# Patient Record
Sex: MALE | Race: WHITE | NOT HISPANIC OR LATINO | Employment: OTHER | URBAN - METROPOLITAN AREA
[De-identification: names, ages, dates, MRNs, and addresses within clinical notes are randomized per-mention and may not be internally consistent; named-entity substitution may affect disease eponyms.]

---

## 2017-01-03 ENCOUNTER — OFFICE VISIT (OUTPATIENT)
Dept: INTERNAL MEDICINE | Facility: CLINIC | Age: 77
End: 2017-01-03

## 2017-01-03 VITALS
HEART RATE: 70 BPM | DIASTOLIC BLOOD PRESSURE: 66 MMHG | WEIGHT: 186 LBS | OXYGEN SATURATION: 98 % | SYSTOLIC BLOOD PRESSURE: 124 MMHG | BODY MASS INDEX: 25.19 KG/M2 | HEIGHT: 72 IN

## 2017-01-03 DIAGNOSIS — M17.0 PRIMARY OSTEOARTHRITIS OF BOTH KNEES: ICD-10-CM

## 2017-01-03 DIAGNOSIS — G60.9 IDIOPATHIC PERIPHERAL NEUROPATHY: ICD-10-CM

## 2017-01-03 DIAGNOSIS — E78.49 OTHER HYPERLIPIDEMIA: ICD-10-CM

## 2017-01-03 DIAGNOSIS — M10.472 ACUTE GOUT DUE TO OTHER SECONDARY CAUSE INVOLVING TOE OF LEFT FOOT: Primary | ICD-10-CM

## 2017-01-03 DIAGNOSIS — F52.21 ERECTILE DYSFUNCTION OF NONORGANIC ORIGIN: ICD-10-CM

## 2017-01-03 DIAGNOSIS — I10 ESSENTIAL HYPERTENSION: ICD-10-CM

## 2017-01-03 LAB
ALBUMIN SERPL-MCNC: 3.8 G/DL (ref 3.2–4.8)
ALBUMIN/GLOB SERPL: 1.6 G/DL (ref 1.5–2.5)
ALP SERPL-CCNC: 67 U/L (ref 25–100)
ALT SERPL-CCNC: 28 U/L (ref 7–40)
AST SERPL-CCNC: 29 U/L (ref 0–33)
BILIRUB SERPL-MCNC: 0.4 MG/DL (ref 0.3–1.2)
BUN SERPL-MCNC: 23 MG/DL (ref 9–23)
BUN/CREAT SERPL: 25.6 (ref 7–25)
CALCIUM SERPL-MCNC: 9.6 MG/DL (ref 8.7–10.4)
CHLORIDE SERPL-SCNC: 107 MMOL/L (ref 99–109)
CHOLEST SERPL-MCNC: 132 MG/DL (ref 0–200)
CO2 SERPL-SCNC: 38 MMOL/L (ref 20–31)
CREAT SERPL-MCNC: 0.9 MG/DL (ref 0.6–1.3)
GLOBULIN SER CALC-MCNC: 2.4 GM/DL
GLUCOSE SERPL-MCNC: 93 MG/DL (ref 70–100)
HDLC SERPL-MCNC: 42 MG/DL (ref 40–60)
LDLC SERPL CALC-MCNC: 73 MG/DL (ref 0–100)
POTASSIUM SERPL-SCNC: 4.6 MMOL/L (ref 3.5–5.5)
PROT SERPL-MCNC: 6.2 G/DL (ref 5.7–8.2)
SODIUM SERPL-SCNC: 144 MMOL/L (ref 132–146)
TRIGL SERPL-MCNC: 83 MG/DL (ref 0–150)
TSH SERPL DL<=0.005 MIU/L-ACNC: 2.19 MIU/ML (ref 0.35–5.35)
VLDLC SERPL CALC-MCNC: 16.6 MG/DL

## 2017-01-03 PROCEDURE — G0444 DEPRESSION SCREEN ANNUAL: HCPCS | Performed by: HOSPITALIST

## 2017-01-03 PROCEDURE — 96372 THER/PROPH/DIAG INJ SC/IM: CPT | Performed by: HOSPITALIST

## 2017-01-03 PROCEDURE — 96160 PT-FOCUSED HLTH RISK ASSMT: CPT | Performed by: HOSPITALIST

## 2017-01-03 PROCEDURE — 99397 PER PM REEVAL EST PAT 65+ YR: CPT | Performed by: HOSPITALIST

## 2017-01-03 PROCEDURE — G0439 PPPS, SUBSEQ VISIT: HCPCS | Performed by: HOSPITALIST

## 2017-01-03 RX ORDER — METHYLPREDNISOLONE ACETATE 40 MG/ML
40 INJECTION, SUSPENSION INTRA-ARTICULAR; INTRALESIONAL; INTRAMUSCULAR; SOFT TISSUE ONCE
Status: COMPLETED | OUTPATIENT
Start: 2017-01-03 | End: 2017-01-03

## 2017-01-03 RX ORDER — VARDENAFIL HYDROCHLORIDE 20 MG/1
20 TABLET ORAL DAILY PRN
Qty: 30 TABLET | Refills: 5 | OUTPATIENT
Start: 2017-01-03 | End: 2017-12-08

## 2017-01-03 RX ORDER — TADALAFIL 20 MG/1
20 TABLET ORAL DAILY PRN
Qty: 30 TABLET | Refills: 5 | OUTPATIENT
Start: 2017-01-03 | End: 2017-12-08

## 2017-01-03 RX ADMIN — METHYLPREDNISOLONE ACETATE 40 MG: 40 INJECTION, SUSPENSION INTRA-ARTICULAR; INTRALESIONAL; INTRAMUSCULAR; SOFT TISSUE at 10:10

## 2017-01-03 NOTE — PROGRESS NOTES
Subjective   Isrrael Marino is a 76 y.o. male. Annual Exam (subsequent medicare wellness visit)   Also complains og OA in his knees, gout in his toes, and GERD      HPI Comments: He has been having gout pain in the left great toe and aleve is not taking care of it. He has taken steroid shot for the gout in the past and it helped. He also has chronic knee pain due to OA. He has not had any interventions but its has been worse and he would like to try an injection. His acid reflux is controlled on Nexium one a day but he had a flare up in the last few months.      The following portions of the patient's history were reviewed and updated as appropriate: allergies, current medications, past family history, past medical history, past social history, past surgical history and problem list.    Review of Systems   Constitutional: Negative for activity change and appetite change.   HENT: Negative for congestion and dental problem.    Respiratory: Negative for cough and choking.    Genitourinary: Negative for difficulty urinating and dysuria.   Musculoskeletal: Positive for arthralgias. Negative for gait problem.   Neurological: Negative for dizziness and facial asymmetry.   Psychiatric/Behavioral: Negative for agitation and behavioral problems.       Objective   Vitals:    01/03/17 0840   BP: 124/66   Pulse: 70   SpO2: 98%       Physical Exam   Constitutional: He is oriented to person, place, and time. He appears well-developed and well-nourished.   HENT:   Head: Normocephalic and atraumatic.   Right Ear: External ear normal.   Left Ear: External ear normal.   Mouth/Throat: Oropharynx is clear and moist.   Eyes: Conjunctivae and EOM are normal. Pupils are equal, round, and reactive to light.   Neck: Normal range of motion. Neck supple.   Cardiovascular: Normal rate and regular rhythm.    Pulmonary/Chest: Effort normal and breath sounds normal.   Abdominal: Bowel sounds are normal.   Musculoskeletal: Normal range of  motion. He exhibits deformity. He exhibits no tenderness.   Neurological: He is alert and oriented to person, place, and time.   Skin: Skin is warm and dry.   Psychiatric: He has a normal mood and affect. His behavior is normal. Judgment normal.       Assessment/Plan   Isrrael was seen today for annual exam.    Diagnoses and all orders for this visit:    Acute gout due to other secondary cause involving toe of left foot    Primary osteoarthritis of both knees    Idiopathic peripheral neuropathy        Results for orders placed or performed in visit on 09/01/16    COLONOSCOPY   Result Value Ref Range     Colonoscopy Fiberoptic     COLONOSCOPY   Result Value Ref Range     Colonoscopy Complete Colonoscopy

## 2017-01-03 NOTE — MR AVS SNAPSHOT
Isrrael CASEY Ned   1/3/2017 8:30 AM   Office Visit    Dept Phone:  [phone]   Encounter #:  [id number]    Provider:  Everardo Peña MD   Department:  [facility] INTERNAL MEDICINE AND ENDOCRINOLOGY [place]                Your Full Care Plan              Today's Medication Changes          These changes are accurate as of: 1/3/17 10:09 AM.  If you have any questions, ask your nurse or doctor.               New Medication(s)Ordered:     tadalafil 20 MG tablet   Commonly known as:  CIALIS   Take 1 tablet by mouth Daily As Needed for erectile dysfunction.   Started by:  Everardo Peña MD       vardenafil 20 MG tablet   Commonly known as:  LEVITRA   Take 1 tablet by mouth Daily As Needed for erectile dysfunction.   Started by:  Everardo Peña MD            Where to Get Your Medications      You can get these medications from any pharmacy     Bring a paper prescription for each of these medications     tadalafil 20 MG tablet    vardenafil 20 MG tablet                  Your Updated Medication List          This list is accurate as of: 1/3/17 10:09 AM.  Always use your most recent med list.                B COMPLEX PO       B-12 PO       clopidogrel 75 MG tablet   Commonly known as:  PLAVIX   TAKE 1 TABLET EVERY DAY       esomeprazole 20 MG capsule   Commonly known as:  nexIUM       fluticasone 50 MCG/ACT nasal spray   Commonly known as:  FLONASE       gabapentin 300 MG capsule   Commonly known as:  NEURONTIN   Take 1-2 capsules by mouth every night.       lisinopril 10 MG tablet   Commonly known as:  PRINIVIL,ZESTRIL       metoprolol succinate XL 25 MG 24 hr tablet   Commonly known as:  TOPROL-XL   TAKE 1 TABLET ONE TIME DAILY       simvastatin 40 MG tablet   Commonly known as:  ZOCOR       tadalafil 20 MG tablet   Commonly known as:  CIALIS   Take 1 tablet by mouth Daily As Needed for erectile dysfunction.       vardenafil 20 MG tablet   Commonly known as:  LEVITRA   Take 1 tablet by  mouth Daily As Needed for erectile dysfunction.               We Performed the Following     Ambulatory Referral to Orthopedic Surgery     Comprehensive Metabolic Panel     Lipid Panel     TSH       You Were Diagnosed With        Codes Comments    Acute gout due to other secondary cause involving toe of left foot    -  Primary ICD-10-CM: M10.472     Primary osteoarthritis of both knees     ICD-10-CM: M17.0  ICD-9-CM: 715.16     Idiopathic peripheral neuropathy     ICD-10-CM: G60.9  ICD-9-CM: 356.9     Erectile dysfunction of nonorganic origin     ICD-10-CM: F52.21  ICD-9-CM: 302.72     Essential hypertension     ICD-10-CM: I10  ICD-9-CM: 401.9     Other hyperlipidemia     ICD-10-CM: E78.4  ICD-9-CM: 272.4       Medications to be Given to You by a Medical Professional     Due       Frequency    1/3/2017 methylPREDNISolone acetate (DEPO-medrol) injection 40 mg  Once      Instructions     None    Patient Instructions History      Upcoming Appointments     Visit Type Date Time Department    SUBSEQUENT MEDICARE WELLNESS 1/3/2017  8:30 AM [facility]    FOLLOW UP 7/3/2017  8:15 AM [facility]    FOLLOW UP 9/12/2017 10:00 AM [facility]    FOLLOW UP 11/6/2017  9:45 AM [facility]      [employer] Signup     Our records indicate that your [facility] [employer] account has been deactivated. If you would like to reactivate your account, please email [email or web address] or call [phone] to talk to our [employer] staff.             Other Info from Your Visit           Your Appointments     Jul 03, 2017  8:15 AM EDT   Follow Up with Everardo Peña MD   [facility] INTERNAL MEDICINE AND ENDOCRINOLOGY [place] (--)    [address]  [place] [id number] [phone]           Arrive 15 minutes prior to appointment.            Sep 12, 2017 10:00 AM EDT   Follow Up with Zeus Rizvi MD   [facility] (--)    [address]  [place]  "[id number]   [phone]           Arrive 15 minutes prior to appointment.            Nov 06, 2017  9:45 AM EST   Follow Up with Casimiro Bernal MD   [facility] CARDIOLOG[facility] (--)    [address]  [place] [address] [phone]           Arrive 15 minutes prior to appointment.              Allergies     Ibuprofen        Reason for Visit     Annual Exam subsequent medicare wellness visit      Vital Signs     Blood Pressure Pulse Height Weight Oxygen Saturation Body Mass Index    124/66 70 72\" (182.9 cm) 186 lb (84.4 kg) 98% 25.23 kg/m2    Smoking Status                   Never Smoker           Problems and Diagnoses Noted     Sexual dysfunction    Gout    High cholesterol or triglycerides    High blood pressure    Problem with function of peripheral nerve    Primary osteoarthritis of both knees        "

## 2017-01-03 NOTE — PROGRESS NOTES
QUICK REFERENCE INFORMATION:  The ABCs of the Annual Wellness Visit    Subsequent Medicare Wellness Visit    HEALTH RISK ASSESSMENT    Recent Hospitalizations:  No recent hospitalization(s)..    Health Habits:  Current Diet: Well Balanced Diet  Dental Exam. up to date  Eye Exam. not up to date - scheduling an appt  Exercise: 5 times/week.  Current exercise activities include: light weights/kettlebells and walking    Current Medical Providers:  Patient Care Team:  Everardo Peña MD as PCP - General  Everardo Peña MD as PCP - Family Medicine    The [facility] providers who are involved in the care of this patient are listed above. Additional providers and suppliers are listed below:          Smoking Status:  History   Smoking Status   • Never Smoker   Smokeless Tobacco   • Never Used       Alcohol Consumption:  History   Alcohol Use No       Depression Screen:   PHQ-9 Depression Screening 1/3/2017   Little interest or pleasure in doing things 0   Feeling down, depressed, or hopeless 0   Trouble falling or staying asleep, or sleeping too much 0   Feeling tired or having little energy 0   Poor appetite or overeating 0   Feeling bad about yourself - or that you are a failure or have let yourself or your family down 0   Trouble concentrating on things, such as reading the newspaper or watching television 0   Moving or speaking so slowly that other people could have noticed. Or the opposite - being so fidgety or restless that you have been moving around a lot more than usual 0   Thoughts that you would be better off dead, or of hurting yourself in some way 0   PHQ-9 Total Score 0       Functional and Cognitive Screening:  Functional & Cognitive Status 1/3/2017   Do you have difficulty preparing food and eating? No   Do you have difficulty bathing yourself? No   Do you have difficulty getting dressed? Yes   Do you have difficulty using the toilet? No   Do you have difficulty moving around from place to place?  Yes   In the past year have you fallen or experienced a near fall? No   Do you need help using the phone?  No   Are you deaf or do you have serious difficulty hearing?  Yes   Do you need help with transportation? No   Do you need help shopping? No   Do you need help preparing meals?  No   Do you need help with housework?  No   Do you need help with laundry? No   Do you need help taking your medications? No   Do you need help managing money? No   Do you have difficulty concentrating, remembering or making decisions? No       Falls Risk Assessment:       Does the patient have evidence of cognitive impairment? No    Recent Lab Results:  CMP:  Lab Results   Component Value Date    BUN 40 (H) 06/29/2016    CREATININE 1.10 06/29/2016    EGFRIFNONA 65 06/29/2016    BCR 36.4 (H) 06/29/2016     06/29/2016    K 4.5 06/29/2016    CO2 31.0 06/29/2016    CALCIUM 9.2 06/29/2016    ALBUMIN 4.00 06/29/2016    LABIL2 1.4 06/29/2016    BILITOT 0.6 06/29/2016    ALKPHOS 60 06/29/2016    AST 30 06/29/2016    ALT 22 06/29/2016     Lipid Panel:  Lab Results   Component Value Date    CHLPL 160 11/24/2015    TRIG 74 11/24/2015    HDL 64 (H) 11/24/2015     HbA1c:  No results found for: HGBA1C  Urine Microalbumin:  No results found for: MICROALBUR, POCMALB  Visual Acuity:  No exam data present    Age-appropriate Screening Schedule:  Refer to the list below for future screening recommendations based on patient's age, sex and/or medical conditions. Orders for these recommended tests are listed in the plan section. The patient has been provided with a written plan.    Health Maintenance   Topic Date Due   • TDAP/TD VACCINES (2 - Td) 12/11/2012   • LIPID PANEL  01/03/2017   • INFLUENZA VACCINE  08/01/2016   • ZOSTER VACCINE  02/14/2017 (Originally 6/29/2016)   • PNEUMOCOCCAL VACCINES (65+ LOW/MEDIUM RISK)  Excluded        Subjective   History of Present Illness    Isrrael Marino is a 76 y.o. male who presents for an Subsequent Wellness  Visit. In addition, we addressed the following health issues:Osteoarthritis and gout in his left great toe, GERD  He also had a prostate exam with Dr. Gutierrez this year  The following portions of the patient's history were reviewed and updated as appropriate: allergies, current medications, past family history, past medical history, past social history, past surgical history and problem list.    Outpatient Medications Prior to Visit   Medication Sig Dispense Refill   • B Complex Vitamins (B COMPLEX PO) Take  by mouth As Needed.     • clopidogrel (PLAVIX) 75 MG tablet TAKE 1 TABLET EVERY DAY 90 tablet 2   • Cyanocobalamin (B-12 PO) Take  by mouth As Needed.     • esomeprazole (NexIUM) 20 MG capsule Take 20 mg by mouth 2 (two) times a day.     • fluticasone (FLONASE) 50 MCG/ACT nasal spray 2 sprays into each nostril daily.     • gabapentin (NEURONTIN) 300 MG capsule Take 1-2 capsules by mouth every night. (Patient taking differently: Take 300-600 mg by mouth As Needed.) 60 capsule 3   • lisinopril (PRINIVIL,ZESTRIL) 10 MG tablet Take 10 mg by mouth daily.     • metoprolol succinate XL (TOPROL-XL) 25 MG 24 hr tablet TAKE 1 TABLET ONE TIME DAILY 90 tablet 2   • simvastatin (ZOCOR) 40 MG tablet Take 40 mg by mouth daily.       No facility-administered medications prior to visit.        Patient Active Problem List   Diagnosis   • Gastroesophageal reflux disease   • Atopic rhinitis   • Arthritis   • Peripheral neuropathy   • Arthralgia of hip   • Neck pain   • Low back pain   • Irritable bowel syndrome   • Hypertension   • Hyperlipidemia   • Hyperglycemia   • Hemorrhoids   • Gout   • Enlarged prostate   • Erectile dysfunction of nonorganic origin   • Cough   • Constipation   • Chronic diarrhea   • Benign prostatic hyperplasia   • Chronic coronary artery disease   • Lower extremity neuropathy   • Primary osteoarthritis of both knees       Identification of Risk Factors:  Risk factors include: cardiovascular risk.    Review of  "Systems   Constitutional: Negative for activity change and appetite change.   HENT: Negative for congestion and dental problem.    Respiratory: Negative for apnea and chest tightness.    Cardiovascular: Negative for chest pain and leg swelling.   Endocrine: Negative for cold intolerance and heat intolerance.   Genitourinary: Negative for difficulty urinating and dysuria.   Musculoskeletal: Negative for arthralgias and back pain.   Neurological: Negative for dizziness and facial asymmetry.   Hematological: Negative for adenopathy. Does not bruise/bleed easily.   Psychiatric/Behavioral: Negative for agitation and behavioral problems.       Compared to one year ago, the patient feels his physical health is the same.  Compared to one year ago, the patient feels his mental health is the same.    Objective     Physical Exam    Vitals:    01/03/17 0840   BP: 124/66   Pulse: 70   SpO2: 98%   Weight: 186 lb (84.4 kg)   Height: 72\" (182.9 cm)       Body mass index is 25.23 kg/(m^2).  Discussed the patient's BMI with him. The BMI is in the acceptable range.    Assessment/Plan   Patient Self-Management and Personalized Health Advice  The patient has been provided with information about: designing advance directives and preventive services including:   · Advanced directives: has an advanced directive - a copy HAS NOT been provided.    Visit Diagnoses:    ICD-10-CM ICD-9-CM   1. Acute gout due to other secondary cause involving toe of left foot M10.472    2. Primary osteoarthritis of both knees M17.0 715.16   3. Idiopathic peripheral neuropathy G60.9 356.9   4. Erectile dysfunction of nonorganic origin F52.21 302.72   5. Essential hypertension I10 401.9   6. Other hyperlipidemia E78.4 272.4       Orders Placed This Encounter   Procedures   • Comprehensive Metabolic Panel   • Lipid Panel   • TSH   • Ambulatory Referral to Orthopedic Surgery     Referral Priority:   Routine     Referral Type:   Consultation     Referral Reason:   " Specialty Services Required     Requested Specialty:   Orthopedic Surgery     Number of Visits Requested:   1       Outpatient Encounter Prescriptions as of 1/3/2017   Medication Sig Dispense Refill   • B Complex Vitamins (B COMPLEX PO) Take  by mouth As Needed.     • clopidogrel (PLAVIX) 75 MG tablet TAKE 1 TABLET EVERY DAY 90 tablet 2   • Cyanocobalamin (B-12 PO) Take  by mouth As Needed.     • esomeprazole (NexIUM) 20 MG capsule Take 20 mg by mouth 2 (two) times a day.     • fluticasone (FLONASE) 50 MCG/ACT nasal spray 2 sprays into each nostril daily.     • gabapentin (NEURONTIN) 300 MG capsule Take 1-2 capsules by mouth every night. (Patient taking differently: Take 300-600 mg by mouth As Needed.) 60 capsule 3   • lisinopril (PRINIVIL,ZESTRIL) 10 MG tablet Take 10 mg by mouth daily.     • metoprolol succinate XL (TOPROL-XL) 25 MG 24 hr tablet TAKE 1 TABLET ONE TIME DAILY 90 tablet 2   • simvastatin (ZOCOR) 40 MG tablet Take 40 mg by mouth daily.     • tadalafil (CIALIS) 20 MG tablet Take 1 tablet by mouth Daily As Needed for erectile dysfunction. 30 tablet 5   • vardenafil (LEVITRA) 20 MG tablet Take 1 tablet by mouth Daily As Needed for erectile dysfunction. 30 tablet 5     Facility-Administered Encounter Medications as of 1/3/2017   Medication Dose Route Frequency Provider Last Rate Last Dose   • methylPREDNISolone acetate (DEPO-medrol) injection 40 mg  40 mg Intramuscular Once Everardo Peña MD           Reviewed use of high risk medication in the elderly: yes  Reviewed for potential of harmful drug interactions in the elderly: yes    Follow Up:  Return in about 6 months (around 7/3/2017) for Next scheduled follow up.     An After Visit Summary and PPPS with all of these plans were given to the patient.

## 2017-02-08 RX ORDER — SIMVASTATIN 40 MG
TABLET ORAL
Qty: 90 TABLET | Refills: 3 | Status: SHIPPED | OUTPATIENT
Start: 2017-02-08 | End: 2018-03-02 | Stop reason: SDUPTHER

## 2017-02-08 RX ORDER — LISINOPRIL 10 MG/1
TABLET ORAL
Qty: 90 TABLET | Refills: 3 | Status: SHIPPED | OUTPATIENT
Start: 2017-02-08 | End: 2018-07-09

## 2017-05-10 RX ORDER — FLUTICASONE PROPIONATE 50 MCG
SPRAY, SUSPENSION (ML) NASAL
Qty: 48 G | Refills: 3 | OUTPATIENT
Start: 2017-05-10 | End: 2017-12-08

## 2017-05-23 ENCOUNTER — OFFICE VISIT (OUTPATIENT)
Dept: ORTHOPEDIC SURGERY | Facility: CLINIC | Age: 77
End: 2017-05-23

## 2017-05-23 VITALS
WEIGHT: 182 LBS | HEIGHT: 71 IN | BODY MASS INDEX: 25.48 KG/M2 | SYSTOLIC BLOOD PRESSURE: 142 MMHG | HEART RATE: 63 BPM | DIASTOLIC BLOOD PRESSURE: 70 MMHG

## 2017-05-23 DIAGNOSIS — M17.0 PRIMARY OSTEOARTHRITIS OF BOTH KNEES: Primary | ICD-10-CM

## 2017-05-23 PROCEDURE — 20610 DRAIN/INJ JOINT/BURSA W/O US: CPT | Performed by: PHYSICIAN ASSISTANT

## 2017-05-23 RX ORDER — LIDOCAINE HYDROCHLORIDE 10 MG/ML
4 INJECTION, SOLUTION INFILTRATION; PERINEURAL
Status: COMPLETED | OUTPATIENT
Start: 2017-05-23 | End: 2017-05-23

## 2017-05-23 RX ORDER — METHYLPREDNISOLONE ACETATE 40 MG/ML
40 INJECTION, SUSPENSION INTRA-ARTICULAR; INTRALESIONAL; INTRAMUSCULAR; SOFT TISSUE
Status: COMPLETED | OUTPATIENT
Start: 2017-05-23 | End: 2017-05-23

## 2017-05-23 RX ORDER — BUPIVACAINE HYDROCHLORIDE 2.5 MG/ML
4 INJECTION, SOLUTION INFILTRATION; PERINEURAL
Status: COMPLETED | OUTPATIENT
Start: 2017-05-23 | End: 2017-05-23

## 2017-05-23 RX ADMIN — BUPIVACAINE HYDROCHLORIDE 4 ML: 2.5 INJECTION, SOLUTION INFILTRATION; PERINEURAL at 08:48

## 2017-05-23 RX ADMIN — METHYLPREDNISOLONE ACETATE 40 MG: 40 INJECTION, SUSPENSION INTRA-ARTICULAR; INTRALESIONAL; INTRAMUSCULAR; SOFT TISSUE at 08:48

## 2017-05-23 RX ADMIN — METHYLPREDNISOLONE ACETATE 40 MG: 40 INJECTION, SUSPENSION INTRA-ARTICULAR; INTRALESIONAL; INTRAMUSCULAR; SOFT TISSUE at 08:50

## 2017-05-23 RX ADMIN — LIDOCAINE HYDROCHLORIDE 4 ML: 10 INJECTION, SOLUTION INFILTRATION; PERINEURAL at 08:48

## 2017-05-23 RX ADMIN — LIDOCAINE HYDROCHLORIDE 4 ML: 10 INJECTION, SOLUTION INFILTRATION; PERINEURAL at 08:50

## 2017-05-23 RX ADMIN — BUPIVACAINE HYDROCHLORIDE 4 ML: 2.5 INJECTION, SOLUTION INFILTRATION; PERINEURAL at 08:50

## 2017-07-03 ENCOUNTER — OFFICE VISIT (OUTPATIENT)
Dept: INTERNAL MEDICINE | Facility: CLINIC | Age: 77
End: 2017-07-03

## 2017-07-03 VITALS
DIASTOLIC BLOOD PRESSURE: 84 MMHG | SYSTOLIC BLOOD PRESSURE: 138 MMHG | BODY MASS INDEX: 25.38 KG/M2 | OXYGEN SATURATION: 98 % | HEART RATE: 56 BPM | WEIGHT: 182 LBS

## 2017-07-03 DIAGNOSIS — M10.471 ACUTE GOUT DUE TO OTHER SECONDARY CAUSE INVOLVING TOE OF RIGHT FOOT: ICD-10-CM

## 2017-07-03 DIAGNOSIS — I10 ESSENTIAL HYPERTENSION: Primary | ICD-10-CM

## 2017-07-03 PROCEDURE — 99213 OFFICE O/P EST LOW 20 MIN: CPT | Performed by: NURSE PRACTITIONER

## 2017-07-03 RX ORDER — HYDROCODONE BITARTRATE AND ACETAMINOPHEN 10; 325 MG/1; MG/1
TABLET ORAL AS NEEDED
COMMUNITY
Start: 2017-06-29 | End: 2017-12-07

## 2017-07-03 RX ORDER — CLINDAMYCIN PHOSPHATE 11.9 MG/ML
SOLUTION TOPICAL
COMMUNITY
Start: 2017-06-13 | End: 2017-09-21

## 2017-07-03 RX ORDER — AMOXICILLIN AND CLAVULANATE POTASSIUM 500; 125 MG/1; MG/1
TABLET, FILM COATED ORAL
COMMUNITY
Start: 2017-06-29 | End: 2017-09-21

## 2017-07-03 RX ORDER — COLCHICINE 0.6 MG/1
TABLET ORAL
Qty: 9 TABLET | Refills: 0 | OUTPATIENT
Start: 2017-07-03 | End: 2017-12-08

## 2017-07-03 NOTE — PROGRESS NOTES
Subjective  Follow-up (hypertension, gout flared up )      Isrrael Marino is a 77 y.o. male.   Allergies   Allergen Reactions   • Ibuprofen      History of Present Illness      States his blood pressure was doing well usually 120/68, no cp, no soa     Did have a gout flare  Up , took his meds and got a little better but still red and swollen 3 days , is a constant ache  The following portions of the patient's history were reviewed and updated as appropriate: allergies, current medications, past family history, past medical history, past social history, past surgical history and problem list.    Review of Systems   Constitutional: Negative for fatigue.   Respiratory: Negative for apnea, chest tightness and shortness of breath.    Cardiovascular: Negative for chest pain, palpitations and leg swelling.   Musculoskeletal: Positive for arthralgias.   Skin: Negative for color change.   Psychiatric/Behavioral: The patient is not nervous/anxious.    All other systems reviewed and are negative.      Objective   Physical Exam   Constitutional: He is oriented to person, place, and time. He appears well-developed and well-nourished.   HENT:   Head: Normocephalic and atraumatic.   Eyes: Conjunctivae are normal.   Neck: Neck supple. No thyromegaly present.   Cardiovascular: Normal rate and regular rhythm.    Pulmonary/Chest: Effort normal and breath sounds normal.   Musculoskeletal: He exhibits tenderness.   Right great toe is swollen, erythematous and swollen   Lymphadenopathy:     He has no cervical adenopathy.   Neurological: He is alert and oriented to person, place, and time.   Skin: Skin is warm and dry.   Psychiatric: He has a normal mood and affect. His behavior is normal. Judgment and thought content normal.   Nursing note and vitals reviewed.    /84  Pulse 56  Wt 182 lb (82.6 kg)  SpO2 98%  BMI 25.38 kg/m2    Assessment/Plan     Problem List Items Addressed This Visit        Cardiovascular and Mediastinum     Hypertension - Primary     Blood pressure is controlled with medication.              Other    Gout     meds as ordered               rtc 6 mos

## 2017-08-22 ENCOUNTER — OFFICE VISIT (OUTPATIENT)
Dept: ORTHOPEDIC SURGERY | Facility: CLINIC | Age: 77
End: 2017-08-22

## 2017-08-22 DIAGNOSIS — M25.511 BILATERAL SHOULDER PAIN, UNSPECIFIED CHRONICITY: ICD-10-CM

## 2017-08-22 DIAGNOSIS — M25.512 BILATERAL SHOULDER PAIN, UNSPECIFIED CHRONICITY: ICD-10-CM

## 2017-08-22 DIAGNOSIS — M17.0 PRIMARY OSTEOARTHRITIS OF BOTH KNEES: Primary | ICD-10-CM

## 2017-08-22 PROCEDURE — 99213 OFFICE O/P EST LOW 20 MIN: CPT | Performed by: PHYSICIAN ASSISTANT

## 2017-08-22 PROCEDURE — 20610 DRAIN/INJ JOINT/BURSA W/O US: CPT | Performed by: PHYSICIAN ASSISTANT

## 2017-08-22 RX ORDER — METHYLPREDNISOLONE ACETATE 40 MG/ML
40 INJECTION, SUSPENSION INTRA-ARTICULAR; INTRALESIONAL; INTRAMUSCULAR; SOFT TISSUE
Status: COMPLETED | OUTPATIENT
Start: 2017-08-22 | End: 2017-08-22

## 2017-08-22 RX ORDER — BUPIVACAINE HYDROCHLORIDE 2.5 MG/ML
4 INJECTION, SOLUTION INFILTRATION; PERINEURAL
Status: COMPLETED | OUTPATIENT
Start: 2017-08-22 | End: 2017-08-22

## 2017-08-22 RX ORDER — LIDOCAINE HYDROCHLORIDE 10 MG/ML
4 INJECTION, SOLUTION INFILTRATION; PERINEURAL
Status: COMPLETED | OUTPATIENT
Start: 2017-08-22 | End: 2017-08-22

## 2017-08-22 RX ADMIN — LIDOCAINE HYDROCHLORIDE 4 ML: 10 INJECTION, SOLUTION INFILTRATION; PERINEURAL at 09:02

## 2017-08-22 RX ADMIN — METHYLPREDNISOLONE ACETATE 40 MG: 40 INJECTION, SUSPENSION INTRA-ARTICULAR; INTRALESIONAL; INTRAMUSCULAR; SOFT TISSUE at 09:02

## 2017-08-22 RX ADMIN — BUPIVACAINE HYDROCHLORIDE 4 ML: 2.5 INJECTION, SOLUTION INFILTRATION; PERINEURAL at 09:02

## 2017-08-22 RX ADMIN — LIDOCAINE HYDROCHLORIDE 4 ML: 10 INJECTION, SOLUTION INFILTRATION; PERINEURAL at 09:04

## 2017-08-22 RX ADMIN — METHYLPREDNISOLONE ACETATE 40 MG: 40 INJECTION, SUSPENSION INTRA-ARTICULAR; INTRALESIONAL; INTRAMUSCULAR; SOFT TISSUE at 09:04

## 2017-08-22 RX ADMIN — BUPIVACAINE HYDROCHLORIDE 4 ML: 2.5 INJECTION, SOLUTION INFILTRATION; PERINEURAL at 09:04

## 2017-08-22 NOTE — PROGRESS NOTES
Procedure   Large Joint Arthrocentesis  Date/Time: 8/22/2017 9:04 AM  Consent given by: patient  Site marked: site marked  Timeout: Immediately prior to procedure a time out was called to verify the correct patient, procedure, equipment, support staff and site/side marked as required   Supporting Documentation  Indications: pain   Procedure Details  Location: knee - L knee  Preparation: Patient was prepped and draped in the usual sterile fashion  Needle size: 22 G  Approach: anterolateral  Medications administered: 4 mL bupivacaine; 4 mL lidocaine 1 %; 40 mg methylPREDNISolone acetate 40 MG/ML  Patient tolerance: patient tolerated the procedure well with no immediate complications

## 2017-08-22 NOTE — PROGRESS NOTES
Procedure   Large Joint Arthrocentesis  Date/Time: 8/22/2017 9:02 AM  Consent given by: patient  Site marked: site marked  Timeout: Immediately prior to procedure a time out was called to verify the correct patient, procedure, equipment, support staff and site/side marked as required   Supporting Documentation  Indications: pain   Procedure Details  Location: knee - R knee  Preparation: Patient was prepped and draped in the usual sterile fashion  Needle size: 22 G  Approach: anterolateral  Medications administered: 4 mL bupivacaine; 4 mL lidocaine 1 %; 40 mg methylPREDNISolone acetate 40 MG/ML  Patient tolerance: patient tolerated the procedure well with no immediate complications

## 2017-08-22 NOTE — PROGRESS NOTES
I have reviewed the notes, assessments, and/or procedures performed by Tish Shelley PA-C, I concur with her documentation of Isrrael Marino.

## 2017-08-22 NOTE — PROGRESS NOTES
Patient: Isrrael Marino  : 1940    Primary Care Provider: Everardo Peña MD (Inactive)    Requesting Provider: As above    Follow-up (Bilat knees)      History    Chief Complaint: Bilateral knee pain.    History of Present Illness: Patient returns today to discuss his bilateral knee pain with known bilateral knee arthritis and chondrocalcinosis.  He reports no new symptoms from the knees.  He complains of medial functional pain with no night pain, left more painful than the right.  He is still able to be very active with the pain.  He takes Aleve as needed.  He describes the pain as sharp.  He has been treated in the past with steroid injection every 3-4 months with 90% relief for at least 2 months and sometimes 3.  He reports that about 20% of pain has returned since his previous injections in May 2017.  He is not interested knee replacement and like to continue with intermittent injection.      Patient also complains of bilateral shoulder pain longstanding.  He has had several falls on the left shoulder.  He complains of pain with lifting and lying on his shoulders and feelings of weakness on the right.      Allergies   Allergen Reactions   • Ibuprofen      Current Outpatient Prescriptions on File Prior to Visit   Medication Sig Dispense Refill   • amoxicillin-clavulanate (AUGMENTIN) 500-125 MG per tablet      • B Complex Vitamins (B COMPLEX PO) Take  by mouth As Needed.     • clindamycin (CLEOCIN T) 1 % external solution      • clopidogrel (PLAVIX) 75 MG tablet TAKE 1 TABLET EVERY DAY 90 tablet 2   • colchicine (COLCRYS) 0.6 MG tablet Take one tablet, if no relief in 2 h take 1 more, if no relief 2 hours until then may take a 3rd pill 9 tablet 0   • Cyanocobalamin (B-12 PO) Take  by mouth As Needed.     • esomeprazole (NexIUM) 20 MG capsule Take 20 mg by mouth 2 (two) times a day.     • fluticasone (FLONASE) 50 MCG/ACT nasal spray USE 1 TO 2 SPRAYS IN EACH NOSTRIL ONE TIME DAILY 48 g 3   •  gabapentin (NEURONTIN) 300 MG capsule Take 1-2 capsules by mouth every night. (Patient taking differently: Take 300-600 mg by mouth As Needed.) 60 capsule 3   • HYDROcodone-acetaminophen (NORCO)  MG per tablet      • lisinopril (PRINIVIL,ZESTRIL) 10 MG tablet TAKE 1 TABLET EVERY DAY 90 tablet 3   • metoprolol succinate XL (TOPROL-XL) 25 MG 24 hr tablet TAKE 1 TABLET ONE TIME DAILY 90 tablet 2   • simvastatin (ZOCOR) 40 MG tablet TAKE 1 TABLET EVERY DAY 90 tablet 3   • tadalafil (CIALIS) 20 MG tablet Take 1 tablet by mouth Daily As Needed for erectile dysfunction. 30 tablet 5   • vardenafil (LEVITRA) 20 MG tablet Take 1 tablet by mouth Daily As Needed for erectile dysfunction. 30 tablet 5     No current facility-administered medications on file prior to visit.      Social History     Social History   • Marital status:      Spouse name: N/A   • Number of children: N/A   • Years of education: N/A     Occupational History   • Not on file.     Social History Main Topics   • Smoking status: Never Smoker   • Smokeless tobacco: Never Used   • Alcohol use No   • Drug use: No   • Sexual activity: Defer     Other Topics Concern   • Not on file     Social History Narrative     Past Surgical History:   Procedure Laterality Date   • APPENDECTOMY  1956   • ARTHRODESIS      Lumbar L, Lumbar L3   • BACK SURGERY  1997    spinal decompression and fusion   • BREAST LUMPECTOMY     • CARPAL TUNNEL RELEASE  03/28/2014    Neuroplasty Decompression Median Nerve At Carpal Tunnel   • HERNIA REPAIR  2002    & 1998   • JOINT REPLACEMENT Right     2003  [facility]   • TOTAL HIP ARTHROPLASTY  2002   • VASECTOMY       Family History   Problem Relation Age of Onset   • Hyperlipidemia Other    • Hypertension Other    • Cancer Mother    • Heart disease Father    • Hypertension Father      Past Medical History:   Diagnosis Date   • Arthritis    • CAD (coronary artery disease)    • Cancer    • GERD (gastroesophageal reflux disease)    • Gout    •  Heart attack      Last Assessed: 28 Mar 2014   • Hyperlipidemia    • Hypertension    • IBS (irritable bowel syndrome)    • Low back pain    • Lower extremity neuropathy    • Lumbar canal stenosis    • Neck pain    • Numbness    • Right hip pain          Review of Systems   Constitutional: Negative.    HENT: Positive for hearing loss.    Eyes: Negative.    Respiratory: Negative.    Cardiovascular: Positive for leg swelling.   Gastrointestinal: Negative.    Endocrine: Negative.    Genitourinary: Negative.    Musculoskeletal: Positive for arthralgias and joint swelling.   Skin: Negative.    Allergic/Immunologic: Negative.    Neurological: Negative.    Hematological: Negative.  Does not bruise/bleed easily.   Psychiatric/Behavioral: Negative.        The following portions of the patient's history were reviewed and updated as appropriate: allergies, current medications, past family history, past medical history, past social history, past surgical history and problem list.    Objective   There were no vitals taken for this visit.    Physical Exam:   GENERAL: Body habitus: normal weight for height    Lower extremity edema: Left: none; Right: none    Varicose veins:  Left: none; Right: none    Gait: normal     Mental Status:  awake and alert; oriented to person, place, and time    Voice:  clear  SKIN:  Normal    Hair Growth:  Right:normal; Left:  normal  HEENT: Head: Normocephalic, no lesions, without obvious abnormality.     Eyes: sclera anicteric  PULM:  Repiratory effort normal    Ortho Exam    Right Knee Exam  ----------  ALIGNMENT: Right: neutral----------  RANGE OF MOTION:  Right: 5-120  LIGAMENTOUS STABILITY:   Right:stable to varus and valgus stress at terminal extension and 30 degrees without any evidence of laxity----------  STRENGTH:  KNEE FLEXION Right 5/5  KNEE EXTENSION Right 5/5 ----------  PAIN WITH PALPATION: Right denies tenderness to palpation about the knee  PAIN WITH KNEE ROM: Right no  PATELLAR  CREPITUS: Right yes   ----------    Left Knee Exam  ----------  Knee Exam:  ----------  ALIGNMENT:  Left: neutral  ----------  RANGE OF MOTION:  Left: 5-120  LIGAMENTOUS STABILITY:   Left:stable to varus and valgus stress at terminal extension and 30 degrees without any evidence of laxity   ----------  STRENGTH:  KNEE FLEXION  Left 5/5  KNEE EXTENSION Left 5/5  ----------  PAIN WITH PALPATION: Left medial joint line  PAIN WITH KNEE ROM:  Left no  PATELLAR CREPITUS:  Left yes  ----------        Medical Decision Making    Data Review:   none    Assessment and Plan/ Diagnosis/Treatment options:   1.  Doing well with nonoperative treatment of bilateral knee arthritis. I reviewed clinical findings with the patient today.  On exam, he has left medial joint line tenderness with no evidence of new ligament or meniscal pathology.  The right knee is nontender with no evidence of ligament or meniscal pathology.  Patient reports no new symptoms from the knees he continues to be active with 20% return pain since steroid injection of May 2017.  Recommendation today is repeat steroid injection into bilateral knees.  He'll return in 3-4 months or sooner if needed.    Using sterile technique, the right knee was sterilely prepped with Hibiclens.  Following a time out,  using a 22 gauge needle, the right knee was injected with 40mg Depo Medrol, 4 cc lidocaine and 4 cc marcaine.  Patient tolerated the procedure well.  No complications.    Using sterile technique, the left knee was sterilely prepped with Hibiclens. Following a time out,  using a 22 gauge needle, the left knee was injected with 40mg Depo Medrol, 4 cc lidocaine and 4 cc marcaine.  Patient tolerated the procedure well.  No complications.    2.  Bilateral shoulder pain.  I explained to the patient that I do not have a doctor in the practice now that treats shoulders.  I will refer him to [facility] for evaluation.

## 2017-09-21 ENCOUNTER — OFFICE VISIT (OUTPATIENT)
Dept: NEUROLOGY | Facility: CLINIC | Age: 77
End: 2017-09-21

## 2017-09-21 VITALS
DIASTOLIC BLOOD PRESSURE: 58 MMHG | HEART RATE: 59 BPM | WEIGHT: 184 LBS | SYSTOLIC BLOOD PRESSURE: 120 MMHG | OXYGEN SATURATION: 98 % | BODY MASS INDEX: 25.76 KG/M2 | HEIGHT: 71 IN

## 2017-09-21 DIAGNOSIS — G60.9 IDIOPATHIC PERIPHERAL NEUROPATHY: Primary | ICD-10-CM

## 2017-09-21 PROCEDURE — 99212 OFFICE O/P EST SF 10 MIN: CPT | Performed by: PSYCHIATRY & NEUROLOGY

## 2017-09-21 NOTE — PROGRESS NOTES
Subjective:     Patient ID: Isrrael Marino is a 77 y.o. male.    History of Present Illness     77 y.o.  male with peripheral neuropathy returns in follow up.  Last visit on 9/13/16 started  -600 mg qhs.     mg qhs educes cramps in feet at night.  Side effects of unsteadiness.   Trouble sensing where feet are in space.  Using brake and gas pedal can be   Notes continued numbness in feet, worse on the right than the left.  Once to twice a week has knife like jolts in feet while sitting.  Trouble using brake pedals due to not feeling feet.      Continue to use walk behind mower multiple times a week without difficulty.   Feet will cramp later in the day after exertion.  Wasting in right FDI.  N/T in 4/5 digits in bilateral hands.      The following portions of the patient's history were reviewed and updated as appropriate: allergies, current medications, past medical history, past surgical history and problem list.    Review of Systems   Constitutional: Negative for activity change, fatigue and unexpected weight change.   HENT: Negative for facial swelling, hearing loss, tinnitus, trouble swallowing and voice change.    Eyes: Negative for photophobia, pain and visual disturbance.   Respiratory: Negative for apnea, cough and choking.    Cardiovascular: Negative for chest pain.   Gastrointestinal: Negative for constipation, nausea and vomiting.   Endocrine: Negative for cold intolerance.   Genitourinary: Negative for difficulty urinating, frequency and urgency.   Musculoskeletal: Positive for myalgias. Negative for arthralgias, back pain, gait problem, neck pain and neck stiffness.   Skin: Negative for rash.   Allergic/Immunologic: Negative for immunocompromised state.   Neurological: Positive for numbness. Negative for dizziness, tremors, seizures, syncope, facial asymmetry, speech difficulty, weakness, light-headedness and headaches.   Hematological: Negative for adenopathy.   Psychiatric/Behavioral:  "Negative for confusion, decreased concentration, hallucinations and sleep disturbance. The patient is not nervous/anxious.         Objective:  Vitals:    09/21/17 0822   BP: 120/58   Pulse: 59   SpO2: 98%   Weight: 184 lb (83.5 kg)   Height: 71\" (180.3 cm)       Neurologic Exam     Mental Status   Attention: normal. Concentration: normal.   Level of consciousness: alert  Knowledge: good and consistent with education.   Normal comprehension.     Cranial Nerves     CN II   Visual fields full to confrontation.   Visual acuity: normal  Right visual field deficit: none  Left visual field deficit: none     CN III, IV, VI   Nystagmus: none   Diplopia: none  Ophthalmoparesis: none  Upgaze: normal  Downgaze: normal  Conjugate gaze: present    CN V   Facial sensation intact.   Right corneal reflex: normal  Left corneal reflex: normal    CN VII   Right facial weakness: none  Left facial weakness: none    CN VIII   Hearing: intact    CN IX, X   Palate: symmetric  Right gag reflex: normal  Left gag reflex: normal    CN XI   Right sternocleidomastoid strength: normal  Left sternocleidomastoid strength: normal    CN XII   Tongue: not atrophic  Fasciculations: absent  Tongue deviation: none    Motor Exam   Muscle bulk: normal  Overall muscle tone: normal  Right arm tone: normal  Left arm tone: normal  Right leg tone: normal  Left leg tone: normal    Sensory Exam   Right leg light touch: decreased from knee  Left leg light touch: decreased from knee  Right leg vibration: decreased from knee  Left leg vibration: decreased from knee  Right leg proprioception: decreased from knee  Left leg proprioception: decreased from knee  Right leg pinprick: decreased from knee  Left leg pinprick: decreased from knee  Sensory deficit distribution on right: ulnar  Sensory deficit distribution on left: ulnar    Gait, Coordination, and Reflexes     Tremor   Resting tremor: absent  Intention tremor: absent  Action tremor: absent    Reflexes   Reflexes " 2+ except as noted.   Right patellar: 0  Left patellar: 0  Right achilles: 0  Left achilles: 0      Physical Exam   Constitutional: He appears well-developed and well-nourished.   Neurological:   Reflex Scores:       Patellar reflexes are 0 on the right side and 0 on the left side.       Achilles reflexes are 0 on the right side and 0 on the left side.  Nursing note and vitals reviewed.      Assessment/Plan:       Problems Addressed this Visit        Nervous and Auditory    Peripheral neuropathy - Primary     Sx continue to progress     Continue  - 600 mg qhs

## 2017-11-06 ENCOUNTER — OFFICE VISIT (OUTPATIENT)
Dept: CARDIOLOGY | Facility: CLINIC | Age: 77
End: 2017-11-06

## 2017-11-06 VITALS
DIASTOLIC BLOOD PRESSURE: 58 MMHG | SYSTOLIC BLOOD PRESSURE: 124 MMHG | HEART RATE: 68 BPM | BODY MASS INDEX: 25.55 KG/M2 | WEIGHT: 188.6 LBS | HEIGHT: 72 IN | OXYGEN SATURATION: 96 %

## 2017-11-06 DIAGNOSIS — I25.10 CHRONIC CORONARY ARTERY DISEASE: Primary | ICD-10-CM

## 2017-11-06 DIAGNOSIS — I10 ESSENTIAL HYPERTENSION: ICD-10-CM

## 2017-11-06 DIAGNOSIS — E78.2 MIXED HYPERLIPIDEMIA: ICD-10-CM

## 2017-11-06 PROCEDURE — 99214 OFFICE O/P EST MOD 30 MIN: CPT | Performed by: NURSE PRACTITIONER

## 2017-11-06 RX ORDER — ESOMEPRAZOLE MAGNESIUM 40 MG/1
40 CAPSULE, DELAYED RELEASE ORAL
COMMUNITY
End: 2020-08-25

## 2017-11-06 RX ORDER — CHOLECALCIFEROL (VITAMIN D3) 125 MCG
5 CAPSULE ORAL DAILY
COMMUNITY
End: 2017-12-08

## 2017-11-06 RX ORDER — MAG HYDROX/ALUMINUM HYD/SIMETH 400-400-40
SUSPENSION, ORAL (FINAL DOSE FORM) ORAL AS NEEDED
COMMUNITY
End: 2017-12-08

## 2017-11-06 NOTE — PROGRESS NOTES
Subjective:     Encounter Date:11/06/2017      Patient ID: Isrrael Marino is a 77 y.o. male.    Chief Complaint: Coronary Artery Disease    PROBLEM LIST:  1.  Coronary artery disease:  a. Non STEMI, 09/22/2013.  Cardiac catheterization:   99% first diagonal (2.5 x 15 Xience), 95% second diagonal (PTCA), 50% LAD, FFR 0.83.  EF 50%.  b. On 11/19/2013:  Crescendo angina.  Catheterization:  95% LAD/70% second diagonal 3.0 x 23-mm XIENCE (LAD) and 2.75 x 12-mm XIENCE (second diagonal). Widely  patent first diagonal stent.   2. Hypertension.  3. Dyslipidemia.   4. Lower  extremity neuropathy.  5. Arthritis.  6. Skin cancer with removal  7. Surgeries:  a. Appendectomy  b. Lumbar surgery  c. Carpal tunnel surgery  d. Hernia repair  e. Right hip replacement  f. Vasectomy         History of Present Illness  Patient returns today for follow up with a history of coronary artery disease, hypertension, and dyslipidemia. Since last visit Patient is overall done well.  His GI complaints have resolved with medication and dietary adjustments.  Since last being seen he had skin cancer removed.  He still walks for 30 minutes on his treadmill almost every day.  Denies any chest pain, pressure, tightness.  Denies any increasing shortness of breath.  Denies any syncope, near-syncope, or edema.  He is also suffered a left rotator cuff injury and at this time has decided not to undergo surgical intervention.    Allergies   Allergen Reactions   • Ibuprofen          Current Outpatient Prescriptions:   •  B Complex Vitamins (B COMPLEX PO), Take  by mouth As Needed., Disp: , Rfl:   •  Cholecalciferol (VITAMIN D3) 2000 units tablet, Take 5 tablets by mouth Daily., Disp: , Rfl:   •  clopidogrel (PLAVIX) 75 MG tablet, TAKE 1 TABLET EVERY DAY, Disp: 90 tablet, Rfl: 2  •  Coenzyme Q10 (CO Q 10) 100 MG capsule, Take  by mouth Daily., Disp: , Rfl:   •  colchicine (COLCRYS) 0.6 MG tablet, Take one tablet, if no relief in 2 h take 1 more, if no  "relief 2 hours until then may take a 3rd pill, Disp: 9 tablet, Rfl: 0  •  Cyanocobalamin (B-12 PO), Take  by mouth As Needed., Disp: , Rfl:   •  esomeprazole (nexIUM) 40 MG capsule, Take 40 mg by mouth Every Morning Before Breakfast., Disp: , Rfl:   •  fluticasone (FLONASE) 50 MCG/ACT nasal spray, USE 1 TO 2 SPRAYS IN EACH NOSTRIL ONE TIME DAILY, Disp: 48 g, Rfl: 3  •  HYDROcodone-acetaminophen (NORCO)  MG per tablet, As Needed., Disp: , Rfl:   •  lisinopril (PRINIVIL,ZESTRIL) 10 MG tablet, TAKE 1 TABLET EVERY DAY, Disp: 90 tablet, Rfl: 3  •  metoprolol succinate XL (TOPROL-XL) 25 MG 24 hr tablet, TAKE 1 TABLET ONE TIME DAILY, Disp: 90 tablet, Rfl: 2  •  Saw Palmetto 450 MG capsule, Take  by mouth As Needed., Disp: , Rfl:   •  simvastatin (ZOCOR) 40 MG tablet, TAKE 1 TABLET EVERY DAY, Disp: 90 tablet, Rfl: 3  •  tadalafil (CIALIS) 20 MG tablet, Take 1 tablet by mouth Daily As Needed for erectile dysfunction., Disp: 30 tablet, Rfl: 5  •  vardenafil (LEVITRA) 20 MG tablet, Take 1 tablet by mouth Daily As Needed for erectile dysfunction., Disp: 30 tablet, Rfl: 5  •  Zinc 50 MG capsule, Take  by mouth As Needed., Disp: , Rfl:     The following portions of the patient's history were reviewed and updated as appropriate: allergies, current medications, past family history, past medical history, past social history, past surgical history and problem list.    Review of Systems   Constitution: Negative.   Cardiovascular: Negative.    Respiratory: Negative.    Hematologic/Lymphatic: Negative for bleeding problem. Does not bruise/bleed easily.   Skin: Negative for rash.   Musculoskeletal: Negative for muscle weakness and myalgias.   Gastrointestinal: Negative for heartburn, nausea and vomiting.   Neurological: Negative.           Objective:     Vitals:    11/06/17 0940 11/06/17 1033   BP:  124/58   Pulse: 68    SpO2: 96%    Weight: 188 lb 9.6 oz (85.5 kg)    Height: 72\" (182.9 cm)          Physical Exam   Constitutional: " He is oriented to person, place, and time. He appears well-developed and well-nourished.   HENT:   Mouth/Throat: Oropharynx is clear and moist.   Neck: No JVD present. Carotid bruit is not present. No thyromegaly present.   Cardiovascular: Regular rhythm, S1 normal, S2 normal, normal heart sounds and intact distal pulses.  Exam reveals no gallop, no S3 and no S4.    No murmur heard.  Pulses:       Carotid pulses are 2+ on the right side, and 2+ on the left side.       Radial pulses are 2+ on the right side, and 2+ on the left side.   Pulmonary/Chest: Breath sounds normal.   Abdominal: Soft. Bowel sounds are normal. He exhibits no mass. There is no tenderness.   Musculoskeletal: He exhibits no edema.   Neurological: He is alert and oriented to person, place, and time.   Skin: Skin is warm and dry. No rash noted.       Procedures        Assessment:   Isrrael was seen today for coronary artery disease.    Diagnoses and all orders for this visit:    Chronic coronary artery disease, Stable no current angina.    Essential hypertension, controlled.    Mixed hyperlipidemia, on statin therapy.  Labs with primary care.        Plan:    1. Continue current medications.  2. Revisit in 12 MO, or sooner as needed.    JULY Rodriguez     Please note that portions of this note may have been completed with a voice recognition program. Efforts were made to edit the dictations, but occasionally words are mistranscribed.

## 2017-11-12 ENCOUNTER — HOSPITAL ENCOUNTER (EMERGENCY)
Facility: HOSPITAL | Age: 77
Discharge: HOME | End: 2017-11-12
Attending: EMERGENCY MEDICINE | Admitting: FAMILY MEDICINE

## 2017-11-12 ENCOUNTER — APPOINTMENT (OUTPATIENT)
Dept: CT IMAGING | Facility: HOSPITAL | Age: 77
End: 2017-11-12

## 2017-11-12 ENCOUNTER — APPOINTMENT (OUTPATIENT)
Dept: GENERAL RADIOLOGY | Facility: HOSPITAL | Age: 77
End: 2017-11-12

## 2017-11-12 VITALS
RESPIRATION RATE: 18 BRPM | WEIGHT: 185 LBS | SYSTOLIC BLOOD PRESSURE: 133 MMHG | BODY MASS INDEX: 23.74 KG/M2 | DIASTOLIC BLOOD PRESSURE: 72 MMHG | OXYGEN SATURATION: 96 % | HEIGHT: 74 IN | TEMPERATURE: 97.9 F | HEART RATE: 62 BPM

## 2017-11-12 DIAGNOSIS — M54.2 NECK PAIN: ICD-10-CM

## 2017-11-12 DIAGNOSIS — S12.100A CLOSED ODONTOID FRACTURE, INITIAL ENCOUNTER (HCC): Primary | ICD-10-CM

## 2017-11-12 DIAGNOSIS — M54.6 ACUTE BILATERAL THORACIC BACK PAIN: ICD-10-CM

## 2017-11-12 PROCEDURE — 72125 CT NECK SPINE W/O DYE: CPT

## 2017-11-12 PROCEDURE — 90715 TDAP VACCINE 7 YRS/> IM: CPT | Performed by: EMERGENCY MEDICINE

## 2017-11-12 PROCEDURE — 73560 X-RAY EXAM OF KNEE 1 OR 2: CPT

## 2017-11-12 PROCEDURE — 99283 EMERGENCY DEPT VISIT LOW MDM: CPT

## 2017-11-12 PROCEDURE — 72128 CT CHEST SPINE W/O DYE: CPT

## 2017-11-12 PROCEDURE — 70450 CT HEAD/BRAIN W/O DYE: CPT

## 2017-11-12 PROCEDURE — 90471 IMMUNIZATION ADMIN: CPT | Performed by: EMERGENCY MEDICINE

## 2017-11-12 PROCEDURE — 25010000002 TDAP 5-2.5-18.5 LF-MCG/0.5 SUSPENSION: Performed by: EMERGENCY MEDICINE

## 2017-11-12 PROCEDURE — 99284 EMERGENCY DEPT VISIT MOD MDM: CPT | Performed by: NEUROLOGICAL SURGERY

## 2017-11-12 RX ORDER — TRAMADOL HYDROCHLORIDE 50 MG/1
50 TABLET ORAL EVERY 6 HOURS PRN
COMMUNITY
End: 2018-07-09

## 2017-11-12 RX ORDER — HYDROCODONE BITARTRATE AND ACETAMINOPHEN 7.5; 325 MG/1; MG/1
1 TABLET ORAL ONCE
Status: COMPLETED | OUTPATIENT
Start: 2017-11-12 | End: 2017-11-12

## 2017-11-12 RX ORDER — GABAPENTIN 300 MG/1
300 CAPSULE ORAL 3 TIMES DAILY
COMMUNITY
End: 2018-02-15

## 2017-11-12 RX ORDER — HYDROCODONE BITARTRATE AND ACETAMINOPHEN 5; 325 MG/1; MG/1
1-2 TABLET ORAL EVERY 4 HOURS PRN
Qty: 18 TABLET | Refills: 0 | Status: SHIPPED | OUTPATIENT
Start: 2017-11-12 | End: 2018-07-09

## 2017-11-12 RX ADMIN — TETANUS TOXOID, REDUCED DIPHTHERIA TOXOID AND ACELLULAR PERTUSSIS VACCINE, ADSORBED 0.5 ML: 5; 2.5; 8; 8; 2.5 SUSPENSION INTRAMUSCULAR at 12:39

## 2017-11-12 RX ADMIN — HYDROCODONE BITARTRATE AND ACETAMINOPHEN 1 TABLET: 7.5; 325 TABLET ORAL at 12:43

## 2017-11-12 NOTE — CONSULTS
NEUROSURGERY CONSULT    Referring Provider: No ref. provider found     Reason for Consultation: Odontoid fracture    Patient Care Team:  JULY Rowe as PCP - General (Internal Medicine)    Chief complaint: Neck pain    Subjective .     History of present illness:  The patient is a 77-year-old man who was painting while standing on on about the third step of a ladder leaning against the wall 2 days ago, when the foot of the ladder on the drop cloth slipped out and he fell forward and hit his head and face, causing bruising of his face, and afterwards he noticed neck pain that persisted for the next 2 days until today.  He has had no neurologic symptoms.  He came to the emergency room where CT scanning of the cervical spine was done showing a type II odontoid fracture.  He was placed in a vertical collar and a neurosurgical consult was requested.    Results Review:   CT scan of the cervical spine confirms a type II odontoid fracture with good alignment and no subluxation.  He has extensive osteoarthritis of the cervical spine, particularly the C4 to C7 level, but even at the C1-C2 level there is a partially calcified pannus surrounding the odontoid on the sagittal view.    Review of Systems  Pertinent items are noted in HPI, all other systems reviewed.    History  Past Medical History:   Diagnosis Date   • Arthritis     both knees   • CAD (coronary artery disease)    • Cancer    • Chondrocalcinosis of knee    • GERD (gastroesophageal reflux disease)    • Gout    • Heart attack      Last Assessed: 28 Mar 2014   • Heart disease    • Hyperlipidemia    • Hypertension    • IBS (irritable bowel syndrome)    • Left rotator cuff tear arthropathy    • Low back pain    • Lower extremity neuropathy    • Lumbar canal stenosis    • Neck pain    • Numbness    • Right hip pain    • Rotator cuff tear arthropathy of right shoulder    • Thin blood    , Past Surgical History:   Procedure Laterality Date   • APPENDECTOMY  1956  "  • BACK SURGERY  1997    spinal decompression and fusion   • BREAST LUMPECTOMY  2003   • CARPAL TUNNEL RELEASE  03/28/2014    Neuroplasty Decompression Median Nerve At Carpal Tunnel   • CORONARY STENT PLACEMENT  2013   • HERNIA REPAIR  2002    & 1998   • LUMBAR DISCECTOMY FUSION INSTRUMENTATION     • TONSILLECTOMY     • TOTAL HIP ARTHROPLASTY  2002   • VASECTOMY     , Family History   Problem Relation Age of Onset   • Cancer Mother    • Heart disease Father    • Hypertension Father    • Heart attack Father    , Social History   Substance Use Topics   • Smoking status: Never Smoker   • Smokeless tobacco: Never Used   • Alcohol use No   ,   (Not in a hospital admission) and Allergies:  Ibuprofen    Objective     Vital Signs   Blood pressure 133/72, pulse 62, temperature 97.9 °F (36.6 °C), temperature source Oral, resp. rate 18, height 74\" (188 cm), weight 185 lb (83.9 kg), SpO2 96 %.    Physical Exam:    FACE: Multiple abrasions of the face, no laceration.    NEUROLOGICAL EXAMINATION:      MENTAL STATUS:  Alert and oriented.  Speech intact.  Recent and remote memory intact.      CRANIAL NERVES:  Nerves II through XII are intact.     MUSCULOSKELETAL:  No focal cervical spine tenderness.    MOTOR:  Normal strength throughout the upper and lower extremities, normal gait and stance.    SENSATION:  Chronic numbness of the lower extremities, which has been attributed to a peripheral neuropathy.    REFLEXES:  DTR hypoactive to absent upper and lower extremities.      Assessment/Plan     DIAGNOSIS: Odontoid fracture type II, stable, normal alignment, no separation of the fracture site.    RECOMMENDATION: Cervical collar for 2-3 months.  Follow-up with AP and lateral C-spine x-ray in 3 weeks.  He is currently wearing a [place] J-type collar which is fine for the daytime.  A soft cervical collar can be worn at night when he is reclining.  The collar should be worn at all times except when shaving or showering.    Zeus Cruz, " MD  11/12/17  3:35 PM

## 2017-11-12 NOTE — DISCHARGE INSTRUCTIONS
Follow up with Dr. Cruz in three weeks for a follow up x-ray and then a repeat evaluation.    Immediately return to the emergency department for any new or worsening symptoms.     Where your hard collar throughout the day.  You may sleep in your soft collar.  Avoid neck rotation.    Do not climb any tree stands or ladders.    If you are questionable about any activity that you wish to perform, ask your wife's permission first.    Please review the medications you are supposed to be taking according to prior physician recommendations. I have not changed your home medications during this visit. If your discharge instructions indicate that I have changed your home medications, this is not the case, and you should disregard. If you have any questions about the medication you should be taking at home, please call your physician.

## 2017-11-12 NOTE — ED PROVIDER NOTES
Subjective   HPI Comments: Mr. Isrrael Marino is a 77 y.o. male who presents to the ED with c/o after a fall. Two days ago he was on a ladder at ceiling height (8ft) and fell after his drop cloth slipped. He fell and hit his back, head, and knees. He complains of pain in his upper back between his shoulder blades, left knee, and neck. He rates his pain as a 9/10 in severity. He took two tramadol with some, but not all, relief of his pain. He cannot lie down secondary to the pain and the pain does ease when he stands. He denies any chest pain, abdominal pain, or any other injuries to his body. He is anticoagulated on Plavix. His last tetanus vaccine was in 2012.     Patient is a 77 y.o. male presenting with fall.   History provided by:  Patient  Fall   Mechanism of injury: fall    Injury location:  Torso, head/neck and leg  Head/neck injury location:  L neck and R neck  Torso injury location:  Back  Leg injury location:  L knee  Incident location:  Home  Time since incident:  2 days  Arrived directly from scene: no    Fall:     Fall occurred:  From a ladder    Height of fall:  8 ft    Impact surface:  Unable to specify    Entrapped after fall: no    Suspicion of alcohol use: no    Suspicion of drug use: no    Current pain details:     Pain Severity:  Severe    Pain timing:  Constant  Associated symptoms: back pain    Associated symptoms: no abdominal pain, no chest pain, no headaches and no neck pain    Risk factors: anticoagulation therapy (Plavix)        Review of Systems   Constitutional: Negative for chills and fever.   Cardiovascular: Negative for chest pain.   Gastrointestinal: Negative for abdominal pain.   Genitourinary: Negative for flank pain.   Musculoskeletal: Positive for arthralgias (Left knee) and back pain. Negative for joint swelling, myalgias and neck pain.   Skin: Positive for wound (Left knee, face).   Neurological: Negative for syncope and headaches.   All other systems reviewed and are  negative.      Past Medical History:   Diagnosis Date   • Arthritis     both knees   • CAD (coronary artery disease)    • Cancer    • Chondrocalcinosis of knee    • GERD (gastroesophageal reflux disease)    • Gout    • Heart attack      Last Assessed: 28 Mar 2014   • Heart disease    • Hyperlipidemia    • Hypertension    • IBS (irritable bowel syndrome)    • Left rotator cuff tear arthropathy    • Low back pain    • Lower extremity neuropathy    • Lumbar canal stenosis    • Neck pain    • Numbness    • Right hip pain    • Rotator cuff tear arthropathy of right shoulder    • Thin blood        Allergies   Allergen Reactions   • Ibuprofen        Past Surgical History:   Procedure Laterality Date   • APPENDECTOMY  1956   • ARTHRODESIS      Lumbar L, Lumbar L3   • BACK SURGERY  1997    spinal decompression and fusion   • BREAST LUMPECTOMY  2003   • CARPAL TUNNEL RELEASE  03/28/2014    Neuroplasty Decompression Median Nerve At Carpal Tunnel   • CORONARY STENT PLACEMENT  2013   • HERNIA REPAIR  2002    & 1998   • JOINT REPLACEMENT Right     2003  [facility]   • TONSILLECTOMY     • TOTAL HIP ARTHROPLASTY  2002   • VASECTOMY         Family History   Problem Relation Age of Onset   • Hyperlipidemia Other    • Hypertension Other    • Cancer Mother    • Heart disease Father    • Hypertension Father    • Heart attack Father    • Heart disease Other    • Hypertension Other        Social History     Social History   • Marital status:      Spouse name: N/A   • Number of children: N/A   • Years of education: N/A     Social History Main Topics   • Smoking status: Never Smoker   • Smokeless tobacco: Never Used   • Alcohol use No   • Drug use: No   • Sexual activity: Defer     Other Topics Concern   • None     Social History Narrative         Objective   Physical Exam   Constitutional: He is oriented to person, place, and time. He appears well-developed and well-nourished. No distress.   HENT:   Head: Normocephalic.   Right Ear: No  hemotympanum.   Left Ear: No hemotympanum.   Nose: Nose normal.   Mouth/Throat: Oropharynx is clear and moist.   Periorbital contusion. No signs of dental trauma   Eyes: Conjunctivae are normal. Pupils are equal, round, and reactive to light. No scleral icterus.   PERRL at 4mm   Neck: Normal range of motion. Neck supple.   Mid and low cervical spine TTP w/o crepitus or deformity.    Cardiovascular: Intact distal pulses.    Pulmonary/Chest: Effort normal. No respiratory distress.   Musculoskeletal: Normal range of motion.   Upper thoracic spine mild TTP more in paraspinal musculature than midline. No other spinal TTP or abnormality  Left knee: Mild TTP diffusely but able to support full weight w/o significant difficulty.    Neurological: He is alert and oriented to person, place, and time.   Skin: Skin is warm and dry. He is not diaphoretic.   Overlying patella there is a abrasion/avulsion measuring 2 cm vertically by 7 cm horizontally. No signs of infection   Psychiatric: He has a normal mood and affect. His behavior is normal.   Nursing note and vitals reviewed.      Procedures         ED Course  ED Course   Comment By Time   Dr. Mckeon paged for Dr. Cruz. Aum Lundy 11/12 1453   Dr. Mckeon discussed case with Dr. Cruz, on call neurosurgery, who recommends admission and will come see the patient. [facility] 11/12 1502   Dr. Mckeon paged for Dr. León [facility] 11/12 1504   Dr. Mckeon discussed case with , hospitalist, who will admit the patient. [facility] 11/12 1511   Dr. Mckeon discussed case with Dr. Cruz, on call neurosurgery, who after seeing the patient feels as though he can go home. He recommends follow up xray in 3 weeks. Aum Lundy 11/12 1534     No results found for this or any previous visit (from the past 24 hour(s)).  Note: In addition to lab results from this visit, the labs listed above may include labs taken at another facility or during a different encounter within the last 24 hours.  Please correlate lab times with ED admission and discharge times for further clarification of the services performed during this visit.    CT Cervical Spine Without Contrast   Final Result   1. Incomplete fracture involving the base of the tip of the odontoid of   uncertain chronicity and clinical correlation is needed. (Incomplete   type II A fracture) There is extensive degenerative change and   subchondral cyst formation seen throughout the C1 and C2 levels. There   is no prevertebral soft tissue swelling.   2. Extensive degenerative changes seen at the C5/C6 and C6/C7 levels   with anterolisthesis of C7 on T1, chronic in appearance. Multilevel   degenerative change is seen throughout the cervical spine.   3. Mild convexity to the left of the thoracic spine with multilevel   degenerative changes present and no evidence of fracture or dislocation.       DICTATED:     11/12/2017   EDITED:         11/12/2017           This report was finalized on 11/12/2017 2:06 PM by Dr. Jordana Romo MD.          CT Thoracic Spine Without Contrast   Final Result   1. Incomplete fracture involving the base of the tip of the odontoid of   uncertain chronicity and clinical correlation is needed. (Incomplete   type II A fracture) There is extensive degenerative change and   subchondral cyst formation seen throughout the C1 and C2 levels. There   is no prevertebral soft tissue swelling.   2. Extensive degenerative changes seen at the C5/C6 and C6/C7 levels   with anterolisthesis of C7 on T1, chronic in appearance. Multilevel   degenerative change is seen throughout the cervical spine.   3. Mild convexity to the left of the thoracic spine with multilevel   degenerative changes present and no evidence of fracture or dislocation.       DICTATED:     11/12/2017   EDITED:         11/12/2017           This report was finalized on 11/12/2017 2:06 PM by Dr. Jordana Romo MD.          CT Head Without Contrast   Preliminary Result  "  Mild chronic change with no acute intracranial abnormality   identified.       DICTATED:     11/12/2017   EDITED:         11/12/2017                      XR Knee 1 or 2 View Right    (Results Pending)     Vitals:    11/12/17 1131   BP: 167/74   Pulse: 69   Resp: 16   Temp: 98.3 °F (36.8 °C)   TempSrc: Oral   SpO2: 96%   Weight: 185 lb (83.9 kg)   Height: 74\" (188 cm)     Medications   HYDROcodone-acetaminophen (NORCO) 7.5-325 MG per tablet 1 tablet (1 tablet Oral Given 11/12/17 1243)   Tdap (BOOSTRIX) injection 0.5 mL (0.5 mL Intramuscular Given 11/12/17 1239)     ECG/EMG Results (last 24 hours)     ** No results found for the last 24 hours. **                          [facility]    Final diagnoses:   Closed odontoid fracture, initial encounter   Neck pain   Acute bilateral thoracic back pain       Documentation assistance provided by yamini VILLEGAS.  Information recorded by the scribabida was done at my direction and has been verified and validated by me.     Say Villegas  11/12/17 1219       Say Villegas  11/12/17 1513       Omar Mckeon MD  11/12/17 2211    "

## 2017-11-13 DIAGNOSIS — S12.100A CLOSED ODONTOID FRACTURE, INITIAL ENCOUNTER (HCC): Primary | ICD-10-CM

## 2017-11-21 ENCOUNTER — OFFICE VISIT (OUTPATIENT)
Dept: ORTHOPEDIC SURGERY | Facility: CLINIC | Age: 77
End: 2017-11-21

## 2017-11-21 DIAGNOSIS — M17.0 PRIMARY OSTEOARTHRITIS OF BOTH KNEES: Primary | ICD-10-CM

## 2017-11-21 PROCEDURE — 99213 OFFICE O/P EST LOW 20 MIN: CPT | Performed by: PHYSICIAN ASSISTANT

## 2017-11-21 PROCEDURE — 20610 DRAIN/INJ JOINT/BURSA W/O US: CPT | Performed by: PHYSICIAN ASSISTANT

## 2017-11-21 RX ORDER — LIDOCAINE HYDROCHLORIDE 10 MG/ML
4 INJECTION, SOLUTION INFILTRATION; PERINEURAL
Status: COMPLETED | OUTPATIENT
Start: 2017-11-21 | End: 2017-11-21

## 2017-11-21 RX ORDER — METHYLPREDNISOLONE ACETATE 40 MG/ML
40 INJECTION, SUSPENSION INTRA-ARTICULAR; INTRALESIONAL; INTRAMUSCULAR; SOFT TISSUE
Status: COMPLETED | OUTPATIENT
Start: 2017-11-21 | End: 2017-11-21

## 2017-11-21 RX ADMIN — LIDOCAINE HYDROCHLORIDE 4 ML: 10 INJECTION, SOLUTION INFILTRATION; PERINEURAL at 08:33

## 2017-11-21 RX ADMIN — METHYLPREDNISOLONE ACETATE 40 MG: 40 INJECTION, SUSPENSION INTRA-ARTICULAR; INTRALESIONAL; INTRAMUSCULAR; SOFT TISSUE at 08:33

## 2017-11-21 NOTE — PROGRESS NOTES
Patient: Isrrael Marino  : 1940    Primary Care Provider: JULY Sullivan    Requesting Provider: As above    Follow-up of the Left Knee (3 months) and Follow-up of the Right Knee (3 months)      History    Chief Complaint: Bilateral knee pain    History of Present Illness:  Patient returns for today for follow-up bilateral knee pain with known bilateral knee arthritis.  He reports no new symptoms.  He had a fall from a ladder a week ago breaking his neck and has an abrasion and skin tear on the left anterior knee.  He reports the steroid injections are still giving him at least 2 months relief or more.  They've continued to bother him again for about the past week.  He is has only functional pain with no night pain.  He had x-rays of the left knee at the emergency room which showed degenerative changes but no fracture.  He denies any fever chills or constitutional symptoms.        Allergies   Allergen Reactions   • Ibuprofen      Current Outpatient Prescriptions on File Prior to Visit   Medication Sig Dispense Refill   • B Complex Vitamins (B COMPLEX PO) Take  by mouth As Needed.     • Cholecalciferol (VITAMIN D3) 2000 units tablet Take 5 tablets by mouth Daily.     • clopidogrel (PLAVIX) 75 MG tablet TAKE 1 TABLET EVERY DAY 90 tablet 2   • Coenzyme Q10 (CO Q 10) 100 MG capsule Take  by mouth Daily.     • colchicine (COLCRYS) 0.6 MG tablet Take one tablet, if no relief in 2 h take 1 more, if no relief 2 hours until then may take a 3rd pill 9 tablet 0   • Cyanocobalamin (B-12 PO) Take  by mouth As Needed.     • docusate sodium (DOCQLACE) 100 MG capsule Take  by mouth Take As Directed.     • esomeprazole (nexIUM) 40 MG capsule Take 40 mg by mouth Every Morning Before Breakfast.     • fluticasone (FLONASE) 50 MCG/ACT nasal spray USE 1 TO 2 SPRAYS IN EACH NOSTRIL ONE TIME DAILY 48 g 3   • gabapentin (NEURONTIN) 300 MG capsule Take 300 mg by mouth 3 (Three) Times a Day.     • HYDROcodone-acetaminophen  (NORCO)  MG per tablet As Needed.     • HYDROcodone-acetaminophen (NORCO) 5-325 MG per tablet Take 1-2 tablets by mouth Every 4 (Four) Hours As Needed for Severe Pain . 18 tablet 0   • lisinopril (PRINIVIL,ZESTRIL) 10 MG tablet TAKE 1 TABLET EVERY DAY 90 tablet 3   • metoprolol succinate XL (TOPROL-XL) 25 MG 24 hr tablet TAKE 1 TABLET ONE TIME DAILY 90 tablet 2   • Saw Palmetto 450 MG capsule Take  by mouth As Needed.     • simvastatin (ZOCOR) 40 MG tablet TAKE 1 TABLET EVERY DAY 90 tablet 3   • tadalafil (CIALIS) 20 MG tablet Take 1 tablet by mouth Daily As Needed for erectile dysfunction. 30 tablet 5   • tamsulosin (FLOMAX) 0.4 MG capsule 24 hr capsule Take  by mouth Take As Directed.     • traMADol (ULTRAM) 50 MG tablet Take 50 mg by mouth Every 6 (Six) Hours As Needed for Moderate Pain .     • vardenafil (LEVITRA) 20 MG tablet Take 1 tablet by mouth Daily As Needed for erectile dysfunction. 30 tablet 5   • vitamin B-12 (CYANOCOBALAMIN) 1000 MCG tablet Take 1,000 mcg by mouth Take As Directed.     • Zinc 50 MG capsule Take  by mouth As Needed.       No current facility-administered medications on file prior to visit.      Social History     Social History   • Marital status:      Spouse name: N/A   • Number of children: N/A   • Years of education: N/A     Occupational History   • Not on file.     Social History Main Topics   • Smoking status: Never Smoker   • Smokeless tobacco: Never Used   • Alcohol use No   • Drug use: No   • Sexual activity: Not Currently     Other Topics Concern   • Not on file     Social History Narrative     Past Surgical History:   Procedure Laterality Date   • APPENDECTOMY  1956   • BACK SURGERY  1997    spinal decompression and fusion   • BREAST LUMPECTOMY  2003   • CARPAL TUNNEL RELEASE  03/28/2014    Neuroplasty Decompression Median Nerve At Carpal Tunnel   • CORONARY STENT PLACEMENT  2013   • HERNIA REPAIR  2002    & 1998   • LUMBAR DISCECTOMY FUSION INSTRUMENTATION     •  TONSILLECTOMY     • TOTAL HIP ARTHROPLASTY  2002   • VASECTOMY       Family History   Problem Relation Age of Onset   • Cancer Mother    • Heart disease Father    • Hypertension Father    • Heart attack Father      Past Medical History:   Diagnosis Date   • Arthritis     both knees   • CAD (coronary artery disease)    • Cancer    • Chondrocalcinosis of knee    • GERD (gastroesophageal reflux disease)    • Gout    • Heart attack      Last Assessed: 28 Mar 2014   • Heart disease    • Hyperlipidemia    • Hypertension    • IBS (irritable bowel syndrome)    • Left rotator cuff tear arthropathy    • Low back pain    • Lower extremity neuropathy    • Lumbar canal stenosis    • Neck pain    • Numbness    • Right hip pain    • Rotator cuff tear arthropathy of right shoulder    • Thin blood          Review of Systems   Constitutional: Negative.    HENT: Negative.    Eyes: Negative.    Respiratory: Negative.    Cardiovascular: Negative.    Gastrointestinal: Negative.    Endocrine: Negative.    Genitourinary: Negative.    Musculoskeletal: Negative.         Joint Pain    Skin: Negative.    Allergic/Immunologic: Negative.    Neurological: Negative.    Hematological: Negative.    Psychiatric/Behavioral: Negative.        The following portions of the patient's history were reviewed and updated as appropriate: allergies, current medications, past family history, past medical history, past social history, past surgical history and problem list.    Objective   There were no vitals taken for this visit.    Physical Exam:   GENERAL: Body habitus: normal weight for height    Lower extremity edema: Left: 1+ pitting; Right: trace    Varicose veins:  Left: moderate; Right: moderate    Gait: antalgic     Mental Status:  awake and alert; oriented to person, place, and time    Voice:  clear  SKIN:  Normal    Hair Growth:  Right:normal; Left:  normal  HEENT: Head: Normocephalic, no lesions, without obvious abnormality.     Eyes: sclera  anicteric  PULM:  Repiratory effort normal    Ortho Exam    Left Knee Exam  ----------  Knee Exam:  ----------  ALIGNMENT:  Left: neutral  ----------  RANGE OF MOTION:  Left: 5-120  LIGAMENTOUS STABILITY:   Left:stable to varus and valgus stress at terminal extension and 30 degrees without any evidence of laxity   ----------  STRENGTH:  KNEE FLEXION  Left 5/5  KNEE EXTENSION Left 5/5  ----------  PAIN WITH PALPATION: Left medial joint line  PAIN WITH KNEE ROM:  Left no  PATELLAR CREPITUS:  Left yes  ----------  SENSATION TO LIGHT TOUCH:  DEEP PERONEAL/SUPERFICIAL PERONEAL/SURAL/SAPHENOUS/TIBIAL:   Left intact  ----------  EDEMA:   Left:  no  ERYTHEMA:  ; Left:  no  WOUNDS/INCISIONS: abrasion skin tear anterior knee. Healing.  No erythema or sign of infection.       Right Knee Exam  ----------  ALIGNMENT: Right: neutral----------  RANGE OF MOTION:  Right: 5-120  LIGAMENTOUS STABILITY:   Right:stable to varus and valgus stress at terminal extension and 30 degrees without any evidence of laxity----------  STRENGTH:  KNEE FLEXION Right 5/5  KNEE EXTENSION Right 5/5 ----------  PAIN WITH PALPATION: Right medial joint line  PAIN WITH KNEE ROM: Right no  PATELLAR CREPITUS: Right yes   ----------  SENSATION TO LIGHT TOUCH:  DEEP PERONEAL/SUPERFICIAL PERONEAL/SURAL/SAPHENOUS/TIBIAL:   Right intact----------  EDEMA:  Right:  no;  ERYTHEMA:  Right: no;  WOUNDS/INCISIONS: none, no overlying skin problems.      Medical Decision Making    Data Review:   none    Assessment and Plan/ Diagnosis/Treatment options:   Doing well with nonoperative treatment of bilateral knee arthritis.  I reviewed clinical findings with the patient today.  On exam, he has bilateral medial joint line tenderness with mild flexion contractures.  He does have an abrasion and skin tear on the anterior left knee.  I explained him that I don't think that we should do an injection into the left knee today given his wound.  I explained the risk of introducing  bacteria into the knee joint and leading to infection in the joint itself.  Recommendation today is repeat steroid injection into the right knee.  Should he decide he needs injection in the left after the wound has healed and there is no scab any longer, he can return for repeat injection into the left knee.  Otherwise, return in 3 months.    Using sterile technique, the right knee was sterilely prepped with Hibiclens.  Following a time out,  using a 22 gauge needle, the right knee was injected with 40mg Depo Medrol, 4 cc lidocaine and 4 cc marcaine.  Patient tolerated the procedure well.  No complications.

## 2017-11-21 NOTE — PROGRESS NOTES
Procedure   Large Joint Arthrocentesis  Date/Time: 11/21/2017 8:33 AM  Consent given by: patient  Site marked: site marked  Timeout: Immediately prior to procedure a time out was called to verify the correct patient, procedure, equipment, support staff and site/side marked as required   Supporting Documentation  Indications: pain   Procedure Details  Location: knee - R knee  Needle size: 22 G  Approach: anterolateral  Medications administered: 4 mL lidocaine 1 %; 40 mg methylPREDNISolone acetate 40 MG/ML (4cc- Bupivacaine .25%  NDC [id number]  Batch [id number]   [id number])  Patient tolerance: patient tolerated the procedure well with no immediate complications

## 2017-12-07 ENCOUNTER — OFFICE VISIT (OUTPATIENT)
Dept: NEUROSURGERY | Facility: CLINIC | Age: 77
End: 2017-12-07

## 2017-12-07 ENCOUNTER — HOSPITAL ENCOUNTER (OUTPATIENT)
Dept: GENERAL RADIOLOGY | Facility: HOSPITAL | Age: 77
Discharge: HOME | End: 2017-12-07
Attending: NEUROLOGICAL SURGERY | Admitting: NEUROLOGICAL SURGERY

## 2017-12-07 VITALS — BODY MASS INDEX: 23.61 KG/M2 | HEIGHT: 74 IN | TEMPERATURE: 97.4 F | WEIGHT: 184 LBS

## 2017-12-07 DIAGNOSIS — S12.100A CLOSED ODONTOID FRACTURE, INITIAL ENCOUNTER (HCC): ICD-10-CM

## 2017-12-07 DIAGNOSIS — M50.30 DDD (DEGENERATIVE DISC DISEASE), CERVICAL: Primary | ICD-10-CM

## 2017-12-07 PROBLEM — S12.100D: Status: ACTIVE | Noted: 2017-12-07

## 2017-12-07 PROCEDURE — 72040 X-RAY EXAM NECK SPINE 2-3 VW: CPT

## 2017-12-07 PROCEDURE — 99213 OFFICE O/P EST LOW 20 MIN: CPT | Performed by: NEUROLOGICAL SURGERY

## 2017-12-07 NOTE — PROGRESS NOTES
Subjective   Isrrael Marino is a 77 y.o. male who presents for follow up of  odontoid fracture.     The patient sustained a type II odontoid fracture 1 month ago on 11/10/17 when he fell off the third step of a ladder reviewed CT scan showed a type II odontoid fracture with no subluxation or separation of the odontoid from the C2 body.  He has worn a soft cervical collar since then.  He has no complaint of pain in his upper cervical region, but does in his lower cervical region where he has extensive degenerative disc and facet disease from C5 to C7.    X-rays today show that the odontoid is well aligned just as it was when his CT was done a month ago.    He has used cervical traction in the past because of his chronic neck pain.  Did drywall installation for many years.  He is currently sleeping in a recliner because he cannot sleep flat in the bed with a collar.    The following portions of the patient's history were reviewed, updated as appropriate and approved: allergies, current medications, past family history, past medical history, past social history, past surgical history, review of systems and problem list.     Review of Systems   Constitutional: Negative for activity change, appetite change, chills, diaphoresis, fatigue, fever and unexpected weight change.   HENT: Positive for hearing loss and tinnitus. Negative for congestion, dental problem, drooling, ear discharge, ear pain, facial swelling, mouth sores, nosebleeds, postnasal drip, rhinorrhea, sinus pressure, sneezing, sore throat, trouble swallowing and voice change.    Eyes: Negative for photophobia, pain, discharge, redness, itching and visual disturbance.   Respiratory: Positive for cough. Negative for apnea, choking, chest tightness, shortness of breath, wheezing and stridor.    Cardiovascular: Positive for leg swelling. Negative for chest pain and palpitations.   Gastrointestinal: Negative for abdominal distention, abdominal pain, anal  bleeding, blood in stool, constipation, diarrhea, nausea, rectal pain and vomiting.   Endocrine: Negative for cold intolerance, heat intolerance, polydipsia, polyphagia and polyuria.   Genitourinary: Positive for difficulty urinating and frequency. Negative for decreased urine volume, dysuria, enuresis, flank pain, genital sores, hematuria and urgency.   Musculoskeletal: Positive for arthralgias, back pain, myalgias, neck pain and neck stiffness. Negative for gait problem and joint swelling.   Skin: Negative for color change, pallor, rash and wound.   Allergic/Immunologic: Negative for environmental allergies, food allergies and immunocompromised state.   Neurological: Positive for weakness and numbness. Negative for dizziness, tremors, seizures, syncope, facial asymmetry, speech difficulty, light-headedness and headaches.   Hematological: Negative for adenopathy. Bruises/bleeds easily.   Psychiatric/Behavioral: Negative for agitation, behavioral problems, confusion, decreased concentration, dysphoric mood, hallucinations, self-injury, sleep disturbance and suicidal ideas. The patient is not nervous/anxious and is not hyperactive.    All other systems reviewed and are negative.      Objective    NEUROLOGICAL EXAMINATION:    Neurological exam is normal.    Assessment   Stable odontoid fracture, now one month since onset.       Plan   6 week follow-up with CT of the cervical spine.  May remove the collar when sleeping.       Zeus Cruz MD

## 2017-12-28 RX ORDER — CLOPIDOGREL BISULFATE 75 MG/1
TABLET ORAL
Qty: 90 TABLET | Refills: 4 | Status: SHIPPED | OUTPATIENT
Start: 2017-12-28 | End: 2019-05-01 | Stop reason: SDUPTHER

## 2017-12-28 RX ORDER — METOPROLOL SUCCINATE 25 MG/1
TABLET, EXTENDED RELEASE ORAL
Qty: 90 TABLET | Refills: 4 | Status: SHIPPED | OUTPATIENT
Start: 2017-12-28 | End: 2019-05-01 | Stop reason: SDUPTHER

## 2018-02-01 ENCOUNTER — OFFICE VISIT (OUTPATIENT)
Dept: NEUROSURGERY | Facility: CLINIC | Age: 78
End: 2018-02-01

## 2018-02-01 ENCOUNTER — HOSPITAL ENCOUNTER (OUTPATIENT)
Dept: CT IMAGING | Facility: HOSPITAL | Age: 78
Discharge: HOME | End: 2018-02-01
Attending: NEUROLOGICAL SURGERY | Admitting: NEUROLOGICAL SURGERY

## 2018-02-01 VITALS — WEIGHT: 186 LBS | BODY MASS INDEX: 23.87 KG/M2 | HEIGHT: 74 IN

## 2018-02-01 DIAGNOSIS — M50.30 DDD (DEGENERATIVE DISC DISEASE), CERVICAL: ICD-10-CM

## 2018-02-01 DIAGNOSIS — S12.100D CLOSED ODONTOID FRACTURE WITH ROUTINE HEALING, SUBSEQUENT ENCOUNTER: Primary | ICD-10-CM

## 2018-02-01 DIAGNOSIS — S12.100A CLOSED ODONTOID FRACTURE, INITIAL ENCOUNTER (HCC): ICD-10-CM

## 2018-02-01 PROCEDURE — 99213 OFFICE O/P EST LOW 20 MIN: CPT | Performed by: NEUROLOGICAL SURGERY

## 2018-02-01 PROCEDURE — 72125 CT NECK SPINE W/O DYE: CPT

## 2018-02-01 NOTE — PROGRESS NOTES
Subjective   Isrrael Marino is a 77 y.o. male who presents for follow up of  type II odontoid fracture.     The patient fell off the third step of a ladder and fractured his odontoid nearly 3 months ago on 11/10/17.  CT scan of the cervical spine showed a type II odontoid fracture of the anterior cortex with a slight angulation of the odontoid on the lateral/sagittal view, but with intact cortex of the posterior odontoid and good alignment.  He has worn a collar for 3 months.  He has pain in his lower neck and shoulder blade more so than he does in his upper neck or occipital region.    A new CT scan of the head was done and shows no change in the appearance of the odontoid fracture.  It is stable but has not shown any evidence of calcification or reossification at the fracture site.  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    The following portions of the patient's history were reviewed, updated as appropriate and approved: allergies, current medications, past family history, past medical history, past social history, past surgical history, review of systems and problem list.     Review of Systems   Constitutional: Negative for activity change, appetite change, chills, diaphoresis, fatigue, fever and unexpected weight change.   HENT: Positive for hearing loss and tinnitus. Negative for congestion, dental problem, drooling, ear discharge, ear pain, facial swelling, mouth sores, nosebleeds, postnasal drip, rhinorrhea, sinus pressure, sneezing, sore throat, trouble swallowing and voice change.    Eyes: Negative for photophobia, pain, discharge, redness, itching and visual disturbance.   Respiratory: Negative for apnea, cough, choking, chest tightness, shortness of breath, wheezing and stridor.    Cardiovascular: Negative for chest pain, palpitations and leg swelling.   Gastrointestinal: Negative for abdominal distention, abdominal pain, anal bleeding, blood in stool,  constipation, diarrhea, nausea, rectal pain and vomiting.   Endocrine: Negative for cold intolerance, heat intolerance, polydipsia, polyphagia and polyuria.   Genitourinary: Positive for frequency. Negative for decreased urine volume, difficulty urinating, dysuria, enuresis, flank pain, genital sores, hematuria and urgency.   Musculoskeletal: Positive for arthralgias, back pain, neck pain and neck stiffness. Negative for gait problem, joint swelling and myalgias.   Skin: Negative for color change, pallor, rash and wound.   Allergic/Immunologic: Negative for environmental allergies, food allergies and immunocompromised state.   Neurological: Negative for dizziness, tremors, seizures, syncope, facial asymmetry, speech difficulty, weakness, light-headedness, numbness and headaches.   Hematological: Negative for adenopathy. Does not bruise/bleed easily.   Psychiatric/Behavioral: Negative for agitation, behavioral problems, confusion, decreased concentration, dysphoric mood, hallucinations, self-injury, sleep disturbance and suicidal ideas. The patient is not nervous/anxious and is not hyperactive.        Objective    NEUROLOGICAL EXAMINATION:    Neurological exam is intact.    Assessment   Stable type II odontoid fracture, 3 months since injury       Plan   Removed cervical collar.  Follow-up CT of the cervical spine in 3 months.       Zeus Cruz MD

## 2018-02-09 ENCOUNTER — TELEPHONE (OUTPATIENT)
Dept: NEUROSURGERY | Facility: CLINIC | Age: 78
End: 2018-02-09

## 2018-02-13 ENCOUNTER — OFFICE VISIT (OUTPATIENT)
Dept: ORTHOPEDIC SURGERY | Facility: CLINIC | Age: 78
End: 2018-02-13

## 2018-02-13 VITALS
BODY MASS INDEX: 23.4 KG/M2 | DIASTOLIC BLOOD PRESSURE: 89 MMHG | HEART RATE: 75 BPM | SYSTOLIC BLOOD PRESSURE: 139 MMHG | HEIGHT: 74 IN | WEIGHT: 182.32 LBS

## 2018-02-13 DIAGNOSIS — M19.011 GLENOHUMERAL ARTHRITIS, RIGHT: ICD-10-CM

## 2018-02-13 DIAGNOSIS — M11.20 CHONDROCALCINOSIS: ICD-10-CM

## 2018-02-13 DIAGNOSIS — M19.012 GLENOHUMERAL ARTHRITIS, LEFT: ICD-10-CM

## 2018-02-13 DIAGNOSIS — M17.0 PRIMARY OSTEOARTHRITIS OF BOTH KNEES: Primary | ICD-10-CM

## 2018-02-13 PROCEDURE — 99214 OFFICE O/P EST MOD 30 MIN: CPT | Performed by: PHYSICIAN ASSISTANT

## 2018-02-13 PROCEDURE — 20610 DRAIN/INJ JOINT/BURSA W/O US: CPT | Performed by: PHYSICIAN ASSISTANT

## 2018-02-13 RX ADMIN — METHYLPREDNISOLONE ACETATE 40 MG: 40 INJECTION, SUSPENSION INTRA-ARTICULAR; INTRALESIONAL; INTRAMUSCULAR; SOFT TISSUE at 12:12

## 2018-02-13 NOTE — PROGRESS NOTES
Procedure   Large Joint Arthrocentesis  Date/Time: 2/13/2018 12:12 PM  Consent given by: patient  Site marked: site marked  Timeout: Immediately prior to procedure a time out was called to verify the correct patient, procedure, equipment, support staff and site/side marked as required   Supporting Documentation  Indications: pain   Procedure Details  Location: knee - R knee  Needle size: 22 G  Approach: anterolateral  Medications administered: 40 mg methylPREDNISolone acetate 40 MG/ML (4cc, Xylocaine 1% 10mg/ml [id number] exp [id number]       4cc , Bupivacaine 2.5mg/ml  CBU [id number] exp [id number])

## 2018-02-13 NOTE — PROGRESS NOTES
[facility] – [place], [place] [facility] Note    Subjective     Patient: Isrrael Marino  : 1940    Primary Care Provider: JULY Sullivan    Requesting Provider: As above    Follow-up of the Left Knee and Follow-up of the Right Knee      History    Chief Complaint: Bilateral knee pain and bilateral shoulder pain    History of Present Illness: Patient presents today to discuss his returning bilateral knee pain as well as a new problem, bilateral shoulder pain.  He reports no new symptoms in the knees.  He complains of functional medial joint line pain with no rest pain or night pain.  He denies any radiating pain or mechanical symptoms.  He reports an aching and throbbing type pain.  He has been treated in the past with intermittent steroid injection that gave him good relief for at least 2 months.  He reports that steroid injections in his knee joints actually help all of his joint pain to some degree.  He is not interested knee replacements and will like to continue with intermittent injection.    He also has complaints of bilateral shoulder pain, right more painful than left.  He fell last summer fracturing some cervical vertebrae and falling on the left shoulder.  Since that time he has not had good use of his left shoulder with fixing to extreme decrease in active range of motion.  He reports pain in the right shoulder for about the same.  Time.  He denies any radiating pain down the arm.  He has pain constantly with increased pain with any motion of the shoulder.  He has night pain from the shoulders as well.  He reports that pain to be 7/10.  He was seen by Dr. Cardona for the left shoulder and was told he needed a reverse shoulder replacement.  He was also given injection at that time which improved his pain for several months.  He reports he is not interested in any surgical treatment for his shoulders and would like to continue with any injections that are available to him.    Current  Outpatient Prescriptions on File Prior to Visit   Medication Sig Dispense Refill   • clopidogrel (PLAVIX) 75 MG tablet TAKE 1 TABLET EVERY DAY 90 tablet 4   • esomeprazole (nexIUM) 40 MG capsule Take 40 mg by mouth Every Morning Before Breakfast.     • gabapentin (NEURONTIN) 300 MG capsule Take 300 mg by mouth 3 (Three) Times a Day.     • HYDROcodone-acetaminophen (NORCO) 5-325 MG per tablet Take 1-2 tablets by mouth Every 4 (Four) Hours As Needed for Severe Pain . 18 tablet 0   • lisinopril (PRINIVIL,ZESTRIL) 10 MG tablet TAKE 1 TABLET EVERY DAY 90 tablet 3   • metoprolol succinate XL (TOPROL-XL) 25 MG 24 hr tablet TAKE 1 TABLET ONE TIME DAILY 90 tablet 4   • simvastatin (ZOCOR) 40 MG tablet TAKE 1 TABLET EVERY DAY 90 tablet 3   • traMADol (ULTRAM) 50 MG tablet Take 50 mg by mouth Every 6 (Six) Hours As Needed for Moderate Pain .       No current facility-administered medications on file prior to visit.       Allergies   Allergen Reactions   • Ibuprofen       Past Medical History:   Diagnosis Date   • Arthritis     both knees   • CAD (coronary artery disease)    • Cancer    • Chondrocalcinosis of knee    • GERD (gastroesophageal reflux disease)    • Gout    • Heart attack      Last Assessed: 28 Mar 2014   • Heart disease    • Hyperlipidemia    • Hypertension    • IBS (irritable bowel syndrome)    • Left rotator cuff tear arthropathy    • Low back pain    • Lower extremity neuropathy    • Lumbar canal stenosis    • Neck pain    • Numbness    • Right hip pain    • Rotator cuff tear arthropathy of right shoulder    • Thin blood      Past Surgical History:   Procedure Laterality Date   • APPENDECTOMY  1956   • BACK SURGERY  1997    spinal decompression and fusion   • BREAST LUMPECTOMY  2003   • CARPAL TUNNEL RELEASE  03/28/2014    Neuroplasty Decompression Median Nerve At Carpal Tunnel   • CORONARY STENT PLACEMENT  2013   • HERNIA REPAIR  2002    & 1998   • LUMBAR DISCECTOMY FUSION INSTRUMENTATION     • TONSILLECTOMY    "  • TOTAL HIP ARTHROPLASTY  2002   • VASECTOMY       Family History   Problem Relation Age of Onset   • Cancer Mother    • Heart disease Father    • Hypertension Father    • Heart attack Father       Social History     Social History   • Marital status:      Spouse name: N/A   • Number of children: N/A   • Years of education: N/A     Occupational History   • Not on file.     Social History Main Topics   • Smoking status: Never Smoker   • Smokeless tobacco: Never Used   • Alcohol use No   • Drug use: No   • Sexual activity: Not Currently     Other Topics Concern   • Not on file     Social History Narrative        Review of Systems   Constitutional: Negative.    HENT: Negative.    Eyes: Negative.    Respiratory: Negative.    Cardiovascular: Negative.    Gastrointestinal: Negative.    Endocrine: Negative.    Genitourinary: Negative.    Musculoskeletal: Positive for joint swelling.        Joint Pain    Skin: Negative.    Allergic/Immunologic: Negative.    Neurological: Negative.    Hematological: Negative.    Psychiatric/Behavioral: Negative.        The following portions of the patient's history were reviewed and updated as appropriate: allergies, current medications, past family history, past medical history, past social history, past surgical history and problem list.      Objective      Physical Exam  /89  Pulse 75  Ht 188 cm (74.02\")  Wt 82.7 kg (182 lb 5.1 oz)  BMI 23.4 kg/m2    Body mass index is 23.4 kg/(m^2).    GENERAL: Body habitus: normal weight for height    Lower extremity edema: Left: trace; Right: trace    Varicose veins:  Left: moderate; Right: moderate    Gait: normal     Mental Status:  awake and alert; oriented to person, place, and time    Voice:  clear  SKIN:  Normal    Hair Growth:  Right:normal; Left:  normal  HEENT: Head: Normocephalic, atraumatic,  without obvious abnormality.  eye: normal external eye, no icterus   PULM:  Repiratory effort normal    Ortho " Exam  Musculoskeletal   Upper Extremity   Left Shoulder     Inspection and Palpation:     Tenderness - over the AC joint and medial scapula    Crepitus - positive    Sensation is normal    Examination reveals no ecchymosis.      Strength and Tone:    Supraspinatus - 0/5     External Rotators-3/5    Infraspinatus - 3/5        Deltoid - 5/5   Right Shoulder     Inspection and Palpation:     Tenderness - posterior joint line and AC joint    Crepitus - positive    Sensation is normal    Examination reveals no ecchymosis.        Strength and Tone:    Supraspinatus -4/5    External Rotators-4/5    Infraspinatus - 5/5    Subscapularis - 4/5    Deltoid - 5/5     Range of Motion   Right shoulder:    Internal Rotation: ROM - T7    External Rotation: AROM - 45 degrees    Elevation through flexion: AROM - 140 degrees    Left Shoulder:    Internal Rotation: ROM - T2    External Rotation: AROM - 30 degrees    Elevation through flexion: AROM - 30 degrees      Instability      Impingement   Left shoulder    Shi-Gilbert impingement test negative    Neer impingement test negative     Functional Testing   Left shoulder    AC crossover adduction test mildly positive         Impingement   Right shoulder    Shi-Gilbert impingement test negative    Neer impingement test negative       Right shoulder    AC crossover adduction test mildly positive          Right Knee Exam  ----------  ALIGNMENT: Right: neutral----------  RANGE OF MOTION:  Right: 5-120  LIGAMENTOUS STABILITY:   Right:stable to varus and valgus stress at terminal extension and 30 degrees without any evidence of laxity----------  STRENGTH:  KNEE FLEXION Right 5/5  KNEE EXTENSION Right 5/5 ----------  PAIN WITH PALPATION: Right medial joint line  PAIN WITH KNEE ROM: Right no  PATELLAR CREPITUS: Right yes   ----------    Left Knee Exam  ----------  Knee Exam:  ----------  ALIGNMENT:  Left: neutral  ----------  RANGE OF MOTION:  Left: 5-120  LIGAMENTOUS STABILITY:    Left:stable to varus and valgus stress at terminal extension and 30 degrees without any evidence of laxity   ----------  STRENGTH:  KNEE FLEXION  Left 5/5  KNEE EXTENSION Left 5/5  ----------  PAIN WITH PALPATION: Left medial joint line  PAIN WITH KNEE ROM:  Left no  PATELLAR CREPITUS:  Left yes  ----------        Medical Decision Making    Data Review:   ordered and reviewed x-rays today    Assessment:  1. Primary osteoarthritis of both knees    2. Glenohumeral arthritis, left    3. Glenohumeral arthritis, right        Plan:  1.  Doing well with nonoperative treatment of bilateral knee arthritis.  I reviewed x-rays and clinical findings with the patient today.  On exam, he has medial joint line tenderness with no evidence of new ligament or meniscal pathology.  X-rays today show outer to severe medial joint space narrowing with chondrocalcinosis.  Patient reports he has done well in the past with intermittent steroid injection with good relief for 2 months.  He is not interested knee replacement and like to continue with injection.  Plan today is repeat steroid injection into bilateral knees.    Using sterile technique, the right knee was sterilely prepped with Hibiclens.  Following a time out,  using a 22 gauge needle, the right knee was injected with 40mg Depo Medrol, 4 cc lidocaine and 4 cc marcaine.  Patient tolerated the procedure well.  No complications.    Using sterile technique, the left knee was sterilely prepped with Hibiclens. Following a time out,  using a 22 gauge needle, the left knee was injected with 40mg Depo Medrol, 4 cc lidocaine and 4 cc marcaine.  Patient tolerated the procedure well.  No complications.    2.  Bilateral glenohumeral arthritis.  I reviewed today's x-rays, clinical findings with the patient.  On exam, he has decreased passive range of motion of both shoulders with before meals joint tenderness bilaterally and posterior joint line tenderness on the right.  He essentially has  loss of active range of motion of the left shoulder.  X-rays today show glenohumeral arthritis bilaterally, right more advanced than left.  As well as severe before meals arthritis bilaterally.  Patient has seen Dr. Cardona who recommended reverse shoulder replacement on the left.  He is not interested in that.  He would like to get injections in both shoulders.  He feels steroid injections and joints improve all of his joint pain.  He'll like to return in 6 weeks for injections into bilateral shoulders as the knee injections will help this pain for several weeks.  Plan today is that he return in 6 weeks to consider bilateral glenohumeral injection.  In the meantime, I will get Dr. Cardona's notes for review.      Tish Shelley PA-C  02/14/18  10:07 AM

## 2018-02-14 RX ORDER — METHYLPREDNISOLONE ACETATE 40 MG/ML
40 INJECTION, SUSPENSION INTRA-ARTICULAR; INTRALESIONAL; INTRAMUSCULAR; SOFT TISSUE
Status: COMPLETED | OUTPATIENT
Start: 2018-02-13 | End: 2018-02-13

## 2018-02-15 ENCOUNTER — OFFICE VISIT (OUTPATIENT)
Dept: INTERNAL MEDICINE | Facility: CLINIC | Age: 78
End: 2018-02-15

## 2018-02-15 VITALS
WEIGHT: 182 LBS | BODY MASS INDEX: 23.36 KG/M2 | HEIGHT: 74 IN | DIASTOLIC BLOOD PRESSURE: 72 MMHG | HEART RATE: 61 BPM | OXYGEN SATURATION: 98 % | SYSTOLIC BLOOD PRESSURE: 140 MMHG

## 2018-02-15 DIAGNOSIS — Z00.00 MEDICARE ANNUAL WELLNESS VISIT, SUBSEQUENT: Primary | ICD-10-CM

## 2018-02-15 PROCEDURE — G0439 PPPS, SUBSEQ VISIT: HCPCS | Performed by: NURSE PRACTITIONER

## 2018-02-15 NOTE — PROGRESS NOTES
QUICK REFERENCE INFORMATION:  The ABCs of the Annual Wellness Visit    Subsequent Medicare Wellness Visit    HEALTH RISK ASSESSMENT    1940    Recent Hospitalizations:  No hospitalization(s) within the last year..        Current Medical Providers:  Patient Care Team:  JULY Rowe as PCP - General (Internal Medicine)  Omar Mckeon MD as Referring Physician (Emergency Medicine)        Smoking Status:  History   Smoking Status   • Never Smoker   Smokeless Tobacco   • Never Used       Alcohol Consumption:  History   Alcohol Use No       Depression Screen:   PHQ-2/PHQ-9 Depression Screening 2/15/2018   Little interest or pleasure in doing things 0   Feeling down, depressed, or hopeless 0   Trouble falling or staying asleep, or sleeping too much -   Feeling tired or having little energy -   Poor appetite or overeating -   Feeling bad about yourself - or that you are a failure or have let yourself or your family down -   Trouble concentrating on things, such as reading the newspaper or watching television -   Moving or speaking so slowly that other people could have noticed. Or the opposite - being so fidgety or restless that you have been moving around a lot more than usual -   Thoughts that you would be better off dead, or of hurting yourself in some way -   Total Score 0       Health Habits and Functional and Cognitive Screening:  Functional & Cognitive Status 2/15/2018   Do you have difficulty preparing food and eating? No   Do you have difficulty bathing yourself, getting dressed or grooming yourself? Yes   Do you have difficulty using the toilet? No   Do you have difficulty moving around from place to place? Yes   Do you have trouble with steps or getting out of a bed or a chair? Yes   In the past year have you fallen or experienced a near fall? Yes   Current Diet Well Balanced Diet   Dental Exam Up to date   Eye Exam Up to date   Exercise (times per week) 3 times per week   Current Exercise  Activities Include Light Weight/Kette[employer]ells   Do you need help using the phone?  No   Are you deaf or do you have serious difficulty hearing?  Yes   Do you need help with transportation? No   Do you need help shopping? No   Do you need help preparing meals?  No   Do you need help with housework?  No   Do you need help with laundry? No   Do you need help taking your medications? No   Do you need help managing money? No   Have you felt unusual stress, anger or loneliness in the last month? No   Who do you live with? Spouse   If you need help, do you have trouble finding someone available to you? No   Have you been bothered in the last four weeks by sexual problems? No   Do you have difficulty concentrating, remembering or making decisions? No           Does the patient have evidence of cognitive impairment? No    Aspirin use counseling: Does not need ASA (and currently is not on it)      Recent Lab Results:  CMP:  Lab Results   Component Value Date    GLU 93 01/03/2017    BUN 23 01/03/2017    CREATININE 0.90 01/03/2017    EGFRIFNONA 82 01/03/2017    EGFRIFAFRI 99 01/03/2017    BCR 25.6 (H) 01/03/2017     01/03/2017    K 4.6 01/03/2017    CO2 38.0 (H) 01/03/2017    CALCIUM 9.6 01/03/2017    PROTENTOTREF 6.2 01/03/2017    ALBUMIN 3.80 01/03/2017    LABGLOBREF 2.4 01/03/2017    LABIL2 1.6 01/03/2017    BILITOT 0.4 01/03/2017    ALKPHOS 67 01/03/2017    AST 29 01/03/2017    ALT 28 01/03/2017     Lipid Panel:  Lab Results   Component Value Date    TRIG 83 01/03/2017    HDL 42 01/03/2017    VLDL 16.6 01/03/2017     HbA1c:       Visual Acuity:  No exam data present    Age-appropriate Screening Schedule:  Refer to the list below for future screening recommendations based on patient's age, sex and/or medical conditions. Orders for these recommended tests are listed in the plan section. The patient has been provided with a written plan.    Health Maintenance   Topic Date Due   • ZOSTER VACCINE  06/29/2016   • LIPID PANEL   01/03/2018   • TDAP/TD VACCINES (3 - Td) 11/12/2027   • INFLUENZA VACCINE  Completed   • PNEUMOCOCCAL VACCINES (65+ LOW/MEDIUM RISK)  Excluded        Subjective   History of Present Illness    Isrrael Marino is a 77 y.o. male who presents for an Subsequent Wellness Visit.    The following portions of the patient's history were reviewed and updated as appropriate: current medications, past family history, past social history, past surgical history and problem list.    Outpatient Medications Prior to Visit   Medication Sig Dispense Refill   • clopidogrel (PLAVIX) 75 MG tablet TAKE 1 TABLET EVERY DAY 90 tablet 4   • esomeprazole (nexIUM) 40 MG capsule Take 40 mg by mouth Every Morning Before Breakfast.     • HYDROcodone-acetaminophen (NORCO) 5-325 MG per tablet Take 1-2 tablets by mouth Every 4 (Four) Hours As Needed for Severe Pain . 18 tablet 0   • lisinopril (PRINIVIL,ZESTRIL) 10 MG tablet TAKE 1 TABLET EVERY DAY 90 tablet 3   • metoprolol succinate XL (TOPROL-XL) 25 MG 24 hr tablet TAKE 1 TABLET ONE TIME DAILY 90 tablet 4   • simvastatin (ZOCOR) 40 MG tablet TAKE 1 TABLET EVERY DAY 90 tablet 3   • traMADol (ULTRAM) 50 MG tablet Take 50 mg by mouth Every 6 (Six) Hours As Needed for Moderate Pain .     • gabapentin (NEURONTIN) 300 MG capsule Take 300 mg by mouth 3 (Three) Times a Day.       No facility-administered medications prior to visit.        Patient Active Problem List   Diagnosis   • Gastroesophageal reflux disease   • Atopic rhinitis   • Arthritis   • Peripheral neuropathy   • Arthralgia of hip   • Neck pain   • Low back pain   • Irritable bowel syndrome   • Hypertension   • Hyperlipidemia   • Hyperglycemia   • Hemorrhoids   • Gout   • Enlarged prostate   • Erectile dysfunction of nonorganic origin   • Cough   • Constipation   • Chronic diarrhea   • Benign prostatic hyperplasia   • Chronic coronary artery disease   • Lower extremity neuropathy   • Primary osteoarthritis of both knees   • Odontoid  "fracture with routine healing       Advance Care Planning:  has an advance directive - a copy has been provided and is in file    Identification of Risk Factors:  Risk factors include: weight , alcohol use and polypharmacy.    Review of Systems   Respiratory: Negative for cough, choking and shortness of breath.    Cardiovascular: Negative for palpitations and leg swelling.   Gastrointestinal: Negative for abdominal pain.   All other systems reviewed and are negative.      Compared to one year ago, the patient feels his physical health is the same.  Compared to one year ago, the patient feels his mental health is the same.    Objective     Physical Exam   Constitutional: He is oriented to person, place, and time. He appears well-developed and well-nourished.   HENT:   Head: Normocephalic and atraumatic.   Cardiovascular: Normal rate.    Pulmonary/Chest: Effort normal and breath sounds normal.   Lymphadenopathy:     He has no cervical adenopathy.   Neurological: He is alert and oriented to person, place, and time.   Skin: Skin is warm and dry.   Psychiatric: He has a normal mood and affect. His behavior is normal. Judgment and thought content normal.   Nursing note and vitals reviewed.      Vitals:    02/15/18 0937   BP: 140/72   Pulse: 61   SpO2: 98%   Weight: 82.6 kg (182 lb)   Height: 188 cm (74\")       Body mass index is 23.37 kg/(m^2).  Discussed the patient's BMI with him. BMI is normal   Assessment/Plan encouraged cv exercise   Patient Self-Management and Personalized Health Advice  The patient has been provided with information about: diet, exercise, weight management and alcohol use and preventive services including:   · Exercise counseling provided.    Visit Diagnoses:    ICD-10-CM ICD-9-CM   1. Medicare annual wellness visit, subsequent Z00.00 V70.0       No orders of the defined types were placed in this encounter.      Outpatient Encounter Prescriptions as of 2/15/2018   Medication Sig Dispense Refill   • " clopidogrel (PLAVIX) 75 MG tablet TAKE 1 TABLET EVERY DAY 90 tablet 4   • esomeprazole (nexIUM) 40 MG capsule Take 40 mg by mouth Every Morning Before Breakfast.     • HYDROcodone-acetaminophen (NORCO) 5-325 MG per tablet Take 1-2 tablets by mouth Every 4 (Four) Hours As Needed for Severe Pain . 18 tablet 0   • lisinopril (PRINIVIL,ZESTRIL) 10 MG tablet TAKE 1 TABLET EVERY DAY 90 tablet 3   • metoprolol succinate XL (TOPROL-XL) 25 MG 24 hr tablet TAKE 1 TABLET ONE TIME DAILY 90 tablet 4   • simvastatin (ZOCOR) 40 MG tablet TAKE 1 TABLET EVERY DAY 90 tablet 3   • traMADol (ULTRAM) 50 MG tablet Take 50 mg by mouth Every 6 (Six) Hours As Needed for Moderate Pain .     • [DISCONTINUED] gabapentin (NEURONTIN) 300 MG capsule Take 300 mg by mouth 3 (Three) Times a Day.       No facility-administered encounter medications on file as of 2/15/2018.        Reviewed use of high risk medication in the elderly: yes  Reviewed for potential of harmful drug interactions in the elderly: yes    Follow Up:  Return in about 6 months (around 8/15/2018).     An After Visit Summary and PPPS with all of these plans were given to the patient.

## 2018-03-02 RX ORDER — SIMVASTATIN 40 MG
TABLET ORAL
Qty: 90 TABLET | Refills: 3 | Status: SHIPPED | OUTPATIENT
Start: 2018-03-02 | End: 2019-05-01 | Stop reason: SDUPTHER

## 2018-03-27 ENCOUNTER — OFFICE VISIT (OUTPATIENT)
Dept: ORTHOPEDIC SURGERY | Facility: CLINIC | Age: 78
End: 2018-03-27

## 2018-03-27 VITALS
WEIGHT: 182 LBS | HEIGHT: 74 IN | DIASTOLIC BLOOD PRESSURE: 62 MMHG | HEART RATE: 71 BPM | BODY MASS INDEX: 23.36 KG/M2 | SYSTOLIC BLOOD PRESSURE: 128 MMHG

## 2018-03-27 DIAGNOSIS — M19.019 AC (ACROMIOCLAVICULAR) ARTHRITIS: ICD-10-CM

## 2018-03-27 DIAGNOSIS — M19.012 OSTEOARTHRITIS OF LEFT GLENOHUMERAL JOINT: Primary | ICD-10-CM

## 2018-03-27 DIAGNOSIS — M19.011 GLENOHUMERAL ARTHRITIS, RIGHT: ICD-10-CM

## 2018-03-27 PROCEDURE — 20610 DRAIN/INJ JOINT/BURSA W/O US: CPT | Performed by: PHYSICIAN ASSISTANT

## 2018-03-27 RX ORDER — LIDOCAINE HYDROCHLORIDE 10 MG/ML
4 INJECTION, SOLUTION INFILTRATION; PERINEURAL
Status: COMPLETED | OUTPATIENT
Start: 2018-03-27 | End: 2018-03-27

## 2018-03-27 RX ORDER — FLUTICASONE PROPIONATE 50 MCG
1 SPRAY, SUSPENSION (ML) NASAL DAILY
COMMUNITY
Start: 2018-03-05 | End: 2019-03-13 | Stop reason: SDUPTHER

## 2018-03-27 RX ORDER — TRIAMCINOLONE ACETONIDE 40 MG/ML
40 INJECTION, SUSPENSION INTRA-ARTICULAR; INTRAMUSCULAR
Status: COMPLETED | OUTPATIENT
Start: 2018-03-27 | End: 2018-03-27

## 2018-03-27 RX ADMIN — LIDOCAINE HYDROCHLORIDE 4 ML: 10 INJECTION, SOLUTION INFILTRATION; PERINEURAL at 11:19

## 2018-03-27 RX ADMIN — TRIAMCINOLONE ACETONIDE 40 MG: 40 INJECTION, SUSPENSION INTRA-ARTICULAR; INTRAMUSCULAR at 11:19

## 2018-03-27 RX ADMIN — LIDOCAINE HYDROCHLORIDE 4 ML: 10 INJECTION, SOLUTION INFILTRATION; PERINEURAL at 11:21

## 2018-03-27 RX ADMIN — TRIAMCINOLONE ACETONIDE 40 MG: 40 INJECTION, SUSPENSION INTRA-ARTICULAR; INTRAMUSCULAR at 11:21

## 2018-03-27 NOTE — PROGRESS NOTES
Procedure   Large Joint Arthrocentesis  Date/Time: 3/27/2018 11:19 AM  Consent given by: patient  Site marked: site marked  Timeout: Immediately prior to procedure a time out was called to verify the correct patient, procedure, equipment, support staff and site/side marked as required   Supporting Documentation  Indications: pain   Procedure Details  Location: shoulder - R glenohumeral  Preparation: Patient was prepped and draped in the usual sterile fashion  Needle size: 22 G  Approach: posterior  Medications administered: 4 mL lidocaine 1 %; 40 mg triamcinolone acetonide 40 MG/ML (4 cc Bupivicaine .25% NDC: 6614-4666-66; LOT: -DK; EXP: 04/01/2019)  Patient tolerance: patient tolerated the procedure well with no immediate complications

## 2018-03-27 NOTE — PROGRESS NOTES
[facility] [place] [facility] Note    Subjective     Patient: Isrrael Marino  : 1940    Primary Care Provider: JULY Sullivan    Requesting Provider: As above    Follow-up of the Left Knee (Glenohumeral Arthritis of both knees ) and Follow-up of the Right Knee (Glenohumeral Arthritis of both knees )      History    Chief Complaint: Bilateral shoulder pain    History of Present Illness: Patient returns today to discuss his bilateral shoulder pain.  He reports no change in his shoulder pain.  He has loss of motion and pain along the posterior joint line.  He has had injection in the left shoulder in the past with improved pain.  He has seen Dr. Cardona who recommended a reverse shoulder replacement, but the patient is not interested.  He denies any radiating pain down the arm.  He would like glenohumeral injection in both shoulders today.    Current Outpatient Prescriptions on File Prior to Visit   Medication Sig Dispense Refill   • clopidogrel (PLAVIX) 75 MG tablet TAKE 1 TABLET EVERY DAY 90 tablet 4   • esomeprazole (nexIUM) 40 MG capsule Take 40 mg by mouth Every Morning Before Breakfast.     • HYDROcodone-acetaminophen (NORCO) 5-325 MG per tablet Take 1-2 tablets by mouth Every 4 (Four) Hours As Needed for Severe Pain . 18 tablet 0   • lisinopril (PRINIVIL,ZESTRIL) 10 MG tablet TAKE 1 TABLET EVERY DAY 90 tablet 3   • metoprolol succinate XL (TOPROL-XL) 25 MG 24 hr tablet TAKE 1 TABLET ONE TIME DAILY 90 tablet 4   • simvastatin (ZOCOR) 40 MG tablet TAKE 1 TABLET EVERY DAY 90 tablet 3   • traMADol (ULTRAM) 50 MG tablet Take 50 mg by mouth Every 6 (Six) Hours As Needed for Moderate Pain .       No current facility-administered medications on file prior to visit.       Allergies   Allergen Reactions   • Ibuprofen       Past Medical History:   Diagnosis Date   • Arthritis     both knees   • CAD (coronary artery disease)    • Cancer    • Chondrocalcinosis of knee    • GERD (gastroesophageal  reflux disease)    • Gout    • Heart attack      Last Assessed: 28 Mar 2014   • Heart disease    • Hyperlipidemia    • Hypertension    • IBS (irritable bowel syndrome)    • Left rotator cuff tear arthropathy    • Low back pain    • Lower extremity neuropathy    • Lumbar canal stenosis    • Neck pain    • Numbness    • Right hip pain    • Rotator cuff tear arthropathy of right shoulder    • Thin blood      Past Surgical History:   Procedure Laterality Date   • APPENDECTOMY  1956   • BACK SURGERY  1997    spinal decompression and fusion   • BREAST LUMPECTOMY  2003   • CARPAL TUNNEL RELEASE  03/28/2014    Neuroplasty Decompression Median Nerve At Carpal Tunnel   • CORONARY STENT PLACEMENT  2013   • HERNIA REPAIR  2002    & 1998   • LUMBAR DISCECTOMY FUSION INSTRUMENTATION     • TONSILLECTOMY     • TOTAL HIP ARTHROPLASTY  2002   • VASECTOMY       Family History   Problem Relation Age of Onset   • Cancer Mother    • Heart disease Father    • Hypertension Father    • Heart attack Father       Social History     Social History   • Marital status:      Spouse name: N/A   • Number of children: N/A   • Years of education: N/A     Occupational History   • Not on file.     Social History Main Topics   • Smoking status: Never Smoker   • Smokeless tobacco: Never Used   • Alcohol use No   • Drug use: No   • Sexual activity: Not Currently     Other Topics Concern   • Not on file     Social History Narrative   • No narrative on file        Review of Systems   Constitutional: Negative.    HENT: Negative.    Eyes: Negative.    Respiratory: Negative.    Cardiovascular: Negative.    Gastrointestinal: Negative.    Endocrine: Negative.    Genitourinary: Negative.    Musculoskeletal: Positive for joint swelling.        Joint Pain    Skin: Negative.    Allergic/Immunologic: Negative.    Neurological: Negative.    Hematological: Negative.    Psychiatric/Behavioral: Negative.        The following portions of the patient's history were  "reviewed and updated as appropriate: allergies, current medications, past family history, past medical history, past social history, past surgical history and problem list.      Objective      Physical Exam  /62   Pulse 71   Ht 188 cm (74.02\")   Wt 82.6 kg (182 lb)   BMI 23.36 kg/m²     Body mass index is 23.36 kg/m².    GENERAL: Body habitus: normal weight for height    Gait: normal     Mental Status:  awake and alert; oriented to person, place, and time    Voice:  clear  SKIN:  Normal    Hair Growth:  Right:normal; Left:  normal  HEENT: Head: Normocephalic, atraumatic,  without obvious abnormality.  eye: normal external eye, no icterus   PULM:  Repiratory effort normal    Ortho Exam  Musculoskeletal   Upper Extremity   Left Shoulder     Inspection and Palpation:     Tenderness - posterior joint line    Crepitus - positive    Sensation is normal    Examination reveals no ecchymosis.       Right Shoulder     Inspection and Palpation:     Tenderness - posterior joint line    Crepitus - positive    Sensation is normal    Examination reveals no ecchymosis.           Medical Decision Making    Data Review:  Bilateral shoulder x-rays 2/13/18  Assessment:  1. Osteoarthritis of left glenohumeral joint    2. Glenohumeral arthritis, right    3. AC (acromioclavicular) arthritis        Plan:  1.  Patient has bilateral glenohumeral arthritis.  X-rays from 2/13/18 show glenohumeral arthritis, left more advanced than right.  He also has moderate to severe acromioclavicular arthritis bilaterally.  Patient is tender over the posterior joint line with decreased motion and strength.  He reports glenohumeral injection on the left in the past by Dr. Cardona with improved pain.  He is not interested in any surgery is recommended by Dr. Cardona.  He reports he got good relief from the injection for several months.  I explained to him that I was unable to get Dr. Cardona's notes for review.  Plan today is steroid injection into " bilateral glenohumeral joints.  He'll return as needed.    Using sterile technique, the left shoulder with sterilely prepped with Hibiclens.  After time out, using a 22-gauge needle, the left glenohumeral joint was injected with 40 mg Kenalog, 2 cc of Marcaine and 2 cc of lidocaine.  Patient tolerated the procedure well.  Using sterile technique, the right shoulder with sterilely prepped with Hibiclens.  After time out, using a 22-gauge needle, the right glenohumeral joint was injected with 40 mg Kenalog, 2 cc of Marcaine and 2 cc of lidocaine.  Patient tolerated the procedure well.    Of note, the patient reports improved bilateral shoulder pain before leaving the office.        Tish Shelley PA-C  03/27/18  1:36 PM

## 2018-03-27 NOTE — PROGRESS NOTES
Procedure   Large Joint Arthrocentesis  Date/Time: 3/27/2018 11:21 AM  Consent given by: patient  Site marked: site marked  Timeout: Immediately prior to procedure a time out was called to verify the correct patient, procedure, equipment, support staff and site/side marked as required   Supporting Documentation  Indications: pain   Procedure Details  Location: shoulder - L glenohumeral  Preparation: Patient was prepped and draped in the usual sterile fashion  Needle size: 22 G  Approach: posterior  Medications administered: 4 mL lidocaine 1 %; 40 mg triamcinolone acetonide 40 MG/ML (4 cc Bupivicaine .25% NDC: 0473-7337-68; LOT: -DK; EXP: 04/01/2019)  Patient tolerance: patient tolerated the procedure well with no immediate complications

## 2018-05-03 ENCOUNTER — HOSPITAL ENCOUNTER (OUTPATIENT)
Dept: CT IMAGING | Facility: HOSPITAL | Age: 78
Discharge: HOME | End: 2018-05-03
Attending: NEUROLOGICAL SURGERY | Admitting: NEUROLOGICAL SURGERY

## 2018-05-03 DIAGNOSIS — S12.100D CLOSED ODONTOID FRACTURE WITH ROUTINE HEALING, SUBSEQUENT ENCOUNTER: ICD-10-CM

## 2018-05-03 PROCEDURE — 72125 CT NECK SPINE W/O DYE: CPT

## 2018-05-15 ENCOUNTER — CLINICAL SUPPORT (OUTPATIENT)
Dept: ORTHOPEDIC SURGERY | Facility: CLINIC | Age: 78
End: 2018-05-15

## 2018-05-15 VITALS — BODY MASS INDEX: 22.8 KG/M2 | WEIGHT: 177.69 LBS | HEIGHT: 74 IN

## 2018-05-15 DIAGNOSIS — M17.0 PRIMARY OSTEOARTHRITIS OF BOTH KNEES: Primary | ICD-10-CM

## 2018-05-15 PROCEDURE — 20610 DRAIN/INJ JOINT/BURSA W/O US: CPT | Performed by: PHYSICIAN ASSISTANT

## 2018-05-15 RX ORDER — BUPIVACAINE HYDROCHLORIDE 2.5 MG/ML
4 INJECTION, SOLUTION INFILTRATION; PERINEURAL
Status: COMPLETED | OUTPATIENT
Start: 2018-05-15 | End: 2018-05-15

## 2018-05-15 RX ORDER — METHYLPREDNISOLONE ACETATE 40 MG/ML
40 INJECTION, SUSPENSION INTRA-ARTICULAR; INTRALESIONAL; INTRAMUSCULAR; SOFT TISSUE
Status: COMPLETED | OUTPATIENT
Start: 2018-05-15 | End: 2018-05-15

## 2018-05-15 RX ORDER — LIDOCAINE HYDROCHLORIDE 10 MG/ML
4 INJECTION, SOLUTION INFILTRATION; PERINEURAL
Status: COMPLETED | OUTPATIENT
Start: 2018-05-15 | End: 2018-05-15

## 2018-05-15 RX ADMIN — BUPIVACAINE HYDROCHLORIDE 4 ML: 2.5 INJECTION, SOLUTION INFILTRATION; PERINEURAL at 08:52

## 2018-05-15 RX ADMIN — METHYLPREDNISOLONE ACETATE 40 MG: 40 INJECTION, SUSPENSION INTRA-ARTICULAR; INTRALESIONAL; INTRAMUSCULAR; SOFT TISSUE at 08:52

## 2018-05-15 RX ADMIN — LIDOCAINE HYDROCHLORIDE 4 ML: 10 INJECTION, SOLUTION INFILTRATION; PERINEURAL at 08:52

## 2018-05-15 RX ADMIN — BUPIVACAINE HYDROCHLORIDE 4 ML: 2.5 INJECTION, SOLUTION INFILTRATION; PERINEURAL at 08:50

## 2018-05-15 RX ADMIN — METHYLPREDNISOLONE ACETATE 40 MG: 40 INJECTION, SUSPENSION INTRA-ARTICULAR; INTRALESIONAL; INTRAMUSCULAR; SOFT TISSUE at 08:50

## 2018-05-15 RX ADMIN — LIDOCAINE HYDROCHLORIDE 4 ML: 10 INJECTION, SOLUTION INFILTRATION; PERINEURAL at 08:50

## 2018-05-15 NOTE — PROGRESS NOTES
[facility] [place] [facility] Clinic Note    Subjective     Patient: Irsrael Marino  : 1940    Primary Care Provider: JULY Sullivan    Requesting Provider: As above    Follow-up (3 months bilateral knees)      History    Chief Complaint: Bilateral knee pain    History of Present Illness: Patient returns today to discuss his increasing bilateral knee pain.  He reports no new symptoms.  He complains of medial functional pain with no night pain.  He denies any radiating pain or mechanical symptoms.  He reports pain moderate in nature.  He reports good relief for 6-8 weeks.  He still able to do all activities he would like to.  He has been treated with steroid injection multiple times in the past.  He is not interested knee replacement and would like to continue with intermittent injection.    He reports glenohumeral injections approximate 6 weeks ago significantly improved shoulder pain and they are still working.    Current Outpatient Prescriptions on File Prior to Visit   Medication Sig Dispense Refill   • clopidogrel (PLAVIX) 75 MG tablet TAKE 1 TABLET EVERY DAY 90 tablet 4   • esomeprazole (nexIUM) 40 MG capsule Take 40 mg by mouth Every Morning Before Breakfast.     • fluticasone (FLONASE) 50 MCG/ACT nasal spray      • HYDROcodone-acetaminophen (NORCO) 5-325 MG per tablet Take 1-2 tablets by mouth Every 4 (Four) Hours As Needed for Severe Pain . 18 tablet 0   • lisinopril (PRINIVIL,ZESTRIL) 10 MG tablet TAKE 1 TABLET EVERY DAY 90 tablet 3   • metoprolol succinate XL (TOPROL-XL) 25 MG 24 hr tablet TAKE 1 TABLET ONE TIME DAILY 90 tablet 4   • simvastatin (ZOCOR) 40 MG tablet TAKE 1 TABLET EVERY DAY 90 tablet 3   • traMADol (ULTRAM) 50 MG tablet Take 50 mg by mouth Every 6 (Six) Hours As Needed for Moderate Pain .       No current facility-administered medications on file prior to visit.       Allergies   Allergen Reactions   • Ibuprofen       Past Medical History:   Diagnosis Date   • Arthritis  "    both knees   • CAD (coronary artery disease)    • Cancer    • Chondrocalcinosis of knee    • GERD (gastroesophageal reflux disease)    • Gout    • Heart attack      Last Assessed: 28 Mar 2014   • Heart disease    • Hyperlipidemia    • Hypertension    • IBS (irritable bowel syndrome)    • Left rotator cuff tear arthropathy    • Low back pain    • Lower extremity neuropathy    • Lumbar canal stenosis    • Neck pain    • Numbness    • Right hip pain    • Rotator cuff tear arthropathy of right shoulder    • Thin blood      Past Surgical History:   Procedure Laterality Date   • APPENDECTOMY  1956   • BACK SURGERY  1997    spinal decompression and fusion   • BREAST LUMPECTOMY  2003   • CARPAL TUNNEL RELEASE  03/28/2014    Neuroplasty Decompression Median Nerve At Carpal Tunnel   • CORONARY STENT PLACEMENT  2013   • HERNIA REPAIR  2002    & 1998   • LUMBAR DISCECTOMY FUSION INSTRUMENTATION     • TONSILLECTOMY     • TOTAL HIP ARTHROPLASTY  2002   • VASECTOMY       Family History   Problem Relation Age of Onset   • Cancer Mother    • Heart disease Father    • Hypertension Father    • Heart attack Father       Social History     Social History   • Marital status:      Spouse name: N/A   • Number of children: N/A   • Years of education: N/A     Occupational History   • Not on file.     Social History Main Topics   • Smoking status: Never Smoker   • Smokeless tobacco: Never Used   • Alcohol use No   • Drug use: No   • Sexual activity: Not Currently     Other Topics Concern   • Not on file     Social History Narrative   • No narrative on file        Review of Systems    The following portions of the patient's history were reviewed and updated as appropriate: allergies, current medications, past family history, past medical history, past social history, past surgical history and problem list.      Objective      Physical Exam  Ht 188 cm (74.02\")   Wt 80.6 kg (177 lb 11.1 oz)   BMI 22.80 kg/m²     Body mass index is " 22.8 kg/m².    GENERAL: Body habitus: normal weight for height    Lower extremity edema: Left: trace; Right: trace    Varicose veins:  Left: mild; Right: mild    Gait: normal     Mental Status:  awake and alert; oriented to person, place, and time    Voice:  clear  SKIN:  Normal    Hair Growth:  Right:normal; Left:  normal  HEENT: Head: Normocephalic, atraumatic,  without obvious abnormality.  eye: normal external eye, no icterus   PULM:  Repiratory effort normal    Ortho Exam  Right Hip Exam  ----------  FLEXION CONTRACTURE: None  FLEXION: 110 degrees  INTERNAL ROTATION: 20 degrees at 90 degrees of flexion   EXTERNAL ROTATION: 40 degrees at 90 degrees of flexion    PAIN WITH HIP MOTION: no      Right Knee Exam  ----------  ALIGNMENT: Right: neutral----------  RANGE OF MOTION:  Right: 0-120  LIGAMENTOUS STABILITY:   Right:stable to varus and valgus stress at terminal extension and 30 degrees without any evidence of laxity----------  STRENGTH:  KNEE FLEXION Right 5/5  KNEE EXTENSION Right 5/5 ----------  PAIN WITH PALPATION: Right medial joint line  PAIN WITH KNEE ROM: Right no  PATELLAR CREPITUS: Right yes   ----------  SENSATION TO LIGHT TOUCH:  DEEP PERONEAL/SUPERFICIAL PERONEAL/SURAL/SAPHENOUS/TIBIAL:   Right intact----------  EFFUSION  Right:  no;  ERYTHEMA:  Right: no;  WOUNDS/INCISIONS: none, no overlying skin problems.    Left Hip Exam  ---------  FLEXION CONTRACTURE: None  FLEXION: 110 degrees  INTERNAL ROTATION: 20 degrees at 90 degrees of flexion   EXTERNAL ROTATION: 40 degrees at 90 degrees of flexion    PAIN WITH HIP MOTION: no      Left Knee Exam  ----------  Knee Exam:  ----------  ALIGNMENT:  Left: neutral  ----------  RANGE OF MOTION:  Left: 0-120  LIGAMENTOUS STABILITY:   Left:stable to varus and valgus stress at terminal extension and 30 degrees without any evidence of laxity   ----------  STRENGTH:  KNEE FLEXION  Left 5/5  KNEE EXTENSION Left 5/5  ----------  PAIN WITH PALPATION: Left medial joint  line  PAIN WITH KNEE ROM:  Left no  PATELLAR CREPITUS:  Left yes  ----------  SENSATION TO LIGHT TOUCH:  DEEP PERONEAL/SUPERFICIAL PERONEAL/SURAL/SAPHENOUS/TIBIAL:   Left intact  ----------  EFFUSION:   Left:  no  ERYTHEMA:  ; Left:  no  WOUNDS/INCISIONS: none, no overlying skin problems.      Medical Decision Making    Data Review:   none    Assessment:  1. Primary osteoarthritis of both knees        Plan:  Bilateral knee arthritis.  I reviewed clinical findings past and current treatment with the patient.  On exam, he has medial joint line tenderness is good motion and stable ligament is exam.  Patient has been treated in the past with intermittent steroid injection given him excellent relief for 6-8 weeks.  He reports these last injections last him longer and he believes because he had glenohumeral injections in between.  He is not interested in replacement.  Plan today is repeat steroid injection into bilateral knees.  He will return in 3 months for the knees or sooner if needed for his shoulders.    Using sterile technique, the left knee was sterilely prepped with Hibiclens. Following a time out,  using a 22 gauge needle, the left knee was injected with 40mg Depo Medrol, 4 cc lidocaine and 4 cc marcaine.  Patient tolerated the procedure well.  No complications.    Using sterile technique, the right knee was sterilely prepped with Hibiclens.  Following a time out,  using a 22 gauge needle, the right knee was injected with 40mg Depo Medrol, 4 cc lidocaine and 4 cc marcaine.  Patient tolerated the procedure well.  No complications.        Tish Shelley PA-C  05/15/18  10:20 AM

## 2018-05-15 NOTE — PROGRESS NOTES
Procedure   Large Joint Arthrocentesis  Date/Time: 5/15/2018 8:52 AM  Consent given by: patient  Site marked: site marked  Timeout: Immediately prior to procedure a time out was called to verify the correct patient, procedure, equipment, support staff and site/side marked as required   Supporting Documentation  Indications: pain   Procedure Details  Location: knee - L knee  Preparation: Patient was prepped and draped in the usual sterile fashion  Needle size: 22 G  Approach: anterolateral  Medications administered: 4 mL bupivacaine 0.25 %; 4 mL lidocaine 1 %; 40 mg methylPREDNISolone acetate 40 MG/ML  Patient tolerance: patient tolerated the procedure well with no immediate complications

## 2018-05-15 NOTE — PROGRESS NOTES
Procedure   Large Joint Arthrocentesis  Date/Time: 5/15/2018 8:50 AM  Consent given by: patient  Site marked: site marked  Timeout: Immediately prior to procedure a time out was called to verify the correct patient, procedure, equipment, support staff and site/side marked as required   Supporting Documentation  Indications: pain   Procedure Details  Location: knee - R knee  Needle size: 22 G  Approach: anterolateral  Medications administered: 4 mL bupivacaine 0.25 %; 4 mL lidocaine 1 %; 40 mg methylPREDNISolone acetate 40 MG/ML  Patient tolerance: patient tolerated the procedure well with no immediate complications

## 2018-05-24 ENCOUNTER — TELEPHONE (OUTPATIENT)
Dept: NEUROSURGERY | Facility: CLINIC | Age: 78
End: 2018-05-24

## 2018-05-24 ENCOUNTER — HOSPITAL ENCOUNTER (OUTPATIENT)
Dept: GENERAL RADIOLOGY | Facility: HOSPITAL | Age: 78
Discharge: HOME | End: 2018-05-24
Attending: NEUROLOGICAL SURGERY | Admitting: NEUROLOGICAL SURGERY

## 2018-05-24 ENCOUNTER — OFFICE VISIT (OUTPATIENT)
Dept: NEUROSURGERY | Facility: CLINIC | Age: 78
End: 2018-05-24

## 2018-05-24 VITALS — BODY MASS INDEX: 23.74 KG/M2 | WEIGHT: 185 LBS | HEIGHT: 74 IN

## 2018-05-24 DIAGNOSIS — S12.100D CLOSED ODONTOID FRACTURE WITH ROUTINE HEALING, SUBSEQUENT ENCOUNTER: Primary | ICD-10-CM

## 2018-05-24 DIAGNOSIS — S12.100D CLOSED ODONTOID FRACTURE WITH ROUTINE HEALING, SUBSEQUENT ENCOUNTER: ICD-10-CM

## 2018-05-24 PROCEDURE — 72040 X-RAY EXAM NECK SPINE 2-3 VW: CPT

## 2018-05-24 PROCEDURE — 99213 OFFICE O/P EST LOW 20 MIN: CPT | Performed by: NEUROLOGICAL SURGERY

## 2018-05-24 NOTE — TELEPHONE ENCOUNTER
DR ELI REQUESTED TO SEE PT NEXT WEEK BUT HIS SCHEDULE IS FULL.  WOULD HE LIKE TO OPEN A SPOT ON THE 5/31/18?

## 2018-05-24 NOTE — PROGRESS NOTES
Subjective   Isrrael Marino is a 77 y.o. male who presents for follow up of  type II odontoid fracture.     The patient fell off the third step of a ladder and fractured his odontoid 6 months ago on 11/10/17.  CT scan showed a type II odontoid fracture with intact posterior cortex, but slight splaying of the anterior cortex on the sagittal view.  He wore a collar for 3 months.  Pain in his neck has completely subsided.  He has been able to be physically active, including playing a footer to a patio in a 30 inch trench in the past couple of months.    CT scan of the cervical spine done on 5/3/18 was reviewed and shows no change in the appearance of the odontoid.  There is no ossification within the fracture gap, but the posterior cortex remains intact.  The alignment remains good.    The following portions of the patient's history were reviewed, updated as appropriate and approved: allergies, current medications, past family history, past medical history, past social history, past surgical history, review of systems and problem list.     Review of Systems   Constitutional: Negative for activity change, appetite change, chills, diaphoresis, fatigue, fever and unexpected weight change.   HENT: Positive for hearing loss and tinnitus. Negative for congestion, dental problem, drooling, ear discharge, ear pain, facial swelling, mouth sores, nosebleeds, postnasal drip, rhinorrhea, sinus pressure, sneezing, sore throat, trouble swallowing and voice change.    Eyes: Negative for photophobia, pain, discharge, redness, itching and visual disturbance.   Respiratory: Negative for apnea, cough, choking, chest tightness, shortness of breath, wheezing and stridor.    Cardiovascular: Negative for chest pain, palpitations and leg swelling.   Gastrointestinal: Negative for abdominal distention, abdominal pain, anal bleeding, blood in stool, constipation, diarrhea, nausea, rectal pain and vomiting.   Endocrine: Negative for cold  intolerance, heat intolerance, polydipsia, polyphagia and polyuria.   Genitourinary: Negative for decreased urine volume, difficulty urinating, dysuria, enuresis, flank pain, frequency, genital sores, hematuria and urgency.   Musculoskeletal: Positive for arthralgias, back pain, myalgias, neck pain and neck stiffness. Negative for gait problem and joint swelling.   Skin: Negative for color change, pallor, rash and wound.   Allergic/Immunologic: Negative for environmental allergies, food allergies and immunocompromised state.   Neurological: Negative for dizziness, tremors, seizures, syncope, facial asymmetry, speech difficulty, weakness, light-headedness, numbness and headaches.   Hematological: Negative for adenopathy. Does not bruise/bleed easily.   Psychiatric/Behavioral: Negative for agitation, behavioral problems, confusion, decreased concentration, dysphoric mood, hallucinations, self-injury, sleep disturbance and suicidal ideas. The patient is not nervous/anxious and is not hyperactive.        Objective    NEUROLOGICAL EXAMINATION:    Intact neurological exam.    Assessment   6 months since type II odontoid fracture.  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       Plan   Since he is asymptomatic and the alignment appears to be satisfactory without instability, surgery probably poses more risks than benefits.  We will have lateral flexion-extension cervical spine films done to confirm the stability of his odontoid.  If stable without evidence of angulation or subluxation on lateral flexion-extension, we will see him in follow-up with CT of the cervical spine in 6 months.       Zeus Cruz MD

## 2018-05-31 ENCOUNTER — OFFICE VISIT (OUTPATIENT)
Dept: NEUROSURGERY | Facility: CLINIC | Age: 78
End: 2018-05-31

## 2018-05-31 VITALS — BODY MASS INDEX: 23.74 KG/M2 | WEIGHT: 185 LBS | HEIGHT: 74 IN

## 2018-05-31 DIAGNOSIS — S12.100G CLOSED ODONTOID FRACTURE WITH DELAYED HEALING, SUBSEQUENT ENCOUNTER: Primary | ICD-10-CM

## 2018-05-31 PROCEDURE — 99213 OFFICE O/P EST LOW 20 MIN: CPT | Performed by: NEUROLOGICAL SURGERY

## 2018-05-31 NOTE — PROGRESS NOTES
Subjective   Isrrael Marino is a 77 y.o. male who presents for follow up of  odontoid fracture.     Patient sustained an odontoid type II fracture over 6 months ago.  There appeared to be no subluxation on CT scan.  After I saw him last week with a CT scan that showed angulation of the odontoid but no subluxation, lateral flexion and extension images were done.  This shows a 15 mm anterior subluxation on flexion at C1-2.    The following portions of the patient's history were reviewed, updated as appropriate and approved: allergies, current medications, past family history, past medical history, past social history, past surgical history, review of systems and problem list.     Review of Systems   Constitutional: Negative for activity change, appetite change, chills, diaphoresis, fatigue, fever and unexpected weight change.   HENT: Positive for hearing loss and tinnitus. Negative for congestion, dental problem, drooling, ear discharge, ear pain, facial swelling, mouth sores, nosebleeds, postnasal drip, rhinorrhea, sinus pressure, sneezing, sore throat, trouble swallowing and voice change.    Eyes: Negative for photophobia, pain, discharge, redness, itching and visual disturbance.   Respiratory: Negative for apnea, cough, choking, chest tightness, shortness of breath, wheezing and stridor.    Cardiovascular: Negative for chest pain, palpitations and leg swelling.   Gastrointestinal: Negative for abdominal distention, abdominal pain, anal bleeding, blood in stool, constipation, diarrhea, nausea, rectal pain and vomiting.   Endocrine: Negative for cold intolerance, heat intolerance, polydipsia, polyphagia and polyuria.   Genitourinary: Negative for decreased urine volume, difficulty urinating, dysuria, enuresis, flank pain, frequency, genital sores, hematuria and urgency.   Musculoskeletal: Positive for arthralgias, back pain, myalgias, neck pain and neck stiffness. Negative for gait problem and joint swelling.    Skin: Negative for color change, pallor, rash and wound.   Allergic/Immunologic: Negative for environmental allergies, food allergies and immunocompromised state.   Neurological: Negative for dizziness, tremors, seizures, syncope, facial asymmetry, speech difficulty, weakness, light-headedness, numbness and headaches.   Hematological: Negative for adenopathy. Does not bruise/bleed easily.   Psychiatric/Behavioral: Negative for agitation, behavioral problems, confusion, decreased concentration, dysphoric mood, hallucinations, self-injury, sleep disturbance and suicidal ideas. The patient is not nervous/anxious and is not hyperactive.        Objective    NEUROLOGICAL EXAMINATION:    Neurological examination is intact with the exception of a chronic neuropathy which he says he's had for the past 20 years, initially affecting his feet, but now affecting his hands with atrophy and some weakness.    Assessment   Unstable odontoid fracture       Plan   I will ask Dr. Randall Barnett to see him about odontoid stabilization.       Zeus Cruz MD

## 2018-06-04 ENCOUNTER — OFFICE VISIT (OUTPATIENT)
Dept: NEUROSURGERY | Facility: CLINIC | Age: 78
End: 2018-06-04

## 2018-06-04 ENCOUNTER — TELEPHONE (OUTPATIENT)
Dept: CARDIOLOGY | Facility: CLINIC | Age: 78
End: 2018-06-04

## 2018-06-04 ENCOUNTER — PREP FOR SURGERY (OUTPATIENT)
Dept: OTHER | Facility: HOSPITAL | Age: 78
End: 2018-06-04

## 2018-06-04 VITALS
BODY MASS INDEX: 23.74 KG/M2 | WEIGHT: 185 LBS | HEIGHT: 74 IN | SYSTOLIC BLOOD PRESSURE: 126 MMHG | OXYGEN SATURATION: 99 % | DIASTOLIC BLOOD PRESSURE: 80 MMHG | RESPIRATION RATE: 18 BRPM

## 2018-06-04 DIAGNOSIS — S12.110K ODONTOID FRACTURE WITH NONUNION: Primary | ICD-10-CM

## 2018-06-04 DIAGNOSIS — S12.100G CLOSED ODONTOID FRACTURE WITH DELAYED HEALING, SUBSEQUENT ENCOUNTER: Primary | ICD-10-CM

## 2018-06-04 PROBLEM — S12.100A CLOSED FRACTURE OF ODONTOID PROCESS OF AXIS (HCC): Status: ACTIVE | Noted: 2018-06-04

## 2018-06-04 PROCEDURE — 99214 OFFICE O/P EST MOD 30 MIN: CPT | Performed by: NEUROLOGICAL SURGERY

## 2018-06-04 RX ORDER — CEFAZOLIN SODIUM 2 G/100ML
2 INJECTION, SOLUTION INTRAVENOUS ONCE
Status: CANCELLED | OUTPATIENT
Start: 2018-06-04 | End: 2018-06-04

## 2018-06-04 RX ORDER — FAMOTIDINE 20 MG/1
20 TABLET, FILM COATED ORAL
Status: CANCELLED | OUTPATIENT
Start: 2018-06-04

## 2018-06-04 RX ORDER — CHLORHEXIDINE GLUCONATE 4 G/100ML
SOLUTION TOPICAL
Qty: 120 ML | Refills: 0 | Status: SHIPPED | OUTPATIENT
Start: 2018-06-04 | End: 2018-07-09

## 2018-06-04 RX ORDER — SODIUM CHLORIDE AND POTASSIUM CHLORIDE 150; 900 MG/100ML; MG/100ML
75 INJECTION, SOLUTION INTRAVENOUS CONTINUOUS
Status: CANCELLED | OUTPATIENT
Start: 2018-06-04

## 2018-06-04 RX ORDER — ACETAMINOPHEN 500 MG
1000 TABLET ORAL ONCE
Status: CANCELLED | OUTPATIENT
Start: 2018-06-04 | End: 2018-06-04

## 2018-06-04 RX ORDER — IBUPROFEN 800 MG/1
800 TABLET ORAL ONCE
Status: CANCELLED | OUTPATIENT
Start: 2018-06-04 | End: 2018-06-04

## 2018-06-04 RX ORDER — OXYCODONE HCL 10 MG/1
10 TABLET, FILM COATED, EXTENDED RELEASE ORAL ONCE
Status: CANCELLED | OUTPATIENT
Start: 2018-06-04 | End: 2018-06-04

## 2018-06-04 NOTE — TELEPHONE ENCOUNTER
ANDIE from Neurosurgery office requesting ok to stop Plavix for upcoming cervical fusion on 7/13/18.

## 2018-06-04 NOTE — PROGRESS NOTES
"  NAME: VERENA REGALADO   DOS: 2018  : 1940  PCP: JULY Sullivan    Chief Complaint:  Neck pain  Chief Complaint   Patient presents with   • Neck Pain       History of Present Illness:  77 y.o. male   77-year-old gentleman with a history of a fall back in November.  He was treated nonsurgically for a type II odontoid fracture and followed as an outpatient.  He represented for follow-up CT scan demonstrated widening of this fracture line with instability on his CT scan And cervical flexion extension films.  Additionally he has \"neuropathy \"with numbness in his hands and lowers extremities with widening gait and difficulty with his left shoulder.    He's had prior back fusions ×2 years ago with Dr. Santos he denies any additional symptomatology    PMHX  Allergies:  Allergies   Allergen Reactions   • Ibuprofen      Medications    Current Outpatient Prescriptions:   •  clopidogrel (PLAVIX) 75 MG tablet, TAKE 1 TABLET EVERY DAY, Disp: 90 tablet, Rfl: 4  •  esomeprazole (nexIUM) 40 MG capsule, Take 40 mg by mouth Every Morning Before Breakfast., Disp: , Rfl:   •  fluticasone (FLONASE) 50 MCG/ACT nasal spray, , Disp: , Rfl:   •  HYDROcodone-acetaminophen (NORCO) 5-325 MG per tablet, Take 1-2 tablets by mouth Every 4 (Four) Hours As Needed for Severe Pain ., Disp: 18 tablet, Rfl: 0  •  lisinopril (PRINIVIL,ZESTRIL) 10 MG tablet, TAKE 1 TABLET EVERY DAY, Disp: 90 tablet, Rfl: 3  •  metoprolol succinate XL (TOPROL-XL) 25 MG 24 hr tablet, TAKE 1 TABLET ONE TIME DAILY, Disp: 90 tablet, Rfl: 4  •  simvastatin (ZOCOR) 40 MG tablet, TAKE 1 TABLET EVERY DAY, Disp: 90 tablet, Rfl: 3  •  traMADol (ULTRAM) 50 MG tablet, Take 50 mg by mouth Every 6 (Six) Hours As Needed for Moderate Pain ., Disp: , Rfl:   Past Medical History:  Past Medical History:   Diagnosis Date   • Arthritis     both knees   • CAD (coronary artery disease)    • Cancer    • Chondrocalcinosis of knee    • GERD (gastroesophageal reflux disease)  "   • Gout    • Heart attack      Last Assessed: 28 Mar 2014   • Heart disease    • Hyperlipidemia    • Hypertension    • IBS (irritable bowel syndrome)    • Left rotator cuff tear arthropathy    • Low back pain    • Lower extremity neuropathy    • Lumbar canal stenosis    • Neck pain    • Numbness    • Right hip pain    • Rotator cuff tear arthropathy of right shoulder    • Thin blood      Past Surgical History:  Past Surgical History:   Procedure Laterality Date   • APPENDECTOMY  1956   • BACK SURGERY  1997    spinal decompression and fusion   • BREAST LUMPECTOMY  2003   • CARPAL TUNNEL RELEASE  03/28/2014    Neuroplasty Decompression Median Nerve At Carpal Tunnel   • CORONARY STENT PLACEMENT  2013   • HERNIA REPAIR  2002    & 1998   • LUMBAR DISCECTOMY FUSION INSTRUMENTATION     • TONSILLECTOMY     • TOTAL HIP ARTHROPLASTY  2002   • VASECTOMY       Social Hx:  Social History   Substance Use Topics   • Smoking status: Never Smoker   • Smokeless tobacco: Never Used   • Alcohol use No     Family Hx:  Family History   Problem Relation Age of Onset   • Cancer Mother    • Heart disease Father    • Hypertension Father    • Heart attack Father      Review of Systems:        Review of Systems   Constitutional: Negative for activity change, appetite change, chills, diaphoresis, fatigue, fever and unexpected weight change.   HENT: Negative for congestion, dental problem, drooling, ear discharge, ear pain, facial swelling, hearing loss, mouth sores, nosebleeds, postnasal drip, rhinorrhea, sinus pressure, sneezing, sore throat, tinnitus, trouble swallowing and voice change.    Eyes: Negative for photophobia, pain, discharge, redness, itching and visual disturbance.   Respiratory: Negative for apnea, cough, choking, chest tightness, shortness of breath, wheezing and stridor.    Cardiovascular: Negative for chest pain, palpitations and leg swelling.   Gastrointestinal: Negative for abdominal distention, abdominal pain, anal  bleeding, blood in stool, constipation, diarrhea, nausea, rectal pain and vomiting.   Endocrine: Negative for cold intolerance, heat intolerance, polydipsia, polyphagia and polyuria.   Genitourinary: Negative for decreased urine volume, difficulty urinating, dysuria, enuresis, flank pain, frequency, genital sores, hematuria and urgency.   Musculoskeletal: Positive for neck pain. Negative for arthralgias, back pain, gait problem, joint swelling, myalgias and neck stiffness.   Skin: Negative for color change, pallor, rash and wound.   Allergic/Immunologic: Negative for environmental allergies, food allergies and immunocompromised state.   Neurological: Negative for dizziness, tremors, seizures, syncope, facial asymmetry, speech difficulty, weakness, light-headedness, numbness and headaches.   Hematological: Negative for adenopathy. Does not bruise/bleed easily.   Psychiatric/Behavioral: Negative for agitation, behavioral problems, confusion, decreased concentration, dysphoric mood, hallucinations, self-injury, sleep disturbance and suicidal ideas. The patient is not nervous/anxious and is not hyperactive.    All other systems reviewed and are negative.     I have reviewed this note template and all pertinent parts of the review of systems social, family history, surgical history and medication list      Physical Examination:  Vitals:    06/04/18 0854   BP: 126/80   Resp: 18   SpO2: 99%      General Appearance:   Well developed, well nourished, well groomed, alert, and cooperative.  Neurological examination:  Neurologic Exam  Vital signs were reviewed and documented in the chart  Patient appeared in good neurologic function with normal comprehension fluent speech  Mood and affect are normal  Sense of smell deferred    Pupils symmetric equally reactive funduscopic exam not visualized   Visual fields intact to confrontation  Extraocular movements intact  Face motor function is symmetric  Facial sensations normal  Hearing  intact to finger rub hearing intact to finger rub  Tongue is midline  Palate symmetric  Swallowing normal  Shoulder shrug normal  Marketed decreased range of motion neck    Marketed decreased range of motion left shoulder with weak deltoid  Muscle bulk and tone normal  5 out of 5 strength no motor drift  Gait normal intact however it's wide-based unstable and he favors his right side  Negative Romberg  No clonus long tract signs or myelopathy  First dorsal interosseous weakness positive Tinel's at the right elbow  Reflexes diffusely hyporeflexic  No edema noted and extremities skin appears normal    Straight leg raise sign absent  No signs of intrinsic hip dysfunction  Back is without any lesions or abnormality  Feet are warm and well perfused        Review of Imaging/DATA:  CT scan demonstrates abnormal fracture with pannus formation worsened in the last 3-6 months additionally flexion-extension show mechanical instability  Diagnoses/Plan:    Mr. Marino is a 77 y.o. male   1.  C1 to odontoid fracture with widening of the CHRISTI and compression of the spinal cord patient's diffusely neuropathic so it's difficult to ascertain but he's got an abnormal gait  I think he is in need surgery for this I would opt for a posterior approaching case C1 2 fusion given his advanced age the other thing as is that he has a relatively large rheumatoid pannus which may speak to the chronicity of the type of fracture he has any Eduardo has limited range of motion of like an MRI of his cervical spine for presurgical planning and we'll make arrangements I explained the risks benefits and expected outcome from the surgery

## 2018-06-11 ENCOUNTER — HOSPITAL ENCOUNTER (OUTPATIENT)
Dept: MRI IMAGING | Facility: HOSPITAL | Age: 78
Discharge: HOME | End: 2018-06-11
Attending: NEUROLOGICAL SURGERY | Admitting: NEUROLOGICAL SURGERY

## 2018-06-11 DIAGNOSIS — S12.100G CLOSED ODONTOID FRACTURE WITH DELAYED HEALING, SUBSEQUENT ENCOUNTER: ICD-10-CM

## 2018-06-11 PROCEDURE — 72141 MRI NECK SPINE W/O DYE: CPT

## 2018-07-09 ENCOUNTER — APPOINTMENT (OUTPATIENT)
Dept: PREADMISSION TESTING | Facility: HOSPITAL | Age: 78
End: 2018-07-09

## 2018-07-09 VITALS — WEIGHT: 192.9 LBS | BODY MASS INDEX: 26.13 KG/M2 | HEIGHT: 72 IN

## 2018-07-09 DIAGNOSIS — S12.110K ODONTOID FRACTURE WITH NONUNION: ICD-10-CM

## 2018-07-09 LAB
ALBUMIN SERPL-MCNC: 3.84 G/DL (ref 3.2–4.8)
ALBUMIN/GLOB SERPL: 1.7 G/DL (ref 1.5–2.5)
ALP SERPL-CCNC: 58 U/L (ref 25–100)
ALT SERPL W P-5'-P-CCNC: 26 U/L (ref 7–40)
ANION GAP SERPL CALCULATED.3IONS-SCNC: 5 MMOL/L (ref 3–11)
AST SERPL-CCNC: 32 U/L (ref 0–33)
BILIRUB SERPL-MCNC: 0.5 MG/DL (ref 0.3–1.2)
BUN BLD-MCNC: 37 MG/DL (ref 9–23)
BUN/CREAT SERPL: 38.1 (ref 7–25)
CALCIUM SPEC-SCNC: 8.9 MG/DL (ref 8.7–10.4)
CHLORIDE SERPL-SCNC: 110 MMOL/L (ref 99–109)
CO2 SERPL-SCNC: 28 MMOL/L (ref 20–31)
CREAT BLD-MCNC: 0.97 MG/DL (ref 0.6–1.3)
DEPRECATED RDW RBC AUTO: 45.3 FL (ref 37–54)
ERYTHROCYTE [DISTWIDTH] IN BLOOD BY AUTOMATED COUNT: 13.7 % (ref 11.3–14.5)
GFR SERPL CREATININE-BSD FRML MDRD: 74 ML/MIN/1.73
GLOBULIN UR ELPH-MCNC: 2.3 GM/DL
GLUCOSE BLD-MCNC: 85 MG/DL (ref 70–100)
HBA1C MFR BLD: 5.8 % (ref 4.8–5.6)
HCT VFR BLD AUTO: 42.5 % (ref 38.9–50.9)
HGB BLD-MCNC: 14.1 G/DL (ref 13.1–17.5)
MCH RBC QN AUTO: 30.2 PG (ref 27–31)
MCHC RBC AUTO-ENTMCNC: 33.2 G/DL (ref 32–36)
MCV RBC AUTO: 91 FL (ref 80–99)
PLATELET # BLD AUTO: 196 10*3/MM3 (ref 150–450)
PMV BLD AUTO: 10.4 FL (ref 6–12)
POTASSIUM BLD-SCNC: 5.4 MMOL/L (ref 3.5–5.5)
PROT SERPL-MCNC: 6.1 G/DL (ref 5.7–8.2)
RBC # BLD AUTO: 4.67 10*6/MM3 (ref 4.2–5.76)
SODIUM BLD-SCNC: 143 MMOL/L (ref 132–146)
WBC NRBC COR # BLD: 7.09 10*3/MM3 (ref 3.5–10.8)

## 2018-07-09 PROCEDURE — 93005 ELECTROCARDIOGRAM TRACING: CPT

## 2018-07-09 PROCEDURE — 83036 HEMOGLOBIN GLYCOSYLATED A1C: CPT | Performed by: ANESTHESIOLOGY

## 2018-07-09 PROCEDURE — 36415 COLL VENOUS BLD VENIPUNCTURE: CPT

## 2018-07-09 PROCEDURE — 85027 COMPLETE CBC AUTOMATED: CPT | Performed by: NEUROLOGICAL SURGERY

## 2018-07-09 PROCEDURE — 87081 CULTURE SCREEN ONLY: CPT | Performed by: ANESTHESIOLOGY

## 2018-07-09 PROCEDURE — 80053 COMPREHEN METABOLIC PANEL: CPT | Performed by: NEUROLOGICAL SURGERY

## 2018-07-09 RX ORDER — LANOLIN ALCOHOL/MO/W.PET/CERES
1000 CREAM (GRAM) TOPICAL DAILY
COMMUNITY

## 2018-07-09 RX ORDER — LOPERAMIDE HYDROCHLORIDE 2 MG/1
2 CAPSULE ORAL 4 TIMES DAILY PRN
COMMUNITY
End: 2018-07-14 | Stop reason: HOSPADM

## 2018-07-09 NOTE — DISCHARGE INSTRUCTIONS

## 2018-07-11 LAB — MRSA SPEC QL CULT: NORMAL

## 2018-07-12 ENCOUNTER — ANESTHESIA EVENT (OUTPATIENT)
Dept: PERIOP | Facility: HOSPITAL | Age: 78
End: 2018-07-12

## 2018-07-13 ENCOUNTER — APPOINTMENT (OUTPATIENT)
Dept: GENERAL RADIOLOGY | Facility: HOSPITAL | Age: 78
End: 2018-07-13

## 2018-07-13 ENCOUNTER — ANESTHESIA (OUTPATIENT)
Dept: PERIOP | Facility: HOSPITAL | Age: 78
End: 2018-07-13

## 2018-07-13 ENCOUNTER — HOSPITAL ENCOUNTER (INPATIENT)
Facility: HOSPITAL | Age: 78
LOS: 1 days | Discharge: HOME | End: 2018-07-14
Attending: NEUROLOGICAL SURGERY | Admitting: NEUROLOGICAL SURGERY

## 2018-07-13 DIAGNOSIS — S12.110K ODONTOID FRACTURE WITH NONUNION: ICD-10-CM

## 2018-07-13 DIAGNOSIS — Z74.09 IMPAIRED FUNCTIONAL MOBILITY, BALANCE, GAIT, AND ENDURANCE: Primary | ICD-10-CM

## 2018-07-13 LAB
GLUCOSE BLDC GLUCOMTR-MCNC: 102 MG/DL (ref 70–130)
POTASSIUM BLDA-SCNC: 3.69 MMOL/L (ref 3.5–5.3)

## 2018-07-13 PROCEDURE — 25010000002 NEOSTIGMINE 10 MG/10ML SOLUTION: Performed by: NURSE ANESTHETIST, CERTIFIED REGISTERED

## 2018-07-13 PROCEDURE — 25010000002 ONDANSETRON PER 1 MG: Performed by: ANESTHESIOLOGY

## 2018-07-13 PROCEDURE — 22600 ARTHRD PST TQ 1NTRSPC CRV: CPT | Performed by: NEUROLOGICAL SURGERY

## 2018-07-13 PROCEDURE — 25010000002 DEXAMETHASONE PER 1 MG: Performed by: NURSE ANESTHETIST, CERTIFIED REGISTERED

## 2018-07-13 PROCEDURE — 76000 FLUOROSCOPY <1 HR PHYS/QHP: CPT

## 2018-07-13 PROCEDURE — 25010000003 CEFAZOLIN IN DEXTROSE 2-4 GM/100ML-% SOLUTION: Performed by: NEUROLOGICAL SURGERY

## 2018-07-13 PROCEDURE — C1713 ANCHOR/SCREW BN/BN,TIS/BN: HCPCS | Performed by: NEUROLOGICAL SURGERY

## 2018-07-13 PROCEDURE — 25010000002 FENTANYL CITRATE (PF) 100 MCG/2ML SOLUTION: Performed by: NURSE ANESTHETIST, CERTIFIED REGISTERED

## 2018-07-13 PROCEDURE — 61783 SCAN PROC SPINAL: CPT | Performed by: NEUROLOGICAL SURGERY

## 2018-07-13 PROCEDURE — L0172 CERV COL SR FOAM 2PC PRE OTS: HCPCS | Performed by: NEUROLOGICAL SURGERY

## 2018-07-13 PROCEDURE — 82962 GLUCOSE BLOOD TEST: CPT

## 2018-07-13 PROCEDURE — 84132 ASSAY OF SERUM POTASSIUM: CPT | Performed by: NEUROLOGICAL SURGERY

## 2018-07-13 PROCEDURE — 25010000002 PROMETHAZINE PER 50 MG: Performed by: ANESTHESIOLOGY

## 2018-07-13 PROCEDURE — 25010000002 ONDANSETRON PER 1 MG: Performed by: NEUROLOGICAL SURGERY

## 2018-07-13 PROCEDURE — 0RG1071 FUSION OF CERVICAL VERTEBRAL JOINT WITH AUTOLOGOUS TISSUE SUBSTITUTE, POSTERIOR APPROACH, POSTERIOR COLUMN, OPEN APPROACH: ICD-10-PCS | Performed by: NEUROLOGICAL SURGERY

## 2018-07-13 PROCEDURE — 25010000002 FENTANYL CITRATE (PF) 100 MCG/2ML SOLUTION: Performed by: ANESTHESIOLOGY

## 2018-07-13 PROCEDURE — 22840 INSERT SPINE FIXATION DEVICE: CPT | Performed by: NEUROLOGICAL SURGERY

## 2018-07-13 PROCEDURE — 63710000001 PROMETHAZINE PER 12.5 MG: Performed by: NEUROLOGICAL SURGERY

## 2018-07-13 PROCEDURE — 25010000002 PROPOFOL 10 MG/ML EMULSION: Performed by: NURSE ANESTHETIST, CERTIFIED REGISTERED

## 2018-07-13 PROCEDURE — 25010000002 ONDANSETRON PER 1 MG: Performed by: NURSE ANESTHETIST, CERTIFIED REGISTERED

## 2018-07-13 PROCEDURE — 99024 POSTOP FOLLOW-UP VISIT: CPT | Performed by: NEUROLOGICAL SURGERY

## 2018-07-13 PROCEDURE — 25010000002 PHENYLEPHRINE PER 1 ML: Performed by: NURSE ANESTHETIST, CERTIFIED REGISTERED

## 2018-07-13 DEVICE — SET SCREW 6950315 M6 SET SCREW
Type: IMPLANTABLE DEVICE | Status: FUNCTIONAL
Brand: VERTEX® RECONSTRUCTION SYSTEM

## 2018-07-13 DEVICE — SCREW 6958728 3.5 X 28MM MAS
Type: IMPLANTABLE DEVICE | Status: FUNCTIONAL
Brand: VERTEX® RECONSTRUCTION SYSTEM

## 2018-07-13 DEVICE — ROD 7753725 PRE-CUT 3.5MM X 25MM
Type: IMPLANTABLE DEVICE | Status: FUNCTIONAL
Brand: VERTEX® RECONSTRUCTION SYSTEM

## 2018-07-13 DEVICE — SCREW 6958730 3.5 X 30MM MAS
Type: IMPLANTABLE DEVICE | Status: FUNCTIONAL
Brand: VERTEX® RECONSTRUCTION SYSTEM

## 2018-07-13 RX ORDER — ROCURONIUM BROMIDE 10 MG/ML
INJECTION, SOLUTION INTRAVENOUS AS NEEDED
Status: DISCONTINUED | OUTPATIENT
Start: 2018-07-13 | End: 2018-07-13 | Stop reason: SURG

## 2018-07-13 RX ORDER — FENTANYL CITRATE 50 UG/ML
50 INJECTION, SOLUTION INTRAMUSCULAR; INTRAVENOUS
Status: DISCONTINUED | OUTPATIENT
Start: 2018-07-13 | End: 2018-07-13

## 2018-07-13 RX ORDER — BACITRACIN, NEOMYCIN, POLYMYXIN B 400; 3.5; 5 [USP'U]/G; MG/G; [USP'U]/G
OINTMENT TOPICAL AS NEEDED
Status: DISCONTINUED | OUTPATIENT
Start: 2018-07-13 | End: 2018-07-13 | Stop reason: HOSPADM

## 2018-07-13 RX ORDER — POLYETHYLENE GLYCOL 3350 17 G/17G
17 POWDER, FOR SOLUTION ORAL DAILY
Qty: 238 G | Refills: 1 | Status: SHIPPED | OUTPATIENT
Start: 2018-07-13 | End: 2020-01-24

## 2018-07-13 RX ORDER — DEXAMETHASONE SODIUM PHOSPHATE 4 MG/ML
INJECTION, SOLUTION INTRA-ARTICULAR; INTRALESIONAL; INTRAMUSCULAR; INTRAVENOUS; SOFT TISSUE AS NEEDED
Status: DISCONTINUED | OUTPATIENT
Start: 2018-07-13 | End: 2018-07-13 | Stop reason: SURG

## 2018-07-13 RX ORDER — ACETAMINOPHEN 325 MG/1
650 TABLET ORAL EVERY 4 HOURS PRN
Status: DISCONTINUED | OUTPATIENT
Start: 2018-07-13 | End: 2018-07-14 | Stop reason: HOSPADM

## 2018-07-13 RX ORDER — ONDANSETRON 2 MG/ML
4 INJECTION INTRAMUSCULAR; INTRAVENOUS EVERY 6 HOURS PRN
Status: DISCONTINUED | OUTPATIENT
Start: 2018-07-13 | End: 2018-07-14 | Stop reason: HOSPADM

## 2018-07-13 RX ORDER — ATORVASTATIN CALCIUM 10 MG/1
10 TABLET, FILM COATED ORAL NIGHTLY
Status: DISCONTINUED | OUTPATIENT
Start: 2018-07-13 | End: 2018-07-14 | Stop reason: HOSPADM

## 2018-07-13 RX ORDER — IBUPROFEN 800 MG/1
800 TABLET ORAL ONCE
Status: DISCONTINUED | OUTPATIENT
Start: 2018-07-13 | End: 2018-07-13 | Stop reason: HOSPADM

## 2018-07-13 RX ORDER — FAMOTIDINE 20 MG/1
20 TABLET, FILM COATED ORAL
Status: DISCONTINUED | OUTPATIENT
Start: 2018-07-13 | End: 2018-07-13 | Stop reason: HOSPADM

## 2018-07-13 RX ORDER — FENTANYL CITRATE 50 UG/ML
50 INJECTION, SOLUTION INTRAMUSCULAR; INTRAVENOUS
Status: DISCONTINUED | OUTPATIENT
Start: 2018-07-13 | End: 2018-07-13 | Stop reason: HOSPADM

## 2018-07-13 RX ORDER — LIDOCAINE HYDROCHLORIDE 10 MG/ML
0.5 INJECTION, SOLUTION EPIDURAL; INFILTRATION; INTRACAUDAL; PERINEURAL ONCE AS NEEDED
Status: COMPLETED | OUTPATIENT
Start: 2018-07-13 | End: 2018-07-13

## 2018-07-13 RX ORDER — VECURONIUM BROMIDE 1 MG/ML
INJECTION, POWDER, LYOPHILIZED, FOR SOLUTION INTRAVENOUS AS NEEDED
Status: DISCONTINUED | OUTPATIENT
Start: 2018-07-13 | End: 2018-07-13 | Stop reason: SURG

## 2018-07-13 RX ORDER — METHOCARBAMOL 750 MG/1
750 TABLET, FILM COATED ORAL EVERY 8 HOURS SCHEDULED
Status: DISCONTINUED | OUTPATIENT
Start: 2018-07-13 | End: 2018-07-14 | Stop reason: HOSPADM

## 2018-07-13 RX ORDER — NALOXONE HCL 0.4 MG/ML
0.4 VIAL (ML) INJECTION
Status: DISCONTINUED | OUTPATIENT
Start: 2018-07-13 | End: 2018-07-14 | Stop reason: HOSPADM

## 2018-07-13 RX ORDER — SODIUM CHLORIDE 0.9 % (FLUSH) 0.9 %
1-10 SYRINGE (ML) INJECTION AS NEEDED
Status: DISCONTINUED | OUTPATIENT
Start: 2018-07-13 | End: 2018-07-14 | Stop reason: HOSPADM

## 2018-07-13 RX ORDER — SENNA AND DOCUSATE SODIUM 50; 8.6 MG/1; MG/1
1 TABLET, FILM COATED ORAL NIGHTLY PRN
Status: DISCONTINUED | OUTPATIENT
Start: 2018-07-13 | End: 2018-07-14 | Stop reason: HOSPADM

## 2018-07-13 RX ORDER — METOPROLOL SUCCINATE 25 MG/1
25 TABLET, EXTENDED RELEASE ORAL NIGHTLY
Status: DISCONTINUED | OUTPATIENT
Start: 2018-07-13 | End: 2018-07-14 | Stop reason: HOSPADM

## 2018-07-13 RX ORDER — CEFAZOLIN SODIUM 2 G/100ML
2 INJECTION, SOLUTION INTRAVENOUS EVERY 8 HOURS
Status: DISCONTINUED | OUTPATIENT
Start: 2018-07-13 | End: 2018-07-13

## 2018-07-13 RX ORDER — LIDOCAINE HYDROCHLORIDE 10 MG/ML
INJECTION, SOLUTION EPIDURAL; INFILTRATION; INTRACAUDAL; PERINEURAL AS NEEDED
Status: DISCONTINUED | OUTPATIENT
Start: 2018-07-13 | End: 2018-07-13 | Stop reason: SURG

## 2018-07-13 RX ORDER — SODIUM CHLORIDE, SODIUM LACTATE, POTASSIUM CHLORIDE, CALCIUM CHLORIDE 600; 310; 30; 20 MG/100ML; MG/100ML; MG/100ML; MG/100ML
9 INJECTION, SOLUTION INTRAVENOUS CONTINUOUS PRN
Status: DISCONTINUED | OUTPATIENT
Start: 2018-07-13 | End: 2018-07-13

## 2018-07-13 RX ORDER — OXYCODONE AND ACETAMINOPHEN 7.5; 325 MG/1; MG/1
1 TABLET ORAL EVERY 4 HOURS PRN
Status: DISCONTINUED | OUTPATIENT
Start: 2018-07-13 | End: 2018-07-14 | Stop reason: HOSPADM

## 2018-07-13 RX ORDER — OXYCODONE HCL 10 MG/1
10 TABLET, FILM COATED, EXTENDED RELEASE ORAL EVERY 12 HOURS SCHEDULED
Status: DISCONTINUED | OUTPATIENT
Start: 2018-07-13 | End: 2018-07-14

## 2018-07-13 RX ORDER — GLYCOPYRROLATE 0.2 MG/ML
INJECTION INTRAMUSCULAR; INTRAVENOUS AS NEEDED
Status: DISCONTINUED | OUTPATIENT
Start: 2018-07-13 | End: 2018-07-13 | Stop reason: SURG

## 2018-07-13 RX ORDER — ONDANSETRON 2 MG/ML
4 INJECTION INTRAMUSCULAR; INTRAVENOUS EVERY 6 HOURS PRN
Status: DISCONTINUED | OUTPATIENT
Start: 2018-07-13 | End: 2018-07-13 | Stop reason: HOSPADM

## 2018-07-13 RX ORDER — DIAZEPAM 5 MG/1
5 TABLET ORAL EVERY 6 HOURS PRN
Status: DISCONTINUED | OUTPATIENT
Start: 2018-07-13 | End: 2018-07-14 | Stop reason: HOSPADM

## 2018-07-13 RX ORDER — METHOCARBAMOL 750 MG/1
750 TABLET, FILM COATED ORAL EVERY 8 HOURS SCHEDULED
Qty: 90 TABLET | Refills: 0 | Status: SHIPPED | OUTPATIENT
Start: 2018-07-13 | End: 2018-07-30

## 2018-07-13 RX ORDER — CEFAZOLIN SODIUM 2 G/100ML
2 INJECTION, SOLUTION INTRAVENOUS ONCE
Status: DISCONTINUED | OUTPATIENT
Start: 2018-07-13 | End: 2018-07-13 | Stop reason: HOSPADM

## 2018-07-13 RX ORDER — PROPOFOL 10 MG/ML
VIAL (ML) INTRAVENOUS AS NEEDED
Status: DISCONTINUED | OUTPATIENT
Start: 2018-07-13 | End: 2018-07-13 | Stop reason: SURG

## 2018-07-13 RX ORDER — CEFAZOLIN SODIUM 2 G/100ML
2 INJECTION, SOLUTION INTRAVENOUS EVERY 8 HOURS
Status: COMPLETED | OUTPATIENT
Start: 2018-07-13 | End: 2018-07-14

## 2018-07-13 RX ORDER — PROMETHAZINE HYDROCHLORIDE 25 MG/ML
6.25 INJECTION, SOLUTION INTRAMUSCULAR; INTRAVENOUS ONCE
Status: COMPLETED | OUTPATIENT
Start: 2018-07-13 | End: 2018-07-13

## 2018-07-13 RX ORDER — BUPIVACAINE HYDROCHLORIDE 2.5 MG/ML
INJECTION, SOLUTION EPIDURAL; INFILTRATION; INTRACAUDAL AS NEEDED
Status: DISCONTINUED | OUTPATIENT
Start: 2018-07-13 | End: 2018-07-13 | Stop reason: HOSPADM

## 2018-07-13 RX ORDER — OXYCODONE HCL 10 MG/1
10 TABLET, FILM COATED, EXTENDED RELEASE ORAL ONCE
Status: COMPLETED | OUTPATIENT
Start: 2018-07-13 | End: 2018-07-13

## 2018-07-13 RX ORDER — NEOSTIGMINE METHYLSULFATE 1 MG/ML
INJECTION, SOLUTION INTRAVENOUS AS NEEDED
Status: DISCONTINUED | OUTPATIENT
Start: 2018-07-13 | End: 2018-07-13 | Stop reason: SURG

## 2018-07-13 RX ORDER — FENTANYL CITRATE 50 UG/ML
INJECTION, SOLUTION INTRAMUSCULAR; INTRAVENOUS AS NEEDED
Status: DISCONTINUED | OUTPATIENT
Start: 2018-07-13 | End: 2018-07-13 | Stop reason: SURG

## 2018-07-13 RX ORDER — SODIUM CHLORIDE 0.9 % (FLUSH) 0.9 %
1-10 SYRINGE (ML) INJECTION AS NEEDED
Status: DISCONTINUED | OUTPATIENT
Start: 2018-07-13 | End: 2018-07-13 | Stop reason: HOSPADM

## 2018-07-13 RX ORDER — PROMETHAZINE HYDROCHLORIDE 12.5 MG/1
12.5 TABLET ORAL EVERY 6 HOURS PRN
Status: DISCONTINUED | OUTPATIENT
Start: 2018-07-13 | End: 2018-07-14 | Stop reason: HOSPADM

## 2018-07-13 RX ORDER — MAGNESIUM HYDROXIDE 1200 MG/15ML
LIQUID ORAL AS NEEDED
Status: DISCONTINUED | OUTPATIENT
Start: 2018-07-13 | End: 2018-07-13 | Stop reason: HOSPADM

## 2018-07-13 RX ORDER — HYDROMORPHONE HYDROCHLORIDE 1 MG/ML
0.5 INJECTION, SOLUTION INTRAMUSCULAR; INTRAVENOUS; SUBCUTANEOUS
Status: DISCONTINUED | OUTPATIENT
Start: 2018-07-13 | End: 2018-07-13

## 2018-07-13 RX ORDER — PANTOPRAZOLE SODIUM 40 MG/1
40 TABLET, DELAYED RELEASE ORAL EVERY MORNING
Status: DISCONTINUED | OUTPATIENT
Start: 2018-07-13 | End: 2018-07-14 | Stop reason: HOSPADM

## 2018-07-13 RX ORDER — SODIUM CHLORIDE 450 MG/100ML
50 INJECTION, SOLUTION INTRAVENOUS CONTINUOUS
Status: DISCONTINUED | OUTPATIENT
Start: 2018-07-13 | End: 2018-07-14 | Stop reason: HOSPADM

## 2018-07-13 RX ORDER — TEMAZEPAM 15 MG/1
15 CAPSULE ORAL NIGHTLY PRN
Status: DISCONTINUED | OUTPATIENT
Start: 2018-07-13 | End: 2018-07-14 | Stop reason: HOSPADM

## 2018-07-13 RX ORDER — LIDOCAINE HYDROCHLORIDE 10 MG/ML
0.5 INJECTION, SOLUTION EPIDURAL; INFILTRATION; INTRACAUDAL; PERINEURAL ONCE AS NEEDED
Status: DISCONTINUED | OUTPATIENT
Start: 2018-07-13 | End: 2018-07-13

## 2018-07-13 RX ORDER — FLUTICASONE PROPIONATE 50 MCG
1 SPRAY, SUSPENSION (ML) NASAL DAILY
Status: DISCONTINUED | OUTPATIENT
Start: 2018-07-13 | End: 2018-07-14 | Stop reason: HOSPADM

## 2018-07-13 RX ORDER — LIDOCAINE HYDROCHLORIDE AND EPINEPHRINE 5; 5 MG/ML; UG/ML
INJECTION, SOLUTION INFILTRATION; PERINEURAL AS NEEDED
Status: DISCONTINUED | OUTPATIENT
Start: 2018-07-13 | End: 2018-07-13 | Stop reason: HOSPADM

## 2018-07-13 RX ORDER — ACETAMINOPHEN 500 MG
1000 TABLET ORAL ONCE
Status: COMPLETED | OUTPATIENT
Start: 2018-07-13 | End: 2018-07-13

## 2018-07-13 RX ORDER — CELECOXIB 200 MG/1
200 CAPSULE ORAL DAILY
Status: DISCONTINUED | OUTPATIENT
Start: 2018-07-14 | End: 2018-07-14 | Stop reason: HOSPADM

## 2018-07-13 RX ORDER — ONDANSETRON 2 MG/ML
INJECTION INTRAMUSCULAR; INTRAVENOUS AS NEEDED
Status: DISCONTINUED | OUTPATIENT
Start: 2018-07-13 | End: 2018-07-13 | Stop reason: SURG

## 2018-07-13 RX ORDER — FAMOTIDINE 20 MG/1
20 TABLET, FILM COATED ORAL
Status: DISCONTINUED | OUTPATIENT
Start: 2018-07-13 | End: 2018-07-13

## 2018-07-13 RX ORDER — OXYCODONE AND ACETAMINOPHEN 7.5; 325 MG/1; MG/1
1 TABLET ORAL EVERY 8 HOURS PRN
Qty: 40 TABLET | Refills: 0 | Status: SHIPPED | OUTPATIENT
Start: 2018-07-14 | End: 2018-07-28

## 2018-07-13 RX ADMIN — ACETAMINOPHEN 1000 MG: 500 TABLET, FILM COATED ORAL at 06:42

## 2018-07-13 RX ADMIN — PHENYLEPHRINE HYDROCHLORIDE 100 MCG: 10 INJECTION INTRAVENOUS at 08:13

## 2018-07-13 RX ADMIN — PROPOFOL 200 MG: 10 INJECTION, EMULSION INTRAVENOUS at 07:40

## 2018-07-13 RX ADMIN — PHENYLEPHRINE HYDROCHLORIDE 100 MCG: 10 INJECTION INTRAVENOUS at 09:36

## 2018-07-13 RX ADMIN — METHOCARBAMOL 750 MG: 750 TABLET ORAL at 21:44

## 2018-07-13 RX ADMIN — FENTANYL CITRATE 50 MCG: 50 INJECTION, SOLUTION INTRAMUSCULAR; INTRAVENOUS at 11:45

## 2018-07-13 RX ADMIN — CEFAZOLIN SODIUM 2 G: 2 INJECTION, SOLUTION INTRAVENOUS at 17:25

## 2018-07-13 RX ADMIN — PHENYLEPHRINE HYDROCHLORIDE 100 MCG: 10 INJECTION INTRAVENOUS at 08:09

## 2018-07-13 RX ADMIN — ONDANSETRON 4 MG: 2 INJECTION, SOLUTION INTRAMUSCULAR; INTRAVENOUS at 15:39

## 2018-07-13 RX ADMIN — LIDOCAINE HYDROCHLORIDE 0.5 ML: 10 INJECTION, SOLUTION EPIDURAL; INFILTRATION; INTRACAUDAL; PERINEURAL at 06:35

## 2018-07-13 RX ADMIN — PHENYLEPHRINE HYDROCHLORIDE 100 MCG: 10 INJECTION INTRAVENOUS at 08:01

## 2018-07-13 RX ADMIN — SODIUM CHLORIDE 100 ML/HR: 4.5 INJECTION, SOLUTION INTRAVENOUS at 12:51

## 2018-07-13 RX ADMIN — PROMETHAZINE HYDROCHLORIDE 6.25 MG: 25 INJECTION INTRAMUSCULAR; INTRAVENOUS at 11:57

## 2018-07-13 RX ADMIN — SODIUM CHLORIDE 100 ML/HR: 4.5 INJECTION, SOLUTION INTRAVENOUS at 22:58

## 2018-07-13 RX ADMIN — ROCURONIUM BROMIDE 50 MG: 10 SOLUTION INTRAVENOUS at 07:40

## 2018-07-13 RX ADMIN — NEOSTIGMINE METHYLSULFATE 4 MG: 1 INJECTION, SOLUTION INTRAVENOUS at 10:13

## 2018-07-13 RX ADMIN — VECURONIUM BROMIDE 2 MG: 1 INJECTION, POWDER, LYOPHILIZED, FOR SOLUTION INTRAVENOUS at 07:57

## 2018-07-13 RX ADMIN — VECURONIUM BROMIDE 2 MG: 1 INJECTION, POWDER, LYOPHILIZED, FOR SOLUTION INTRAVENOUS at 09:05

## 2018-07-13 RX ADMIN — GLYCOPYRROLATE 0.6 MG: 0.2 INJECTION, SOLUTION INTRAMUSCULAR; INTRAVENOUS at 10:13

## 2018-07-13 RX ADMIN — PROMETHAZINE HYDROCHLORIDE 12.5 MG: 12.5 TABLET ORAL at 20:18

## 2018-07-13 RX ADMIN — METOPROLOL SUCCINATE 25 MG: 25 TABLET, EXTENDED RELEASE ORAL at 21:41

## 2018-07-13 RX ADMIN — DEXAMETHASONE SODIUM PHOSPHATE 8 MG: 4 INJECTION, SOLUTION INTRAMUSCULAR; INTRAVENOUS at 07:46

## 2018-07-13 RX ADMIN — ONDANSETRON 4 MG: 2 INJECTION, SOLUTION INTRAMUSCULAR; INTRAVENOUS at 11:28

## 2018-07-13 RX ADMIN — ONDANSETRON 4 MG: 2 INJECTION, SOLUTION INTRAMUSCULAR; INTRAVENOUS at 21:40

## 2018-07-13 RX ADMIN — LIDOCAINE HYDROCHLORIDE 50 MG: 10 INJECTION, SOLUTION EPIDURAL; INFILTRATION; INTRACAUDAL; PERINEURAL at 07:40

## 2018-07-13 RX ADMIN — PHENYLEPHRINE HYDROCHLORIDE 100 MCG: 10 INJECTION INTRAVENOUS at 09:05

## 2018-07-13 RX ADMIN — OXYCODONE HYDROCHLORIDE 10 MG: 10 TABLET, FILM COATED, EXTENDED RELEASE ORAL at 21:41

## 2018-07-13 RX ADMIN — SODIUM CHLORIDE, POTASSIUM CHLORIDE, SODIUM LACTATE AND CALCIUM CHLORIDE: 600; 310; 30; 20 INJECTION, SOLUTION INTRAVENOUS at 07:38

## 2018-07-13 RX ADMIN — OXYCODONE HYDROCHLORIDE 10 MG: 10 TABLET, FILM COATED, EXTENDED RELEASE ORAL at 06:42

## 2018-07-13 RX ADMIN — FAMOTIDINE 20 MG: 20 TABLET ORAL at 06:42

## 2018-07-13 RX ADMIN — ONDANSETRON 4 MG: 2 INJECTION INTRAMUSCULAR; INTRAVENOUS at 07:46

## 2018-07-13 RX ADMIN — FENTANYL CITRATE 100 MCG: 50 INJECTION, SOLUTION INTRAMUSCULAR; INTRAVENOUS at 07:40

## 2018-07-13 RX ADMIN — SODIUM CHLORIDE, POTASSIUM CHLORIDE, SODIUM LACTATE AND CALCIUM CHLORIDE 9 ML/HR: 600; 310; 30; 20 INJECTION, SOLUTION INTRAVENOUS at 06:35

## 2018-07-13 NOTE — ANESTHESIA POSTPROCEDURE EVALUATION
Patient: Isrrael Marino    Procedure Summary     Date:  07/13/18 Room / Location:   [facility]  [facility]    Anesthesia Start:  0738 Anesthesia Stop:      Procedure:  CERVICAL LAMINECTOMY DECOMPRESSION POSTERIOR  C1-2 posterior cervical fusion (N/A Spine Cervical) Diagnosis:       Odontoid fracture with nonunion      (Odontoid fracture with nonunion [S12.110K])    Surgeon:  Randall Barnett MD Provider:  Darwin Nielsen MD    Anesthesia Type:  general ASA Status:  3          Anesthesia Type: general  Last vitals  BP   135/70   Temp   97   Pulse   69   Resp   18     SpO2   95     Anesthesia Post Evaluation

## 2018-07-13 NOTE — ANESTHESIA POSTPROCEDURE EVALUATION
Patient: Isrrael Marino    Procedure Summary     Date:  07/13/18 Room / Location:   [facility] [address] [place] [address] [facility]    Anesthesia Start:  0738 Anesthesia Stop:      Procedure:  CERVICAL LAMINECTOMY DECOMPRESSION POSTERIOR  C1-2 posterior cervical fusion (N/A Spine Cervical) Diagnosis:       Odontoid fracture with nonunion      (Odontoid fracture with nonunion [S12.110K])    Surgeon:  Randall Barnett MD Provider:  Darwin Nielsen MD    Anesthesia Type:  general ASA Status:  3          Anesthesia Type: general  Last vitals  BP   163/81 (07/13/18 0633)   Temp   97 °F (36.1 °C) (07/13/18 0633)   Pulse   59 (07/13/18 0633)   Resp   18 (07/13/18 0633)     SpO2   98 % (07/13/18 0633)     Post Anesthesia Care and Evaluation    Patient location during evaluation: PACU  Patient participation: complete - patient participated  Level of consciousness: awake and responsive to verbal stimuli  Pain score: 2  Pain management: adequate  Airway patency: patent  Anesthetic complications: No anesthetic complications    Cardiovascular status: acceptable  Respiratory status: acceptable  Hydration status: acceptable    Comments: Pt awake and responsive. SV. VSS. Report to RN. Patient Vitals in the past 24 hrs:  07/13/18 0633, BP:163/81, Temp:97 °F (36.1 °C), Temp src:Temporal Art, Pulse:59, Resp:18, SpO2:98 %  133/78. p 72. r 16. t 98.1

## 2018-07-13 NOTE — PLAN OF CARE
"Problem: Patient Care Overview  Goal: Plan of Care Review  Outcome: Ongoing (interventions implemented as appropriate)   07/13/18 1820   Coping/Psychosocial   Plan of Care Reviewed With patient;spouse   Plan of Care Review   Progress no change   OTHER   Outcome Summary Pt arrived to the floor c/o nausea. Per PACU nurse, pt instructed to \"sleep it off\". Pt slept undisturbed until Dr. Barnett checked on patient. PRN Zofran administered. At approx 1800, pt had an episode of vomiting but refused Phenergan. Pt still feels drowsy and unsteady to get up and ambulate. Pt remained sitting on side of the bed, wife present at bedside.       Problem: Nausea/Vomiting (Adult)  Goal: Identify Related Risk Factors and Signs and Symptoms  Outcome: Ongoing (interventions implemented as appropriate)   07/13/18 1820   Nausea/Vomiting (Adult)   Related Risk Factors (Nausea/Vomiting) recent surgery with general anesthesia;noxious stimuli   Signs and Symptoms (Nausea/Vomiting) report of queasy sensation;report of emesis;weakness     Goal: Symptom Relief  Outcome: Ongoing (interventions implemented as appropriate)   07/13/18 1820   Nausea/Vomiting (Adult)   Symptom Relief making progress toward outcome     Goal: Adequate Hydration  Outcome: Ongoing (interventions implemented as appropriate)   07/13/18 1820   Nausea/Vomiting (Adult)   Adequate Hydration making progress toward outcome   IV fluids      "

## 2018-07-13 NOTE — ANESTHESIA PROCEDURE NOTES
Airway  Urgency: elective    Airway not difficult    General Information and Staff    Patient location during procedure: OR  CRNA: JAKOB CINTRON    Indications and Patient Condition  Indications for airway management: airway protection    Preoxygenated: yes  MILS not maintained throughout  Mask difficulty assessment: 1 - vent by mask    Final Airway Details  Final airway type: endotracheal airway      Successful airway: ETT  Cuffed: yes   Successful intubation technique: video laryngoscopy  Facilitating devices/methods: intubating stylet  Endotracheal tube insertion site: oral  Blade: Obie  Blade size: #3  ETT size: 7.5 mm  Cormack-Lehane Classification: grade I - full view of glottis  Placement verified by: chest auscultation and capnometry   Measured from: lips  ETT to lips (cm): 20  Number of attempts at approach: 1    Additional Comments  Negative epigastric sounds, Breath sound equal bilaterally with symmetric chest rise and fall

## 2018-07-13 NOTE — OP NOTE
Operation note C1-C2 fusion for mechanical instability, nonunion odontoid fracture      Preoperative diagnosis   Cervical spondylitic myelopathy    C1 transverse ligament disruption with odontoid fracture nonunion        Postoperative diagnosis is same    Procedures performed   1.  C1-C2 lateral mass fusion,, open reduction internal fixation of nonunion of odontoid fracture    2.  O arm neuro navigation assisted placement of C2 pedicle screw, C1 lateral mass screw using [employer] hardware 30 and 24 mm screws in C1 and C2 respectively    3.  Autograft harvest via same incision    C1 laminectomy decompression    Surgeon: Randall Barnett MD     Assistants: Rj Summers    Anesthesia: Normal endotracheal anesthesia    ASA Class:   Blood loss 50 cc    Severe lateral recess compression    Complications none        Procedure in detail after formal written consent was obtained the patient was taken to the operating room endotracheally intubated given preoperative antimicrobial prophylaxis they were prepped and draped in the usual sterile manner all bony prominences and genitalia were padded to prevent neurologic injury.    The patient was placed in neutral C-spine precautions in Trevizo 3 point head fixation the Stealth neuro navigation system array was attached.    A midline incision was made, dissection was carried down through the skin skin cutaneous tissues the C1 and C2 laminae were exposed and dissection was carried in the lateral mass aspects of C1 and the C2 pedicles were exposed.    3-D rotational CT scan was obtained and Stealth neuro navigation was used to drill tap and place without events screws were then placed after ball probe passed easily into the holes there is no evidence of disruption     At this point in time posterior instrumentation was torqued to appropriate tightness.  Decortication commenced with placement of bone around the lateral gutters and over the C2 posterior arch to facilitate  arthrodesis       The areas were copiously irrigated, meticulous hemostasis was maintained and a small flat NATI drain was placed and tunneled through the skin skin was then closed in layers.    Fluoroscopic imaging again confirmed the correct level and appropriate instrumentation placement.    Findings of the surgery were discussed with the family

## 2018-07-13 NOTE — ANESTHESIA PREPROCEDURE EVALUATION
Anesthesia Evaluation     Patient summary reviewed and Nursing notes reviewed   NPO Solid Status: > 8 hours  NPO Liquid Status: > 8 hours           Airway   Mallampati: I  TM distance: >3 FB  Neck ROM: full  No difficulty expected  Dental      Pulmonary    (-) COPD, asthma, shortness of breath, recent URI, not a smoker  Cardiovascular     ECG reviewed    (+) hypertension, past MI (2014 )  >12 months, CAD, cardiac stents dysrhythmias (Hx of Bigeminy ), hyperlipidemia,   (-) angina    ROS comment: Normal sinus rhythm  Minimal voltage criteria for LVH, may be normal variant  Borderline ECG    Neuro/Psych  (+) numbness, psychiatric history,     (-) seizures, TIA, CVA    ROS Comment: Odontoid fracture routine healing   GI/Hepatic/Renal/Endo    (+)  GERD,    (-) liver disease, no renal disease, diabetes, hypothyroidism    Musculoskeletal     (+) back pain, neck pain,   Abdominal    Substance History      OB/GYN          Other   (+) arthritis   history of cancer    ROS/Med Hx Other: plavix stopped per cardiology                   Anesthesia Plan    ASA 3     general   (Probable routine glidescope intubation (with in line traction)   Will discuss PRECIOUS Hatch )  intravenous induction   Anesthetic plan and risks discussed with patient.    Plan discussed with CRNA.

## 2018-07-13 NOTE — H&P
Pre-Op H&P  Isrrael Marino  [id number]  1940    Chief complaint: neck injury    HPI:    Patient is a 78 y.o.male presents with a history of odontoid fracture after sustaining a fall from a ladder in November 2017. Patient reports no neck pain of any kind.      Review of Systems:  General ROS: negative for chills, fever or skin lesions;  No changes since last office visit  Cardiovascular ROS: no chest pain or dyspnea on exertion  Respiratory ROS: no cough, shortness of breath, or wheezing    Allergies:   Allergies   Allergen Reactions   • Ibuprofen Hives       Home Meds:    No current facility-administered medications on file prior to encounter.      Current Outpatient Prescriptions on File Prior to Encounter   Medication Sig Dispense Refill   • clopidogrel (PLAVIX) 75 MG tablet TAKE 1 TABLET EVERY DAY 90 tablet 4   • esomeprazole (nexIUM) 40 MG capsule Take 40 mg by mouth Every Morning Before Breakfast.     • fluticasone (FLONASE) 50 MCG/ACT nasal spray 1 spray Daily.     • metoprolol succinate XL (TOPROL-XL) 25 MG 24 hr tablet TAKE 1 TABLET ONE TIME DAILY 90 tablet 4   • simvastatin (ZOCOR) 40 MG tablet TAKE 1 TABLET EVERY DAY 90 tablet 3       PMH:   Past Medical History:   Diagnosis Date   • Arthritis     both knees   • CAD (coronary artery disease)    • Cancer (CMS/HCC)    • Chondrocalcinosis of knee    • GERD (gastroesophageal reflux disease)    • Gout    • Heart attack      Last Assessed: 28 Mar 2014   • Heart disease    • History of transfusion     own blood with hip surgery   • Hyperlipidemia    • Hypertension    • IBS (irritable bowel syndrome)    • Left rotator cuff tear arthropathy    • Low back pain    • Lower extremity neuropathy    • Lumbar canal stenosis    • Neck pain    • Numbness    • Right hip pain    • Rotator cuff tear arthropathy of right shoulder    • Thin blood (CMS/HCC)      PSH:    Past Surgical History:   Procedure Laterality Date   • APPENDECTOMY  1956   • BACK SURGERY  1997     spinal decompression and fusion   • BREAST LUMPECTOMY  2003   • CARDIAC CATHETERIZATION      with two stents//. DR. TOLEDO   • CARPAL TUNNEL RELEASE  03/28/2014    Neuroplasty Decompression Median Nerve At Carpal Tunnel   • COLONOSCOPY     • CORONARY STENT PLACEMENT  2013   • HERNIA REPAIR  2002    & 1998   • LUMBAR DISCECTOMY FUSION INSTRUMENTATION     • TONSILLECTOMY     • TOTAL HIP ARTHROPLASTY  2002   • VASECTOMY         Immunization History:  Influenza: 2017  Pneumococcal: patient unsure of date  Tetanus: 2002    Social History:   Tobacco:   History   Smoking Status   • Never Smoker   Smokeless Tobacco   • Never Used      Alcohol:     History   Alcohol Use No       Vitals:           /81 (BP Location: Right arm, Patient Position: Lying)   Pulse 59   Temp 97 °F (36.1 °C) (Temporal Artery )   Resp 18   SpO2 98%     Physical Exam:  General Appearance:    Alert, cooperative, no distress, appears stated age   Head:    Normocephalic, without obvious abnormality, atraumatic   Lungs:     Clear to auscultation bilaterally, respirations unlabored    Heart:   Regular rate and rhythm, S1 and S2 normal, no murmur, rub    or gallop    Abdomen:    Soft, non-tender.  +bowel sounds   Breast Exam:    deferred   Genitalia:    deferred   Extremities:   Extremities normal, atraumatic, no cyanosis or edema   Skin:   Skin color, texture, turgor normal, no rashes or lesions   Neurologic:   Grossly intact     Cancer Staging (if applicable)  Cancer Patient: __ yes _x_no __unknown; If yes, clinical stage T:__ N:__M:__, stage group or __N/A    Impression: odontoid fracture    Plan: Posterior cervical laminectomy - decompression C1-C2. Posterior cervical fusion C1-C2    TRACEY Nath   7/13/2018   6:50 AM

## 2018-07-14 VITALS
HEIGHT: 72 IN | SYSTOLIC BLOOD PRESSURE: 149 MMHG | HEART RATE: 77 BPM | BODY MASS INDEX: 26.13 KG/M2 | TEMPERATURE: 97.5 F | OXYGEN SATURATION: 90 % | RESPIRATION RATE: 16 BRPM | WEIGHT: 192.9 LBS | DIASTOLIC BLOOD PRESSURE: 77 MMHG

## 2018-07-14 PROCEDURE — 25010000003 CEFAZOLIN IN DEXTROSE 2-4 GM/100ML-% SOLUTION: Performed by: NEUROLOGICAL SURGERY

## 2018-07-14 PROCEDURE — 97161 PT EVAL LOW COMPLEX 20 MIN: CPT

## 2018-07-14 RX ORDER — TAMSULOSIN HYDROCHLORIDE 0.4 MG/1
0.4 CAPSULE ORAL DAILY
Qty: 30 CAPSULE | Refills: 0 | Status: SHIPPED | OUTPATIENT
Start: 2018-07-15 | End: 2020-08-25

## 2018-07-14 RX ORDER — TAMSULOSIN HYDROCHLORIDE 0.4 MG/1
0.4 CAPSULE ORAL DAILY
Status: DISCONTINUED | OUTPATIENT
Start: 2018-07-14 | End: 2018-07-14 | Stop reason: HOSPADM

## 2018-07-14 RX ADMIN — PANTOPRAZOLE SODIUM 40 MG: 40 TABLET, DELAYED RELEASE ORAL at 06:21

## 2018-07-14 RX ADMIN — OXYCODONE HYDROCHLORIDE 10 MG: 10 TABLET, FILM COATED, EXTENDED RELEASE ORAL at 08:26

## 2018-07-14 RX ADMIN — CEFAZOLIN SODIUM 2 G: 2 INJECTION, SOLUTION INTRAVENOUS at 02:40

## 2018-07-14 RX ADMIN — CELECOXIB 200 MG: 200 CAPSULE ORAL at 08:26

## 2018-07-14 RX ADMIN — POLYETHYLENE GLYCOL 3350 17 G: 17 POWDER, FOR SOLUTION ORAL at 13:24

## 2018-07-14 RX ADMIN — METHOCARBAMOL 750 MG: 750 TABLET ORAL at 13:24

## 2018-07-14 RX ADMIN — METHOCARBAMOL 750 MG: 750 TABLET ORAL at 06:21

## 2018-07-14 RX ADMIN — FLUTICASONE PROPIONATE 1 SPRAY: 50 SPRAY, METERED NASAL at 08:26

## 2018-07-14 RX ADMIN — TAMSULOSIN HYDROCHLORIDE 0.4 MG: 0.4 CAPSULE ORAL at 08:31

## 2018-07-14 NOTE — PLAN OF CARE
Problem: Patient Care Overview  Goal: Plan of Care Review  Outcome: Ongoing (interventions implemented as appropriate)   07/14/18 0328   OTHER   Outcome Summary vitals stable but remains on 02 therapy, no neurovascular decline, no neurological decline, ambulated 490 feet in hallway, gait steady, urinary retention present, c/o bladder spasms, straight cath performed bedside with 1150 return, moderate drainage from NATI drain.  Pain well controlled with current regimen. Multiple episodes prior to midnight of emesis. N/V now resolved with pharmacological intervention.  Patient overview: improving.         Problem: Laminectomy/Foraminotomy/Discectomy (Adult)  Goal: Signs and Symptoms of Listed Potential Problems Will be Absent, Minimized or Managed (Laminectomy/Foraminotomy/Discectomy)   07/14/18 0328   Goal/Outcome Evaluation   Problems Assessed (Laminectomy/Laminotomy/Discectomy) all   Problems Present (Laminectomy/otomy) pain;postoperative urinary retention;postoperative nausea and vomiting

## 2018-07-14 NOTE — PLAN OF CARE
Problem: Patient Care Overview  Goal: Plan of Care Review  Outcome: Outcome(s) achieved Date Met: 07/14/18 07/14/18 5738   Coping/Psychosocial   Plan of Care Reviewed With patient   Plan of Care Review   Progress improving   OTHER   Outcome Summary Patient ambulating safely on floor with walker. He has been educated in neck precautions. No further skilled PT needed. may walk on floor with nursing or wife.

## 2018-07-14 NOTE — PROGRESS NOTES
Continued Stay Note  [facility]     Patient Name: Isrrael Marino  MRN: [id number]  Today's Date: 7/14/2018    Admit Date: 7/13/2018          Discharge Plan     Row Name 07/14/18 1519       Plan    Plan Comments CM spoke with pt and his wife who reports they own a rolling walker and bedside commode. They do not believe he will need either at discharge. Pt denies all discharge needs at this time.    Final Discharge Disposition Code 01 - home or self-care    Final Note home              Discharge Codes    No documentation.       Expected Discharge Date and Time     Expected Discharge Date Expected Discharge Time    Jul 14, 2018             Loida Granados RN

## 2018-07-14 NOTE — PROGRESS NOTES
"NEUROSURGERY PROGRESS NOTE     LOS: 1 day   Patient Care Team:  JULY Rowe as PCP - General (Internal Medicine)  Omar Mckeon MD as Referring Physician (Emergency Medicine)    Chief Complaint: Odontoid fracture with nonunion and instability.    POD#: 1 Day Post-Op  Procedures:  C1-2 fusion and stabilization with C1 laminectomy.    Interval History:   Patient Complaints: Incisional pain, nausea, voiding difficulty.  Patient Denies: Weakness, numbness, or gait difficulty.    Vital Signs: Blood pressure 154/79, pulse 70, temperature 97.6 °F (36.4 °C), temperature source Temporal Artery , resp. rate 16, height 182.9 cm (72.01\"), weight 87.5 kg (192 lb 14.4 oz), SpO2 92 %.  Intake/Output:   Intake/Output Summary (Last 24 hours) at 07/14/18 0756  Last data filed at 07/14/18 0300   Gross per 24 hour   Intake          2051.67 ml   Output             1900 ml   Net           151.67 ml     Drain output: 20/85 mL.    Physical Exam:  Patient is awake and alert.  He is sitting up in bed.  Motor and sensory function are intact in his extremities.  Hard cervical collar is in place as is NATI drain.       Assessment/Plan:  1.  Odontoid fracture with nonunion and instability status post decompression and fusion dorsally.  2.  Urinary retention: The patient has required straight catheterization.  He does have some prostatic hypertrophy and has utilized Flomax in the past.  I will reinstitute the Flomax.  2.  History of hypertension.  3.  Disposition: Continue drain for now.  I anticipate that the patient will be discharged home in the next 1-2 days when ambulatory, taking by mouth, and voiding independently.      Suresh Rhodes MD  07/14/18  7:56 AM    "

## 2018-07-14 NOTE — THERAPY DISCHARGE NOTE
Acute Care - Physical Therapy Initial Eval/Discharge   [facility]     Patient Name: Isrrael Marino  : 1940  MRN: [id number]  Today's Date: 2018      Date of Referral to PT: 18  Referring Physician: Dr Barnett      Admit Date: 2018    Visit Dx:    ICD-10-CM ICD-9-CM   1. Impaired functional mobility, balance, gait, and endurance Z74.09 V49.89   2. Odontoid fracture with nonunion S12.110K 733.82     Patient Active Problem List   Diagnosis   • Gastroesophageal reflux disease   • Atopic rhinitis   • Arthritis   • Peripheral neuropathy   • Arthralgia of hip   • Low back pain   • Irritable bowel syndrome   • Hypertension   • Hyperlipidemia   • Hyperglycemia   • Hemorrhoids   • Gout   • Enlarged prostate   • Erectile dysfunction of nonorganic origin   • Cough   • Constipation   • Chronic diarrhea   • Benign prostatic hyperplasia   • Chronic coronary artery disease   • Lower extremity neuropathy   • Primary osteoarthritis of both knees   • Odontoid fracture with routine healing   • Glenohumeral arthritis, right   • Closed fracture of odontoid process of axis (CMS/HCC)   • Odontoid fracture with nonunion     Past Medical History:   Diagnosis Date   • Arthritis     both knees   • CAD (coronary artery disease)    • Cancer (CMS/HCC)    • Chondrocalcinosis of knee    • GERD (gastroesophageal reflux disease)    • Gout    • Heart attack      Last Assessed: 28 Mar 2014   • Heart disease    • History of transfusion     own blood with hip surgery   • Hyperlipidemia    • Hypertension    • IBS (irritable bowel syndrome)    • Left rotator cuff tear arthropathy    • Low back pain    • Lower extremity neuropathy    • Lumbar canal stenosis    • Neck pain    • Numbness    • Right hip pain    • Rotator cuff tear arthropathy of right shoulder    • Thin blood (CMS/HCC)      Past Surgical History:   Procedure Laterality Date   • APPENDECTOMY     • BACK SURGERY      spinal decompression and fusion   • BREAST  LUMPECTOMY  2003   • CARDIAC CATHETERIZATION      with two stents//. DR. TOLEDO   • CARPAL TUNNEL RELEASE  03/28/2014    Neuroplasty Decompression Median Nerve At Carpal Tunnel   • COLONOSCOPY     • CORONARY STENT PLACEMENT  2013   • HERNIA REPAIR  2002    & 1998   • LUMBAR DISCECTOMY FUSION INSTRUMENTATION     • TONSILLECTOMY     • TOTAL HIP ARTHROPLASTY  2002   • VASECTOMY            PT ASSESSMENT (last 12 hours)      Physical Therapy Evaluation     Row Name 07/14/18 0920          PT Evaluation Time/Intention    Subjective Information complains of  -SC     Document Type evaluation  -SC     Mode of Treatment physical therapy  -SC     Patient Effort excellent  -SC     Symptoms Noted During/After Treatment none  -SC     Row Name 07/14/18 0920          General Information    Patient Profile Reviewed? yes  -SC     Referring Physician Dr Barnett  -SC     Patient Observations agree to therapy  -SC     Patient/Family Observations wife  -SC     General Observations of Patient sitting on eob with hard collar on  -SC     Prior Level of Function independent:;gait;transfer  -SC     Equipment Currently Used at Home walker, rolling  -SC     Pertinent History of Current Functional Problem fell in November resulting in ondontoid fx. Now s/p C1-C2 fusion , c1 lami for non union   -SC     Existing Precautions/Restrictions fall  -SC     Equipment Issued to Patient gait belt  -SC     Equipment Ordered for Patient gait belt  -SC     Risks Reviewed patient:;increased discomfort;change in vital signs  -SC     Benefits Reviewed patient and family:;improve function;increase independence;increase strength  -SC     Barriers to Rehab none identified  -SC     Row Name 07/14/18 0920          Relationship/Environment    Primary Source of Support/Comfort spouse  -SC     Lives With spouse  -SC     Row Name 07/14/18 0920          Resource/Environmental Concerns    Current Living Arrangements home/apartment/condo  -SC     Resource/Environmental  Concerns none  -SC     Row Name 07/14/18 0920          Cognitive Assessment/Intervention- PT/OT    Orientation Status (Cognition) oriented x 4  -SC     Follows Commands (Cognition) WNL  -SC     Safety Deficit (Cognitive) insight into deficits/self awareness  -SC     Row Name 07/14/18 0920          Safety Issues, Functional Mobility    Safety Issues Affecting Function (Mobility) insight into deficits/self awareness  -SC     Impairments Affecting Function (Mobility) balance  -SC     Row Name 07/14/18 0920          Bed Mobility Assessment/Treatment    Bed Mobility Assessment/Treatment bed mobility (all) activities  -SC     [place] Level (Bed Mobility) independent  -SC     Row Name 07/14/18 0920          Transfer Assessment/Treatment    Transfer Assessment/Treatment sit-stand transfer;stand-sit transfer  -SC     Comment (Transfers) demonstrated safe transfer  -SC     Sit-Stand [place] (Transfers) conditional independence  -SC     Stand-Sit [place] (Transfers) conditional independence  -SC     Row Name 07/14/18 0920          Sit-Stand Transfer    Assistive Device (Sit-Stand Transfers) walker, front-wheeled  -SC     Row Name 07/14/18 0920          Stand-Sit Transfer    Assistive Device (Stand-Sit Transfers) walker, front-wheeled  -[facility] Name 07/14/18 0920          Gait/Stairs Assessment/Training    Gait/Stairs Assessment/Training gait/ambulation independence  -SC     [place] Level (Gait) supervision  -SC     Assistive Device (Gait) walker, front-wheeled  -SC     Distance in Feet (Gait) 400  -SC     Pattern (Gait) step-through  -SC     Deviations/Abnormal Patterns (Gait) kayli decreased   slightly unsteady  -SC     Comment (Gait/Stairs) cues for improving posture-- able to correct this  -SC     Row Name 07/14/18 0920          General ROM    Head/Neck/Trunk Comments   in hard collar  -SC     LT Upper Ext --   elevation limied by DJD  -SC     GENERAL ROM COMMENTS other jts wfl, B hands with some  deformity  -SC     Row Name 07/14/18 0920          General Assessment (Manual Muscle Testing)    General Manual Muscle Testing (MMT) Assessment upper extremity strength deficits identified  -SC     Comment, General Manual Muscle Testing (MMT) Assessment B LE: grossly 4+/5  -[facility] Name 07/14/18 0920          Upper Extremity (Manual Muscle Testing)    Upper Extremity: Manual Muscle Testing (MMT) --   B  4+/5  -SC     Comment, MMT: Upper Extremity L UE with Rotator cuff injury- no active elevation  -[facility] Name 07/14/18 0920          Motor Assessment/Intervention    Additional Documentation Balance (Group)  -[facility] Name 07/14/18 0920          Balance    Balance dynamic standing balance  -[facility] Name 07/14/18 0920          Dynamic Standing Balance    Level of [place], Reaches Outside Midline (Standing, Dynamic Balance) supervision   walker  -[facility] Name 07/14/18 0920          Sensory Assessment/Intervention    Sensory General Assessment light touch sensation deficits identified  -[facility] Name 07/14/18 0920          Light Touch Sensation Assessment    Right Upper Extremity: Light Touch Sensation Assessment mild impairment, 75% or more correct responses   Ulner distribution  -[facility] Name 07/14/18 0920          Pain Assessment    Additional Documentation Pain Scale: Numbers Pre/Post-Treatment (Group)  -[facility] Name 07/14/18 0920          Pain Scale: Numbers Pre/Post-Treatment    Pain Scale: Numbers, Pretreatment 2/10  -SC     Pain Scale: Numbers, Post-Treatment 2/10  -SC     Pain Location neck  -SC     Row Name             Wound 07/13/18 0946 Other (See comments) neck incision    Wound - Properties Group Date first assessed: 07/13/18  -LW Time first assessed: 0946  -LW Side: Other (See comments)  -LW Location: neck  -LW Type: incision  -LW    Row Name 07/14/18 0920          Coping    Observed Emotional State calm;cooperative  -SC     Verbalized Emotional State acceptance  -[facility]  Name 07/14/18 0920          Plan of Care Review    Plan of Care Reviewed With patient;spouse  -SC     Row Name 07/14/18 0920          Physical Therapy Clinical Impression    Date of Referral to PT 07/13/18  -SC     PT Diagnosis (PT Clinical Impression) impaired mobility  -SC     Patient/Family Goals Statement (PT Clinical Impression) go home  -SC     Criteria for Skilled Interventions Met (PT Clinical Impression) yes;treatment indicated   evaluation only  -SC     Pathology/Pathophysiology Noted (Describe Specifically for Each System) musculoskeletal  -SC     Impairments Found (describe specific impairments) gait, locomotion, and balance  -SC     Rehab Potential (PT Clinical Summary) good, to achieve stated therapy goals  -SC     Care Plan Review (PT) evaluation/treatment results reviewed;risks/benefits reviewed;care plan/treatment goals reviewed  -SC     Patient/Family Concerns, Equipment Needs at Discharge (PT) no- has walker  -SC     Patient/Family Concerns, Anticipated Discharge Disposition (PT) wife conserned he will bed too active  -SC     Row Name 07/14/18 0920          Vital Signs    Pre SpO2 (%) 96  -SC     O2 Delivery Pre Treatment supplemental O2  -SC     Intra SpO2 (%) 92  -SC     O2 Delivery Intra Treatment room air  -SC     Post SpO2 (%) 92  -SC     O2 Delivery Post Treatment room air  -SC     Row Name 07/14/18 0920          Physical Therapy Goals    Bed Mobility Goal Selection (PT) bed mobility, PT goal 1  -SC     Transfer Goal Selection (PT) transfer, PT goal 1  -SC     Gait Training Goal Selection (PT) gait training, PT goal 1  -SC     Row Name 07/14/18 0920          Bed Mobility Goal 1 (PT)    Activity/Assistive Device (Bed Mobility Goal 1, PT) bed mobility activities, all  -SC     [place] Level/Cues Needed (Bed Mobility Goal 1, PT) conditional independence  -SC     Time Frame (Bed Mobility Goal 1, PT) by discharge  -SC     Progress/Outcomes (Bed Mobility Goal 1, PT) goal met  -SC     Row Name  07/14/18 0920          Transfer Goal 1 (PT)    Activity/Assistive Device (Transfer Goal 1, PT) sit-to-stand/stand-to-sit;walker, rolling  -SC     [place] Level/Cues Needed (Transfer Goal 1, PT) conditional independence  -SC     Time Frame (Transfer Goal 1, PT) by discharge  -SC     Progress/Outcome (Transfer Goal 1, PT) goal met  -SC     Row Name 07/14/18 0920          Gait Training Goal 1 (PT)    Activity/Assistive Device (Gait Training Goal 1, PT) gait (walking locomotion);walker, rolling  -SC     [place] Level (Gait Training Goal 1, PT) supervision required  -SC     Distance (Gait Goal 1, PT) 350  -SC     Time Frame (Gait Training Goal 1, PT) by discharge  -SC     Progress/Outcome (Gait Training Goal 1, PT) goal met  -SC     Row Name 07/14/18 0920          Patient Education Goal (PT)    Activity (Patient Education Goal, PT) brace use, precautions with activity  -SC     [place]/Cues/Accuracy (Memory Goal 2, PT) demonstrates adequately;independent;verbalizes understanding  -SC     Time Frame (Patient Education Goal, PT) long term goal (LTG);by discharge  -SC     Progress/Outcome (Patient Education Goal, PT) goal met  -SC     Row Name 07/14/18 0920          Positioning and Restraints    Pre-Treatment Position in bed  -SC     Post Treatment Position bed  -SC     In Bed sitting EOB;call light within reach;with family/caregiver  -SC       User Key  (r) = Recorded By, (t) = Taken By, (c) = Cosigned By    Initials Name Provider Type    SC Stephani Romano PT Physical Therapist    SHANNAN Hayes, RN Registered Nurse          Physical Therapy Education     Title: PT OT SLP Therapies (Done)     Topic: Physical Therapy (Done)     Point: Mobility training (Done)    Learning Progress Summary     Learner Status Readiness Method Response Comment Documented by    Patient Done TIEN Byrne DU reviewed brace use  reviewed safety with mobility    reviewed precautions with neck--no lifting, weed wacking,hunting,  falling SC 07/14/18 1132    Family Done TIEN Byrne DU reviewed brace use  reviewed safety with mobility    reviewed precautions with neck--no lifting, weed wacking,hunting, falling SC 07/14/18 1132          Point: Home exercise program (Done)    Learning Progress Summary     Learner Status Readiness Method Response Comment Documented by    Patient Done TIEN [place] DU reviewed brace use  reviewed safety with mobility    reviewed precautions with neck--no lifting, weed wacking,hunting, falling SC 07/14/18 1132    Family Done TIEN Byrne DU reviewed brace use  reviewed safety with mobility    reviewed precautions with neck--no lifting, weed wacking,hunting, falling SC 07/14/18 1132          Point: Body mechanics (Done)    Learning Progress Summary     Learner Status Readiness Method Response Comment Documented by    Patient Done TIEN Byr[place] DU reviewed brace use  reviewed safety with mobility    reviewed precautions with neck--no lifting, weed wacking,hunting, falling SC 07/14/18 1132    Family Done TIEN Byrne DU reviewed brace use  reviewed safety with mobility    reviewed precautions with neck--no lifting, weed wacking,hunting, falling SC 07/14/18 1132          Point: Precautions (Done)    Learning Progress Summary     Learner Status Readiness Method Response Comment Documented by    Patient Done TIEN Byrne DU reviewed brace use  reviewed safety with mobility    reviewed precautions with neck--no lifting, weed wacking,hunting, falling SC 07/14/18 1132    Family Done TIEN Byrne DU reviewed brace use  reviewed safety with mobility    reviewed precautions with neck--no lifting, weed wacking,hunting, falling SC 07/14/18 1132                      User Key     Initials Effective Dates Name Provider Type Discipline    SC 06/19/15 -  Stephani Romano, PT Physical Therapist PT                PT Recommendation and Plan  Anticipated Discharge Disposition (PT): home with assist  Planned Therapy Interventions (PT Eval):  bed mobility training, gait training, postural re-education, patient/family education, strengthening, transfer training  Outcome Summary/Treatment Plan (PT)  Patient/Family Concerns, Equipment Needs at Discharge (PT): no- has walker  Anticipated Discharge Disposition (PT): home with assist  Patient/Family Concerns, Anticipated Discharge Disposition (PT): wife conserned he will bed too active  Plan of Care Reviewed With: patient  Progress: improving  Outcome Summary: Patient ambulating safely on floor with walker. He has been educated in neck precautions. No futher  skilled PT needed. may walk on floor with nursing or wife.          Outcome Measures     Row Name 07/14/18 0920             How much help from another person do you currently need...    Turning from your back to your side while in flat bed without using bedrails? 4  -SC      Moving from lying on back to sitting on the side of a flat bed without bedrails? 4  -SC      Moving to and from a bed to a chair (including a wheelchair)? 3  -SC      Standing up from a chair using your arms (e.g., wheelchair, bedside chair)? 3  -SC      Climbing 3-5 steps with a railing? 3  -SC      To walk in hospital room? 3  -SC      AM-PAC 6 Clicks Score 20  -SC         Functional Assessment    Outcome Measure Options AM-PAC 6 Clicks Basic Mobility (PT)  -SC        User Key  (r) = Recorded By, (t) = Taken By, (c) = Cosigned By    Initials Name Provider Type    MINA Romano, PT Physical Therapist           Time Calculation:         PT Charges     Row Name 07/14/18 1136             Time Calculation    Start Time 0920  -SC      PT Received On 07/14/18  -SC        User Key  (r) = Recorded By, (t) = Taken By, (c) = Cosigned By    Initials Name Provider Type    MINA Romano, PT Physical Therapist        Therapy Suggested Charges     Code   Minutes Charges    None           Therapy Charges for Today     Code Description Service Date Service Provider Modifiers Qty     [id number]  PT EVAL LOW COMPLEXITY 4 7/14/2018 Stephani Romano, PT GP 1          PT G-Codes  Outcome Measure Options: AM-PAC 6 Clicks Basic Mobility (PT)    PT Discharge Summary  Anticipated Discharge Disposition (PT): home with assist  Reason for Discharge: All goals achieved  Outcomes Achieved: Able to achieve all goals within established timeline  Discharge Destination: Home with assist    Stephani Romano, PT  7/14/2018

## 2018-07-27 NOTE — DISCHARGE SUMMARY
Date of Discharge:  7/27/2018    Discharge Diagnosis: Cervical stenosis/C1 fracture    Procedures Performed  Procedure(s):  CERVICAL LAMINECTOMY DECOMPRESSION POSTERIOR C1-2 POSTERIOR CERVICAL FUSION       Consults:   Consults     No orders found from 6/14/2018 to 7/14/2018.          Presenting Problem/History of Present Illness  Odontoid fracture with nonunion [S12.110K]  Odontoid fracture with nonunion [S12.110K]     Hospital Course  Patient is a 78 y.o. male presented with C1 fracture with instability.  Patient was deemed surgical candidate by Dr. Randall Barnett.  Patient was taken to the operating room on 7/13/2018 for his C1 2 posterior laminectomy and lateral fusion.  Patient is sent to the floor for observation and pain control.  Drain had minimal output.  Patient's family, taking food by mouth, voiding appropriately..      Condition on Discharge:  Patient is ambulatory, taking food by mouth, voiding appropriately.  Incision is clean, dry.  No signs of infection, bleeding, or erythema.    Discharge Disposition  Home or Self Care  Patient is in stable and satisfactory condition at discharge.  Patient is ambulating, taking food by mouth, voiding appropriately.  Discharge Medications     Discharge Medications      New Medications      Instructions Start Date   CLEARLAX powder  Generic drug:  polyethylene glycol   17 g, Oral, Daily      methocarbamol 750 MG tablet  Commonly known as:  ROBAXIN   750 mg, Oral, Every 8 Hours Scheduled      oxyCODONE-acetaminophen 7.5-325 MG per tablet  Commonly known as:  PERCOCET   1 tablet, Oral, Every 8 Hours PRN      tamsulosin 0.4 MG capsule 24 hr capsule  Commonly known as:  FLOMAX   0.4 mg, Oral, Daily         Continue These Medications      Instructions Start Date   clopidogrel 75 MG tablet  Commonly known as:  PLAVIX   TAKE 1 TABLET EVERY DAY      esomeprazole 40 MG capsule  Commonly known as:  nexIUM   40 mg, Oral, Every Morning Before Breakfast      fluticasone 50  MCG/ACT nasal spray  Commonly known as:  FLONASE   1 spray, Daily      metoprolol succinate XL 25 MG 24 hr tablet  Commonly known as:  TOPROL-XL   TAKE 1 TABLET ONE TIME DAILY      simvastatin 40 MG tablet  Commonly known as:  ZOCOR   TAKE 1 TABLET EVERY DAY      VITAMIN B COMPLEX PO   1 tablet, Oral, Daily      vitamin B-12 1000 MCG tablet  Commonly known as:  CYANOCOBALAMIN   1,000 mcg, Oral, Daily         Stop These Medications    loperamide 2 MG capsule  Commonly known as:  IMODIUM            Activity at Discharge:   Patient is to avoid heavy bending, lifting, twisting.  Patient's stay in cervical collar  Follow-up Appointments  Future Appointments  Date Time Provider Department Center   7/30/2018 11:00 AM Rj Summers PA-C [facility] None   8/13/2018 8:00 AM JULY Chaney [facility] None   8/14/2018 8:40 AM Tish Shelley PA-C [facility] None   9/24/2018 8:30 AM Zeus Rizvi MD [facility] None   11/12/2018 10:15 AM Casimiro Bernal MD [facility] None         Test Results Pending at Discharge       Rj Summers PA-C  07/27/18  4:33 PM

## 2018-07-30 ENCOUNTER — TELEPHONE (OUTPATIENT)
Dept: NEUROSURGERY | Facility: CLINIC | Age: 78
End: 2018-07-30

## 2018-07-30 ENCOUNTER — OFFICE VISIT (OUTPATIENT)
Dept: NEUROSURGERY | Facility: CLINIC | Age: 78
End: 2018-07-30

## 2018-07-30 VITALS
BODY MASS INDEX: 25.06 KG/M2 | TEMPERATURE: 98 F | DIASTOLIC BLOOD PRESSURE: 62 MMHG | HEIGHT: 72 IN | WEIGHT: 185 LBS | SYSTOLIC BLOOD PRESSURE: 112 MMHG

## 2018-07-30 DIAGNOSIS — S12.100G CLOSED ODONTOID FRACTURE WITH DELAYED HEALING, SUBSEQUENT ENCOUNTER: Primary | ICD-10-CM

## 2018-07-30 DIAGNOSIS — S12.100K: ICD-10-CM

## 2018-07-30 PROCEDURE — 99024 POSTOP FOLLOW-UP VISIT: CPT | Performed by: PHYSICIAN ASSISTANT

## 2018-07-30 NOTE — TELEPHONE ENCOUNTER
Provider:  Anthony  Caller: wife/Daphne  Time of call: [phone]    Phone #:    Surgery: CERVICAL LAMINECTOMY DECOMPRESSION POSTERIOR C1-2 POSTERIOR CERVICAL FUSION   Surgery Date: 7/13/18  Last visit: 7/30/18    Next visit: 9/24/18    Reason for call:         Pt's wife called stating that when they were being seen today Rj mentioned a stimulator, they want more information on this, specifically how to obtain one. I have spoken with Apro about it and she directed me to discuss this with Rj to determine if the pt is an appropriate candidate for this therapy.

## 2018-07-30 NOTE — PROGRESS NOTES
Patient: Isrrael Marino  : 1940    Primary Care Provider: JULY Sullivan      Chief Complaint: Wound check    History of Present Illness:       Patient is well-known to the neurosurgical practice for undergoing a posterior C1 2 fusion for unstable odontoid fracture.  Procedures performed on 2018 by Dr. Randall Barnett.  Patient initially presented with bilateral hand neuropathy, numbness, tingling in decreased strength.  Patient is also having trouble walking.    Patient presents today for suture removal.  Sutures were removed without event.  Patient's exam has shown some improvements.  Patient no longer has burning in his hands and has some increased strength.  Patient is also able to walk but has limited range of motion in cervical spine, as expected.  Patient has been off of opioid narcotics for a few days and is eager to get back to some of his routine daily activities.    Cervical collar should be worn for another 4 weeks.  Patient will do physical therapy for 6-8 weeks.  Patient will follow back up with Dr. Barnett with AP and lateral x-rays the cervical spine to check placement of rods and screws.    Review of Systems   Constitutional: Negative for activity change, appetite change, chills, diaphoresis, fatigue, fever and unexpected weight change.   HENT: Negative for congestion, dental problem, drooling, ear discharge, ear pain, facial swelling, hearing loss, mouth sores, nosebleeds, postnasal drip, rhinorrhea, sinus pressure, sneezing, sore throat, tinnitus, trouble swallowing and voice change.    Eyes: Negative for photophobia, pain, discharge, redness, itching and visual disturbance.   Respiratory: Negative for apnea, cough, choking, chest tightness, shortness of breath, wheezing and stridor.    Cardiovascular: Negative for chest pain, palpitations and leg swelling.   Gastrointestinal: Negative for abdominal distention, abdominal pain, anal bleeding, blood in stool, constipation,  diarrhea, nausea, rectal pain and vomiting.   Endocrine: Negative for cold intolerance, heat intolerance, polydipsia, polyphagia and polyuria.   Genitourinary: Negative for decreased urine volume, difficulty urinating, dysuria, enuresis, flank pain, frequency, genital sores, hematuria and urgency.   Musculoskeletal: Positive for neck pain and neck stiffness. Negative for arthralgias, back pain, gait problem, joint swelling and myalgias.   Skin: Negative for color change, pallor, rash and wound.   Allergic/Immunologic: Negative for environmental allergies, food allergies and immunocompromised state.   Neurological: Negative for dizziness, tremors, seizures, syncope, facial asymmetry, speech difficulty, weakness, light-headedness, numbness and headaches.   Hematological: Negative for adenopathy. Does not bruise/bleed easily.   Psychiatric/Behavioral: Negative for agitation, behavioral problems, confusion, decreased concentration, dysphoric mood, hallucinations, self-injury, sleep disturbance and suicidal ideas. The patient is not nervous/anxious and is not hyperactive.        Past Medical History:     Past Medical History:   Diagnosis Date   • Arthritis     both knees   • CAD (coronary artery disease)    • Cancer (CMS/HCC)    • Chondrocalcinosis of knee    • GERD (gastroesophageal reflux disease)    • Gout    • Heart attack      Last Assessed: 28 Mar 2014   • Heart disease    • History of transfusion     own blood with hip surgery   • Hyperlipidemia    • Hypertension    • IBS (irritable bowel syndrome)    • Left rotator cuff tear arthropathy    • Low back pain    • Lower extremity neuropathy    • Lumbar canal stenosis    • Neck pain    • Numbness    • Right hip pain    • Rotator cuff tear arthropathy of right shoulder    • Thin blood (CMS/HCC)        Family History:     Family History   Problem Relation Age of Onset   • Cancer Mother    • Heart disease Father    • Hypertension Father    • Heart attack Father   "      Social History:    reports that he has never smoked. He has never used smokeless tobacco. He reports that he does not drink alcohol or use drugs.   SMOKING STATUS: Nonsmoker    Surgical History:     Past Surgical History:   Procedure Laterality Date   • APPENDECTOMY  1956   • BACK SURGERY  1997    spinal decompression and fusion   • BREAST LUMPECTOMY  2003   • CARDIAC CATHETERIZATION      with two stents//. DR. TOLEDO   • CARPAL TUNNEL RELEASE  03/28/2014    Neuroplasty Decompression Median Nerve At Carpal Tunnel   • CERVICAL DISCECTOMY POSTERIOR FUSION WITH BRAIN LAB N/A 7/13/2018    Procedure: CERVICAL LAMINECTOMY DECOMPRESSION POSTERIOR C1-2 POSTERIOR CERVICAL FUSION;  Surgeon: Randall Barnett MD;  Location: [facility];  Service: Neurosurgery   • COLONOSCOPY     • CORONARY STENT PLACEMENT  2013   • HERNIA REPAIR  2002    & 1998   • LUMBAR DISCECTOMY FUSION INSTRUMENTATION     • TONSILLECTOMY     • TOTAL HIP ARTHROPLASTY  2002   • VASECTOMY         Allergies:   Ibuprofen    Physical Exam:    Vital Signs:/62 (BP Location: Right arm, Patient Position: Sitting)   Temp 98 °F (36.7 °C) (Temporal Artery )   Ht 182.9 cm (72.01\")   Wt 83.9 kg (185 lb)   BMI 25.08 kg/m²    BMI: Body mass index is 25.08 kg/m².    GENEREAL:   The patient is in no acute distress, and is able to answer all questions appropriately.  Skin:  The incision is well-healed and well approximated.  No signs of infection, bleeding, or erythema.  Musculoskeletal: The patient’s strength is intact in upper and lower extremities upon direct testing.  Strength is 4 out of 5.  Shoulder abduction is 4 out of 5.  Finger extension is 4 out of 5.  Thenar atrophy is noted in the right hand.   Dorsiflexion and plantarflexion are equal bilaterally @ 5/5. Hip flexion against resistance.  The patient’s gait is normal without antalgia.  Neurologic: The patient is alert and oriented by 3.  Recent memory, language, attention span, and fund of knowledge " are all with within normal limits.   Sensation is equal bilaterally with no deficit.    Reflexes are 2+ at the biceps, triceps, and brachioradialis as well as the patellar and Achilles tendon bilaterally.  There is no sign of Obrien’s, clonus, or long track signs.      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  Patient should continue cervical collar for 4 more weeks and then wean out of it.  Patient will get an AP and lateral x-ray on his return visit.    It has been a pleasure providing neurosurgical care.    Rj Summers PA-C      No diagnosis found.

## 2018-08-06 ENCOUNTER — TELEPHONE (OUTPATIENT)
Dept: ORTHOPEDIC SURGERY | Facility: CLINIC | Age: 78
End: 2018-08-06

## 2018-08-06 NOTE — TELEPHONE ENCOUNTER
I called the patient and left a message for him to call the doctor that will be performing the surgery.  I explained that they should be able to let him know.  I let him know if he has any other questions to give us a call back.  Sada

## 2018-08-06 NOTE — TELEPHONE ENCOUNTER
PT CALLING TO SEE IF CORTISONE INJ WILL HURT THE HEALING PROCESS AFTER NECK SURGERY. HIS NUMBER [phone].

## 2018-08-13 ENCOUNTER — OFFICE VISIT (OUTPATIENT)
Dept: INTERNAL MEDICINE | Facility: CLINIC | Age: 78
End: 2018-08-13

## 2018-08-13 VITALS
DIASTOLIC BLOOD PRESSURE: 76 MMHG | SYSTOLIC BLOOD PRESSURE: 130 MMHG | OXYGEN SATURATION: 96 % | HEART RATE: 77 BPM | WEIGHT: 183 LBS | BODY MASS INDEX: 24.81 KG/M2

## 2018-08-13 DIAGNOSIS — R09.89 BRUIT OF RIGHT CAROTID ARTERY: ICD-10-CM

## 2018-08-13 DIAGNOSIS — E78.5 HYPERLIPIDEMIA, UNSPECIFIED HYPERLIPIDEMIA TYPE: Primary | ICD-10-CM

## 2018-08-13 LAB
ALBUMIN SERPL-MCNC: 4.22 G/DL (ref 3.2–4.8)
ALBUMIN/GLOB SERPL: 1.7 G/DL (ref 1.5–2.5)
ALP SERPL-CCNC: 77 U/L (ref 25–100)
ALT SERPL-CCNC: 15 U/L (ref 7–40)
AST SERPL-CCNC: 22 U/L (ref 0–33)
BILIRUB SERPL-MCNC: 0.6 MG/DL (ref 0.3–1.2)
BUN SERPL-MCNC: 21 MG/DL (ref 9–23)
BUN/CREAT SERPL: 20.6 (ref 7–25)
CALCIUM SERPL-MCNC: 9.7 MG/DL (ref 8.7–10.4)
CHLORIDE SERPL-SCNC: 107 MMOL/L (ref 99–109)
CHOLEST SERPL-MCNC: 169 MG/DL (ref 0–200)
CK SERPL-CCNC: 75 U/L (ref 26–174)
CO2 SERPL-SCNC: 27 MMOL/L (ref 20–31)
CREAT SERPL-MCNC: 1.02 MG/DL (ref 0.6–1.3)
GLOBULIN SER CALC-MCNC: 2.5 GM/DL
GLUCOSE SERPL-MCNC: 94 MG/DL (ref 70–100)
HDLC SERPL-MCNC: 62 MG/DL (ref 40–60)
LDLC SERPL CALC-MCNC: 92 MG/DL (ref 0–100)
POTASSIUM SERPL-SCNC: 4.5 MMOL/L (ref 3.5–5.5)
PROT SERPL-MCNC: 6.7 G/DL (ref 5.7–8.2)
SODIUM SERPL-SCNC: 141 MMOL/L (ref 132–146)
TRIGL SERPL-MCNC: 75 MG/DL (ref 0–150)
VLDLC SERPL CALC-MCNC: 15 MG/DL

## 2018-08-13 PROCEDURE — 99214 OFFICE O/P EST MOD 30 MIN: CPT | Performed by: NURSE PRACTITIONER

## 2018-08-13 RX ORDER — HYDROCODONE BITARTRATE AND ACETAMINOPHEN 7.5; 325 MG/1; MG/1
1 TABLET ORAL EVERY 6 HOURS PRN
COMMUNITY
End: 2018-11-12 | Stop reason: SDDI

## 2018-08-13 RX ORDER — TRAMADOL HYDROCHLORIDE 50 MG/1
50 TABLET ORAL EVERY 6 HOURS PRN
COMMUNITY
End: 2018-09-24 | Stop reason: SDUPTHER

## 2018-08-13 NOTE — PROGRESS NOTES
Subjective   Isrrael Marino is a 78 y.o. male.   Chief Complaint   Patient presents with   • Follow-up     6 month   • Hypertension      History of Present Illness  Had FX neck repaired in Nov, 2017.  Has peripheral neuropathy not from neck break.    Pt reports he does monitor BP at Home.  Pt notes BP has been low at home so he stopped lisinopril  Tries to stick to low fat low cholesterol diet.  Takes BP meds on regular basis .   Takes Plavix for cardiac stents ( Dr Montelongo).  Has constipation -taking Miralax.  Has enlarged prostate-sees Dr Gutierrez.  Patient denies fever chills, headache, ear pain, sore throat, shortness of air, cough, chest pain, abdominal pain, nausea vomiting diarrhea, dysuria, blood in stool or urine. Mood is good.  Eating and drinking as usual.        The following portions of the patient's history were reviewed and updated as appropriate: allergies, current medications, past family history, past medical history, past social history, past surgical history and problem list.    Current Outpatient Prescriptions:   •  B Complex Vitamins (VITAMIN B COMPLEX PO), Take 1 tablet by mouth Daily., Disp: , Rfl:   •  clopidogrel (PLAVIX) 75 MG tablet, TAKE 1 TABLET EVERY DAY, Disp: 90 tablet, Rfl: 4  •  esomeprazole (nexIUM) 40 MG capsule, Take 40 mg by mouth Every Morning Before Breakfast., Disp: , Rfl:   •  fluticasone (FLONASE) 50 MCG/ACT nasal spray, 1 spray Daily., Disp: , Rfl:   •  HYDROcodone-acetaminophen (NORCO) 7.5-325 MG per tablet, Take 1 tablet by mouth Every 6 (Six) Hours As Needed for Moderate Pain ., Disp: , Rfl:   •  metoprolol succinate XL (TOPROL-XL) 25 MG 24 hr tablet, TAKE 1 TABLET ONE TIME DAILY, Disp: 90 tablet, Rfl: 4  •  polyethylene glycol (MIRALAX) powder, Dissolve 1 capful (17g) in water and take by mouth Daily as directed while on opiods, Disp: 238 g, Rfl: 1  •  simvastatin (ZOCOR) 40 MG tablet, TAKE 1 TABLET EVERY DAY, Disp: 90 tablet, Rfl: 3  •  tamsulosin (FLOMAX) 0.4 MG  capsule 24 hr capsule, Take 1 capsule by mouth Daily., Disp: 30 capsule, Rfl: 0  •  traMADol (ULTRAM) 50 MG tablet, Take 50 mg by mouth Every 6 (Six) Hours As Needed for Moderate Pain ., Disp: , Rfl:   •  vitamin B-12 (CYANOCOBALAMIN) 1000 MCG tablet, Take 1,000 mcg by mouth Daily., Disp: , Rfl:     Review of Systems  Consitutional, HEENT, Respiratory, CV, GI, , Skin, Musculoskeletal, Neuro-mental, Endocrinological, Hematological were reviewed.  Positives were discussed in the HPI, otherwise ROS was negative   /76   Pulse 77   Wt 83 kg (183 lb)   SpO2 96%   BMI 24.81 kg/m²     Objective   Allergies   Allergen Reactions   • Ibuprofen Hives       Physical Exam   Constitutional: He is oriented to person, place, and time. He appears well-developed and well-nourished. No distress.   HENT:   Right Ear: External ear normal.   Left Ear: External ear normal.   Mouth/Throat: Oropharynx is clear and moist.   Well healed scar to posterior head and neck     Eyes: Right eye exhibits no discharge. Left eye exhibits no discharge.   Neck: Neck supple.   right carotid bruit (slight)  Left clear   Cardiovascular: Normal rate, regular rhythm, normal heart sounds and intact distal pulses.  Exam reveals no gallop and no friction rub.    No murmur heard.  Pulmonary/Chest: Effort normal and breath sounds normal. No respiratory distress.   Abdominal: Soft. Bowel sounds are normal. There is no tenderness.   Musculoskeletal:   Moves head stiffly from surgery    Lymphadenopathy:     He has no cervical adenopathy.   Neurological: He is alert and oriented to person, place, and time.   Skin: Skin is warm and dry.   Psychiatric: He has a normal mood and affect. His behavior is normal. Judgment and thought content normal.   Nursing note and vitals reviewed.      Procedures    Assessment/Plan   Isrrael was seen today for follow-up and hypertension.    Diagnoses and all orders for this visit:    Hyperlipidemia, unspecified hyperlipidemia  type  -     Lipid panel  -     CK  -     Comprehensive metabolic panel    Bruit of right carotid artery  -     Duplex Carotid Ultrasound CAR        Patient Instructions   Lipid Profile Test  Why am I having this test?  The lipid profile test gives results that can help predict the likelihood of developing heart disease. The test is also used to monitor treatment for high cholesterol to see if you are reaching your goals. A lipid profile measures the following:  · Total cholesterol. Cholesterol is a waxy fat in your blood. If your total cholesterol is elevated, this can increase your risk of coronary heart disease.  · High-density lipoprotein (HDL). This is known as the good cholesterol. Having a high level of HDL is good. Your HDL level may be low if you smoke or do not get enough exercise.  · Low-density lipoprotein (LDL). This is known as the bad cholesterol and is responsible for the formation of plaque in the arteries. Having a low level of LDL is best.  · Cholesterol to HDL ratio. This is calculated by dividing the total cholesterol by the HDL cholesterol. The ratio is used by health care providers for determining your risk of heart disease. A low ratio is best.  · Triglycerides. These are a type of fat in the blood responsible for providing energy to your cells. Low levels are best.    What kind of sample is taken?  A blood sample is required for this test. It is usually collected by inserting a needle into a vein.  How do I prepare for this test?  Do not eat or drink anything after midnight on the night before the test or as directed by your health care provider.  What are the reference ranges?  Reference ranges are considered healthy ranges established after testing a large group of healthy people. Reference ranges may vary among different people, labs, and hospitals. It is your responsibility to obtain your test results. Ask the lab or department performing the test when and how you will get your  results.  Reference ranges for the lipid profile test are as follows:  Total Cholesterol  · Adult or elderly: less than 200 mg/dL or less than 5.20 mmol/L (SI units).  · Child: 120-200 mg/dL.  · Infant:  mg/dL.  · Newborns:  mg/dL.  HDL  · Male: greater than 45 mg/dL or greater than 0.75 mmol/L (SI units).  · Female: greater than 55 mg/dL or greater than 0.91 mmol/L (SI units).  HDL reference values based on risk of heart disease:  · For low risk of heart disease:  ? Male: 60 mg/dL or 1.55 mmol/L.  ? Female: 70 mg/dL or 1.81 mmol/L.  · For moderate risk of heart disease:  ? Male: 45 mg/dL or 1.17 mmol/L.  ? Female: 55 mg/dL or 1.42 mmol/L.  · For high risk of heart disease:  ? Male: 25 mg/dL or 0.65 mmol/L.  ? Female: 35 mg/dL or 0.90 mmol/L.    LDL  · Adult: less than 130 mg/dL.  · Children: less than 110 mg/dL.  Cholesterol to HDL Ratio  Reference values based on risk for coronary heart disease:  · Risk that is one half average:  ? Male: 3.4.  ? Female: 3.3.  · Average risk:  ? Male: 5.0.  ? Female: 4.4.  · Risk that is two times average (moderate risk):  ? Male: 10.0.  ? Female: 7.0.  · Risk that is three times average (high risk):  ? Male: 24.0.  ? Female: 11.0.    Triglycerides  · Adult or elderly:  ? Male:  mg/dL or 0.45-1.81 mmol/L (SI units).  ? Female:  mg/dL or 0.40-1.52 mmol/L (SI units).  · Children 0-5 years old:  ? Male: 30-86 mg/dL.  ? Female: 32-99 mg/dL.  · Children 6-11 years old:  ? Male:  mg/dL.  ? Female:  mg/dL.  · Children 12-15 years old:  ? Male:  mg/dL.  ? Female:  mg/dL.  · Children 16-19 years old:  ? Male:  mg/dL.  ? Female:  mg/dL.  Triglycerides should be less than 400 mg/dL even when you are not fasting.  What do the results mean?  Talk with your health care provider to discuss your results, treatment options, and if necessary, the need for more tests. Talk with your health care provider if you have any questions about your  results.  Talk with your health care provider to discuss your results, treatment options, and if necessary, the need for more tests. Talk with your health care provider if you have any questions about your results.  This information is not intended to replace advice given to you by your health care provider. Make sure you discuss any questions you have with your health care provider.  Document Released: 01/11/2006 Document Revised: 08/23/2017 Document Reviewed: 04/09/2015  [employer] Interactive Patient Education © 2018 [employer]    Hypertension  Hypertension is another name for high blood pressure. High blood pressure forces your heart to work harder to pump blood. This can cause problems over time.  There are two numbers in a blood pressure reading. There is a top number (systolic) over a bottom number (diastolic). It is best to have a blood pressure below 120/80. Healthy choices can help lower your blood pressure. You may need medicine to help lower your blood pressure if:  · Your blood pressure cannot be lowered with healthy choices.  · Your blood pressure is higher than 130/80.    Follow these instructions at home:  Eating and drinking  · If directed, follow the DASH eating plan. This diet includes:  ? Filling half of your plate at each meal with fruits and vegetables.  ? Filling one quarter of your plate at each meal with whole grains. Whole grains include whole wheat pasta, brown rice, and whole grain bread.  ? Eating or drinking low-fat dairy products, such as skim milk or low-fat yogurt.  ? Filling one quarter of your plate at each meal with low-fat (lean) proteins. Low-fat proteins include fish, skinless chicken, eggs, beans, and tofu.  ? Avoiding fatty meat, cured and processed meat, or chicken with skin.  ? Avoiding premade or processed food.  · Eat less than 1,500 mg of salt (sodium) a day.  · Limit alcohol use to no more than 1 drink a day for nonpregnant women and 2 drinks a day for men. One drink  equals 12 oz of beer, 5 oz of wine, or 1½ oz of hard liquor.  Lifestyle  · Work with your doctor to stay at a healthy weight or to lose weight. Ask your doctor what the best weight is for you.  · Get at least 30 minutes of exercise that causes your heart to beat faster (aerobic exercise) most days of the week. This may include walking, swimming, or biking.  · Get at least 30 minutes of exercise that strengthens your muscles (resistance exercise) at least 3 days a week. This may include lifting weights or pilates.  · Do not use any products that contain nicotine or tobacco. This includes cigarettes and e-cigarettes. If you need help quitting, ask your doctor.  · Check your blood pressure at home as told by your doctor.  · Keep all follow-up visits as told by your doctor. This is important.  Medicines  · Take over-the-counter and prescription medicines only as told by your doctor. Follow directions carefully.  · Do not skip doses of blood pressure medicine. The medicine does not work as well if you skip doses. Skipping doses also puts you at risk for problems.  · Ask your doctor about side effects or reactions to medicines that you should watch for.  Contact a doctor if:  · You think you are having a reaction to the medicine you are taking.  · You have headaches that keep coming back (recurring).  · You feel dizzy.  · You have swelling in your ankles.  · You have trouble with your vision.  Get help right away if:  · You get a very bad headache.  · You start to feel confused.  · You feel weak or numb.  · You feel faint.  · You get very bad pain in your:  ? Chest.  ? Belly (abdomen).  · You throw up (vomit) more than once.  · You have trouble breathing.  Summary  · Hypertension is another name for high blood pressure.  · Making healthy choices can help lower blood pressure. If your blood pressure cannot be controlled with healthy choices, you may need to take medicine.  This information is not intended to replace advice  given to you by your health care provider. Make sure you discuss any questions you have with your health care provider.  Document Released: 06/05/2009 Document Revised: 11/15/2017 Document Reviewed: 11/15/2017  [employer] Interactive Patient Education © 2018 [employer]            Rossy Cardona, APRN

## 2018-08-13 NOTE — PATIENT INSTRUCTIONS
Lipid Profile Test  Why am I having this test?  The lipid profile test gives results that can help predict the likelihood of developing heart disease. The test is also used to monitor treatment for high cholesterol to see if you are reaching your goals. A lipid profile measures the following:  · Total cholesterol. Cholesterol is a waxy fat in your blood. If your total cholesterol is elevated, this can increase your risk of coronary heart disease.  · High-density lipoprotein (HDL). This is known as the good cholesterol. Having a high level of HDL is good. Your HDL level may be low if you smoke or do not get enough exercise.  · Low-density lipoprotein (LDL). This is known as the bad cholesterol and is responsible for the formation of plaque in the arteries. Having a low level of LDL is best.  · Cholesterol to HDL ratio. This is calculated by dividing the total cholesterol by the HDL cholesterol. The ratio is used by health care providers for determining your risk of heart disease. A low ratio is best.  · Triglycerides. These are a type of fat in the blood responsible for providing energy to your cells. Low levels are best.    What kind of sample is taken?  A blood sample is required for this test. It is usually collected by inserting a needle into a vein.  How do I prepare for this test?  Do not eat or drink anything after midnight on the night before the test or as directed by your health care provider.  What are the reference ranges?  Reference ranges are considered healthy ranges established after testing a large group of healthy people. Reference ranges may vary among different people, labs, and hospitals. It is your responsibility to obtain your test results. Ask the lab or department performing the test when and how you will get your results.  Reference ranges for the lipid profile test are as follows:  Total Cholesterol  · Adult or elderly: less than 200 mg/dL or less than 5.20 mmol/L (SI units).  · Child:  120-200 mg/dL.  · Infant:  mg/dL.  · Newborns:  mg/dL.  HDL  · Male: greater than 45 mg/dL or greater than 0.75 mmol/L (SI units).  · Female: greater than 55 mg/dL or greater than 0.91 mmol/L (SI units).  HDL reference values based on risk of heart disease:  · For low risk of heart disease:  ? Male: 60 mg/dL or 1.55 mmol/L.  ? Female: 70 mg/dL or 1.81 mmol/L.  · For moderate risk of heart disease:  ? Male: 45 mg/dL or 1.17 mmol/L.  ? Female: 55 mg/dL or 1.42 mmol/L.  · For high risk of heart disease:  ? Male: 25 mg/dL or 0.65 mmol/L.  ? Female: 35 mg/dL or 0.90 mmol/L.    LDL  · Adult: less than 130 mg/dL.  · Children: less than 110 mg/dL.  Cholesterol to HDL Ratio  Reference values based on risk for coronary heart disease:  · Risk that is one half average:  ? Male: 3.4.  ? Female: 3.3.  · Average risk:  ? Male: 5.0.  ? Female: 4.4.  · Risk that is two times average (moderate risk):  ? Male: 10.0.  ? Female: 7.0.  · Risk that is three times average (high risk):  ? Male: 24.0.  ? Female: 11.0.    Triglycerides  · Adult or elderly:  ? Male:  mg/dL or 0.45-1.81 mmol/L (SI units).  ? Female:  mg/dL or 0.40-1.52 mmol/L (SI units).  · Children 0-5 years old:  ? Male: 30-86 mg/dL.  ? Female: 32-99 mg/dL.  · Children 6-11 years old:  ? Male:  mg/dL.  ? Female:  mg/dL.  · Children 12-15 years old:  ? Male:  mg/dL.  ? Female:  mg/dL.  · Children 16-19 years old:  ? Male:  mg/dL.  ? Female:  mg/dL.  Triglycerides should be less than 400 mg/dL even when you are not fasting.  What do the results mean?  Talk with your health care provider to discuss your results, treatment options, and if necessary, the need for more tests. Talk with your health care provider if you have any questions about your results.  Talk with your health care provider to discuss your results, treatment options, and if necessary, the need for more tests. Talk with your health care provider if you  have any questions about your results.  This information is not intended to replace advice given to you by your health care provider. Make sure you discuss any questions you have with your health care provider.  Document Released: 01/11/2006 Document Revised: 08/23/2017 Document Reviewed: 04/09/2015  [employer] Interactive Patient Education © 2018 [employer]    Hypertension  Hypertension is another name for high blood pressure. High blood pressure forces your heart to work harder to pump blood. This can cause problems over time.  There are two numbers in a blood pressure reading. There is a top number (systolic) over a bottom number (diastolic). It is best to have a blood pressure below 120/80. Healthy choices can help lower your blood pressure. You may need medicine to help lower your blood pressure if:  · Your blood pressure cannot be lowered with healthy choices.  · Your blood pressure is higher than 130/80.    Follow these instructions at home:  Eating and drinking  · If directed, follow the DASH eating plan. This diet includes:  ? Filling half of your plate at each meal with fruits and vegetables.  ? Filling one quarter of your plate at each meal with whole grains. Whole grains include whole wheat pasta, brown rice, and whole grain bread.  ? Eating or drinking low-fat dairy products, such as skim milk or low-fat yogurt.  ? Filling one quarter of your plate at each meal with low-fat (lean) proteins. Low-fat proteins include fish, skinless chicken, eggs, beans, and tofu.  ? Avoiding fatty meat, cured and processed meat, or chicken with skin.  ? Avoiding premade or processed food.  · Eat less than 1,500 mg of salt (sodium) a day.  · Limit alcohol use to no more than 1 drink a day for nonpregnant women and 2 drinks a day for men. One drink equals 12 oz of beer, 5 oz of wine, or 1½ oz of hard liquor.  Lifestyle  · Work with your doctor to stay at a healthy weight or to lose weight. Ask your doctor what the best  weight is for you.  · Get at least 30 minutes of exercise that causes your heart to beat faster (aerobic exercise) most days of the week. This may include walking, swimming, or biking.  · Get at least 30 minutes of exercise that strengthens your muscles (resistance exercise) at least 3 days a week. This may include lifting weights or pilates.  · Do not use any products that contain nicotine or tobacco. This includes cigarettes and e-cigarettes. If you need help quitting, ask your doctor.  · Check your blood pressure at home as told by your doctor.  · Keep all follow-up visits as told by your doctor. This is important.  Medicines  · Take over-the-counter and prescription medicines only as told by your doctor. Follow directions carefully.  · Do not skip doses of blood pressure medicine. The medicine does not work as well if you skip doses. Skipping doses also puts you at risk for problems.  · Ask your doctor about side effects or reactions to medicines that you should watch for.  Contact a doctor if:  · You think you are having a reaction to the medicine you are taking.  · You have headaches that keep coming back (recurring).  · You feel dizzy.  · You have swelling in your ankles.  · You have trouble with your vision.  Get help right away if:  · You get a very bad headache.  · You start to feel confused.  · You feel weak or numb.  · You feel faint.  · You get very bad pain in your:  ? Chest.  ? Belly (abdomen).  · You throw up (vomit) more than once.  · You have trouble breathing.  Summary  · Hypertension is another name for high blood pressure.  · Making healthy choices can help lower blood pressure. If your blood pressure cannot be controlled with healthy choices, you may need to take medicine.  This information is not intended to replace advice given to you by your health care provider. Make sure you discuss any questions you have with your health care provider.  Document Released: 06/05/2009 Document Revised:  11/15/2017 Document Reviewed: 11/15/2017  [employer] Interactive Patient Education © 2018 [employer]      Labs as discussed.  Flu vaccine in the fall

## 2018-08-14 ENCOUNTER — OFFICE VISIT (OUTPATIENT)
Dept: ORTHOPEDIC SURGERY | Facility: CLINIC | Age: 78
End: 2018-08-14

## 2018-08-14 VITALS — OXYGEN SATURATION: 98 % | HEART RATE: 77 BPM | BODY MASS INDEX: 24.78 KG/M2 | WEIGHT: 182.98 LBS | HEIGHT: 72 IN

## 2018-08-14 DIAGNOSIS — M17.0 PRIMARY OSTEOARTHRITIS OF BOTH KNEES: Primary | ICD-10-CM

## 2018-08-14 PROCEDURE — 20610 DRAIN/INJ JOINT/BURSA W/O US: CPT | Performed by: PHYSICIAN ASSISTANT

## 2018-08-14 RX ORDER — LIDOCAINE HYDROCHLORIDE 10 MG/ML
4 INJECTION, SOLUTION INFILTRATION; PERINEURAL
Status: COMPLETED | OUTPATIENT
Start: 2018-08-14 | End: 2018-08-14

## 2018-08-14 RX ORDER — METHYLPREDNISOLONE ACETATE 40 MG/ML
40 INJECTION, SUSPENSION INTRA-ARTICULAR; INTRALESIONAL; INTRAMUSCULAR; SOFT TISSUE
Status: COMPLETED | OUTPATIENT
Start: 2018-08-14 | End: 2018-08-14

## 2018-08-14 RX ORDER — BUPIVACAINE HYDROCHLORIDE 2.5 MG/ML
4 INJECTION, SOLUTION INFILTRATION; PERINEURAL
Status: COMPLETED | OUTPATIENT
Start: 2018-08-14 | End: 2018-08-14

## 2018-08-14 RX ADMIN — METHYLPREDNISOLONE ACETATE 40 MG: 40 INJECTION, SUSPENSION INTRA-ARTICULAR; INTRALESIONAL; INTRAMUSCULAR; SOFT TISSUE at 08:46

## 2018-08-14 RX ADMIN — LIDOCAINE HYDROCHLORIDE 4 ML: 10 INJECTION, SOLUTION INFILTRATION; PERINEURAL at 08:46

## 2018-08-14 RX ADMIN — LIDOCAINE HYDROCHLORIDE 4 ML: 10 INJECTION, SOLUTION INFILTRATION; PERINEURAL at 08:45

## 2018-08-14 RX ADMIN — METHYLPREDNISOLONE ACETATE 40 MG: 40 INJECTION, SUSPENSION INTRA-ARTICULAR; INTRALESIONAL; INTRAMUSCULAR; SOFT TISSUE at 08:45

## 2018-08-14 RX ADMIN — BUPIVACAINE HYDROCHLORIDE 4 ML: 2.5 INJECTION, SOLUTION INFILTRATION; PERINEURAL at 08:46

## 2018-08-14 RX ADMIN — BUPIVACAINE HYDROCHLORIDE 4 ML: 2.5 INJECTION, SOLUTION INFILTRATION; PERINEURAL at 08:45

## 2018-08-14 NOTE — PROGRESS NOTES
Please call patient your total cholesterol is excellent at 169.  Your triglycerides are perfect at 75.  Your HDL cholesterol is 62 which is excellent.  Your LDL cholesterol is 92 which is excellent. Thanks for letting us participate in your health care.  Please let us know if there is anything we can do to help you feel better or if you have any questions.

## 2018-08-14 NOTE — PROGRESS NOTES
Procedure   Large Joint Arthrocentesis  Date/Time: 8/14/2018 8:45 AM  Consent given by: patient  Site marked: site marked  Timeout: Immediately prior to procedure a time out was called to verify the correct patient, procedure, equipment, support staff and site/side marked as required   Supporting Documentation  Indications: pain   Procedure Details  Location: knee - R knee  Preparation: Patient was prepped and draped in the usual sterile fashion  Needle size: 22 G  Approach: anterolateral  Medications administered: 4 mL bupivacaine 0.25 %; 4 mL lidocaine 1 %; 40 mg methylPREDNISolone acetate 40 MG/ML  Patient tolerance: patient tolerated the procedure well with no immediate complications    Large Joint Arthrocentesis  Date/Time: 8/14/2018 8:46 AM  Consent given by: patient  Site marked: site marked  Timeout: Immediately prior to procedure a time out was called to verify the correct patient, procedure, equipment, support staff and site/side marked as required   Supporting Documentation  Indications: pain   Procedure Details  Location: knee - L knee  Needle size: 22 G  Approach: anterolateral  Medications administered: 4 mL bupivacaine 0.25 %; 4 mL lidocaine 1 %; 40 mg methylPREDNISolone acetate 40 MG/ML  Patient tolerance: patient tolerated the procedure well with no immediate complications

## 2018-08-14 NOTE — PROGRESS NOTES
I checked a level called CK.  It is also normal.  Your sugar was normal at 94.  Your kidney function shows mild decrease.  There is nothing to do at this point except monitor.  It appears stable.

## 2018-08-14 NOTE — PROGRESS NOTES
[facility] [place]    Subjective     Patient: Isrrael Marino  : 1940    Primary Care Provider: Elena Rosas APRN    Requesting Provider: As above    Follow-up (3 months bilateral knees)      History    Chief Complaint: Bilateral knee pain    History of Present Illness: Patient presents today to discuss his increasing bilateral knee pain.  He reports no new symptoms.  He continues to have functional pain with no night pain.  He denies any radiating pain or mechanical symptoms.  He feels he gets a little over 2 months relief from the steroid injection.  He rates the pain to be 8/10.  He is not interested knee replacement as long as he is still getting relief from the injections.    Current Outpatient Prescriptions on File Prior to Visit   Medication Sig Dispense Refill   • B Complex Vitamins (VITAMIN B COMPLEX PO) Take 1 tablet by mouth Daily.     • clopidogrel (PLAVIX) 75 MG tablet TAKE 1 TABLET EVERY DAY 90 tablet 4   • esomeprazole (nexIUM) 40 MG capsule Take 40 mg by mouth Every Morning Before Breakfast.     • fluticasone (FLONASE) 50 MCG/ACT nasal spray 1 spray Daily.     • HYDROcodone-acetaminophen (NORCO) 7.5-325 MG per tablet Take 1 tablet by mouth Every 6 (Six) Hours As Needed for Moderate Pain .     • metoprolol succinate XL (TOPROL-XL) 25 MG 24 hr tablet TAKE 1 TABLET ONE TIME DAILY 90 tablet 4   • polyethylene glycol (MIRALAX) powder Dissolve 1 capful (17g) in water and take by mouth Daily as directed while on opiods 238 g 1   • simvastatin (ZOCOR) 40 MG tablet TAKE 1 TABLET EVERY DAY 90 tablet 3   • tamsulosin (FLOMAX) 0.4 MG capsule 24 hr capsule Take 1 capsule by mouth Daily. 30 capsule 0   • traMADol (ULTRAM) 50 MG tablet Take 50 mg by mouth Every 6 (Six) Hours As Needed for Moderate Pain .     • vitamin B-12 (CYANOCOBALAMIN) 1000 MCG tablet Take 1,000 mcg by mouth Daily.       No current facility-administered medications on file prior to visit.       Allergies    Allergen Reactions   • Ibuprofen Hives      Past Medical History:   Diagnosis Date   • Arthritis     both knees   • Broken neck (CMS/HCC)    • CAD (coronary artery disease)    • Cancer (CMS/HCC)    • Chondrocalcinosis of knee    • GERD (gastroesophageal reflux disease)    • Gout    • Heart attack      Last Assessed: 28 Mar 2014   • Heart disease    • History of transfusion     own blood with hip surgery   • Hyperlipidemia    • Hypertension    • IBS (irritable bowel syndrome)    • Left rotator cuff tear arthropathy    • Low back pain    • Lower extremity neuropathy    • Lumbar canal stenosis    • Neck pain    • Numbness    • Right hip pain    • Rotator cuff tear arthropathy of right shoulder    • Thin blood (CMS/HCC)      Past Surgical History:   Procedure Laterality Date   • APPENDECTOMY  1956   • BACK SURGERY  1997    spinal decompression and fusion   • BREAST LUMPECTOMY  2003   • CARDIAC CATHETERIZATION      with two stents//. DR. TOLEDO   • CARPAL TUNNEL RELEASE  03/28/2014    Neuroplasty Decompression Median Nerve At Carpal Tunnel   • CERVICAL DISCECTOMY POSTERIOR FUSION WITH BRAIN LAB N/A 7/13/2018    Procedure: CERVICAL LAMINECTOMY DECOMPRESSION POSTERIOR C1-2 POSTERIOR CERVICAL FUSION;  Surgeon: Randall Barnett MD;  Location: [facility], [facility];  Service: Neurosurgery   • COLONOSCOPY     • CORONARY STENT PLACEMENT  2013   • HERNIA REPAIR  2002    & 1998   • LUMBAR DISCECTOMY FUSION INSTRUMENTATION     • TONSILLECTOMY     • TOTAL HIP ARTHROPLASTY  2002   • VASECTOMY       Family History   Problem Relation Age of Onset   • Cancer Mother    • Heart disease Father    • Hypertension Father    • Heart attack Father       Social History     Social History   • Marital status:      Spouse name: N/A   • Number of children: N/A   • Years of education: N/A     Occupational History   • Not on file.     Social History Main Topics   • Smoking status: Never Smoker   • Smokeless tobacco: Never Used   • Alcohol use No   •  "Drug use: No   • Sexual activity: Defer     Other Topics Concern   • Not on file     Social History Narrative   • No narrative on file        Review of Systems   Constitutional: Negative.    HENT: Negative.    Eyes: Negative.    Respiratory: Negative.    Cardiovascular: Negative.    Gastrointestinal: Negative.    Endocrine: Negative.    Genitourinary: Negative.    Musculoskeletal: Positive for arthralgias (knee pain).   Skin: Negative.    Allergic/Immunologic: Negative.    Neurological: Negative.    Hematological: Negative.    Psychiatric/Behavioral: Negative.        The following portions of the patient's history were reviewed and updated as appropriate: allergies, current medications, past family history, past medical history, past social history, past surgical history and problem list.      Objective      Physical Exam  Pulse 77   Ht 182.9 cm (72.01\")   Wt 83 kg (182 lb 15.7 oz)   SpO2 98%   BMI 24.81 kg/m²     Body mass index is 24.81 kg/m².    Patient is well nourished and well developed.      Ortho Exam  Right Hip Exam  ----------  FLEXION CONTRACTURE: None  FLEXION: 110 degrees  INTERNAL ROTATION: 20 degrees at 90 degrees of flexion   EXTERNAL ROTATION: 40 degrees at 90 degrees of flexion    PAIN WITH HIP MOTION: no      Right Knee Exam  ----------  ALIGNMENT: Right: neutral----------  RANGE OF MOTION:  Right: 3-120  LIGAMENTOUS STABILITY:   Right:stable to varus and valgus stress at terminal extension and 30 degrees without any evidence of laxity----------  STRENGTH:  KNEE FLEXION Right 5/5  KNEE EXTENSION Right 5/5 ----------  PAIN WITH PALPATION: Right medial joint line  PAIN WITH KNEE ROM: Right no  PATELLAR CREPITUS: Right yes   ----------  SENSATION TO LIGHT TOUCH:  DEEP PERONEAL/SUPERFICIAL PERONEAL/SURAL/SAPHENOUS/TIBIAL:   Right intact----------  EFFUSION  Right:  no;  ERYTHEMA:  Right: no;  WOUNDS/INCISIONS: none, no overlying skin problems.    Left Hip Exam  ---------  FLEXION CONTRACTURE: " None  FLEXION: 110 degrees  INTERNAL ROTATION: 20 degrees at 90 degrees of flexion   EXTERNAL ROTATION: 40 degrees at 90 degrees of flexion    PAIN WITH HIP MOTION: no      Left Knee Exam  ----------  Knee Exam:  ----------  ALIGNMENT:  Left: neutral  ----------  RANGE OF MOTION:  Left: 3-120  LIGAMENTOUS STABILITY:   Left:stable to varus and valgus stress at terminal extension and 30 degrees without any evidence of laxity   ----------  STRENGTH:  KNEE FLEXION  Left 5/5  KNEE EXTENSION Left 5/5  ----------  PAIN WITH PALPATION: Left medial joint line  PAIN WITH KNEE ROM:  Left no  PATELLAR CREPITUS:  Left yes  ----------  SENSATION TO LIGHT TOUCH:  DEEP PERONEAL/SUPERFICIAL PERONEAL/SURAL/SAPHENOUS/TIBIAL:   Left intact  ----------  EFFUSION:   Left:  no  ERYTHEMA:  ; Left:  no  WOUNDS/INCISIONS: none, no overlying skin problems.      Medical Decision Making    Data Review:   none    Assessment:  1. Primary osteoarthritis of both knees        Plan:  Bilateral knee arthritis.  I reviewed clinical findings, past treatment with the patient.  On exam, he has medial joint line tenderness with no evidence of ligament or meniscal pathology.  Patient has been treated in the past with intermittent steroid injection with give him him more than 2 months relief.  He is not interested in replacement as long as the injections are giving him good relief.  Plan today is repeat steroid injection into bilateral knees.  He'll return in 3-4 months or sooner if needed.    Using sterile technique, the left knee was sterilely prepped with Hibiclens. Following a time out,  using a 22 gauge needle, the left knee was injected with 40mg Depo Medrol, 4 cc lidocaine and 4 cc marcaine.  Patient tolerated the procedure well.  No complications.    Using sterile technique, the right knee was sterilely prepped with Hibiclens.  Following a time out,  using a 22 gauge needle, the right knee was injected with 40mg Depo Medrol, 4 cc lidocaine and 4 cc  ganesh.  Patient tolerated the procedure well.  No complications.          Tish Shelley PA-C  08/14/18  9:06 AM

## 2018-08-16 ENCOUNTER — HOSPITAL ENCOUNTER (OUTPATIENT)
Dept: CARDIOLOGY | Facility: HOSPITAL | Age: 78
Discharge: HOME | End: 2018-08-16
Admitting: NURSE PRACTITIONER

## 2018-08-16 VITALS — HEIGHT: 72 IN | BODY MASS INDEX: 24.65 KG/M2 | WEIGHT: 182 LBS

## 2018-08-16 LAB
BH CV ECHO MEAS - BSA(HAYCOCK): 2.1 M^2
BH CV ECHO MEAS - BSA: 2 M^2
BH CV ECHO MEAS - BZI_BMI: 24.7 KILOGRAMS/M^2
BH CV ECHO MEAS - BZI_METRIC_HEIGHT: 182.9 CM
BH CV ECHO MEAS - BZI_METRIC_WEIGHT: 82.6 KG
BH CV XLRA MEAS LEFT BULB EDV: 37 CM/SEC
BH CV XLRA MEAS LEFT BULB PSV: 97.1 CM/SEC
BH CV XLRA MEAS LEFT CCA RATIO VEL: 85.5 CM/SEC
BH CV XLRA MEAS LEFT DIST CCA EDV: 22 CM/SEC
BH CV XLRA MEAS LEFT DIST CCA PSV: 79.8 CM/SEC
BH CV XLRA MEAS LEFT ICA RATIO VEL: 112 CM/SEC
BH CV XLRA MEAS LEFT ICA/CCA RATIO: 1.3
BH CV XLRA MEAS LEFT MID CCA EDV: 25.8 CM/SEC
BH CV XLRA MEAS LEFT MID CCA PSV: 86.1 CM/SEC
BH CV XLRA MEAS LEFT MID ICA EDV: 37.7 CM/SEC
BH CV XLRA MEAS LEFT MID ICA PSV: 113.1 CM/SEC
BH CV XLRA MEAS LEFT PROX CCA EDV: 12.6 CM/SEC
BH CV XLRA MEAS LEFT PROX CCA PSV: 81.7 CM/SEC
BH CV XLRA MEAS LEFT PROX ECA PSV: 89.3 CM/SEC
BH CV XLRA MEAS LEFT PROX ICA EDV: 23 CM/SEC
BH CV XLRA MEAS LEFT PROX ICA PSV: 75.4 CM/SEC
BH CV XLRA MEAS LEFT PROX SCLA PSV: 101.8 CM/SEC
BH CV XLRA MEAS LEFT VERTEBRAL A PSV: 61.5 CM/SEC
BH CV XLRA MEAS RIGHT CCA RATIO VEL: 84.1 CM/SEC
BH CV XLRA MEAS RIGHT DIST CCA EDV: 19.5 CM/SEC
BH CV XLRA MEAS RIGHT DIST CCA PSV: 69.8 CM/SEC
BH CV XLRA MEAS RIGHT DIST ICA EDV: 35.3 CM/SEC
BH CV XLRA MEAS RIGHT DIST ICA PSV: 85.5 CM/SEC
BH CV XLRA MEAS RIGHT ICA RATIO VEL: 92.6 CM/SEC
BH CV XLRA MEAS RIGHT ICA/CCA RATIO: 1.1
BH CV XLRA MEAS RIGHT MID CCA EDV: 17.3 CM/SEC
BH CV XLRA MEAS RIGHT MID CCA PSV: 84.9 CM/SEC
BH CV XLRA MEAS RIGHT MID ICA EDV: 32.5 CM/SEC
BH CV XLRA MEAS RIGHT MID ICA PSV: 93.2 CM/SEC
BH CV XLRA MEAS RIGHT PROX CCA EDV: 13.4 CM/SEC
BH CV XLRA MEAS RIGHT PROX CCA PSV: 88 CM/SEC
BH CV XLRA MEAS RIGHT PROX ECA PSV: 144.9 CM/SEC
BH CV XLRA MEAS RIGHT PROX ICA EDV: 16 CM/SEC
BH CV XLRA MEAS RIGHT PROX ICA PSV: 51.3 CM/SEC
BH CV XLRA MEAS RIGHT PROX SCLA PSV: 102.9 CM/SEC
BH CV XLRA MEAS RIGHT VERTEBRAL A PSV: 35.8 CM/SEC
RIGHT ARM BP: NORMAL MMHG

## 2018-08-16 PROCEDURE — 93880 EXTRACRANIAL BILAT STUDY: CPT | Performed by: INTERNAL MEDICINE

## 2018-08-16 PROCEDURE — 93880 EXTRACRANIAL BILAT STUDY: CPT

## 2018-08-16 NOTE — PROGRESS NOTES
Please call patient carotid Doppler indicates you have some plaque in your arteries.  It is important that we keep your blood pressure under great control and keep your cholesterol under control.  If you are not already taking an aspirin or any kind of blood thinner recommend starting a baby aspirin

## 2018-09-24 ENCOUNTER — OFFICE VISIT (OUTPATIENT)
Dept: NEUROLOGY | Facility: CLINIC | Age: 78
End: 2018-09-24

## 2018-09-24 ENCOUNTER — OFFICE VISIT (OUTPATIENT)
Dept: NEUROSURGERY | Facility: CLINIC | Age: 78
End: 2018-09-24

## 2018-09-24 ENCOUNTER — HOSPITAL ENCOUNTER (OUTPATIENT)
Dept: GENERAL RADIOLOGY | Facility: HOSPITAL | Age: 78
Discharge: HOME | End: 2018-09-24
Admitting: PHYSICIAN ASSISTANT

## 2018-09-24 ENCOUNTER — DOCUMENTATION (OUTPATIENT)
Dept: NEUROSURGERY | Facility: CLINIC | Age: 78
End: 2018-09-24

## 2018-09-24 VITALS
DIASTOLIC BLOOD PRESSURE: 64 MMHG | TEMPERATURE: 98 F | HEIGHT: 72 IN | BODY MASS INDEX: 25.06 KG/M2 | SYSTOLIC BLOOD PRESSURE: 110 MMHG | WEIGHT: 185 LBS

## 2018-09-24 VITALS
OXYGEN SATURATION: 98 % | WEIGHT: 185 LBS | HEIGHT: 72 IN | BODY MASS INDEX: 25.06 KG/M2 | RESPIRATION RATE: 18 BRPM | DIASTOLIC BLOOD PRESSURE: 74 MMHG | HEART RATE: 77 BPM | SYSTOLIC BLOOD PRESSURE: 128 MMHG

## 2018-09-24 DIAGNOSIS — S12.100G CLOSED ODONTOID FRACTURE WITH DELAYED HEALING, SUBSEQUENT ENCOUNTER: Primary | ICD-10-CM

## 2018-09-24 DIAGNOSIS — G60.9 IDIOPATHIC PERIPHERAL NEUROPATHY: Primary | ICD-10-CM

## 2018-09-24 DIAGNOSIS — S12.100G CLOSED ODONTOID FRACTURE WITH DELAYED HEALING, SUBSEQUENT ENCOUNTER: ICD-10-CM

## 2018-09-24 DIAGNOSIS — Z98.1 S/P CERVICAL SPINAL FUSION: ICD-10-CM

## 2018-09-24 DIAGNOSIS — G57.93 NEUROPATHY INVOLVING BOTH LOWER EXTREMITIES: ICD-10-CM

## 2018-09-24 DIAGNOSIS — M79.2 NEUROPATHIC PAIN: ICD-10-CM

## 2018-09-24 PROCEDURE — 99214 OFFICE O/P EST MOD 30 MIN: CPT | Performed by: PSYCHIATRY & NEUROLOGY

## 2018-09-24 PROCEDURE — 72040 X-RAY EXAM NECK SPINE 2-3 VW: CPT

## 2018-09-24 PROCEDURE — 99024 POSTOP FOLLOW-UP VISIT: CPT | Performed by: NEUROLOGICAL SURGERY

## 2018-09-24 RX ORDER — TRAMADOL HYDROCHLORIDE 50 MG/1
50 TABLET ORAL 2 TIMES DAILY
Qty: 60 TABLET | Refills: 0 | OUTPATIENT
Start: 2018-09-24 | End: 2019-03-11

## 2018-09-24 NOTE — PROGRESS NOTES
Subjective:   Chief Complaint   Patient presents with   • idopathic peripheral neuropathy       Patient ID: Isrrael Marino is a 78 y.o. male.    History of Present Illness     78 y.o.  male with peripheral neuropathy returns in follow up.  Last visit on 9/13/16 started  -600 mg qhs.    Interval history:  CERVICAL LAMINECTOMY DECOMPRESSION POSTERIOR C1-2 POSTERIOR CERVICAL FUSION by Dr Barnett.  Fell off a ladder, ladder slipped, November 2017.      Feet are numb, R > L.  LE weakness  Making it difficult get up off the floor.       mg qhs controlled cramps in feet at night.  Side effects of unsteadiness.       Needs to touch wall to keep balance in shower.  Trouble sensing where feet are in space.     Continued trouble using brake pedals due to not feeling feet.        The following portions of the patient's history were reviewed and updated as appropriate: allergies, current medications, past medical history, past surgical history and problem list.    Review of Systems   Constitutional: Positive for fatigue. Negative for activity change and unexpected weight change.   HENT: Negative for facial swelling, hearing loss, tinnitus, trouble swallowing and voice change.    Eyes: Negative for photophobia, pain and visual disturbance.   Respiratory: Negative for apnea, cough and choking.    Cardiovascular: Negative for chest pain.   Gastrointestinal: Negative for constipation, nausea and vomiting.   Endocrine: Negative for cold intolerance.   Genitourinary: Negative for difficulty urinating, frequency and urgency.   Musculoskeletal: Positive for myalgias, neck pain and neck stiffness. Negative for arthralgias, back pain and gait problem.   Skin: Negative for rash.   Allergic/Immunologic: Negative for immunocompromised state.   Neurological: Positive for weakness and numbness. Negative for dizziness, tremors, seizures, syncope, facial asymmetry, speech difficulty, light-headedness and headaches.  "  Hematological: Negative for adenopathy.   Psychiatric/Behavioral: Negative for confusion, decreased concentration, hallucinations and sleep disturbance. The patient is not nervous/anxious.         Objective:  Vitals:    09/24/18 0826   BP: 128/74   BP Location: Right arm   Patient Position: Sitting   Cuff Size: Adult   Pulse: 77   Resp: 18   SpO2: 98%   Weight: 83.9 kg (185 lb)   Height: 182.9 cm (72\")       Neurologic Exam     Mental Status   Attention: normal. Concentration: normal.   Level of consciousness: alert  Knowledge: good and consistent with education.   Normal comprehension.     Cranial Nerves     CN II   Visual fields full to confrontation.   Visual acuity: normal  Right visual field deficit: none  Left visual field deficit: none     CN III, IV, VI   Nystagmus: none   Diplopia: none  Ophthalmoparesis: none  Upgaze: normal  Downgaze: normal  Conjugate gaze: present    CN V   Facial sensation intact.   Right corneal reflex: normal  Left corneal reflex: normal    CN VII   Right facial weakness: none  Left facial weakness: none    CN VIII   Hearing: intact    CN IX, X   Palate: symmetric  Right gag reflex: normal  Left gag reflex: normal    CN XI   Right sternocleidomastoid strength: normal  Left sternocleidomastoid strength: normal    CN XII   Tongue: not atrophic  Fasciculations: absent  Tongue deviation: none    Motor Exam   Muscle bulk: normal  Overall muscle tone: normal  Right arm tone: normal  Left arm tone: normal  Right leg tone: normal  Left leg tone: normal    Sensory Exam   Right leg light touch: decreased from knee  Left leg light touch: decreased from knee  Right leg vibration: decreased from knee  Left leg vibration: decreased from knee  Right leg proprioception: decreased from knee  Left leg proprioception: decreased from knee  Right leg pinprick: decreased from knee  Left leg pinprick: decreased from knee  Sensory deficit distribution on right: ulnar  Sensory deficit distribution on left: " ulnar    Gait, Coordination, and Reflexes     Gait  Gait: wide-based (sensory ataxia)    Coordination   Romberg: positive  Tandem walking coordination: abnormal    Tremor   Resting tremor: absent  Intention tremor: absent  Action tremor: absent    Reflexes   Reflexes 2+ except as noted.   Right patellar: 0  Left patellar: 0  Right achilles: 0  Left achilles: 0      Physical Exam   Constitutional: He appears well-developed and well-nourished.   Neurological: He has an abnormal Romberg Test and an abnormal Tandem Gait Test.   Reflex Scores:       Patellar reflexes are 0 on the right side and 0 on the left side.       Achilles reflexes are 0 on the right side and 0 on the left side.  Nursing note and vitals reviewed.      Assessment/Plan:       Problems Addressed this Visit        Nervous and Auditory    Peripheral neuropathy - Primary     Balance is worsening     Refer to PT          Relevant Orders    Ambulatory Referral to Physical Therapy Evaluate and treat    Neuropathic pain     Continue GBP prn for muscle cramps          RESOLVED: Lower extremity neuropathy

## 2018-09-24 NOTE — PROGRESS NOTES
"  NAME: VERENA REGALADO   DOS: 2018  : 1940  PCP: Rossy Cardona APRN    Chief Complaint:    Chief Complaint   Patient presents with   • Odontoid Fx      posterior C1 2 fusion for unstable odontoid fracture on 2018        History of Present Illness:  78 y.o. male   78-year-old gentleman with a history of a fall back in November.  He was treated nonsurgically for a type II odontoid fracture and followed as an outpatient.  He represented for follow-up CT scan demonstrated widening of this fracture line with instability on his CT scan And cervical flexion extension films.  Additionally he has \"neuropathy \"with numbness in his hands and lowers extremities with widening gait and difficulty with his left shoulder.     He's had prior back fusions ×2 years ago with Dr. Santos he denies any additional symptomatology     Still having some aches and pains around his neck no evidence of weakness his balance is improving but is not normal    PMHX  Allergies:  Allergies   Allergen Reactions   • Ibuprofen Hives     Medications    Current Outpatient Prescriptions:   •  B Complex Vitamins (VITAMIN B COMPLEX PO), Take 1 tablet by mouth Daily., Disp: , Rfl:   •  clopidogrel (PLAVIX) 75 MG tablet, TAKE 1 TABLET EVERY DAY, Disp: 90 tablet, Rfl: 4  •  esomeprazole (nexIUM) 40 MG capsule, Take 40 mg by mouth Every Morning Before Breakfast., Disp: , Rfl:   •  fluticasone (FLONASE) 50 MCG/ACT nasal spray, 1 spray Daily., Disp: , Rfl:   •  HYDROcodone-acetaminophen (NORCO) 7.5-325 MG per tablet, Take 1 tablet by mouth Every 6 (Six) Hours As Needed for Moderate Pain ., Disp: , Rfl:   •  metoprolol succinate XL (TOPROL-XL) 25 MG 24 hr tablet, TAKE 1 TABLET ONE TIME DAILY, Disp: 90 tablet, Rfl: 4  •  polyethylene glycol (MIRALAX) powder, Dissolve 1 capful (17g) in water and take by mouth Daily as directed while on opiods, Disp: 238 g, Rfl: 1  •  simvastatin (ZOCOR) 40 MG tablet, TAKE 1 TABLET EVERY DAY, Disp: 90 tablet, Rfl: " 3  •  tamsulosin (FLOMAX) 0.4 MG capsule 24 hr capsule, Take 1 capsule by mouth Daily., Disp: 30 capsule, Rfl: 0  •  traMADol (ULTRAM) 50 MG tablet, Take 50 mg by mouth Every 6 (Six) Hours As Needed for Moderate Pain ., Disp: , Rfl:   •  vitamin B-12 (CYANOCOBALAMIN) 1000 MCG tablet, Take 1,000 mcg by mouth Daily., Disp: , Rfl:   Past Medical History:  Past Medical History:   Diagnosis Date   • Arthritis     both knees   • Broken neck (CMS/HCC)    • CAD (coronary artery disease)    • Cancer (CMS/HCC)    • Chondrocalcinosis of knee    • GERD (gastroesophageal reflux disease)    • Gout    • Heart attack      Last Assessed: 28 Mar 2014   • Heart disease    • History of transfusion     own blood with hip surgery   • Hyperlipidemia    • Hypertension    • IBS (irritable bowel syndrome)    • Left rotator cuff tear arthropathy    • Low back pain    • Lower extremity neuropathy    • Lumbar canal stenosis    • Neck pain    • Numbness    • Right hip pain    • Rotator cuff tear arthropathy of right shoulder    • Thin blood (CMS/HCC)      Past Surgical History:  Past Surgical History:   Procedure Laterality Date   • APPENDECTOMY  1956   • BACK SURGERY  1997    spinal decompression and fusion   • BREAST LUMPECTOMY  2003   • CARDIAC CATHETERIZATION      with two stents//. DR. TOLEDO   • CARPAL TUNNEL RELEASE  03/28/2014    Neuroplasty Decompression Median Nerve At Carpal Tunnel   • CERVICAL DISCECTOMY POSTERIOR FUSION WITH BRAIN LAB N/A 7/13/2018    Procedure: CERVICAL LAMINECTOMY DECOMPRESSION POSTERIOR C1-2 POSTERIOR CERVICAL FUSION;  Surgeon: Randall Barnett MD;  Location: [facility];  Service: Neurosurgery   • COLONOSCOPY     • CORONARY STENT PLACEMENT  2013   • HERNIA REPAIR  2002    & 1998   • LUMBAR DISCECTOMY FUSION INSTRUMENTATION     • TONSILLECTOMY     • TOTAL HIP ARTHROPLASTY  2002   • VASECTOMY       Social Hx:  Social History   Substance Use Topics   • Smoking status: Never Smoker   • Smokeless tobacco: Never Used    • Alcohol use No     Family Hx:  Family History   Problem Relation Age of Onset   • Cancer Mother    • Heart disease Father    • Hypertension Father    • Heart attack Father      Review of Systems:        Review of Systems   Constitutional: Negative for activity change, appetite change, chills, diaphoresis, fatigue, fever and unexpected weight change.   HENT: Negative for congestion, dental problem, drooling, ear discharge, ear pain, facial swelling, hearing loss, mouth sores, nosebleeds, postnasal drip, rhinorrhea, sinus pressure, sneezing, sore throat, tinnitus, trouble swallowing and voice change.    Eyes: Negative for photophobia, pain, discharge, redness, itching and visual disturbance.   Respiratory: Negative for apnea, cough, choking, chest tightness, shortness of breath, wheezing and stridor.    Cardiovascular: Negative for chest pain, palpitations and leg swelling.   Gastrointestinal: Negative for abdominal distention, abdominal pain, anal bleeding, blood in stool, constipation, diarrhea, nausea, rectal pain and vomiting.   Endocrine: Negative for cold intolerance, heat intolerance, polydipsia, polyphagia and polyuria.   Genitourinary: Negative for decreased urine volume, difficulty urinating, dysuria, enuresis, flank pain, frequency, genital sores, hematuria and urgency.   Musculoskeletal: Positive for neck stiffness. Negative for arthralgias, back pain, gait problem, joint swelling, myalgias and neck pain.   Skin: Negative for color change, pallor, rash and wound.   Allergic/Immunologic: Negative for environmental allergies, food allergies and immunocompromised state.   Neurological: Negative for dizziness, tremors, seizures, syncope, facial asymmetry, speech difficulty, weakness, light-headedness, numbness and headaches.   Hematological: Negative for adenopathy. Does not bruise/bleed easily.   Psychiatric/Behavioral: Negative for agitation, behavioral problems, confusion, decreased concentration,  dysphoric mood, hallucinations, self-injury, sleep disturbance and suicidal ideas. The patient is not nervous/anxious and is not hyperactive.    All other systems reviewed and are negative.           Physical Examination:  Vitals:    09/24/18 1200   BP: 110/64   Temp: 98 °F (36.7 °C)      General Appearance:   Well developed, well nourished, well groomed, alert, and cooperative.  Neurological examination:  Neurologic Exam  No evidence of any weakness or paralysis gait is wide-based decreased range of motion and neck incision well-healed    Review of Imaging/DATA:  X-rays look as expected  Diagnoses/Plan:    Mr. Marino is a 78 y.o. male   Overall doing well we had a nice discussion about returned a hunting etc. he's having expected postoperative changes he has no evidence of clinical myelopathy and his balance to me looks better.  I refilled his Ultram counseled him on fall risk precautions and we'll make arrangements for him to see me back in the new year with some x-rays to monitor his progress

## 2018-11-06 ENCOUNTER — HOSPITAL ENCOUNTER (OUTPATIENT)
Dept: PHYSICAL THERAPY | Facility: HOSPITAL | Age: 78
Setting detail: THERAPIES SERIES
Discharge: HOME | End: 2018-11-06

## 2018-11-06 DIAGNOSIS — R26.89 BALANCE PROBLEM: Primary | ICD-10-CM

## 2018-11-06 PROCEDURE — G8979 MOBILITY GOAL STATUS: HCPCS | Performed by: PHYSICAL THERAPIST

## 2018-11-06 PROCEDURE — 97161 PT EVAL LOW COMPLEX 20 MIN: CPT | Performed by: PHYSICAL THERAPIST

## 2018-11-06 PROCEDURE — G8978 MOBILITY CURRENT STATUS: HCPCS | Performed by: PHYSICAL THERAPIST

## 2018-11-06 PROCEDURE — 97110 THERAPEUTIC EXERCISES: CPT | Performed by: PHYSICAL THERAPIST

## 2018-11-12 ENCOUNTER — OFFICE VISIT (OUTPATIENT)
Dept: CARDIOLOGY | Facility: CLINIC | Age: 78
End: 2018-11-12

## 2018-11-12 ENCOUNTER — HOSPITAL ENCOUNTER (OUTPATIENT)
Dept: PHYSICAL THERAPY | Facility: HOSPITAL | Age: 78
Setting detail: THERAPIES SERIES
Discharge: HOME | End: 2018-11-12

## 2018-11-12 VITALS
OXYGEN SATURATION: 95 % | HEART RATE: 87 BPM | SYSTOLIC BLOOD PRESSURE: 118 MMHG | DIASTOLIC BLOOD PRESSURE: 60 MMHG | HEIGHT: 72 IN | BODY MASS INDEX: 24.46 KG/M2 | WEIGHT: 180.6 LBS

## 2018-11-12 DIAGNOSIS — I10 ESSENTIAL HYPERTENSION: ICD-10-CM

## 2018-11-12 DIAGNOSIS — R26.89 BALANCE PROBLEM: Primary | ICD-10-CM

## 2018-11-12 DIAGNOSIS — E78.2 MIXED HYPERLIPIDEMIA: ICD-10-CM

## 2018-11-12 DIAGNOSIS — I25.10 CHRONIC CORONARY ARTERY DISEASE: Primary | ICD-10-CM

## 2018-11-12 PROCEDURE — 97110 THERAPEUTIC EXERCISES: CPT | Performed by: PHYSICAL THERAPIST

## 2018-11-12 PROCEDURE — 99214 OFFICE O/P EST MOD 30 MIN: CPT | Performed by: INTERNAL MEDICINE

## 2018-11-12 PROCEDURE — 97112 NEUROMUSCULAR REEDUCATION: CPT | Performed by: PHYSICAL THERAPIST

## 2018-11-12 RX ORDER — VARDENAFIL HYDROCHLORIDE 20 MG/1
20 TABLET ORAL AS NEEDED
COMMUNITY
End: 2020-01-24 | Stop reason: SINTOL

## 2018-11-12 RX ORDER — TADALAFIL 20 MG/1
20 TABLET ORAL AS NEEDED
COMMUNITY
End: 2020-01-24 | Stop reason: SINTOL

## 2018-11-12 NOTE — THERAPY TREATMENT NOTE
"    Outpatient Physical Therapy Neuro Treatment Note  [facility]     Patient Name: Isrrael Marino  : 1940  MRN: [id number]  Today's Date: 2018      Visit Date: 2018    Visit Dx:    ICD-10-CM ICD-9-CM   1. Balance problem R26.89 781.99       Patient Active Problem List   Diagnosis   • Gastroesophageal reflux disease   • Atopic rhinitis   • Arthritis   • Peripheral neuropathy   • Arthralgia of hip   • Low back pain   • Irritable bowel syndrome   • Hypertension   • Hyperlipidemia   • Hyperglycemia   • Hemorrhoids   • Gout   • Enlarged prostate   • Erectile dysfunction of nonorganic origin   • Cough   • Constipation   • Chronic diarrhea   • Benign prostatic hyperplasia   • Chronic coronary artery disease   • Primary osteoarthritis of both knees   • Odontoid fracture with routine healing   • Glenohumeral arthritis, right   • Closed fracture of odontoid process of axis (CMS/HCC)   • Odontoid fracture with nonunion   • Neuropathic pain           PT Neuro     Row Name 18 0800             Subjective Comments    Subjective Comments  \"I'm not worth five cents today.  I'm hurting all over today.\"  -MW         Subjective Pain    Able to rate subjective pain?  yes  -MW      Pre-Treatment Pain Level  10  -MW      Subjective Pain Comment  \"whole body\"  -MW         Balance Skills Training    Training Strategies (Balance)  St. balance on blue foam with alternating tapipng 10\" step x 10, B fwd step up onto/off 10\" step x 10, one foot on foam and other on step and hold with B cervical rot/flex/ext x 10 with CGA and then hold x 10 sec with CGA.  B fwd lunge to BOSU without UE A x 10, lunges with B arm flexion x 10 with CGA.  St. balance on both sides of BOSU with B cerivcal rot/flex/ext x 10 and mini-squats x 10 with CGA/min A.  Tandem along balance beam 8ft x 4 without UE A  or LOB, hold tandem with LE behind tapping cone in front x 10 without UE A or LOB.  Tandem on rounded side of half foam roll with LE " "behind tap cone in front x 10 with CGA, B sidestepping on both beam and half foam roll with LOB posteriorly with min A for balance.    -MW        User Key  (r) = Recorded By, (t) = Taken By, (c) = Cosigned By    Initials Name Provider Type    Allyssa Marie, PT Physical Therapist                  PT Assessment/Plan     Row Name 11/12/18 0800          PT Assessment    Assessment Comments  Pt. requires min A with standing dynamic balance activities.  Pt. has increased LOB with EC and will benefit from continued therapy serivces to meet goals.  -MW        PT Plan    PT Plan Comments  PT services to improve gait, balance, strength, and overall functional moblility.  -MW       User Key  (r) = Recorded By, (t) = Taken By, (c) = Cosigned By    Initials Name Provider Type    Allyssa Marie, PT Physical Therapist               Exercises     Row Name 11/12/18 0800             Subjective Comments    Subjective Comments  \"I'm not worth five cents today.  I'm hurting all over today.\"  -MW         Subjective Pain    Able to rate subjective pain?  yes  -MW      Pre-Treatment Pain Level  10  -MW      Post-Treatment Pain Level  9  -MW      Subjective Pain Comment  \"whole body\"  -MW         Total Minutes    62112 - PT Therapeutic Exercise Minutes  15  -MW      08471 -  PT Neuromuscular Reeducation Minutes  30  -MW         Exercise 1    Exercise Name 1  NuStep L6  -MW      Time 1  8 min  -MW      Additional Comments  B UE/LEs  -MW         Exercise 2    Exercise Name 2  Seated stool pull  -MW      Time 2  3 min  -MW         Exercise 3    Exercise Name 3  TRX B UE scap retraction  -MW      Sets 3  2  -MW      Reps 3  10  -MW      Additional Comments  CGA for balance  -MW        User Key  (r) = Recorded By, (t) = Taken By, (c) = Cosigned By    Initials Name Provider Type    Allyssa Marie, PT Physical Therapist                            Therapy Education  Given: Symptoms/condition management, Posture/body " mechanics  Program: Reinforced  How Provided: Verbal  Provided to: Patient  Level of Understanding: Verbalized              Time Calculation:   Start Time: 0800   Therapy Suggested Charges     Code   Minutes Charges    73283 (CPT®) Hc Pt Neuromusc Re Education Ea 15 Min 30 2    83953 (CPT®) Hc Pt Ther Proc Ea 15 Min 15 1    66753 (CPT®) Hc Gait Training Ea 15 Min      12625 (CPT®) Hc Pt Therapeutic Act Ea 15 Min      67161 (CPT®) Hc Pt Manual Therapy Ea 15 Min      17172 (CPT®) Hc Pt Ther Massage- Per 15 Min      03133 (CPT®) Hc Pt Iontophoresis Ea 15 Min      15669 (CPT®) Hc Pt Elec Stim Ea-Per 15 Min      54612 (CPT®) Hc Pt Ultrasound Ea 15 Min      83976 (CPT®) Hc Pt Self Care/Mgmt/Train Ea 15 Min      02579 (CPT®) Hc Pt Prosthetic (S) Train Initial Encounter, Each 15 Min      93529 (CPT®) Hc Orthotic(S) Mgmt/Train Initial Encounter, Each 15min      44070 (CPT®) Hc Pt Aquatic Therapy Ea 15 Min      97804 (CPT®) Hc Pt Orthotic(S)/Prosthetic(S) Encounter, Each 15 Min       (CPT®) Hc Pt Electrical Stim Unattended      Total  45 3        Therapy Charges for Today     Code Description Service Date Service Provider Modifiers Qty    69265954561 HC PT NEUROMUSC RE EDUCATION EA 15 MIN 11/12/2018 Allyssa Hayes, PT GP 2    00036873649 HC PT THER PROC EA 15 MIN 11/12/2018 Allyssa Hayes, PT GP 1                    Allyssa Hayes, PT  11/12/2018

## 2018-11-12 NOTE — PROGRESS NOTES
Subjective:     Encounter Date:11/12/2018      Patient ID: Isrrael Marino is a 78 y.o. male.    Chief Complaint: Coronary Artery Disease    PROBLEM LIST:  1.  Coronary artery disease:  a. Non STEMI, 09/22/2013.  Cardiac catheterization:   99% first diagonal (2.5 x 15 Xience), 95% second diagonal (PTCA), 50% LAD, FFR 0.83.  EF 50%.  b. On 11/19/2013:  Crescendo angina.  Catheterization:  95% LAD/70% second diagonal 3.0 x 23-mm XIENCE (LAD) and 2.75 x 12-mm XIENCE (second diagonal). Widely  patent first diagonal stent.   2. Hypertension.  3. Dyslipidemia.   4. Lower  extremity neuropathy.  5. Arthritis.  6. Skin cancer with removal  7. Traumatic fracture of C2  8. Surgeries:  a. Appendectomy  b. Lumbar surgery  c. Carpal tunnel surgery  d. Hernia repair  e. Right hip replacement  f. Vasectomy    History of Present Illness  Isrrael Marino returns today for a 12 month  follow up with a history of coronary artery disease among other cardiac risk factors. Since last visit he has been doing well from a cardiovascular standpoint. Outside of cardiology, Mr. Marino experienced a fall that resulted in neck injury. Denies any exertional chest pain, shortness of breath, orthopnea, PND, or palpitations. Otherwise, inquired about potential physician for knee replacement.    Allergies   Allergen Reactions   • Ibuprofen Hives       Current Outpatient Medications:   •  B Complex Vitamins (VITAMIN B COMPLEX PO), Take 1 tablet by mouth Daily., Disp: , Rfl:   •  clopidogrel (PLAVIX) 75 MG tablet, TAKE 1 TABLET EVERY DAY, Disp: 90 tablet, Rfl: 4  •  esomeprazole (nexIUM) 40 MG capsule, Take 40 mg by mouth Every Morning Before Breakfast., Disp: , Rfl:   •  fluticasone (FLONASE) 50 MCG/ACT nasal spray, 1 spray into the nostril(s) as directed by provider Daily., Disp: , Rfl:   •  metoprolol succinate XL (TOPROL-XL) 25 MG 24 hr tablet, TAKE 1 TABLET ONE TIME DAILY, Disp: 90 tablet, Rfl: 4  •  polyethylene glycol (MIRALAX) powder,  "Dissolve 1 capful (17g) in water and take by mouth Daily as directed while on opiods, Disp: 238 g, Rfl: 1  •  simvastatin (ZOCOR) 40 MG tablet, TAKE 1 TABLET EVERY DAY, Disp: 90 tablet, Rfl: 3  •  tadalafil (CIALIS) 20 MG tablet, Take 20 mg by mouth As Needed for erectile dysfunction., Disp: , Rfl:   •  tamsulosin (FLOMAX) 0.4 MG capsule 24 hr capsule, Take 1 capsule by mouth Daily., Disp: 30 capsule, Rfl: 0  •  traMADol (ULTRAM) 50 MG tablet, Take 1 tablet by mouth 2 (Two) Times a Day. (Patient taking differently: Take 50 mg by mouth As Needed.), Disp: 60 tablet, Rfl: 0  •  vardenafil (LEVITRA) 20 MG tablet, Take 20 mg by mouth As Needed for erectile dysfunction., Disp: , Rfl:   •  vitamin B-12 (CYANOCOBALAMIN) 1000 MCG tablet, Take 1,000 mcg by mouth Daily., Disp: , Rfl:     The following portions of the patient's history were reviewed and updated as appropriate: allergies, current medications, past family history, past medical history, past social history, past surgical history and problem list.    Review of Systems   Constitution: Positive for weight gain.   Cardiovascular: Negative for chest pain, leg swelling, near-syncope, orthopnea, palpitations, paroxysmal nocturnal dyspnea and syncope.   Respiratory: Negative.  Negative for cough.    Hematologic/Lymphatic: Negative for bleeding problem. Does not bruise/bleed easily.   Skin: Negative for rash.   Musculoskeletal: Positive for arthritis and falls. Negative for joint swelling, muscle weakness and myalgias.   Gastrointestinal: Negative for heartburn, nausea and vomiting.   Neurological: Negative.  Negative for dizziness, headaches, light-headedness, loss of balance and tremors.   Psychiatric/Behavioral: The patient is not nervous/anxious.         Objective:     Vitals:    11/12/18 1047   BP: 118/60   BP Location: Right arm   Patient Position: Sitting   Pulse: 87   SpO2: 95%   Weight: 81.9 kg (180 lb 9.6 oz)   Height: 182.9 cm (72\")       Physical Exam "   Constitutional: He is oriented to person, place, and time. He appears well-developed and well-nourished.   HENT:   Mouth/Throat: Oropharynx is clear and moist.   Neck: No JVD present. Carotid bruit is not present. No thyromegaly present.   Cardiovascular: Regular rhythm, S1 normal, S2 normal, normal heart sounds and intact distal pulses. Exam reveals no gallop, no S3 and no S4.   No murmur heard.  Pulses:       Carotid pulses are 2+ on the right side, and 2+ on the left side.       Radial pulses are 2+ on the right side, and 2+ on the left side.   Pulmonary/Chest: Breath sounds normal.   Abdominal: Soft. Bowel sounds are normal. He exhibits no mass. There is no tenderness.   Musculoskeletal: He exhibits no edema.   Neurological: He is alert and oriented to person, place, and time.   Skin: Skin is warm and dry. No rash noted.     Lab Review:    Lab Results   Component Value Date    GLUCOSE 85 07/09/2018    BUN 21 08/13/2018    CREATININE 1.02 08/13/2018    EGFRIFNONA 70 08/13/2018    EGFRIFAFRI 85 08/13/2018    BCR 20.6 08/13/2018    K 4.5 08/13/2018    CO2 27.0 08/13/2018    CALCIUM 9.7 08/13/2018    PROTENTOTREF 6.7 08/13/2018    ALBUMIN 4.22 08/13/2018    LABIL2 1.7 08/13/2018    AST 22 08/13/2018    ALT 15 08/13/2018     Lab Results   Component Value Date    CHLPL 169 08/13/2018    TRIG 75 08/13/2018    HDL 62 (H) 08/13/2018    LDL 92 08/13/2018     Lab Results   Component Value Date    HGBA1C 5.80 (H) 07/09/2018     Procedures        Assessment:   Isrrael was seen today for coronary artery disease.    Diagnoses and all orders for this visit:    Chronic coronary artery disease    Essential hypertension    Mixed hyperlipidemia    Impression:  1. Coronary artery disease, stable without angina.  2. Hypertension is well-controlled.  3. Dyslipidemia is controlled with statin therapy.    Plan:  1. Continue current medications.  2. Revisit in 12 MO, or sooner as needed.    Scribed for Casimiro Bernal MD by Drew  Tad. 11/12/2018  11:01 AM    I, Casimiro Bernal MD, personally performed the services described in this documentation as scribed by the above individual in my presence, and it is both accurate and complete      Please note that portions of this note may have been completed with a voice recognition program. Efforts were made to edit the dictations, but occasionally words are mistranscribed.

## 2018-11-19 ENCOUNTER — TELEPHONE (OUTPATIENT)
Dept: NEUROLOGY | Facility: CLINIC | Age: 78
End: 2018-11-19

## 2018-11-19 NOTE — TELEPHONE ENCOUNTER
FYI- Patient called, stated that he was not going back to therapy due to the pain he is having. States he needed to get it under control before he can go back. Thanks!!

## 2018-11-20 ENCOUNTER — OFFICE VISIT (OUTPATIENT)
Dept: ORTHOPEDIC SURGERY | Facility: CLINIC | Age: 78
End: 2018-11-20

## 2018-11-20 VITALS — WEIGHT: 181.22 LBS | BODY MASS INDEX: 24.55 KG/M2 | HEIGHT: 72 IN | HEART RATE: 85 BPM | OXYGEN SATURATION: 98 %

## 2018-11-20 DIAGNOSIS — M17.0 PRIMARY OSTEOARTHRITIS OF BOTH KNEES: Primary | ICD-10-CM

## 2018-11-20 PROCEDURE — 20610 DRAIN/INJ JOINT/BURSA W/O US: CPT | Performed by: PHYSICIAN ASSISTANT

## 2018-11-20 PROCEDURE — 99213 OFFICE O/P EST LOW 20 MIN: CPT | Performed by: PHYSICIAN ASSISTANT

## 2018-11-20 RX ORDER — LIDOCAINE HYDROCHLORIDE 10 MG/ML
4 INJECTION, SOLUTION INFILTRATION; PERINEURAL
Status: COMPLETED | OUTPATIENT
Start: 2018-11-20 | End: 2018-11-20

## 2018-11-20 RX ORDER — BUPIVACAINE HYDROCHLORIDE 2.5 MG/ML
4 INJECTION, SOLUTION INFILTRATION; PERINEURAL
Status: COMPLETED | OUTPATIENT
Start: 2018-11-20 | End: 2018-11-20

## 2018-11-20 RX ORDER — METHYLPREDNISOLONE ACETATE 40 MG/ML
40 INJECTION, SUSPENSION INTRA-ARTICULAR; INTRALESIONAL; INTRAMUSCULAR; SOFT TISSUE
Status: COMPLETED | OUTPATIENT
Start: 2018-11-20 | End: 2018-11-20

## 2018-11-20 RX ADMIN — BUPIVACAINE HYDROCHLORIDE 4 ML: 2.5 INJECTION, SOLUTION INFILTRATION; PERINEURAL at 08:40

## 2018-11-20 RX ADMIN — LIDOCAINE HYDROCHLORIDE 4 ML: 10 INJECTION, SOLUTION INFILTRATION; PERINEURAL at 08:40

## 2018-11-20 RX ADMIN — METHYLPREDNISOLONE ACETATE 40 MG: 40 INJECTION, SUSPENSION INTRA-ARTICULAR; INTRALESIONAL; INTRAMUSCULAR; SOFT TISSUE at 08:40

## 2018-11-20 RX ADMIN — BUPIVACAINE HYDROCHLORIDE 4 ML: 2.5 INJECTION, SOLUTION INFILTRATION; PERINEURAL at 08:41

## 2018-11-20 RX ADMIN — LIDOCAINE HYDROCHLORIDE 4 ML: 10 INJECTION, SOLUTION INFILTRATION; PERINEURAL at 08:41

## 2018-11-20 RX ADMIN — METHYLPREDNISOLONE ACETATE 40 MG: 40 INJECTION, SUSPENSION INTRA-ARTICULAR; INTRALESIONAL; INTRAMUSCULAR; SOFT TISSUE at 08:41

## 2018-11-20 NOTE — PROGRESS NOTES
Procedure   Large Joint Arthrocentesis: R knee  Date/Time: 11/20/2018 8:40 AM  Consent given by: patient  Site marked: site marked  Timeout: Immediately prior to procedure a time out was called to verify the correct patient, procedure, equipment, support staff and site/side marked as required   Supporting Documentation  Indications: pain   Procedure Details  Location: knee - R knee  Preparation: Patient was prepped and draped in the usual sterile fashion  Needle size: 22 G  Approach: anterolateral  Medications administered: 4 mL bupivacaine 0.25 %; 4 mL lidocaine 1 %; 40 mg methylPREDNISolone acetate 40 MG/ML  Patient tolerance: patient tolerated the procedure well with no immediate complications    Large Joint Arthrocentesis: L knee  Date/Time: 11/20/2018 8:41 AM  Consent given by: patient  Site marked: site marked  Timeout: Immediately prior to procedure a time out was called to verify the correct patient, procedure, equipment, support staff and site/side marked as required   Supporting Documentation  Indications: pain   Procedure Details  Location: knee - L knee  Preparation: Patient was prepped and draped in the usual sterile fashion  Needle size: 22 G  Approach: anterolateral  Medications administered: 4 mL bupivacaine 0.25 %; 4 mL lidocaine 1 %; 40 mg methylPREDNISolone acetate 40 MG/ML  Patient tolerance: patient tolerated the procedure well with no immediate complications

## 2018-11-20 NOTE — PROGRESS NOTES
[facility] [place] Orthopaedic Surgery Clinic Note    Subjective     Patient: Isrrael Marino  : 1940    Primary Care Provider: Rossy Cardona APRN    Requesting Provider: As above    Follow-up of the Left Knee and Follow-up of the Right Knee      History    Chief Complaint: bilateral knee pain    History of Present Illness: Mr. Marino comes in with increasing bilateral knee pain.  He reports no new symptoms.  He has functional pain with night pain on the right.  The right knee is more painful and left.  He complains of grinding and stiffness with increasing stiffness on the right.  He's been treated with intermittent injection for several years now.  He reports the injections are only lasting about 2 months at this point.  He is not ready consider replacement at this time.  He would like repeat injection today.    Current Outpatient Medications on File Prior to Visit   Medication Sig Dispense Refill   • B Complex Vitamins (VITAMIN B COMPLEX PO) Take 1 tablet by mouth Daily.     • clopidogrel (PLAVIX) 75 MG tablet TAKE 1 TABLET EVERY DAY 90 tablet 4   • esomeprazole (nexIUM) 40 MG capsule Take 40 mg by mouth Every Morning Before Breakfast.     • fluticasone (FLONASE) 50 MCG/ACT nasal spray 1 spray into the nostril(s) as directed by provider Daily.     • metoprolol succinate XL (TOPROL-XL) 25 MG 24 hr tablet TAKE 1 TABLET ONE TIME DAILY 90 tablet 4   • polyethylene glycol (MIRALAX) powder Dissolve 1 capful (17g) in water and take by mouth Daily as directed while on opiods 238 g 1   • simvastatin (ZOCOR) 40 MG tablet TAKE 1 TABLET EVERY DAY 90 tablet 3   • tadalafil (CIALIS) 20 MG tablet Take 20 mg by mouth As Needed for erectile dysfunction.     • tamsulosin (FLOMAX) 0.4 MG capsule 24 hr capsule Take 1 capsule by mouth Daily. 30 capsule 0   • traMADol (ULTRAM) 50 MG tablet Take 1 tablet by mouth 2 (Two) Times a Day. (Patient taking differently: Take 50 mg by mouth As Needed.) 60 tablet 0   • vardenafil  (LEVITRA) 20 MG tablet Take 20 mg by mouth As Needed for erectile dysfunction.     • vitamin B-12 (CYANOCOBALAMIN) 1000 MCG tablet Take 1,000 mcg by mouth Daily.       No current facility-administered medications on file prior to visit.       Allergies   Allergen Reactions   • Ibuprofen Hives      Past Medical History:   Diagnosis Date   • Arthritis     both knees   • Broken neck (CMS/HCC)    • CAD (coronary artery disease)    • Cancer (CMS/[facility])    • Chondrocalcinosis of knee    • GERD (gastroesophageal reflux disease)    • Gout    • Heart attack (CMS/[facility])      Last Assessed: 28 Mar 2014   • Heart disease    • History of transfusion     own blood with hip surgery   • Hyperlipidemia    • Hypertension    • IBS (irritable bowel syndrome)    • Left rotator cuff tear arthropathy    • Low back pain    • Lower extremity neuropathy    • Lumbar canal stenosis    • Neck pain    • Numbness    • Right hip pain    • Rotator cuff tear arthropathy of right shoulder    • Thin blood (CMS/HCC)      Past Surgical History:   Procedure Laterality Date   • APPENDECTOMY  1956   • BACK SURGERY  1997    spinal decompression and fusion   • BREAST LUMPECTOMY  2003   • CARDIAC CATHETERIZATION      with two stents//. DR. TOLEDO   • CARPAL TUNNEL RELEASE  03/28/2014    Neuroplasty Decompression Median Nerve At Carpal Tunnel   • COLONOSCOPY     • CORONARY STENT PLACEMENT  2013   • HERNIA REPAIR  2002    & 1998   • LUMBAR DISCECTOMY FUSION INSTRUMENTATION     • TONSILLECTOMY     • TOTAL HIP ARTHROPLASTY  2002   • VASECTOMY       Family History   Problem Relation Age of Onset   • Cancer Mother    • Heart disease Father    • Hypertension Father    • Heart attack Father       Social History     Socioeconomic History   • Marital status:      Spouse name: Not on file   • Number of children: Not on file   • Years of education: Not on file   • Highest education level: Not on file   Social Needs   • Financial resource strain: Not on file   • Food  "insecurity - worry: Not on file   • Food insecurity - inability: Not on file   • Transportation needs - medical: Not on file   • Transportation needs - non-medical: Not on file   Occupational History   • Not on file   Tobacco Use   • Smoking status: Never Smoker   • Smokeless tobacco: Never Used   Substance and Sexual Activity   • Alcohol use: No   • Drug use: No   • Sexual activity: Defer   Other Topics Concern   • Not on file   Social History Narrative   • Not on file        Review of Systems   Constitutional: Positive for activity change.   HENT: Negative.    Eyes: Negative.    Respiratory: Negative.    Cardiovascular: Negative.    Gastrointestinal: Negative.    Endocrine: Negative.    Genitourinary: Negative.    Musculoskeletal: Positive for arthralgias and gait problem.   Skin: Negative.    Allergic/Immunologic: Negative.    Hematological: Negative.    Psychiatric/Behavioral: Negative.        The following portions of the patient's history were reviewed and updated as appropriate: allergies, current medications, past family history, past medical history, past social history, past surgical history and problem list.      Objective      Physical Exam  Pulse 85   Ht 182.9 cm (72\")   Wt 82.2 kg (181 lb 3.5 oz)   SpO2 98%   BMI 24.58 kg/m²     Body mass index is 24.58 kg/m².    Patient is well nourished and well developed.      Ortho Exam    Right Knee Exam  ----------  ALIGNMENT: Right: varus----------  RANGE OF MOTION:  Right: 8-105  LIGAMENTOUS STABILITY:   Right:stable to varus and valgus stress at terminal extension and 30 degrees without any evidence of laxity----------  STRENGTH:  KNEE FLEXION Right 5/5  KNEE EXTENSION Right 5/5 ----------  PAIN WITH PALPATION: Right denies tenderness to palpation about the knee  PAIN WITH KNEE ROM: Right no  PATELLAR CREPITUS: Right yes   ----------    Left Knee Exam  ----------  Knee Exam:  ----------  ALIGNMENT:  Left: neutral  ----------  RANGE OF MOTION:  Left: Normal " (0-120 degrees) with no extensor lag or flexion contracture  LIGAMENTOUS STABILITY:   Left:stable to varus and valgus stress at terminal extension and 30 degrees without any evidence of laxity   ----------  STRENGTH:  KNEE FLEXION  Left 5/5  KNEE EXTENSION Left 5/5  ----------  PAIN WITH PALPATION: Left denies tenderness to palpation about the knee  PAIN WITH KNEE ROM:  Left no  PATELLAR CREPITUS:  Left yes  ----------        Medical Decision Making    Data Review:   none    Assessment:  1. Primary osteoarthritis of both knees        Plan:  Doing well with nonoperative treatment of bilateral knee arthritis.  I reviewed clinical findings, past and current treatment with the patient.  On exam, he is nontender with flexion contracture on the right.  Patient has been treated in the past with intermittent steroid injection with good relief.  There only lasting about 2 months at this point.  We briefly discussed knee replacement including that he cannot have steroid injection for 3 months prior to surgery today.  Also discussed use of a spinal with sedation as opposed to general anesthesia.  If and when the time comes, patient would like Dr. Rojas to perform his surgery.  Plan today is repeat steroid injection into bilateral knees.  He'll return in 3 months or sooner if needed.    Using sterile technique, the left knee was sterilely prepped with Hibiclens. Following a time out,  using a 22 gauge needle, the left knee was injected with 40mg Depo Medrol, 4 cc lidocaine and 4 cc marcaine.  Patient tolerated the procedure well.  No complications.    Using sterile technique, the right knee was sterilely prepped with Hibiclens.  Following a time out,  using a 22 gauge needle, the right knee was injected with 40mg Depo Medrol, 4 cc lidocaine and 4 cc marcaine.  Patient tolerated the procedure well.  No complications.          Tish Shelley PA-C  11/20/18  10:05 AM

## 2019-01-08 ENCOUNTER — DOCUMENTATION (OUTPATIENT)
Dept: PHYSICAL THERAPY | Facility: HOSPITAL | Age: 79
End: 2019-01-08

## 2019-01-08 DIAGNOSIS — R26.89 BALANCE PROBLEM: Primary | ICD-10-CM

## 2019-01-08 NOTE — THERAPY DISCHARGE NOTE
Outpatient Physical Therapy Discharge Summary         Patient Name: Isrrael Marino  : 1940  MRN: [id number]    Today's Date: 2019    Visit Dx:    ICD-10-CM ICD-9-CM   1. Balance problem R26.89 781.99       PT OP Goals     Row Name 19 1400          PT Short Term Goals    STG Date to Achieve  18  -MW     STG 1  Patient to ambulate 10 meters without AD within 9 sec without LOB for improved gait kayli and functional mobility.  -MW     STG 1 Progress  Not Met  -MW        Long Term Goals    LTG Date to Achieve  18  -MW     LTG 1  Patient to improve [place] balance score to >/= 54/56 to decrease client's risk of falls.  -MW     LTG 1 Progress  Not Met  -MW     LTG 2  Patient to perform TUG within 10 sec without LOB for improved functional mobility.  -MW     LTG 2 Progress  Not Met  -MW     LTG 3  Client to improve mini-BEST test balance score to >/= 26/28 to decrease client's risk of falls.  -MW     LTG 3 Progress  Not Met  -MW     LTG 4  Patient to improve FGA score to >/= 28/30 to decrease client's risk of falls.  -MW     LTG 4 Progress  Not Met  -MW     LTG 5  Patient to be I with HEP.  -MW     LTG 5 Progress  Not Met  -MW       User Key  (r) = Recorded By, (t) = Taken By, (c) = Cosigned By    Initials Name Provider Type    Allyssa Marie, PT Physical Therapist          OP PT Discharge Summary  Date of Discharge: 19  Reason for Discharge: other (comment)(pt did not return)  Outcomes Achieved: (pt did not return)  Discharge Destination: Home with home program      Time Calculation:        Therapy Suggested Charges     Code   Minutes Charges    None                       Allyssa Hayes, PT  2019

## 2019-01-09 ENCOUNTER — HOSPITAL ENCOUNTER (OUTPATIENT)
Dept: GENERAL RADIOLOGY | Facility: HOSPITAL | Age: 79
Discharge: HOME | End: 2019-01-09
Attending: NEUROLOGICAL SURGERY | Admitting: NEUROLOGICAL SURGERY

## 2019-01-09 PROCEDURE — 72040 X-RAY EXAM NECK SPINE 2-3 VW: CPT

## 2019-01-10 ENCOUNTER — OFFICE VISIT (OUTPATIENT)
Dept: NEUROSURGERY | Facility: CLINIC | Age: 79
End: 2019-01-10

## 2019-01-10 VITALS — TEMPERATURE: 97.2 F | HEIGHT: 72 IN | WEIGHT: 183.6 LBS | BODY MASS INDEX: 24.87 KG/M2 | RESPIRATION RATE: 17 BRPM

## 2019-01-10 DIAGNOSIS — S12.100G CLOSED ODONTOID FRACTURE WITH DELAYED HEALING, SUBSEQUENT ENCOUNTER: Primary | ICD-10-CM

## 2019-01-10 PROCEDURE — 99214 OFFICE O/P EST MOD 30 MIN: CPT | Performed by: NEUROLOGICAL SURGERY

## 2019-01-10 RX ORDER — OXYCODONE AND ACETAMINOPHEN 7.5; 325 MG/1; MG/1
1 TABLET ORAL EVERY 6 HOURS PRN
COMMUNITY
End: 2019-03-11

## 2019-01-10 RX ORDER — HYDROCODONE BITARTRATE AND ACETAMINOPHEN 7.5; 325 MG/1; MG/1
1 TABLET ORAL EVERY 6 HOURS PRN
COMMUNITY
End: 2019-03-11

## 2019-01-10 RX ORDER — DULOXETIN HYDROCHLORIDE 30 MG/1
30 CAPSULE, DELAYED RELEASE ORAL DAILY
Qty: 60 CAPSULE | Refills: 1 | Status: SHIPPED | OUTPATIENT
Start: 2019-01-10 | End: 2019-01-10 | Stop reason: SDUPTHER

## 2019-01-10 RX ORDER — DICLOFENAC SODIUM 75 MG/1
75 TABLET, DELAYED RELEASE ORAL 2 TIMES DAILY
Qty: 60 TABLET | Refills: 1 | Status: SHIPPED | OUTPATIENT
Start: 2019-01-10 | End: 2019-03-11 | Stop reason: SDUPTHER

## 2019-01-10 RX ORDER — DULOXETIN HYDROCHLORIDE 30 MG/1
30 CAPSULE, DELAYED RELEASE ORAL DAILY
Qty: 60 CAPSULE | Refills: 1 | Status: SHIPPED | OUTPATIENT
Start: 2019-01-10 | End: 2019-03-11 | Stop reason: SDUPTHER

## 2019-01-10 RX ORDER — DICLOFENAC SODIUM 75 MG/1
75 TABLET, DELAYED RELEASE ORAL 2 TIMES DAILY
Qty: 60 TABLET | Refills: 1 | Status: SHIPPED | OUTPATIENT
Start: 2019-01-10 | End: 2019-01-10 | Stop reason: SDUPTHER

## 2019-01-10 NOTE — PROGRESS NOTES
NAME: VERENA REGALADO   DOS: 1/10/2019  : 1940  PCP: Rossy Cardona APRN    Chief Complaint:    Chief Complaint   Patient presents with   • Hx of Lami/Decompression C1-2 Fusion- 18     4 month f/u XR       History of Present Illness:  78 y.o. male   I saw him this gentleman back he is status post cervical fracture dislocation and lumbar spinal fusion a number years ago by Dr. Santos his incisions well-healed today he still having some neck and low back arthritis he has pain all over and is here for evaluation he reports that he stronger than predating his surgery    PMHX  Allergies:  Allergies   Allergen Reactions   • Ibuprofen Hives     Medications    Current Outpatient Medications:   •  HYDROcodone-acetaminophen (NORCO) 7.5-325 MG per tablet, Take 1 tablet by mouth Every 6 (Six) Hours As Needed for Moderate Pain ., Disp: , Rfl:   •  oxyCODONE-acetaminophen (PERCOCET) 7.5-325 MG per tablet, Take 1 tablet by mouth Every 6 (Six) Hours As Needed., Disp: , Rfl:   •  B Complex Vitamins (VITAMIN B COMPLEX PO), Take 1 tablet by mouth Daily., Disp: , Rfl:   •  clopidogrel (PLAVIX) 75 MG tablet, TAKE 1 TABLET EVERY DAY, Disp: 90 tablet, Rfl: 4  •  esomeprazole (nexIUM) 40 MG capsule, Take 40 mg by mouth Every Morning Before Breakfast., Disp: , Rfl:   •  fluticasone (FLONASE) 50 MCG/ACT nasal spray, 1 spray into the nostril(s) as directed by provider Daily., Disp: , Rfl:   •  metoprolol succinate XL (TOPROL-XL) 25 MG 24 hr tablet, TAKE 1 TABLET ONE TIME DAILY, Disp: 90 tablet, Rfl: 4  •  polyethylene glycol (MIRALAX) powder, Dissolve 1 capful (17g) in water and take by mouth Daily as directed while on opiods, Disp: 238 g, Rfl: 1  •  simvastatin (ZOCOR) 40 MG tablet, TAKE 1 TABLET EVERY DAY, Disp: 90 tablet, Rfl: 3  •  tadalafil (CIALIS) 20 MG tablet, Take 20 mg by mouth As Needed for erectile dysfunction., Disp: , Rfl:   •  tamsulosin (FLOMAX) 0.4 MG capsule 24 hr capsule, Take 1 capsule by mouth Daily.,  Disp: 30 capsule, Rfl: 0  •  traMADol (ULTRAM) 50 MG tablet, Take 1 tablet by mouth 2 (Two) Times a Day. (Patient taking differently: Take 50 mg by mouth As Needed.), Disp: 60 tablet, Rfl: 0  •  vardenafil (LEVITRA) 20 MG tablet, Take 20 mg by mouth As Needed for erectile dysfunction., Disp: , Rfl:   •  vitamin B-12 (CYANOCOBALAMIN) 1000 MCG tablet, Take 1,000 mcg by mouth Daily., Disp: , Rfl:   Past Medical History:  Past Medical History:   Diagnosis Date   • Arthritis     both knees   • Broken neck (CMS/HCC)    • CAD (coronary artery disease)    • Cancer (CMS/HCC)    • Chondrocalcinosis of knee    • GERD (gastroesophageal reflux disease)    • Gout    • Heart attack (CMS/HCC)      Last Assessed: 28 Mar 2014   • Heart disease    • History of transfusion     own blood with hip surgery   • Hyperlipidemia    • Hypertension    • IBS (irritable bowel syndrome)    • Left rotator cuff tear arthropathy    • Low back pain    • Lower extremity neuropathy    • Lumbar canal stenosis    • Neck pain    • Numbness    • Right hip pain    • Rotator cuff tear arthropathy of right shoulder    • Thin blood (CMS/HCC)      Past Surgical History:  Past Surgical History:   Procedure Laterality Date   • APPENDECTOMY  1956   • BACK SURGERY  1997    spinal decompression and fusion   • BREAST LUMPECTOMY  2003   • CARDIAC CATHETERIZATION      with two stents//. DR. TOLEDO   • CARPAL TUNNEL RELEASE  03/28/2014    Neuroplasty Decompression Median Nerve At Carpal Tunnel   • CERVICAL DISCECTOMY POSTERIOR FUSION WITH BRAIN LAB N/A 7/13/2018    Procedure: CERVICAL LAMINECTOMY DECOMPRESSION POSTERIOR C1-2 POSTERIOR CERVICAL FUSION;  Surgeon: Randall Barnett MD;  Location: [facility];  Service: Neurosurgery   • COLONOSCOPY     • CORONARY STENT PLACEMENT  2013   • HERNIA REPAIR  2002    & 1998   • LUMBAR DISCECTOMY FUSION INSTRUMENTATION     • TONSILLECTOMY     • TOTAL HIP ARTHROPLASTY  2002   • VASECTOMY       Social Hx:  Social History     Tobacco  Use   • Smoking status: Never Smoker   • Smokeless tobacco: Never Used   Substance Use Topics   • Alcohol use: No   • Drug use: No     Family Hx:  Family History   Problem Relation Age of Onset   • Cancer Mother    • Heart disease Father    • Hypertension Father    • Heart attack Father      Review of Systems:        Review of Systems   Constitutional: Negative for activity change, appetite change, chills, diaphoresis, fatigue, fever and unexpected weight change.   HENT: Positive for hearing loss and tinnitus. Negative for congestion, dental problem, drooling, ear discharge, ear pain, facial swelling, mouth sores, nosebleeds, postnasal drip, rhinorrhea, sinus pressure, sneezing, sore throat, trouble swallowing and voice change.    Eyes: Negative for photophobia, pain, discharge, redness, itching and visual disturbance.   Respiratory: Negative for apnea, cough, choking, chest tightness, shortness of breath, wheezing and stridor.    Cardiovascular: Positive for palpitations. Negative for chest pain and leg swelling.   Gastrointestinal: Negative for abdominal distention, abdominal pain, anal bleeding, blood in stool, constipation, diarrhea, nausea, rectal pain and vomiting.   Endocrine: Negative for cold intolerance, heat intolerance, polydipsia, polyphagia and polyuria.   Genitourinary: Positive for difficulty urinating and frequency. Negative for decreased urine volume, dysuria, enuresis, flank pain, genital sores, hematuria and urgency.   Musculoskeletal: Positive for arthralgias, back pain, gait problem, myalgias, neck pain and neck stiffness. Negative for joint swelling.   Skin: Negative for color change, pallor, rash and wound.   Allergic/Immunologic: Negative for environmental allergies, food allergies and immunocompromised state.   Neurological: Positive for light-headedness. Negative for dizziness, tremors, seizures, syncope, facial asymmetry, speech difficulty, weakness, numbness and headaches.    Hematological: Negative for adenopathy. Does not bruise/bleed easily.   Psychiatric/Behavioral: Negative for agitation, behavioral problems, confusion, decreased concentration, dysphoric mood, hallucinations, self-injury, sleep disturbance and suicidal ideas. The patient is not nervous/anxious and is not hyperactive.    All other systems reviewed and are negative.           Physical Examination:  Vitals:    01/10/19 0851   Resp: 17   Temp: 97.2 °F (36.2 °C)      General Appearance:   Well developed, well nourished, well groomed, alert, and cooperative.  Neurological examination:  Neurologic Exam diffuse changes of arthritis he's get a well-healed incision    Good strength in his upper and lower extremities I detect no evidence of hyperreflexia he does have trace reflexes at his right knee he has no clonus long tract signs    His gait is wide-based and stable he has no Carlos's  Review of Imaging/DATA:  MRIs were reviewed from presurgery his postoperative x-rays look good he does have multiple levels of cervical stenosis he does have mild mid cervical stenosis however is autofused at that level x-rays look stable  Diagnoses/Plan:    Mr. Marino is a 78 y.o. male   He is a lot of achiness over well though he'll say these improved change him over from the Ultram to Voltaren instructed him to monitor his GI tracking kidneys    He does complain of some erectile dysfunction it could be from that were to start him on Cymbalta as well as explained the risks benefits and expected outcome but with his generalized arthritic complaints I think this will assist him in a see him back in 8 weeks

## 2019-02-13 ENCOUNTER — OFFICE VISIT (OUTPATIENT)
Dept: INTERNAL MEDICINE | Facility: CLINIC | Age: 79
End: 2019-02-13

## 2019-02-13 VITALS
SYSTOLIC BLOOD PRESSURE: 128 MMHG | DIASTOLIC BLOOD PRESSURE: 62 MMHG | HEART RATE: 76 BPM | HEIGHT: 72 IN | OXYGEN SATURATION: 97 % | WEIGHT: 183 LBS | BODY MASS INDEX: 24.79 KG/M2

## 2019-02-13 DIAGNOSIS — I10 ESSENTIAL HYPERTENSION: Primary | ICD-10-CM

## 2019-02-13 LAB
ALBUMIN SERPL-MCNC: 4.41 G/DL (ref 3.2–4.8)
ALBUMIN/GLOB SERPL: 1.8 G/DL (ref 1.5–2.5)
ALP SERPL-CCNC: 71 U/L (ref 25–100)
ALT SERPL-CCNC: 27 U/L (ref 7–40)
AST SERPL-CCNC: 31 U/L (ref 0–33)
BILIRUB SERPL-MCNC: 0.7 MG/DL (ref 0.3–1.2)
BUN SERPL-MCNC: 32 MG/DL (ref 9–23)
BUN/CREAT SERPL: 29.9 (ref 7–25)
CALCIUM SERPL-MCNC: 9.5 MG/DL (ref 8.7–10.4)
CHLORIDE SERPL-SCNC: 105 MMOL/L (ref 99–109)
CO2 SERPL-SCNC: 30 MMOL/L (ref 20–31)
CREAT SERPL-MCNC: 1.07 MG/DL (ref 0.6–1.3)
GLOBULIN SER CALC-MCNC: 2.4 GM/DL
GLUCOSE SERPL-MCNC: 84 MG/DL (ref 70–100)
POTASSIUM SERPL-SCNC: 4.7 MMOL/L (ref 3.5–5.5)
PROT SERPL-MCNC: 6.8 G/DL (ref 5.7–8.2)
SODIUM SERPL-SCNC: 140 MMOL/L (ref 132–146)

## 2019-02-13 PROCEDURE — 99214 OFFICE O/P EST MOD 30 MIN: CPT | Performed by: NURSE PRACTITIONER

## 2019-02-13 NOTE — PATIENT INSTRUCTIONS
Pt has left over opiiods at home.  Advised to destroy or take to approp drop off site.  Meds as discussed per MAR. Did not need RF today.  CMP.  Continue monitoring of blood pressure at home.  Continue to do well.  Pt verbalizes understanding and agreement with plan of care.

## 2019-02-13 NOTE — PROGRESS NOTES
Subjective   Isrrael Marino is a 78 y.o. male.   Chief Complaint   Patient presents with   • Hyperlipidemia   • Hypertension     follow up      History of Present Illness Feels great.  Since last visit, he has been doing well. Was started on Cymbalta and Diclofenac for neck pain by Dr Barnett.  He says it is helping.  He reports his blood pressure is been under good control.  Patient denies fever chills, headache, ear pain, sore throat, shortness of air, cough, wheezing,  chest pain, abdominal pain, nausea, vomiting, diarrhea, dysuria, blood in stool or urine.  Mood is good.  Eating and drinking as usual.  No unexplained weight loss or gain.  When I was performing his med reconciliation I noticed he was on multiple narcotics.  Patient states he has some sternal home but does not use them.  I advised him to destroy them mixing them in zenon litter/wetting it or take it by one of the narcotic disposal sites offered by public health or the police department    The following portions of the patient's history were reviewed and updated as appropriate: allergies, current medications, past family history, past medical history, past social history, past surgical history and problem list.    Current Outpatient Medications:   •  B Complex Vitamins (VITAMIN B COMPLEX PO), Take 1 tablet by mouth Daily., Disp: , Rfl:   •  clopidogrel (PLAVIX) 75 MG tablet, TAKE 1 TABLET EVERY DAY, Disp: 90 tablet, Rfl: 4  •  diclofenac (VOLTAREN) 75 MG EC tablet, Take 1 tablet by mouth 2 (Two) Times a Day., Disp: 60 tablet, Rfl: 1  •  DULoxetine (CYMBALTA) 30 MG capsule, Take 1 capsule by mouth Daily. 30mg for 2 weeks, 60mg thereafter, Disp: 60 capsule, Rfl: 1  •  esomeprazole (nexIUM) 40 MG capsule, Take 40 mg by mouth Every Morning Before Breakfast., Disp: , Rfl:   •  fluticasone (FLONASE) 50 MCG/ACT nasal spray, 1 spray into the nostril(s) as directed by provider Daily., Disp: , Rfl:   •  HYDROcodone-acetaminophen (NORCO) 7.5-325 MG per  "tablet, Take 1 tablet by mouth Every 6 (Six) Hours As Needed for Moderate Pain ., Disp: , Rfl:   •  metoprolol succinate XL (TOPROL-XL) 25 MG 24 hr tablet, TAKE 1 TABLET ONE TIME DAILY, Disp: 90 tablet, Rfl: 4  •  oxyCODONE-acetaminophen (PERCOCET) 7.5-325 MG per tablet, Take 1 tablet by mouth Every 6 (Six) Hours As Needed., Disp: , Rfl:   •  polyethylene glycol (MIRALAX) powder, Dissolve 1 capful (17g) in water and take by mouth Daily as directed while on opiods, Disp: 238 g, Rfl: 1  •  simvastatin (ZOCOR) 40 MG tablet, TAKE 1 TABLET EVERY DAY, Disp: 90 tablet, Rfl: 3  •  tadalafil (CIALIS) 20 MG tablet, Take 20 mg by mouth As Needed for erectile dysfunction., Disp: , Rfl:   •  tamsulosin (FLOMAX) 0.4 MG capsule 24 hr capsule, Take 1 capsule by mouth Daily., Disp: 30 capsule, Rfl: 0  •  traMADol (ULTRAM) 50 MG tablet, Take 1 tablet by mouth 2 (Two) Times a Day. (Patient taking differently: Take 50 mg by mouth As Needed.), Disp: 60 tablet, Rfl: 0  •  vardenafil (LEVITRA) 20 MG tablet, Take 20 mg by mouth As Needed for erectile dysfunction., Disp: , Rfl:   •  vitamin B-12 (CYANOCOBALAMIN) 1000 MCG tablet, Take 1,000 mcg by mouth Daily., Disp: , Rfl:     Review of Systems Consitutional, HEENT, Respiratory, CV, GI, , Skin, Musculoskeletal, Neuro-mental, Endocrinological, Hematological were reviewed.  Positives were discussed in the HPI, otherwise ROS was negative   /62   Pulse 76   Ht 182.9 cm (72\")   Wt 83 kg (183 lb)   SpO2 97%   BMI 24.82 kg/m²     Objective   Allergies   Allergen Reactions   • Ibuprofen Hives     \"Pt states he hasn't taken this in a long time\"       Physical Exam   Constitutional: He is oriented to person, place, and time. He appears well-developed and well-nourished. No distress.   HENT:   Head: Normocephalic.   Eyes: Right eye exhibits no discharge. Left eye exhibits no discharge.   Neck: Neck supple.   Well healed scar   Cardiovascular: Normal rate, regular rhythm, normal heart " sounds and intact distal pulses. Exam reveals no gallop and no friction rub.   No murmur heard.  Pulmonary/Chest: Effort normal and breath sounds normal. No stridor. No respiratory distress. He has no wheezes. He has no rales.   Abdominal: Soft. There is no tenderness.   Musculoskeletal:   LEY well.  Gait upright and steady    Neurological: He is alert and oriented to person, place, and time.   Skin: Skin is warm and dry. Capillary refill takes less than 2 seconds.   Is pink, no rash    Nursing note and vitals reviewed.      Procedures    LABS  Results for orders placed or performed in visit on 08/13/18   Lipid panel   Result Value Ref Range    Total Cholesterol 169 0 - 200 mg/dL    Triglycerides 75 0 - 150 mg/dL    HDL Cholesterol 62 (H) 40 - 60 mg/dL    VLDL Cholesterol 15 mg/dL    LDL Cholesterol  92 0 - 100 mg/dL   CK   Result Value Ref Range    Creatine Kinase 75 26 - 174 U/L   Comprehensive metabolic panel   Result Value Ref Range    Glucose 94 70 - 100 mg/dL    BUN 21 9 - 23 mg/dL    Creatinine 1.02 0.60 - 1.30 mg/dL    eGFR Non African Am 70 >60 mL/min/1.73    eGFR African Am 85 >60 mL/min/1.73    BUN/Creatinine Ratio 20.6 7.0 - 25.0    Sodium 141 132 - 146 mmol/L    Potassium 4.5 3.5 - 5.5 mmol/L    Chloride 107 99 - 109 mmol/L    Total CO2 27.0 20.0 - 31.0 mmol/L    Calcium 9.7 8.7 - 10.4 mg/dL    Total Protein 6.7 5.7 - 8.2 g/dL    Albumin 4.22 3.20 - 4.80 g/dL    Globulin 2.5 gm/dL    A/G Ratio 1.7 1.5 - 2.5 g/dL    Total Bilirubin 0.6 0.3 - 1.2 mg/dL    Alkaline Phosphatase 77 25 - 100 U/L    AST (SGOT) 22 0 - 33 U/L    ALT (SGPT) 15 7 - 40 U/L   Duplex Carotid Ultrasound CAR   Result Value Ref Range    BSA 2.0 m^2    Prox CCA PSV 81.7 cm/sec    Prox CCA PSV 88.0 cm/sec    Prox CCA EDV 12.6 cm/sec    Prox CCA EDV 13.4 cm/sec    left Mid CCA PSV 86.1 cm/sec    Right Mid CCA PSV 84.9 cm/sec    left Mid CCA EDV 25.8 cm/sec    right Mid CCA EDV 17.3 cm/sec    Dist CCA PSV 79.8 cm/sec    Dist CCA PSV 69.8  cm/sec    Dist CCA EDV 22.0 cm/sec    Dist CCA EDV 19.5 cm/sec    CCA ratio rebecca 85.5 cm/sec    CCA ratio rebecca 84.1 cm/sec    Prox ECA PSV 89.3 cm/sec    Prox ECA .9 cm/sec    Bulb PSV 97.1 cm/sec    Bulb EDV 37.0 cm/sec    Prox ICA PSV 75.4 cm/sec    Prox ICA PSV 51.3 cm/sec    Prox ICA EDV 23.0 cm/sec    Prox ICA EDV 16.0 cm/sec    Mid ICA .1 cm/sec    Mid ICA PSV 93.2 cm/sec    Mid ICA EDV 37.7 cm/sec    Mid ICA EDV 32.5 cm/sec    Dist ICA PSV 85.5 cm/sec    Dist ICA EDV 35.3 cm/sec    ICA ratio rebecca 112.0 cm/sec    ICA ratio rebecca 92.6 cm/sec    Vertebral A PSV 61.5 cm/sec    Vertebral A PSV 35.8 cm/sec    ICA/CCA ratio 1.3     ICA/CCA ratio 1.1     Prox SCLA .8 cm/sec    Prox SCLA .9 cm/sec     CV ECHO SAURABH - BZI_BMI 24.7 kilograms/m^2     CV ECHO SAURABH - BSA(HAYCOCK) 2.1 m^2     CV ECHO SAURABH - BZI_METRIC_WEIGHT 82.6 kg     CV ECHO SAURABH - BZI_METRIC_HEIGHT 182.9 cm    Right arm /91 mmHg       Assessment/Plan   Isrrael was seen today for hyperlipidemia and hypertension.    Diagnoses and all orders for this visit:    Essential hypertension  -     Comprehensive Metabolic Panel        Patient Instructions   Pt has left over opiiods at home.  Advised to destroy or take to approp drop off site.  Meds as discussed per MAR. Did not need RF today.  CMP.  Continue monitoring of blood pressure at home.  Continue to do well.  Pt verbalizes understanding and agreement with plan of care.     EMR Dragon/transcription disclaimer:  Please note that portions of this note were completed with a voice recognition program.  Electronic transcription of the voice recognition program may permit erroneous words or phrases to be inadvertently transcribed.  Although I have reviewed the note for such errors, some may still exist in this documentation       Rossy Cardona, JULY

## 2019-02-14 ENCOUNTER — TELEPHONE (OUTPATIENT)
Dept: INTERNAL MEDICINE | Facility: CLINIC | Age: 79
End: 2019-02-14

## 2019-02-14 NOTE — TELEPHONE ENCOUNTER
Pt notified of results and verbalized understanding. He would like a copy of his labs mailed to him.

## 2019-02-14 NOTE — TELEPHONE ENCOUNTER
----- Message from JULY Chaney sent at 2/14/2019 11:05 AM EST -----  Please call patient random sugar was normal at 84.  Your kidney function and potassium and liver enzymes are normal.  Please let us know if you have any questions or concerns

## 2019-02-19 ENCOUNTER — OFFICE VISIT (OUTPATIENT)
Dept: ORTHOPEDIC SURGERY | Facility: CLINIC | Age: 79
End: 2019-02-19

## 2019-02-19 VITALS — BODY MASS INDEX: 24.19 KG/M2 | WEIGHT: 178.57 LBS | HEIGHT: 72 IN | OXYGEN SATURATION: 96 % | HEART RATE: 79 BPM

## 2019-02-19 DIAGNOSIS — M17.0 PRIMARY OSTEOARTHRITIS OF BOTH KNEES: Primary | ICD-10-CM

## 2019-02-19 PROCEDURE — 20610 DRAIN/INJ JOINT/BURSA W/O US: CPT | Performed by: PHYSICIAN ASSISTANT

## 2019-02-19 RX ORDER — LIDOCAINE HYDROCHLORIDE 10 MG/ML
4 INJECTION, SOLUTION INFILTRATION; PERINEURAL
Status: COMPLETED | OUTPATIENT
Start: 2019-02-19 | End: 2019-02-19

## 2019-02-19 RX ORDER — BUPIVACAINE HYDROCHLORIDE 2.5 MG/ML
4 INJECTION, SOLUTION INFILTRATION; PERINEURAL
Status: COMPLETED | OUTPATIENT
Start: 2019-02-19 | End: 2019-02-19

## 2019-02-19 RX ORDER — METHYLPREDNISOLONE ACETATE 40 MG/ML
40 INJECTION, SUSPENSION INTRA-ARTICULAR; INTRALESIONAL; INTRAMUSCULAR; SOFT TISSUE
Status: COMPLETED | OUTPATIENT
Start: 2019-02-19 | End: 2019-02-19

## 2019-02-19 RX ADMIN — METHYLPREDNISOLONE ACETATE 40 MG: 40 INJECTION, SUSPENSION INTRA-ARTICULAR; INTRALESIONAL; INTRAMUSCULAR; SOFT TISSUE at 08:52

## 2019-02-19 RX ADMIN — LIDOCAINE HYDROCHLORIDE 4 ML: 10 INJECTION, SOLUTION INFILTRATION; PERINEURAL at 08:52

## 2019-02-19 RX ADMIN — BUPIVACAINE HYDROCHLORIDE 4 ML: 2.5 INJECTION, SOLUTION INFILTRATION; PERINEURAL at 08:52

## 2019-02-19 NOTE — PROGRESS NOTES
Procedure   Large Joint Arthrocentesis: R knee  Date/Time: 2/19/2019 8:52 AM  Consent given by: patient  Site marked: site marked  Timeout: Immediately prior to procedure a time out was called to verify the correct patient, procedure, equipment, support staff and site/side marked as required   Supporting Documentation  Indications: pain   Procedure Details  Location: knee - R knee  Preparation: Patient was prepped and draped in the usual sterile fashion  Needle size: 22 G  Approach: anterolateral  Medications administered: 4 mL lidocaine 1 %; 40 mg methylPREDNISolone acetate 40 MG/ML; 4 mL bupivacaine 0.25 %  Patient tolerance: patient tolerated the procedure well with no immediate complications    Large Joint Arthrocentesis: L knee  Date/Time: 2/19/2019 8:52 AM  Consent given by: patient  Site marked: site marked  Timeout: Immediately prior to procedure a time out was called to verify the correct patient, procedure, equipment, support staff and site/side marked as required   Supporting Documentation  Indications: pain   Procedure Details  Location: knee - L knee  Preparation: Patient was prepped and draped in the usual sterile fashion  Needle size: 22 G  Approach: anterolateral  Medications administered: 4 mL lidocaine 1 %; 40 mg methylPREDNISolone acetate 40 MG/ML; 4 mL bupivacaine 0.25 %  Patient tolerance: patient tolerated the procedure well with no immediate complications

## 2019-02-19 NOTE — PROGRESS NOTES
[facility] Note    Subjective     Patient: Isrrael Marino  : 1940    Primary Care Provider: Rossy Cardona APRN    Requesting Provider: As above    Follow-up (3 months - Primary osteoarthritis of both knees )      History    Chief Complaint: bilateral knee pain    History of Present Illness: Patient presents to discuss his increasing bilateral knee pain.  He reports no new symptoms.  He has functional pain with no night pain.  Pain is worse with getting up from seated position.  He rates it 5/10 and aching burning and shooting.  He is most recently started diclofenac and Cymbalta which she feels has helped his knee pain.  He is not interested in replacement like repeat injection today.    Current Outpatient Medications on File Prior to Visit   Medication Sig Dispense Refill   • B Complex Vitamins (VITAMIN B COMPLEX PO) Take 1 tablet by mouth Daily.     • clopidogrel (PLAVIX) 75 MG tablet TAKE 1 TABLET EVERY DAY 90 tablet 4   • diclofenac (VOLTAREN) 75 MG EC tablet Take 1 tablet by mouth 2 (Two) Times a Day. 60 tablet 1   • DULoxetine (CYMBALTA) 30 MG capsule Take 1 capsule by mouth Daily. 30mg for 2 weeks, 60mg thereafter 60 capsule 1   • esomeprazole (nexIUM) 40 MG capsule Take 40 mg by mouth Every Morning Before Breakfast.     • fluticasone (FLONASE) 50 MCG/ACT nasal spray 1 spray into the nostril(s) as directed by provider Daily.     • HYDROcodone-acetaminophen (NORCO) 7.5-325 MG per tablet Take 1 tablet by mouth Every 6 (Six) Hours As Needed for Moderate Pain .     • metoprolol succinate XL (TOPROL-XL) 25 MG 24 hr tablet TAKE 1 TABLET ONE TIME DAILY 90 tablet 4   • oxyCODONE-acetaminophen (PERCOCET) 7.5-325 MG per tablet Take 1 tablet by mouth Every 6 (Six) Hours As Needed.     • polyethylene glycol (MIRALAX) powder Dissolve 1 capful (17g) in water and take by mouth Daily as directed while on opiods 238 g 1   • simvastatin (ZOCOR) 40 MG tablet TAKE 1 TABLET EVERY DAY 90  "tablet 3   • tadalafil (CIALIS) 20 MG tablet Take 20 mg by mouth As Needed for erectile dysfunction.     • tamsulosin (FLOMAX) 0.4 MG capsule 24 hr capsule Take 1 capsule by mouth Daily. 30 capsule 0   • traMADol (ULTRAM) 50 MG tablet Take 1 tablet by mouth 2 (Two) Times a Day. (Patient taking differently: Take 50 mg by mouth As Needed.) 60 tablet 0   • vardenafil (LEVITRA) 20 MG tablet Take 20 mg by mouth As Needed for erectile dysfunction.     • vitamin B-12 (CYANOCOBALAMIN) 1000 MCG tablet Take 1,000 mcg by mouth Daily.       No current facility-administered medications on file prior to visit.       Allergies   Allergen Reactions   • Ibuprofen Hives     \"Pt states he hasn't taken this in a long time\"      Past Medical History:   Diagnosis Date   • Arthritis     both knees   • Broken neck (CMS/HCC)    • CAD (coronary artery disease)    • Cancer (CMS/HCC)    • Chondrocalcinosis of knee    • GERD (gastroesophageal reflux disease)    • Gout    • Heart attack (CMS/HCC)      Last Assessed: 28 Mar 2014   • Heart disease    • History of transfusion     own blood with hip surgery   • Hyperlipidemia    • Hypertension    • IBS (irritable bowel syndrome)    • Left rotator cuff tear arthropathy    • Low back pain    • Lower extremity neuropathy    • Lumbar canal stenosis    • Neck pain    • Numbness    • Right hip pain    • Rotator cuff tear arthropathy of right shoulder    • Thin blood (CMS/HCC)      Past Surgical History:   Procedure Laterality Date   • APPENDECTOMY  1956   • BACK SURGERY  1997    spinal decompression and fusion   • BREAST LUMPECTOMY  2003   • CARDIAC CATHETERIZATION      with two stents//. DR. TOLEDO   • CARPAL TUNNEL RELEASE  03/28/2014    Neuroplasty Decompression Median Nerve At Carpal Tunnel   • CERVICAL DISCECTOMY POSTERIOR FUSION WITH BRAIN LAB N/A 7/13/2018    Procedure: CERVICAL LAMINECTOMY DECOMPRESSION POSTERIOR C1-2 POSTERIOR CERVICAL FUSION;  Surgeon: Randall Barnett MD;  Location: [facility]" OR;  Service: Neurosurgery   • COLONOSCOPY     • CORONARY STENT PLACEMENT  2013   • HERNIA REPAIR  2002    & 1998   • LUMBAR DISCECTOMY FUSION INSTRUMENTATION     • TONSILLECTOMY     • TOTAL HIP ARTHROPLASTY  2002   • VASECTOMY       Family History   Problem Relation Age of Onset   • Cancer Mother    • Heart disease Father    • Hypertension Father    • Heart attack Father       Social History     Socioeconomic History   • Marital status:      Spouse name: Not on file   • Number of children: Not on file   • Years of education: Not on file   • Highest education level: Not on file   Social Needs   • Financial resource strain: Not on file   • Food insecurity - worry: Not on file   • Food insecurity - inability: Not on file   • Transportation needs - medical: Not on file   • Transportation needs - non-medical: Not on file   Occupational History   • Not on file   Tobacco Use   • Smoking status: Never Smoker   • Smokeless tobacco: Never Used   Substance and Sexual Activity   • Alcohol use: No   • Drug use: No   • Sexual activity: Defer   Other Topics Concern   • Not on file   Social History Narrative   • Not on file        Review of Systems   Constitutional: Negative.    HENT: Positive for hearing loss.    Eyes: Negative.    Respiratory: Negative.    Cardiovascular: Negative.    Gastrointestinal: Negative.    Endocrine: Negative.    Genitourinary: Positive for difficulty urinating and frequency.   Musculoskeletal: Positive for arthralgias, back pain, neck pain and neck stiffness.   Skin: Negative.    Allergic/Immunologic: Negative.    Neurological: Negative.    Hematological: Bruises/bleeds easily.   Psychiatric/Behavioral: Negative.        The following portions of the patient's history were reviewed and updated as appropriate: allergies, current medications, past family history, past medical history, past social history, past surgical history and problem list.      Objective      Physical Exam  Pulse 79   Ht  "182.9 cm (72.01\")   Wt 81 kg (178 lb 9.2 oz)   SpO2 96%   BMI 24.21 kg/m²     Body mass index is 24.21 kg/m².    Patient is well developed, well nourished and in no acute distress.  Alert and oriented x 3.    Ortho Exam    Right Knee Exam  ----------  ALIGNMENT: Right: neutral----------  RANGE OF MOTION:  Right: Normal (0-120 degrees) with no extensor lag or flexion contracture  LIGAMENTOUS STABILITY:   Right:stable to varus and valgus stress at terminal extension and 30 degrees without any evidence of laxity----------  STRENGTH:  KNEE FLEXION Right 5/5  KNEE EXTENSION Right 5/5 ----------  PAIN WITH PALPATION: Right denies tenderness to palpation about the knee  PAIN WITH KNEE ROM: Right no  PATELLAR CREPITUS: Right yes   ----------    Left Knee Exam  ----------  Knee Exam:  ----------  ALIGNMENT:  Left: neutral  ----------  RANGE OF MOTION:  Left: Normal (0-120 degrees) with no extensor lag or flexion contracture  LIGAMENTOUS STABILITY:   Left:stable to varus and valgus stress at terminal extension and 30 degrees without any evidence of laxity   ----------  STRENGTH:  KNEE FLEXION  Left 5/5  KNEE EXTENSION Left 5/5  ----------  PAIN WITH PALPATION: Left denies tenderness to palpation about the knee  PAIN WITH KNEE ROM:  Left no  PATELLAR CREPITUS:  Left yes  ----------        Medical Decision Making    Data Review:   none    Assessment:  1. Primary osteoarthritis of both knees        Plan:  Bilateral knee arthritis.  Reviewed clinical findings, past and current treatment the patient.  On exam, he has crepitus with no tenderness and stable ligamentous exam.  He has been treated in the past with intermittent steroid injection with good relief.  He is not interested in knee replacement would like repeat injection.  Plan is to proceed with steroid injection into bilateral knees.  I will see him back in 3 months or sooner if needed.    Using sterile technique, the left knee was sterilely prepped with Hibiclens. " Following a time out,  using a 22 gauge needle, the left knee was injected with 40mg Depo Medrol, 4 cc lidocaine and 4 cc marcaine.  Patient tolerated the procedure well.  No complications.    Using sterile technique, the right knee was sterilely prepped with Hibiclens.  Following a time out,  using a 22 gauge needle, the right knee was injected with 40mg Depo Medrol, 4 cc lidocaine and 4 cc marcaine.  Patient tolerated the procedure well.  No complications.          Tish Shelley PA-C  02/19/19  9:32 AM

## 2019-03-11 ENCOUNTER — DOCUMENTATION (OUTPATIENT)
Dept: NEUROSURGERY | Facility: CLINIC | Age: 79
End: 2019-03-11

## 2019-03-11 ENCOUNTER — OFFICE VISIT (OUTPATIENT)
Dept: NEUROSURGERY | Facility: CLINIC | Age: 79
End: 2019-03-11

## 2019-03-11 VITALS — TEMPERATURE: 96.7 F | BODY MASS INDEX: 25.35 KG/M2 | HEIGHT: 72 IN | RESPIRATION RATE: 16 BRPM | WEIGHT: 187.2 LBS

## 2019-03-11 DIAGNOSIS — Z98.1 HX OF FUSION OF CERVICAL SPINE: Primary | ICD-10-CM

## 2019-03-11 PROCEDURE — 99212 OFFICE O/P EST SF 10 MIN: CPT | Performed by: NEUROLOGICAL SURGERY

## 2019-03-11 RX ORDER — FLUOROURACIL 50 MG/G
CREAM TOPICAL
COMMUNITY
Start: 2019-02-28 | End: 2021-04-29

## 2019-03-11 RX ORDER — DULOXETIN HYDROCHLORIDE 30 MG/1
60 CAPSULE, DELAYED RELEASE ORAL DAILY
Qty: 60 CAPSULE | Refills: 1 | Status: SHIPPED | OUTPATIENT
Start: 2019-03-11 | End: 2019-03-11 | Stop reason: SDUPTHER

## 2019-03-11 RX ORDER — DULOXETIN HYDROCHLORIDE 60 MG/1
30 CAPSULE, DELAYED RELEASE ORAL 2 TIMES DAILY
COMMUNITY
End: 2020-01-24

## 2019-03-11 RX ORDER — DICLOFENAC SODIUM 75 MG/1
75 TABLET, DELAYED RELEASE ORAL 2 TIMES DAILY
COMMUNITY
End: 2019-08-06 | Stop reason: SDUPTHER

## 2019-03-11 RX ORDER — DICLOFENAC SODIUM 75 MG/1
75 TABLET, DELAYED RELEASE ORAL 2 TIMES DAILY
Qty: 60 TABLET | Refills: 1 | Status: SHIPPED | OUTPATIENT
Start: 2019-03-11 | End: 2019-03-11 | Stop reason: SDUPTHER

## 2019-03-11 NOTE — PROGRESS NOTES
"  NAME: VERENA REGALADO   DOS: 3/11/2019  : 1940  PCP: Rossy Cardona APRN    Chief Complaint:    Chief Complaint   Patient presents with   • Hx of Lami/Decompression C1-2 Fusion   • routine f/u       History of Present Illness:  78 y.o. male   Follow-up cervical C1-2 fusion doing well minimal neck pain better strength in extremities overall pleased    PMHX  Allergies:  Allergies   Allergen Reactions   • Ibuprofen Hives     \"Pt states he hasn't taken this in a long time\"     Medications    Current Outpatient Medications:   •  B Complex Vitamins (VITAMIN B COMPLEX PO), Take 1 tablet by mouth Daily., Disp: , Rfl:   •  clopidogrel (PLAVIX) 75 MG tablet, TAKE 1 TABLET EVERY DAY, Disp: 90 tablet, Rfl: 4  •  diclofenac (VOLTAREN) 75 MG EC tablet, Take 1 tablet by mouth 2 (Two) Times a Day., Disp: 60 tablet, Rfl: 1  •  DULoxetine (CYMBALTA) 30 MG capsule, Take 1 capsule by mouth Daily. 30mg for 2 weeks, 60mg thereafter, Disp: 60 capsule, Rfl: 1  •  esomeprazole (nexIUM) 40 MG capsule, Take 40 mg by mouth Every Morning Before Breakfast., Disp: , Rfl:   •  fluorouracil (EFUDEX) 5 % cream, , Disp: , Rfl:   •  fluticasone (FLONASE) 50 MCG/ACT nasal spray, 1 spray into the nostril(s) as directed by provider Daily., Disp: , Rfl:   •  metoprolol succinate XL (TOPROL-XL) 25 MG 24 hr tablet, TAKE 1 TABLET ONE TIME DAILY, Disp: 90 tablet, Rfl: 4  •  oxyCODONE-acetaminophen (PERCOCET) 7.5-325 MG per tablet, Take 1 tablet by mouth Every 6 (Six) Hours As Needed., Disp: , Rfl:   •  polyethylene glycol (MIRALAX) powder, Dissolve 1 capful (17g) in water and take by mouth Daily as directed while on opiods, Disp: 238 g, Rfl: 1  •  simvastatin (ZOCOR) 40 MG tablet, TAKE 1 TABLET EVERY DAY, Disp: 90 tablet, Rfl: 3  •  tadalafil (CIALIS) 20 MG tablet, Take 20 mg by mouth As Needed for erectile dysfunction., Disp: , Rfl:   •  tamsulosin (FLOMAX) 0.4 MG capsule 24 hr capsule, Take 1 capsule by mouth Daily., Disp: 30 capsule, Rfl: 0  •  " traMADol (ULTRAM) 50 MG tablet, Take 1 tablet by mouth 2 (Two) Times a Day. (Patient taking differently: Take 50 mg by mouth As Needed.), Disp: 60 tablet, Rfl: 0  •  vardenafil (LEVITRA) 20 MG tablet, Take 20 mg by mouth As Needed for erectile dysfunction., Disp: , Rfl:   •  vitamin B-12 (CYANOCOBALAMIN) 1000 MCG tablet, Take 1,000 mcg by mouth Daily., Disp: , Rfl:   Past Medical History:  Past Medical History:   Diagnosis Date   • Arthritis     both knees   • Broken neck (CMS/HCC)    • CAD (coronary artery disease)    • Cancer (CMS/HCC)    • Chondrocalcinosis of knee    • GERD (gastroesophageal reflux disease)    • Gout    • Heart attack (CMS/HCC)      Last Assessed: 28 Mar 2014   • Heart disease    • History of transfusion     own blood with hip surgery   • Hyperlipidemia    • Hypertension    • IBS (irritable bowel syndrome)    • Left rotator cuff tear arthropathy    • Low back pain    • Lower extremity neuropathy    • Lumbar canal stenosis    • Neck pain    • Numbness    • Right hip pain    • Rotator cuff tear arthropathy of right shoulder    • Thin blood (CMS/HCC)      Past Surgical History:  Past Surgical History:   Procedure Laterality Date   • APPENDECTOMY  1956   • BACK SURGERY  1997    spinal decompression and fusion   • BREAST LUMPECTOMY  2003   • CARDIAC CATHETERIZATION      with two stents//. DR. TOLEDO   • CARPAL TUNNEL RELEASE  03/28/2014    Neuroplasty Decompression Median Nerve At Carpal Tunnel   • CERVICAL DISCECTOMY POSTERIOR FUSION WITH BRAIN LAB N/A 7/13/2018    Procedure: CERVICAL LAMINECTOMY DECOMPRESSION POSTERIOR C1-2 POSTERIOR CERVICAL FUSION;  Surgeon: Randall Barnett MD;  Location: [facility];  Service: Neurosurgery   • COLONOSCOPY     • CORONARY STENT PLACEMENT  2013   • HERNIA REPAIR  2002    & 1998   • LUMBAR DISCECTOMY FUSION INSTRUMENTATION     • TONSILLECTOMY     • TOTAL HIP ARTHROPLASTY  2002   • VASECTOMY       Social Hx:  Social History     Tobacco Use   • Smoking status: Never  Smoker   • Smokeless tobacco: Never Used   Substance Use Topics   • Alcohol use: No   • Drug use: No     Family Hx:  Family History   Problem Relation Age of Onset   • Cancer Mother    • Heart disease Father    • Hypertension Father    • Heart attack Father      Review of Systems:        Review of Systems   Constitutional: Negative for activity change, appetite change, chills, diaphoresis, fatigue, fever and unexpected weight change.        Pt reports no new symptoms.   HENT: Negative for congestion, dental problem, drooling, ear discharge, ear pain, facial swelling, hearing loss, mouth sores, nosebleeds, postnasal drip, rhinorrhea, sinus pressure, sneezing, sore throat, tinnitus, trouble swallowing and voice change.    Eyes: Negative for photophobia, pain, discharge, redness, itching and visual disturbance.   Respiratory: Negative for apnea, cough, choking, chest tightness, shortness of breath, wheezing and stridor.    Cardiovascular: Negative for chest pain, palpitations and leg swelling.   Gastrointestinal: Negative for abdominal distention, abdominal pain, anal bleeding, blood in stool, constipation, diarrhea, nausea, rectal pain and vomiting.   Endocrine: Negative for cold intolerance, heat intolerance, polydipsia, polyphagia and polyuria.   Genitourinary: Negative for decreased urine volume, difficulty urinating, dysuria, enuresis, flank pain, frequency, genital sores, hematuria and urgency.   Musculoskeletal: Negative for arthralgias, back pain, gait problem, joint swelling, myalgias, neck pain and neck stiffness.   Skin: Negative for color change, pallor, rash and wound.   Allergic/Immunologic: Negative for environmental allergies, food allergies and immunocompromised state.   Neurological: Negative for dizziness, tremors, seizures, syncope, facial asymmetry, speech difficulty, weakness, light-headedness, numbness and headaches.   Hematological: Negative for adenopathy. Does not bruise/bleed easily.    Psychiatric/Behavioral: Negative for agitation, behavioral problems, confusion, decreased concentration, dysphoric mood, hallucinations, self-injury, sleep disturbance and suicidal ideas. The patient is not nervous/anxious and is not hyperactive.    All other systems reviewed and are negative.           Physical Examination:  Vitals:    03/11/19 0859   Resp: 16   Temp: 96.7 °F (35.9 °C)      General Appearance:   Well developed, well nourished, well groomed, alert, and cooperative.  Neurological examination:  Neurologic Exam  He is awake alert and follows commands    His incisions clean dry and intact    He has mild atrophy of his left paraspinal musculature    He has good strength upper and lower extremities with no Hoffmans    His gait is normal    Review of Imaging/DATA:    Diagnoses/Plan:    Mr. Marino is a 78 y.o. male    he is doing well with expected loss of range of motion in the neck.  He is going to continue his Cymbalta he has no side effects I discussed with him ulcer risk using the Voltaren.  He has no signs of clinical myelopathy his gait is wide-based and stable his incisions well-healed.    We will set him up with a primary care doctor is right now he does not have one.  I will see him back as needed

## 2019-03-13 RX ORDER — FLUTICASONE PROPIONATE 50 MCG
1 SPRAY, SUSPENSION (ML) NASAL DAILY
Qty: 3 BOTTLE | Refills: 1 | Status: SHIPPED | OUTPATIENT
Start: 2019-03-13 | End: 2020-07-27 | Stop reason: SDUPTHER

## 2019-03-13 NOTE — TELEPHONE ENCOUNTER
PATIENT WOULD LIKE FOR THIS MEDICATION TO BE SENT TO THE [facility] THAT IS ON FILE .

## 2019-03-19 ENCOUNTER — OFFICE VISIT (OUTPATIENT)
Dept: ORTHOPEDIC SURGERY | Facility: CLINIC | Age: 79
End: 2019-03-19

## 2019-03-19 VITALS — WEIGHT: 182.1 LBS | HEIGHT: 72 IN | HEART RATE: 71 BPM | OXYGEN SATURATION: 100 % | BODY MASS INDEX: 24.66 KG/M2

## 2019-03-19 DIAGNOSIS — M19.011 GLENOHUMERAL ARTHRITIS, RIGHT: ICD-10-CM

## 2019-03-19 DIAGNOSIS — M25.511 RIGHT SHOULDER PAIN, UNSPECIFIED CHRONICITY: Primary | ICD-10-CM

## 2019-03-19 PROCEDURE — 20610 DRAIN/INJ JOINT/BURSA W/O US: CPT | Performed by: PHYSICIAN ASSISTANT

## 2019-03-19 PROCEDURE — 99213 OFFICE O/P EST LOW 20 MIN: CPT | Performed by: PHYSICIAN ASSISTANT

## 2019-03-19 RX ORDER — DULOXETIN HYDROCHLORIDE 30 MG/1
CAPSULE, DELAYED RELEASE ORAL EVERY 12 HOURS SCHEDULED
COMMUNITY
End: 2019-03-19

## 2019-03-19 RX ORDER — DICLOFENAC SODIUM 75 MG/1
TABLET, DELAYED RELEASE ORAL EVERY 12 HOURS SCHEDULED
COMMUNITY
End: 2019-03-19

## 2019-03-19 RX ADMIN — BUPIVACAINE HYDROCHLORIDE 2 ML: 2.5 INJECTION, SOLUTION INFILTRATION; PERINEURAL at 13:33

## 2019-03-19 RX ADMIN — TRIAMCINOLONE ACETONIDE 40 MG: 40 INJECTION, SUSPENSION INTRA-ARTICULAR; INTRAMUSCULAR at 13:33

## 2019-03-19 NOTE — PROGRESS NOTES
[facility]    Subjective     Patient: Isrrael Marino  : 1940    Primary Care Provider: Rossy Cardona APRN    Requesting Provider: As above    Follow-up of the Right Shoulder (1 year )      History    Chief Complaint: Right shoulder pain    History of Present Illness: Patient returns today for his right shoulder pain.  He has no glenohumeral arthritis.  He reports no new symptoms with pain with shoulder motion and no rest pain.  He has been treated in the past with intermittent steroid injection with relief for months.  Last injection was 2018.  He would like repeat injection today.  Current Outpatient Medications on File Prior to Visit   Medication Sig Dispense Refill   • B Complex Vitamins (VITAMIN B COMPLEX PO) Take 1 tablet by mouth Daily.     • clopidogrel (PLAVIX) 75 MG tablet TAKE 1 TABLET EVERY DAY 90 tablet 4   • diclofenac (VOLTAREN) 75 MG EC tablet Take 75 mg by mouth 2 (Two) Times a Day.     • DULoxetine (CYMBALTA) 60 MG capsule Take 60 mg by mouth Daily.     • esomeprazole (nexIUM) 40 MG capsule Take 40 mg by mouth Every Morning Before Breakfast.     • fluorouracil (EFUDEX) 5 % cream      • fluticasone (FLONASE) 50 MCG/ACT nasal spray 1 spray into the nostril(s) as directed by provider Daily. 3 bottle 1   • metoprolol succinate XL (TOPROL-XL) 25 MG 24 hr tablet TAKE 1 TABLET ONE TIME DAILY 90 tablet 4   • polyethylene glycol (MIRALAX) powder Dissolve 1 capful (17g) in water and take by mouth Daily as directed while on opiods 238 g 1   • simvastatin (ZOCOR) 40 MG tablet TAKE 1 TABLET EVERY DAY 90 tablet 3   • tadalafil (CIALIS) 20 MG tablet Take 20 mg by mouth As Needed for erectile dysfunction.     • tamsulosin (FLOMAX) 0.4 MG capsule 24 hr capsule Take 1 capsule by mouth Daily. 30 capsule 0   • vardenafil (LEVITRA) 20 MG tablet Take 20 mg by mouth As Needed for erectile dysfunction.     • vitamin B-12 (CYANOCOBALAMIN) 1000 MCG tablet Take 1,000 mcg by  "mouth Daily.       No current facility-administered medications on file prior to visit.       Allergies   Allergen Reactions   • Ibuprofen Hives     \"Pt states he hasn't taken this in a long time\"      Past Medical History:   Diagnosis Date   • Arthritis     both knees   • Broken neck (CMS/HCC)    • CAD (coronary artery disease)    • Cancer (CMS/HCC)    • Chondrocalcinosis of knee    • GERD (gastroesophageal reflux disease)    • Gout    • Heart attack (CMS/HCC)      Last Assessed: 28 Mar 2014   • Heart disease    • History of transfusion     own blood with hip surgery   • Hyperlipidemia    • Hypertension    • IBS (irritable bowel syndrome)    • Left rotator cuff tear arthropathy    • Low back pain    • Lower extremity neuropathy    • Lumbar canal stenosis    • Neck pain    • Numbness    • Right hip pain    • Rotator cuff tear arthropathy of right shoulder    • Thin blood (CMS/HCC)      Past Surgical History:   Procedure Laterality Date   • APPENDECTOMY  1956   • BACK SURGERY  1997    spinal decompression and fusion   • BREAST LUMPECTOMY  2003   • CARDIAC CATHETERIZATION      with two stents//. DR. TOLEDO   • CARPAL TUNNEL RELEASE  03/28/2014    Neuroplasty Decompression Median Nerve At Carpal Tunnel   • CERVICAL DISCECTOMY POSTERIOR FUSION WITH BRAIN LAB N/A 7/13/2018    Procedure: CERVICAL LAMINECTOMY DECOMPRESSION POSTERIOR C1-2 POSTERIOR CERVICAL FUSION;  Surgeon: Randall Barnett MD;  Location: [facility];  Service: Neurosurgery   • COLONOSCOPY     • CORONARY STENT PLACEMENT  2013   • HERNIA REPAIR  2002    & 1998   • LUMBAR DISCECTOMY FUSION INSTRUMENTATION     • TONSILLECTOMY     • TOTAL HIP ARTHROPLASTY  2002   • VASECTOMY       Family History   Problem Relation Age of Onset   • Cancer Mother    • Heart disease Father    • Hypertension Father    • Heart attack Father       Social History     Socioeconomic History   • Marital status:      Spouse name: Not on file   • Number of children: Not on file " "  • Years of education: Not on file   • Highest education level: Not on file   Tobacco Use   • Smoking status: Never Smoker   • Smokeless tobacco: Never Used   Substance and Sexual Activity   • Alcohol use: No   • Drug use: No   • Sexual activity: Defer        Review of Systems   Constitutional: Negative.    HENT: Positive for hearing loss.    Eyes: Negative.    Respiratory: Negative.    Cardiovascular: Negative.    Gastrointestinal: Negative.    Endocrine: Negative.    Genitourinary: Positive for difficulty urinating.   Musculoskeletal: Positive for arthralgias and neck stiffness.   Skin: Negative.    Allergic/Immunologic: Negative.    Neurological: Negative.    Hematological: Negative.    Psychiatric/Behavioral: Negative.        The following portions of the patient's history were reviewed and updated as appropriate: allergies, current medications, past family history, past medical history, past social history, past surgical history and problem list.      Objective      Physical Exam  Pulse 71   Ht 182.9 cm (72.01\")   Wt 82.6 kg (182 lb 1.6 oz)   SpO2 100%   BMI 24.69 kg/m²     Body mass index is 24.69 kg/m².    Patient is well developed, well nourished and in no acute distress.  Alert and oriented x 3.    Ortho Exam  Musculoskeletal   Upper Extremity   Right Shoulder     Inspection and Palpation:     Tenderness - tender over the posterior joint line    Crepitus - positive    Sensation is normal    Examination reveals no ecchymosis.        Strength and Tone:    Supraspinatus -5/5    External Rotators-5/5    Infraspinatus - 5/5    Subscapularis - 5/5    Deltoid - 5/5     Range of Motion   Left shoulder:    Internal Rotation: ROM - T7    External Rotation: AROM - 80 degrees    Elevation through flexion: AROM - 180 degrees    Right Shoulder:       External Rotation: AROM - 60 degrees    Elevation through flexion: AROM - 180 degrees          Medical Decision Making    Data Review:   ordered and reviewed x-rays " today    Assessment:  1. Right shoulder pain, unspecified chronicity    2. Glenohumeral arthritis, right        Plan:  Right glenohumeral arthritis.  I reviewed x-rays and clinical findings, past and current treatment the patient today.  On exam, he has good range of motion with tenderness along the posterior joint line and crepitus.  X-rays today show severe glenohumeral and severe acromioclavicular arthritis.  Patient has done well in the past with intermittent steroid injection plan today is repeat steroid injection into the right glenohumeral joint.  He will return as needed.    Using sterile technique, the right shoulder with sterilely prepped with Hibiclens.  After time out, using a 22-gauge needle, the right glenohumeral joint was injected with 40 mg Kenalog, 2 cc of Marcaine and 2 cc of lidocaine.  Patient tolerated the procedure well.        Tish Shelley PA-C  03/20/19  1:02 PM

## 2019-03-19 NOTE — PROGRESS NOTES
Procedure   Large Joint Arthrocentesis: R glenohumeral  Date/Time: 3/19/2019 1:33 PM  Consent given by: patient  Site marked: site marked  Timeout: Immediately prior to procedure a time out was called to verify the correct patient, procedure, equipment, support staff and site/side marked as required   Supporting Documentation  Indications: pain   Procedure Details  Location: shoulder - R glenohumeral  Preparation: Patient was prepped and draped in the usual sterile fashion  Needle size: 22 G  Medications administered: 2 mL bupivacaine 0.25 %; 40 mg triamcinolone acetonide 40 MG/ML (2cc Lidocaine 1%, NDC 9282-5129-40, Lot [id number], exp [id number])

## 2019-03-20 RX ORDER — BUPIVACAINE HYDROCHLORIDE 2.5 MG/ML
2 INJECTION, SOLUTION INFILTRATION; PERINEURAL
Status: COMPLETED | OUTPATIENT
Start: 2019-03-19 | End: 2019-03-19

## 2019-03-20 RX ORDER — TRIAMCINOLONE ACETONIDE 40 MG/ML
40 INJECTION, SUSPENSION INTRA-ARTICULAR; INTRAMUSCULAR
Status: COMPLETED | OUTPATIENT
Start: 2019-03-19 | End: 2019-03-19

## 2019-05-01 RX ORDER — METOPROLOL SUCCINATE 25 MG/1
TABLET, EXTENDED RELEASE ORAL
Qty: 90 TABLET | Refills: 4 | Status: SHIPPED | OUTPATIENT
Start: 2019-05-01 | End: 2020-07-27 | Stop reason: SDUPTHER

## 2019-05-01 RX ORDER — SIMVASTATIN 40 MG
TABLET ORAL
Qty: 90 TABLET | Refills: 3 | Status: SHIPPED | OUTPATIENT
Start: 2019-05-01 | End: 2020-07-27 | Stop reason: SDUPTHER

## 2019-05-01 RX ORDER — CLOPIDOGREL BISULFATE 75 MG/1
TABLET ORAL
Qty: 90 TABLET | Refills: 4 | Status: SHIPPED | OUTPATIENT
Start: 2019-05-01 | End: 2020-07-27 | Stop reason: SDUPTHER

## 2019-05-21 ENCOUNTER — OFFICE VISIT (OUTPATIENT)
Dept: ORTHOPEDIC SURGERY | Facility: CLINIC | Age: 79
End: 2019-05-21

## 2019-05-21 ENCOUNTER — OFFICE VISIT (OUTPATIENT)
Dept: INTERNAL MEDICINE | Facility: CLINIC | Age: 79
End: 2019-05-21

## 2019-05-21 VITALS
SYSTOLIC BLOOD PRESSURE: 120 MMHG | HEIGHT: 72 IN | BODY MASS INDEX: 25.81 KG/M2 | WEIGHT: 190.6 LBS | OXYGEN SATURATION: 96 % | HEART RATE: 81 BPM | DIASTOLIC BLOOD PRESSURE: 62 MMHG | TEMPERATURE: 97.6 F

## 2019-05-21 VITALS — BODY MASS INDEX: 24.66 KG/M2 | HEIGHT: 72 IN | OXYGEN SATURATION: 97 % | HEART RATE: 79 BPM | WEIGHT: 182.1 LBS

## 2019-05-21 DIAGNOSIS — S40.811A ABRASION OF RIGHT UPPER EXTREMITY, INITIAL ENCOUNTER: Primary | ICD-10-CM

## 2019-05-21 DIAGNOSIS — M17.0 PRIMARY OSTEOARTHRITIS OF BOTH KNEES: Primary | ICD-10-CM

## 2019-05-21 PROCEDURE — 99213 OFFICE O/P EST LOW 20 MIN: CPT | Performed by: NURSE PRACTITIONER

## 2019-05-21 PROCEDURE — 20610 DRAIN/INJ JOINT/BURSA W/O US: CPT | Performed by: PHYSICIAN ASSISTANT

## 2019-05-21 PROCEDURE — 99213 OFFICE O/P EST LOW 20 MIN: CPT | Performed by: PHYSICIAN ASSISTANT

## 2019-05-21 RX ORDER — LIDOCAINE HYDROCHLORIDE 10 MG/ML
4 INJECTION, SOLUTION EPIDURAL; INFILTRATION; INTRACAUDAL; PERINEURAL
Status: COMPLETED | OUTPATIENT
Start: 2019-05-21 | End: 2019-05-21

## 2019-05-21 RX ORDER — CEPHALEXIN 500 MG/1
500 CAPSULE ORAL 2 TIMES DAILY
Qty: 20 CAPSULE | Refills: 0 | Status: SHIPPED | OUTPATIENT
Start: 2019-05-21 | End: 2019-05-31

## 2019-05-21 RX ORDER — METHYLPREDNISOLONE ACETATE 40 MG/ML
80 INJECTION, SUSPENSION INTRA-ARTICULAR; INTRALESIONAL; INTRAMUSCULAR; SOFT TISSUE
Status: COMPLETED | OUTPATIENT
Start: 2019-05-21 | End: 2019-05-21

## 2019-05-21 RX ORDER — METHYLPREDNISOLONE ACETATE 40 MG/ML
40 INJECTION, SUSPENSION INTRA-ARTICULAR; INTRALESIONAL; INTRAMUSCULAR; SOFT TISSUE
Status: COMPLETED | OUTPATIENT
Start: 2019-05-21 | End: 2019-05-21

## 2019-05-21 RX ADMIN — METHYLPREDNISOLONE ACETATE 40 MG: 40 INJECTION, SUSPENSION INTRA-ARTICULAR; INTRALESIONAL; INTRAMUSCULAR; SOFT TISSUE at 09:00

## 2019-05-21 RX ADMIN — LIDOCAINE HYDROCHLORIDE 4 ML: 10 INJECTION, SOLUTION EPIDURAL; INFILTRATION; INTRACAUDAL; PERINEURAL at 09:02

## 2019-05-21 RX ADMIN — METHYLPREDNISOLONE ACETATE 80 MG: 40 INJECTION, SUSPENSION INTRA-ARTICULAR; INTRALESIONAL; INTRAMUSCULAR; SOFT TISSUE at 09:02

## 2019-05-21 RX ADMIN — LIDOCAINE HYDROCHLORIDE 4 ML: 10 INJECTION, SOLUTION EPIDURAL; INFILTRATION; INTRACAUDAL; PERINEURAL at 09:00

## 2019-05-21 NOTE — PROGRESS NOTES
Chief Complaint   Patient presents with   • Arm Injury     skin off right arm       History of Present Illness  78 y.o.male presents for arm injury.  Wednesday of last week he slipped on a boat and scraped his right arm.  The top layer of skin has continued to peel off in the area.  Does have some pain there.  Not much drainage if he has any it is clear yellow.  This morning when he woke up he had some redness around the site and was concerned was infected so he made appointment.  As the day has went on though the redness improved.  Denies any fever.  His last tetanus shot was in 2017.  No smoking or diabetes noted.      Review of Systems   Constitutional: Negative for chills, diaphoresis and fever.   Skin: Positive for color change.        Wound right arm   Neurological: Negative for numbness.       [facility]  The following portions of the patient's history were reviewed and updated as appropriate: allergies, current medications, past family history, past medical history, past social history, past surgical history and problem list.       Past Medical History:   Diagnosis Date   • Arthritis     both knees   • Broken neck (CMS/HCC)    • CAD (coronary artery disease)    • Cancer (CMS/HCC)    • Chondrocalcinosis of knee    • GERD (gastroesophageal reflux disease)    • Gout    • Heart attack (CMS/HCC)      Last Assessed: 28 Mar 2014   • Heart disease    • History of transfusion     own blood with hip surgery   • Hyperlipidemia    • Hypertension    • IBS (irritable bowel syndrome)    • Left rotator cuff tear arthropathy    • Low back pain    • Lower extremity neuropathy    • Lumbar canal stenosis    • Neck pain    • Numbness    • Right hip pain    • Rotator cuff tear arthropathy of right shoulder    • Thin blood (CMS/HCC)       Past Surgical History:   Procedure Laterality Date   • APPENDECTOMY  1956   • BACK SURGERY  1997    spinal decompression and fusion   • BREAST LUMPECTOMY  2003   • CARDIAC CATHETERIZATION      with  "two stents//. DR. TOLEDO   • CARPAL TUNNEL RELEASE  03/28/2014    Neuroplasty Decompression Median Nerve At Carpal Tunnel   • CERVICAL DISCECTOMY POSTERIOR FUSION WITH BRAIN LAB N/A 7/13/2018    Procedure: CERVICAL LAMINECTOMY DECOMPRESSION POSTERIOR C1-2 POSTERIOR CERVICAL FUSION;  Surgeon: Randall Barnett MD;  Location: [facility];  Service: Neurosurgery   • COLONOSCOPY     • CORONARY STENT PLACEMENT  2013   • HERNIA REPAIR  2002    & 1998   • LUMBAR DISCECTOMY FUSION INSTRUMENTATION     • TONSILLECTOMY     • TOTAL HIP ARTHROPLASTY  2002   • VASECTOMY        Allergies   Allergen Reactions   • Ibuprofen Hives     \"Pt states he hasn't taken this in a long time\"      Family History   Problem Relation Age of Onset   • Cancer Mother    • Heart disease Father    • Hypertension Father    • Heart attack Father             Current Outpatient Medications:   •  B Complex Vitamins (VITAMIN B COMPLEX PO), Take 1 tablet by mouth Daily., Disp: , Rfl:   •  clopidogrel (PLAVIX) 75 MG tablet, TAKE 1 TABLET EVERY DAY, Disp: 90 tablet, Rfl: 4  •  diclofenac (VOLTAREN) 75 MG EC tablet, Take 75 mg by mouth 2 (Two) Times a Day., Disp: , Rfl:   •  DULoxetine (CYMBALTA) 60 MG capsule, Take 60 mg by mouth Daily., Disp: , Rfl:   •  esomeprazole (nexIUM) 40 MG capsule, Take 40 mg by mouth Every Morning Before Breakfast., Disp: , Rfl:   •  fluorouracil (EFUDEX) 5 % cream, , Disp: , Rfl:   •  fluticasone (FLONASE) 50 MCG/ACT nasal spray, 1 spray into the nostril(s) as directed by provider Daily., Disp: 3 bottle, Rfl: 1  •  metoprolol succinate XL (TOPROL-XL) 25 MG 24 hr tablet, TAKE 1 TABLET EVERY DAY, Disp: 90 tablet, Rfl: 4  •  polyethylene glycol (MIRALAX) powder, Dissolve 1 capful (17g) in water and take by mouth Daily as directed while on opiods, Disp: 238 g, Rfl: 1  •  simvastatin (ZOCOR) 40 MG tablet, TAKE 1 TABLET EVERY DAY, Disp: 90 tablet, Rfl: 3  •  tadalafil (CIALIS) 20 MG tablet, Take 20 mg by mouth As Needed for erectile " "dysfunction., Disp: , Rfl:   •  tamsulosin (FLOMAX) 0.4 MG capsule 24 hr capsule, Take 1 capsule by mouth Daily., Disp: 30 capsule, Rfl: 0  •  vardenafil (LEVITRA) 20 MG tablet, Take 20 mg by mouth As Needed for erectile dysfunction., Disp: , Rfl:   •  vitamin B-12 (CYANOCOBALAMIN) 1000 MCG tablet, Take 1,000 mcg by mouth Daily., Disp: , Rfl:   No current facility-administered medications for this visit.     VITALS:  /62   Pulse 81   Temp 97.6 °F (36.4 °C) (Oral)   Ht 182.9 cm (72.01\")   Wt 86.5 kg (190 lb 9.6 oz)   SpO2 96%   BMI 25.84 kg/m²     Physical Exam   Constitutional: He is oriented to person, place, and time. He appears well-developed and well-nourished. No distress.   Pulmonary/Chest: Effort normal.   Neurological: He is alert and oriented to person, place, and time.   Skin: Skin is warm and dry. Capillary refill takes less than 2 seconds. Abrasion noted.        Right inner forearm with abrasion/wound approximately 3-1/2 inches in length and 1/2 inch with . top layer of skin is pulled back some no drainage.  Small surrounding erythematous base without streaking swelling or warmth.       LABS  No new labs    ASSESSMENT/PLAN  Isrrael was seen today for arm injury.    Diagnoses and all orders for this visit:    Abrasion of right upper extremity, initial encounter  -     mupirocin (BACTROBAN) 2 % ointment; Apply  topically to the appropriate area as directed 3 (Three) Times a Day.  -     cephalexin (KEFLEX) 500 MG capsule; Take 1 capsule by mouth 2 (Two) Times a Day for 10 days.    I think with some antibiotic ointment that area of skin that that is pulled back will soften up and slough off with scabbing.  The wound base looks good no pus.  Not much erythema patient says this improved throughout the day.  May only need the antibiotic ointment and dressing changes.  If no improvement within 1 to 2 days of this I do want him to start the Keflex.  Patient verbalizes understanding.  If any worsening " of symptoms or drainage or fever he is to let us know.  Patient thinks he already has the Bactroban cream at home.  He was to check their first before picking up prescription.    I discussed the patients findings and my recommendations with patient.  Patient was encouraged to keep me informed of any acute changes, lack of improvement, or any new concerning symptoms.    Patient voiced understanding of all instructions and denied further questions.      FOLLOW-UP  Return if symptoms worsen or fail to improve.    Electronically signed by:    JULY Fitch  05/21/2019

## 2019-05-21 NOTE — PROGRESS NOTES
[facility]    Subjective     Patient: Isrrael Marino  : 1940    Primary Care Provider: Rossy Cardona APRN    Requesting Provider: As above    Follow-up (bilateral knees 3 months)      History    Chief Complaint: Bilateral knee pain    History of Present Illness: Patient returns today to discuss his returning and increasing bilateral knee pain.  He is well-known to me with known end-stage bilateral knee arthritis.  He is done well in the past with intermittent steroid injection every 3 to 4 months.  He is not interested in knee replacement.  He would like repeat injections today.    Current Outpatient Medications on File Prior to Visit   Medication Sig Dispense Refill   • B Complex Vitamins (VITAMIN B COMPLEX PO) Take 1 tablet by mouth Daily.     • clopidogrel (PLAVIX) 75 MG tablet TAKE 1 TABLET EVERY DAY 90 tablet 4   • diclofenac (VOLTAREN) 75 MG EC tablet Take 75 mg by mouth 2 (Two) Times a Day.     • DULoxetine (CYMBALTA) 60 MG capsule Take 60 mg by mouth Daily.     • esomeprazole (nexIUM) 40 MG capsule Take 40 mg by mouth Every Morning Before Breakfast.     • fluorouracil (EFUDEX) 5 % cream      • fluticasone (FLONASE) 50 MCG/ACT nasal spray 1 spray into the nostril(s) as directed by provider Daily. 3 bottle 1   • metoprolol succinate XL (TOPROL-XL) 25 MG 24 hr tablet TAKE 1 TABLET EVERY DAY 90 tablet 4   • polyethylene glycol (MIRALAX) powder Dissolve 1 capful (17g) in water and take by mouth Daily as directed while on opiods 238 g 1   • simvastatin (ZOCOR) 40 MG tablet TAKE 1 TABLET EVERY DAY 90 tablet 3   • tadalafil (CIALIS) 20 MG tablet Take 20 mg by mouth As Needed for erectile dysfunction.     • tamsulosin (FLOMAX) 0.4 MG capsule 24 hr capsule Take 1 capsule by mouth Daily. 30 capsule 0   • vardenafil (LEVITRA) 20 MG tablet Take 20 mg by mouth As Needed for erectile dysfunction.     • vitamin B-12 (CYANOCOBALAMIN) 1000 MCG tablet Take 1,000 mcg by mouth Daily.  "      No current facility-administered medications on file prior to visit.       Allergies   Allergen Reactions   • Ibuprofen Hives     \"Pt states he hasn't taken this in a long time\"      Past Medical History:   Diagnosis Date   • Arthritis     both knees   • Broken neck (CMS/HCC)    • CAD (coronary artery disease)    • Cancer (CMS/HCC)    • Chondrocalcinosis of knee    • GERD (gastroesophageal reflux disease)    • Gout    • Heart attack (CMS/HCC)      Last Assessed: 28 Mar 2014   • Heart disease    • History of transfusion     own blood with hip surgery   • Hyperlipidemia    • Hypertension    • IBS (irritable bowel syndrome)    • Left rotator cuff tear arthropathy    • Low back pain    • Lower extremity neuropathy    • Lumbar canal stenosis    • Neck pain    • Numbness    • Right hip pain    • Rotator cuff tear arthropathy of right shoulder    • Thin blood (CMS/HCC)      Past Surgical History:   Procedure Laterality Date   • APPENDECTOMY  1956   • BACK SURGERY  1997    spinal decompression and fusion   • BREAST LUMPECTOMY  2003   • CARDIAC CATHETERIZATION      with two stents//. DR. TOLEDO   • CARPAL TUNNEL RELEASE  03/28/2014    Neuroplasty Decompression Median Nerve At Carpal Tunnel   • CERVICAL DISCECTOMY POSTERIOR FUSION WITH BRAIN LAB N/A 7/13/2018    Procedure: CERVICAL LAMINECTOMY DECOMPRESSION POSTERIOR C1-2 POSTERIOR CERVICAL FUSION;  Surgeon: Randall Barnett MD;  Location: [facility];  Service: Neurosurgery   • COLONOSCOPY     • CORONARY STENT PLACEMENT  2013   • HERNIA REPAIR  2002    & 1998   • LUMBAR DISCECTOMY FUSION INSTRUMENTATION     • TONSILLECTOMY     • TOTAL HIP ARTHROPLASTY  2002   • VASECTOMY       Family History   Problem Relation Age of Onset   • Cancer Mother    • Heart disease Father    • Hypertension Father    • Heart attack Father       Social History     Socioeconomic History   • Marital status:      Spouse name: Not on file   • Number of children: Not on file   • Years of " "education: Not on file   • Highest education level: Not on file   Tobacco Use   • Smoking status: Never Smoker   • Smokeless tobacco: Never Used   Substance and Sexual Activity   • Alcohol use: No   • Drug use: No   • Sexual activity: Defer        Review of Systems   Constitutional: Negative.    HENT: Positive for hearing loss.    Eyes: Negative.    Respiratory: Negative.    Cardiovascular: Positive for leg swelling.   Gastrointestinal: Negative.    Endocrine: Negative.    Genitourinary: Positive for difficulty urinating and frequency.   Musculoskeletal: Positive for arthralgias, back pain, joint swelling, neck pain and neck stiffness.   Skin: Negative.    Allergic/Immunologic: Negative.    Neurological: Negative.    Hematological: Bruises/bleeds easily.   Psychiatric/Behavioral: Negative.        The following portions of the patient's history were reviewed and updated as appropriate: allergies, current medications, past family history, past medical history, past social history, past surgical history and problem list.      Objective      Physical Exam  Pulse 79   Ht 182.9 cm (72.01\")   Wt 82.6 kg (182 lb 1.6 oz)   SpO2 97%   BMI 24.69 kg/m²     Body mass index is 24.69 kg/m².    Patient is well developed, well nourished and in no acute distress.  Alert and oriented x 3.    Ortho Exam    Right Knee Exam  ----------  ALIGNMENT: Right: neutral----------  RANGE OF MOTION:  Right: Normal 5-110  LIGAMENTOUS STABILITY:   Right:stable to varus and valgus stress at terminal extension and 30 degrees without any evidence of laxity----------  STRENGTH:  KNEE FLEXION Right 5/5  KNEE EXTENSION Right 5/5 ----------  PAIN WITH PALPATION: Right medial joint line  PAIN WITH KNEE ROM: Right no  PATELLAR CREPITUS: Right yes   ----------    Left Knee Exam  ----------  Knee Exam:  ----------  ALIGNMENT:  Left: neutral  ----------  RANGE OF MOTION:  Left: Normal 5-110  LIGAMENTOUS STABILITY:   Left:stable to varus and valgus stress at " terminal extension and 30 degrees without any evidence of laxity   ----------  STRENGTH:  KNEE FLEXION  Left 5/5  KNEE EXTENSION Left 5/5  ----------  PAIN WITH PALPATION: Left medial joint line  PAIN WITH KNEE ROM:  Left no  PATELLAR CREPITUS:  Left yes  ----------        Medical Decision Making    Data Review:   ordered and reviewed x-rays today    Assessment:  1. Primary osteoarthritis of both knees        Plan:  End-stage bilateral knee arthritis.  I reviewed x-rays and clinical findings, past and current treatment the patient.  On exam, he has medial joint line tenderness with stable ligamentous exam.  X-rays today show moderate to severe tricompartmental arthritis bilaterally with genu varum alignment, periarticular osteophytes visualized in all compartments and No significant changes compared to prior radiographs.  Patient has done well with intermittent steroid injection for several years.  Plan today is repeat steroid injection to bilateral knees.  I will see him back at the end of June for steroid injection into his right shoulder and in 3 to 4 months for his knees.  Return sooner if needed.    Using sterile technique, the left knee was sterilely prepped with Hibiclens. Following a time out,  using a 22 gauge needle, the left knee was injected with 40mg Depo Medrol, 4 cc lidocaine and 4 cc marcaine.  Patient tolerated the procedure well.  No complications.    Using sterile technique, the right knee was sterilely prepped with Hibiclens.  Following a time out,  using a 22 gauge needle, the right knee was injected with 40mg Depo Medrol, 4 cc lidocaine and 4 cc marcaine.  Patient tolerated the procedure well.  No complications.          Tish Shelley PA-C  05/21/19  9:53 AM

## 2019-05-21 NOTE — PROGRESS NOTES
Procedure   Large Joint Arthrocentesis: R knee  Date/Time: 5/21/2019 9:00 AM  Consent given by: patient  Site marked: site marked  Timeout: Immediately prior to procedure a time out was called to verify the correct patient, procedure, equipment, support staff and site/side marked as required   Supporting Documentation  Indications: pain   Procedure Details  Location: knee - R knee  Preparation: Patient was prepped and draped in the usual sterile fashion  Needle size: 22 G  Approach: anterolateral  Medications administered: 4 mL lidocaine PF 1% 1 %; 40 mg methylPREDNISolone acetate 40 MG/ML (4 cc Bupivacaine NDC 6791593447  [id number]  10/1/20)  Patient tolerance: patient tolerated the procedure well with no immediate complications    Large Joint Arthrocentesis: L knee  Date/Time: 5/21/2019 9:02 AM  Consent given by: patient  Site marked: site marked  Timeout: Immediately prior to procedure a time out was called to verify the correct patient, procedure, equipment, support staff and site/side marked as required   Supporting Documentation  Indications: pain   Procedure Details  Location: knee - L knee  Preparation: Patient was prepped and draped in the usual sterile fashion  Needle size: 22 G  Approach: anterolateral  Medications administered: 4 mL lidocaine PF 1% 1 %; 80 mg methylPREDNISolone acetate 40 MG/ML (4 cc Bupivacaine NDC [id number]  [id number] 10/1/20)  Patient tolerance: patient tolerated the procedure well with no immediate complications

## 2019-06-25 ENCOUNTER — OFFICE VISIT (OUTPATIENT)
Dept: ORTHOPEDIC SURGERY | Facility: CLINIC | Age: 79
End: 2019-06-25

## 2019-06-25 VITALS — OXYGEN SATURATION: 96 % | HEIGHT: 72 IN | HEART RATE: 82 BPM | WEIGHT: 176.81 LBS | BODY MASS INDEX: 23.95 KG/M2

## 2019-06-25 DIAGNOSIS — M19.012 ARTHRITIS OF LEFT GLENOHUMERAL JOINT: ICD-10-CM

## 2019-06-25 DIAGNOSIS — M19.011 ARTHRITIS OF RIGHT GLENOHUMERAL JOINT: Primary | ICD-10-CM

## 2019-06-25 PROCEDURE — 20610 DRAIN/INJ JOINT/BURSA W/O US: CPT | Performed by: PHYSICIAN ASSISTANT

## 2019-06-25 RX ORDER — METHYLPREDNISOLONE ACETATE 40 MG/ML
40 INJECTION, SUSPENSION INTRA-ARTICULAR; INTRALESIONAL; INTRAMUSCULAR; SOFT TISSUE
Status: COMPLETED | OUTPATIENT
Start: 2019-06-25 | End: 2019-06-25

## 2019-06-25 RX ORDER — LIDOCAINE HYDROCHLORIDE 10 MG/ML
2 INJECTION, SOLUTION EPIDURAL; INFILTRATION; INTRACAUDAL; PERINEURAL
Status: COMPLETED | OUTPATIENT
Start: 2019-06-25 | End: 2019-06-25

## 2019-06-25 RX ADMIN — METHYLPREDNISOLONE ACETATE 40 MG: 40 INJECTION, SUSPENSION INTRA-ARTICULAR; INTRALESIONAL; INTRAMUSCULAR; SOFT TISSUE at 09:29

## 2019-06-25 RX ADMIN — LIDOCAINE HYDROCHLORIDE 2 ML: 10 INJECTION, SOLUTION EPIDURAL; INFILTRATION; INTRACAUDAL; PERINEURAL at 09:30

## 2019-06-25 RX ADMIN — METHYLPREDNISOLONE ACETATE 40 MG: 40 INJECTION, SUSPENSION INTRA-ARTICULAR; INTRALESIONAL; INTRAMUSCULAR; SOFT TISSUE at 09:30

## 2019-06-25 RX ADMIN — LIDOCAINE HYDROCHLORIDE 2 ML: 10 INJECTION, SOLUTION EPIDURAL; INFILTRATION; INTRACAUDAL; PERINEURAL at 09:29

## 2019-06-25 NOTE — PROGRESS NOTES
Procedure   Large Joint Arthrocentesis: R glenohumeral  Date/Time: 6/25/2019 9:29 AM  Consent given by: patient  Site marked: site marked  Timeout: Immediately prior to procedure a time out was called to verify the correct patient, procedure, equipment, support staff and site/side marked as required   Supporting Documentation  Indications: pain   Procedure Details  Location: shoulder - R glenohumeral  Preparation: Patient was prepped and draped in the usual sterile fashion  Needle size: 22 G  Approach: anterior  Medications administered: 40 mg methylPREDNISolone acetate 40 MG/ML; 2 mL lidocaine PF 1% 1 % (2 cc Bupivicaine .25% NDC: 5923-6365-53; LOT: -DK; EXP: 10/01/2020)  Patient tolerance: patient tolerated the procedure well with no immediate complications    Large Joint Arthrocentesis: L glenohumeral  Date/Time: 6/25/2019 9:30 AM  Consent given by: patient  Site marked: site marked  Timeout: Immediately prior to procedure a time out was called to verify the correct patient, procedure, equipment, support staff and site/side marked as required   Supporting Documentation  Indications: pain   Procedure Details  Location: shoulder - L glenohumeral  Preparation: Patient was prepped and draped in the usual sterile fashion  Needle size: 22 G  Approach: anterior  Medications administered: 40 mg methylPREDNISolone acetate 40 MG/ML; 2 mL lidocaine PF 1% 1 % (2 cc Bupivicaine .25% NDC: 0529-7035-63; LOT: -DK; EXP: 10/01/2020)  Patient tolerance: patient tolerated the procedure well with no immediate complications

## 2019-06-25 NOTE — PROGRESS NOTES
[facility]    Subjective     Patient: Isrrael Marino  : 1940    Primary Care Provider: Rossy Cardona APRN    Requesting Provider: As above    Pain and Follow-up of the Right Shoulder (right glenohumeral injection given on 3/20/19)      History    Chief Complaint: Bilateral shoulder pain    History of Present Illness: Patient returns today with increasing bilateral shoulder pain.  He has known severe bilateral GH arthritis.  He has had prior steroid injection into bilateral shoulder with improved pain.  Last injection in the right in 3/19, last injection in the left .  He would like repeat injection today.    Current Outpatient Medications on File Prior to Visit   Medication Sig Dispense Refill   • B Complex Vitamins (VITAMIN B COMPLEX PO) Take 1 tablet by mouth Daily.     • clopidogrel (PLAVIX) 75 MG tablet TAKE 1 TABLET EVERY DAY 90 tablet 4   • diclofenac (VOLTAREN) 75 MG EC tablet Take 75 mg by mouth 2 (Two) Times a Day.     • DULoxetine (CYMBALTA) 60 MG capsule Take 60 mg by mouth Daily.     • esomeprazole (nexIUM) 40 MG capsule Take 40 mg by mouth Every Morning Before Breakfast.     • fluorouracil (EFUDEX) 5 % cream      • fluticasone (FLONASE) 50 MCG/ACT nasal spray 1 spray into the nostril(s) as directed by provider Daily. 3 bottle 1   • metoprolol succinate XL (TOPROL-XL) 25 MG 24 hr tablet TAKE 1 TABLET EVERY DAY 90 tablet 4   • mupirocin (BACTROBAN) 2 % ointment Apply  topically to the appropriate area as directed 3 (Three) Times a Day. 30 g 0   • polyethylene glycol (MIRALAX) powder Dissolve 1 capful (17g) in water and take by mouth Daily as directed while on opiods 238 g 1   • simvastatin (ZOCOR) 40 MG tablet TAKE 1 TABLET EVERY DAY 90 tablet 3   • tadalafil (CIALIS) 20 MG tablet Take 20 mg by mouth As Needed for erectile dysfunction.     • tamsulosin (FLOMAX) 0.4 MG capsule 24 hr capsule Take 1 capsule by mouth Daily. 30 capsule 0   • vardenafil  "(LEVITRA) 20 MG tablet Take 20 mg by mouth As Needed for erectile dysfunction.     • vitamin B-12 (CYANOCOBALAMIN) 1000 MCG tablet Take 1,000 mcg by mouth Daily.       No current facility-administered medications on file prior to visit.       Allergies   Allergen Reactions   • Ibuprofen Hives     \"Pt states he hasn't taken this in a long time\"      Past Medical History:   Diagnosis Date   • Arthritis     both knees   • Broken neck (CMS/HCC)    • CAD (coronary artery disease)    • Cancer (CMS/HCC)    • Chondrocalcinosis of knee    • GERD (gastroesophageal reflux disease)    • Gout    • Heart attack (CMS/HCC)      Last Assessed: 28 Mar 2014   • Heart disease    • History of transfusion     own blood with hip surgery   • Hyperlipidemia    • Hypertension    • IBS (irritable bowel syndrome)    • Left rotator cuff tear arthropathy    • Low back pain    • Lower extremity neuropathy    • Lumbar canal stenosis    • Neck pain    • Numbness    • Right hip pain    • Rotator cuff tear arthropathy of right shoulder    • Thin blood (CMS/HCC)      Past Surgical History:   Procedure Laterality Date   • APPENDECTOMY  1956   • BACK SURGERY  1997    spinal decompression and fusion   • BREAST LUMPECTOMY  2003   • CARDIAC CATHETERIZATION      with two stents//. DR. TOLEDO   • CARPAL TUNNEL RELEASE  03/28/2014    Neuroplasty Decompression Median Nerve At Carpal Tunnel   • CERVICAL DISCECTOMY POSTERIOR FUSION WITH BRAIN LAB N/A 7/13/2018    Procedure: CERVICAL LAMINECTOMY DECOMPRESSION POSTERIOR C1-2 POSTERIOR CERVICAL FUSION;  Surgeon: Randall Barnett MD;  Location: [facility];  Service: Neurosurgery   • COLONOSCOPY     • CORONARY STENT PLACEMENT  2013   • HERNIA REPAIR  2002    & 1998   • LUMBAR DISCECTOMY FUSION INSTRUMENTATION     • TONSILLECTOMY     • TOTAL HIP ARTHROPLASTY  2002   • VASECTOMY       Family History   Problem Relation Age of Onset   • Cancer Mother    • Heart disease Father    • Hypertension Father    • Heart attack " "Father       Social History     Socioeconomic History   • Marital status:      Spouse name: Not on file   • Number of children: Not on file   • Years of education: Not on file   • Highest education level: Not on file   Tobacco Use   • Smoking status: Never Smoker   • Smokeless tobacco: Never Used   Substance and Sexual Activity   • Alcohol use: No   • Drug use: No   • Sexual activity: Defer        Review of Systems   Constitutional: Positive for activity change.   HENT: Positive for hearing loss.    Eyes: Negative.    Respiratory: Negative.    Cardiovascular: Negative.    Gastrointestinal: Negative.    Endocrine: Negative.    Genitourinary: Negative.    Musculoskeletal: Positive for arthralgias and joint swelling.   Skin: Negative.    Allergic/Immunologic: Positive for environmental allergies.   Neurological: Negative.    Hematological: Negative.    Psychiatric/Behavioral: Negative.        The following portions of the patient's history were reviewed and updated as appropriate: allergies, current medications, past family history, past medical history, past social history, past surgical history and problem list.      Objective      Physical Exam  Pulse 82   Ht 182.9 cm (72.01\")   Wt 80.2 kg (176 lb 12.9 oz)   SpO2 96%   BMI 23.97 kg/m²     Body mass index is 23.97 kg/m².    Patient is well developed, well nourished and in no acute distress.  Alert and oriented x 3.    Ortho Exam  Musculoskeletal:     Cervical Spine:    ROM:  normal    Tenderness:  none     Left Shoulder    Inspection and Palpation:     Tenderness - none    Crepitus - positive    Sensation is normal    Examination reveals no ecchymosis.       Range of Motion    External Rotation: AROM - 60 degrees    Elevation through flexion: AROM - 160 degrees         Right Shoulder    Inspection and Palpation:     Tenderness - bicipital groove    Crepitus - none    Sensation is normal    Examination reveals no ecchymosis.        Range of Motion   Right " shoulder:        External Rotation: 60    Elevation through flexion: 160              Medical Decision Making    Data Review:   none    Assessment:  1. Arthritis of right glenohumeral joint    2. Arthritis of left glenohumeral joint        Plan:  biLateral glenohumeral arthritis.  Patient has done well in the past with glenohumeral steroid injection.  He is not interested in replacement.  He would like repeat injection today.    Using sterile technique, the left shoulder with sterilely prepped with Hibiclens.  After time out, using a 22-gauge needle, the left glenohumeral joint was injected with 40 mg Depo-Medrol, 2 cc of Marcaine and 2 cc of lidocaine.  Patient tolerated the procedure well.    Using sterile technique, the right shoulder with sterilely prepped with Hibiclens.  After time out, using a 22-gauge needle, the right glenohumeral joint was injected with 40 mg Depo-Medrol, 2 cc of Marcaine and 2 cc of lidocaine.  Patient tolerated the procedure well.        Tish Shelley PA-C  06/25/19  10:13 AM

## 2019-08-06 RX ORDER — DICLOFENAC SODIUM 75 MG/1
TABLET, DELAYED RELEASE ORAL
Qty: 120 TABLET | Refills: 1 | Status: SHIPPED | OUTPATIENT
Start: 2019-08-06 | End: 2020-01-24

## 2019-08-06 RX ORDER — DULOXETIN HYDROCHLORIDE 30 MG/1
CAPSULE, DELAYED RELEASE ORAL
Qty: 120 CAPSULE | Refills: 1 | Status: SHIPPED | OUTPATIENT
Start: 2019-08-06 | End: 2020-01-21

## 2019-08-06 NOTE — TELEPHONE ENCOUNTER
Provider: Anibal    Caller: Pharmacy  Time of call: 8:21am     Phone #:  [phone]  Surgery:  CERVICAL LAMINECTOMY DECOMPRESSION POSTERIOR C1-2 POSTERIOR CERVICAL FUSION   Surgery Date: 7/13/18   Last visit:   3/11/19  Next visit: PRN     Reason for call:       Requested Prescriptions     Pending Prescriptions Disp Refills   • DULoxetine (CYMBALTA) 30 MG capsule [Pharmacy Med Name: DULOXETINE HCL 30 MG Capsule Delayed Release Particles] 120 capsule 1     Sig: TAKE 2 CAPSULES BY MOUTH DAILY.   • diclofenac (VOLTAREN) 75 MG EC tablet [Pharmacy Med Name: DICLOFENAC SODIUM DR 75 MG Tablet Delayed Release] 120 tablet 1     Sig: TAKE 1 TABLET TWICE DAILY

## 2019-08-15 ENCOUNTER — OFFICE VISIT (OUTPATIENT)
Dept: INTERNAL MEDICINE | Facility: CLINIC | Age: 79
End: 2019-08-15

## 2019-08-15 VITALS
SYSTOLIC BLOOD PRESSURE: 112 MMHG | BODY MASS INDEX: 23.7 KG/M2 | HEIGHT: 72 IN | TEMPERATURE: 97.8 F | WEIGHT: 175 LBS | HEART RATE: 85 BPM | DIASTOLIC BLOOD PRESSURE: 68 MMHG | OXYGEN SATURATION: 95 % | RESPIRATION RATE: 18 BRPM

## 2019-08-15 DIAGNOSIS — E78.2 MIXED HYPERLIPIDEMIA: Primary | ICD-10-CM

## 2019-08-15 DIAGNOSIS — Z00.00 MEDICARE ANNUAL WELLNESS VISIT, SUBSEQUENT: ICD-10-CM

## 2019-08-15 LAB
ALBUMIN SERPL-MCNC: 4.1 G/DL (ref 3.5–5.2)
ALBUMIN/GLOB SERPL: 1.8 G/DL
ALP SERPL-CCNC: 67 U/L (ref 39–117)
ALT SERPL-CCNC: 16 U/L (ref 1–41)
AST SERPL-CCNC: 23 U/L (ref 1–40)
BILIRUB SERPL-MCNC: 0.4 MG/DL (ref 0.2–1.2)
BUN SERPL-MCNC: 31 MG/DL (ref 8–23)
BUN/CREAT SERPL: 28.7 (ref 7–25)
CALCIUM SERPL-MCNC: 8.8 MG/DL (ref 8.6–10.5)
CHLORIDE SERPL-SCNC: 104 MMOL/L (ref 98–107)
CHOLEST SERPL-MCNC: 170 MG/DL (ref 0–200)
CO2 SERPL-SCNC: 28.5 MMOL/L (ref 22–29)
CREAT SERPL-MCNC: 1.08 MG/DL (ref 0.76–1.27)
GLOBULIN SER CALC-MCNC: 2.3 GM/DL
GLUCOSE SERPL-MCNC: 96 MG/DL (ref 65–99)
HDLC SERPL-MCNC: 65 MG/DL (ref 40–60)
LDLC SERPL CALC-MCNC: 92 MG/DL (ref 0–100)
POTASSIUM SERPL-SCNC: 4.3 MMOL/L (ref 3.5–5.2)
PROT SERPL-MCNC: 6.4 G/DL (ref 6–8.5)
SODIUM SERPL-SCNC: 144 MMOL/L (ref 136–145)
TRIGL SERPL-MCNC: 64 MG/DL (ref 0–150)
VLDLC SERPL CALC-MCNC: 12.8 MG/DL

## 2019-08-15 PROCEDURE — G0439 PPPS, SUBSEQ VISIT: HCPCS | Performed by: NURSE PRACTITIONER

## 2019-08-15 PROCEDURE — 96160 PT-FOCUSED HLTH RISK ASSMT: CPT | Performed by: NURSE PRACTITIONER

## 2019-08-15 PROCEDURE — 99397 PER PM REEVAL EST PAT 65+ YR: CPT | Performed by: NURSE PRACTITIONER

## 2019-08-15 NOTE — PROGRESS NOTES
Subsequent Medicare Wellness Visit    Chief Complaint   Patient presents with   • Medicare Wellness-subsequent   • Hyperlipidemia   • Hypertension       Subjective   History of Present Illness:  Isrrael Marino is a 79 y.o. male who presents for a Subsequent Medicare Wellness Visit.    States he feels OK but has chronic neuropathy in legs and chronic back, neck and hip pain.    Doesn't go to pain management.  Takes Tyl  1-2 week.  Patient denies fever chills.  Has occ headache RT shoulder pain.  No ear pain but has hearing loss.  Hearing aids didn't help.  No sore throat, shortness of air, cough, wheezing, chest pain, abdominal pain, nausea, vomiting, diarrhea,  blood in stool or urine, constipation.  Uses probiotic to keeps bowels formed.  Has weak urinary stream from enlarged prostate-sees Dr Gutierrez.  Mood is good.  Eating and drinking as usual.  No unexplained weight loss or gain.      HEALTH RISK ASSESSMENT    Recent Hospitalizations:  No hospitalization(s) within the last year.    Current Medical Providers:  Patient Care Team:  Rossy Cardona APRN as PCP - General (Nurse Practitioner)  Omar Mckeon MD as Referring Physician (Emergency Medicine)    Smoking Status:  Social History     Tobacco Use   Smoking Status Never Smoker   Smokeless Tobacco Never Used       Alcohol Consumption:  Social History     Substance and Sexual Activity   Alcohol Use No       Depression Screen:   PHQ-2/PHQ-9 Depression Screening 8/15/2019   Little interest or pleasure in doing things 0   Feeling down, depressed, or hopeless 0   Trouble falling or staying asleep, or sleeping too much -   Feeling tired or having little energy -   Poor appetite or overeating -   Feeling bad about yourself - or that you are a failure or have let yourself or your family down -   Trouble concentrating on things, such as reading the newspaper or watching television -   Moving or speaking so slowly that other people could have noticed. Or the opposite  - being so fidgety or restless that you have been moving around a lot more than usual -   Thoughts that you would be better off dead, or of hurting yourself in some way -   Total Score 0       Fall Risk Screen:  LAYLA Fall Risk Assessment has not been completed.    Health Habits and Functional and Cognitive Screening:  Functional & Cognitive Status 8/15/2019   Do you have difficulty preparing food and eating? No   Do you have difficulty bathing yourself, getting dressed or grooming yourself? No   Do you have difficulty using the toilet? No   Do you have difficulty moving around from place to place? Yes   Do you have trouble with steps or getting out of a bed or a chair? Yes   Current Diet Well Balanced Diet   Dental Exam Up to date   Eye Exam Up to date   Exercise (times per week) 3 times per week   Current Exercise Activities Include Walking   Do you need help using the phone?  No   Are you deaf or do you have serious difficulty hearing?  No   Do you need help with transportation? No   Do you need help shopping? No   Do you need help preparing meals?  No   Do you need help with housework?  No   Do you need help with laundry? No   Do you need help taking your medications? No   Do you need help managing money? No   Do you ever drive or ride in a car without wearing a seat belt? No   Have you felt unusual stress, anger or loneliness in the last month? No   Who do you live with? Spouse   If you need help, do you have trouble finding someone available to you? No   Have you been bothered in the last four weeks by sexual problems? No   Do you have difficulty concentrating, remembering or making decisions? Yes         Does the patient have evidence of cognitive impairment? No    Asprin use counseling:Does not need ASA (and currently is not on it)    Age-appropriate Screening Schedule:  Refer to the list below for future screening recommendations based on patient's age, sex and/or medical conditions. Orders for these  recommended tests are listed in the plan section. The patient has been provided with a written plan.    Health Maintenance   Topic Date Due   • LIPID PANEL  08/13/2019   • INFLUENZA VACCINE  09/09/2019 (Originally 8/1/2019)   • ZOSTER VACCINE (2 of 2) 08/15/2020 (Originally 5/24/2019)   • TDAP/TD VACCINES (3 - Td) 11/12/2027   • PNEUMOCOCCAL VACCINES (65+ LOW/MEDIUM RISK)  Discontinued          The following portions of the patient's history were reviewed and updated as appropriate: He  has a past medical history of Arthritis, Broken neck (CMS/HCC), CAD (coronary artery disease), Cancer (CMS/HCC), Chondrocalcinosis of knee, GERD (gastroesophageal reflux disease), Gout, Heart attack (CMS/HCC), Heart disease, History of transfusion, Hyperlipidemia, Hypertension, IBS (irritable bowel syndrome), Left rotator cuff tear arthropathy, Low back pain, Lower extremity neuropathy, Lumbar canal stenosis, Neck pain, Numbness, Right hip pain, Rotator cuff tear arthropathy of right shoulder, and Thin blood (CMS/[facility])..    Outpatient Medications Prior to Visit   Medication Sig Dispense Refill   • B Complex Vitamins (VITAMIN B COMPLEX PO) Take 1 tablet by mouth Daily.     • clopidogrel (PLAVIX) 75 MG tablet TAKE 1 TABLET EVERY DAY 90 tablet 4   • diclofenac (VOLTAREN) 75 MG EC tablet TAKE 1 TABLET TWICE DAILY 120 tablet 1   • DULoxetine (CYMBALTA) 30 MG capsule TAKE 2 CAPSULES BY MOUTH DAILY. 120 capsule 1   • DULoxetine (CYMBALTA) 60 MG capsule Take 60 mg by mouth Daily.     • esomeprazole (nexIUM) 40 MG capsule Take 40 mg by mouth Every Morning Before Breakfast.     • fluorouracil (EFUDEX) 5 % cream      • fluticasone (FLONASE) 50 MCG/ACT nasal spray 1 spray into the nostril(s) as directed by provider Daily. 3 bottle 1   • metoprolol succinate XL (TOPROL-XL) 25 MG 24 hr tablet TAKE 1 TABLET EVERY DAY 90 tablet 4   • mupirocin (BACTROBAN) 2 % ointment Apply  topically to the appropriate area as directed 3 (Three) Times a Day. 30 g 0  "  • polyethylene glycol (MIRALAX) powder Dissolve 1 capful (17g) in water and take by mouth Daily as directed while on opiods 238 g 1   • simvastatin (ZOCOR) 40 MG tablet TAKE 1 TABLET EVERY DAY 90 tablet 3   • tadalafil (CIALIS) 20 MG tablet Take 20 mg by mouth As Needed for erectile dysfunction.     • tamsulosin (FLOMAX) 0.4 MG capsule 24 hr capsule Take 1 capsule by mouth Daily. 30 capsule 0   • vardenafil (LEVITRA) 20 MG tablet Take 20 mg by mouth As Needed for erectile dysfunction.     • vitamin B-12 (CYANOCOBALAMIN) 1000 MCG tablet Take 1,000 mcg by mouth Daily.       No facility-administered medications prior to visit.        Patient Active Problem List   Diagnosis   • Gastroesophageal reflux disease   • Atopic rhinitis   • Arthritis   • Peripheral neuropathy   • Arthralgia of hip   • Low back pain   • Irritable bowel syndrome   • Hypertension   • Hyperlipidemia   • Hyperglycemia   • Hemorrhoids   • Gout   • Enlarged prostate   • Erectile dysfunction of nonorganic origin   • Cough   • Constipation   • Chronic diarrhea   • Benign prostatic hyperplasia   • Chronic coronary artery disease   • Primary osteoarthritis of both knees   • Odontoid fracture with routine healing   • Glenohumeral arthritis, right   • Closed fracture of odontoid process of axis (CMS/HCC)   • Odontoid fracture with nonunion   • Neuropathic pain       Advanced Care Planning:  Patient does not have an advance directive - information provided to the patient today    Review of Systems Health Education:  Heart healthy diet to include fresh fruits and vegetables.  Limit intake of red meats.  Increase chicken and fish in diet.  Avoid fried greasy foods.  Daily activity working up 30 minutes of brisk exercise daily.  Adequate sleep and \"down time\".      Compared to one year ago, the patient feels his physical health is the same.  Compared to one year ago, the patient feels his mental health is the same.    Reviewed chart for potential of high risk " "medication in the elderly: yes  Reviewed chart for potential of harmful drug interactions in the elderly:yes    Objective         Vitals:    08/15/19 0823   BP: 112/68   Pulse: 85   Resp: 18   Temp: 97.8 °F (36.6 °C)   SpO2: 95%   Weight: 79.4 kg (175 lb)   Height: 182.9 cm (72\")   PainSc:   6       Body mass index is 23.73 kg/m².  Discussed the patient's BMI with him. The BMI is in the acceptable range.    Physical Exam   Constitutional: He is oriented to person, place, and time. He appears well-developed and well-nourished. No distress.   HENT:   Head: Normocephalic.   Right Ear: External ear normal.   Left Ear: External ear normal.   Nose: Nose normal.   Mouth/Throat: Oropharynx is clear and moist. No oropharyngeal exudate.   Is hard of hearing   Eyes: Pupils are equal, round, and reactive to light. Right eye exhibits no discharge. Left eye exhibits no discharge. No scleral icterus.   Wearing glasses   Neck: Neck supple. No thyromegaly present.   Cardiovascular: Normal rate, regular rhythm, normal heart sounds and intact distal pulses. Exam reveals no gallop and no friction rub.   No murmur heard.  Pulmonary/Chest: Effort normal and breath sounds normal. No stridor. No respiratory distress. He has no wheezes. He has no rales.   Abdominal: Soft. Bowel sounds are normal. He exhibits no mass. There is no tenderness.   Musculoskeletal:   Moves slowly and stiffly   Lymphadenopathy:     He has no cervical adenopathy.   Neurological: He is alert and oriented to person, place, and time.   Skin: Skin is warm and dry. Capillary refill takes less than 2 seconds.   Is pink, no rash    Psychiatric: He has a normal mood and affect. His behavior is normal. Judgment and thought content normal.   Nursing note and vitals reviewed.            Assessment/Plan   Medicare Risks and Personalized Health Plan  CMS Preventative Services Quick Reference  Chronic Pain     The above risks/problems have been discussed with the " "patient.  Pertinent information has been shared with the patient in the After Visit Summary.  Follow up plans and orders are seen below in the Assessment/Plan Section.    Diagnoses and all orders for this visit:    1. Mixed hyperlipidemia (Primary)  -     Comprehensive Metabolic Panel  -     Lipid Panel    2. Medicare annual wellness visit, subsequent  -     Comprehensive Metabolic Panel  -     Lipid Panel      Follow Up:  Return in about 3 months (around 11/15/2019).     An After Visit Summary and PPPS were given to the patient.         Patient Instructions   Patient will check to see if vaccines are covered by his insurance carrier.  Recommend continuation of same medications did not change any I did not change any medicines today.  Keep your follow-up appointment with your cardiology and urologist.  Health Education:  Heart healthy diet to include fresh fruits and vegetables.  Limit intake of red meats.  Increase chicken and fish in diet.  Avoid fried greasy foods.  Daily activity working up 30 minutes of brisk exercise daily.  Adequate sleep and \"down time\".  Pt verbalizes understanding and agreement with plan of care.     EMR Dragon/transcription disclaimer:  Please note that portions of this note were completed with a voice recognition program.  Electronic transcription of the voice recognition program may permit erroneous words or phrases to be inadvertently transcribed.  Although I have reviewed the note for such errors, some may still exist in this documentation   "

## 2019-08-15 NOTE — PATIENT INSTRUCTIONS
"Patient will check to see if vaccines are covered by his insurance carrier.  Recommend continuation of same medications did not change any I did not change any medicines today.  Keep your follow-up appointment with your cardiology and urologist.  Health Education:  Heart healthy diet to include fresh fruits and vegetables.  Limit intake of red meats.  Increase chicken and fish in diet.  Avoid fried greasy foods.  Daily activity working up 30 minutes of brisk exercise daily.  Adequate sleep and \"down time\".  Pt verbalizes understanding and agreement with plan of care.   "

## 2019-08-20 ENCOUNTER — OFFICE VISIT (OUTPATIENT)
Dept: ORTHOPEDIC SURGERY | Facility: CLINIC | Age: 79
End: 2019-08-20

## 2019-08-20 VITALS — HEART RATE: 69 BPM | BODY MASS INDEX: 24.31 KG/M2 | OXYGEN SATURATION: 98 % | HEIGHT: 72 IN | WEIGHT: 179.45 LBS

## 2019-08-20 DIAGNOSIS — M17.0 PRIMARY OSTEOARTHRITIS OF BOTH KNEES: Primary | ICD-10-CM

## 2019-08-20 PROCEDURE — 20610 DRAIN/INJ JOINT/BURSA W/O US: CPT | Performed by: PHYSICIAN ASSISTANT

## 2019-08-20 RX ORDER — LIDOCAINE HYDROCHLORIDE 10 MG/ML
4 INJECTION, SOLUTION EPIDURAL; INFILTRATION; INTRACAUDAL; PERINEURAL
Status: COMPLETED | OUTPATIENT
Start: 2019-08-20 | End: 2019-08-20

## 2019-08-20 RX ORDER — METHYLPREDNISOLONE ACETATE 40 MG/ML
40 INJECTION, SUSPENSION INTRA-ARTICULAR; INTRALESIONAL; INTRAMUSCULAR; SOFT TISSUE
Status: COMPLETED | OUTPATIENT
Start: 2019-08-20 | End: 2019-08-20

## 2019-08-20 RX ADMIN — LIDOCAINE HYDROCHLORIDE 4 ML: 10 INJECTION, SOLUTION EPIDURAL; INFILTRATION; INTRACAUDAL; PERINEURAL at 09:08

## 2019-08-20 RX ADMIN — METHYLPREDNISOLONE ACETATE 40 MG: 40 INJECTION, SUSPENSION INTRA-ARTICULAR; INTRALESIONAL; INTRAMUSCULAR; SOFT TISSUE at 09:08

## 2019-08-20 NOTE — PROGRESS NOTES
Procedure   Large Joint Arthrocentesis: R knee  Date/Time: 8/20/2019 9:08 AM  Consent given by: patient  Site marked: site marked  Timeout: Immediately prior to procedure a time out was called to verify the correct patient, procedure, equipment, support staff and site/side marked as required   Supporting Documentation  Indications: pain   Procedure Details  Location: knee - R knee  Preparation: Patient was prepped and draped in the usual sterile fashion  Needle size: 22 G  Approach: anterolateral  Medications administered: 40 mg methylPREDNISolone acetate 40 MG/ML; 4 mL lidocaine PF 1% 1 %  Patient tolerance: patient tolerated the procedure well with no immediate complications    Large Joint Arthrocentesis: L knee  Date/Time: 8/20/2019 9:08 AM  Consent given by: patient  Site marked: site marked  Timeout: Immediately prior to procedure a time out was called to verify the correct patient, procedure, equipment, support staff and site/side marked as required   Supporting Documentation  Indications: pain   Procedure Details  Location: knee - L knee  Preparation: Patient was prepped and draped in the usual sterile fashion  Needle size: 22 G  Approach: anterolateral  Medications administered: 40 mg methylPREDNISolone acetate 40 MG/ML; 4 mL lidocaine PF 1% 1 %  Patient tolerance: patient tolerated the procedure well with no immediate complications

## 2019-08-20 NOTE — PROGRESS NOTES
[facility] Note    Subjective     Patient: Isrrael Marino  : 1940    Primary Care Provider: Rossy Cardona APRN    Requesting Provider: As above    Follow-up of the Left Knee (3 month f/u/Primary osteoarthritis of both knees) and Follow-up of the Right Knee (3 month f/u/Primary osteoarthritis of both knees/)      History    Chief Complaint: Bilateral knee pain    History of Present Illness: Patient returns today for increasing bilateral knee pain.  He is well-known to me with bilateral knee arthritis.  He reports no symptoms.  He has functional pain with occasional night pain.  Pain 6/10.  He is been treated with intermittent steroid injection with good relief for several months.  He is not interested in replacement at all and would like repeat injections today.    Current Outpatient Medications on File Prior to Visit   Medication Sig Dispense Refill   • B Complex Vitamins (VITAMIN B COMPLEX PO) Take 1 tablet by mouth Daily.     • clopidogrel (PLAVIX) 75 MG tablet TAKE 1 TABLET EVERY DAY 90 tablet 4   • diclofenac (VOLTAREN) 75 MG EC tablet TAKE 1 TABLET TWICE DAILY 120 tablet 1   • DULoxetine (CYMBALTA) 30 MG capsule TAKE 2 CAPSULES BY MOUTH DAILY. 120 capsule 1   • DULoxetine (CYMBALTA) 60 MG capsule Take 60 mg by mouth Daily.     • esomeprazole (nexIUM) 40 MG capsule Take 40 mg by mouth Every Morning Before Breakfast.     • fluorouracil (EFUDEX) 5 % cream      • fluticasone (FLONASE) 50 MCG/ACT nasal spray 1 spray into the nostril(s) as directed by provider Daily. 3 bottle 1   • metoprolol succinate XL (TOPROL-XL) 25 MG 24 hr tablet TAKE 1 TABLET EVERY DAY 90 tablet 4   • mupirocin (BACTROBAN) 2 % ointment Apply  topically to the appropriate area as directed 3 (Three) Times a Day. 30 g 0   • polyethylene glycol (MIRALAX) powder Dissolve 1 capful (17g) in water and take by mouth Daily as directed while on opiods 238 g 1   • simvastatin (ZOCOR) 40 MG tablet TAKE 1 TABLET  "EVERY DAY 90 tablet 3   • tadalafil (CIALIS) 20 MG tablet Take 20 mg by mouth As Needed for erectile dysfunction.     • tamsulosin (FLOMAX) 0.4 MG capsule 24 hr capsule Take 1 capsule by mouth Daily. 30 capsule 0   • vardenafil (LEVITRA) 20 MG tablet Take 20 mg by mouth As Needed for erectile dysfunction.     • vitamin B-12 (CYANOCOBALAMIN) 1000 MCG tablet Take 1,000 mcg by mouth Daily.       No current facility-administered medications on file prior to visit.       Allergies   Allergen Reactions   • Ibuprofen Hives     \"Pt states he hasn't taken this in a long time\"      Past Medical History:   Diagnosis Date   • Arthritis     both knees   • Broken neck (CMS/HCC)    • CAD (coronary artery disease)    • Cancer (CMS/HCC)    • Chondrocalcinosis of knee    • GERD (gastroesophageal reflux disease)    • Gout    • Heart attack (CMS/HCC)      Last Assessed: 28 Mar 2014   • Heart disease    • History of transfusion     own blood with hip surgery   • Hyperlipidemia    • Hypertension    • IBS (irritable bowel syndrome)    • Left rotator cuff tear arthropathy    • Low back pain    • Lower extremity neuropathy    • Lumbar canal stenosis    • Neck pain    • Numbness    • Right hip pain    • Rotator cuff tear arthropathy of right shoulder    • Thin blood (CMS/HCC)      Past Surgical History:   Procedure Laterality Date   • APPENDECTOMY  1956   • BACK SURGERY  1997    spinal decompression and fusion   • BREAST LUMPECTOMY  2003   • CARDIAC CATHETERIZATION      with two stents//. DR. TOLEDO   • CARPAL TUNNEL RELEASE  03/28/2014    Neuroplasty Decompression Median Nerve At Carpal Tunnel   • CERVICAL DISCECTOMY POSTERIOR FUSION WITH BRAIN LAB N/A 7/13/2018    Procedure: CERVICAL LAMINECTOMY DECOMPRESSION POSTERIOR C1-2 POSTERIOR CERVICAL FUSION;  Surgeon: Randall Barnett MD;  Location: [facility];  Service: Neurosurgery   • COLONOSCOPY     • CORONARY STENT PLACEMENT  2013   • HERNIA REPAIR  2002    & 1998   • LUMBAR DISCECTOMY " "FUSION INSTRUMENTATION     • TONSILLECTOMY     • TOTAL HIP ARTHROPLASTY  2002   • VASECTOMY       Family History   Problem Relation Age of Onset   • Cancer Mother    • Heart disease Father    • Hypertension Father    • Heart attack Father       Social History     Socioeconomic History   • Marital status:      Spouse name: Not on file   • Number of children: Not on file   • Years of education: Not on file   • Highest education level: Not on file   Tobacco Use   • Smoking status: Never Smoker   • Smokeless tobacco: Never Used   Substance and Sexual Activity   • Alcohol use: No   • Drug use: No   • Sexual activity: Defer        Review of Systems   Constitutional: Positive for appetite change.   HENT: Positive for hearing loss.    Eyes: Positive for visual disturbance.   Respiratory: Negative.    Cardiovascular: Positive for leg swelling.   Gastrointestinal: Negative.    Endocrine: Negative.    Genitourinary: Positive for difficulty urinating.   Musculoskeletal: Positive for arthralgias, back pain, neck pain and neck stiffness.   Skin: Negative.    Allergic/Immunologic: Negative.    Neurological: Negative.    Hematological: Bruises/bleeds easily.   Psychiatric/Behavioral: Negative.        The following portions of the patient's history were reviewed and updated as appropriate: allergies, current medications, past family history, past medical history, past social history, past surgical history and problem list.      Objective      Physical Exam  Pulse 69   Ht 182.9 cm (72.01\")   Wt 81.4 kg (179 lb 7.3 oz)   SpO2 98%   BMI 24.33 kg/m²     Body mass index is 24.33 kg/m².    Patient is well developed, well nourished and in no acute distress.  Alert and oriented x 3.    Ortho Exam    Right Knee Exam  ----------  ALIGNMENT: Right: neutral----------  RANGE OF MOTION:  Right: Normal (0-120 degrees) with no extensor lag or flexion contracture  LIGAMENTOUS STABILITY:   Right:stable to varus and valgus stress at " terminal extension and 30 degrees without any evidence of laxity----------  STRENGTH:  KNEE FLEXION Right 5/5  KNEE EXTENSION Right 5/5 ----------  PAIN WITH PALPATION: Right medial joint line  PAIN WITH KNEE ROM: Right no  PATELLAR CREPITUS: Right yes   ----------    Left Knee Exam  ----------  Knee Exam:  ----------  ALIGNMENT:  Left: neutral  ----------  RANGE OF MOTION:  Left: Normal (0-120 degrees) with no extensor lag or flexion contracture  LIGAMENTOUS STABILITY:   Left:stable to varus and valgus stress at terminal extension and 30 degrees without any evidence of laxity   ----------  STRENGTH:  KNEE FLEXION  Left 5/5  KNEE EXTENSION Left 5/5  ----------  PAIN WITH PALPATION: Left medial joint line  PAIN WITH KNEE ROM:  Left no  PATELLAR CREPITUS:  Left yes  ----------        Medical Decision Making    Data Review:   none    Assessment:  No diagnosis found.    Plan:  Bilateral knee arthritis.  I reviewed today's clinical findings, past and current treatment the patient.  On exam he has medial joint line tenderness with no evidence of ligament or meniscal pathology.  Patient would like repeat injection today.  He is not interested in replacement.  Plan today's steroid injection into bilateral knees.  I will see him back in 3 months or sooner if needed.    Using sterile technique, the left knee was sterilely prepped with Hibiclens. Following a time out,  using a 22 gauge needle, the left knee was injected with 40mg Depo Medrol, 4 cc lidocaine.  Patient tolerated the procedure well.  No complications.  Using sterile technique, the right knee was sterilely prepped with Hibiclens.  Following a time out,  using a 22 gauge needle, the right knee was injected with 40mg Depo Medrol, 4 cc lidocaine.  Patient tolerated the procedure well.  No complications.          Tish Shelley PA-C  08/20/19  10:50 AM

## 2019-11-18 ENCOUNTER — OFFICE VISIT (OUTPATIENT)
Dept: CARDIOLOGY | Facility: CLINIC | Age: 79
End: 2019-11-18

## 2019-11-18 VITALS
WEIGHT: 193 LBS | SYSTOLIC BLOOD PRESSURE: 130 MMHG | HEART RATE: 73 BPM | HEIGHT: 72 IN | DIASTOLIC BLOOD PRESSURE: 68 MMHG | BODY MASS INDEX: 26.14 KG/M2 | OXYGEN SATURATION: 91 %

## 2019-11-18 DIAGNOSIS — I25.10 CHRONIC CORONARY ARTERY DISEASE: Primary | ICD-10-CM

## 2019-11-18 DIAGNOSIS — E78.2 MIXED HYPERLIPIDEMIA: ICD-10-CM

## 2019-11-18 DIAGNOSIS — I65.23 BILATERAL CAROTID ARTERY STENOSIS: ICD-10-CM

## 2019-11-18 DIAGNOSIS — I10 ESSENTIAL HYPERTENSION: ICD-10-CM

## 2019-11-18 PROCEDURE — 99214 OFFICE O/P EST MOD 30 MIN: CPT | Performed by: INTERNAL MEDICINE

## 2019-11-18 RX ORDER — UBIDECARENONE 100 MG
100 CAPSULE ORAL DAILY
COMMUNITY
End: 2020-01-24

## 2019-11-18 RX ORDER — MAG HYDROX/ALUMINUM HYD/SIMETH 400-400-40
SUSPENSION, ORAL (FINAL DOSE FORM) ORAL DAILY
Status: ON HOLD | COMMUNITY
End: 2021-01-07

## 2019-11-18 RX ORDER — PLANT STANOL ESTER 450 MG
1 TABLET ORAL DAILY
Status: ON HOLD | COMMUNITY
End: 2021-05-28

## 2019-11-18 NOTE — PROGRESS NOTES
"  Subjective:     Encounter Date:11/18/2019      Patient ID: Isrrael Marino is a 79 y.o. male.    Chief Complaint: Coronary Artery Disease      PROBLEM LIST:  1.  Coronary artery disease:  a. Non STEMI, 09/22/2013.  Cardiac catheterization:   99% first diagonal (2.5 x 15 Xience), 95% second diagonal (PTCA), 50% LAD, FFR 0.83.  EF 50%.  b. On 11/19/2013:  Crescendo angina.  Catheterization:  95% LAD/70% second diagonal 3.0 x 23-mm XIENCE (LAD) and 2.75 x 12-mm XIENCE (second diagonal). Widely  patent first diagonal stent.   c. Carotid duplex 08/16/2018: Proximal right internal carotid artery plaque without significant stenosis. Right internal carotid artery stenosis of 0-49%. Proximal left internal carotid artery plaque without significant stenosis. Left internal carotid artery stenosis of 0-49  2. Hypertension.  3. Dyslipidemia.   4. Lower  extremity neuropathy.  5. Arthritis.  6. Skin cancer with removal  7. Traumatic fracture of C2  8. Surgeries:  a. Appendectomy  b. Lumbar surgery  c. Carpal tunnel surgery  d. Hernia repair  e. Right hip replacement  f. Vasectomy      History of Present Illness  Isrrael Marino returns today for an annual follow up with a history of coronary artery disease, hypertension, and dyslipidemia. Since last visit he reports experiencing tightness in his chest that extends all the way to his back. This happens after he eats meal and exerts himself physically. He had another episode when he was fishing at night and became dizzy and could not stand up due to lightheadedness. He reports he has fallen multiple times while fishing.  He believes his loss of balance, lightheadedness, and dizziness is due to taking Levitra, to which the Pt has DC. Denies any shortness of breath, orthopnea, PND, or palpitations.    Allergies   Allergen Reactions   • Ibuprofen Hives     \"Pt states he hasn't taken this in a long time\"         Current Outpatient Medications:   •  B Complex Vitamins (VITAMIN B " COMPLEX PO), Take 1 tablet by mouth Daily., Disp: , Rfl:   •  Cholecalciferol (VITAMIN D3) 125 MCG (5000 UT) capsule capsule, Take 2,000 Units by mouth Daily., Disp: , Rfl:   •  clopidogrel (PLAVIX) 75 MG tablet, TAKE 1 TABLET EVERY DAY, Disp: 90 tablet, Rfl: 4  •  coenzyme Q10 100 MG capsule, Take 100 mg by mouth Daily., Disp: , Rfl:   •  diclofenac (VOLTAREN) 75 MG EC tablet, TAKE 1 TABLET TWICE DAILY, Disp: 120 tablet, Rfl: 1  •  DULoxetine (CYMBALTA) 30 MG capsule, TAKE 2 CAPSULES BY MOUTH DAILY., Disp: 120 capsule, Rfl: 1  •  DULoxetine (CYMBALTA) 60 MG capsule, Take 30 mg by mouth 2 (Two) Times a Day., Disp: , Rfl:   •  esomeprazole (nexIUM) 40 MG capsule, Take 40 mg by mouth Every Morning Before Breakfast., Disp: , Rfl:   •  fluorouracil (EFUDEX) 5 % cream, , Disp: , Rfl:   •  fluticasone (FLONASE) 50 MCG/ACT nasal spray, 1 spray into the nostril(s) as directed by provider Daily., Disp: 3 bottle, Rfl: 1  •  metoprolol succinate XL (TOPROL-XL) 25 MG 24 hr tablet, TAKE 1 TABLET EVERY DAY, Disp: 90 tablet, Rfl: 4  •  mupirocin (BACTROBAN) 2 % ointment, Apply  topically to the appropriate area as directed 3 (Three) Times a Day., Disp: 30 g, Rfl: 0  •  polyethylene glycol (MIRALAX) powder, Dissolve 1 capful (17g) in water and take by mouth Daily as directed while on opiods, Disp: 238 g, Rfl: 1  •  Potassium Gluconate 550 (90 K) MG tablet, Take  by mouth Daily., Disp: , Rfl:   •  Saw Palmetto 450 MG capsule, Take  by mouth Daily., Disp: , Rfl:   •  simvastatin (ZOCOR) 40 MG tablet, TAKE 1 TABLET EVERY DAY, Disp: 90 tablet, Rfl: 3  •  tadalafil (CIALIS) 20 MG tablet, Take 20 mg by mouth As Needed for erectile dysfunction., Disp: , Rfl:   •  tamsulosin (FLOMAX) 0.4 MG capsule 24 hr capsule, Take 1 capsule by mouth Daily. (Patient taking differently: Take 0.4 mg by mouth As Needed.), Disp: 30 capsule, Rfl: 0  •  vardenafil (LEVITRA) 20 MG tablet, Take 20 mg by mouth As Needed for erectile dysfunction., Disp: , Rfl:  "  •  vitamin B-12 (CYANOCOBALAMIN) 1000 MCG tablet, Take 1,000 mcg by mouth Daily., Disp: , Rfl:   •  Zinc 50 MG capsule, Take  by mouth Daily., Disp: , Rfl:     The following portions of the patient's history were reviewed and updated as appropriate: allergies, current medications, past family history, past medical history, past social history, past surgical history and problem list.    Review of Systems   Constitution: Negative.   HENT: Positive for hearing loss and tinnitus.    Eyes: Positive for blurred vision.   Cardiovascular: Positive for chest pain. Negative for cyanosis, irregular heartbeat, leg swelling, orthopnea, palpitations and paroxysmal nocturnal dyspnea.   Respiratory: Negative.  Negative for shortness of breath.    Endocrine: Positive for polyuria.   Hematologic/Lymphatic: Negative for bleeding problem. Bruises/bleeds easily.   Skin: Negative for rash.   Musculoskeletal: Positive for arthritis, back pain, gout, muscle cramps, muscle weakness, neck pain and stiffness. Negative for myalgias.   Gastrointestinal: Positive for heartburn. Negative for nausea and vomiting.   Neurological: Positive for dizziness, headaches, light-headedness and loss of balance.          Objective:     Vitals:    11/18/19 1037   BP: 130/68   BP Location: Right arm   Patient Position: Sitting   Pulse: 73   SpO2: 91%   Weight: 87.5 kg (193 lb)   Height: 182.9 cm (72\")         Physical Exam   Constitutional: He is oriented to person, place, and time. He appears well-developed and well-nourished.   HENT:   Mouth/Throat: Oropharynx is clear and moist.   Neck: No JVD present. Carotid bruit is not present. No thyromegaly present.   Cardiovascular: Regular rhythm, S1 normal, S2 normal, normal heart sounds and intact distal pulses. Exam reveals no gallop, no S3 and no S4.   No murmur heard.  Pulses:       Carotid pulses are 2+ on the right side, and 2+ on the left side.       Radial pulses are 2+ on the right side, and 2+ on the " left side.   Pulmonary/Chest: Breath sounds normal.   Abdominal: Soft. Bowel sounds are normal. He exhibits no mass. There is no tenderness.   Musculoskeletal: He exhibits no edema.   Neurological: He is alert and oriented to person, place, and time.   Skin: Skin is warm and dry. No rash noted.       Lab Review:  Lab Results   Component Value Date    GLUCOSE 85 07/09/2018    BUN 31 (H) 08/15/2019    CREATININE 1.08 08/15/2019    EGFRIFNONA 66 08/15/2019    EGFRIFAFRI 80 08/15/2019    BCR 28.7 (H) 08/15/2019    K 4.3 08/15/2019    CO2 28.5 08/15/2019    CALCIUM 8.8 08/15/2019    PROTENTOTREF 6.4 08/15/2019    ALBUMIN 4.10 08/15/2019    LABIL2 1.8 08/15/2019    AST 23 08/15/2019    ALT 16 08/15/2019     Lab Results   Component Value Date    CHLPL 170 08/15/2019    TRIG 64 08/15/2019    HDL 65 (H) 08/15/2019    LDL 92 08/15/2019      Lab Results   Component Value Date    WBC 7.09 07/09/2018    HGB 14.1 07/09/2018    HCT 42.5 07/09/2018    MCV 91.0 07/09/2018     07/09/2018         Procedures        Assessment:   Isrrael was seen today for coronary artery disease.    Diagnoses and all orders for this visit:    Chronic coronary artery disease    Essential hypertension    Mixed hyperlipidemia        Impression:  1. Coronary artery disease, stable without angina. Pt is on dual antiplatelet therapy  2. Hypertension, acceptable control on metoprolol 25 mg  3. Hyperlipidemia, acceptable control on simvastatin 40 mg  4. Chest pain, with exertion after meals.  Possible angina versus due to his GERD.  5. Intermittent dizziness hypotension appears to be related to his Cialis/Levitra usage        Plan:  1. DC Levitra/Cialis due to lightheadedness and dizziness  2. Schedule carotid duplex to assess possible reasons for loss of balance  3. Schedule stress test to assess chest pain with exertion.  If negative, consider repeat GI evaluation  4. Continue current medications.  5. Revisit in 12 MO, or sooner as needed.    Scribed  for Casimiro Bernal MD by Nava Boland. 11/18/2019  11:16 AM    Casimiro Bernal MD      Please note that portions of this note may have been completed with a voice recognition program. Efforts were made to edit the dictations, but occasionally words are mistranscribed.

## 2019-11-19 ENCOUNTER — OFFICE VISIT (OUTPATIENT)
Dept: ORTHOPEDIC SURGERY | Facility: CLINIC | Age: 79
End: 2019-11-19

## 2019-11-19 VITALS — HEIGHT: 72 IN | HEART RATE: 75 BPM | BODY MASS INDEX: 25.26 KG/M2 | OXYGEN SATURATION: 97 % | WEIGHT: 186.51 LBS

## 2019-11-19 DIAGNOSIS — M17.0 PRIMARY OSTEOARTHRITIS OF BOTH KNEES: Primary | ICD-10-CM

## 2019-11-19 PROCEDURE — 20610 DRAIN/INJ JOINT/BURSA W/O US: CPT | Performed by: PHYSICIAN ASSISTANT

## 2019-11-19 RX ORDER — LIDOCAINE HYDROCHLORIDE 10 MG/ML
4 INJECTION, SOLUTION EPIDURAL; INFILTRATION; INTRACAUDAL; PERINEURAL
Status: COMPLETED | OUTPATIENT
Start: 2019-11-19 | End: 2019-11-19

## 2019-11-19 RX ORDER — METHYLPREDNISOLONE ACETATE 40 MG/ML
40 INJECTION, SUSPENSION INTRA-ARTICULAR; INTRALESIONAL; INTRAMUSCULAR; SOFT TISSUE
Status: COMPLETED | OUTPATIENT
Start: 2019-11-19 | End: 2019-11-19

## 2019-11-19 RX ADMIN — METHYLPREDNISOLONE ACETATE 40 MG: 40 INJECTION, SUSPENSION INTRA-ARTICULAR; INTRALESIONAL; INTRAMUSCULAR; SOFT TISSUE at 08:39

## 2019-11-19 RX ADMIN — LIDOCAINE HYDROCHLORIDE 4 ML: 10 INJECTION, SOLUTION EPIDURAL; INFILTRATION; INTRACAUDAL; PERINEURAL at 08:39

## 2019-11-19 NOTE — PROGRESS NOTES
Procedure   Large Joint Arthrocentesis: R knee  Date/Time: 11/19/2019 8:39 AM  Consent given by: patient  Site marked: site marked  Timeout: Immediately prior to procedure a time out was called to verify the correct patient, procedure, equipment, support staff and site/side marked as required   Supporting Documentation  Indications: pain   Procedure Details  Location: knee - R knee  Preparation: Patient was prepped and draped in the usual sterile fashion  Needle size: 22 G  Approach: anterolateral  Medications administered: 4 mL lidocaine PF 1% 1 %; 40 mg methylPREDNISolone acetate 40 MG/ML  Patient tolerance: patient tolerated the procedure well with no immediate complications    Large Joint Arthrocentesis: L knee  Date/Time: 11/19/2019 8:39 AM  Consent given by: patient  Site marked: site marked  Timeout: Immediately prior to procedure a time out was called to verify the correct patient, procedure, equipment, support staff and site/side marked as required   Supporting Documentation  Indications: pain   Procedure Details  Location: knee - L knee  Preparation: Patient was prepped and draped in the usual sterile fashion  Needle size: 22 G  Approach: anterolateral  Medications administered: 4 mL lidocaine PF 1% 1 %; 40 mg methylPREDNISolone acetate 40 MG/ML  Patient tolerance: patient tolerated the procedure well with no immediate complications

## 2019-11-19 NOTE — PROGRESS NOTES
[facility] Note    Subjective     Patient: Isrrael Marino  : 1940    Primary Care Provider: Rossy Cardona APRN    Requesting Provider: As above    Follow-up of the Left Knee (3 month  F/u/Primary Osteoarthritis of Both Knees/Bilateral Knee Cortisone Injection given 2019) and Follow-up of the Right Knee (3 month  F/u/Primary Osteoarthritis of Both Knees/Bilateral Knee Cortisone Injection given 2019)      History    Chief Complaint: Bilateral knee pain    History of Present Illness: Patient returns today for follow-up of his bilateral knee arthritis.  He has been treated in the past with intermittent steroid injection for years.  Is not interested in replacement of the like repeat injection today.    Current Outpatient Medications on File Prior to Visit   Medication Sig Dispense Refill   • B Complex Vitamins (VITAMIN B COMPLEX PO) Take 1 tablet by mouth Daily.     • Cholecalciferol (VITAMIN D3) 125 MCG (5000 UT) capsule capsule Take 2,000 Units by mouth Daily.     • clopidogrel (PLAVIX) 75 MG tablet TAKE 1 TABLET EVERY DAY 90 tablet 4   • coenzyme Q10 100 MG capsule Take 100 mg by mouth Daily.     • diclofenac (VOLTAREN) 75 MG EC tablet TAKE 1 TABLET TWICE DAILY 120 tablet 1   • DULoxetine (CYMBALTA) 30 MG capsule TAKE 2 CAPSULES BY MOUTH DAILY. 120 capsule 1   • DULoxetine (CYMBALTA) 60 MG capsule Take 30 mg by mouth 2 (Two) Times a Day.     • esomeprazole (nexIUM) 40 MG capsule Take 40 mg by mouth Every Morning Before Breakfast.     • fluorouracil (EFUDEX) 5 % cream      • fluticasone (FLONASE) 50 MCG/ACT nasal spray 1 spray into the nostril(s) as directed by provider Daily. 3 bottle 1   • metoprolol succinate XL (TOPROL-XL) 25 MG 24 hr tablet TAKE 1 TABLET EVERY DAY 90 tablet 4   • mupirocin (BACTROBAN) 2 % ointment Apply  topically to the appropriate area as directed 3 (Three) Times a Day. 30 g 0   • polyethylene glycol (MIRALAX) powder Dissolve 1 capful (17g)  "in water and take by mouth Daily as directed while on opiods 238 g 1   • Potassium Gluconate 550 (90 K) MG tablet Take  by mouth Daily.     • Saw Palmetto 450 MG capsule Take  by mouth Daily.     • simvastatin (ZOCOR) 40 MG tablet TAKE 1 TABLET EVERY DAY 90 tablet 3   • tadalafil (CIALIS) 20 MG tablet Take 20 mg by mouth As Needed for erectile dysfunction.     • tamsulosin (FLOMAX) 0.4 MG capsule 24 hr capsule Take 1 capsule by mouth Daily. (Patient taking differently: Take 0.4 mg by mouth As Needed.) 30 capsule 0   • vardenafil (LEVITRA) 20 MG tablet Take 20 mg by mouth As Needed for erectile dysfunction.     • vitamin B-12 (CYANOCOBALAMIN) 1000 MCG tablet Take 1,000 mcg by mouth Daily.     • Zinc 50 MG capsule Take  by mouth Daily.       No current facility-administered medications on file prior to visit.       Allergies   Allergen Reactions   • Ibuprofen Hives     \"Pt states he hasn't taken this in a long time\"      Past Medical History:   Diagnosis Date   • Arthritis     both knees   • Broken neck (CMS/HCC)    • CAD (coronary artery disease)    • Cancer (CMS/HCC)    • Chondrocalcinosis of knee    • GERD (gastroesophageal reflux disease)    • Gout    • Heart attack (CMS/HCC)      Last Assessed: 28 Mar 2014   • Heart disease    • History of transfusion     own blood with hip surgery   • Hyperlipidemia    • Hypertension    • IBS (irritable bowel syndrome)    • Left rotator cuff tear arthropathy    • Low back pain    • Lower extremity neuropathy    • Lumbar canal stenosis    • Neck pain    • Numbness    • Right hip pain    • Rotator cuff tear arthropathy of right shoulder    • Thin blood (CMS/HCC)      Past Surgical History:   Procedure Laterality Date   • APPENDECTOMY  1956   • BACK SURGERY  1997    spinal decompression and fusion   • BREAST LUMPECTOMY  2003   • CARDIAC CATHETERIZATION      with two stents//. DR. TOLEDO   • CARPAL TUNNEL RELEASE  03/28/2014    Neuroplasty Decompression Median Nerve At Carpal Tunnel " "  • CERVICAL DISCECTOMY POSTERIOR FUSION WITH BRAIN LAB N/A 7/13/2018    Procedure: CERVICAL LAMINECTOMY DECOMPRESSION POSTERIOR C1-2 POSTERIOR CERVICAL FUSION;  Surgeon: Randall Barnett MD;  Location: [facility];  Service: Neurosurgery   • COLONOSCOPY     • CORONARY STENT PLACEMENT  2013   • HERNIA REPAIR  2002    & 1998   • LUMBAR DISCECTOMY FUSION INSTRUMENTATION     • TONSILLECTOMY     • TOTAL HIP ARTHROPLASTY  2002   • VASECTOMY       Family History   Problem Relation Age of Onset   • Cancer Mother    • Heart disease Father    • Hypertension Father    • Heart attack Father       Social History     Socioeconomic History   • Marital status:      Spouse name: Not on file   • Number of children: Not on file   • Years of education: Not on file   • Highest education level: Not on file   Tobacco Use   • Smoking status: Never Smoker   • Smokeless tobacco: Never Used   Substance and Sexual Activity   • Alcohol use: No   • Drug use: No   • Sexual activity: Defer        Review of Systems   HENT: Positive for hearing loss.    Eyes: Negative.    Respiratory: Negative.    Cardiovascular: Negative.    Gastrointestinal: Negative.    Endocrine: Negative.    Genitourinary: Positive for difficulty urinating and frequency.   Musculoskeletal: Positive for arthralgias, back pain, neck pain and neck stiffness.   Skin: Negative.    Allergic/Immunologic: Negative.    Neurological: Positive for dizziness, weakness, light-headedness and headaches.   Hematological: Bruises/bleeds easily.   Psychiatric/Behavioral: Negative.        The following portions of the patient's history were reviewed and updated as appropriate: allergies, current medications, past family history, past medical history, past social history, past surgical history and problem list.      Objective      Physical Exam  Pulse 75   Ht 182.9 cm (72.01\")   Wt 84.6 kg (186 lb 8.2 oz)   SpO2 97%   BMI 25.29 kg/m²     Body mass index is 25.29 kg/m².    Patient is " well developed, well nourished and in no acute distress.  Alert and oriented x 3.    Ortho Exam  Bilateral knee exam:  Range of motion 0-1 20 bilaterally  Asymptomatic crepitus  Ligaments stable to valgus and varus stress  Medial joint line tenderness bilaterally  Antalgic gait    Medical Decision Making    Data Review:   none    Assessment:  1. Primary osteoarthritis of both knees        Plan:  Doing well with nonoperative treatment of bilateral knee arthritis.  Patient has done well with intermittent steroid injection in the past.  Plan today is repeat steroid injections bilateral knees.  I will see him back in 3 months or sooner if needed.    Using sterile technique, the left knee was sterilely prepped with Hibiclens. Following a time out,  using a 22 gauge needle, the left knee was injected with 40mg Depo Medrol, 4 cc lidocaine.  Patient tolerated the procedure well.  No complications.    Using sterile technique, the right knee was sterilely prepped with Hibiclens.  Following a time out,  using a 22 gauge needle, the right knee was injected with 40mg Depo Medrol, 4 cc lidocaine.  Patient tolerated the procedure well.  No complications.          Tish Shelley PA-C  11/19/19  11:54 AM

## 2019-12-10 ENCOUNTER — HOSPITAL ENCOUNTER (OUTPATIENT)
Dept: CARDIOLOGY | Facility: HOSPITAL | Age: 79
Discharge: HOME | End: 2019-12-10

## 2019-12-10 ENCOUNTER — HOSPITAL ENCOUNTER (OUTPATIENT)
Dept: CARDIOLOGY | Facility: HOSPITAL | Age: 79
Discharge: HOME | End: 2019-12-10
Admitting: INTERNAL MEDICINE

## 2019-12-10 VITALS — HEIGHT: 72 IN | BODY MASS INDEX: 25.19 KG/M2 | WEIGHT: 186 LBS

## 2019-12-10 VITALS
DIASTOLIC BLOOD PRESSURE: 86 MMHG | SYSTOLIC BLOOD PRESSURE: 146 MMHG | WEIGHT: 186 LBS | HEIGHT: 72 IN | BODY MASS INDEX: 25.19 KG/M2

## 2019-12-10 DIAGNOSIS — I65.23 BILATERAL CAROTID ARTERY STENOSIS: ICD-10-CM

## 2019-12-10 DIAGNOSIS — I25.10 CHRONIC CORONARY ARTERY DISEASE: ICD-10-CM

## 2019-12-10 LAB
BH CV ECHO MEAS - BSA(HAYCOCK): 2 M^2
BH CV ECHO MEAS - BSA: 2 M^2
BH CV ECHO MEAS - BZI_BMI: 24.4 KILOGRAMS/M^2
BH CV ECHO MEAS - BZI_METRIC_HEIGHT: 182.9 CM
BH CV ECHO MEAS - BZI_METRIC_WEIGHT: 81.6 KG
BH CV STRESS BP STAGE 1: NORMAL
BH CV STRESS BP STAGE 2: NORMAL
BH CV STRESS DURATION MIN STAGE 1: 3
BH CV STRESS DURATION MIN STAGE 2: 5
BH CV STRESS DURATION SEC STAGE 1: 0
BH CV STRESS DURATION SEC STAGE 2: 23
BH CV STRESS GRADE STAGE 1: 10
BH CV STRESS GRADE STAGE 2: 12
BH CV STRESS HR STAGE 1: 96
BH CV STRESS HR STAGE 2: 129
BH CV STRESS METS STAGE 1: 5
BH CV STRESS METS STAGE 2: 7.5
BH CV STRESS O2 STAGE 1: 96
BH CV STRESS O2 STAGE 2: 96
BH CV STRESS PROTOCOL 1: NORMAL
BH CV STRESS RECOVERY BP: NORMAL MMHG
BH CV STRESS RECOVERY HR: 73 BPM
BH CV STRESS SPEED STAGE 1: 1.7
BH CV STRESS SPEED STAGE 2: 2.5
BH CV STRESS STAGE 1: 1
BH CV STRESS STAGE 2: 2
BH CV XLRA MEAS LEFT DIST CCA EDV: 22.1 CM/SEC
BH CV XLRA MEAS LEFT DIST CCA PSV: 80 CM/SEC
BH CV XLRA MEAS LEFT DIST ICA EDV: 21.2 CM/SEC
BH CV XLRA MEAS LEFT DIST ICA PSV: 56 CM/SEC
BH CV XLRA MEAS LEFT ICA/CCA RATIO: 1.14
BH CV XLRA MEAS LEFT MID CCA EDV: 22.1 CM/SEC
BH CV XLRA MEAS LEFT MID CCA PSV: 84 CM/SEC
BH CV XLRA MEAS LEFT MID ICA EDV: 30.4 CM/SEC
BH CV XLRA MEAS LEFT MID ICA PSV: 88.9 CM/SEC
BH CV XLRA MEAS LEFT PROX CCA EDV: 26.4 CM/SEC
BH CV XLRA MEAS LEFT PROX CCA PSV: 107 CM/SEC
BH CV XLRA MEAS LEFT PROX ECA EDV: 15.7 CM/SEC
BH CV XLRA MEAS LEFT PROX ECA PSV: 106 CM/SEC
BH CV XLRA MEAS LEFT PROX ICA EDV: 32.4 CM/SEC
BH CV XLRA MEAS LEFT PROX ICA PSV: 96.3 CM/SEC
BH CV XLRA MEAS LEFT PROX SCLA PSV: 130 CM/SEC
BH CV XLRA MEAS LEFT VERTEBRAL A EDV: 15.3 CM/SEC
BH CV XLRA MEAS LEFT VERTEBRAL A PSV: 50 CM/SEC
BH CV XLRA MEAS RIGHT DIST CCA EDV: 16.4 CM/SEC
BH CV XLRA MEAS RIGHT DIST CCA PSV: 63.2 CM/SEC
BH CV XLRA MEAS RIGHT DIST ICA EDV: 29.2 CM/SEC
BH CV XLRA MEAS RIGHT DIST ICA PSV: 86 CM/SEC
BH CV XLRA MEAS RIGHT ICA/CCA RATIO: 0.96
BH CV XLRA MEAS RIGHT MID CCA EDV: 19.6 CM/SEC
BH CV XLRA MEAS RIGHT MID CCA PSV: 81.6 CM/SEC
BH CV XLRA MEAS RIGHT MID ICA EDV: 26.3 CM/SEC
BH CV XLRA MEAS RIGHT MID ICA PSV: 78.6 CM/SEC
BH CV XLRA MEAS RIGHT PROX CCA EDV: 19.2 CM/SEC
BH CV XLRA MEAS RIGHT PROX CCA PSV: 88.4 CM/SEC
BH CV XLRA MEAS RIGHT PROX ECA EDV: 33.7 CM/SEC
BH CV XLRA MEAS RIGHT PROX ECA PSV: 185 CM/SEC
BH CV XLRA MEAS RIGHT PROX ICA EDV: 17.1 CM/SEC
BH CV XLRA MEAS RIGHT PROX ICA PSV: 63.9 CM/SEC
BH CV XLRA MEAS RIGHT PROX SCLA PSV: 90.4 CM/SEC
BH CV XLRA MEAS RIGHT VERTEBRAL A EDV: 8.8 CM/SEC
BH CV XLRA MEAS RIGHT VERTEBRAL A PSV: 38.4 CM/SEC
LEFT ARM BP: NORMAL MMHG
LV EF NUC BP: 47 %
MAXIMAL PREDICTED HEART RATE: 139 BPM
PERCENT MAX PREDICTED HR: 92.81 %
RIGHT ARM BP: NORMAL MMHG
STRESS BASELINE BP: NORMAL MMHG
STRESS BASELINE HR: 68 BPM
STRESS O2 SAT REST: 98 %
STRESS PERCENT HR: 109 %
STRESS POST ESTIMATED WORKLOAD: 7 METS
STRESS POST EXERCISE DUR MIN: 5 MIN
STRESS POST EXERCISE DUR SEC: 23 SEC
STRESS POST O2 SAT PEAK: 96 %
STRESS POST PEAK BP: NORMAL MMHG
STRESS POST PEAK HR: 129 BPM
STRESS TARGET HR: 118 BPM

## 2019-12-10 PROCEDURE — 93880 EXTRACRANIAL BILAT STUDY: CPT

## 2019-12-10 PROCEDURE — 93880 EXTRACRANIAL BILAT STUDY: CPT | Performed by: INTERNAL MEDICINE

## 2019-12-10 PROCEDURE — 78452 HT MUSCLE IMAGE SPECT MULT: CPT

## 2019-12-10 PROCEDURE — 93017 CV STRESS TEST TRACING ONLY: CPT

## 2019-12-10 PROCEDURE — 93018 CV STRESS TEST I&R ONLY: CPT | Performed by: INTERNAL MEDICINE

## 2019-12-10 PROCEDURE — 78452 HT MUSCLE IMAGE SPECT MULT: CPT | Performed by: INTERNAL MEDICINE

## 2019-12-10 PROCEDURE — 0 TECHNETIUM SESTAMIBI: Performed by: INTERNAL MEDICINE

## 2019-12-10 PROCEDURE — A9500 TC99M SESTAMIBI: HCPCS | Performed by: INTERNAL MEDICINE

## 2019-12-10 RX ADMIN — TECHNETIUM TC 99M SESTAMIBI 1 DOSE: 1 INJECTION INTRAVENOUS at 08:30

## 2019-12-10 RX ADMIN — TECHNETIUM TC 99M SESTAMIBI 1 DOSE: 1 INJECTION INTRAVENOUS at 10:15

## 2019-12-12 ENCOUNTER — TELEPHONE (OUTPATIENT)
Dept: CARDIOLOGY | Facility: CLINIC | Age: 79
End: 2019-12-12

## 2019-12-12 NOTE — TELEPHONE ENCOUNTER
Spoke with patient, advised of below.  He will f/u with PCP.     ----- Message from Casimiro Bernal MD sent at 12/11/2019  4:29 PM EST -----  No evidence of coronary artery disease, however note what appears to be significant arthritis/degeneration of thoracic spine.  May need further imaging, have patient seen by primary physician for possible MRI I

## 2020-01-21 RX ORDER — DULOXETIN HYDROCHLORIDE 30 MG/1
CAPSULE, DELAYED RELEASE ORAL
Qty: 180 CAPSULE | Refills: 1 | Status: SHIPPED | OUTPATIENT
Start: 2020-01-21 | End: 2020-08-25 | Stop reason: SDUPTHER

## 2020-01-21 NOTE — TELEPHONE ENCOUNTER
Provider:  Anibal  Caller:  Automated refill request  Surgery:  C1-2 lami/Fusion  Surgery Date:  07/13/18  Last visit:  03/11/19  Next visit: NA    Reason for call:         Requested Prescriptions     Pending Prescriptions Disp Refills   • DULoxetine (CYMBALTA) 30 MG capsule [Pharmacy Med Name: DULOXETINE HCL 30 MG Capsule Delayed Release Particles] 180 capsule      Sig: TAKE 2 CAPSULES EVERY DAY

## 2020-01-24 ENCOUNTER — OFFICE VISIT (OUTPATIENT)
Dept: INTERNAL MEDICINE | Facility: CLINIC | Age: 80
End: 2020-01-24

## 2020-01-24 ENCOUNTER — LAB (OUTPATIENT)
Dept: LAB | Facility: HOSPITAL | Age: 80
End: 2020-01-24

## 2020-01-24 ENCOUNTER — TELEPHONE (OUTPATIENT)
Dept: INTERNAL MEDICINE | Facility: CLINIC | Age: 80
End: 2020-01-24

## 2020-01-24 VITALS
WEIGHT: 188.4 LBS | RESPIRATION RATE: 16 BRPM | OXYGEN SATURATION: 98 % | DIASTOLIC BLOOD PRESSURE: 84 MMHG | HEIGHT: 72 IN | SYSTOLIC BLOOD PRESSURE: 146 MMHG | BODY MASS INDEX: 25.52 KG/M2 | TEMPERATURE: 98.2 F | HEART RATE: 77 BPM

## 2020-01-24 DIAGNOSIS — F52.21 ERECTILE DYSFUNCTION OF NONORGANIC ORIGIN: ICD-10-CM

## 2020-01-24 DIAGNOSIS — R73.03 PREDIABETES: ICD-10-CM

## 2020-01-24 DIAGNOSIS — Z00.00 HEALTHCARE MAINTENANCE: ICD-10-CM

## 2020-01-24 DIAGNOSIS — M17.0 PRIMARY OSTEOARTHRITIS OF BOTH KNEES: ICD-10-CM

## 2020-01-24 DIAGNOSIS — I10 ESSENTIAL HYPERTENSION: ICD-10-CM

## 2020-01-24 DIAGNOSIS — M19.011 GLENOHUMERAL ARTHRITIS, RIGHT: ICD-10-CM

## 2020-01-24 DIAGNOSIS — R93.7 ABNORMAL CT OF THORACIC SPINE: Primary | ICD-10-CM

## 2020-01-24 DIAGNOSIS — I25.10 CHRONIC CORONARY ARTERY DISEASE: ICD-10-CM

## 2020-01-24 DIAGNOSIS — K21.9 GASTROESOPHAGEAL REFLUX DISEASE WITHOUT ESOPHAGITIS: ICD-10-CM

## 2020-01-24 DIAGNOSIS — G60.9 IDIOPATHIC PERIPHERAL NEUROPATHY: ICD-10-CM

## 2020-01-24 DIAGNOSIS — E78.2 MIXED HYPERLIPIDEMIA: ICD-10-CM

## 2020-01-24 DIAGNOSIS — N40.0 BENIGN PROSTATIC HYPERPLASIA WITHOUT LOWER URINARY TRACT SYMPTOMS: ICD-10-CM

## 2020-01-24 LAB
ALBUMIN SERPL-MCNC: 4.1 G/DL (ref 3.5–5.2)
ALBUMIN/GLOB SERPL: 1.2 G/DL
ALP SERPL-CCNC: 71 U/L (ref 39–117)
ALT SERPL W P-5'-P-CCNC: 14 U/L (ref 1–41)
ANION GAP SERPL CALCULATED.3IONS-SCNC: 10 MMOL/L (ref 5–15)
AST SERPL-CCNC: 28 U/L (ref 1–40)
BILIRUB SERPL-MCNC: 0.5 MG/DL (ref 0.2–1.2)
BUN BLD-MCNC: 25 MG/DL (ref 8–23)
BUN/CREAT SERPL: 23.1 (ref 7–25)
CALCIUM SPEC-SCNC: 9.5 MG/DL (ref 8.6–10.5)
CHLORIDE SERPL-SCNC: 102 MMOL/L (ref 98–107)
CO2 SERPL-SCNC: 29 MMOL/L (ref 22–29)
CREAT BLD-MCNC: 1.08 MG/DL (ref 0.76–1.27)
DEPRECATED RDW RBC AUTO: 46.1 FL (ref 37–54)
ERYTHROCYTE [DISTWIDTH] IN BLOOD BY AUTOMATED COUNT: 13.4 % (ref 12.3–15.4)
GFR SERPL CREATININE-BSD FRML MDRD: 66 ML/MIN/1.73
GLOBULIN UR ELPH-MCNC: 3.4 GM/DL
GLUCOSE BLD-MCNC: 89 MG/DL (ref 65–99)
HBA1C MFR BLD: 5.8 % (ref 4.8–5.6)
HCT VFR BLD AUTO: 48.9 % (ref 37.5–51)
HGB BLD-MCNC: 15.7 G/DL (ref 13–17.7)
MCH RBC QN AUTO: 30.1 PG (ref 26.6–33)
MCHC RBC AUTO-ENTMCNC: 32.1 G/DL (ref 31.5–35.7)
MCV RBC AUTO: 93.9 FL (ref 79–97)
PLATELET # BLD AUTO: 230 10*3/MM3 (ref 140–450)
PMV BLD AUTO: 10.9 FL (ref 6–12)
POTASSIUM BLD-SCNC: 4.5 MMOL/L (ref 3.5–5.2)
PROT SERPL-MCNC: 7.5 G/DL (ref 6–8.5)
RBC # BLD AUTO: 5.21 10*6/MM3 (ref 4.14–5.8)
SODIUM BLD-SCNC: 141 MMOL/L (ref 136–145)
WBC NRBC COR # BLD: 8.37 10*3/MM3 (ref 3.4–10.8)

## 2020-01-24 PROCEDURE — 99214 OFFICE O/P EST MOD 30 MIN: CPT | Performed by: INTERNAL MEDICINE

## 2020-01-24 PROCEDURE — 80053 COMPREHEN METABOLIC PANEL: CPT | Performed by: INTERNAL MEDICINE

## 2020-01-24 PROCEDURE — 85027 COMPLETE CBC AUTOMATED: CPT | Performed by: INTERNAL MEDICINE

## 2020-01-24 PROCEDURE — 83036 HEMOGLOBIN GLYCOSYLATED A1C: CPT | Performed by: INTERNAL MEDICINE

## 2020-01-24 NOTE — PROGRESS NOTES
Internal Medicine New Patient  Isrrael Marino is a 79 y.o. male who presents today to establish care and with concerns as outlined below.    Chief Complaint  Chief Complaint   Patient presents with   • Establish Care        HPI  Mr. Marino is here to establish care. He was a patient of [facility].    CAD - He has had 3 previous stents. He follows with Dr. Bernal. He had been having chest pain while at the gym and had exercise stress test that was normal. However the CT images showed increased density in the mid thoracic spine. He was instructed to get an MRI but reports this will cost him $300.    Neuropathy - He reports neuropathy in both legs. He was seeing Dr. Rizvi but he quit seeing him after he was unable to give him any idea of prognosis. He had taken gabapentin that was not tolerated. He quit after a week. He is now on cymbalta that is prescribed by his neurosurgeon, Dr. Randall Barnett. He typically takes 30mg daily but if his pain is bad will take 60mg daily.    He has chronic back pain and has had a previous neck fracture. He had C1-2 fusion in 2018.    He also has chronic shoulder and knee pain. He gets injections with orthopedics. Will go Tuesday for injection in his right shoulder.       Review of Systems  Review of Systems   Constitutional: Negative.    Eyes: Negative.    Respiratory: Negative.    Cardiovascular: Negative.    Gastrointestinal: Positive for GERD. Negative for abdominal pain, blood in stool, constipation, diarrhea, nausea and vomiting.   Genitourinary: Negative.    Musculoskeletal: Positive for arthralgias, back pain and neck pain.   Skin: Positive for skin lesions (on scalp).   Neurological: Positive for numbness.   Psychiatric/Behavioral: Negative.         Past Medical History  Past Medical History:   Diagnosis Date   • Arthritis     both knees   • Broken neck (CMS/HCC)    • CAD (coronary artery disease)    • Cancer (CMS/HCC)    • Chondrocalcinosis of knee    • GERD  (gastroesophageal reflux disease)    • Gout    • Heart attack (CMS/HCC)      Last Assessed: 28 Mar 2014   • Heart disease    • History of transfusion     own blood with hip surgery   • Hyperlipidemia    • Hypertension    • IBS (irritable bowel syndrome)    • Left rotator cuff tear arthropathy    • Low back pain    • Lower extremity neuropathy    • Lumbar canal stenosis    • Neck pain    • Numbness    • Right hip pain    • Rotator cuff tear arthropathy of right shoulder    • Thin blood (CMS/HCC)         Surgical History  Past Surgical History:   Procedure Laterality Date   • APPENDECTOMY  1956   • BACK SURGERY  1997    spinal decompression and fusion   • BREAST LUMPECTOMY  2003   • CARDIAC CATHETERIZATION      with two stents//. DR. TOLEDO   • CARPAL TUNNEL RELEASE  03/28/2014    Neuroplasty Decompression Median Nerve At Carpal Tunnel   • CERVICAL DISCECTOMY POSTERIOR FUSION WITH BRAIN LAB N/A 7/13/2018    Procedure: CERVICAL LAMINECTOMY DECOMPRESSION POSTERIOR C1-2 POSTERIOR CERVICAL FUSION;  Surgeon: Randall Barnett MD;  [facility];  Service: Neurosurgery   • COLONOSCOPY     • CORONARY STENT PLACEMENT  2013   • HERNIA REPAIR  2002    & 1998   • LUMBAR DISCECTOMY FUSION INSTRUMENTATION     • TONSILLECTOMY     • TOTAL HIP ARTHROPLASTY  2002   • VASECTOMY          Family History  Family History   Problem Relation Age of Onset   • Cancer Mother    • Heart disease Father    • Hypertension Father    • Heart attack Father         Social History  Social History     Socioeconomic History   • Marital status:      Spouse name: Not on file   • Number of children: Not on file   • Years of education: Not on file   • Highest education level: Not on file   Tobacco Use   • Smoking status: Never Smoker   • Smokeless tobacco: Never Used   Substance and Sexual Activity   • Alcohol use: No   • Drug use: No   • Sexual activity: Defer        Current Medications  Current Outpatient Medications on File Prior to Visit    Medication Sig Dispense Refill   • B Complex Vitamins (VITAMIN B COMPLEX PO) Take 1 tablet by mouth Daily.     • Cholecalciferol (VITAMIN D3) 125 MCG (5000 UT) capsule capsule Take 2,000 Units by mouth Daily.     • clopidogrel (PLAVIX) 75 MG tablet TAKE 1 TABLET EVERY DAY 90 tablet 4   • DULoxetine (CYMBALTA) 30 MG capsule TAKE 2 CAPSULES EVERY  capsule 1   • esomeprazole (nexIUM) 40 MG capsule Take 40 mg by mouth Every Morning Before Breakfast.     • fluticasone (FLONASE) 50 MCG/ACT nasal spray 1 spray into the nostril(s) as directed by provider Daily. 3 bottle 1   • metoprolol succinate XL (TOPROL-XL) 25 MG 24 hr tablet TAKE 1 TABLET EVERY DAY 90 tablet 4   • polyethylene glycol (MIRALAX) powder Dissolve 1 capful (17g) in water and take by mouth Daily as directed while on opiods 238 g 1   • Potassium Gluconate 550 (90 K) MG tablet Take  by mouth Daily.     • Saw Palmetto 450 MG capsule Take  by mouth Daily.     • simvastatin (ZOCOR) 40 MG tablet TAKE 1 TABLET EVERY DAY 90 tablet 3   • tadalafil (CIALIS) 20 MG tablet Take 20 mg by mouth As Needed for erectile dysfunction.     • tamsulosin (FLOMAX) 0.4 MG capsule 24 hr capsule Take 1 capsule by mouth Daily. (Patient taking differently: Take 0.4 mg by mouth As Needed.) 30 capsule 0   • vardenafil (LEVITRA) 20 MG tablet Take 20 mg by mouth As Needed for erectile dysfunction.     • vitamin B-12 (CYANOCOBALAMIN) 1000 MCG tablet Take 1,000 mcg by mouth Daily.     • Zinc 50 MG capsule Take  by mouth Daily.     • coenzyme Q10 100 MG capsule Take 100 mg by mouth Daily.     • diclofenac (VOLTAREN) 75 MG EC tablet TAKE 1 TABLET TWICE DAILY 120 tablet 1   • DULoxetine (CYMBALTA) 60 MG capsule Take 30 mg by mouth 2 (Two) Times a Day.     • fluorouracil (EFUDEX) 5 % cream      • mupirocin (BACTROBAN) 2 % ointment Apply  topically to the appropriate area as directed 3 (Three) Times a Day. 30 g 0     No current facility-administered medications on file prior to visit.   "      Allergies  Allergies   Allergen Reactions   • Ibuprofen Hives     \"Pt states he hasn't taken this in a long time\"        Objective  Visit Vitals  /84   Pulse 77   Temp 98.2 °F (36.8 °C)   Resp 16   Ht 182.9 cm (72.01\")   Wt 85.5 kg (188 lb 6.4 oz)   SpO2 98%   BMI 25.55 kg/m²        Physical Exam  Physical Exam   Constitutional: He is oriented to person, place, and time. He appears well-developed and well-nourished. No distress.   HENT:   Head: Normocephalic and atraumatic.   Right Ear: External ear normal.   Left Ear: External ear normal.   Nose: Nose normal.   Mouth/Throat: Oropharynx is clear and moist.   Eyes: Pupils are equal, round, and reactive to light. Conjunctivae and EOM are normal.   Neck:   Scarring posteriorly and laterally from prior cervical fusion, ROM decreased, pain to palpation at base of neck.   Cardiovascular: Normal rate, regular rhythm and normal heart sounds.   Pulmonary/Chest: Effort normal and breath sounds normal. No respiratory distress.   Abdominal: Soft. Bowel sounds are normal. He exhibits no distension. There is no tenderness.   Musculoskeletal:   No palpable tenderness or deformity over thoracic spine.   Neurological: He is alert and oriented to person, place, and time.   Gait stiff but steady without assist device   Skin: Skin is warm and dry.   Psychiatric: He has a normal mood and affect. His behavior is normal. Judgment and thought content normal.        Results  Results for orders placed or performed during the hospital encounter of 12/10/19   Duplex Carotid Ultrasound CAR   Result Value Ref Range    BSA 2.0 m^2    Vertebral A PSV 50 cm/sec    Vertebral A PSV 38.4 cm/sec    Vertebral A EDV 15.3 cm/sec    Vertebral A EDV 8.8 cm/sec    Prox SCLA .0 cm/sec    Prox SCLA PSV 90.4 cm/sec     CV ECHO SAURABH - BZI_BMI 24.4 kilograms/m^2     CV ECHO SAURABH - BSA(HAYCOCK) 2.0 m^2     CV ECHO SAURABH - BZI_METRIC_WEIGHT 81.6 kg     CV ECHO SAURABH - BZI_METRIC_HEIGHT " 182.9 cm    Prox CCA PSV 88.4 cm/sec    Prox CCA EDV 19.2 cm/sec    Right Mid CCA PSV 81.6 cm/sec    right Mid CCA EDV 19.6 cm/sec    Dist CCA PSV 63.2 cm/sec    Dist CCA EDV 16.4 cm/sec    Prox ECA  cm/sec    Prox ECA EDV 33.7 cm/sec    Prox ICA PSV 63.9 cm/sec    Prox ICA EDV 17.1 cm/sec    Mid ICA PSV 78.6 cm/sec    Mid ICA EDV 26.3 cm/sec    Dist ICA PSV 86 cm/sec    Dist ICA EDV 29.2 cm/sec    ICA/CCA ratio 0.96     Prox CCA  cm/sec    Prox CCA EDV 26.4 cm/sec    left Mid CCA PSV 84 cm/sec    left Mid CCA EDV 22.1 cm/sec    Dist CCA PSV 80 cm/sec    Dist CCA EDV 22.1 cm/sec    Prox ECA  cm/sec    Prox ECA EDV 15.7 cm/sec    Prox ICA PSV 96.3 cm/sec    Prox ICA EDV 32.4 cm/sec    Mid ICA PSV 88.9 cm/sec    Mid ICA EDV 30.4 cm/sec    Dist ICA PSV 56 cm/sec    Dist ICA EDV 21.2 cm/sec    ICA/CCA ratio 1.14     Left arm /76 mmHg    Right arm /96 mmHg        Assessment and Plan  Isrrael was seen today for establish care.    Diagnoses and all orders for this visit:    Abnormal CT of thoracic spine  - Stress test 12/2019 with CT attentuation images read as having mass or increased density anterior to several thoracic vertebral bodies  - Exam with no visible or palpable deformity, no tenderness over thoracic spine  - Discussed recommendation for further imaging to determine what these finding represent and he was hesitant due to anticipated cost.  - After patient left office I called Dr. Jordana Romo with radiology who had read his most recent thoracic spine CT which unfortunately was in 2017. She reviewed these old images and noted no anterior masses or densities. She did not review the CT attenuation images associated with the stress test as they were part of stress imaging which she did not read so she was unable to comment on potential etiologies. Recommendation was for repeat CT imaging.  - Will attempt to contact patient to discuss this recommendation.    Prediabetes  - Prior  A1c 5.8, will repeat today    Gastroesophageal reflux disease without esophagitis  - Well controlled on esomeprazole daily, occasionally takes it BID    Idiopathic peripheral neuropathy  - He had previously been evaluated and managed by Dr. Rizvi but not currently  - Does not tolerate gabapentin  - Currently on duloxetine 30mg daily prescribed by Dr. Barnett with improvement in symptoms    Essential hypertension  - On metoprolol succinate 25mg daily  - BP above goal today in office but acceptable, will need to monitor need for second agent    Mixed hyperlipidemia  - On simvastatin 40mg daily  - Will repeat lipids at next visit    Erectile dysfunction of nonorganic origin  - Prior note by Dr. Bernal recommended discontinuation of ED meds due to dizziness. He reports today that he had not understood this conversation.  - Discussed today that he should not be using levitra or cialis as they have been previously thought to cause him to feel dizzy which could then potentially lead to falls. He was agreeable.    Benign prostatic hyperplasia without lower urinary tract symptoms  - On flomax    Chronic coronary artery disease  - Follows with Dr. Bernal  - Had stress test 12/2019 due to chest pain that was negative for ischemia. Chest pain was felt to be due to GERD that is managed as above.  - He is on plavix, ASA, metoprolol succinate 25mg daily, simvasatin 40mg daily  - No further chest pain since starting esomeprazole for GERD    Primary osteoarthritis of both knees  - He has been getting injections periodically through the orthopedics clinic which help his pain, last 11/2019  - He is able to ambulate without assist device, denies falls    Glenohumeral arthritis, right  - He also gets injections through the orthopedics clinic for his shoulder arthritis and has plans to get injection in his right shoulder next week.    Healthcare maintenance  - CBC and CMP ordered    Health Maintenance   Topic Date Due   • Pneumococcal  Vaccine Once at 65 Years Old  06/06/2003   • INFLUENZA VACCINE  08/01/2019   • MEDICARE ANNUAL WELLNESS  08/15/2020   • LIPID PANEL  08/15/2020   • TDAP/TD VACCINES (3 - Td) 11/12/2027   • ZOSTER VACCINE  Completed     Health Maintenance  - Colonoscopy: No longer indicated due to age.  - Immunizations: Tdap 2017. Flu UTD. Shingrix series complete. Pneumovax potentially administered in 2010.  - Depression screening: negative 8/2019    Return in about 7 months (around 8/24/2020) for Medicare Wellness, Labs today.

## 2020-01-24 NOTE — TELEPHONE ENCOUNTER
PATIENT IS CHECKING OUT, HE WAS WANTING TO KNOW IF YOU WILL MAIL HIM A COPY OF HIS LAB RESULTS WHEN THEY COME BACK. THANKS

## 2020-01-25 PROBLEM — R73.03 PREDIABETES: Status: ACTIVE | Noted: 2020-01-25

## 2020-01-28 ENCOUNTER — OFFICE VISIT (OUTPATIENT)
Dept: ORTHOPEDIC SURGERY | Facility: CLINIC | Age: 80
End: 2020-01-28

## 2020-01-28 VITALS — OXYGEN SATURATION: 98 % | HEART RATE: 80 BPM | WEIGHT: 188.49 LBS | HEIGHT: 72 IN | BODY MASS INDEX: 25.53 KG/M2

## 2020-01-28 DIAGNOSIS — R93.7 ABNORMAL CT OF THORACIC SPINE: Primary | ICD-10-CM

## 2020-01-28 DIAGNOSIS — M19.011 ARTHRITIS OF RIGHT GLENOHUMERAL JOINT: ICD-10-CM

## 2020-01-28 PROCEDURE — 20610 DRAIN/INJ JOINT/BURSA W/O US: CPT | Performed by: PHYSICIAN ASSISTANT

## 2020-01-28 RX ORDER — LIDOCAINE HYDROCHLORIDE 10 MG/ML
4 INJECTION, SOLUTION EPIDURAL; INFILTRATION; INTRACAUDAL; PERINEURAL
Status: COMPLETED | OUTPATIENT
Start: 2020-01-28 | End: 2020-01-28

## 2020-01-28 RX ORDER — METHYLPREDNISOLONE ACETATE 40 MG/ML
40 INJECTION, SUSPENSION INTRA-ARTICULAR; INTRALESIONAL; INTRAMUSCULAR; SOFT TISSUE
Status: COMPLETED | OUTPATIENT
Start: 2020-01-28 | End: 2020-01-28

## 2020-01-28 RX ADMIN — METHYLPREDNISOLONE ACETATE 40 MG: 40 INJECTION, SUSPENSION INTRA-ARTICULAR; INTRALESIONAL; INTRAMUSCULAR; SOFT TISSUE at 08:10

## 2020-01-28 RX ADMIN — LIDOCAINE HYDROCHLORIDE 4 ML: 10 INJECTION, SOLUTION EPIDURAL; INFILTRATION; INTRACAUDAL; PERINEURAL at 08:10

## 2020-01-28 NOTE — PROGRESS NOTES
"        [facility] [place] [facility]opa[facility] Note    Subjective     Patient: Isrrael Marino  : 1940    Primary Care Provider: Ivanna George MD    Requesting Provider: As above    Follow-up (right shoulder)      History    Chief Complaint: Right shoulder pain    History of Present Illness: Patient returns today for his right glenohumeral arthritis.  He reports intra-articular steroid injection still gives him good relief.  He is not interested in surgery.  He would like repeat injection today.    Current Outpatient Medications on File Prior to Visit   Medication Sig Dispense Refill   • B Complex Vitamins (VITAMIN B COMPLEX PO) Take 1 tablet by mouth Daily.     • Cholecalciferol (VITAMIN D3) 125 MCG (5000 UT) capsule capsule Take 2,000 Units by mouth Daily.     • clopidogrel (PLAVIX) 75 MG tablet TAKE 1 TABLET EVERY DAY 90 tablet 4   • DULoxetine (CYMBALTA) 30 MG capsule TAKE 2 CAPSULES EVERY  capsule 1   • esomeprazole (nexIUM) 40 MG capsule Take 40 mg by mouth Every Morning Before Breakfast.     • fluorouracil (EFUDEX) 5 % cream      • fluticasone (FLONASE) 50 MCG/ACT nasal spray 1 spray into the nostril(s) as directed by provider Daily. 3 bottle 1   • metoprolol succinate XL (TOPROL-XL) 25 MG 24 hr tablet TAKE 1 TABLET EVERY DAY 90 tablet 4   • Potassium Gluconate 550 (90 K) MG tablet Take  by mouth Daily.     • Saw Palmetto 450 MG capsule Take  by mouth Daily.     • simvastatin (ZOCOR) 40 MG tablet TAKE 1 TABLET EVERY DAY 90 tablet 3   • tamsulosin (FLOMAX) 0.4 MG capsule 24 hr capsule Take 1 capsule by mouth Daily. (Patient taking differently: Take 0.4 mg by mouth As Needed.) 30 capsule 0   • vitamin B-12 (CYANOCOBALAMIN) 1000 MCG tablet Take 1,000 mcg by mouth Daily.     • Zinc 50 MG capsule Take  by mouth Daily.       No current facility-administered medications on file prior to visit.       Allergies   Allergen Reactions   • Ibuprofen Hives     \"Pt states he hasn't taken this in a long time\" "      Past Medical History:   Diagnosis Date   • Arthritis     both knees   • Broken neck (CMS/HCC)    • CAD (coronary artery disease)    • Cancer (CMS/HCC)    • Chondrocalcinosis of knee    • GERD (gastroesophageal reflux disease)    • Gout    • Heart attack (CMS/HCC)      Last Assessed: 28 Mar 2014   • Heart disease    • History of transfusion     own blood with hip surgery   • Hyperlipidemia    • Hypertension    • IBS (irritable bowel syndrome)    • Left rotator cuff tear arthropathy    • Low back pain    • Lower extremity neuropathy    • Lumbar canal stenosis    • Neck pain    • Numbness    • Right hip pain    • Rotator cuff tear arthropathy of right shoulder    • Thin blood (CMS/HCC)      Past Surgical History:   Procedure Laterality Date   • APPENDECTOMY  1956   • BACK SURGERY  1997    spinal decompression and fusion   • BREAST LUMPECTOMY  2003   • CARDIAC CATHETERIZATION      with two stents//. DR. TOLEDO   • CARPAL TUNNEL RELEASE  03/28/2014    Neuroplasty Decompression Median Nerve At Carpal Tunnel   • CERVICAL DISCECTOMY POSTERIOR FUSION WITH BRAIN LAB N/A 7/13/2018    Procedure: CERVICAL LAMINECTOMY DECOMPRESSION POSTERIOR C1-2 POSTERIOR CERVICAL FUSION;  Surgeon: Randall Barnett MD;  Location: [facility];  Service: Neurosurgery   • COLONOSCOPY     • CORONARY STENT PLACEMENT  2013   • HERNIA REPAIR  2002    & 1998   • LUMBAR DISCECTOMY FUSION INSTRUMENTATION     • TONSILLECTOMY     • TOTAL HIP ARTHROPLASTY  2002   • VASECTOMY       Family History   Problem Relation Age of Onset   • Cancer Mother    • Heart disease Father    • Hypertension Father    • Heart attack Father       Social History     Socioeconomic History   • Marital status:      Spouse name: Not on file   • Number of children: Not on file   • Years of education: Not on file   • Highest education level: Not on file   Tobacco Use   • Smoking status: Never Smoker   • Smokeless tobacco: Never Used   Substance and Sexual Activity   •  "Alcohol use: No   • Drug use: No   • Sexual activity: Defer        Review of Systems   Constitutional: Negative.    HENT: Negative.    Eyes: Negative.    Respiratory: Negative.    Cardiovascular: Negative.    Gastrointestinal: Negative.    Endocrine: Negative.    Genitourinary: Negative.    Musculoskeletal: Positive for arthralgias.   Skin: Negative.    Allergic/Immunologic: Negative.    Neurological: Negative.    Hematological: Negative.    Psychiatric/Behavioral: Negative.        The following portions of the patient's history were reviewed and updated as appropriate: allergies, current medications, past family history, past medical history, past social history, past surgical history and problem list.      Objective      Physical Exam  Pulse 80   Ht 182.9 cm (72.01\")   Wt 85.5 kg (188 lb 7.9 oz)   SpO2 98%   BMI 25.56 kg/m²     Body mass index is 25.56 kg/m².    Patient is well developed, well nourished and in no acute distress.  Alert and oriented x 3.    Ortho Exam  Right shoulder exam: Decreased range of motion of the shoulder with tenderness over the posterior joint line.  Rotator cuff strength intact but weak.  Neurovascular intact distally.      Medical Decision Making    Data Review:   none    Assessment:  1. Arthritis of right glenohumeral joint        Plan:  Right glenohumeral arthritis.  Patient is done well with intermittent intra-articular injection.  Plan today is repeat glenohumeral injection to the right shoulder.  I will see him back in 3 to 4 months to consider repeat injection or sooner if needed.    Using sterile technique, the right shoulder with sterilely prepped with Hibiclens.  After time out, using a 22-gauge needle, the right glenohumeral joint was injected with 40 mg Depo-Medrol and 4cc of lidocaine.  Patient tolerated the procedure well.        Tish Shelley PA-C  01/28/20  1:17 PM    "

## 2020-02-20 ENCOUNTER — OFFICE VISIT (OUTPATIENT)
Dept: ORTHOPEDIC SURGERY | Facility: CLINIC | Age: 80
End: 2020-02-20

## 2020-02-20 VITALS — BODY MASS INDEX: 25.53 KG/M2 | WEIGHT: 188.49 LBS | HEART RATE: 78 BPM | HEIGHT: 72 IN | OXYGEN SATURATION: 98 %

## 2020-02-20 DIAGNOSIS — M17.0 PRIMARY OSTEOARTHRITIS OF BOTH KNEES: Primary | ICD-10-CM

## 2020-02-20 PROCEDURE — 20610 DRAIN/INJ JOINT/BURSA W/O US: CPT | Performed by: PHYSICIAN ASSISTANT

## 2020-02-20 RX ORDER — LIDOCAINE HYDROCHLORIDE 10 MG/ML
4 INJECTION, SOLUTION EPIDURAL; INFILTRATION; INTRACAUDAL; PERINEURAL
Status: COMPLETED | OUTPATIENT
Start: 2020-02-20 | End: 2020-02-20

## 2020-02-20 RX ORDER — METHYLPREDNISOLONE ACETATE 40 MG/ML
40 INJECTION, SUSPENSION INTRA-ARTICULAR; INTRALESIONAL; INTRAMUSCULAR; SOFT TISSUE
Status: COMPLETED | OUTPATIENT
Start: 2020-02-20 | End: 2020-02-20

## 2020-02-20 RX ADMIN — METHYLPREDNISOLONE ACETATE 40 MG: 40 INJECTION, SUSPENSION INTRA-ARTICULAR; INTRALESIONAL; INTRAMUSCULAR; SOFT TISSUE at 08:16

## 2020-02-20 RX ADMIN — LIDOCAINE HYDROCHLORIDE 4 ML: 10 INJECTION, SOLUTION EPIDURAL; INFILTRATION; INTRACAUDAL; PERINEURAL at 08:16

## 2020-02-20 NOTE — PROGRESS NOTES
Procedure   Large Joint Arthrocentesis: R knee  Date/Time: 2/20/2020 8:16 AM  Consent given by: patient  Site marked: site marked  Timeout: Immediately prior to procedure a time out was called to verify the correct patient, procedure, equipment, support staff and site/side marked as required   Supporting Documentation  Indications: pain   Procedure Details  Location: knee - R knee  Preparation: Patient was prepped and draped in the usual sterile fashion  Needle size: 22 G  Approach: anterolateral  Medications administered: 4 mL lidocaine PF 1% 1 %; 40 mg methylPREDNISolone acetate 40 MG/ML  Patient tolerance: patient tolerated the procedure well with no immediate complications    Large Joint Arthrocentesis: L knee  Date/Time: 2/20/2020 8:16 AM  Consent given by: patient  Site marked: site marked  Timeout: Immediately prior to procedure a time out was called to verify the correct patient, procedure, equipment, support staff and site/side marked as required   Supporting Documentation  Indications: pain   Procedure Details  Location: knee - L knee  Preparation: Patient was prepped and draped in the usual sterile fashion  Needle size: 22 G  Approach: anterolateral  Medications administered: 4 mL lidocaine PF 1% 1 %; 40 mg methylPREDNISolone acetate 40 MG/ML  Patient tolerance: patient tolerated the procedure well with no immediate complications

## 2020-02-20 NOTE — PROGRESS NOTES
[facility] Note    Subjective     Patient: Isrrael Marino  : 1940    Primary Care Provider: Ivanna George MD    Requesting Provider: As above    Follow-up (3 months- Primary osteoarthritis of both knees )      History    Chief Complaint: Bilateral knee pain    History of Present Illness: Patient returns today for increasing bilateral knee pain.  He has known bilateral knee arthritis and is well-known to me.  We have been doing intermittent steroid injection with good relief.  Is not interested in replacement would like repeat injection today.    Current Outpatient Medications on File Prior to Visit   Medication Sig Dispense Refill   • B Complex Vitamins (VITAMIN B COMPLEX PO) Take 1 tablet by mouth Daily.     • Cholecalciferol (VITAMIN D3) 125 MCG (5000 UT) capsule capsule Take 2,000 Units by mouth Daily.     • clopidogrel (PLAVIX) 75 MG tablet TAKE 1 TABLET EVERY DAY 90 tablet 4   • DULoxetine (CYMBALTA) 30 MG capsule TAKE 2 CAPSULES EVERY  capsule 1   • esomeprazole (nexIUM) 40 MG capsule Take 40 mg by mouth Every Morning Before Breakfast.     • fluorouracil (EFUDEX) 5 % cream      • fluticasone (FLONASE) 50 MCG/ACT nasal spray 1 spray into the nostril(s) as directed by provider Daily. 3 bottle 1   • metoprolol succinate XL (TOPROL-XL) 25 MG 24 hr tablet TAKE 1 TABLET EVERY DAY 90 tablet 4   • Potassium Gluconate 550 (90 K) MG tablet Take  by mouth Daily.     • Saw Palmetto 450 MG capsule Take  by mouth Daily.     • simvastatin (ZOCOR) 40 MG tablet TAKE 1 TABLET EVERY DAY 90 tablet 3   • tamsulosin (FLOMAX) 0.4 MG capsule 24 hr capsule Take 1 capsule by mouth Daily. (Patient taking differently: Take 0.4 mg by mouth As Needed.) 30 capsule 0   • vitamin B-12 (CYANOCOBALAMIN) 1000 MCG tablet Take 1,000 mcg by mouth Daily.     • Zinc 50 MG capsule Take  by mouth Daily.       No current facility-administered medications on file prior to visit.       Allergies    "  Allergen Reactions   • Ibuprofen Hives     \"Pt states he hasn't taken this in a long time\"      Past Medical History:   Diagnosis Date   • Arthritis     both knees   • Broken neck (CMS/HCC)    • CAD (coronary artery disease)    • Cancer (CMS/HCC)    • Chondrocalcinosis of knee    • GERD (gastroesophageal reflux disease)    • Gout    • Heart attack (CMS/HCC)      Last Assessed: 28 Mar 2014   • Heart disease    • History of transfusion     own blood with hip surgery   • Hyperlipidemia    • Hypertension    • IBS (irritable bowel syndrome)    • Left rotator cuff tear arthropathy    • Low back pain    • Lower extremity neuropathy    • Lumbar canal stenosis    • Neck pain    • Numbness    • Right hip pain    • Rotator cuff tear arthropathy of right shoulder    • Thin blood (CMS/HCC)      Past Surgical History:   Procedure Laterality Date   • APPENDECTOMY  1956   • BACK SURGERY  1997    spinal decompression and fusion   • BREAST LUMPECTOMY  2003   • CARDIAC CATHETERIZATION      with two stents//. DR. TOLEDO   • CARPAL TUNNEL RELEASE  03/28/2014    Neuroplasty Decompression Median Nerve At Carpal Tunnel   • CERVICAL DISCECTOMY POSTERIOR FUSION WITH BRAIN LAB N/A 7/13/2018    Procedure: CERVICAL LAMINECTOMY DECOMPRESSION POSTERIOR C1-2 POSTERIOR CERVICAL FUSION;  Surgeon: Randall Barnett MD;  Location: [facility];  Service: Neurosurgery   • COLONOSCOPY     • CORONARY STENT PLACEMENT  2013   • HERNIA REPAIR  2002    & 1998   • LUMBAR DISCECTOMY FUSION INSTRUMENTATION     • TONSILLECTOMY     • TOTAL HIP ARTHROPLASTY  2002   • VASECTOMY       Family History   Problem Relation Age of Onset   • Cancer Mother    • Heart disease Father    • Hypertension Father    • Heart attack Father       Social History     Socioeconomic History   • Marital status:      Spouse name: Not on file   • Number of children: Not on file   • Years of education: Not on file   • Highest education level: Not on file   Tobacco Use   • Smoking " "status: Never Smoker   • Smokeless tobacco: Never Used   Substance and Sexual Activity   • Alcohol use: No   • Drug use: No   • Sexual activity: Defer        Review of Systems   Constitutional: Negative.    HENT: Negative.    Eyes: Negative.    Respiratory: Negative.    Cardiovascular: Negative.    Gastrointestinal: Negative.    Endocrine: Negative.    Genitourinary: Negative.    Musculoskeletal: Positive for arthralgias.   Skin: Negative.    Allergic/Immunologic: Negative.    Neurological: Negative.    Hematological: Negative.    Psychiatric/Behavioral: Negative.        The following portions of the patient's history were reviewed and updated as appropriate: allergies, current medications, past family history, past medical history, past social history, past surgical history and problem list.      Objective      Physical Exam  Pulse 78   Ht 182.9 cm (72.01\")   Wt 85.5 kg (188 lb 7.9 oz)   SpO2 98%   BMI 25.56 kg/m²     Body mass index is 25.56 kg/m².    Patient is well developed, well nourished and in no acute distress.  Alert and oriented x 3.    Ortho Exam    Right Knee Exam  ----------  ALIGNMENT: Right: Varus ----------  RANGE OF MOTION:  Right: 0-120  LIGAMENTOUS STABILITY:   Right: stable to valgus and varus stress----------  STRENGTH:  KNEE FLEXION Right 5/5  KNEE EXTENSION Right 5/5 ----------  PAIN WITH PALPATION: Right medial joint line  PAIN WITH KNEE ROM: Right no  PATELLAR CREPITUS: Right yes   ----------    Left Knee Exam  ----------  ALIGNMENT: Left: Varus ----------  RANGE OF MOTION:  Left: 0-120  LIGAMENTOUS STABILITY:   Left: stable to valgus and varus stress----------  STRENGTH:  KNEE FLEXION left 5/5  KNEE EXTENSION left 5/5 ----------  PAIN WITH PALPATION: Left medial joint line  PAIN WITH KNEE ROM: Left no  PATELLAR CREPITUS: Left yes         Medical Decision Making    Data Review:   none    Assessment:  1. Primary osteoarthritis of both knees        Plan:  Bilateral knee arthritis.  " Patient is done well with intermittent traction the past.  Plan today is repeat steroid injection to bilateral knees.  I will see him back in 3 to 4 months or sooner if needed.    Using sterile technique, the left knee was sterilely prepped with Hibiclens. Following a time out,  using a 22 gauge needle, the left knee was injected with 40mg Depo Medrol, 4 cc lidocaine.  Patient tolerated the procedure well.  No complications.  Using sterile technique, the right knee was sterilely prepped with Hibiclens.  Following a time out,  using a 22 gauge needle, the right knee was injected with 40mg Depo Medrol, 4 cc lidocaine.  Patient tolerated the procedure well.  No complications.          Tish Shelley PA-C  02/20/20  8:32 AM

## 2020-05-26 ENCOUNTER — OFFICE VISIT (OUTPATIENT)
Dept: ORTHOPEDIC SURGERY | Facility: CLINIC | Age: 80
End: 2020-05-26

## 2020-05-26 VITALS — HEART RATE: 78 BPM | WEIGHT: 189.6 LBS | HEIGHT: 72 IN | OXYGEN SATURATION: 97 % | BODY MASS INDEX: 25.68 KG/M2

## 2020-05-26 DIAGNOSIS — M17.0 PRIMARY OSTEOARTHRITIS OF BOTH KNEES: Primary | ICD-10-CM

## 2020-05-26 PROCEDURE — 20610 DRAIN/INJ JOINT/BURSA W/O US: CPT | Performed by: PHYSICIAN ASSISTANT

## 2020-05-26 RX ORDER — BUPIVACAINE HYDROCHLORIDE 2.5 MG/ML
4 INJECTION, SOLUTION EPIDURAL; INFILTRATION; INTRACAUDAL
Status: COMPLETED | OUTPATIENT
Start: 2020-05-26 | End: 2020-05-26

## 2020-05-26 RX ORDER — METHYLPREDNISOLONE ACETATE 40 MG/ML
40 INJECTION, SUSPENSION INTRA-ARTICULAR; INTRALESIONAL; INTRAMUSCULAR; SOFT TISSUE
Status: COMPLETED | OUTPATIENT
Start: 2020-05-26 | End: 2020-05-26

## 2020-05-26 RX ADMIN — BUPIVACAINE HYDROCHLORIDE 4 ML: 2.5 INJECTION, SOLUTION EPIDURAL; INFILTRATION; INTRACAUDAL at 08:10

## 2020-05-26 RX ADMIN — METHYLPREDNISOLONE ACETATE 40 MG: 40 INJECTION, SUSPENSION INTRA-ARTICULAR; INTRALESIONAL; INTRAMUSCULAR; SOFT TISSUE at 08:10

## 2020-05-26 NOTE — PROGRESS NOTES
[facility] [place] [facility] Note    Subjective     Patient: Isrrael Marino  : 1940    Primary Care Provider: Ivanna George MD    Requesting Provider: As above    Follow-up (3 month follow up - Primary osteoarthritis of both knees - injection given 20)      History    Chief Complaint: Bilateral knee arthritis    History of Present Illness: Patient returns today for his bilateral knee arthritis.  He is well-known to me.  He gets intermittent steroid injections every 3 to 4 months.  He is not interested in surgery and like repeat injections today.    Current Outpatient Medications on File Prior to Visit   Medication Sig Dispense Refill   • B Complex Vitamins (VITAMIN B COMPLEX PO) Take 1 tablet by mouth Daily.     • Cholecalciferol (VITAMIN D3) 125 MCG (5000 UT) capsule capsule Take 2,000 Units by mouth Daily.     • clopidogrel (PLAVIX) 75 MG tablet TAKE 1 TABLET EVERY DAY 90 tablet 4   • DULoxetine (CYMBALTA) 30 MG capsule TAKE 2 CAPSULES EVERY  capsule 1   • esomeprazole (nexIUM) 40 MG capsule Take 40 mg by mouth Every Morning Before Breakfast.     • fluorouracil (EFUDEX) 5 % cream      • fluticasone (FLONASE) 50 MCG/ACT nasal spray 1 spray into the nostril(s) as directed by provider Daily. 3 bottle 1   • metoprolol succinate XL (TOPROL-XL) 25 MG 24 hr tablet TAKE 1 TABLET EVERY DAY 90 tablet 4   • Potassium Gluconate 550 (90 K) MG tablet Take  by mouth Daily.     • Saw Palmetto 450 MG capsule Take  by mouth Daily.     • simvastatin (ZOCOR) 40 MG tablet TAKE 1 TABLET EVERY DAY 90 tablet 3   • tamsulosin (FLOMAX) 0.4 MG capsule 24 hr capsule Take 1 capsule by mouth Daily. (Patient taking differently: Take 0.4 mg by mouth As Needed.) 30 capsule 0   • vitamin B-12 (CYANOCOBALAMIN) 1000 MCG tablet Take 1,000 mcg by mouth Daily.     • Zinc 50 MG capsule Take  by mouth Daily.       No current facility-administered medications on file prior to visit.       Allergies   Allergen  "Reactions   • Ibuprofen Hives     \"Pt states he hasn't taken this in a long time\"      Past Medical History:   Diagnosis Date   • Arthritis     both knees   • Broken neck (CMS/HCC)    • CAD (coronary artery disease)    • Cancer (CMS/HCC)    • Chondrocalcinosis of knee    • GERD (gastroesophageal reflux disease)    • Gout    • Heart attack (CMS/HCC)      Last Assessed: 28 Mar 2014   • Heart disease    • History of transfusion     own blood with hip surgery   • Hyperlipidemia    • Hypertension    • IBS (irritable bowel syndrome)    • Left rotator cuff tear arthropathy    • Low back pain    • Lower extremity neuropathy    • Lumbar canal stenosis    • Neck pain    • Numbness    • Right hip pain    • Rotator cuff tear arthropathy of right shoulder    • Thin blood (CMS/HCC)      Past Surgical History:   Procedure Laterality Date   • APPENDECTOMY  1956   • BACK SURGERY  1997    spinal decompression and fusion   • BREAST LUMPECTOMY  2003   • CARDIAC CATHETERIZATION      with two stents//. DR. TOLEDO   • CARPAL TUNNEL RELEASE  03/28/2014    Neuroplasty Decompression Median Nerve At Carpal Tunnel   • CERVICAL DISCECTOMY POSTERIOR FUSION WITH BRAIN LAB N/A 7/13/2018    Procedure: CERVICAL LAMINECTOMY DECOMPRESSION POSTERIOR C1-2 POSTERIOR CERVICAL FUSION;  Surgeon: Randall Barnett MD;  Location: [facility];  Service: Neurosurgery   • COLONOSCOPY     • CORONARY STENT PLACEMENT  2013   • HERNIA REPAIR  2002    & 1998   • LUMBAR DISCECTOMY FUSION INSTRUMENTATION     • TONSILLECTOMY     • TOTAL HIP ARTHROPLASTY  2002   • VASECTOMY       Family History   Problem Relation Age of Onset   • Cancer Mother    • Heart disease Father    • Hypertension Father    • Heart attack Father       Social History     Socioeconomic History   • Marital status:      Spouse name: Not on file   • Number of children: Not on file   • Years of education: Not on file   • Highest education level: Not on file   Tobacco Use   • Smoking status: Never " "Smoker   • Smokeless tobacco: Never Used   Substance and Sexual Activity   • Alcohol use: No   • Drug use: No   • Sexual activity: Defer        Review of Systems   Constitutional: Negative.    HENT: Negative.    Eyes: Negative.    Respiratory: Negative.    Cardiovascular: Negative.    Gastrointestinal: Negative.    Endocrine: Negative.    Genitourinary: Negative.    Musculoskeletal: Positive for arthralgias and joint swelling.   Skin: Negative.    Allergic/Immunologic: Negative.    Neurological: Negative.    Hematological: Negative.    Psychiatric/Behavioral: Negative.        The following portions of the patient's history were reviewed and updated as appropriate: allergies, current medications, past family history, past medical history, past social history, past surgical history and problem list.      Objective      Physical Exam  Pulse 78   Ht 182.9 cm (72.01\")   Wt 86 kg (189 lb 9.5 oz)   SpO2 97%   BMI 25.71 kg/m²     Body mass index is 25.71 kg/m².    Patient is well developed, well nourished and in no acute distress.  Alert and oriented x 3.    Ortho Exam    Right Knee Exam  ----------  ALIGNMENT: Right: neutral----------  RANGE OF MOTION:  Right: 0-120  LIGAMENTOUS STABILITY:   Right: stable to valgus and varus stress----------  STRENGTH:  KNEE FLEXION Right 5/5  KNEE EXTENSION Right 5/5 ----------  PAIN WITH PALPATION: Right medial joint line  PAIN WITH KNEE ROM: Right no  PATELLAR CREPITUS: Right yes   ----------    Left Knee Exam  ----------  ALIGNMENT: Left: neutral----------  RANGE OF MOTION:  Left: 0-120  LIGAMENTOUS STABILITY:   Left: stable to valgus and varus stress----------  STRENGTH:  KNEE FLEXION left 5/5  KNEE EXTENSION left 5/5 ----------  PAIN WITH PALPATION: Left medial joint line  PAIN WITH KNEE ROM: Left no  PATELLAR CREPITUS: Left yes         Medical Decision Making    Data Review:   none    Assessment:  1. Primary osteoarthritis of both knees        Plan:  Bilateral knee arthritis.  " Patient has done well with intermittent steroid injection.  Plan today is repeat steroid injection into bilateral knees.  RTC 3 months or sooner if needed.    Using sterile technique, the left knee was sterilely prepped with Hibiclens. Following a time out,  using a 22 gauge needle, the left knee was injected with 40mg Depo Medrol, 4 cc lidocaine.  Patient tolerated the procedure well.  No complications.    Using sterile technique, the right knee was sterilely prepped with Hibiclens.  Following a time out,  using a 22 gauge needle, the right knee was injected with 40mg Depo Medrol, 4 cc lidocaine.  Patient tolerated the procedure well.  No complications.          Tish Shelley PA-C  05/26/20  08:22

## 2020-05-26 NOTE — PROGRESS NOTES
Procedure   Large Joint Arthrocentesis: R knee  Date/Time: 5/26/2020 8:10 AM  Consent given by: patient  Site marked: site marked  Timeout: Immediately prior to procedure a time out was called to verify the correct patient, procedure, equipment, support staff and site/side marked as required   Supporting Documentation  Indications: pain   Procedure Details  Location: knee - R knee  Preparation: Patient was prepped and draped in the usual sterile fashion  Needle size: 22 G  Approach: anterolateral  Medications administered: 40 mg methylPREDNISolone acetate 40 MG/ML; 4 mL bupivacaine (PF) 0.25 %  Patient tolerance: patient tolerated the procedure well with no immediate complications    Large Joint Arthrocentesis: L knee  Date/Time: 5/26/2020 8:10 AM  Consent given by: patient  Site marked: site marked  Timeout: Immediately prior to procedure a time out was called to verify the correct patient, procedure, equipment, support staff and site/side marked as required   Supporting Documentation  Indications: pain   Procedure Details  Location: knee - L knee  Preparation: Patient was prepped and draped in the usual sterile fashion  Needle size: 22 G  Approach: anterolateral  Medications administered: 40 mg methylPREDNISolone acetate 40 MG/ML; 4 mL bupivacaine (PF) 0.25 %  Patient tolerance: patient tolerated the procedure well with no immediate complications

## 2020-07-27 RX ORDER — SIMVASTATIN 40 MG
40 TABLET ORAL DAILY
Qty: 90 TABLET | Refills: 3 | Status: SHIPPED | OUTPATIENT
Start: 2020-07-27 | End: 2020-08-25

## 2020-07-27 RX ORDER — CLOPIDOGREL BISULFATE 75 MG/1
75 TABLET ORAL DAILY
Qty: 90 TABLET | Refills: 4 | Status: SHIPPED | OUTPATIENT
Start: 2020-07-27 | End: 2020-08-25 | Stop reason: SDUPTHER

## 2020-07-27 RX ORDER — METOPROLOL SUCCINATE 25 MG/1
25 TABLET, EXTENDED RELEASE ORAL DAILY
Qty: 90 TABLET | Refills: 4 | Status: SHIPPED | OUTPATIENT
Start: 2020-07-27 | End: 2020-08-25 | Stop reason: SDUPTHER

## 2020-07-28 RX ORDER — FLUTICASONE PROPIONATE 50 MCG
1 SPRAY, SUSPENSION (ML) NASAL DAILY
Qty: 3 BOTTLE | Refills: 1 | Status: SHIPPED | OUTPATIENT
Start: 2020-07-28 | End: 2020-08-25 | Stop reason: SDUPTHER

## 2020-08-25 ENCOUNTER — OFFICE VISIT (OUTPATIENT)
Dept: INTERNAL MEDICINE | Facility: CLINIC | Age: 80
End: 2020-08-25

## 2020-08-25 VITALS
DIASTOLIC BLOOD PRESSURE: 72 MMHG | WEIGHT: 180.8 LBS | BODY MASS INDEX: 24.49 KG/M2 | SYSTOLIC BLOOD PRESSURE: 132 MMHG | HEART RATE: 82 BPM | RESPIRATION RATE: 16 BRPM | HEIGHT: 72 IN | TEMPERATURE: 96.9 F | OXYGEN SATURATION: 98 %

## 2020-08-25 DIAGNOSIS — E78.2 MIXED HYPERLIPIDEMIA: ICD-10-CM

## 2020-08-25 DIAGNOSIS — E61.1 IRON DEFICIENCY: ICD-10-CM

## 2020-08-25 DIAGNOSIS — I25.10 CHRONIC CORONARY ARTERY DISEASE: ICD-10-CM

## 2020-08-25 DIAGNOSIS — N40.0 BENIGN PROSTATIC HYPERPLASIA WITHOUT LOWER URINARY TRACT SYMPTOMS: ICD-10-CM

## 2020-08-25 DIAGNOSIS — R94.6 ABNORMAL RESULTS OF THYROID FUNCTION STUDIES: ICD-10-CM

## 2020-08-25 DIAGNOSIS — G89.29 CHRONIC BILATERAL LOW BACK PAIN, UNSPECIFIED WHETHER SCIATICA PRESENT: ICD-10-CM

## 2020-08-25 DIAGNOSIS — M54.50 CHRONIC BILATERAL LOW BACK PAIN, UNSPECIFIED WHETHER SCIATICA PRESENT: ICD-10-CM

## 2020-08-25 DIAGNOSIS — R74.01 TRANSAMINITIS: ICD-10-CM

## 2020-08-25 DIAGNOSIS — G60.9 IDIOPATHIC PERIPHERAL NEUROPATHY: ICD-10-CM

## 2020-08-25 DIAGNOSIS — M17.0 PRIMARY OSTEOARTHRITIS OF BOTH KNEES: ICD-10-CM

## 2020-08-25 DIAGNOSIS — I10 ESSENTIAL HYPERTENSION: ICD-10-CM

## 2020-08-25 DIAGNOSIS — Z00.00 MEDICARE ANNUAL WELLNESS VISIT, SUBSEQUENT: Primary | ICD-10-CM

## 2020-08-25 DIAGNOSIS — Z91.89 AT RISK FOR SLEEP APNEA: ICD-10-CM

## 2020-08-25 DIAGNOSIS — K21.9 GASTROESOPHAGEAL REFLUX DISEASE WITHOUT ESOPHAGITIS: ICD-10-CM

## 2020-08-25 DIAGNOSIS — R73.03 PREDIABETES: ICD-10-CM

## 2020-08-25 DIAGNOSIS — M19.011 GLENOHUMERAL ARTHRITIS, RIGHT: ICD-10-CM

## 2020-08-25 DIAGNOSIS — J30.9 ALLERGIC RHINITIS, UNSPECIFIED SEASONALITY, UNSPECIFIED TRIGGER: ICD-10-CM

## 2020-08-25 PROBLEM — M79.2 NEUROPATHIC PAIN: Status: RESOLVED | Noted: 2018-09-24 | Resolved: 2020-08-25

## 2020-08-25 PROBLEM — S12.100D: Status: RESOLVED | Noted: 2017-12-07 | Resolved: 2020-08-25

## 2020-08-25 PROBLEM — S12.100A CLOSED FRACTURE OF ODONTOID PROCESS OF AXIS: Status: RESOLVED | Noted: 2018-06-04 | Resolved: 2020-08-25

## 2020-08-25 PROBLEM — S12.110K ODONTOID FRACTURE WITH NONUNION: Status: RESOLVED | Noted: 2018-06-04 | Resolved: 2020-08-25

## 2020-08-25 LAB
ALBUMIN SERPL-MCNC: 4.4 G/DL (ref 3.5–5.2)
ALBUMIN/GLOB SERPL: 2.2 G/DL
ALP SERPL-CCNC: 68 U/L (ref 39–117)
ALT SERPL-CCNC: 25 U/L (ref 1–41)
AST SERPL-CCNC: 31 U/L (ref 1–40)
BASOPHILS # BLD AUTO: 0.07 10*3/MM3 (ref 0–0.2)
BASOPHILS NFR BLD AUTO: 1 % (ref 0–1.5)
BILIRUB SERPL-MCNC: 0.5 MG/DL (ref 0–1.2)
BUN SERPL-MCNC: 34 MG/DL (ref 8–23)
BUN/CREAT SERPL: 29.3 (ref 7–25)
CALCIUM SERPL-MCNC: 9.4 MG/DL (ref 8.6–10.5)
CHLORIDE SERPL-SCNC: 104 MMOL/L (ref 98–107)
CO2 SERPL-SCNC: 26.3 MMOL/L (ref 22–29)
CREAT SERPL-MCNC: 1.16 MG/DL (ref 0.76–1.27)
EOSINOPHIL # BLD AUTO: 0.28 10*3/MM3 (ref 0–0.4)
EOSINOPHIL NFR BLD AUTO: 4 % (ref 0.3–6.2)
ERYTHROCYTE [DISTWIDTH] IN BLOOD BY AUTOMATED COUNT: 12.6 % (ref 12.3–15.4)
FERRITIN SERPL-MCNC: 166 NG/ML (ref 30–400)
GLOBULIN SER CALC-MCNC: 2 GM/DL
GLUCOSE SERPL-MCNC: 95 MG/DL (ref 65–99)
HCT VFR BLD AUTO: 45.2 % (ref 37.5–51)
HGB BLD-MCNC: 15.5 G/DL (ref 13–17.7)
IMM GRANULOCYTES # BLD AUTO: 0.01 10*3/MM3 (ref 0–0.05)
IMM GRANULOCYTES NFR BLD AUTO: 0.1 % (ref 0–0.5)
IRON SATN MFR SERPL: 14 % (ref 20–50)
IRON SERPL-MCNC: 47 MCG/DL (ref 59–158)
LYMPHOCYTES # BLD AUTO: 1.79 10*3/MM3 (ref 0.7–3.1)
LYMPHOCYTES NFR BLD AUTO: 25.5 % (ref 19.6–45.3)
MCH RBC QN AUTO: 30.3 PG (ref 26.6–33)
MCHC RBC AUTO-ENTMCNC: 34.3 G/DL (ref 31.5–35.7)
MCV RBC AUTO: 88.3 FL (ref 79–97)
MONOCYTES # BLD AUTO: 0.69 10*3/MM3 (ref 0.1–0.9)
MONOCYTES NFR BLD AUTO: 9.8 % (ref 5–12)
NEUTROPHILS # BLD AUTO: 4.17 10*3/MM3 (ref 1.7–7)
NEUTROPHILS NFR BLD AUTO: 59.6 % (ref 42.7–76)
NRBC BLD AUTO-RTO: 0 /100 WBC (ref 0–0.2)
PLATELET # BLD AUTO: 187 10*3/MM3 (ref 140–450)
POTASSIUM SERPL-SCNC: 4.7 MMOL/L (ref 3.5–5.2)
PROT SERPL-MCNC: 6.4 G/DL (ref 6–8.5)
RBC # BLD AUTO: 5.12 10*6/MM3 (ref 4.14–5.8)
SODIUM SERPL-SCNC: 141 MMOL/L (ref 136–145)
TIBC SERPL-MCNC: 330 MCG/DL
TSH SERPL DL<=0.005 MIU/L-ACNC: 4.48 UIU/ML (ref 0.27–4.2)
UIBC SERPL-MCNC: 283 MCG/DL (ref 112–346)
WBC # BLD AUTO: 7.01 10*3/MM3 (ref 3.4–10.8)

## 2020-08-25 PROCEDURE — 96160 PT-FOCUSED HLTH RISK ASSMT: CPT | Performed by: INTERNAL MEDICINE

## 2020-08-25 PROCEDURE — G0439 PPPS, SUBSEQ VISIT: HCPCS | Performed by: INTERNAL MEDICINE

## 2020-08-25 RX ORDER — CLOPIDOGREL BISULFATE 75 MG/1
75 TABLET ORAL DAILY
Qty: 90 TABLET | Refills: 4 | Status: SHIPPED | OUTPATIENT
Start: 2020-08-25 | End: 2021-05-17 | Stop reason: SDUPTHER

## 2020-08-25 RX ORDER — ATORVASTATIN CALCIUM 40 MG/1
40 TABLET, FILM COATED ORAL NIGHTLY
Qty: 90 TABLET | Refills: 0 | Status: SHIPPED | OUTPATIENT
Start: 2020-08-25 | End: 2021-01-12 | Stop reason: SDUPTHER

## 2020-08-25 RX ORDER — METOPROLOL SUCCINATE 25 MG/1
25 TABLET, EXTENDED RELEASE ORAL DAILY
Qty: 90 TABLET | Refills: 4 | Status: SHIPPED | OUTPATIENT
Start: 2020-08-25 | End: 2021-01-10 | Stop reason: HOSPADM

## 2020-08-25 RX ORDER — DULOXETIN HYDROCHLORIDE 30 MG/1
60 CAPSULE, DELAYED RELEASE ORAL DAILY
Qty: 180 CAPSULE | Refills: 1 | Status: SHIPPED | OUTPATIENT
Start: 2020-08-25 | End: 2022-08-08 | Stop reason: SDUPTHER

## 2020-08-25 RX ORDER — TAMSULOSIN HYDROCHLORIDE 0.4 MG/1
0.4 CAPSULE ORAL DAILY
Qty: 90 CAPSULE | Refills: 0 | Status: SHIPPED | OUTPATIENT
Start: 2020-08-25 | End: 2021-01-12 | Stop reason: SDUPTHER

## 2020-08-25 RX ORDER — DICLOFENAC SODIUM AND MISOPROSTOL 75; 200 MG/1; UG/1
1 TABLET, DELAYED RELEASE ORAL 2 TIMES DAILY
COMMUNITY
End: 2020-08-25

## 2020-08-25 RX ORDER — FLUTICASONE PROPIONATE 50 MCG
1 SPRAY, SUSPENSION (ML) NASAL DAILY
Qty: 3 BOTTLE | Refills: 1 | Status: SHIPPED | OUTPATIENT
Start: 2020-08-25 | End: 2021-01-28 | Stop reason: SDUPTHER

## 2020-08-31 ENCOUNTER — TELEPHONE (OUTPATIENT)
Dept: INTERNAL MEDICINE | Facility: CLINIC | Age: 80
End: 2020-08-31

## 2020-09-01 ENCOUNTER — OFFICE VISIT (OUTPATIENT)
Dept: ORTHOPEDIC SURGERY | Facility: CLINIC | Age: 80
End: 2020-09-01

## 2020-09-01 VITALS — OXYGEN SATURATION: 97 % | WEIGHT: 180.78 LBS | BODY MASS INDEX: 24.49 KG/M2 | HEART RATE: 80 BPM | HEIGHT: 72 IN

## 2020-09-01 DIAGNOSIS — M25.561 PAIN IN BOTH KNEES, UNSPECIFIED CHRONICITY: ICD-10-CM

## 2020-09-01 DIAGNOSIS — M70.21 OLECRANON BURSITIS OF RIGHT ELBOW: Primary | ICD-10-CM

## 2020-09-01 DIAGNOSIS — M17.0 PRIMARY OSTEOARTHRITIS OF BOTH KNEES: ICD-10-CM

## 2020-09-01 DIAGNOSIS — M25.562 PAIN IN BOTH KNEES, UNSPECIFIED CHRONICITY: ICD-10-CM

## 2020-09-01 PROCEDURE — 20610 DRAIN/INJ JOINT/BURSA W/O US: CPT | Performed by: PHYSICIAN ASSISTANT

## 2020-09-01 PROCEDURE — 99213 OFFICE O/P EST LOW 20 MIN: CPT | Performed by: PHYSICIAN ASSISTANT

## 2020-09-01 RX ORDER — LIDOCAINE HYDROCHLORIDE 10 MG/ML
4 INJECTION, SOLUTION EPIDURAL; INFILTRATION; INTRACAUDAL; PERINEURAL
Status: COMPLETED | OUTPATIENT
Start: 2020-09-01 | End: 2020-09-01

## 2020-09-01 RX ORDER — TRIAMCINOLONE ACETONIDE 40 MG/ML
40 INJECTION, SUSPENSION INTRA-ARTICULAR; INTRAMUSCULAR
Status: COMPLETED | OUTPATIENT
Start: 2020-09-01 | End: 2020-09-01

## 2020-09-01 RX ADMIN — TRIAMCINOLONE ACETONIDE 40 MG: 40 INJECTION, SUSPENSION INTRA-ARTICULAR; INTRAMUSCULAR at 08:22

## 2020-09-01 RX ADMIN — LIDOCAINE HYDROCHLORIDE 4 ML: 10 INJECTION, SOLUTION EPIDURAL; INFILTRATION; INTRACAUDAL; PERINEURAL at 08:22

## 2020-09-01 NOTE — PROGRESS NOTES
[facility] [place] [facility] Note    Subjective     Patient: Isrrael Marino  : 1940    Primary Care Provider: Ivanna George MD    Requesting Provider: As above    Follow-up (3 months- Primary osteoarthritis of both knees )      History    Chief Complaint: Right elbow deformity and Bilateral knee pain    History of Present Illness: Patient returns today for right elbow deformity and bilateral ankle pain.  He does not remember bumping the elbow or an increased use.  He denies any pain, warmth or erythema.  No change in motion.  He does have a history of gout.  He is also here for bilateral knee arthritis wanting repeat steroid injections into bilateral knees.    Current Outpatient Medications on File Prior to Visit   Medication Sig Dispense Refill   • atorvastatin (LIPITOR) 40 MG tablet Take 1 tablet by mouth Every Night. 90 tablet 0   • B Complex Vitamins (VITAMIN B COMPLEX PO) Take 1 tablet by mouth Daily.     • Cholecalciferol (VITAMIN D3) 125 MCG (5000 UT) capsule capsule Take 2,000 Units by mouth Daily.     • clopidogrel (PLAVIX) 75 MG tablet Take 1 tablet by mouth Daily. 90 tablet 4   • DULoxetine (CYMBALTA) 30 MG capsule Take 2 capsules by mouth Daily. 180 capsule 1   • fluorouracil (EFUDEX) 5 % cream      • fluticasone (FLONASE) 50 MCG/ACT nasal spray 1 spray into the nostril(s) as directed by provider Daily. 3 bottle 1   • metoprolol succinate XL (TOPROL-XL) 25 MG 24 hr tablet Take 1 tablet by mouth Daily. 90 tablet 4   • Potassium Gluconate 550 (90 K) MG tablet Take  by mouth Daily.     • Saw Palmetto 450 MG capsule Take  by mouth Daily.     • tamsulosin (FLOMAX) 0.4 MG capsule 24 hr capsule Take 1 capsule by mouth Daily. 90 capsule 0   • vitamin B-12 (CYANOCOBALAMIN) 1000 MCG tablet Take 1,000 mcg by mouth Daily.     • Zinc 50 MG capsule Take  by mouth Daily.       No current facility-administered medications on file prior to visit.       Allergies   Allergen Reactions   •  "Ibuprofen Hives     \"Pt states he hasn't taken this in a long time\"      Past Medical History:   Diagnosis Date   • Arthritis     both knees   • Broken neck (CMS/HCC)    • CAD (coronary artery disease)    • Cancer (CMS/HCC)    • Chondrocalcinosis of knee    • GERD (gastroesophageal reflux disease)    • Gout    • Heart attack (CMS/HCC)      Last Assessed: 28 Mar 2014   • Heart disease    • History of transfusion     own blood with hip surgery   • Hyperlipidemia    • Hypertension    • IBS (irritable bowel syndrome)    • Left rotator cuff tear arthropathy    • Low back pain    • Lower extremity neuropathy    • Lumbar canal stenosis    • Neck pain    • Numbness    • Right hip pain    • Rotator cuff tear arthropathy of right shoulder    • Thin blood (CMS/HCC)      Past Surgical History:   Procedure Laterality Date   • APPENDECTOMY  1956   • BACK SURGERY  1997    spinal decompression and fusion   • BREAST LUMPECTOMY  2003   • CARDIAC CATHETERIZATION      with two stents//. DR. TOLEDO   • CARPAL TUNNEL RELEASE  03/28/2014    Neuroplasty Decompression Median Nerve At Carpal Tunnel   • CERVICAL DISCECTOMY POSTERIOR FUSION WITH BRAIN LAB N/A 7/13/2018    Procedure: CERVICAL LAMINECTOMY DECOMPRESSION POSTERIOR C1-2 POSTERIOR CERVICAL FUSION;  Surgeon: Randall Barnett MD;  Location: [facility];  Service: Neurosurgery   • COLONOSCOPY     • CORONARY STENT PLACEMENT  2013   • HERNIA REPAIR  2002    & 1998   • LUMBAR DISCECTOMY FUSION INSTRUMENTATION     • TONSILLECTOMY     • TOTAL HIP ARTHROPLASTY  2002   • VASECTOMY       Family History   Problem Relation Age of Onset   • Cancer Mother    • Heart disease Father    • Hypertension Father    • Heart attack Father       Social History     Socioeconomic History   • Marital status:      Spouse name: Not on file   • Number of children: Not on file   • Years of education: Not on file   • Highest education level: Not on file   Tobacco Use   • Smoking status: Never Smoker   • " "Smokeless tobacco: Never Used   Substance and Sexual Activity   • Alcohol use: No   • Drug use: No   • Sexual activity: Defer        Review of Systems   Constitutional: Negative.    HENT: Negative.    Eyes: Negative.    Respiratory: Negative.    Cardiovascular: Negative.    Gastrointestinal: Negative.    Endocrine: Negative.    Genitourinary: Negative.    Musculoskeletal: Positive for arthralgias.   Skin: Negative.    Allergic/Immunologic: Negative.    Neurological: Negative.    Hematological: Negative.    Psychiatric/Behavioral: Negative.        The following portions of the patient's history were reviewed and updated as appropriate: allergies, current medications, past family history, past medical history, past social history, past surgical history and problem list.      Objective      Physical Exam  Pulse 80   Ht 182.9 cm (72.01\")   Wt 82 kg (180 lb 12.4 oz)   SpO2 97%   BMI 24.51 kg/m²     Body mass index is 24.51 kg/m².    Patient is well developed, well nourished and in no acute distress.  Alert and oriented x 3.    Ortho Exam  Peripheral Vascular   Right Upper Extremity    No cyanotic nail beds    Pink nail beds and rapid capillary refill   Palpation    Radial Pulse - Bilaterally normal    Neurologic   Sensory - Elbow   Inspection and Palpation:    Light touch: intact - right hand   Muscle Strength and Tone:    Right bicep - 5/5    Right tricep - 5/5    Right wrist extensors - 5/5     Right wrist flexors - 5/5    Right intrinsics - 5/5    Musculoskeletal   Right Upper Extremity - Elbow   Inspection and Palpation     Tenderness - nontender with palpable olecranon bursa    Effusion - none    Crepitus - none   Range of Motion    Flexion: AROM - 140 degrees    Extension - AROM - 0 degrees     Forearm supination: AROM - 90 degrees    Forearm pronation - AROM - 90 degrees    Functional testing:    Valgus stress test negative    Varus stress test negative    Elbow flexion test negative        Right Knee " Exam  ----------  ALIGNMENT: Right: varus  RANGE OF MOTION:  Right: 0-120  LIGAMENTOUS STABILITY:   Right: stable to valgus and varus stress----------  STRENGTH:  KNEE FLEXION Right 5/5  KNEE EXTENSION Right 5/5 ----------  PAIN WITH PALPATION: Right medial joint line  PAIN WITH KNEE ROM: Right no  PATELLAR CREPITUS: Right yes   ----------    Left Knee Exam  ----------  ALIGNMENT: Left: varus----------  RANGE OF MOTION:  Left: 0-120  LIGAMENTOUS STABILITY:   Left: stable to valgus and varus stress----------  STRENGTH:  KNEE FLEXION left 5/5  KNEE EXTENSION left 5/5 ----------  PAIN WITH PALPATION: Left medial joint line  PAIN WITH KNEE ROM: Left no  PATELLAR CREPITUS: Left yes           Medical Decision Making    Data Review:   none    Assessment:  1. Pain in both knees, unspecified chronicity    2. Primary osteoarthritis of both knees        Plan:  1. Right olecronon bursitis.  Patient has a nontender palpable bursa on the right.  I explained to him that we leave these alone as long as they are not causing any problem or look infectious.  He does have a history of gout, however, I do not think this is a gouty flare.  I explained that it will eventually go away on its own.  He should avoid leaning on his elbow.  Plan today is continued observation.  I will see him back as needed for this.    2.  Bilateral knee arthritis.  Plan today is repeat steroid injection into both knees.  I will see him back in 3 months or sooner if needed.    Using sterile technique, the left knee was sterilely prepped with Hibiclens. Following a time out,  using a 22 gauge needle, the left knee was injected with 1cc (40mg) Kenalog, 4 cc lidocaine.  Patient tolerated the procedure well.  No complications.  Using sterile technique, the right knee was sterilely prepped with Hibiclens.  Following a time out,  using a 22 gauge needle, the right knee was injected with 1cc (40mg) Kenalog, 4 cc lidocaine.  Patient tolerated the procedure well.  No  complications.      Tish Shelley PA-C  09/01/20  08:32

## 2020-09-01 NOTE — PROGRESS NOTES
Procedure   Large Joint Arthrocentesis: R knee  Date/Time: 9/1/2020 8:22 AM  Consent given by: patient  Site marked: site marked  Timeout: Immediately prior to procedure a time out was called to verify the correct patient, procedure, equipment, support staff and site/side marked as required   Supporting Documentation  Indications: pain   Procedure Details  Location: knee - R knee  Preparation: Patient was prepped and draped in the usual sterile fashion  Needle size: 22 G  Approach: anterolateral  Medications administered: 4 mL lidocaine PF 1% 1 %; 40 mg triamcinolone acetonide 40 MG/ML  Patient tolerance: patient tolerated the procedure well with no immediate complications    Large Joint Arthrocentesis: L knee  Date/Time: 9/1/2020 8:22 AM  Consent given by: patient  Site marked: site marked  Timeout: Immediately prior to procedure a time out was called to verify the correct patient, procedure, equipment, support staff and site/side marked as required   Supporting Documentation  Indications: pain   Procedure Details  Location: knee - L knee  Preparation: Patient was prepped and draped in the usual sterile fashion  Needle size: 22 G  Approach: anterolateral  Medications administered: 4 mL lidocaine PF 1% 1 %; 40 mg triamcinolone acetonide 40 MG/ML  Patient tolerance: patient tolerated the procedure well with no immediate complications

## 2020-09-04 ENCOUNTER — TELEPHONE (OUTPATIENT)
Dept: INTERNAL MEDICINE | Facility: CLINIC | Age: 80
End: 2020-09-04

## 2020-09-04 NOTE — TELEPHONE ENCOUNTER
He can continue taking simvastatin while we work out the issues with the insurance and atorvastatin. Please call his pharmacy and clarify the need for PA and if needed complete PA.

## 2020-09-04 NOTE — TELEPHONE ENCOUNTER
Patient requested a call back. Patient stated Joss sent him a letter informing him his prescription for atorvastatin (LIPITOR) 40 MG tablet is not covered with his insurance without prior authorization. Patient would like to know if Dr. George will take care of the prior authorization or if she will prescribe a different medication. Patient stated he currently takes simvastatin 40 MG and would like to know if Dr. George wants him to continue taking this or if he should discontinue it and wait on his new prescription.    Please call and advise. Patient call back [phone] or [phone]

## 2020-10-07 ENCOUNTER — CLINICAL SUPPORT (OUTPATIENT)
Dept: INTERNAL MEDICINE | Facility: CLINIC | Age: 80
End: 2020-10-07

## 2020-10-07 DIAGNOSIS — Z23 NEED FOR INFLUENZA VACCINATION: ICD-10-CM

## 2020-10-07 PROCEDURE — 90694 VACC AIIV4 NO PRSRV 0.5ML IM: CPT | Performed by: INTERNAL MEDICINE

## 2020-10-07 PROCEDURE — G0008 ADMIN INFLUENZA VIRUS VAC: HCPCS | Performed by: INTERNAL MEDICINE

## 2020-11-03 ENCOUNTER — CONSULT (OUTPATIENT)
Dept: SLEEP MEDICINE | Facility: HOSPITAL | Age: 80
End: 2020-11-03

## 2020-11-03 VITALS
OXYGEN SATURATION: 95 % | HEIGHT: 72 IN | DIASTOLIC BLOOD PRESSURE: 63 MMHG | WEIGHT: 185.8 LBS | SYSTOLIC BLOOD PRESSURE: 115 MMHG | HEART RATE: 71 BPM | BODY MASS INDEX: 25.17 KG/M2

## 2020-11-03 DIAGNOSIS — G47.10 HYPERSOMNIA: ICD-10-CM

## 2020-11-03 DIAGNOSIS — G47.33 OSA (OBSTRUCTIVE SLEEP APNEA): Primary | ICD-10-CM

## 2020-11-03 PROCEDURE — 99214 OFFICE O/P EST MOD 30 MIN: CPT | Performed by: INTERNAL MEDICINE

## 2020-11-03 NOTE — PROGRESS NOTES
Isrrael Marino is a 80 y.o. male.   Chief Complaint   Patient presents with   • Sleeping Problem       HPI     80 y.o. male seen in consultation at the request of Ivanna George MD for evaluation of the above.     He has a longstanding history of daytime somnolence.  This has worsened to the point that his wife says that he will fall asleep unexpectedly anytime he is not active during the day.    He thinks he averages about 6 hours of sleep per night and he naps 1 or 2 hours during the day.    His wife notes snoring as well as apneas.  She does not note any unusual parasomnias or motor activity at night.    Further details are as follows:    [place] Scale is: 21/24    Estimated average amount of sleep per night: 6 plus napping  Number of times he wakes up at night: multiple  Difficulty falling back asleep: no  It usually takes 5 minutes to go to sleep.  He feels sleepy upon waking up: yes  Rotating or night shift work: no    Drowsiness/Sleepiness:  He exhibits the following:  excessive daytime sleepiness  excessive daytime fatigue  falls asleep watching TV  falls asleep during times of the day when he is quiet    Snoring/Breathing:  He exhibits the following:  loud snoring  awoken with dry mouth  quits breathing at night    Reflux:  He describes the following:  None currently.  Used to take medication.    Narcolepsy:  He exhibits the following:  sudden episodes of sleep during the day    RLS/PLMs:  He describes the following:  none    Insomnia:  He describes the following:  frequent awakenings  bothered by pain at night  restless sleep    Parasomnia:  He exhibits the following:  none    Weight:  Weight change in the last year:  gain: 0 lbs      The patient's relevant past medical, surgical, family, and social history reviewed and updated in Epic as appropriate.    Current medications are:   Current Outpatient Medications:   •  atorvastatin (LIPITOR) 40 MG tablet, Take 1 tablet by mouth Every Night., Disp:  "90 tablet, Rfl: 0  •  B Complex Vitamins (VITAMIN B COMPLEX PO), Take 1 tablet by mouth Daily., Disp: , Rfl:   •  Cholecalciferol (VITAMIN D3) 125 MCG (5000 UT) capsule capsule, Take 2,000 Units by mouth Daily., Disp: , Rfl:   •  clopidogrel (PLAVIX) 75 MG tablet, Take 1 tablet by mouth Daily., Disp: 90 tablet, Rfl: 4  •  DULoxetine (CYMBALTA) 30 MG capsule, Take 2 capsules by mouth Daily., Disp: 180 capsule, Rfl: 1  •  fluorouracil (EFUDEX) 5 % cream, , Disp: , Rfl:   •  fluticasone (FLONASE) 50 MCG/ACT nasal spray, 1 spray into the nostril(s) as directed by provider Daily., Disp: 3 bottle, Rfl: 1  •  Melatonin 3 MG capsule, Take  by mouth., Disp: , Rfl:   •  Potassium Gluconate 550 (90 K) MG tablet, Take  by mouth Daily., Disp: , Rfl:   •  Saw Palmetto 450 MG capsule, Take  by mouth Daily., Disp: , Rfl:   •  tamsulosin (FLOMAX) 0.4 MG capsule 24 hr capsule, Take 1 capsule by mouth Daily., Disp: 90 capsule, Rfl: 0  •  vitamin B-12 (CYANOCOBALAMIN) 1000 MCG tablet, Take 1,000 mcg by mouth Daily., Disp: , Rfl:   •  Zinc 50 MG capsule, Take  by mouth Daily., Disp: , Rfl:   •  metoprolol succinate XL (TOPROL-XL) 25 MG 24 hr tablet, Take 1 tablet by mouth Daily., Disp: 90 tablet, Rfl: 4.    Review of Systems    Review of Systems  ROS documented in patient questionnaire ×14 systems.  Reviewed with patient.  Otherwise negative except as noted in HPI.    Physical Exam    Blood pressure 115/63, pulse 71, height 182.9 cm (72\"), weight 84.3 kg (185 lb 12.8 oz), SpO2 95 %. Body mass index is 25.2 kg/m².    Physical Exam  Vitals signs and nursing note reviewed.   Constitutional:       Appearance: Normal appearance. He is well-developed.   HENT:      Head: Normocephalic and atraumatic.      Nose: Nose normal.      Mouth/Throat:      Mouth: Mucous membranes are moist.      Pharynx: Oropharynx is clear. No oropharyngeal exudate.      Comments: Class III airway  Eyes:      General: No scleral icterus.     Conjunctiva/sclera: " Conjunctivae normal.   Neck:      Thyroid: No thyromegaly.      Trachea: No tracheal deviation.   Cardiovascular:      Rate and Rhythm: Normal rate and regular rhythm.      Heart sounds: No murmur. No friction rub. No gallop.    Pulmonary:      Effort: Pulmonary effort is normal. No respiratory distress.      Breath sounds: No wheezing or rales.   Musculoskeletal: Normal range of motion.         General: No deformity.   Skin:     General: Skin is warm and dry.      Findings: No rash.   Neurological:      Mental Status: He is alert and oriented to person, place, and time.   Psychiatric:         Behavior: Behavior normal.         Thought Content: Thought content normal.         ASSESSMENT:    Problem List Items Addressed This Visit        Pulmonary Problems    JIN (obstructive sleep apnea) - possible - Primary    Relevant Orders    Home Sleep Study       Other    Hypersomnia    Relevant Orders    Home Sleep Study          80-year-old male with evidence of obstructive sleep apnea.  This consists of snoring, witnessed apneas, and significant daytime somnolence.    PLAN:    1. He needs a work-up for obstructive sleep apnea and I think we could try a home sleep apnea test as that is his preference and the preferred initial diagnostic study.  If there is any problems accomplishing this in a reliable manner then he will need an in lab polysomnogram  2. I discussed the likely diagnosis of obstructive sleep apnea with the patient and his wife as well as treatment options and the importance of a proper diagnosis and treatment to avoid long-term complications and to improve his quality of life on a day-to-day basis.  3. He was agreeable to a trial of CPAP therapy and I will initiate this as appropriate after his testing and arrange close follow-up in the sleep center    I have reviewed the results of my evaluation and impression and discussed my recommendations in detail with the patient.    Level of Risk Moderate due to:  undiagnosed new problem    Signed by  Ez Caballero MD    November 3, 2020      CC: Ivanna George MD Schultz, Megan Anne, MD

## 2020-11-22 NOTE — PROGRESS NOTES
"[facility] Cardiology  Subjective:     Encounter Date: 11/23/2020      Patient ID: Isrrael Marino is a 80 y.o. male.    Chief Complaint: Chronic Coronary Artery Disease      PROBLEM LIST:  1. Coronary artery disease:  a. Non STEMI, 09/22/2013.  Cardiac catheterization:   99% first diagonal (2.5 x 15 Xience), 95% second diagonal (PTCA), 50% LAD, FFR 0.83.  EF 50%.  b. On 11/19/2013:  Crescendo angina.  Catheterization:  95% LAD/70% second diagonal 3.0 x 23-mm XIENCE (LAD) and 2.75 x 12-mm XIENCE (second diagonal). Widely  patent first diagonal stent.   c. Carotid duplex 08/16/2018: Proximal right internal carotid artery plaque without significant stenosis. Right internal carotid artery stenosis of 0-49%. Proximal left internal carotid artery plaque without significant stenosis. Left internal carotid artery stenosis of 0-49   d. Carotid Duplex, 12/10/19: Bilateral ICA stenosis 0-49%. Bilateral antegrade flow.  e. Stress test, 12/10/19:  Normal study with no evidence of ischemia. EF 47%.  2. Hypertension.  3. Dyslipidemia.   4. Lower  extremity neuropathy.  5. Arthritis.  6. Skin cancer with removal  7. Traumatic fracture of C2  8. Surgeries:  a. Appendectomy  b. Lumbar surgery  c. Carpal tunnel surgery  d. Hernia repair  e. Right hip replacement  f. Vasectomy      History of Present Illness  Isrrael Marino returns today for an annual follow up with a history of coronary artery disease and cardiac risk factors. Since last visit, patient has been feeling well overall from a cardiovascular standpoint. He does report increased pain in his back, knees, and shoulder. He says he intermittently experiences fullness in his back that \"feels like it is coming through to his chest\" whenever he does certain activities like walking up a hill.  He also reports that when he walks long distances he will experience tightness and fatigue in his legs. This is not limited to a specific area on the leg " "and he describes it as being the entire leg. He asks about his medications and what medications he is able to take without proving problematic with his current medication regime. He says he is not having his dizzy spells as much as he used to. If he drinks a glass of water and sits down it relieves any dizziness.     Allergies   Allergen Reactions   • Ibuprofen Hives     \"Pt states he hasn't taken this in a long time\"         Current Outpatient Medications:   •  atorvastatin (LIPITOR) 40 MG tablet, Take 1 tablet by mouth Every Night., Disp: 90 tablet, Rfl: 0  •  B Complex Vitamins (VITAMIN B COMPLEX PO), Take 1 tablet by mouth Daily., Disp: , Rfl:   •  Cholecalciferol (VITAMIN D3) 125 MCG (5000 UT) capsule capsule, Take 2,000 Units by mouth Daily., Disp: , Rfl:   •  clopidogrel (PLAVIX) 75 MG tablet, Take 1 tablet by mouth Daily., Disp: 90 tablet, Rfl: 4  •  DULoxetine (CYMBALTA) 30 MG capsule, Take 2 capsules by mouth Daily. (Patient taking differently: Take 30 mg by mouth Daily.), Disp: 180 capsule, Rfl: 1  •  fluorouracil (EFUDEX) 5 % cream, , Disp: , Rfl:   •  fluticasone (FLONASE) 50 MCG/ACT nasal spray, 1 spray into the nostril(s) as directed by provider Daily., Disp: 3 bottle, Rfl: 1  •  HYDROcodone-acetaminophen (NORCO) 5-325 MG per tablet, Take 1 tablet by mouth As Needed., Disp: , Rfl:   •  Melatonin 3 MG capsule, Take  by mouth., Disp: , Rfl:   •  metoprolol succinate XL (TOPROL-XL) 25 MG 24 hr tablet, Take 1 tablet by mouth Daily., Disp: 90 tablet, Rfl: 4  •  Potassium Gluconate 550 (90 K) MG tablet, Take  by mouth Daily., Disp: , Rfl:   •  Saw Palmetto 450 MG capsule, Take  by mouth Daily., Disp: , Rfl:   •  tamsulosin (FLOMAX) 0.4 MG capsule 24 hr capsule, Take 1 capsule by mouth Daily., Disp: 90 capsule, Rfl: 0  •  vitamin B-12 (CYANOCOBALAMIN) 1000 MCG tablet, Take 1,000 mcg by mouth Daily., Disp: , Rfl:   •  Zinc 50 MG capsule, Take  by mouth Daily., Disp: , Rfl:     The following portions of the " "patient's history were reviewed and updated as appropriate: allergies, current medications, past family history, past medical history, past social history, past surgical history and problem list.    Review of Systems   Constitution: Negative.   Cardiovascular: Negative for chest pain, dyspnea on exertion, leg swelling, palpitations and syncope.   Respiratory: Negative.  Negative for shortness of breath.    Hematologic/Lymphatic: Negative for bleeding problem. Does not bruise/bleed easily.   Skin: Negative for rash.   Musculoskeletal: Negative for muscle weakness and myalgias.   Gastrointestinal: Negative for heartburn, nausea and vomiting.   Neurological: Negative for dizziness, light-headedness, loss of balance and numbness.          Objective:     Vitals:    11/23/20 0909   BP: 124/76   BP Location: Right arm   Patient Position: Sitting   Pulse: 89   SpO2: 95%   Weight: 82.7 kg (182 lb 6.4 oz)   Height: 182.9 cm (72\")         Vitals signs reviewed.   Constitutional:       Appearance: Well-developed and not in distress.   Neck:      Thyroid: No thyromegaly.      Vascular: No carotid bruit or JVD.   Pulmonary:      Breath sounds: Normal breath sounds.   Cardiovascular:      Regular rhythm.      No gallop. No S3 and S4 gallop.   Edema:     Peripheral edema absent.   Abdominal:      General: Bowel sounds are normal.      Palpations: Abdomen is soft. There is no abdominal mass.      Tenderness: There is no abdominal tenderness.   Musculoskeletal:         General: No deformity.      Extremities: No clubbing present.  Skin:     General: Skin is warm and dry.      Findings: No rash.   Neurological:      Mental Status: Alert and oriented to person, place, and time.         Lab Review:  Lab Results   Component Value Date    GLUCOSE 89 01/24/2020    GLU 95 08/25/2020    BUN 34 (H) 08/25/2020    CREATININE 1.16 08/25/2020    EGFRIFNONA 60 (L) 08/25/2020    EGFRIFAFRI 73 08/25/2020    BCR 29.3 (H) 08/25/2020    K 4.7 " 08/25/2020    CO2 26.3 08/25/2020    CALCIUM 9.4 08/25/2020    ALBUMIN 4.40 08/25/2020    ALKPHOS 68 08/25/2020    AST 31 08/25/2020    ALT 25 08/25/2020     Lab Results   Component Value Date    TRIG 64 08/15/2019    HDL 65 (H) 08/15/2019    LDL 92 08/15/2019      Lab Results   Component Value Date    WBC 7.01 08/25/2020    RBC 5.12 08/25/2020    HGB 15.5 08/25/2020    HCT 45.2 08/25/2020    MCV 88.3 08/25/2020     08/25/2020     Lab Results   Component Value Date    TSH 4.480 (H) 08/25/2020     Lab Results   Component Value Date    HGBA1C 5.80 (H) 01/24/2020        Procedures        Assessment:   Diagnoses and all orders for this visit:    1. Chronic coronary artery disease (Primary)    2. Essential hypertension    3. Mixed hyperlipidemia        Impression:  1. Coronary artery disease, stable without angina. On aspirin and Plavix for DAPT.  2. Hypertension, well controlled on metoprolol 25 mg.   3. Dyslipidemia, well controlled on atorvastatin 40 mg.  4. Chest discomfort, intermittently with exertion, associated with back pain.  Intermittent, and nonprogressive  5. Intermittent dizziness, reduced in frequency and easily relieved with rest and hydration.     Plan:  1. Stable cardiac status overall.   2. If develops more classic exertional angina notify our office otherwise continue medical therapy  3. Continue current medications.  4. Revisit in 12 MO, or sooner as needed.    Scribed for Casimiro Bernal MD by Luis Thompson 11/23/2020  09:38 EST    Casimiro Bernal MD      Please note that portions of this note may have been completed with a voice recognition program. Efforts were made to edit the dictations, but occasionally words are mistranscribed.

## 2020-11-23 ENCOUNTER — OFFICE VISIT (OUTPATIENT)
Dept: CARDIOLOGY | Facility: CLINIC | Age: 80
End: 2020-11-23

## 2020-11-23 VITALS
BODY MASS INDEX: 24.71 KG/M2 | SYSTOLIC BLOOD PRESSURE: 124 MMHG | WEIGHT: 182.4 LBS | HEIGHT: 72 IN | HEART RATE: 89 BPM | OXYGEN SATURATION: 95 % | DIASTOLIC BLOOD PRESSURE: 76 MMHG

## 2020-11-23 DIAGNOSIS — I25.10 CHRONIC CORONARY ARTERY DISEASE: Primary | ICD-10-CM

## 2020-11-23 DIAGNOSIS — I10 ESSENTIAL HYPERTENSION: ICD-10-CM

## 2020-11-23 DIAGNOSIS — E78.2 MIXED HYPERLIPIDEMIA: ICD-10-CM

## 2020-11-23 PROCEDURE — 99213 OFFICE O/P EST LOW 20 MIN: CPT | Performed by: INTERNAL MEDICINE

## 2020-11-23 RX ORDER — HYDROCODONE BITARTRATE AND ACETAMINOPHEN 5; 325 MG/1; MG/1
1 TABLET ORAL AS NEEDED
Status: ON HOLD | COMMUNITY
End: 2021-05-28

## 2020-11-24 ENCOUNTER — OFFICE VISIT (OUTPATIENT)
Dept: INTERNAL MEDICINE | Facility: CLINIC | Age: 80
End: 2020-11-24

## 2020-11-24 VITALS — TEMPERATURE: 96 F

## 2020-11-24 DIAGNOSIS — J30.9 ALLERGIC RHINITIS, UNSPECIFIED SEASONALITY, UNSPECIFIED TRIGGER: Primary | ICD-10-CM

## 2020-11-24 PROCEDURE — 99441 PR PHYS/QHP TELEPHONE EVALUATION 5-10 MIN: CPT | Performed by: INTERNAL MEDICINE

## 2020-11-24 NOTE — PROGRESS NOTES
Internal Medicine Acute Visit    Chief Complaint   Patient presents with   • Cough     no fever, drainage        HPI  Mr. Marino was evaluated today via telephone for cough and sinus drainage. He reports that he has had cough and drainage for the last month that worsened over the weekend after a hunting trip in the rain. Cough is worst at night. He feels that this is typical of allergies this time of year for him. He has been using flonase which has helped to clear up congestion. He is also using coriciden. He denies SOA, fever, ear pain, new or worsening headache, sinus pain or pressure. He saw Dr. Bernal yesterday and vitals were stable. He reports that he was given the okay to continue coricidin.       Review of Systems  Review of Systems   Constitutional: Negative for chills, fatigue and fever.   HENT: Positive for postnasal drip and rhinorrhea. Negative for congestion (resolved), ear pain, sinus pressure and sore throat.    Respiratory: Positive for cough. Negative for shortness of breath.         Medications  Current Outpatient Medications on File Prior to Visit   Medication Sig Dispense Refill   • atorvastatin (LIPITOR) 40 MG tablet Take 1 tablet by mouth Every Night. 90 tablet 0   • B Complex Vitamins (VITAMIN B COMPLEX PO) Take 1 tablet by mouth Daily.     • Cholecalciferol (VITAMIN D3) 125 MCG (5000 UT) capsule capsule Take 2,000 Units by mouth Daily.     • clopidogrel (PLAVIX) 75 MG tablet Take 1 tablet by mouth Daily. 90 tablet 4   • DULoxetine (CYMBALTA) 30 MG capsule Take 2 capsules by mouth Daily. (Patient taking differently: Take 30 mg by mouth Daily.) 180 capsule 1   • fluorouracil (EFUDEX) 5 % cream      • fluticasone (FLONASE) 50 MCG/ACT nasal spray 1 spray into the nostril(s) as directed by provider Daily. 3 bottle 1   • HYDROcodone-acetaminophen (NORCO) 5-325 MG per tablet Take 1 tablet by mouth As Needed.     • Melatonin 3 MG capsule Take  by mouth.     • metoprolol succinate XL (TOPROL-XL)  "25 MG 24 hr tablet Take 1 tablet by mouth Daily. 90 tablet 4   • Potassium Gluconate 550 (90 K) MG tablet Take  by mouth Daily.     • Saw Palmetto 450 MG capsule Take  by mouth Daily.     • tamsulosin (FLOMAX) 0.4 MG capsule 24 hr capsule Take 1 capsule by mouth Daily. 90 capsule 0   • vitamin B-12 (CYANOCOBALAMIN) 1000 MCG tablet Take 1,000 mcg by mouth Daily.     • Zinc 50 MG capsule Take  by mouth Daily.       No current facility-administered medications on file prior to visit.         Allergies  Allergies   Allergen Reactions   • Ibuprofen Hives     \"Pt states he hasn't taken this in a long time\"       PMH  Past Medical History:   Diagnosis Date   • Arrhythmia    • Arthritis     both knees   • Broken neck (CMS/HCC)    • CAD (coronary artery disease)    • Cancer (CMS/HCC)    • Chondrocalcinosis of knee    • GERD (gastroesophageal reflux disease)    • Gout    • Heart attack (CMS/HCC)      Last Assessed: 28 Mar 2014   • Heart disease    • History of transfusion     own blood with hip surgery   • Hyperlipidemia    • Hypertension    • IBS (irritable bowel syndrome)    • Left rotator cuff tear arthropathy    • Low back pain    • Lower extremity neuropathy    • Lumbar canal stenosis    • Neck pain    • Numbness    • Right hip pain    • Rotator cuff tear arthropathy of right shoulder    • Thin blood (CMS/HCC)        Objective  Visit Vitals  Temp 96 °F (35.6 °C)        Physical Exam  Physical Exam  Pulmonary:      Effort: Pulmonary effort is normal. No respiratory distress.   Neurological:      Mental Status: He is alert and oriented to person, place, and time.   Psychiatric:         Mood and Affect: Mood normal.         Results  Results for orders placed or performed in visit on 08/25/20   TSH Rfx On Abnormal To Free T4    Specimen: Blood    BLOOD  MANUAL DIFFEREN   Result Value Ref Range    TSH 4.480 (H) 0.270 - 4.200 uIU/mL   Comprehensive Metabolic Panel    Specimen: Blood    BLOOD  MANUAL DIFFEREN   Result Value Ref " Range    Glucose 95 65 - 99 mg/dL    BUN 34 (H) 8 - 23 mg/dL    Creatinine 1.16 0.76 - 1.27 mg/dL    eGFR Non African Am 60 (L) >60 mL/min/1.73    eGFR African Am 73 >60 mL/min/1.73    BUN/Creatinine Ratio 29.3 (H) 7.0 - 25.0    Sodium 141 136 - 145 mmol/L    Potassium 4.7 3.5 - 5.2 mmol/L    Chloride 104 98 - 107 mmol/L    Total CO2 26.3 22.0 - 29.0 mmol/L    Calcium 9.4 8.6 - 10.5 mg/dL    Total Protein 6.4 6.0 - 8.5 g/dL    Albumin 4.40 3.50 - 5.20 g/dL    Globulin 2.0 gm/dL    A/G Ratio 2.2 g/dL    Total Bilirubin 0.5 0.0 - 1.2 mg/dL    Alkaline Phosphatase 68 39 - 117 U/L    AST (SGOT) 31 1 - 40 U/L    ALT (SGPT) 25 1 - 41 U/L   Ferritin    Specimen: Blood    BLOOD  MANUAL DIFFEREN   Result Value Ref Range    Ferritin 166.00 30.00 - 400.00 ng/mL   Iron Profile    Specimen: Blood    BLOOD  MANUAL DIFFEREN   Result Value Ref Range    TIBC 330 mcg/dL    UIBC 283 112 - 346 mcg/dL    Iron 47 (L) 59 - 158 mcg/dL    Iron Saturation 14 (L) 20 - 50 %   CBC & Differential    Specimen: Blood    BLOOD  MANUAL DIFFEREN   Result Value Ref Range    WBC 7.01 3.40 - 10.80 10*3/mm3    RBC 5.12 4.14 - 5.80 10*6/mm3    Hemoglobin 15.5 13.0 - 17.7 g/dL    Hematocrit 45.2 37.5 - 51.0 %    MCV 88.3 79.0 - 97.0 fL    MCH 30.3 26.6 - 33.0 pg    MCHC 34.3 31.5 - 35.7 g/dL    RDW 12.6 12.3 - 15.4 %    Platelets 187 140 - 450 10*3/mm3    Neutrophil Rel % 59.6 42.7 - 76.0 %    Lymphocyte Rel % 25.5 19.6 - 45.3 %    Monocyte Rel % 9.8 5.0 - 12.0 %    Eosinophil Rel % 4.0 0.3 - 6.2 %    Basophil Rel % 1.0 0.0 - 1.5 %    Neutrophils Absolute 4.17 1.70 - 7.00 10*3/mm3    Lymphocytes Absolute 1.79 0.70 - 3.10 10*3/mm3    Monocytes Absolute 0.69 0.10 - 0.90 10*3/mm3    Eosinophils Absolute 0.28 0.00 - 0.40 10*3/mm3    Basophils Absolute 0.07 0.00 - 0.20 10*3/mm3    Immature Granulocyte Rel % 0.1 0.0 - 0.5 %    Immature Grans Absolute 0.01 0.00 - 0.05 10*3/mm3    nRBC 0.0 0.0 - 0.2 /100 WBC        Assessment and Plan  Diagnoses and all orders for  this visit:    Allergic rhinitis, unspecified seasonality, unspecified trigger  - Suspect current symptoms related to allergic rhinitis with post nasal drainage and subsequent cough. Given duration of symptoms 1 month or more do not suspect COVID19 or PNA.  - Advised to increase flonase to 2 sprays each nostril daily until symptoms improve, continue coricidin, and add antihistamine of choice.    Health Maintenance  - Colonoscopy: No longer indicated due to age.  - Immunizations: Tdap 2017. Flu UTD. Shingrix series complete. Pneumovax 2010.  - Depression screening: negative 8/2020     Return in about 22 days (around 12/16/2020) for as scheduled.     This visit has been rescheduled as a phone visit to comply with patient safety concerns in accordance with CDC recommendations. Total time of discussion was 7 minutes.

## 2020-12-01 ENCOUNTER — OFFICE VISIT (OUTPATIENT)
Dept: ORTHOPEDIC SURGERY | Facility: CLINIC | Age: 80
End: 2020-12-01

## 2020-12-01 VITALS — BODY MASS INDEX: 24.69 KG/M2 | HEIGHT: 72 IN | OXYGEN SATURATION: 97 % | HEART RATE: 84 BPM | WEIGHT: 182.32 LBS

## 2020-12-01 DIAGNOSIS — M17.0 PRIMARY OSTEOARTHRITIS OF BOTH KNEES: Primary | ICD-10-CM

## 2020-12-01 PROCEDURE — 20610 DRAIN/INJ JOINT/BURSA W/O US: CPT | Performed by: PHYSICIAN ASSISTANT

## 2020-12-01 PROCEDURE — 99213 OFFICE O/P EST LOW 20 MIN: CPT | Performed by: PHYSICIAN ASSISTANT

## 2020-12-01 RX ORDER — TRIAMCINOLONE ACETONIDE 40 MG/ML
40 INJECTION, SUSPENSION INTRA-ARTICULAR; INTRAMUSCULAR
Status: COMPLETED | OUTPATIENT
Start: 2020-12-01 | End: 2020-12-01

## 2020-12-01 RX ORDER — LIDOCAINE HYDROCHLORIDE 10 MG/ML
4 INJECTION, SOLUTION EPIDURAL; INFILTRATION; INTRACAUDAL; PERINEURAL
Status: COMPLETED | OUTPATIENT
Start: 2020-12-01 | End: 2020-12-01

## 2020-12-01 RX ADMIN — TRIAMCINOLONE ACETONIDE 40 MG: 40 INJECTION, SUSPENSION INTRA-ARTICULAR; INTRAMUSCULAR at 08:56

## 2020-12-01 RX ADMIN — LIDOCAINE HYDROCHLORIDE 4 ML: 10 INJECTION, SOLUTION EPIDURAL; INFILTRATION; INTRACAUDAL; PERINEURAL at 08:58

## 2020-12-01 RX ADMIN — LIDOCAINE HYDROCHLORIDE 4 ML: 10 INJECTION, SOLUTION EPIDURAL; INFILTRATION; INTRACAUDAL; PERINEURAL at 08:56

## 2020-12-01 RX ADMIN — TRIAMCINOLONE ACETONIDE 40 MG: 40 INJECTION, SUSPENSION INTRA-ARTICULAR; INTRAMUSCULAR at 08:58

## 2020-12-01 NOTE — PROGRESS NOTES
[facility] Note    Subjective     Patient: Isrrael Marino  : 1940    Primary Care Provider: Ivanna George MD    Requesting Provider: As above    Follow-up (3 MONTHS- Primary osteoarthritis of both knees)      History    Chief Complaint: Knee pain    History of Present Illness: Patient returns today for his bilateral knee pain.  He has known bilateral knee arthritis.  He reports the steroid injections are not giving him much relief anymore.  They are only lasting several weeks.  He reports the right is more painful than the left.  He is not quite ready to consider knee replacement but would like to discuss it more.    Current Outpatient Medications on File Prior to Visit   Medication Sig Dispense Refill   • atorvastatin (LIPITOR) 40 MG tablet Take 1 tablet by mouth Every Night. 90 tablet 0   • B Complex Vitamins (VITAMIN B COMPLEX PO) Take 1 tablet by mouth Daily.     • Cholecalciferol (VITAMIN D3) 125 MCG (5000 UT) capsule capsule Take 2,000 Units by mouth Daily.     • clopidogrel (PLAVIX) 75 MG tablet Take 1 tablet by mouth Daily. 90 tablet 4   • DULoxetine (CYMBALTA) 30 MG capsule Take 2 capsules by mouth Daily. (Patient taking differently: Take 30 mg by mouth Daily.) 180 capsule 1   • fluorouracil (EFUDEX) 5 % cream      • fluticasone (FLONASE) 50 MCG/ACT nasal spray 1 spray into the nostril(s) as directed by provider Daily. 3 bottle 1   • HYDROcodone-acetaminophen (NORCO) 5-325 MG per tablet Take 1 tablet by mouth As Needed.     • Melatonin 3 MG capsule Take  by mouth.     • metoprolol succinate XL (TOPROL-XL) 25 MG 24 hr tablet Take 1 tablet by mouth Daily. 90 tablet 4   • Potassium Gluconate 550 (90 K) MG tablet Take  by mouth Daily.     • Saw Palmetto 450 MG capsule Take  by mouth Daily.     • tamsulosin (FLOMAX) 0.4 MG capsule 24 hr capsule Take 1 capsule by mouth Daily. 90 capsule 0   • vitamin B-12 (CYANOCOBALAMIN) 1000 MCG tablet Take 1,000 mcg by mouth Daily.  "    • Zinc 50 MG capsule Take  by mouth Daily.       No current facility-administered medications on file prior to visit.       Allergies   Allergen Reactions   • Ibuprofen Hives     \"Pt states he hasn't taken this in a long time\"      Past Medical History:   Diagnosis Date   • Arrhythmia    • Arthritis     both knees   • Broken neck (CMS/HCC)    • CAD (coronary artery disease)    • Cancer (CMS/HCC)    • Chondrocalcinosis of knee    • GERD (gastroesophageal reflux disease)    • Gout    • Heart attack (CMS/HCC)      Last Assessed: 28 Mar 2014   • Heart disease    • History of transfusion     own blood with hip surgery   • Hyperlipidemia    • Hypertension    • IBS (irritable bowel syndrome)    • Left rotator cuff tear arthropathy    • Low back pain    • Lower extremity neuropathy    • Lumbar canal stenosis    • Neck pain    • Numbness    • Right hip pain    • Rotator cuff tear arthropathy of right shoulder    • Thin blood (CMS/HCC)      Past Surgical History:   Procedure Laterality Date   • APPENDECTOMY  1956   • BACK SURGERY  1997    spinal decompression and fusion   • BREAST LUMPECTOMY  2003   • CARDIAC CATHETERIZATION      with two stents//. DR. TOLEDO   • CARPAL TUNNEL RELEASE  03/28/2014    Neuroplasty Decompression Median Nerve At Carpal Tunnel   • CERVICAL DISCECTOMY POSTERIOR FUSION WITH BRAIN LAB N/A 7/13/2018    Procedure: CERVICAL LAMINECTOMY DECOMPRESSION POSTERIOR C1-2 POSTERIOR CERVICAL FUSION;  Surgeon: Randall Barnett MD;  Location: [facility];  Service: Neurosurgery   • COLONOSCOPY     • CORONARY STENT PLACEMENT  2013   • HERNIA REPAIR  2002    & 1998   • LUMBAR DISCECTOMY FUSION INSTRUMENTATION     • TONSILLECTOMY     • TOTAL HIP ARTHROPLASTY  2002   • VASECTOMY     • WISDOM TOOTH EXTRACTION       Family History   Problem Relation Age of Onset   • Cancer Mother    • Heart disease Father    • Hypertension Father    • Heart attack Father    • Sleep apnea Son       Social History     Socioeconomic " "History   • Marital status:      Spouse name: Not on file   • Number of children: Not on file   • Years of education: Not on file   • Highest education level: Not on file   Tobacco Use   • Smoking status: Never Smoker   • Smokeless tobacco: Never Used   Substance and Sexual Activity   • Alcohol use: Not Currently   • Drug use: Never   • Sexual activity: Defer        Review of Systems   Constitutional: Negative.    HENT: Negative.    Eyes: Negative.    Respiratory: Negative.    Cardiovascular: Negative.    Gastrointestinal: Negative.    Endocrine: Negative.    Genitourinary: Negative.    Musculoskeletal: Positive for arthralgias.   Skin: Negative.    Allergic/Immunologic: Negative.    Neurological: Negative.    Hematological: Negative.    Psychiatric/Behavioral: Negative.        The following portions of the patient's history were reviewed and updated as appropriate: allergies, current medications, past family history, past medical history, past social history, past surgical history and problem list.      Objective      Physical Exam  Pulse 84   Ht 182.9 cm (72.01\")   Wt 82.7 kg (182 lb 5.1 oz)   SpO2 97%   BMI 24.72 kg/m²     Body mass index is 24.72 kg/m².    Patient is well developed, well nourished and in no acute distress.  Alert and oriented x 3.    Ortho Exam    Right Knee Exam  ----------  ALIGNMENT: Right: Varus RANGE OF MOTION:  Right: 0-120  LIGAMENTOUS STABILITY:   Right: stable to valgus and varus stress----------  STRENGTH:  KNEE FLEXION Right 5/5  KNEE EXTENSION Right 5/5 ----------  PAIN WITH PALPATION: Right medial joint line  PAIN WITH KNEE ROM: Right no  PATELLAR CREPITUS: Right yes   ----------    Left Knee Exam  ----------  ALIGNMENT: Left: Varus RANGE OF MOTION:  Left: 0-120  LIGAMENTOUS STABILITY:   Left: stable to valgus and varus stress----------  STRENGTH:  KNEE FLEXION left 5/5  KNEE EXTENSION left 5/5 ----------  PAIN WITH PALPATION: Left medial joint line  PAIN WITH KNEE ROM: " Left no  PATELLAR CREPITUS: Left yes         Medical Decision Making    Data Review:   ordered and reviewed x-rays today    Assessment:  1. Primary osteoarthritis of both knees        Plan:  Bilateral knee arthritis.  Reviewed today's x-rays and clinical findings past and current treatment the patient.  He reports steroid injection are not giving him more than just several weeks relief at this point.  I discussed with him whether it is worth continuing injections.  Patient reports he is getting closer to considering knee replacement.  I discussed with the patient perioperative and postoperative stages of knee replacement with him.  I have shown him a model and gone over the surgery with him as well.  He reports he is not quite yet he yet ready to pursue would like repeat injection today.  He understands he cannot have an injection for 3 months prior to surgery.  Plan today is repeat steroid injection into bilateral knees.  He will return in 3 to 4 months or sooner if needed.  Using sterile technique, the left knee was sterilely prepped with Hibiclens. Following a time out,  using a 22 gauge needle, the left knee was injected with 1cc (40mg) Kenalog, 4 cc lidocaine.  Patient tolerated the procedure well.  No complications.  Using sterile technique, the right knee was sterilely prepped with Hibiclens.  Following a time out,  using a 22 gauge needle, the right knee was injected with 1cc (40mg) Kenalog, 4 cc lidocaine.  Patient tolerated the procedure well.  No complications.          Tish Shelley PA-C  12/01/20  09:45 EST

## 2020-12-01 NOTE — PROGRESS NOTES
Procedure   Large Joint Arthrocentesis: R knee  Date/Time: 12/1/2020 8:56 AM  Consent given by: patient  Site marked: site marked  Timeout: Immediately prior to procedure a time out was called to verify the correct patient, procedure, equipment, support staff and site/side marked as required   Supporting Documentation  Indications: pain   Procedure Details  Location: knee - R knee  Preparation: Patient was prepped and draped in the usual sterile fashion  Needle size: 22 G  Approach: anterolateral  Medications administered: 4 mL lidocaine PF 1% 1 %; 40 mg triamcinolone acetonide 40 MG/ML  Patient tolerance: patient tolerated the procedure well with no immediate complications    Large Joint Arthrocentesis: L knee  Date/Time: 12/1/2020 8:58 AM  Consent given by: patient  Site marked: site marked  Timeout: Immediately prior to procedure a time out was called to verify the correct patient, procedure, equipment, support staff and site/side marked as required   Supporting Documentation  Indications: pain   Procedure Details  Location: knee - L knee  Preparation: Patient was prepped and draped in the usual sterile fashion  Needle size: 20 G  Approach: anterolateral  Medications administered: 4 mL lidocaine PF 1% 1 %; 40 mg triamcinolone acetonide 40 MG/ML  Patient tolerance: patient tolerated the procedure well with no immediate complications

## 2020-12-14 ENCOUNTER — HOSPITAL ENCOUNTER (OUTPATIENT)
Dept: SLEEP MEDICINE | Facility: HOSPITAL | Age: 80
Discharge: HOME | End: 2020-12-14
Admitting: INTERNAL MEDICINE

## 2020-12-14 VITALS — HEIGHT: 72 IN | BODY MASS INDEX: 25.06 KG/M2 | WEIGHT: 185 LBS

## 2020-12-14 DIAGNOSIS — G47.10 HYPERSOMNIA: ICD-10-CM

## 2020-12-14 DIAGNOSIS — G47.33 OSA (OBSTRUCTIVE SLEEP APNEA): ICD-10-CM

## 2020-12-14 PROCEDURE — 95800 SLP STDY UNATTENDED: CPT | Performed by: INTERNAL MEDICINE

## 2020-12-14 PROCEDURE — 95800 SLP STDY UNATTENDED: CPT

## 2020-12-15 DIAGNOSIS — G47.10 HYPERSOMNIA: ICD-10-CM

## 2020-12-15 DIAGNOSIS — G47.33 OSA (OBSTRUCTIVE SLEEP APNEA): Primary | ICD-10-CM

## 2020-12-15 DIAGNOSIS — I10 ESSENTIAL HYPERTENSION: ICD-10-CM

## 2020-12-15 DIAGNOSIS — J30.9 ALLERGIC RHINITIS, UNSPECIFIED SEASONALITY, UNSPECIFIED TRIGGER: ICD-10-CM

## 2020-12-16 ENCOUNTER — OFFICE VISIT (OUTPATIENT)
Dept: INTERNAL MEDICINE | Facility: CLINIC | Age: 80
End: 2020-12-16

## 2020-12-16 ENCOUNTER — APPOINTMENT (OUTPATIENT)
Dept: LAB | Facility: HOSPITAL | Age: 80
End: 2020-12-16

## 2020-12-16 ENCOUNTER — LAB REQUISITION (OUTPATIENT)
Dept: LAB | Facility: HOSPITAL | Age: 80
End: 2020-12-16

## 2020-12-16 VITALS
RESPIRATION RATE: 16 BRPM | WEIGHT: 175.6 LBS | TEMPERATURE: 96.9 F | SYSTOLIC BLOOD PRESSURE: 130 MMHG | BODY MASS INDEX: 23.78 KG/M2 | HEIGHT: 72 IN | OXYGEN SATURATION: 99 % | DIASTOLIC BLOOD PRESSURE: 80 MMHG | HEART RATE: 63 BPM

## 2020-12-16 DIAGNOSIS — M17.0 PRIMARY OSTEOARTHRITIS OF BOTH KNEES: ICD-10-CM

## 2020-12-16 DIAGNOSIS — J30.9 ALLERGIC RHINITIS, UNSPECIFIED SEASONALITY, UNSPECIFIED TRIGGER: ICD-10-CM

## 2020-12-16 DIAGNOSIS — R10.9 RIGHT FLANK PAIN: Primary | ICD-10-CM

## 2020-12-16 DIAGNOSIS — E61.1 IRON DEFICIENCY: ICD-10-CM

## 2020-12-16 DIAGNOSIS — I10 ESSENTIAL HYPERTENSION: ICD-10-CM

## 2020-12-16 DIAGNOSIS — Z00.00 ROUTINE GENERAL MEDICAL EXAMINATION AT A HEALTH CARE FACILITY: ICD-10-CM

## 2020-12-16 LAB
APPEARANCE UR: CLEAR
BACTERIA #/AREA URNS HPF: NORMAL /HPF
BASOPHILS # BLD AUTO: 0.06 10*3/MM3 (ref 0–0.2)
BASOPHILS NFR BLD AUTO: 0.6 % (ref 0–1.5)
BILIRUB UR QL STRIP: NEGATIVE
CASTS URNS MICRO: NORMAL
COLOR UR: YELLOW
EOSINOPHIL # BLD AUTO: 0.21 10*3/MM3 (ref 0–0.4)
EOSINOPHIL NFR BLD AUTO: 2.1 % (ref 0.3–6.2)
EPI CELLS #/AREA URNS HPF: NORMAL /HPF
ERYTHROCYTE [DISTWIDTH] IN BLOOD BY AUTOMATED COUNT: 12.6 % (ref 12.3–15.4)
FERRITIN SERPL-MCNC: 188.1 NG/ML (ref 30–400)
GLUCOSE UR QL: NEGATIVE
HCT VFR BLD AUTO: 44.2 % (ref 37.5–51)
HGB BLD-MCNC: 15.1 G/DL (ref 13–17.7)
HGB UR QL STRIP: NEGATIVE
IMM GRANULOCYTES # BLD AUTO: 0.02 10*3/MM3 (ref 0–0.05)
IMM GRANULOCYTES NFR BLD AUTO: 0.2 % (ref 0–0.5)
IRON SATN MFR SERPL: 29 % (ref 20–50)
IRON SERPL-MCNC: 87 MCG/DL (ref 59–158)
KETONES UR QL STRIP: NEGATIVE
LEUKOCYTE ESTERASE UR QL STRIP: NEGATIVE
LYMPHOCYTES # BLD AUTO: 2 10*3/MM3 (ref 0.7–3.1)
LYMPHOCYTES NFR BLD AUTO: 20.4 % (ref 19.6–45.3)
MCH RBC QN AUTO: 30.7 PG (ref 26.6–33)
MCHC RBC AUTO-ENTMCNC: 34.2 G/DL (ref 31.5–35.7)
MCV RBC AUTO: 89.8 FL (ref 79–97)
MONOCYTES # BLD AUTO: 0.82 10*3/MM3 (ref 0.1–0.9)
MONOCYTES NFR BLD AUTO: 8.4 % (ref 5–12)
NEUTROPHILS # BLD AUTO: 6.68 10*3/MM3 (ref 1.7–7)
NEUTROPHILS NFR BLD AUTO: 68.3 % (ref 42.7–76)
NITRITE UR QL STRIP: NEGATIVE
NRBC BLD AUTO-RTO: 0 /100 WBC (ref 0–0.2)
PH UR STRIP: 5.5 [PH] (ref 5–8)
PLATELET # BLD AUTO: 195 10*3/MM3 (ref 140–450)
PROT UR QL STRIP: NEGATIVE
RBC # BLD AUTO: 4.92 10*6/MM3 (ref 4.14–5.8)
RBC #/AREA URNS HPF: NORMAL /HPF
SP GR UR: 1.01 (ref 1–1.03)
TIBC SERPL-MCNC: 302 MCG/DL
UIBC SERPL-MCNC: 215 MCG/DL (ref 112–346)
UROBILINOGEN UR STRIP-MCNC: NORMAL MG/DL
WBC # BLD AUTO: 9.79 10*3/MM3 (ref 3.4–10.8)
WBC #/AREA URNS HPF: NORMAL /HPF

## 2020-12-16 PROCEDURE — 99214 OFFICE O/P EST MOD 30 MIN: CPT | Performed by: INTERNAL MEDICINE

## 2020-12-16 PROCEDURE — 36415 COLL VENOUS BLD VENIPUNCTURE: CPT | Performed by: INTERNAL MEDICINE

## 2020-12-16 NOTE — PROGRESS NOTES
Internal Medicine Follow Up    Chief Complaint  Isrrael Marino is a 80 y.o. male who presents today for follow up of chronic medical conditions outlined below.    Chief Complaint   Patient presents with   • Follow-up     HTN, knee OA, iron deficiency   • Flank Pain        HPI  Mr. Marino comes in today for follow up. Still dealing with post nasal drainage. Had been taking coricidin HBP a couple times a day but this was not working well. He has recently switched to allegra which seems to be helping better. He recently got a cat and thinks that this is the cause. He additionally notes two cousins that recently had pancreatic cancer. He has been having R flank pain, no urinary symptoms or fever. Pain is worsened by movement in certain directions. Other chronic issues seem well controlled. He is on an iron supplement. Declines colonoscopy, does not want to undergo any major procedures at his age. In this vein he is also deferring knee replacement for as long as possible. He continue injections.       Review of Systems  Review of Systems   Constitutional: Negative.    HENT: Positive for postnasal drip.    Eyes: Negative.    Respiratory: Negative.    Gastrointestinal: Negative for abdominal pain, anal bleeding, blood in stool, nausea and vomiting.   Genitourinary: Positive for difficulty urinating (weak stream, BPH), flank pain and nocturia (BPH). Negative for decreased urine volume, dysuria, frequency, hematuria and urgency.   Musculoskeletal: Positive for arthralgias (knees) and back pain.   Allergic/Immunologic: Positive for environmental allergies.   Psychiatric/Behavioral: Negative.         Current Medications  Current Outpatient Medications on File Prior to Visit   Medication Sig Dispense Refill   • atorvastatin (LIPITOR) 40 MG tablet Take 1 tablet by mouth Every Night. 90 tablet 0   • B Complex Vitamins (VITAMIN B COMPLEX PO) Take 1 tablet by mouth Daily.     • Cholecalciferol (VITAMIN D3) 125 MCG (5000 UT)  "capsule capsule Take 2,000 Units by mouth Daily.     • clopidogrel (PLAVIX) 75 MG tablet Take 1 tablet by mouth Daily. 90 tablet 4   • DULoxetine (CYMBALTA) 30 MG capsule Take 2 capsules by mouth Daily. (Patient taking differently: Take 30 mg by mouth Daily.) 180 capsule 1   • fluorouracil (EFUDEX) 5 % cream      • fluticasone (FLONASE) 50 MCG/ACT nasal spray 1 spray into the nostril(s) as directed by provider Daily. 3 bottle 1   • HYDROcodone-acetaminophen (NORCO) 5-325 MG per tablet Take 1 tablet by mouth As Needed.     • Melatonin 3 MG capsule Take  by mouth.     • metoprolol succinate XL (TOPROL-XL) 25 MG 24 hr tablet Take 1 tablet by mouth Daily. 90 tablet 4   • Potassium Gluconate 550 (90 K) MG tablet Take  by mouth Daily.     • Saw Palmetto 450 MG capsule Take  by mouth Daily.     • tamsulosin (FLOMAX) 0.4 MG capsule 24 hr capsule Take 1 capsule by mouth Daily. 90 capsule 0   • vitamin B-12 (CYANOCOBALAMIN) 1000 MCG tablet Take 1,000 mcg by mouth Daily.     • Zinc 50 MG capsule Take  by mouth Daily.       No current facility-administered medications on file prior to visit.        Allergies  Allergies   Allergen Reactions   • Ibuprofen Hives     \"Pt states he hasn't taken this in a long time\"       Objective  Visit Vitals  /80   Pulse 63   Temp 96.9 °F (36.1 °C)   Resp 16   Ht 182.9 cm (72.01\")   Wt 79.7 kg (175 lb 9.6 oz)   SpO2 99%   BMI 23.81 kg/m²        Physical Exam  Physical Exam  Vitals signs and nursing note reviewed.   Constitutional:       General: He is not in acute distress.     Appearance: Normal appearance. He is well-developed and normal weight. He is not ill-appearing or toxic-appearing.   HENT:      Head: Normocephalic and atraumatic.      Mouth/Throat:      Comments: Wearing mask  Eyes:      General: No scleral icterus.     Conjunctiva/sclera: Conjunctivae normal.   Cardiovascular:      Rate and Rhythm: Normal rate and regular rhythm.      Heart sounds: Normal heart sounds. "   Pulmonary:      Effort: Pulmonary effort is normal. No respiratory distress.      Breath sounds: Normal breath sounds.   Abdominal:      General: Bowel sounds are normal. There is no distension.      Palpations: Abdomen is soft. There is no mass.      Tenderness: There is no abdominal tenderness.   Musculoskeletal:         General: Tenderness (R mid thoracic muscles) present.      Right lower leg: No edema.      Left lower leg: No edema.   Skin:     General: Skin is warm and dry.   Neurological:      Mental Status: He is alert and oriented to person, place, and time. Mental status is at baseline.      Gait: Gait normal.   Psychiatric:         Mood and Affect: Mood normal.         Behavior: Behavior normal.         Results  Results for orders placed or performed in visit on 08/25/20   TSH Rfx On Abnormal To Free T4    Specimen: Blood    BLOOD  MANUAL DIFFEREN   Result Value Ref Range    TSH 4.480 (H) 0.270 - 4.200 uIU/mL   Comprehensive Metabolic Panel    Specimen: Blood    BLOOD  MANUAL DIFFEREN   Result Value Ref Range    Glucose 95 65 - 99 mg/dL    BUN 34 (H) 8 - 23 mg/dL    Creatinine 1.16 0.76 - 1.27 mg/dL    eGFR Non African Am 60 (L) >60 mL/min/1.73    eGFR African Am 73 >60 mL/min/1.73    BUN/Creatinine Ratio 29.3 (H) 7.0 - 25.0    Sodium 141 136 - 145 mmol/L    Potassium 4.7 3.5 - 5.2 mmol/L    Chloride 104 98 - 107 mmol/L    Total CO2 26.3 22.0 - 29.0 mmol/L    Calcium 9.4 8.6 - 10.5 mg/dL    Total Protein 6.4 6.0 - 8.5 g/dL    Albumin 4.40 3.50 - 5.20 g/dL    Globulin 2.0 gm/dL    A/G Ratio 2.2 g/dL    Total Bilirubin 0.5 0.0 - 1.2 mg/dL    Alkaline Phosphatase 68 39 - 117 U/L    AST (SGOT) 31 1 - 40 U/L    ALT (SGPT) 25 1 - 41 U/L   Ferritin    Specimen: Blood    BLOOD  MANUAL DIFFEREN   Result Value Ref Range    Ferritin 166.00 30.00 - 400.00 ng/mL   Iron Profile    Specimen: Blood    BLOOD  MANUAL DIFFEREN   Result Value Ref Range    TIBC 330 mcg/dL    UIBC 283 112 - 346 mcg/dL    Iron 47 (L) 59 - 158  mcg/dL    Iron Saturation 14 (L) 20 - 50 %   CBC & Differential    Specimen: Blood    BLOOD  MANUAL DIFFEREN   Result Value Ref Range    WBC 7.01 3.40 - 10.80 10*3/mm3    RBC 5.12 4.14 - 5.80 10*6/mm3    Hemoglobin 15.5 13.0 - 17.7 g/dL    Hematocrit 45.2 37.5 - 51.0 %    MCV 88.3 79.0 - 97.0 fL    MCH 30.3 26.6 - 33.0 pg    MCHC 34.3 31.5 - 35.7 g/dL    RDW 12.6 12.3 - 15.4 %    Platelets 187 140 - 450 10*3/mm3    Neutrophil Rel % 59.6 42.7 - 76.0 %    Lymphocyte Rel % 25.5 19.6 - 45.3 %    Monocyte Rel % 9.8 5.0 - 12.0 %    Eosinophil Rel % 4.0 0.3 - 6.2 %    Basophil Rel % 1.0 0.0 - 1.5 %    Neutrophils Absolute 4.17 1.70 - 7.00 10*3/mm3    Lymphocytes Absolute 1.79 0.70 - 3.10 10*3/mm3    Monocytes Absolute 0.69 0.10 - 0.90 10*3/mm3    Eosinophils Absolute 0.28 0.00 - 0.40 10*3/mm3    Basophils Absolute 0.07 0.00 - 0.20 10*3/mm3    Immature Granulocyte Rel % 0.1 0.0 - 0.5 %    Immature Grans Absolute 0.01 0.00 - 0.05 10*3/mm3    nRBC 0.0 0.0 - 0.2 /100 WBC        Assessment and Plan  Diagnoses and all orders for this visit:    Right flank pain  -     Urinalysis With Microscopic - Urine, Clean Catch; Future  -     Urinalysis With Microscopic - Urine, Clean Catch    Iron deficiency  - Low iron level, no anemia.   - Now on iron supplement  - Discussed etiologies of iron deficiency. No overt GI bleeding, signs of malabsorption, eats well balanced diet. He declines endoscopy. Last colonoscopy in 2014 records unavailable however patient reports normal.  - Repeat iron levels    Primary osteoarthritis of both knees  - He has been getting injections periodically through the orthopedics clinic which help his pain however pain relief does not last as long with subsequent injections.  - He still hopes to put off knee replacement for as long as possible  - He is able to ambulate without assist device, denies falls    Essential hypertension  - BP well controlled on metoprolol succinate 25mg daily    Allergic rhinitis,  unspecified seasonality, unspecified trigger  - Post nasal drainage and allergy symptoms since getting a cat. Managing adequately since starting allegra.     Health Maintenance  - Colonoscopy: No longer indicated due to age.  - Immunizations: Tdap 2017. Flu UTD. Shingrix series complete. Pneumovax 2010.  - Depression screening: negative 8/2020    Return in about 6 months (around 6/16/2021) for Follow up 30 minutes, Labs today.

## 2020-12-17 NOTE — PROGRESS NOTES
CALLED PATIENT TO ADVISE OF STUDY RESULTS. REACHED RECORDING STATING CALL CAN NOT BE COMPLETED AT THIS TIME. 12/17/20 [facility]

## 2020-12-21 NOTE — PROGRESS NOTES
CALLED PATIENT TO ADVISE OF STUDY RESULTS. PATIENT VERBALIZED UNDERSTANDING AND WAS AGREEABLE TO PAP THERAPY. FAXED ORDER TO DME

## 2021-01-06 ENCOUNTER — APPOINTMENT (OUTPATIENT)
Dept: GENERAL RADIOLOGY | Facility: HOSPITAL | Age: 81
End: 2021-01-06

## 2021-01-06 ENCOUNTER — HOSPITAL ENCOUNTER (INPATIENT)
Facility: HOSPITAL | Age: 81
LOS: 4 days | Discharge: HOME | End: 2021-01-10
Attending: EMERGENCY MEDICINE | Admitting: INTERNAL MEDICINE

## 2021-01-06 DIAGNOSIS — R09.02 HYPOXIA: ICD-10-CM

## 2021-01-06 DIAGNOSIS — R06.02 SHORTNESS OF BREATH: ICD-10-CM

## 2021-01-06 DIAGNOSIS — I50.9 ACUTE CONGESTIVE HEART FAILURE, UNSPECIFIED HEART FAILURE TYPE (HCC): Primary | ICD-10-CM

## 2021-01-06 PROBLEM — J96.01 ACUTE RESPIRATORY FAILURE WITH HYPOXIA: Status: ACTIVE | Noted: 2021-01-06

## 2021-01-06 LAB
ALBUMIN SERPL-MCNC: 3.5 G/DL (ref 3.5–5.2)
ALBUMIN/GLOB SERPL: 1.3 G/DL
ALP SERPL-CCNC: 68 U/L (ref 39–117)
ALT SERPL W P-5'-P-CCNC: 19 U/L (ref 1–41)
ANION GAP SERPL CALCULATED.3IONS-SCNC: 11 MMOL/L (ref 5–15)
AST SERPL-CCNC: 25 U/L (ref 1–40)
BASOPHILS # BLD AUTO: 0.05 10*3/MM3 (ref 0–0.2)
BASOPHILS NFR BLD AUTO: 0.7 % (ref 0–1.5)
BILIRUB SERPL-MCNC: 0.4 MG/DL (ref 0–1.2)
BUN SERPL-MCNC: 37 MG/DL (ref 8–23)
BUN/CREAT SERPL: 28.5 (ref 7–25)
CALCIUM SPEC-SCNC: 8.7 MG/DL (ref 8.6–10.5)
CHLORIDE SERPL-SCNC: 107 MMOL/L (ref 98–107)
CO2 SERPL-SCNC: 23 MMOL/L (ref 22–29)
CREAT SERPL-MCNC: 1.3 MG/DL (ref 0.76–1.27)
DEPRECATED RDW RBC AUTO: 44.1 FL (ref 37–54)
EOSINOPHIL # BLD AUTO: 0.14 10*3/MM3 (ref 0–0.4)
EOSINOPHIL NFR BLD AUTO: 1.9 % (ref 0.3–6.2)
ERYTHROCYTE [DISTWIDTH] IN BLOOD BY AUTOMATED COUNT: 13.3 % (ref 12.3–15.4)
FLUAV RNA RESP QL NAA+PROBE: NOT DETECTED
FLUBV RNA RESP QL NAA+PROBE: NOT DETECTED
GFR SERPL CREATININE-BSD FRML MDRD: 53 ML/MIN/1.73
GLOBULIN UR ELPH-MCNC: 2.7 GM/DL
GLUCOSE SERPL-MCNC: 99 MG/DL (ref 65–99)
HCT VFR BLD AUTO: 42.1 % (ref 37.5–51)
HGB BLD-MCNC: 13.7 G/DL (ref 13–17.7)
HOLD SPECIMEN: NORMAL
HOLD SPECIMEN: NORMAL
IMM GRANULOCYTES # BLD AUTO: 0.03 10*3/MM3 (ref 0–0.05)
IMM GRANULOCYTES NFR BLD AUTO: 0.4 % (ref 0–0.5)
LYMPHOCYTES # BLD AUTO: 1.57 10*3/MM3 (ref 0.7–3.1)
LYMPHOCYTES NFR BLD AUTO: 20.8 % (ref 19.6–45.3)
MAGNESIUM SERPL-MCNC: 2 MG/DL (ref 1.6–2.4)
MCH RBC QN AUTO: 29.4 PG (ref 26.6–33)
MCHC RBC AUTO-ENTMCNC: 32.5 G/DL (ref 31.5–35.7)
MCV RBC AUTO: 90.3 FL (ref 79–97)
MONOCYTES # BLD AUTO: 0.7 10*3/MM3 (ref 0.1–0.9)
MONOCYTES NFR BLD AUTO: 9.3 % (ref 5–12)
NEUTROPHILS NFR BLD AUTO: 5.07 10*3/MM3 (ref 1.7–7)
NEUTROPHILS NFR BLD AUTO: 66.9 % (ref 42.7–76)
NRBC BLD AUTO-RTO: 0 /100 WBC (ref 0–0.2)
NT-PROBNP SERPL-MCNC: 3722 PG/ML (ref 0–1800)
OSMOLALITY UR: 373 MOSM/KG (ref 300–1100)
PLATELET # BLD AUTO: 200 10*3/MM3 (ref 140–450)
PMV BLD AUTO: 10.5 FL (ref 6–12)
POTASSIUM SERPL-SCNC: 4.3 MMOL/L (ref 3.5–5.2)
PROT SERPL-MCNC: 6.2 G/DL (ref 6–8.5)
PROT UR-MCNC: <4 MG/DL
QT INTERVAL: 424 MS
QT INTERVAL: 474 MS
QTC INTERVAL: 464 MS
QTC INTERVAL: 485 MS
RBC # BLD AUTO: 4.66 10*6/MM3 (ref 4.14–5.8)
SARS-COV-2 RNA RESP QL NAA+PROBE: NOT DETECTED
SODIUM SERPL-SCNC: 141 MMOL/L (ref 136–145)
SODIUM UR-SCNC: 118 MMOL/L
TROPONIN T SERPL-MCNC: 0.01 NG/ML (ref 0–0.03)
TROPONIN T SERPL-MCNC: 0.01 NG/ML (ref 0–0.03)
WBC # BLD AUTO: 7.56 10*3/MM3 (ref 3.4–10.8)
WHOLE BLOOD HOLD SPECIMEN: NORMAL
WHOLE BLOOD HOLD SPECIMEN: NORMAL

## 2021-01-06 PROCEDURE — 84156 ASSAY OF PROTEIN URINE: CPT | Performed by: NURSE PRACTITIONER

## 2021-01-06 PROCEDURE — 83880 ASSAY OF NATRIURETIC PEPTIDE: CPT | Performed by: EMERGENCY MEDICINE

## 2021-01-06 PROCEDURE — 87636 SARSCOV2 & INF A&B AMP PRB: CPT | Performed by: EMERGENCY MEDICINE

## 2021-01-06 PROCEDURE — 94660 CPAP INITIATION&MGMT: CPT

## 2021-01-06 PROCEDURE — 83735 ASSAY OF MAGNESIUM: CPT | Performed by: EMERGENCY MEDICINE

## 2021-01-06 PROCEDURE — 82570 ASSAY OF URINE CREATININE: CPT | Performed by: NURSE PRACTITIONER

## 2021-01-06 PROCEDURE — 80053 COMPREHEN METABOLIC PANEL: CPT | Performed by: EMERGENCY MEDICINE

## 2021-01-06 PROCEDURE — 93005 ELECTROCARDIOGRAM TRACING: CPT | Performed by: EMERGENCY MEDICINE

## 2021-01-06 PROCEDURE — 99284 EMERGENCY DEPT VISIT MOD MDM: CPT

## 2021-01-06 PROCEDURE — 85025 COMPLETE CBC W/AUTO DIFF WBC: CPT

## 2021-01-06 PROCEDURE — 84300 ASSAY OF URINE SODIUM: CPT | Performed by: NURSE PRACTITIONER

## 2021-01-06 PROCEDURE — 25010000002 FUROSEMIDE PER 20 MG: Performed by: EMERGENCY MEDICINE

## 2021-01-06 PROCEDURE — 83935 ASSAY OF URINE OSMOLALITY: CPT | Performed by: NURSE PRACTITIONER

## 2021-01-06 PROCEDURE — 99223 1ST HOSP IP/OBS HIGH 75: CPT | Performed by: INTERNAL MEDICINE

## 2021-01-06 PROCEDURE — 25010000002 HEPARIN (PORCINE) PER 1000 UNITS: Performed by: NURSE PRACTITIONER

## 2021-01-06 PROCEDURE — 71045 X-RAY EXAM CHEST 1 VIEW: CPT

## 2021-01-06 PROCEDURE — 36415 COLL VENOUS BLD VENIPUNCTURE: CPT

## 2021-01-06 PROCEDURE — 93005 ELECTROCARDIOGRAM TRACING: CPT

## 2021-01-06 PROCEDURE — 84484 ASSAY OF TROPONIN QUANT: CPT | Performed by: EMERGENCY MEDICINE

## 2021-01-06 PROCEDURE — 84145 PROCALCITONIN (PCT): CPT | Performed by: INTERNAL MEDICINE

## 2021-01-06 RX ORDER — LANOLIN ALCOHOL/MO/W.PET/CERES
1000 CREAM (GRAM) TOPICAL DAILY
Status: DISCONTINUED | OUTPATIENT
Start: 2021-01-07 | End: 2021-01-10 | Stop reason: HOSPADM

## 2021-01-06 RX ORDER — CLOPIDOGREL BISULFATE 75 MG/1
75 TABLET ORAL DAILY
Status: DISCONTINUED | OUTPATIENT
Start: 2021-01-07 | End: 2021-01-10 | Stop reason: HOSPADM

## 2021-01-06 RX ORDER — FUROSEMIDE 10 MG/ML
60 INJECTION INTRAMUSCULAR; INTRAVENOUS ONCE
Status: COMPLETED | OUTPATIENT
Start: 2021-01-06 | End: 2021-01-06

## 2021-01-06 RX ORDER — SODIUM CHLORIDE 0.9 % (FLUSH) 0.9 %
10 SYRINGE (ML) INJECTION AS NEEDED
Status: DISCONTINUED | OUTPATIENT
Start: 2021-01-06 | End: 2021-01-10 | Stop reason: HOSPADM

## 2021-01-06 RX ORDER — TAMSULOSIN HYDROCHLORIDE 0.4 MG/1
0.4 CAPSULE ORAL DAILY
Status: DISCONTINUED | OUTPATIENT
Start: 2021-01-07 | End: 2021-01-10 | Stop reason: HOSPADM

## 2021-01-06 RX ORDER — DICLOFENAC SODIUM 75 MG/1
75 TABLET, DELAYED RELEASE ORAL 2 TIMES DAILY
COMMUNITY
End: 2021-05-30 | Stop reason: HOSPADM

## 2021-01-06 RX ORDER — FUROSEMIDE 10 MG/ML
60 INJECTION INTRAMUSCULAR; INTRAVENOUS ONCE
Status: COMPLETED | OUTPATIENT
Start: 2021-01-07 | End: 2021-01-07

## 2021-01-06 RX ORDER — ATORVASTATIN CALCIUM 40 MG/1
40 TABLET, FILM COATED ORAL NIGHTLY
Status: DISCONTINUED | OUTPATIENT
Start: 2021-01-06 | End: 2021-01-10 | Stop reason: HOSPADM

## 2021-01-06 RX ORDER — DULOXETIN HYDROCHLORIDE 30 MG/1
30 CAPSULE, DELAYED RELEASE ORAL DAILY
Status: DISCONTINUED | OUTPATIENT
Start: 2021-01-07 | End: 2021-01-10 | Stop reason: HOSPADM

## 2021-01-06 RX ORDER — SODIUM CHLORIDE 0.9 % (FLUSH) 0.9 %
10 SYRINGE (ML) INJECTION EVERY 12 HOURS SCHEDULED
Status: DISCONTINUED | OUTPATIENT
Start: 2021-01-06 | End: 2021-01-10 | Stop reason: HOSPADM

## 2021-01-06 RX ORDER — FLUTICASONE PROPIONATE 50 MCG
1 SPRAY, SUSPENSION (ML) NASAL DAILY
Status: DISCONTINUED | OUTPATIENT
Start: 2021-01-07 | End: 2021-01-10 | Stop reason: HOSPADM

## 2021-01-06 RX ORDER — METOPROLOL SUCCINATE 25 MG/1
25 TABLET, EXTENDED RELEASE ORAL DAILY
Status: DISCONTINUED | OUTPATIENT
Start: 2021-01-07 | End: 2021-01-08

## 2021-01-06 RX ORDER — HEPARIN SODIUM 5000 [USP'U]/ML
5000 INJECTION, SOLUTION INTRAVENOUS; SUBCUTANEOUS EVERY 8 HOURS SCHEDULED
Status: DISCONTINUED | OUTPATIENT
Start: 2021-01-06 | End: 2021-01-10 | Stop reason: HOSPADM

## 2021-01-06 RX ADMIN — ATORVASTATIN CALCIUM 40 MG: 40 TABLET, FILM COATED ORAL at 22:53

## 2021-01-06 RX ADMIN — FUROSEMIDE 60 MG: 10 INJECTION, SOLUTION INTRAMUSCULAR; INTRAVENOUS at 16:18

## 2021-01-06 RX ADMIN — NITROGLYCERIN 2 INCH: 20 OINTMENT TOPICAL at 16:19

## 2021-01-06 RX ADMIN — HEPARIN SODIUM 5000 UNITS: 5000 INJECTION INTRAVENOUS; SUBCUTANEOUS at 22:53

## 2021-01-06 NOTE — ED PROVIDER NOTES
Subjective   Isrrael Marino is an 80 y.o. male who presents to the ED with c/o shortness of breath. The patient reports that he was deer hunting in 11/2020 when he became suddenly short of breath. Since then he has been experiencing intermittent shortness of breath, but over the past week his difficulty breathing has significantly worsened without relief. The patient was recently prescribed 0.4 mg of Flomax daily by his primary care provider. He complains of fatigue but denies chest pain, bilateral lower extremity redness or pain, acute bilateral lower extremity swelling, abdominal pain, pleuritic chest pain, rhinorrhea, sore throat, acute loss of taste and smell, fever, chills, cough, congestion, acute postnasal drainage, and acute abnormal urinary symptoms. The patient reports he does not smoke tobacco products or consume alcoholic beverages. He has a past medical history of hypertension, hyperlipidemia, myocardial infarction, CAD, arrhythmia, and cancer. His surgical history includes coronary stent placement, appendectomy, vasectomy, breast lumpectomy, colonoscopy, and cardiac catheterization. There are no other acute complaints at this time.      History provided by:  Patient  Shortness of Breath  Severity:  Moderate  Onset quality:  Sudden  Duration:  1 week  Timing:  Constant  Progression:  Worsening  Chronicity:  Recurrent  Context: activity    Relieved by:  Nothing  Worsened by:  Activity and exertion  Ineffective treatments:  Rest and lying down  Associated symptoms: no abdominal pain, no chest pain, no cough, no fever and no sore throat    Risk factors: hx of cancer    Risk factors: no recent alcohol use, no hx of PE/DVT and no tobacco use        Review of Systems   Constitutional: Positive for fatigue. Negative for chills and fever.   HENT: Negative for congestion, postnasal drip, rhinorrhea and sore throat.         The patient denies acute loss of taste or smell and postnasal drainage.   Respiratory:  "Positive for shortness of breath. Negative for cough.    Cardiovascular: Negative for chest pain and leg swelling.        He denies acute bilateral lower extremity edema and pleuritic chest pain.   Gastrointestinal: Negative for abdominal pain.   Genitourinary: Negative for decreased urine volume, difficulty urinating, dysuria, frequency, hematuria and urgency.        He denies acute abnormal urinary symptoms.   Musculoskeletal:        The patient denies bilateral leg pain.   Skin: Negative for color change.        The patient denies bilateral lower extremity erythema.   All other systems reviewed and are negative.      Past Medical History:   Diagnosis Date   • Arrhythmia    • Arthritis     both knees   • Broken neck (CMS/HCC)    • CAD (coronary artery disease)    • Cancer (CMS/HCC)    • Chondrocalcinosis of knee    • GERD (gastroesophageal reflux disease)    • Gout    • Heart attack (CMS/HCC)      Last Assessed: 28 Mar 2014   • Heart disease    • History of transfusion     own blood with hip surgery   • Hyperlipidemia    • Hypertension    • IBS (irritable bowel syndrome)    • Left rotator cuff tear arthropathy    • Low back pain    • Lower extremity neuropathy    • Lumbar canal stenosis    • Neck pain    • Numbness    • Right hip pain    • Rotator cuff tear arthropathy of right shoulder    • Thin blood (CMS/HCC)        Allergies   Allergen Reactions   • Ibuprofen Hives     \"Pt states he hasn't taken this in a long time\"       Past Surgical History:   Procedure Laterality Date   • APPENDECTOMY  1956   • BACK SURGERY  1997    spinal decompression and fusion   • BREAST LUMPECTOMY  2003   • CARDIAC CATHETERIZATION      with two stents//. DR. TOLEDO   • CARPAL TUNNEL RELEASE  03/28/2014    Neuroplasty Decompression Median Nerve At Carpal Tunnel   • CERVICAL DISCECTOMY POSTERIOR FUSION WITH BRAIN LAB N/A 7/13/2018    Procedure: CERVICAL LAMINECTOMY DECOMPRESSION POSTERIOR C1-2 POSTERIOR CERVICAL FUSION;  Surgeon: " Randall Barnett MD;  Location: [facility] OR;  Service: Neurosurgery   • COLONOSCOPY     • CORONARY STENT PLACEMENT  2013   • HERNIA REPAIR  2002    & 1998   • LUMBAR DISCECTOMY FUSION INSTRUMENTATION     • TONSILLECTOMY     • TOTAL HIP ARTHROPLASTY  2002   • VASECTOMY     • WISDOM TOOTH EXTRACTION         Family History   Problem Relation Age of Onset   • Cancer Mother    • Heart disease Father    • Hypertension Father    • Heart attack Father    • Sleep apnea Son        Social History     Socioeconomic History   • Marital status:      Spouse name: Not on file   • Number of children: Not on file   • Years of education: Not on file   • Highest education level: Not on file   Tobacco Use   • Smoking status: Never Smoker   • Smokeless tobacco: Never Used   Substance and Sexual Activity   • Alcohol use: Not Currently   • Drug use: Never   • Sexual activity: Defer         Objective   Physical Exam  Vitals signs and nursing note reviewed.   Constitutional:       General: He is not in acute distress.     Appearance: He is well-developed.   HENT:      Head: Normocephalic and atraumatic.      Mouth/Throat:      Mouth: Mucous membranes are moist.      Pharynx: Oropharynx is clear. Uvula midline.      Comments: Oropharynx is unremarkable.  Eyes:      General: No scleral icterus.     Conjunctiva/sclera: Conjunctivae normal.      Comments: Conjunctivae are unremarkable.   Neck:      Musculoskeletal: Normal range of motion and neck supple.   Cardiovascular:      Rate and Rhythm: Normal rate.      Heart sounds: Normal heart sounds. No murmur. No friction rub. No gallop.       Comments: Occasional irregularities in rhythm. Heart rate of 79 upon examination.  2+ edema to the extremities.  Pulmonary:      Effort: Tachypnea present. No respiratory distress.      Breath sounds: Examination of the right-lower field reveals rales. Examination of the left-lower field reveals rales. Rales present.      Comments: Jose Antonio  crackles.  Mildly tachypneic upon examination with an oxygen saturation of 91% on room air.  Abdominal:      General: Bowel sounds are normal. There is no distension.      Palpations: Abdomen is soft.      Tenderness: There is no abdominal tenderness.   Musculoskeletal: Normal range of motion.      Right lower le+ Edema present.      Left lower le+ Edema present.      Comments: Remote well-healed scar on the posterior cervical spine.   Skin:     General: Skin is warm and dry.      Findings: No erythema.      Comments: No erythema or warmth to the extremities.   Neurological:      Mental Status: He is alert and oriented to person, place, and time.   Psychiatric:         Behavior: Behavior normal.         Procedures         ED Course  ED Course as of  Patient is again reevaluated in the ED.  He is diuresed exceptionally well.  Is treated with Nitropaste and IV Lasix.  He has never been diagnosed with CHF though has had CAD with stent x2 and Dr. Bernal is his cardiologistDespite diuresis immediately on removing his nasal cannula his oxygen dropped to 90 immediately.  Nasal cannula is replaced at a sat of 90 to prevent further hypoxia.  He is not on oxygen at home    []    I discussed case with Dr. Bojorquez who will admit for definitive inpatient care.  I discussed admission with the patient who is in agreement    []    Radiograph is personally viewed by me    []      ED Course User Index  [] Jm Mendiola MD     Recent Results (from the past 24 hour(s))   ECG 12 Lead    Collection Time: 21  2:14 PM   Result Value Ref Range    QT Interval 424 ms    QTC Interval 464 ms   Comprehensive Metabolic Panel    Collection Time: 21  2:18 PM    Specimen: Arm, Left; Blood   Result Value Ref Range    Glucose 99 65 - 99 mg/dL    BUN 37 (H) 8 - 23 mg/dL    Creatinine 1.30 (H) 0.76 - 1.27 mg/dL    Sodium 141 136 - 145 mmol/L    Potassium 4.3 3.5 - 5.2 mmol/L     Chloride 107 98 - 107 mmol/L    CO2 23.0 22.0 - 29.0 mmol/L    Calcium 8.7 8.6 - 10.5 mg/dL    Total Protein 6.2 6.0 - 8.5 g/dL    Albumin 3.50 3.50 - 5.20 g/dL    ALT (SGPT) 19 1 - 41 U/L    AST (SGOT) 25 1 - 40 U/L    Alkaline Phosphatase 68 39 - 117 U/L    Total Bilirubin 0.4 0.0 - 1.2 mg/dL    eGFR Non African Amer 53 (L) >60 mL/min/1.73    Globulin 2.7 gm/dL    A/G Ratio 1.3 g/dL    BUN/Creatinine Ratio 28.5 (H) 7.0 - 25.0    Anion Gap 11.0 5.0 - 15.0 mmol/L   BNP    Collection Time: 01/06/21  2:18 PM    Specimen: Arm, Left; Blood   Result Value Ref Range    proBNP 3,722.0 (H) 0.0-1,800.0 pg/mL   Troponin    Collection Time: 01/06/21  2:18 PM    Specimen: Arm, Left; Blood   Result Value Ref Range    Troponin T 0.013 0.000 - 0.030 ng/mL   Light Blue Top    Collection Time: 01/06/21  2:18 PM   Result Value Ref Range    Extra Tube hold for add-on    Green Top (Gel)    Collection Time: 01/06/21  2:18 PM   Result Value Ref Range    Extra Tube Hold for add-ons.    Lavender Top    Collection Time: 01/06/21  2:18 PM   Result Value Ref Range    Extra Tube hold for add-on    Gold Top - SST    Collection Time: 01/06/21  2:18 PM   Result Value Ref Range    Extra Tube Hold for add-ons.    CBC Auto Differential    Collection Time: 01/06/21  2:18 PM    Specimen: Arm, Left; Blood   Result Value Ref Range    WBC 7.56 3.40 - 10.80 10*3/mm3    RBC 4.66 4.14 - 5.80 10*6/mm3    Hemoglobin 13.7 13.0 - 17.7 g/dL    Hematocrit 42.1 37.5 - 51.0 %    MCV 90.3 79.0 - 97.0 fL    MCH 29.4 26.6 - 33.0 pg    MCHC 32.5 31.5 - 35.7 g/dL    RDW 13.3 12.3 - 15.4 %    RDW-SD 44.1 37.0 - 54.0 fl    MPV 10.5 6.0 - 12.0 fL    Platelets 200 140 - 450 10*3/mm3    Neutrophil % 66.9 42.7 - 76.0 %    Lymphocyte % 20.8 19.6 - 45.3 %    Monocyte % 9.3 5.0 - 12.0 %    Eosinophil % 1.9 0.3 - 6.2 %    Basophil % 0.7 0.0 - 1.5 %    Immature Grans % 0.4 0.0 - 0.5 %    Neutrophils, Absolute 5.07 1.70 - 7.00 10*3/mm3    Lymphocytes, Absolute 1.57 0.70 - 3.10  10*3/mm3    Monocytes, Absolute 0.70 0.10 - 0.90 10*3/mm3    Eosinophils, Absolute 0.14 0.00 - 0.40 10*3/mm3    Basophils, Absolute 0.05 0.00 - 0.20 10*3/mm3    Immature Grans, Absolute 0.03 0.00 - 0.05 10*3/mm3    nRBC 0.0 0.0 - 0.2 /100 WBC   Magnesium    Collection Time: 01/06/21  2:18 PM    Specimen: Arm, Left; Blood   Result Value Ref Range    Magnesium 2.0 1.6 - 2.4 mg/dL   COVID-19 and FLU A/B PCR - Swab, Nasopharynx    Collection Time: 01/06/21  3:00 PM    Specimen: Nasopharynx; Swab   Result Value Ref Range    COVID19 Not Detected Not Detected - Ref. Range    Influenza A PCR Not Detected Not Detected    Influenza B PCR Not Detected Not Detected   ECG 12 Lead    Collection Time: 01/06/21  5:37 PM   Result Value Ref Range    QT Interval 474 ms    QTC Interval 485 ms   Troponin    Collection Time: 01/06/21  5:46 PM    Specimen: Blood   Result Value Ref Range    Troponin T 0.012 0.000 - 0.030 ng/mL     Note: In addition to lab results from this visit, the labs listed above may include labs taken at another facility or during a different encounter within the last 24 hours. Please correlate lab times with ED admission and discharge times for further clarification of the services performed during this visit.    XR Chest 1 View   Preliminary Result   Increased pulmonary vascularity bilaterally with small to   moderate-sized bilateral pleural effusions and increased markings at the   lung bases.       D:  01/06/2021   E:  01/06/2021                Vitals:    01/06/21 1800 01/06/21 1801 01/06/21 1830 01/06/21 1831   BP: 130/72  119/89    BP Location:       Patient Position:       Pulse:  63  64   Resp:       Temp:       TempSrc:       SpO2:  92%  93%   Weight:       Height:         Medications   sodium chloride 0.9 % flush 10 mL (has no administration in time range)   nitroglycerin (NITROSTAT) ointment 2 inch (2 inches Topical Given 1/6/21 1619)   furosemide (LASIX) injection 60 mg (60 mg Intravenous Given 1/6/21  1618)     ECG/EMG Results (last 24 hours)     Procedure Component Value Units Date/Time    ECG 12 Lead [[id number]] Collected: 01/06/21 1414     Updated: 01/06/21 1506     QT Interval 424 ms      QTC Interval 464 ms     Narrative:      Test Reason : SOA Protocol  Blood Pressure : **/** mmHG  Vent. Rate : 072 BPM     Atrial Rate : 072 BPM     P-R Int : 168 ms          QRS Dur : 086 ms      QT Int : 424 ms       P-R-T Axes : 031 -04 064 degrees     QTc Int : 464 ms    Normal sinus rhythm with frequent premature ventricular complexes  Cannot rule out Anterior infarct , age undetermined  Abnormal ECG  When compared with ECG of 09-JUL-2018 08:43,  Minimal criteria for Anterior infarct are now present  Confirmed by DARRYL LEWIS MD ([id number]) on 1/6/2021 3:05:48 PM    Referred By:  ANNALISE           Confirmed By:DARRYL LEWIS MD        ECG 12 Lead   Final Result   Test Reason : 2ND SET   Blood Pressure : **/** mmHG   Vent. Rate : 063 BPM     Atrial Rate : 063 BPM      P-R Int : 212 ms          QRS Dur : 074 ms       QT Int : 474 ms       P-R-T Axes : 014 -27 019 degrees      QTc Int : 485 ms      Sinus rhythm with 1st degree AV block with frequent premature ventricular   complexes   Inferior infarct , age undetermined   Abnormal ECG   When compared with ECG of 06-JAN-2021 14:14,   AL interval has increased   Nonspecific T wave abnormality now evident in Inferior leads   Confirmed by DARRYL LEWIS MD ([id number]) on 1/6/2021 8:13:35 PM      Referred By:  JOSHUA           Confirmed By:DARRYL LEWIS MD      ECG 12 Lead   Final Result   Test Reason : SOA Protocol   Blood Pressure : **/** mmHG   Vent. Rate : 072 BPM     Atrial Rate : 072 BPM      P-R Int : 168 ms          QRS Dur : 086 ms       QT Int : 424 ms       P-R-T Axes : 031 -04 064 degrees      QTc Int : 464 ms      Normal sinus rhythm with frequent premature ventricular complexes   Cannot rule out Anterior infarct , age undetermined   Abnormal ECG   When compared with ECG of  09-JUL-2018 08:43,   Minimal criteria for Anterior infarct are now present   Confirmed by DARRYL MENDIOLA MD ([id number]) on 1/6/2021 3:05:48 PM      Referred By:  EDMD           Confirmed By:DARRYL MENDIOLA MD                                                 [facility]    Final diagnoses:   Acute congestive heart failure, unspecified heart failure type (CMS/HCC)   Hypoxia   Shortness of breath       Documentation assistance provided by yamini Hernandez.  Information recorded by the scribe was done at my direction and has been verified and validated by me.     Lj Hernandez  01/06/21 1535       Darryl Mendiola MD  01/06/21 1922       Darryl Mendiola MD  01/06/21 2015

## 2021-01-07 ENCOUNTER — HOSPITAL ENCOUNTER (OUTPATIENT)
Facility: HOSPITAL | Age: 81
Setting detail: HOSPITAL OUTPATIENT SURGERY
End: 2021-01-07
Attending: INTERNAL MEDICINE | Admitting: INTERNAL MEDICINE

## 2021-01-07 ENCOUNTER — APPOINTMENT (OUTPATIENT)
Dept: CT IMAGING | Facility: HOSPITAL | Age: 81
End: 2021-01-07

## 2021-01-07 ENCOUNTER — APPOINTMENT (OUTPATIENT)
Dept: GENERAL RADIOLOGY | Facility: HOSPITAL | Age: 81
End: 2021-01-07

## 2021-01-07 LAB
ANION GAP SERPL CALCULATED.3IONS-SCNC: 12 MMOL/L (ref 5–15)
APPEARANCE FLD: CLEAR
APTT PPP: 29.5 SECONDS (ref 50–95)
BUN SERPL-MCNC: 39 MG/DL (ref 8–23)
BUN/CREAT SERPL: 31.2 (ref 7–25)
CALCIUM SPEC-SCNC: 9.5 MG/DL (ref 8.6–10.5)
CHLORIDE SERPL-SCNC: 102 MMOL/L (ref 98–107)
CHOLEST SERPL-MCNC: 166 MG/DL (ref 0–200)
CO2 SERPL-SCNC: 28 MMOL/L (ref 22–29)
COLOR FLD: YELLOW
CREAT SERPL-MCNC: 1.25 MG/DL (ref 0.76–1.27)
CREAT UR-MCNC: 23.8 MG/DL
D DIMER PPP FEU-MCNC: 1.31 MCGFEU/ML (ref 0–0.56)
DEPRECATED RDW RBC AUTO: 44.7 FL (ref 37–54)
ERYTHROCYTE [DISTWIDTH] IN BLOOD BY AUTOMATED COUNT: 13.4 % (ref 12.3–15.4)
GFR SERPL CREATININE-BSD FRML MDRD: 55 ML/MIN/1.73
GLUCOSE SERPL-MCNC: 84 MG/DL (ref 65–99)
HBA1C MFR BLD: 5.8 % (ref 4.8–5.6)
HCT VFR BLD AUTO: 45.3 % (ref 37.5–51)
HDLC SERPL-MCNC: 68 MG/DL (ref 40–60)
HGB BLD-MCNC: 14.5 G/DL (ref 13–17.7)
INR PPP: 1.02 (ref 0.85–1.16)
LDH FLD-CCNC: 73 U/L
LDLC SERPL CALC-MCNC: 88 MG/DL (ref 0–100)
LDLC/HDLC SERPL: 1.3 {RATIO}
LYMPHOCYTES NFR FLD MANUAL: 52 %
MACROPHAGE FLUID: 2 %
MAGNESIUM SERPL-MCNC: 2.1 MG/DL (ref 1.6–2.4)
MCH RBC QN AUTO: 29.2 PG (ref 26.6–33)
MCHC RBC AUTO-ENTMCNC: 32 G/DL (ref 31.5–35.7)
MCV RBC AUTO: 91.1 FL (ref 79–97)
MONOCYTES NFR FLD: 42 %
NEUTROPHILS NFR FLD MANUAL: 4 %
PLATELET # BLD AUTO: 217 10*3/MM3 (ref 140–450)
PMV BLD AUTO: 10.4 FL (ref 6–12)
POTASSIUM SERPL-SCNC: 4.1 MMOL/L (ref 3.5–5.2)
PROCALCITONIN SERPL-MCNC: 0.06 NG/ML (ref 0–0.25)
PROT FLD-MCNC: 2.6 G/DL
PROTHROMBIN TIME: 13.1 SECONDS (ref 11.5–14)
RBC # BLD AUTO: 4.97 10*6/MM3 (ref 4.14–5.8)
RBC # FLD AUTO: <2000 /MM3
SODIUM SERPL-SCNC: 142 MMOL/L (ref 136–145)
TRIGL SERPL-MCNC: 49 MG/DL (ref 0–150)
TSH SERPL DL<=0.05 MIU/L-ACNC: 3.98 UIU/ML (ref 0.27–4.2)
VLDLC SERPL-MCNC: 10 MG/DL (ref 5–40)
WBC # BLD AUTO: 8.43 10*3/MM3 (ref 3.4–10.8)
WBC # FLD AUTO: 1220 /MM3

## 2021-01-07 PROCEDURE — 25010000002 FUROSEMIDE PER 20 MG: Performed by: NURSE PRACTITIONER

## 2021-01-07 PROCEDURE — 75989 ABSCESS DRAINAGE UNDER X-RAY: CPT

## 2021-01-07 PROCEDURE — 85730 THROMBOPLASTIN TIME PARTIAL: CPT | Performed by: RADIOLOGY

## 2021-01-07 PROCEDURE — 94660 CPAP INITIATION&MGMT: CPT

## 2021-01-07 PROCEDURE — 85027 COMPLETE CBC AUTOMATED: CPT | Performed by: NURSE PRACTITIONER

## 2021-01-07 PROCEDURE — C1729 CATH, DRAINAGE: HCPCS

## 2021-01-07 PROCEDURE — 25010000003 LIDOCAINE 1 % SOLUTION: Performed by: RADIOLOGY

## 2021-01-07 PROCEDURE — 0 IOPAMIDOL PER 1 ML: Performed by: INTERNAL MEDICINE

## 2021-01-07 PROCEDURE — 80048 BASIC METABOLIC PNL TOTAL CA: CPT | Performed by: NURSE PRACTITIONER

## 2021-01-07 PROCEDURE — 71275 CT ANGIOGRAPHY CHEST: CPT

## 2021-01-07 PROCEDURE — 88305 TISSUE EXAM BY PATHOLOGIST: CPT | Performed by: INTERNAL MEDICINE

## 2021-01-07 PROCEDURE — 99222 1ST HOSP IP/OBS MODERATE 55: CPT | Performed by: INTERNAL MEDICINE

## 2021-01-07 PROCEDURE — 80061 LIPID PANEL: CPT | Performed by: NURSE PRACTITIONER

## 2021-01-07 PROCEDURE — 25010000002 HEPARIN (PORCINE) PER 1000 UNITS: Performed by: NURSE PRACTITIONER

## 2021-01-07 PROCEDURE — 85610 PROTHROMBIN TIME: CPT | Performed by: RADIOLOGY

## 2021-01-07 PROCEDURE — 88112 CYTOPATH CELL ENHANCE TECH: CPT | Performed by: INTERNAL MEDICINE

## 2021-01-07 PROCEDURE — 0W993ZZ DRAINAGE OF RIGHT PLEURAL CAVITY, PERCUTANEOUS APPROACH: ICD-10-PCS | Performed by: RADIOLOGY

## 2021-01-07 PROCEDURE — 83036 HEMOGLOBIN GLYCOSYLATED A1C: CPT | Performed by: NURSE PRACTITIONER

## 2021-01-07 PROCEDURE — 99233 SBSQ HOSP IP/OBS HIGH 50: CPT | Performed by: INTERNAL MEDICINE

## 2021-01-07 PROCEDURE — 84443 ASSAY THYROID STIM HORMONE: CPT | Performed by: NURSE PRACTITIONER

## 2021-01-07 PROCEDURE — 83735 ASSAY OF MAGNESIUM: CPT | Performed by: NURSE PRACTITIONER

## 2021-01-07 PROCEDURE — 71045 X-RAY EXAM CHEST 1 VIEW: CPT

## 2021-01-07 PROCEDURE — 83615 LACTATE (LD) (LDH) ENZYME: CPT | Performed by: INTERNAL MEDICINE

## 2021-01-07 PROCEDURE — 89051 BODY FLUID CELL COUNT: CPT | Performed by: INTERNAL MEDICINE

## 2021-01-07 PROCEDURE — 84157 ASSAY OF PROTEIN OTHER: CPT | Performed by: INTERNAL MEDICINE

## 2021-01-07 PROCEDURE — 85379 FIBRIN DEGRADATION QUANT: CPT | Performed by: INTERNAL MEDICINE

## 2021-01-07 RX ORDER — FUROSEMIDE 10 MG/ML
60 INJECTION INTRAMUSCULAR; INTRAVENOUS EVERY 12 HOURS
Status: DISCONTINUED | OUTPATIENT
Start: 2021-01-07 | End: 2021-01-07

## 2021-01-07 RX ORDER — FUROSEMIDE 10 MG/ML
40 INJECTION INTRAMUSCULAR; INTRAVENOUS EVERY 12 HOURS
Status: DISCONTINUED | OUTPATIENT
Start: 2021-01-07 | End: 2021-01-08

## 2021-01-07 RX ORDER — LIDOCAINE HYDROCHLORIDE 10 MG/ML
20 INJECTION, SOLUTION INFILTRATION; PERINEURAL ONCE
Status: COMPLETED | OUTPATIENT
Start: 2021-01-07 | End: 2021-01-07

## 2021-01-07 RX ADMIN — HEPARIN SODIUM 5000 UNITS: 5000 INJECTION INTRAVENOUS; SUBCUTANEOUS at 17:40

## 2021-01-07 RX ADMIN — LIDOCAINE HYDROCHLORIDE 20 ML: 10 INJECTION, SOLUTION INFILTRATION; PERINEURAL at 14:52

## 2021-01-07 RX ADMIN — CYANOCOBALAMIN TAB 1000 MCG 1000 MCG: 1000 TAB at 08:45

## 2021-01-07 RX ADMIN — HEPARIN SODIUM 5000 UNITS: 5000 INJECTION INTRAVENOUS; SUBCUTANEOUS at 20:24

## 2021-01-07 RX ADMIN — FUROSEMIDE 60 MG: 10 INJECTION, SOLUTION INTRAMUSCULAR; INTRAVENOUS at 05:42

## 2021-01-07 RX ADMIN — DULOXETINE HYDROCHLORIDE 30 MG: 30 CAPSULE, DELAYED RELEASE ORAL at 08:45

## 2021-01-07 RX ADMIN — ATORVASTATIN CALCIUM 40 MG: 40 TABLET, FILM COATED ORAL at 20:24

## 2021-01-07 RX ADMIN — HEPARIN SODIUM 5000 UNITS: 5000 INJECTION INTRAVENOUS; SUBCUTANEOUS at 05:42

## 2021-01-07 RX ADMIN — FUROSEMIDE 40 MG: 10 INJECTION, SOLUTION INTRAMUSCULAR; INTRAVENOUS at 17:40

## 2021-01-07 RX ADMIN — SODIUM CHLORIDE, PRESERVATIVE FREE 10 ML: 5 INJECTION INTRAVENOUS at 17:40

## 2021-01-07 RX ADMIN — CLOPIDOGREL BISULFATE 75 MG: 75 TABLET ORAL at 08:45

## 2021-01-07 RX ADMIN — IOPAMIDOL 55 ML: 755 INJECTION, SOLUTION INTRAVENOUS at 06:55

## 2021-01-07 NOTE — POST-PROCEDURE NOTE
Interventional Radiology Operative Note    Date: 01/07/21     Time: 15:44 EST     Pre-op Diagnosis: Acute hypoxic respiratory failure; Acute Systolic CHF; Bilateral pleural effusions  Post-op Diagnosis: Same    Procedure: CT guided thoracentesis    Surgeon: LUCILLE Godinez M.D.  Assistants: None    Sedation: None    Estimated Blood Loss (EBL): Trace     Urine Output (UOP): N/A (short procedure)    IVF: N/A (short procedure)    Findings: Large right side pleural fluid collection    Specimens: 1200 mL serous fluid. Portion of fluid sent for requested laboratory analysis    Complications: No immediate    Disposition: Recovery. Stable

## 2021-01-07 NOTE — PROGRESS NOTES
Discharge Planning Assessment  [facility]     Patient Name: Isrrael Marino  MRN: [id number]  Today's Date: 1/7/2021    Admit Date: 1/6/2021    Discharge Needs Assessment     Row Name 01/07/21 1422       Living Environment    Lives With  spouse    Name(s) of Who Lives With Patient  Gila Marino, ph [phone]    Current Living Arrangements  home/apartment/condo    Family Caregiver if Needed  spouse;child(ubaldo), adult    Quality of Family Relationships  involved;helpful;supportive    Able to Return to Prior Arrangements  yes       Resource/Environmental Concerns    Resource/Environmental Concerns  none    Transportation Concerns  car, none       Transition Planning    Patient/Family Anticipates Transition to  home with family    Patient/Family Anticipated Services at Transition  none    Transportation Anticipated  family or friend will provide       Discharge Needs Assessment    Readmission Within the Last 30 Days  no previous admission in last 30 days    Equipment Currently Used at Home  cpap;commode;walker, standard;cane, straight;crutches    Equipment Needed After Discharge  none        Discharge Plan     Row Name 01/07/21 1424       Plan    Plan  Home with wife's assistance    Patient/Family in Agreement with Plan  yes    Plan Comments  Met with Mr. Marino and his son at the bedside for discharge planning.  Mr. Marino lives with his wife, Gila Metz, in a one story home in [facility].  He states that he is ambulating independently.  He denies any further DME needs in the home.  He states that he has never used home health in the past, but has been to [facility] for outpatient PT.  Mr. Marino is currently on continuous oxygen per [place].  He states that he recently had a CPAP arranged at home due to low O2 sats qhs.    Mr. Marino states that his wife can assist him at home as needed.    CM will continue to follow and assist with any DC needs.    Final Discharge Disposition Code  01 - home or self-care         Continued Care and Services - Admitted Since 1/6/2021    Coordination has not been started for this encounter.       Expected Discharge Date and Time     Expected Discharge Date Expected Discharge Time    Jan 9, 2021         Demographic Summary     Row Name 01/07/21 1421       General Information    Admission Type  inpatient    Arrived From  home    Reason for Consult  discharge planning    General Information Comments  PCP:  Julia George       Contact Information    Permission Granted to Share Info With          Functional Status     Row Name 01/07/21 1421       Functional Status    Usual Activity Tolerance  moderate    Current Activity Tolerance  moderate       Functional Status, IADL    Medications  independent    Meal Preparation  independent    Housekeeping  independent    Laundry  independent    Shopping  independent       Mental Status    General Appearance WDL  WDL        Psychosocial    No documentation.       Abuse/Neglect    No documentation.       Legal    No documentation.       Substance Abuse    No documentation.       Patient Forms    No documentation.           Susanna Pichardo RN

## 2021-01-07 NOTE — H&P
[facility] Medicine Services  HISTORY AND PHYSICAL    Patient Name: Isrrael Marino  : 1940  MRN: [id number]  Primary Care Physician: Ivanna George MD  Date of admission: 2021    Elena Palacio, APRN 21 8:44 PM EST     Subjective   Subjective     Chief Complaint:  Shortness of breath    HPI:  Isrrael Marino is a 80 y.o. male with past medical history of CAD s/p stents, hypertension, hyperlipidemia, IBS, GERD, presents to the ED with complaints of shortness of breath.  Patient initially started experiencing shortness of breath while on a hunting trip in 2020.  Since then he has been experiencing intermittent shortness of breath that has worsened significantly over the past week.  He states that his dyspnea is worse with exertion, and he has to stop multiple times to get from the car into his house.  He also complains of some fatigue and a nonproductive cough.  He did get his CPAP machine yesterday and slept with it for the first time last.  Patient denies fever, chills, chest pain, lower extremity edema, abdominal distention, abdominal pain, nausea, vomiting, diarrhea, or any other complaints at this time.  Chest x-ray shows increased pulmonary vascularity bilaterally with small to moderate-sized bilateral pleural effusions and increased markings at the lung bases.  Patient was given Lasix 60 mg IV x1 dose in the ED.  While in the ED patient's oxygen saturation was 90% on room air and he was placed back on oxygen via nasal cannula.  Patient is being admitted to the hospital medicine service for further evaluation and management.      Current COVID Risks are:  [] Fever [x]  Cough [x] Shortness of breath [] Fatigue [] Change in taste or smell    [] Exposure to COVID positive patient  [] High risk facility   []  NONE    Review of Systems   Constitutional: Positive for fatigue. Negative for appetite change, chills, diaphoresis and fever.   HENT: Negative.     Eyes: Negative.    Respiratory: Positive for cough and shortness of breath. Negative for wheezing.    Cardiovascular: Positive for leg swelling. Negative for chest pain and palpitations.   Gastrointestinal: Negative.    Endocrine: Negative.    Musculoskeletal: Negative.    Skin: Negative.    Neurological: Negative.    Hematological: Negative.    Psychiatric/Behavioral: Negative.         All other systems reviewed and are negative.     Personal History     Past Medical History:   Diagnosis Date   • Arrhythmia    • Arthritis     both knees   • Broken neck (CMS/HCC)    • CAD (coronary artery disease)    • Cancer (CMS/HCC)    • Chondrocalcinosis of knee    • GERD (gastroesophageal reflux disease)    • Gout    • Heart attack (CMS/HCC)      Last Assessed: 28 Mar 2014   • Heart disease    • History of transfusion     own blood with hip surgery   • Hyperlipidemia    • Hypertension    • IBS (irritable bowel syndrome)    • Left rotator cuff tear arthropathy    • Low back pain    • Lower extremity neuropathy    • Lumbar canal stenosis    • Neck pain    • Numbness    • Right hip pain    • Rotator cuff tear arthropathy of right shoulder    • Thin blood (CMS/HCC)        Past Surgical History:   Procedure Laterality Date   • APPENDECTOMY  1956   • BACK SURGERY  1997    spinal decompression and fusion   • BREAST LUMPECTOMY  2003   • CARDIAC CATHETERIZATION      with two stents//. DR. TOLEDO   • CARPAL TUNNEL RELEASE  03/28/2014    Neuroplasty Decompression Median Nerve At Carpal Tunnel   • CERVICAL DISCECTOMY POSTERIOR FUSION WITH BRAIN LAB N/A 7/13/2018    Procedure: CERVICAL LAMINECTOMY DECOMPRESSION POSTERIOR C1-2 POSTERIOR CERVICAL FUSION;  Surgeon: Randall Barnett MD;  Location: [facility];  Service: Neurosurgery   • COLONOSCOPY     • CORONARY STENT PLACEMENT  2013   • HERNIA REPAIR  2002    & 1998   • LUMBAR DISCECTOMY FUSION INSTRUMENTATION     • TONSILLECTOMY     • TOTAL HIP ARTHROPLASTY  2002   • VASECTOMY     •  "WISDOM TOOTH EXTRACTION         Family History: family history includes Cancer in his mother; Heart attack in his father; Heart disease in his father; Hypertension in his father; Sleep apnea in his son. Otherwise pertinent FHx was reviewed and unremarkable.     Social History:  reports that he has never smoked. He has never used smokeless tobacco. He reports previous alcohol use. He reports that he does not use drugs.  Social History     Social History Narrative   • Not on file       Medications:  B Complex Vitamins, DULoxetine, HYDROcodone-acetaminophen, Melatonin, Potassium Gluconate, Saw Palmetto, Zinc, atorvastatin, clopidogrel, diclofenac, fluorouracil, fluticasone, metoprolol succinate XL, tamsulosin, vitamin B-12, and vitamin D3    Allergies   Allergen Reactions   • Ibuprofen Hives     \"Pt states he hasn't taken this in a long time\"       Objective   Objective     Vital Signs:   Temp:  [97.1 °F (36.2 °C)] 97.1 °F (36.2 °C)  Heart Rate:  [63-93] 71  Resp:  [16-20] 20  BP: (103-135)/(55-99) 103/55  Flow (L/min):  [3] 3    Physical Exam  Constitutional:       General: He is not in acute distress.     Appearance: He is not ill-appearing, toxic-appearing or diaphoretic.   HENT:      Head: Normocephalic.      Nose: Nose normal.      Mouth/Throat:      Mouth: Mucous membranes are moist.   Eyes:      Pupils: Pupils are equal, round, and reactive to light.   Neck:      Musculoskeletal: Normal range of motion.   Cardiovascular:      Rate and Rhythm: Normal rate and regular rhythm.      Pulses: Normal pulses.      Heart sounds: Normal heart sounds. No murmur. No friction rub. No gallop.    Pulmonary:      Effort: Pulmonary effort is normal. No respiratory distress.      Breath sounds: Rales (bilateral bases) present. No wheezing or rhonchi.   Chest:      Chest wall: Tenderness present.   Abdominal:      General: Bowel sounds are normal. There is no distension.      Palpations: Abdomen is soft.      Tenderness: There is " no abdominal tenderness. There is no guarding or rebound.   Musculoskeletal:         General: Swelling (2+ BLE) present. No tenderness or signs of injury.   Skin:     General: Skin is warm and dry.      Coloration: Skin is not pale.      Findings: No erythema or rash.   Neurological:      Mental Status: He is alert and oriented to person, place, and time. Mental status is at baseline.      Sensory: No sensory deficit.   Psychiatric:         Mood and Affect: Mood normal.         Behavior: Behavior normal.         Thought Content: Thought content normal.         Judgment: Judgment normal.            Results Reviewed:  I have personally reviewed most recent indicated data and agree with findings including:  [x]  Laboratory  [x]  Radiology  [x]  EKG/Telemetry  []  Pathology  []  Cardiac/Vascular Studies  [x]  Old records  []  Other:  Most pertinent findings include: Troponin x2 is negative, proBNP 3722, BUN 37, creatinine 1.3      LAB RESULTS:      Lab 01/06/21  1418   WBC 7.56   HEMOGLOBIN 13.7   HEMATOCRIT 42.1   PLATELETS 200   NEUTROS ABS 5.07   IMMATURE GRANS (ABS) 0.03   LYMPHS ABS 1.57   MONOS ABS 0.70   EOS ABS 0.14   MCV 90.3         Lab 01/06/21  1418   SODIUM 141   POTASSIUM 4.3   CHLORIDE 107   CO2 23.0   ANION GAP 11.0   BUN 37*   CREATININE 1.30*   GLUCOSE 99   CALCIUM 8.7   MAGNESIUM 2.0         Lab 01/06/21  1418   TOTAL PROTEIN 6.2   ALBUMIN 3.50   GLOBULIN 2.7   ALT (SGPT) 19   AST (SGOT) 25   BILIRUBIN 0.4   ALK PHOS 68         Lab 01/06/21  1746 01/06/21  1418   PROBNP  --  3,722.0*   TROPONIN T 0.012 0.013                 Brief Urine Lab Results  (Last result in the past 365 days)      Color   Clarity   Blood   Leuk Est   Nitrite   Protein   CREAT   Urine HCG        12/16/20 0917 Yellow  Comment:  REFERENCE RANGE: Yellow, Straw Clear Negative Negative Negative Negative             Microbiology Results (last 10 days)     Procedure Component Value - Date/Time    COVID PRE-OP / PRE-PROCEDURE SCREENING  ORDER (NO ISOLATION) - Swab, Nasopharynx [[id number]]  (Normal) Collected: 01/06/21 1500    Lab Status: Final result Specimen: Swab from Nasopharynx Updated: 01/06/21 1550    Narrative:      The following orders were created for panel order COVID PRE-OP / PRE-PROCEDURE SCREENING ORDER (NO ISOLATION) - Swab, Nasopharynx.  Procedure                               Abnormality         Status                     ---------                               -----------         ------                     COVID-19 and FLU A/B PCR...[[id number]]  Normal              Final result                 Please view results for these tests on the individual orders.    COVID-19 and FLU A/B PCR - Swab, Nasopharynx [[id number]]  (Normal) Collected: 01/06/21 1500    Lab Status: Final result Specimen: Swab from Nasopharynx Updated: 01/06/21 1550     COVID19 Not Detected     Influenza A PCR Not Detected     Influenza B PCR Not Detected    Narrative:      Fact sheet for providers: https://www.fda.gov/media/154442/download    Fact sheet for patients: https://www.fda.gov/media/659360/download    Test performed by PCR.          Xr Chest 1 View    Result Date: 1/6/2021  EXAMINATION: XR CHEST 1 VW- 01/06/2021  INDICATION: SOA triage protocol  COMPARISON: NONE  FINDINGS: Imaging of the chest reveals heart to be enlarged. Increased pulmonary vascularity bilaterally with small bilateral pleural effusions. There are degenerative changes seen within the spine.        Impression: Increased pulmonary vascularity bilaterally with small to moderate-sized bilateral pleural effusions and increased markings at the lung bases.  D:  01/06/2021 E:  01/06/2021              Assessment/Plan   Assessment & Plan       Gastroesophageal reflux disease    Hypertension    Hyperlipidemia    Chronic coronary artery disease    Prediabetes    JIN (obstructive sleep apnea)    Acute congestive heart failure (CMS/[facility])      Isrrael Marino is a 80 y.o. male with past medical history  "of CAD s/p stents, hypertension, hyperlipidemia, IBS, GERD, presents to the ED with complaints of shortness of breath.  Patient initially started experiencing shortness of breath while on a hunting trip in 11/2020.  Since then he has been experiencing intermittent shortness of breath that has worsened significantly over the past week.  He states that his dyspnea is worse with exertion, and he has to stop multiple times to get from the car into his house.      Assessment and Plan:    Acute CHF, acute hypoxic respiratory failure  --Increased pulmonary vascularity bilaterally with small to moderate-sized bilateral pleural effusions and increased markings at the lung bases   --was given Lasix 60 mg IV in the ED  --will give Lasix 60 mg IV x 1 dose in the am and defer further diuresis to cardiology in the am  --consult Dr. Bernal in the am  --echo in the am  --strict I&O's  --daily weights  --continue metoprolol  --aspirin, plavix  --am labs    LORI  --urine studies  --bmp in the am  --recently placed on flomax as he reports having to urinate \"every hour\" and was getting up 5x nightly.  --? bph with urinary retention which could be contributing to edema  --q4h bladder scan x3.    HTN  HLD  CAD  --metoprolol  --statin  --aspirin and plavix    Prediabetes  --a1c in the am    JIN  --CPAP    DVT prophylaxis:  heparin      CODE STATUS:  Full code  Code Status and Medical Interventions:   Ordered at: 01/06/21 0649     Level Of Support Discussed With:    Patient     Code Status:    CPR     Medical Interventions (Level of Support Prior to Arrest):    Full       Electronically signed by JULY Toure, 01/06/21, 9:47 PM EST.       Attending   Admission Attestation       I have seen and examined the patient, performing an independent face-to-face diagnostic evaluation with plan of care reviewed and developed with the advanced practice clinician (APC).      Brief Summary Statement:   Isrrael Marino is a 80 y.o. male " with past medical history of hypertension, hyperlipidemia, GERD, IBS, coronary artery disease with stents in the past who follows with Dr. Bernal who presents to the ER with complaints of shortness of breath and fatigue.  Patient reports he initially started experiencing increased shortness of breath in comparison to his baseline on a hunting trip in November 2020.  He reports that he has had worsening dyspnea with exertion over the past week and increased fatigue.  He does report nonproductive cough.  Denies any fever, chills, or exposure to COVID-19 that he is aware of.  Covid test was negative in the ER.  Patient does report that he was recently diagnosed with obstructive sleep apnea and received his CPAP machine yesterday.  He slept with it for the first time last night.  Patient with work-up in the ER consistent with probable CHF exacerbation with moderate bilateral pleural effusions and increased perihilar and interstitial markings throughout the lungs especially in the bases.  Patient was given 60 mg IV Lasix in the ER with adequate diuresis.  He continues to require oxygen.  Denies any pleuritic chest pain.  Patient is not tachycardic.    Remainder of detailed HPI is as noted by APC and has been reviewed and/or edited by me for completeness.    Attending Physical Exam:  Constitutional: Awake, alert  Eyes: PERRLA, sclerae anicteric, no conjunctival injection  HENT: NCAT, mucous membranes moist  Neck: Supple, no thyromegaly, no lymphadenopathy, trachea midline  Respiratory: Bilateral crackles in the bases with decreased breath sounds in the bases, mildly labored respirations   Cardiovascular: RRR, no murmurs, rubs, or gallops, palpable pedal pulses bilaterally  Gastrointestinal: Positive bowel sounds, soft, nontender, nondistended  Musculoskeletal: 1+ bilateral ankle edema, no clubbing or cyanosis to extremities  Psychiatric: Appropriate affect, cooperative  Neurologic: Oriented x 3, strength symmetric in all  extremities, Cranial Nerves grossly intact to confrontation, speech clear  Skin: No rashes      Brief Assessment/Plan :  See detailed assessment and plan developed with APC which I have reviewed and/or edited for completeness.        Admission Status: I believe that this patient meets INPATIENT status due to acute hypoxic respiratory failure, CHF presents with I feel patient’s risk for adverse outcomes and need for care warrant INPATIENT evaluation and I predict the patient’s care encounter to likely last beyond 2 midnights.        Dajuan Collins,   01/06/21

## 2021-01-07 NOTE — CONSULTS
"[facility]   Consult Note    Referring Provider: Elena MCDOWELL      Reason for Consultation: CHF    Patient Care Team:  Ivanna George MD as PCP - General (Internal Medicine)  Omar Mckeon MD as Referring Physician (Emergency Medicine)     PROBLEM LIST:  1. Coronary artery disease:  a. Non STEMI, 09/22/2013.  Cardiac catheterization:   99% first diagonal (2.5 x 15 Xience), 95% second diagonal (PTCA), 50% LAD, FFR 0.83.  EF 50%.  b. On 11/19/2013:  Crescendo angina.  Catheterization:  95% LAD/70% second diagonal 3.0 x 23-mm XIENCE (LAD) and 2.75 x 12-mm XIENCE (second diagonal). Widely  patent first diagonal stent.   c. Carotid duplex 08/16/2018: Proximal right internal carotid artery plaque without significant stenosis. Right internal carotid artery stenosis of 0-49%. Proximal left internal carotid artery plaque without significant stenosis. Left internal carotid artery stenosis of 0-49   d. Carotid Duplex, 12/10/19: Bilateral ICA stenosis 0-49%. Bilateral antegrade flow.  e. Stress test, 12/10/19:  Normal study with no evidence of ischemia. EF 47%.  2. Hypertension.  3. Dyslipidemia.   4. Lower  extremity neuropathy.  5. Arthritis.  6. Skin cancer with removal  7. Traumatic fracture of C2  8. Surgeries:  a. Appendectomy  b. Lumbar surgery  c. Carpal tunnel surgery  d. Hernia repair  e. Right hip replacement  f. Vasectomy  g. Cervical discectomy  h. Tonsillectomy   i. [place] teeth extracted        Allergies   Allergen Reactions   • Ibuprofen Hives     \"Pt states he hasn't taken this in a long time\"           Current Facility-Administered Medications:   •  atorvastatin (LIPITOR) tablet 40 mg, 40 mg, Oral, Nightly, Elena Palacio APRN, 40 mg at 01/06/21 4053  •  clopidogrel (PLAVIX) tablet 75 mg, 75 mg, Oral, Daily, Elena Palacio APRN, 75 mg at 01/07/21 0845  •  DULoxetine (CYMBALTA) DR capsule 30 mg, 30 mg, Oral, Daily, Elena Palacio APRN, 30 mg at " 01/07/21 0845  •  fluticasone (FLONASE) 50 MCG/ACT nasal spray 1 spray, 1 spray, Nasal, Daily, Elena Palacio APRN  •  furosemide (LASIX) injection 60 mg, 60 mg, Intravenous, Q12H, Tish Samuel II, DO  •  heparin (porcine) 5000 UNIT/ML injection 5,000 Units, 5,000 Units, Subcutaneous, Q8H, Elena Palacio APRN, 5,000 Units at 01/07/21 0542  •  metoprolol succinate XL (TOPROL-XL) 24 hr tablet 25 mg, 25 mg, Oral, Daily, Elena Palacio APRN, Stopped at 01/07/21 0845  •  sodium chloride 0.9 % flush 10 mL, 10 mL, Intravenous, PRN, Jm Mendiola MD  •  sodium chloride 0.9 % flush 10 mL, 10 mL, Intravenous, Q12H, Elena Palacio APRN  •  sodium chloride 0.9 % flush 10 mL, 10 mL, Intravenous, PRN, Elena Palacio APRN  •  tamsulosin (FLOMAX) 24 hr capsule 0.4 mg, 0.4 mg, Oral, Daily, Elena Palacio APRN, Stopped at 01/07/21 0845  •  vitamin B-12 (CYANOCOBALAMIN) tablet 1,000 mcg, 1,000 mcg, Oral, Daily, Elena Palacio APRN, 1,000 mcg at 01/07/21 0845         Medications Prior to Admission   Medication Sig Dispense Refill Last Dose   • diclofenac (VOLTAREN) 75 MG EC tablet Take 75 mg by mouth Daily.      • atorvastatin (LIPITOR) 40 MG tablet Take 1 tablet by mouth Every Night. 90 tablet 0    • B Complex Vitamins (VITAMIN B COMPLEX PO) Take 1 tablet by mouth Daily.      • Cholecalciferol (VITAMIN D3) 125 MCG (5000 UT) capsule capsule Take 2,000 Units by mouth Daily.      • clopidogrel (PLAVIX) 75 MG tablet Take 1 tablet by mouth Daily. 90 tablet 4    • DULoxetine (CYMBALTA) 30 MG capsule Take 2 capsules by mouth Daily. (Patient taking differently: Take 30 mg by mouth Daily.) 180 capsule 1    • fluorouracil (EFUDEX) 5 % cream       • fluticasone (FLONASE) 50 MCG/ACT nasal spray 1 spray into the nostril(s) as directed by provider Daily. 3 bottle 1    • HYDROcodone-acetaminophen (NORCO) 5-325 MG per tablet Take 1 tablet by mouth As Needed.      • Melatonin 3 MG capsule Take   by mouth.      • metoprolol succinate XL (TOPROL-XL) 25 MG 24 hr tablet Take 1 tablet by mouth Daily. 90 tablet 4    • Potassium Gluconate 550 (90 K) MG tablet Take  by mouth Daily.      • Saw Palmetto 450 MG capsule Take  by mouth Daily.      • tamsulosin (FLOMAX) 0.4 MG capsule 24 hr capsule Take 1 capsule by mouth Daily. 90 capsule 0    • vitamin B-12 (CYANOCOBALAMIN) 1000 MCG tablet Take 1,000 mcg by mouth Daily.      • Zinc 50 MG capsule Take  by mouth Daily.            Subjective .   History of present illness:    Patient is an 80-year-old  male who is being seen today for further evaluation of acute congestive heart failure.  He has known previous history of coronary artery disease with last intervention performed in 2013.  Patient reports over the last few months some increasing shortness of breath with exertion.  Notes that he has recently been hunting and been unable to walk through the woods secondary to significant shortness of breath.  Notes that he will have to stop and rest.  No significant chest pain, pressure.  No reported syncope, near syncope.  He has some chronic lower extremity edema which she thinks has been mildly worse recently.  Denies any sodium indiscretion or excessive volume intake.  Patient reports that he ordered a pulse oximeter for home and it was noted to be low.  He called his primary care physician who recommended that he present to the hospital for further evaluation.  Here patient has been noted to be in congestive heart failure with pulmonary edema noted on his chest x-ray.  He has been given IV Lasix with good response.  Notes feeling somewhat better.      Social History     Socioeconomic History   • Marital status:      Spouse name: Not on file   • Number of children: Not on file   • Years of education: Not on file   • Highest education level: Not on file   Tobacco Use   • Smoking status: Never Smoker   • Smokeless tobacco: Never Used   Substance and Sexual  "Activity   • Alcohol use: Not Currently   • Drug use: Never   • Sexual activity: Defer     Family History   Problem Relation Age of Onset   • Cancer Mother    • Heart disease Father    • Hypertension Father    • Heart attack Father    • Sleep apnea Son          Review of Systems:  Review of Systems   Constitution: Positive for malaise/fatigue. Negative for fever.   HENT: Negative for nosebleeds.    Eyes: Negative for redness and visual disturbance.   Cardiovascular: Positive for dyspnea on exertion, leg swelling and orthopnea. Negative for palpitations and paroxysmal nocturnal dyspnea.   Respiratory: Positive for cough and shortness of breath. Negative for snoring, sputum production and wheezing.    Hematologic/Lymphatic: Negative for bleeding problem.   Skin: Negative for flushing, itching and rash.   Musculoskeletal: Positive for arthritis and muscle cramps. Negative for falls and joint pain.   Gastrointestinal: Negative for abdominal pain, diarrhea, heartburn, nausea and vomiting.   Genitourinary: Negative for hematuria.   Neurological: Negative for excessive daytime sleepiness, dizziness, headaches, tremors and weakness.   Psychiatric/Behavioral: Negative for substance abuse. The patient is not nervous/anxious.         Objective   Vitals:  BP 98/49 (BP Location: Left arm, Patient Position: Sitting)   Pulse 85   Temp 97.9 °F (36.6 °C) (Axillary)   Resp 16   Ht 182.9 cm (72\")   Wt 79 kg (174 lb 1 oz)   SpO2 94%   BMI 23.61 kg/m²      Intake/Output Summary (Last 24 hours) at 1/7/2021 1023  Last data filed at 1/7/2021 0900  Gross per 24 hour   Intake 480 ml   Output 1700 ml   Net -1220 ml       Vitals signs reviewed.   Constitutional:       Appearance: Well-developed and not in distress.   Neck:      Vascular: No JVD.      Trachea: No tracheal deviation.   Pulmonary:      Effort: Pulmonary effort is normal.      Breath sounds: Bibasilar Rales present.   Cardiovascular:      Normal rate. Regular rhythm. "   Pulses:     Intact distal pulses.   Edema:     Peripheral edema present.     Ankle: bilateral 1+ edema of the ankle.  Abdominal:      General: Bowel sounds are normal.      Palpations: Abdomen is soft.      Tenderness: There is no abdominal tenderness.   Musculoskeletal:         General: No deformity.   Skin:     General: Skin is warm and dry.   Neurological:      Mental Status: Alert and oriented to person, place, and time.     I have examined the patient and agree with the above           Results Review:  I reviewed the patient's new clinical results.  Results from last 7 days   Lab Units 01/07/21  0802   WBC 10*3/mm3 8.43   HEMOGLOBIN g/dL 14.5   HEMATOCRIT % 45.3   PLATELETS 10*3/mm3 217     Results from last 7 days   Lab Units 01/07/21  0802 01/06/21  1418   SODIUM mmol/L 142 141   POTASSIUM mmol/L 4.1 4.3   CHLORIDE mmol/L 102 107   CO2 mmol/L 28.0 23.0   BUN mg/dL 39* 37*   CREATININE mg/dL 1.25 1.30*   CALCIUM mg/dL 9.5 8.7   BILIRUBIN mg/dL  --  0.4   ALK PHOS U/L  --  68   ALT (SGPT) U/L  --  19   AST (SGOT) U/L  --  25   GLUCOSE mg/dL 84 99     Results from last 7 days   Lab Units 01/07/21  0802   SODIUM mmol/L 142   POTASSIUM mmol/L 4.1   CHLORIDE mmol/L 102   CO2 mmol/L 28.0   BUN mg/dL 39*   CREATININE mg/dL 1.25   GLUCOSE mg/dL 84   CALCIUM mg/dL 9.5         Lab Results   Lab Value Date/Time    TROPONINT 0.012 01/06/2021 1746    TROPONINT 0.013 01/06/2021 1418     Results from last 7 days   Lab Units 01/07/21  0802   TSH uIU/mL 3.980     Results from last 7 days   Lab Units 01/07/21  0802   CHOLESTEROL mg/dL 166   TRIGLYCERIDES mg/dL 49   HDL CHOL mg/dL 68*   LDL CHOL mg/dL 88     Results from last 7 days   Lab Units 01/06/21  1418   PROBNP pg/mL 3,722.0*           Tele: Sinus rhythm with PACs and PVCs.    EKG: Sinus rhythm with first-degree, previous inferior infarct.  Frequent PVCs.  Nonspecific ST and T wave changes.      Assessment/Plan     1. Acute congestive heart failure.  Last EF 47% by  stress test last year.  2. Coronary artery disease with previous history of stents.  3. Hypertension, currently marginal.  4. Dyslipidemia      Plan:    1. Continue IV diuretics currently.  2. We will check echocardiogram today to evaluate LVEF and valvular function.  3. Will likely need eventual ischemic evaluation but needs further optimization of current heart failure.  Plan cardiac cath in the morning  4. We will continue to follow along.      JULY Rodriguez obtained past medical, family history, social history, review of systems and functioned as a scribe for the remainder of the dictation for Dr. Bernal.    I have seen and examined the patient, I have reviewed the note, discussed the case with the advance practice clinician, made necessary changes and I agree with the final note.    Casimiro Bernal MD  01/07/21  14:59 EST        Dictated utilizing Dragon dictation

## 2021-01-07 NOTE — PLAN OF CARE
Problem: Adult Inpatient Plan of Care  Goal: Plan of Care Review  Outcome: Ongoing, Progressing  Flowsheets (Taken 1/7/2021 0641)  Progress: improving  Plan of Care Reviewed With: patient  Outcome Summary: Pt arrived from ED due to SOA. A&O x4 and very pleasant. Pt. slept most of shift. Bipap in place. Pt. states his SOA has decreased this AM. Up standby. Voiding appropriately. 2 in nitro paste to L. Chest in the ER. 3-4 L NC. VSS. NSR w/ 1st degree AV block and frequent PVCs.    Problem: Fall Injury Risk  Goal: Absence of Fall and Fall-Related Injury  Outcome: Ongoing, Progressing  Intervention: Identify and Manage Contributors to Fall Injury Risk  Recent Flowsheet Documentation  Taken 1/6/2021 2158 by Val Mustafa RN  Medication Review/Management: medications reviewed  Intervention: Promote Injury-Free Environment  Recent Flowsheet Documentation  Taken 1/7/2021 0600 by Val Mustafa RN  Safety Promotion/Fall Prevention:   activity supervised   assistive device/personal items within reach   clutter free environment maintained   elopement precautions   fall prevention program maintained   gait belt   nonskid shoes/slippers when out of bed   room organization consistent   safety round/check completed   toileting scheduled  Taken 1/7/2021 0401 by Val Mustafa RN  Safety Promotion/Fall Prevention:   activity supervised   assistive device/personal items within reach   clutter free environment maintained   elopement precautions   fall prevention program maintained   gait belt   lighting adjusted   nonskid shoes/slippers when out of bed   room organization consistent   safety round/check completed   toileting scheduled  Taken 1/7/2021 0200 by Val Mustafa RN  Safety Promotion/Fall Prevention:   activity supervised   assistive device/personal items within reach   clutter free environment maintained   elopement precautions   fall prevention program maintained   gait belt   nonskid shoes/slippers when  out of bed   room organization consistent   safety round/check completed   toileting scheduled  Taken 1/7/2021 0000 by Val Mustafa RN  Safety Promotion/Fall Prevention:   activity supervised   assistive device/personal items within reach   clutter free environment maintained   elopement precautions   fall prevention program maintained   gait belt   nonskid shoes/slippers when out of bed   room organization consistent   safety round/check completed   toileting scheduled  Taken 1/6/2021 2220 by Val Mustafa RN  Safety Promotion/Fall Prevention:   activity supervised   assistive device/personal items within reach   clutter free environment maintained   elopement precautions   fall prevention program maintained   gait belt   nonskid shoes/slippers when out of bed   room organization consistent   safety round/check completed   toileting scheduled  Taken 1/6/2021 2158 by Val Mustafa RN  Safety Promotion/Fall Prevention:   activity supervised   assistive device/personal items within reach   clutter free environment maintained   elopement precautions   fall prevention program maintained   gait belt   nonskid shoes/slippers when out of bed   room organization consistent   safety round/check completed   toileting scheduled    Goal: Patient-Specific Goal (Individualized)  Outcome: Ongoing, Progressing  Goal: Absence of Hospital-Acquired Illness or Injury  Outcome: Ongoing, Progressing  Intervention: Identify and Manage Fall Risk  Recent Flowsheet Documentation  Taken 1/7/2021 0600 by Val Mustafa RN  Safety Promotion/Fall Prevention:   activity supervised   assistive device/personal items within reach   clutter free environment maintained   elopement precautions   fall prevention program maintained   gait belt   nonskid shoes/slippers when out of bed   room organization consistent   safety round/check completed   toileting scheduled  Taken 1/7/2021 0401 by Val Mustafa RN  Safety Promotion/Fall  Prevention:   activity supervised   assistive device/personal items within reach   clutter free environment maintained   elopement precautions   fall prevention program maintained   gait belt   lighting adjusted   nonskid shoes/slippers when out of bed   room organization consistent   safety round/check completed   toileting scheduled  Taken 1/7/2021 0200 by Val Mustafa RN  Safety Promotion/Fall Prevention:   activity supervised   assistive device/personal items within reach   clutter free environment maintained   elopement precautions   fall prevention program maintained   gait belt   nonskid shoes/slippers when out of bed   room organization consistent   safety round/check completed   toileting scheduled  Taken 1/7/2021 0000 by Val Mustafa RN  Safety Promotion/Fall Prevention:   activity supervised   assistive device/personal items within reach   clutter free environment maintained   elopement precautions   fall prevention program maintained   gait belt   nonskid shoes/slippers when out of bed   room organization consistent   safety round/check completed   toileting scheduled  Taken 1/6/2021 2220 by Val Mustafa RN  Safety Promotion/Fall Prevention:   activity supervised   assistive device/personal items within reach   clutter free environment maintained   elopement precautions   fall prevention program maintained   gait belt   nonskid shoes/slippers when out of bed   room organization consistent   safety round/check completed   toileting scheduled  Taken 1/6/2021 2158 by Val Mustafa RN  Safety Promotion/Fall Prevention:   activity supervised   assistive device/personal items within reach   clutter free environment maintained   elopement precautions   fall prevention program maintained   gait belt   nonskid shoes/slippers when out of bed   room organization consistent   safety round/check completed   toileting scheduled  Intervention: Prevent Skin Injury  Recent Flowsheet  Documentation  Taken 1/7/2021 0600 by Val Mustafa RN  Body Position: dangle, side of bed  Taken 1/7/2021 0401 by Val Mustafa RN  Body Position: supine  Taken 1/7/2021 0200 by Val Mustafa RN  Body Position: supine  Taken 1/7/2021 0000 by Val Mustafa RN  Body Position: sitting up in bed  Taken 1/6/2021 2220 by Val Mustafa RN  Body Position: dangle, side of bed  Taken 1/6/2021 2158 by Val Mustafa RN  Body Position: sitting up in bed  Goal: Optimal Comfort and Wellbeing  Outcome: Ongoing, Progressing  Intervention: Provide Person-Centered Care  Recent Flowsheet Documentation  Taken 1/6/2021 2158 by Val Mustafa RN  Trust Relationship/Rapport:   care explained   thoughts/feelings acknowledged  Goal: Readiness for Transition of Care  Outcome: Ongoing, Progressing  Intervention: Mutually Develop Transition Plan  Recent Flowsheet Documentation  Taken 1/6/2021 2158 by Val Mustafa RN  Equipment Currently Used at Home: none  Transportation Anticipated: family or friend will provide  Patient/Family Anticipated Services at Transition: none  Patient/Family Anticipates Transition to: home with family     Problem: Adjustment to Illness (Heart Failure)  Goal: Optimal Coping  Outcome: Ongoing, Progressing  Intervention: Support and Optimize Psychosocial Response to Chronic Illness  Recent Flowsheet Documentation  Taken 1/6/2021 2158 by Val Mustafa RN  Family/Support System Care:   self-care encouraged   support provided     Problem: Arrhythmia/Dysrhythmia (Heart Failure)  Goal: Stable Heart Rate and Rhythm  Outcome: Ongoing, Progressing     Problem: Cardiac Output Decreased (Heart Failure)  Goal: Optimal Cardiac Output  Outcome: Ongoing, Progressing     Problem: Fluid Imbalance (Heart Failure)  Goal: Fluid Balance  Outcome: Ongoing, Progressing     Problem: Functional Ability Impaired (Heart Failure)  Goal: Optimal Functional Ability  Outcome: Ongoing, Progressing     Problem: Oral  Intake Inadequate (Heart Failure)  Goal: Optimal Nutrition Intake  Outcome: Ongoing, Progressing     Problem: Respiratory Compromise (Heart Failure)  Goal: Effective Oxygenation and Ventilation  Outcome: Ongoing, Progressing  Intervention: Promote Airway Secretion Clearance  Recent Flowsheet Documentation  Taken 1/6/2021 2158 by Val Mustafa RN  Cough And Deep Breathing: done with encouragement     Problem: Sleep Disordered Breathing (Heart Failure)  Goal: Effective Breathing Pattern During Sleep  Outcome: Ongoing, Progressing     Problem: Gas Exchange Impaired  Goal: Optimal Gas Exchange  Outcome: Ongoing, Progressing  Intervention: Optimize Oxygenation and Ventilation  Recent Flowsheet Documentation  Taken 1/7/2021 0600 by Val Mustafa RN  Head of Bed (HOB): HOB elevated  Taken 1/7/2021 0401 by Val Mustafa RN  Head of Bed (HOB): HOB elevated  Taken 1/7/2021 0200 by Val Mustafa RN  Head of Bed (HOB): HOB elevated  Taken 1/7/2021 0000 by Val Mustafa RN  Head of Bed (HOB): HOB elevated  Taken 1/6/2021 2220 by Val Mustafa RN  Head of Bed (HOB): HOB elevated  Taken 1/6/2021 2158 by Val Mustafa RN  Head of Bed (HOB): HOB elevated   Goal Outcome Evaluation:  Plan of Care Reviewed With: patient  Progress: improving  Outcome Summary: Pt. slept most of shift. Bipap in place. Pt. states his SOA has decreased this AM. Up standby. Voiding appropriately. 3-4 L NC. VSS. NSR w/ 1st degree AV block and frequent PVCs.

## 2021-01-07 NOTE — NURSING NOTE
Patient brought to CT3 for right thoracentesis.  Patient placed on monitor.  VSS.  Consent already signed.  Gretchen at bedside.  Timeout complete.  Thoracentesis done without incident.  1.2L of yellow fluid removed.  Patient tolerated well.  Fluid sent for labs.  Report called to Shraddha GAMING on 3G.  Patient sent back to room via transport.

## 2021-01-07 NOTE — PROGRESS NOTES
[facility] Medicine Services  PROGRESS NOTE    Patient Name: Isrrael Marino  : 1940  MRN: [id number]    Date of Admission: 2021  Primary Care Physician: Ivanna George MD    Subjective   Subjective     CC: SOA    HPI: Up in bed with family in room. Urinated well overnight. Breathing more comfortably.    ROS:  Gen- No fevers, chills  CV- No chest pain, palpitations  Resp- No cough, dyspnea  GI- No N/V/D, abd pain    Objective   Objective     Vital Signs:   Temp:  [97.1 °F (36.2 °C)-98.4 °F (36.9 °C)] 97.9 °F (36.6 °C)  Heart Rate:  [63-93] 85  Resp:  [12-22] 16  BP: ()/(49-99) 98/49        Physical Exam:  Constitutional: No acute distress, awake, alert  HENT: NCAT, mucous membranes moist  Respiratory: Clear to auscultation bilaterally, respiratory effort normal   Cardiovascular: RRR, no murmurs, rubs, or gallops  Gastrointestinal: Positive bowel sounds, soft, nontender, nondistended  Musculoskeletal: No bilateral ankle edema  Psychiatric: Appropriate affect, cooperative  Neurologic: Oriented x 3, strength symmetric in all extremities, Cranial Nerves grossly intact to confrontation, speech clear  Skin: No rashes    Results Reviewed:  Results from last 7 days   Lab Units 21  0802 21  1746 21  1418   WBC 10*3/mm3 8.43  --  7.56   HEMOGLOBIN g/dL 14.5  --  13.7   HEMATOCRIT % 45.3  --  42.1   PLATELETS 10*3/mm3 217  --  200   PROCALCITONIN ng/mL  --  0.06  --      Results from last 7 days   Lab Units 21  0802 21  1746 21  1418   SODIUM mmol/L 142  --  141   POTASSIUM mmol/L 4.1  --  4.3   CHLORIDE mmol/L 102  --  107   CO2 mmol/L 28.0  --  23.0   BUN mg/dL 39*  --  37*   CREATININE mg/dL 1.25  --  1.30*   GLUCOSE mg/dL 84  --  99   CALCIUM mg/dL 9.5  --  8.7   ALT (SGPT) U/L  --   --  19   AST (SGOT) U/L  --   --  25   TROPONIN T ng/mL  --  0.012 0.013   PROBNP pg/mL  --   --  3,722.0*     Estimated Creatinine Clearance: 50.9 mL/min  (by C-G formula based on SCr of 1.25 mg/dL).    Microbiology Results Abnormal     Procedure Component Value - Date/Time    COVID PRE-OP / PRE-PROCEDURE SCREENING ORDER (NO ISOLATION) - Swab, Nasopharynx [[id number]]  (Normal) Collected: 01/06/21 1500    Lab Status: Final result Specimen: Swab from Nasopharynx Updated: 01/06/21 1550    Narrative:      The following orders were created for panel order COVID PRE-OP / PRE-PROCEDURE SCREENING ORDER (NO ISOLATION) - Swab, Nasopharynx.  Procedure                               Abnormality         Status                     ---------                               -----------         ------                     COVID-19 and FLU A/B PCR...[[id number]]  Normal              Final result                 Please view results for these tests on the individual orders.    COVID-19 and FLU A/B PCR - Swab, Nasopharynx [[id number]]  (Normal) Collected: 01/06/21 1500    Lab Status: Final result Specimen: Swab from Nasopharynx Updated: 01/06/21 1550     COVID19 Not Detected     Influenza A PCR Not Detected     Influenza B PCR Not Detected    Narrative:      Fact sheet for providers: https://www.fda.gov/media/692460/download    Fact sheet for patients: https://www.fda.gov/media/767684/download    Test performed by PCR.        CXR personally reviewed with persistent effusion. Agree with interpretation.  Imaging Results (Last 24 Hours)     Procedure Component Value Units Date/Time    XR Chest 1 View [[id number]] Collected: 01/07/21 0935     Updated: 01/07/21 0942    Narrative:      EXAMINATION: XR CHEST 1 VW-01/07/2021:     INDICATION: Effusions; I50.9-Heart failure, unspecified;  R09.02-Hypoxemia; R06.02-Shortness of breath.     COMPARISON: 01/06/2021.     FINDINGS: The heart shadow is enlarged and there is still a mild diffuse  interstitial edema pattern present, generally stable from the prior  exam. There are persistent pleural effusions, probably unchanged. No  pneumothorax or new pulmonary  parenchymal disease is seen.       Impression:      Stable pattern of congestive heart failure, with mild  pulmonary edema and persistent pleural effusions.     D:  01/07/2021  E:  01/07/2021              CT Angiogram Chest With & Without Contrast [[id number]] Collected: 01/07/21 0717     Updated: 01/07/21 0719    Narrative:      CT ANGIOGRAM CHEST W WO CONTRAST    INDICATION:   78-year-old male with elevated d-dimer shortness of air and hypoxia. Congestive heart failure. No improvement with diuresis.    TECHNIQUE:   CT angiogram of the chest with IV contrast. 3-D reconstructions were obtained and reviewed.   Radiation dose reduction techniques included automated exposure control or exposure modulation based on body size. Count of known CT and cardiac nuc med studies  performed in previous 12 months: 0.     COMPARISON:   None available.    FINDINGS:   Small to early moderate left and moderate to large right-sided pleural effusions are present. There is compressive atelectasis favored over pneumonia in the lung bases right greater than left. No pneumothorax. No pericardial effusion. No threshold  adenopathy. Included thyroid negative. Included upper abdomen demonstrates no acute finding. Streak artifact from prior back surgery. Reflux of contrast into the intrahepatic IVC is nonspecific but suggests underlying right heart dysfunction and there is  diverticulosis.    Normal caliber aorta and atherosclerotic change. Coronary artery calcifications/stents. No PE in the central pulmonary arterial tree. No suspicious bone lesion.      Impression:        1. No PE.  2. No aortic aneurysm.  3. Bilateral pleural effusions right greater than left with probable bibasilar compressive atelectasis or less likely pneumonia.  4. Included upper abdomen demonstrates diverticulosis and findings suggestive of right heart dysfunction.    Signer Name: Buck Fraire MD   Signed: 1/7/2021 7:17 AM   Workstation Name: [email or web address]    Radiology  [facility]    XR Chest 1 View [[id number]] Collected: 01/06/21 1529     Updated: 01/06/21 1533    Narrative:      EXAMINATION: XR CHEST 1 VW- 01/06/2021     INDICATION: SOA triage protocol      COMPARISON: NONE     FINDINGS: Imaging of the chest reveals heart to be enlarged. Increased  pulmonary vascularity bilaterally with small bilateral pleural  effusions. There are degenerative changes seen within the spine.          Impression:      Increased pulmonary vascularity bilaterally with small to  moderate-sized bilateral pleural effusions and increased markings at the  lung bases.     D:  01/06/2021  E:  01/06/2021                     I have reviewed the medications:  Scheduled Meds:atorvastatin, 40 mg, Oral, Nightly  clopidogrel, 75 mg, Oral, Daily  DULoxetine, 30 mg, Oral, Daily  fluticasone, 1 spray, Nasal, Daily  furosemide, 40 mg, Intravenous, Q12H  heparin (porcine), 5,000 Units, Subcutaneous, Q8H  metoprolol succinate XL, 25 mg, Oral, Daily  sodium chloride, 10 mL, Intravenous, Q12H  tamsulosin, 0.4 mg, Oral, Daily  vitamin B-12, 1,000 mcg, Oral, Daily      Continuous Infusions:hold, 1 each      PRN Meds:.hold  •  sodium chloride  •  sodium chloride    Assessment/Plan   Assessment & Plan     Active Hospital Problems    Diagnosis  POA   • Acute congestive heart failure (CMS/HCC) [I50.9]  Yes   • Acute respiratory failure with hypoxia (CMS/HCC) [J96.01]  Unknown   • JIN (obstructive sleep apnea) [G47.33]  Yes   • Prediabetes [R73.03]  Yes   • Gastroesophageal reflux disease [K21.9]  Yes   • Hypertension [I10]  Yes   • Hyperlipidemia [E78.5]  Yes   • Chronic coronary artery disease [I25.10]  Yes      Resolved Hospital Problems   No resolved problems to display.        Brief Hospital Course to date:  Isrrael Marino is a 80 y.o. male with past medical history of CAD s/p stents, hypertension, hyperlipidemia, IBS, GERD, presents to the ED with complaints of shortness of breath.  Patient initially  started experiencing shortness of breath while on a hunting trip in 11/2020.  Since then he has been experiencing intermittent shortness of breath that has worsened significantly over the past week.  He states that his dyspnea is worse with exertion, and he has to stop multiple times to get from the car into his house. This is my first day assessing patient's active medical issues.     Acute hypoxic respiratory failure   Acute Systolic CHF  Bilateral pleural effusions  CAD  --Has responded well to IV lasix thus far and will continue as long as able though BP may limit our diuresis. Continue metoprolol as able.  --Cardiology to see.  --Repeat CXR showing persistent effusions. Will order right sided thoracentesis.  --CP free. Continue aspirin, plavix. Timing of ischemic eval per cardiology.  --Strict I&O's  --Daily weights     Renal insufficiency vs CKD  --Suspect he may have underlying CKD due to HTN. Monitor closely with diuresis.     HTN  HLD  --Controlled, slightly hypotensive. Continue metoprolol  --statin     Prediabetes    JIN  --CPAP    This patient's problems and plans were partially entered by my partner and updated as appropriate by me 01/07/21.     DVT prophylaxis:  heparin     Disposition: I expect the patient to be discharged home in 1-3 days.    CODE STATUS:   Code Status and Medical Interventions:   Ordered at: 01/06/21 3789     Level Of Support Discussed With:    Patient     Code Status:    CPR     Medical Interventions (Level of Support Prior to Arrest):    Full       Tish Samuel II, DO  01/07/21

## 2021-01-07 NOTE — NURSING NOTE
Pt does not have a qualifying diagnosis for Phase II Cardiac Rehab at this time. Staff will wait for cardiology consult and reassess if pt receives a qualifying diagnosis and consult if appropriate.

## 2021-01-07 NOTE — PLAN OF CARE
Patient requiring between 4-5L oxygen prior to thorencentesis.  1.2L removed on Right.  Band aid in place.  3L NC in place with saturation of 95% in the afternoon.  Patient was given IV lasix and large amounts of good UOP.  BP was below perimeters.  AM medication held.  Patient is aware of procedure tomorrow and knows that he is NPO after MN.  Will continue to monitor

## 2021-01-08 ENCOUNTER — APPOINTMENT (OUTPATIENT)
Dept: CARDIOLOGY | Facility: HOSPITAL | Age: 81
End: 2021-01-08

## 2021-01-08 LAB
ANION GAP SERPL CALCULATED.3IONS-SCNC: 10 MMOL/L (ref 5–15)
ASCENDING AORTA: 3.1 CM
BH CV ECHO MEAS - AO MAX PG (FULL): 2.5 MMHG
BH CV ECHO MEAS - AO MAX PG: 5.5 MMHG
BH CV ECHO MEAS - AO ROOT AREA (BSA CORRECTED): 1
BH CV ECHO MEAS - AO ROOT AREA: 3.1 CM^2
BH CV ECHO MEAS - AO ROOT DIAM: 2 CM
BH CV ECHO MEAS - AO V2 MAX: 117 CM/SEC
BH CV ECHO MEAS - ASC AORTA: 3.1 CM
BH CV ECHO MEAS - AVA(V,A): 2.5 CM^2
BH CV ECHO MEAS - AVA(V,D): 2.5 CM^2
BH CV ECHO MEAS - BSA(HAYCOCK): 2 M^2
BH CV ECHO MEAS - BSA: 2 M^2
BH CV ECHO MEAS - BZI_BMI: 23.6 KILOGRAMS/M^2
BH CV ECHO MEAS - BZI_METRIC_HEIGHT: 182.9 CM
BH CV ECHO MEAS - BZI_METRIC_WEIGHT: 78.9 KG
BH CV ECHO MEAS - EDV(CUBED): 142.2 ML
BH CV ECHO MEAS - EDV(MOD-SP2): 112 ML
BH CV ECHO MEAS - EDV(MOD-SP4): 134 ML
BH CV ECHO MEAS - EDV(TEICH): 130.7 ML
BH CV ECHO MEAS - EF(CUBED): 26.1 %
BH CV ECHO MEAS - EF(MOD-BP): 34 %
BH CV ECHO MEAS - EF(MOD-SP2): 32.1 %
BH CV ECHO MEAS - EF(MOD-SP4): 37.3 %
BH CV ECHO MEAS - EF(TEICH): 20.9 %
BH CV ECHO MEAS - ESV(CUBED): 105.2 ML
BH CV ECHO MEAS - ESV(MOD-SP2): 76 ML
BH CV ECHO MEAS - ESV(MOD-SP4): 84 ML
BH CV ECHO MEAS - ESV(TEICH): 103.4 ML
BH CV ECHO MEAS - FS: 9.6 %
BH CV ECHO MEAS - IVS/LVPW: 1.4
BH CV ECHO MEAS - IVSD: 1.1 CM
BH CV ECHO MEAS - LA DIMENSION: 4.8 CM
BH CV ECHO MEAS - LA/AO: 2.4
BH CV ECHO MEAS - LAD MAJOR: 6.8 CM
BH CV ECHO MEAS - LAT PEAK E' VEL: 8.6 CM/SEC
BH CV ECHO MEAS - LATERAL E/E' RATIO: 8.7
BH CV ECHO MEAS - LV DIASTOLIC VOL/BSA (35-75): 66.7 ML/M^2
BH CV ECHO MEAS - LV IVRT: 0.14 SEC
BH CV ECHO MEAS - LV MASS(C)D: 237.4 GRAMS
BH CV ECHO MEAS - LV MASS(C)DI: 118.2 GRAMS/M^2
BH CV ECHO MEAS - LV MAX PG: 2.9 MMHG
BH CV ECHO MEAS - LV MEAN PG: 2 MMHG
BH CV ECHO MEAS - LV SYSTOLIC VOL/BSA (12-30): 41.8 ML/M^2
BH CV ECHO MEAS - LV V1 MAX: 85.8 CM/SEC
BH CV ECHO MEAS - LV V1 MEAN: 59.8 CM/SEC
BH CV ECHO MEAS - LV V1 VTI: 18.7 CM
BH CV ECHO MEAS - LVIDD: 5.2 CM
BH CV ECHO MEAS - LVIDS: 4.7 CM
BH CV ECHO MEAS - LVLD AP2: 7.7 CM
BH CV ECHO MEAS - LVLD AP4: 7.8 CM
BH CV ECHO MEAS - LVLS AP2: 7.1 CM
BH CV ECHO MEAS - LVLS AP4: 7.2 CM
BH CV ECHO MEAS - LVOT AREA (M): 3.5 CM^2
BH CV ECHO MEAS - LVOT AREA: 3.5 CM^2
BH CV ECHO MEAS - LVOT DIAM: 2.1 CM
BH CV ECHO MEAS - LVPWD: 0.97 CM
BH CV ECHO MEAS - MED PEAK E' VEL: 9.1 CM/SEC
BH CV ECHO MEAS - MEDIAL E/E' RATIO: 8.1
BH CV ECHO MEAS - MV A MAX VEL: 93.5 CM/SEC
BH CV ECHO MEAS - MV DEC TIME: 0.22 SEC
BH CV ECHO MEAS - MV E MAX VEL: 74 CM/SEC
BH CV ECHO MEAS - MV E/A: 0.79
BH CV ECHO MEAS - PA ACC SLOPE: 269 CM/SEC^2
BH CV ECHO MEAS - PA ACC TIME: 0.16 SEC
BH CV ECHO MEAS - PA PR(ACCEL): 7.9 MMHG
BH CV ECHO MEAS - RAP SYSTOLE: 3 MMHG
BH CV ECHO MEAS - RVSP: 22 MMHG
BH CV ECHO MEAS - SI(CUBED): 18.5 ML/M^2
BH CV ECHO MEAS - SI(LVOT): 32.3 ML/M^2
BH CV ECHO MEAS - SI(MOD-SP2): 17.9 ML/M^2
BH CV ECHO MEAS - SI(MOD-SP4): 24.9 ML/M^2
BH CV ECHO MEAS - SI(TEICH): 13.6 ML/M^2
BH CV ECHO MEAS - SV(CUBED): 37.1 ML
BH CV ECHO MEAS - SV(LVOT): 64.8 ML
BH CV ECHO MEAS - SV(MOD-SP2): 36 ML
BH CV ECHO MEAS - SV(MOD-SP4): 50 ML
BH CV ECHO MEAS - SV(TEICH): 27.3 ML
BH CV ECHO MEAS - TAPSE (>1.6): 1.1 CM
BH CV ECHO MEAS - TR MAX PG: 19 MMHG
BH CV ECHO MEAS - TR MAX VEL: 217 CM/SEC
BH CV ECHO MEASUREMENTS AVERAGE E/E' RATIO: 8.36
BH CV VAS BP LEFT ARM: NORMAL MMHG
BH CV XLRA - RV BASE: 3.7 CM
BH CV XLRA - RV LENGTH: 7.2 CM
BH CV XLRA - RV MID: 3.1 CM
BH CV XLRA - TDI S': 16.3 CM/SEC
BUN SERPL-MCNC: 34 MG/DL (ref 8–23)
BUN/CREAT SERPL: 28.6 (ref 7–25)
CALCIUM SPEC-SCNC: 9.5 MG/DL (ref 8.6–10.5)
CATH EF ESTIMATED: 25 %
CHLORIDE SERPL-SCNC: 99 MMOL/L (ref 98–107)
CO2 SERPL-SCNC: 28 MMOL/L (ref 22–29)
CREAT SERPL-MCNC: 1.19 MG/DL (ref 0.76–1.27)
GFR SERPL CREATININE-BSD FRML MDRD: 59 ML/MIN/1.73
GLUCOSE SERPL-MCNC: 78 MG/DL (ref 65–99)
IVRT: 144 MSEC
LAB AP CASE REPORT: NORMAL
LEFT ATRIUM VOLUME INDEX: 53.8 ML/M^2
LEFT ATRIUM VOLUME: 108 ML
LV EF 2D ECHO EST: 30 %
MAXIMAL PREDICTED HEART RATE: 138 BPM
PATH REPORT.FINAL DX SPEC: NORMAL
POTASSIUM SERPL-SCNC: 4.7 MMOL/L (ref 3.5–5.2)
SODIUM SERPL-SCNC: 137 MMOL/L (ref 136–145)
STRESS TARGET HR: 117 BPM

## 2021-01-08 PROCEDURE — 99152 MOD SED SAME PHYS/QHP 5/>YRS: CPT | Performed by: INTERNAL MEDICINE

## 2021-01-08 PROCEDURE — 93306 TTE W/DOPPLER COMPLETE: CPT

## 2021-01-08 PROCEDURE — 93458 L HRT ARTERY/VENTRICLE ANGIO: CPT | Performed by: INTERNAL MEDICINE

## 2021-01-08 PROCEDURE — 4A023N7 MEASUREMENT OF CARDIAC SAMPLING AND PRESSURE, LEFT HEART, PERCUTANEOUS APPROACH: ICD-10-PCS | Performed by: INTERNAL MEDICINE

## 2021-01-08 PROCEDURE — 25010000002 MIDAZOLAM PER 1 MG: Performed by: INTERNAL MEDICINE

## 2021-01-08 PROCEDURE — 25010000002 HEPARIN (PORCINE) PER 1000 UNITS: Performed by: INTERNAL MEDICINE

## 2021-01-08 PROCEDURE — C1769 GUIDE WIRE: HCPCS | Performed by: INTERNAL MEDICINE

## 2021-01-08 PROCEDURE — 94660 CPAP INITIATION&MGMT: CPT

## 2021-01-08 PROCEDURE — 25010000002 FENTANYL CITRATE (PF) 100 MCG/2ML SOLUTION: Performed by: INTERNAL MEDICINE

## 2021-01-08 PROCEDURE — 25010000002 FUROSEMIDE PER 20 MG: Performed by: NURSE PRACTITIONER

## 2021-01-08 PROCEDURE — 99232 SBSQ HOSP IP/OBS MODERATE 35: CPT | Performed by: INTERNAL MEDICINE

## 2021-01-08 PROCEDURE — 25010000002 HEPARIN (PORCINE) PER 1000 UNITS: Performed by: NURSE PRACTITIONER

## 2021-01-08 PROCEDURE — 0 IOPAMIDOL PER 1 ML: Performed by: INTERNAL MEDICINE

## 2021-01-08 PROCEDURE — 93306 TTE W/DOPPLER COMPLETE: CPT | Performed by: INTERNAL MEDICINE

## 2021-01-08 PROCEDURE — B2111ZZ FLUOROSCOPY OF MULTIPLE CORONARY ARTERIES USING LOW OSMOLAR CONTRAST: ICD-10-PCS | Performed by: INTERNAL MEDICINE

## 2021-01-08 PROCEDURE — C1894 INTRO/SHEATH, NON-LASER: HCPCS | Performed by: INTERNAL MEDICINE

## 2021-01-08 PROCEDURE — 80048 BASIC METABOLIC PNL TOTAL CA: CPT | Performed by: INTERNAL MEDICINE

## 2021-01-08 RX ORDER — FENTANYL CITRATE 50 UG/ML
INJECTION, SOLUTION INTRAMUSCULAR; INTRAVENOUS AS NEEDED
Status: DISCONTINUED | OUTPATIENT
Start: 2021-01-08 | End: 2021-01-08 | Stop reason: HOSPADM

## 2021-01-08 RX ORDER — MIDAZOLAM HYDROCHLORIDE 1 MG/ML
INJECTION INTRAMUSCULAR; INTRAVENOUS AS NEEDED
Status: DISCONTINUED | OUTPATIENT
Start: 2021-01-08 | End: 2021-01-08 | Stop reason: HOSPADM

## 2021-01-08 RX ORDER — SPIRONOLACTONE 25 MG/1
25 TABLET ORAL DAILY
Status: DISCONTINUED | OUTPATIENT
Start: 2021-01-09 | End: 2021-01-10 | Stop reason: HOSPADM

## 2021-01-08 RX ORDER — LISINOPRIL 5 MG/1
5 TABLET ORAL DAILY
Status: DISCONTINUED | OUTPATIENT
Start: 2021-01-08 | End: 2021-01-10 | Stop reason: HOSPADM

## 2021-01-08 RX ORDER — SODIUM CHLORIDE 9 MG/ML
INJECTION, SOLUTION INTRAVENOUS CONTINUOUS PRN
Status: COMPLETED | OUTPATIENT
Start: 2021-01-08 | End: 2021-01-08

## 2021-01-08 RX ORDER — DIPHENHYDRAMINE HYDROCHLORIDE 50 MG/ML
25 INJECTION INTRAMUSCULAR; INTRAVENOUS EVERY 6 HOURS PRN
Status: DISCONTINUED | OUTPATIENT
Start: 2021-01-08 | End: 2021-01-10 | Stop reason: HOSPADM

## 2021-01-08 RX ORDER — CARVEDILOL 6.25 MG/1
6.25 TABLET ORAL EVERY 12 HOURS SCHEDULED
Status: DISCONTINUED | OUTPATIENT
Start: 2021-01-08 | End: 2021-01-10 | Stop reason: HOSPADM

## 2021-01-08 RX ORDER — ACETAMINOPHEN 325 MG/1
650 TABLET ORAL EVERY 4 HOURS PRN
Status: DISCONTINUED | OUTPATIENT
Start: 2021-01-08 | End: 2021-01-10 | Stop reason: HOSPADM

## 2021-01-08 RX ORDER — LIDOCAINE HYDROCHLORIDE 10 MG/ML
INJECTION, SOLUTION EPIDURAL; INFILTRATION; INTRACAUDAL; PERINEURAL AS NEEDED
Status: DISCONTINUED | OUTPATIENT
Start: 2021-01-08 | End: 2021-01-08 | Stop reason: HOSPADM

## 2021-01-08 RX ORDER — ALPRAZOLAM 0.25 MG/1
0.25 TABLET ORAL 3 TIMES DAILY PRN
Status: DISCONTINUED | OUTPATIENT
Start: 2021-01-08 | End: 2021-01-10 | Stop reason: HOSPADM

## 2021-01-08 RX ORDER — FUROSEMIDE 20 MG/1
20 TABLET ORAL DAILY
Status: DISCONTINUED | OUTPATIENT
Start: 2021-01-09 | End: 2021-01-09

## 2021-01-08 RX ADMIN — LISINOPRIL 5 MG: 5 TABLET ORAL at 17:19

## 2021-01-08 RX ADMIN — FUROSEMIDE 40 MG: 10 INJECTION, SOLUTION INTRAMUSCULAR; INTRAVENOUS at 05:19

## 2021-01-08 RX ADMIN — HEPARIN SODIUM 5000 UNITS: 5000 INJECTION INTRAVENOUS; SUBCUTANEOUS at 05:18

## 2021-01-08 RX ADMIN — HEPARIN SODIUM 5000 UNITS: 5000 INJECTION INTRAVENOUS; SUBCUTANEOUS at 20:58

## 2021-01-08 RX ADMIN — ATORVASTATIN CALCIUM 40 MG: 40 TABLET, FILM COATED ORAL at 20:58

## 2021-01-08 RX ADMIN — METOPROLOL SUCCINATE 25 MG: 25 TABLET, EXTENDED RELEASE ORAL at 09:38

## 2021-01-08 RX ADMIN — CLOPIDOGREL BISULFATE 75 MG: 75 TABLET ORAL at 09:38

## 2021-01-08 NOTE — PLAN OF CARE
Goal Outcome Evaluation:  Plan of Care Reviewed With: patient  Progress: improving   Pt is alert and oriented x4. On 3L NC. Pt has been NPO and slept most of the shift. No complaints of pain. Will continue to monitor.

## 2021-01-08 NOTE — PROGRESS NOTES
[facility] Medicine Services  PROGRESS NOTE    Patient Name: Isrrael Marino  : 1940  MRN: [id number]    Date of Admission: 2021  Primary Care Physician: Ivanna George MD    Subjective   Subjective     CC:  SOB    HPI:  Sleeping, wife states that he had slept well last night but awakens easily to voice. Breathing improved, plan for heart cath today    ROS:  Gen- No fevers, chills  CV- No chest pain, palpitations  Resp- No cough, dyspnea  GI- No N/V/D, abd pain      Objective   Objective     Vital Signs:   Temp:  [97.7 °F (36.5 °C)-98.1 °F (36.7 °C)] 98 °F (36.7 °C)  Heart Rate:  [71-94] 81  Resp:  [20] 20  BP: (111-139)/(66-95) 131/66        Physical Exam:  Constitutional: No acute distress, awake, alert  HENT: NCAT, mucous membranes moist  Respiratory: Clear to auscultation bilaterally, respiratory effort normal   Cardiovascular: RRR, no murmurs, rubs, or gallops  Gastrointestinal: Positive bowel sounds, soft, nontender, nondistended  Musculoskeletal: No bilateral ankle edema  Psychiatric: Appropriate affect, cooperative  Neurologic: Oriented x 3 speech clear  Skin: No rashes    Results Reviewed:  Results from last 7 days   Lab Units 21  1247 21  0802 21  1746 21  1418   WBC 10*3/mm3  --  8.43  --  7.56   HEMOGLOBIN g/dL  --  14.5  --  13.7   HEMATOCRIT %  --  45.3  --  42.1   PLATELETS 10*3/mm3  --  217  --  200   INR  1.02  --   --   --    PROCALCITONIN ng/mL  --   --  0.06  --      Results from last 7 days   Lab Units 21  0649 21  0802 21  1746 21  1418   SODIUM mmol/L 137 142  --  141   POTASSIUM mmol/L 4.7 4.1  --  4.3   CHLORIDE mmol/L 99 102  --  107   CO2 mmol/L 28.0 28.0  --  23.0   BUN mg/dL 34* 39*  --  37*   CREATININE mg/dL 1.19 1.25  --  1.30*   GLUCOSE mg/dL 78 84  --  99   CALCIUM mg/dL 9.5 9.5  --  8.7   ALT (SGPT) U/L  --   --   --  19   AST (SGOT) U/L  --   --   --  25   TROPONIN T ng/mL  --   --  0.012  0.013   PROBNP pg/mL  --   --   --  3,722.0*     Estimated Creatinine Clearance: 53.4 mL/min (by C-G formula based on SCr of 1.19 mg/dL).    Microbiology Results Abnormal     Procedure Component Value - Date/Time    COVID PRE-OP / PRE-PROCEDURE SCREENING ORDER (NO ISOLATION) - Swab, Nasopharynx [[id number]]  (Normal) Collected: 01/06/21 1500    Lab Status: Final result Specimen: Swab from Nasopharynx Updated: 01/06/21 1550    Narrative:      The following orders were created for panel order COVID PRE-OP / PRE-PROCEDURE SCREENING ORDER (NO ISOLATION) - Swab, Nasopharynx.  Procedure                               Abnormality         Status                     ---------                               -----------         ------                     COVID-19 and FLU A/B PCR...[[id number]]  Normal              Final result                 Please view results for these tests on the individual orders.    COVID-19 and FLU A/B PCR - Swab, Nasopharynx [[id number]]  (Normal) Collected: 01/06/21 1500    Lab Status: Final result Specimen: Swab from Nasopharynx Updated: 01/06/21 1550     COVID19 Not Detected     Influenza A PCR Not Detected     Influenza B PCR Not Detected    Narrative:      Fact sheet for providers: https://www.fda.gov/media/406285/download    Fact sheet for patients: https://www.fda.gov/media/015681/download    Test performed by PCR.          Imaging Results (Last 24 Hours)     Procedure Component Value Units Date/Time    XR Chest 1 View [[id number]] Collected: 01/07/21 0935     Updated: 01/07/21 2206    Narrative:      EXAMINATION: XR CHEST 1 VW-01/07/2021:     INDICATION: Effusions; I50.9-Heart failure, unspecified;  R09.02-Hypoxemia; R06.02-Shortness of breath.     COMPARISON: 01/06/2021.     FINDINGS: The heart shadow is enlarged and there is still a mild diffuse  interstitial edema pattern present, generally stable from the prior  exam. There are persistent pleural effusions, probably unchanged. No  pneumothorax  or new pulmonary parenchymal disease is seen.       Impression:      Stable pattern of congestive heart failure, with mild  pulmonary edema and persistent pleural effusions.     D:  01/07/2021  E:  01/07/2021            This report was finalized on 1/7/2021 10:03 PM by Dr. Raleigh Blanchard MD.       XR Chest 1 View [[id number]] Collected: 01/06/21 1529     Updated: 01/07/21 1749    Narrative:      EXAMINATION: XR CHEST 1 VW- 01/06/2021     INDICATION: SOA triage protocol      COMPARISON: NONE     FINDINGS: Imaging of the chest reveals heart to be enlarged. Increased  pulmonary vascularity bilaterally with small bilateral pleural  effusions. There are degenerative changes seen within the spine.          Impression:      Increased pulmonary vascularity bilaterally with small to  moderate-sized bilateral pleural effusions and increased markings at the  lung bases.     D:  01/06/2021  E:  01/06/2021     This report was finalized on 1/7/2021 3:57 PM by Dr. Jordana Romo MD.       CT Guided Thoracentesis [[id number]] Collected: 01/07/21 1547     Updated: 01/07/21 1749    Narrative:      PROCEDURE: CT-guided thoracentesis     Procedural Personnel  Attending physician(s): LUCILLE Godinez M.D.  Fellow physician(s): None  Resident physician(s): None  Advanced practice provider(s): None     Pre-procedure diagnosis: Acute hypoxic respiratory failure;  Acute?Systolic?CHF; Bilateral pleural effusions  Post-procedure diagnosis: Same  Indication: Diagnostic/therapeutic  Additional clinical history: None     Complications: No immediate complications.       Impression:         CT-guided thoracentesis with drainage of 1200 mL of serous fluid.  Portion of fluid sent for requested laboratory analysis     Plan:      Resume care by clinical team.  _______________________________________________________________     PROCEDURE SUMMARY:  - Limited thoracic CT  - CT-guided thoracentesis  - Additional procedure(s): None     PROCEDURE DETAILS:      Pre-procedure  Consent: Informed consent for the procedure including risks, benefits  and alternatives was obtained and time-out was performed prior to the  procedure.  Preparation: The site was prepared and draped using maximal sterile  barrier technique including cutaneous antisepsis.     Anesthesia/sedation  Level of anesthesia/sedation: No sedation     Limited thoracic CT  Limited thoracic CT was performed. A safe window for thoracentesis was  identified.   Right hemithorax findings: Large pleural fluid collection     Thoracentesis  Local anesthesia was administered. The pleural space was accessed using  trocar technique  and fluid return confirmed position. The fluid was  drained. The catheter was removed, and a sterile bandage was applied.  Catheter placed: 8.5 Hungarian nonlocking pigtail  Post-drainage hemithorax findings: No immediate complication     Radiation Dose  CT dose length product (mGy-cm): 412     Additional Details  Additional description of procedure: None  Equipment details: None  Specimens removed: Pleural fluid  Estimated blood loss (mL): Less than 10  Standardized report: Thoracentesis     Attestation  I was present and scrubbed for the entire procedure. Imaging reviewed.  Agree with final report as written.        This report was finalized on 1/7/2021 3:48 PM by Suresh Godinez.                  I have reviewed the medications:  Scheduled Meds:atorvastatin, 40 mg, Oral, Nightly  clopidogrel, 75 mg, Oral, Daily  DULoxetine, 30 mg, Oral, Daily  fluticasone, 1 spray, Nasal, Daily  furosemide, 40 mg, Intravenous, Q12H  heparin (porcine), 5,000 Units, Subcutaneous, Q8H  metoprolol succinate XL, 25 mg, Oral, Daily  pharmacy consult - MTM, , Does not apply, Daily  sodium chloride, 10 mL, Intravenous, Q12H  tamsulosin, 0.4 mg, Oral, Daily  vitamin B-12, 1,000 mcg, Oral, Daily      Continuous Infusions:   PRN Meds:.sodium chloride  •  sodium chloride    Assessment/Plan   Assessment & Plan     Active  Hospital Problems    Diagnosis  POA   • Acute congestive heart failure (CMS/HCC) [I50.9]  Yes   • Acute respiratory failure with hypoxia (CMS/HCC) [J96.01]  Unknown   • JIN (obstructive sleep apnea) [G47.33]  Yes   • Prediabetes [R73.03]  Yes   • Gastroesophageal reflux disease [K21.9]  Yes   • Hypertension [I10]  Yes   • Hyperlipidemia [E78.5]  Yes   • Chronic coronary artery disease [I25.10]  Yes      Resolved Hospital Problems   No resolved problems to display.        Brief Hospital Course to date:  Isrrael Marino is a 80 y.o. male with past medical history of CAD s/p stents, hypertension, hyperlipidemia, IBS, GERD, who presented to the ED with complaints of shortness of breath.   He underwent IR thoracentesis with Dr. Frey on 11/7 with removal of 1200 cc from right lung      Acute hypoxic respiratory failure   Acute Systolic CHF  --Echo pending, previous echo with EF 47% per office records  --Plan for heart cath today    Bilateral pleural effusions  CAD  --Status post right thoracentesis with removal of 1200 cc of serous fluid on 1/7  --Continue Lasix 40 mg IV twice daily with hold parameters for SBP less than 100  --Continue aspirin, plavix  --Strict I&O's  --Daily weights    CKD stage III  --Renal function overall stable, monitor closely with diuresis.     HTN  HLD  --Controlled, slightly hypotensive. Continue metoprolol  --statin     Prediabetes     JIN  --CPAP     This patient's problems and plans were partially entered by my partner and updated as appropriate by me 01/08/21.      DVT prophylaxis:  heparin      Disposition: I expect the patient to be discharged TBD    CODE STATUS:   Code Status and Medical Interventions:   Ordered at: 01/06/21 6934     Level Of Support Discussed With:    Patient     Code Status:    CPR     Medical Interventions (Level of Support Prior to Arrest):    Full       Julia Ospina,   01/08/21

## 2021-01-09 LAB
ANION GAP SERPL CALCULATED.3IONS-SCNC: 11 MMOL/L (ref 5–15)
BUN SERPL-MCNC: 30 MG/DL (ref 8–23)
BUN/CREAT SERPL: 30.9 (ref 7–25)
CALCIUM SPEC-SCNC: 9.2 MG/DL (ref 8.6–10.5)
CHLORIDE SERPL-SCNC: 101 MMOL/L (ref 98–107)
CHOLEST SERPL-MCNC: 140 MG/DL (ref 0–200)
CO2 SERPL-SCNC: 29 MMOL/L (ref 22–29)
CREAT SERPL-MCNC: 0.97 MG/DL (ref 0.76–1.27)
DEPRECATED RDW RBC AUTO: 45 FL (ref 37–54)
ERYTHROCYTE [DISTWIDTH] IN BLOOD BY AUTOMATED COUNT: 13.2 % (ref 12.3–15.4)
GFR SERPL CREATININE-BSD FRML MDRD: 74 ML/MIN/1.73
GLUCOSE SERPL-MCNC: 83 MG/DL (ref 65–99)
HCT VFR BLD AUTO: 44.1 % (ref 37.5–51)
HDLC SERPL-MCNC: 61 MG/DL (ref 40–60)
HGB BLD-MCNC: 14.2 G/DL (ref 13–17.7)
LDLC SERPL CALC-MCNC: 65 MG/DL (ref 0–100)
LDLC/HDLC SERPL: 1.07 {RATIO}
MCH RBC QN AUTO: 30.1 PG (ref 26.6–33)
MCHC RBC AUTO-ENTMCNC: 32.2 G/DL (ref 31.5–35.7)
MCV RBC AUTO: 93.4 FL (ref 79–97)
PLATELET # BLD AUTO: 194 10*3/MM3 (ref 140–450)
PMV BLD AUTO: 10.9 FL (ref 6–12)
POTASSIUM SERPL-SCNC: 3.9 MMOL/L (ref 3.5–5.2)
RBC # BLD AUTO: 4.72 10*6/MM3 (ref 4.14–5.8)
SODIUM SERPL-SCNC: 141 MMOL/L (ref 136–145)
TRIGL SERPL-MCNC: 70 MG/DL (ref 0–150)
VLDLC SERPL-MCNC: 14 MG/DL (ref 5–40)
WBC # BLD AUTO: 7.73 10*3/MM3 (ref 3.4–10.8)

## 2021-01-09 PROCEDURE — 99232 SBSQ HOSP IP/OBS MODERATE 35: CPT | Performed by: INTERNAL MEDICINE

## 2021-01-09 PROCEDURE — 25010000002 HEPARIN (PORCINE) PER 1000 UNITS: Performed by: INTERNAL MEDICINE

## 2021-01-09 PROCEDURE — 85027 COMPLETE CBC AUTOMATED: CPT | Performed by: INTERNAL MEDICINE

## 2021-01-09 PROCEDURE — 80061 LIPID PANEL: CPT | Performed by: INTERNAL MEDICINE

## 2021-01-09 PROCEDURE — 80048 BASIC METABOLIC PNL TOTAL CA: CPT | Performed by: INTERNAL MEDICINE

## 2021-01-09 PROCEDURE — 99233 SBSQ HOSP IP/OBS HIGH 50: CPT | Performed by: INTERNAL MEDICINE

## 2021-01-09 RX ORDER — FUROSEMIDE 40 MG/1
40 TABLET ORAL DAILY
Status: DISCONTINUED | OUTPATIENT
Start: 2021-01-10 | End: 2021-01-10 | Stop reason: HOSPADM

## 2021-01-09 RX ADMIN — HEPARIN SODIUM 5000 UNITS: 5000 INJECTION INTRAVENOUS; SUBCUTANEOUS at 21:45

## 2021-01-09 RX ADMIN — CYANOCOBALAMIN TAB 1000 MCG 1000 MCG: 1000 TAB at 09:49

## 2021-01-09 RX ADMIN — LISINOPRIL 5 MG: 5 TABLET ORAL at 09:49

## 2021-01-09 RX ADMIN — TAMSULOSIN HYDROCHLORIDE 0.4 MG: 0.4 CAPSULE ORAL at 09:49

## 2021-01-09 RX ADMIN — FUROSEMIDE 20 MG: 20 TABLET ORAL at 09:48

## 2021-01-09 RX ADMIN — DULOXETINE HYDROCHLORIDE 30 MG: 30 CAPSULE, DELAYED RELEASE ORAL at 09:51

## 2021-01-09 RX ADMIN — HEPARIN SODIUM 5000 UNITS: 5000 INJECTION INTRAVENOUS; SUBCUTANEOUS at 06:13

## 2021-01-09 RX ADMIN — ATORVASTATIN CALCIUM 40 MG: 40 TABLET, FILM COATED ORAL at 21:44

## 2021-01-09 RX ADMIN — CLOPIDOGREL BISULFATE 75 MG: 75 TABLET ORAL at 09:49

## 2021-01-09 RX ADMIN — SODIUM CHLORIDE, PRESERVATIVE FREE 10 ML: 5 INJECTION INTRAVENOUS at 21:46

## 2021-01-09 RX ADMIN — SPIRONOLACTONE 25 MG: 25 TABLET ORAL at 09:49

## 2021-01-09 RX ADMIN — CARVEDILOL 6.25 MG: 6.25 TABLET, FILM COATED ORAL at 09:48

## 2021-01-09 RX ADMIN — HEPARIN SODIUM 5000 UNITS: 5000 INJECTION INTRAVENOUS; SUBCUTANEOUS at 14:00

## 2021-01-09 RX ADMIN — SODIUM CHLORIDE, PRESERVATIVE FREE 10 ML: 5 INJECTION INTRAVENOUS at 09:30

## 2021-01-09 NOTE — PROGRESS NOTES
Continued Stay Note   [place]     Patient Name: Isrrael Marino  MRN: [id number]  Today's Date: 1/9/2021    Admit Date: 1/6/2021    Discharge Plan     Row Name 01/09/21 1255       Plan    Plan  Home    Patient/Family in Agreement with Plan  yes    Plan Comments  Noted that Mr Marino's RA O2 sat qualifies for home oxygen. Faxed a DCP along with H&P, oxygen order, and qualifying O2 sat to [facility]. Called and spoke with Ganga at [facility] regarding the order and requested he bring a portable tank to Mr Marino's room prior to DC. Plans remain for home and family to transport via car.    Final Discharge Disposition Code  01 - home or self-care        Discharge Codes    No documentation.       Expected Discharge Date and Time     Expected Discharge Date Expected Discharge Time    Jan 9, 2021             Anitra Rodriguez RN

## 2021-01-09 NOTE — PROGRESS NOTES
"  [facility]   Inpatient Progress Note       LOS: 3 days   Patient Care Team:  Ivanna George MD as PCP - General (Internal Medicine)  Omar Mckeon MD as Referring Physician (Emergency Medicine)    Chief Complaint: Heart failure/cardiomyopathy    Subjective     Interval History:     Patient status post cardiac catheterization.  Also status post thoracentesis.  He feels significantly improved since that procedure.  Thinks his breathing is near baseline.  He and his family have significant questions about prognosis, limitations and medications.  Discussed these at length with the patient and family for 30 minutes.  Is ambulating without oxygen around the room without difficulty    Review of Systems:   Pertinent positives noted in history, exam, and assessment. Otherwise reviewed and negative.      Objective     Vitals:  Blood pressure 109/61, pulse 71, temperature 97.7 °F (36.5 °C), temperature source Oral, resp. rate 18, height 182.9 cm (72.01\"), weight 81.5 kg (179 lb 11.2 oz), SpO2 96 %.     Intake/Output Summary (Last 24 hours) at 1/9/2021 1207  Last data filed at 1/9/2021 0948  Gross per 24 hour   Intake 240 ml   Output 1425 ml   Net -1185 ml     Vitals signs reviewed.   Constitutional:       Appearance: Well-developed and not in distress.   Neck:      Thyroid: No thyromegaly.      Vascular: No carotid bruit or JVD.   Pulmonary:      Breath sounds: Examination of the right-middle field reveals rhonchi. Examination of the right-lower field reveals rhonchi. Rhonchi present.   Cardiovascular:      Regular rhythm.      No gallop. No S3 and S4 gallop.   Edema:     Peripheral edema absent.   Abdominal:      General: Bowel sounds are normal.      Palpations: Abdomen is soft. There is no abdominal mass.      Tenderness: There is no abdominal tenderness.   Musculoskeletal:         General: No deformity.      Extremities: No clubbing present.     Comments: Left radial site benign "   Skin:     General: Skin is warm and dry.      Findings: No rash.   Neurological:      Mental Status: Alert and oriented to person, place, and time.            Results Review:     I reviewed the patient's new clinical results.    Results from last 7 days   Lab Units 01/09/21  0655   WBC 10*3/mm3 7.73   HEMOGLOBIN g/dL 14.2   HEMATOCRIT % 44.1   PLATELETS 10*3/mm3 194     Results from last 7 days   Lab Units 01/09/21  0655  01/06/21  1418   SODIUM mmol/L 141   < > 141   POTASSIUM mmol/L 3.9   < > 4.3   CHLORIDE mmol/L 101   < > 107   CO2 mmol/L 29.0   < > 23.0   BUN mg/dL 30*   < > 37*   CREATININE mg/dL 0.97   < > 1.30*   CALCIUM mg/dL 9.2   < > 8.7   BILIRUBIN mg/dL  --   --  0.4   ALK PHOS U/L  --   --  68   ALT (SGPT) U/L  --   --  19   AST (SGOT) U/L  --   --  25   GLUCOSE mg/dL 83   < > 99    < > = values in this interval not displayed.     Results from last 7 days   Lab Units 01/09/21  0655   SODIUM mmol/L 141   POTASSIUM mmol/L 3.9   CHLORIDE mmol/L 101   CO2 mmol/L 29.0   BUN mg/dL 30*   CREATININE mg/dL 0.97   GLUCOSE mg/dL 83   CALCIUM mg/dL 9.2     Results from last 7 days   Lab Units 01/07/21  1247   INR  1.02     No results found for: TROPONINI  Results from last 7 days   Lab Units 01/07/21  0802   TSH uIU/mL 3.980     Results from last 7 days   Lab Units 01/09/21  0655   CHOLESTEROL mg/dL 140   TRIGLYCERIDES mg/dL 70   HDL CHOL mg/dL 61*   LDL CHOL mg/dL 65     Results from last 7 days   Lab Units 01/06/21  1418   PROBNP pg/mL 3,722.0*         Tele: Sinus rhythm    Assessment/Plan       Gastroesophageal reflux disease    Hypertension    Hyperlipidemia    Chronic coronary artery disease    Prediabetes    JIN (obstructive sleep apnea)    Acute congestive heart failure (CMS/HCC)    Acute respiratory failure with hypoxia (CMS/HCC)      1.  Cardiomyopathy, LVEF 25%.  LVEDP at time of cath was 12, normal suggesting adequate diuresis at this visit and euvolemia.  2.  Coronary artery disease.  No significant  disease by cath yesterday  3.  Dyslipidemia  4.  Obstructive sleep apnea, started on CPAP 1 day prior to admission    Plan:  1.  Discussed the need for using CPAP.  2.  Ambulate today without oxygen, see if he qualifies for home therapy  3.  Continue to maximize carvedilol and lisinopril as tolerated by blood pressure  4.  Tentatively home in morning if does well with medical therapy    Casimiro Beranl MD    Dictated utilizing Dragon dictation

## 2021-01-09 NOTE — PROGRESS NOTES
[facility] Medicine Services  PROGRESS NOTE    Patient Name: Isrrael Marino  : 1940  MRN: [id number]    Date of Admission: 2021  Primary Care Physician: Ivanna George MD    Subjective   Subjective     CC: Follow-up SOA    HPI: No acute events overnight, patient states that that he feels better, breathing is improved    ROS:  Gen- No fevers, chills  CV- No chest pain, palpitations  Resp- No cough, +dyspnea  GI- No N/V/D, abd pain    All other systems reviewed and currently negative    Objective   Objective     Vital Signs:   Temp:  [97.7 °F (36.5 °C)] 97.7 °F (36.5 °C)  Heart Rate:  [51-96] 71  Resp:  [16-18] 18  BP: ()/() 109/61        Physical Exam:  Constitutional: Chronically ill-appearing elderly male, no acute distress, awake, alert  HENT: NCAT, mucous membranes moist  Respiratory: Clear to auscultation bilaterally, respiratory effort normal   Cardiovascular: RRR, no murmurs, rubs, or gallops  Gastrointestinal: Positive bowel sounds, soft, nontender, nondistended  Musculoskeletal: No bilateral ankle edema  Psychiatric: Appropriate affect, cooperative  Neurologic: Nonfocal  Skin: No rashes    Results Reviewed:  Results from last 7 days   Lab Units 21  0655 21  1247 21  0802 21  1746 21  1418   WBC 10*3/mm3 7.73  --  8.43  --  7.56   HEMOGLOBIN g/dL 14.2  --  14.5  --  13.7   HEMATOCRIT % 44.1  --  45.3  --  42.1   PLATELETS 10*3/mm3 194  --  217  --  200   INR   --  1.02  --   --   --    PROCALCITONIN ng/mL  --   --   --  0.06  --      Results from last 7 days   Lab Units 21  0655 21  0649 21  0802 21  1746 21  1418   SODIUM mmol/L 141 137 142  --  141   POTASSIUM mmol/L 3.9 4.7 4.1  --  4.3   CHLORIDE mmol/L 101 99 102  --  107   CO2 mmol/L 29.0 28.0 28.0  --  23.0   BUN mg/dL 30* 34* 39*  --  37*   CREATININE mg/dL 0.97 1.19 1.25  --  1.30*   GLUCOSE mg/dL 83 78 84  --  99   CALCIUM mg/dL 9.2  9.5 9.5  --  8.7   ALT (SGPT) U/L  --   --   --   --  19   AST (SGOT) U/L  --   --   --   --  25   TROPONIN T ng/mL  --   --   --  0.012 0.013   PROBNP pg/mL  --   --   --   --  3,722.0*     Estimated Creatinine Clearance: 67.7 mL/min (by C-G formula based on SCr of 0.97 mg/dL).    Microbiology Results Abnormal     Procedure Component Value - Date/Time    COVID PRE-OP / PRE-PROCEDURE SCREENING ORDER (NO ISOLATION) - Swab, Nasopharynx [[id number]]  (Normal) Collected: 01/06/21 1500    Lab Status: Final result Specimen: Swab from Nasopharynx Updated: 01/06/21 1550    Narrative:      The following orders were created for panel order COVID PRE-OP / PRE-PROCEDURE SCREENING ORDER (NO ISOLATION) - Swab, Nasopharynx.  Procedure                               Abnormality         Status                     ---------                               -----------         ------                     COVID-19 and FLU A/B PCR...[[id number]]  Normal              Final result                 Please view results for these tests on the individual orders.    COVID-19 and FLU A/B PCR - Swab, Nasopharynx [[id number]]  (Normal) Collected: 01/06/21 1500    Lab Status: Final result Specimen: Swab from Nasopharynx Updated: 01/06/21 1550     COVID19 Not Detected     Influenza A PCR Not Detected     Influenza B PCR Not Detected    Narrative:      Fact sheet for providers: https://www.fda.gov/media/533967/download    Fact sheet for patients: https://www.fda.gov/media/840504/download    Test performed by PCR.          Imaging Results (Last 24 Hours)     ** No results found for the last 24 hours. **          Results for orders placed during the hospital encounter of 01/06/21   Adult Transthoracic Echo Complete W/ Cont if Necessary Per Protocol    Narrative · Estimated left ventricular EF = 30%  · No hemodynamically significant valvular heart disease          I have reviewed the medications:  Scheduled Meds:atorvastatin, 40 mg, Oral, Nightly  carvedilol,  6.25 mg, Oral, Q12H  clopidogrel, 75 mg, Oral, Daily  DULoxetine, 30 mg, Oral, Daily  fluticasone, 1 spray, Nasal, Daily  [START ON 1/10/2021] furosemide, 40 mg, Oral, Daily  heparin (porcine), 5,000 Units, Subcutaneous, Q8H  lisinopril, 5 mg, Oral, Daily  pharmacy consult - [facility], , Does not apply, Daily  sodium chloride, 10 mL, Intravenous, Q12H  spironolactone, 25 mg, Oral, Daily  tamsulosin, 0.4 mg, Oral, Daily  vitamin B-12, 1,000 mcg, Oral, Daily      Continuous Infusions:   PRN Meds:.•  acetaminophen  •  ALPRAZolam  •  diphenhydrAMINE  •  sodium chloride  •  sodium chloride    Assessment/Plan   Assessment & Plan     Active Hospital Problems    Diagnosis  POA   • Acute congestive heart failure (CMS/HCC) [I50.9]  Yes   • Acute respiratory failure with hypoxia (CMS/HCC) [J96.01]  Unknown   • JIN (obstructive sleep apnea) [G47.33]  Yes   • Prediabetes [R73.03]  Yes   • Gastroesophageal reflux disease [K21.9]  Yes   • Hypertension [I10]  Yes   • Hyperlipidemia [E78.5]  Yes   • Chronic coronary artery disease [I25.10]  Yes      Resolved Hospital Problems   No resolved problems to display.        Brief Hospital Course to date:  Isrrael Marino is a 80 y.o. male past medical history hypertension lipidemia CAD, JIN who presents to the ED with SOA.  Admitted with acute respiratory failure with hypoxia today likely secondary to acute systolic heart failure right pleural effusion.    Plan  Acute respiratory failure with hypoxia  -Etiology likely secondary to acute systolic heart failure and bilateral pleural effusions  -He is s/p right thoracentesis with removal of 1.2 L of serous fluid  -Patient currently on 2 L NC, continue to wean as able, if unable he may need home O2    Severe systolic heart failure  -Patient with a EF of 30% per echo 1/8/2020  -He is s/p [facility] with finding of no flow-limiting coronary arteries disease, EF of 25% with dilated left ventricle  -Continue daily diuresis, aspirin and Plavix, strict  I/O's  -Cardiology consulted continue lisinopril, Aldactone, Coreg    CKD stage III-renal function seems to be currently at baseline, continue to monitor as we diurese    Hypertension-BP currently low but stable, continue meds with holding parameters    Depression-continue Cymbalta    JIN- cpap    BPH-continue Flomax    DVT Prophylaxis: Mechanical    Disposition: TBD, okay to discharge when okay with cardiology, likely in the AM    CODE STATUS:   Code Status and Medical Interventions:   Ordered at: 01/06/21 5183     Level Of Support Discussed With:    Patient     Code Status:    CPR     Medical Interventions (Level of Support Prior to Arrest):    Full       Unique Lamar MD  01/09/21

## 2021-01-09 NOTE — DISCHARGE PLACEMENT REQUEST
"Verena Regalado (80 y.o. Male)     To [facility]  From Anitra Rodriguez, RN Case Manager  [phone]    Re Oxygen and supplies.     Date of Birth Social Security Number Address Home Phone MRN    1940  [id number]   [facility] [id number] [phone] [id number]    Rastafarian Marital Status          Confucianist        Admission Date Admission Type Admitting Provider Attending Provider Department, Room/Bed    21 Emergency Unique Lamar MD Opii, [place], MD [facility] 4H, S479/1    Discharge Date Discharge Disposition Discharge Destination                       Attending Provider: Unique Lamar MD    Allergies: Ibuprofen    Isolation: None   Infection: None   Code Status: CPR    Ht: 182.9 cm (72.01\")   Wt: 81.5 kg (179 lb 11.2 oz)    Admission Cmt: None   Principal Problem: None                Active Insurance as of 2021     Primary Coverage     Payor Plan Insurance Group Employer/Plan Group    HUMANA MEDICARE REPLACEMENT HUMANA MEDICARE REPLACEMENT [id number]     Payor Plan Address Payor Plan Phone Number Payor Plan Fax Number Effective Dates    [address] [address] [phone]  2015 - None Entered    [facility] [id number]       Subscriber Name Subscriber Birth Date Member ID       VERENA REGALADO 1940 [id number]                 Emergency Contacts      (Rel.) Home Phone Work Phone Mobile Phone    Gila Regalado (Spouse) [phone] -- --               History & Physical      Suresh Godinez MD at 21 1306          H&P reviewed. The patient was examined and there are no changes to the H&P.          Electronically signed by Suresh Godinez MD at 21 1306   Source Note              [facility] Medicine Services  HISTORY AND PHYSICAL    Patient Name: Verena Regalado  : 1940  MRN: [id number]  Primary Care Physician: Ivanna George MD  Date of admission: 2021    Elena Palacio, APRN 21 " 8:44 PM EST     Subjective   Subjective     Chief Complaint:  Shortness of breath    HPI:  Isrrael Marino is a 80 y.o. male with past medical history of CAD s/p stents, hypertension, hyperlipidemia, IBS, GERD, presents to the ED with complaints of shortness of breath.  Patient initially started experiencing shortness of breath while on a hunting trip in 11/2020.  Since then he has been experiencing intermittent shortness of breath that has worsened significantly over the past week.  He states that his dyspnea is worse with exertion, and he has to stop multiple times to get from the car into his house.  He also complains of some fatigue and a nonproductive cough.  He did get his CPAP machine yesterday and slept with it for the first time last.  Patient denies fever, chills, chest pain, lower extremity edema, abdominal distention, abdominal pain, nausea, vomiting, diarrhea, or any other complaints at this time.  Chest x-ray shows increased pulmonary vascularity bilaterally with small to moderate-sized bilateral pleural effusions and increased markings at the lung bases.  Patient was given Lasix 60 mg IV x1 dose in the ED.  While in the ED patient's oxygen saturation was 90% on room air and he was placed back on oxygen via nasal cannula.  Patient is being admitted to the hospital medicine service for further evaluation and management.      Current COVID Risks are:  [] Fever [x]  Cough [x] Shortness of breath [] Fatigue [] Change in taste or smell    [] Exposure to COVID positive patient  [] High risk facility   []  NONE    Review of Systems   Constitutional: Positive for fatigue. Negative for appetite change, chills, diaphoresis and fever.   HENT: Negative.    Eyes: Negative.    Respiratory: Positive for cough and shortness of breath. Negative for wheezing.    Cardiovascular: Positive for leg swelling. Negative for chest pain and palpitations.   Gastrointestinal: Negative.    Endocrine: Negative.    Musculoskeletal:  Negative.    Skin: Negative.    Neurological: Negative.    Hematological: Negative.    Psychiatric/Behavioral: Negative.         All other systems reviewed and are negative.     Personal History     Past Medical History:   Diagnosis Date   • Arrhythmia    • Arthritis     both knees   • Broken neck (CMS/HCC)    • CAD (coronary artery disease)    • Cancer (CMS/HCC)    • Chondrocalcinosis of knee    • GERD (gastroesophageal reflux disease)    • Gout    • Heart attack (CMS/HCC)      Last Assessed: 28 Mar 2014   • Heart disease    • History of transfusion     own blood with hip surgery   • Hyperlipidemia    • Hypertension    • IBS (irritable bowel syndrome)    • Left rotator cuff tear arthropathy    • Low back pain    • Lower extremity neuropathy    • Lumbar canal stenosis    • Neck pain    • Numbness    • Right hip pain    • Rotator cuff tear arthropathy of right shoulder    • Thin blood (CMS/HCC)        Past Surgical History:   Procedure Laterality Date   • APPENDECTOMY  1956   • BACK SURGERY  1997    spinal decompression and fusion   • BREAST LUMPECTOMY  2003   • CARDIAC CATHETERIZATION      with two stents//. DR. TOLEDO   • CARPAL TUNNEL RELEASE  03/28/2014    Neuroplasty Decompression Median Nerve At Carpal Tunnel   • CERVICAL DISCECTOMY POSTERIOR FUSION WITH BRAIN LAB N/A 7/13/2018    Procedure: CERVICAL LAMINECTOMY DECOMPRESSION POSTERIOR C1-2 POSTERIOR CERVICAL FUSION;  Surgeon: Randall Barnett MD;  Location: [facility];  Service: Neurosurgery   • COLONOSCOPY     • CORONARY STENT PLACEMENT  2013   • HERNIA REPAIR  2002    & 1998   • LUMBAR DISCECTOMY FUSION INSTRUMENTATION     • TONSILLECTOMY     • TOTAL HIP ARTHROPLASTY  2002   • VASECTOMY     • WISDOM TOOTH EXTRACTION         Family History: family history includes Cancer in his mother; Heart attack in his father; Heart disease in his father; Hypertension in his father; Sleep apnea in his son. Otherwise pertinent FHx was reviewed and unremarkable.     Social  "History:  reports that he has never smoked. He has never used smokeless tobacco. He reports previous alcohol use. He reports that he does not use drugs.  Social History     Social History Narrative   • Not on file       Medications:  B Complex Vitamins, DULoxetine, HYDROcodone-acetaminophen, Melatonin, Potassium Gluconate, Saw Palmetto, Zinc, atorvastatin, clopidogrel, diclofenac, fluorouracil, fluticasone, metoprolol succinate XL, tamsulosin, vitamin B-12, and vitamin D3    Allergies   Allergen Reactions   • Ibuprofen Hives     \"Pt states he hasn't taken this in a long time\"       Objective   Objective     Vital Signs:   Temp:  [97.1 °F (36.2 °C)] 97.1 °F (36.2 °C)  Heart Rate:  [63-93] 71  Resp:  [16-20] 20  BP: (103-135)/(55-99) 103/55  Flow (L/min):  [3] 3    Physical Exam  Constitutional:       General: He is not in acute distress.     Appearance: He is not ill-appearing, toxic-appearing or diaphoretic.   HENT:      Head: Normocephalic.      Nose: Nose normal.      Mouth/Throat:      Mouth: Mucous membranes are moist.   Eyes:      Pupils: Pupils are equal, round, and reactive to light.   Neck:      Musculoskeletal: Normal range of motion.   Cardiovascular:      Rate and Rhythm: Normal rate and regular rhythm.      Pulses: Normal pulses.      Heart sounds: Normal heart sounds. No murmur. No friction rub. No gallop.    Pulmonary:      Effort: Pulmonary effort is normal. No respiratory distress.      Breath sounds: Rales (bilateral bases) present. No wheezing or rhonchi.   Chest:      Chest wall: Tenderness present.   Abdominal:      General: Bowel sounds are normal. There is no distension.      Palpations: Abdomen is soft.      Tenderness: There is no abdominal tenderness. There is no guarding or rebound.   Musculoskeletal:         General: Swelling (2+ BLE) present. No tenderness or signs of injury.   Skin:     General: Skin is warm and dry.      Coloration: Skin is not pale.      Findings: No erythema or rash. "   Neurological:      Mental Status: He is alert and oriented to person, place, and time. Mental status is at baseline.      Sensory: No sensory deficit.   Psychiatric:         Mood and Affect: Mood normal.         Behavior: Behavior normal.         Thought Content: Thought content normal.         Judgment: Judgment normal.            Results Reviewed:  I have personally reviewed most recent indicated data and agree with findings including:  [x]  Laboratory  [x]  Radiology  [x]  EKG/Telemetry  []  Pathology  []  Cardiac/Vascular Studies  [x]  Old records  []  Other:  Most pertinent findings include: Troponin x2 is negative, proBNP 3722, BUN 37, creatinine 1.3      LAB RESULTS:      Lab 01/06/21  1418   WBC 7.56   HEMOGLOBIN 13.7   HEMATOCRIT 42.1   PLATELETS 200   NEUTROS ABS 5.07   IMMATURE GRANS (ABS) 0.03   LYMPHS ABS 1.57   MONOS ABS 0.70   EOS ABS 0.14   MCV 90.3         Lab 01/06/21  1418   SODIUM 141   POTASSIUM 4.3   CHLORIDE 107   CO2 23.0   ANION GAP 11.0   BUN 37*   CREATININE 1.30*   GLUCOSE 99   CALCIUM 8.7   MAGNESIUM 2.0         Lab 01/06/21  1418   TOTAL PROTEIN 6.2   ALBUMIN 3.50   GLOBULIN 2.7   ALT (SGPT) 19   AST (SGOT) 25   BILIRUBIN 0.4   ALK PHOS 68         Lab 01/06/21  1746 01/06/21  1418   PROBNP  --  3,722.0*   TROPONIN T 0.012 0.013                 Brief Urine Lab Results  (Last result in the past 365 days)      Color   Clarity   Blood   Leuk Est   Nitrite   Protein   CREAT   Urine HCG        12/16/20 0917 Yellow  Comment:  REFERENCE RANGE: Yellow, Straw Clear Negative Negative Negative Negative             Microbiology Results (last 10 days)     Procedure Component Value - Date/Time    COVID PRE-OP / PRE-PROCEDURE SCREENING ORDER (NO ISOLATION) - Swab, Nasopharynx [[id number]]  (Normal) Collected: 01/06/21 1500    Lab Status: Final result Specimen: Swab from Nasopharynx Updated: 01/06/21 1550    Narrative:      The following orders were created for panel order COVID PRE-OP / PRE-PROCEDURE  SCREENING ORDER (NO ISOLATION) - Swab, Nasopharynx.  Procedure                               Abnormality         Status                     ---------                               -----------         ------                     COVID-19 and FLU A/B PCR...[[id number]]  Normal              Final result                 Please view results for these tests on the individual orders.    COVID-19 and FLU A/B PCR - Swab, Nasopharynx [[id number]]  (Normal) Collected: 01/06/21 1500    Lab Status: Final result Specimen: Swab from Nasopharynx Updated: 01/06/21 1550     COVID19 Not Detected     Influenza A PCR Not Detected     Influenza B PCR Not Detected    Narrative:      Fact sheet for providers: https://www.fda.gov/media/204023/download    Fact sheet for patients: https://www.fda.gov/media/967364/download    Test performed by PCR.          Xr Chest 1 View    Result Date: 1/6/2021  EXAMINATION: XR CHEST 1 VW- 01/06/2021  INDICATION: SOA triage protocol  COMPARISON: NONE  FINDINGS: Imaging of the chest reveals heart to be enlarged. Increased pulmonary vascularity bilaterally with small bilateral pleural effusions. There are degenerative changes seen within the spine.        Impression: Increased pulmonary vascularity bilaterally with small to moderate-sized bilateral pleural effusions and increased markings at the lung bases.  D:  01/06/2021 E:  01/06/2021              Assessment/Plan   Assessment & Plan       Gastroesophageal reflux disease    Hypertension    Hyperlipidemia    Chronic coronary artery disease    Prediabetes    JIN (obstructive sleep apnea)    Acute congestive heart failure (CMS/HCC)      Isrrael Marino is a 80 y.o. male with past medical history of CAD s/p stents, hypertension, hyperlipidemia, IBS, GERD, presents to the ED with complaints of shortness of breath.  Patient initially started experiencing shortness of breath while on a hunting trip in 11/2020.  Since then he has been experiencing intermittent  "shortness of breath that has worsened significantly over the past week.  He states that his dyspnea is worse with exertion, and he has to stop multiple times to get from the car into his house.      Assessment and Plan:    Acute CHF, acute hypoxic respiratory failure  --Increased pulmonary vascularity bilaterally with small to moderate-sized bilateral pleural effusions and increased markings at the lung bases   --was given Lasix 60 mg IV in the ED  --will give Lasix 60 mg IV x 1 dose in the am and defer further diuresis to cardiology in the am  --consult Dr. Bernal in the am  --echo in the am  --strict I&O's  --daily weights  --continue metoprolol  --aspirin, plavix  --am labs    LORI  --urine studies  --bmp in the am  --recently placed on flomax as he reports having to urinate \"every hour\" and was getting up 5x nightly.  --? bph with urinary retention which could be contributing to edema  --q4h bladder scan x3.    HTN  HLD  CAD  --metoprolol  --statin  --aspirin and plavix    Prediabetes  --a1c in the am    JIN  --CPAP    DVT prophylaxis:  heparin      CODE STATUS:  Full code  Code Status and Medical Interventions:   Ordered at: 01/06/21 2147     Level Of Support Discussed With:    Patient     Code Status:    CPR     Medical Interventions (Level of Support Prior to Arrest):    Full       Electronically signed by JULY Toure, 01/06/21, 9:47 PM EST.       Attending   Admission Attestation       I have seen and examined the patient, performing an independent face-to-face diagnostic evaluation with plan of care reviewed and developed with the advanced practice clinician (APC).      Brief Summary Statement:   Isrrael Marino is a 80 y.o. male with past medical history of hypertension, hyperlipidemia, GERD, IBS, coronary artery disease with stents in the past who follows with Dr. Bernal who presents to the ER with complaints of shortness of breath and fatigue.  Patient reports he initially started " experiencing increased shortness of breath in comparison to his baseline on a hunting trip in November 2020.  He reports that he has had worsening dyspnea with exertion over the past week and increased fatigue.  He does report nonproductive cough.  Denies any fever, chills, or exposure to COVID-19 that he is aware of.  Covid test was negative in the ER.  Patient does report that he was recently diagnosed with obstructive sleep apnea and received his CPAP machine yesterday.  He slept with it for the first time last night.  Patient with work-up in the ER consistent with probable CHF exacerbation with moderate bilateral pleural effusions and increased perihilar and interstitial markings throughout the lungs especially in the bases.  Patient was given 60 mg IV Lasix in the ER with adequate diuresis.  He continues to require oxygen.  Denies any pleuritic chest pain.  Patient is not tachycardic.    Remainder of detailed HPI is as noted by APC and has been reviewed and/or edited by me for completeness.    Attending Physical Exam:  Constitutional: Awake, alert  Eyes: PERRLA, sclerae anicteric, no conjunctival injection  HENT: NCAT, mucous membranes moist  Neck: Supple, no thyromegaly, no lymphadenopathy, trachea midline  Respiratory: Bilateral crackles in the bases with decreased breath sounds in the bases, mildly labored respirations   Cardiovascular: RRR, no murmurs, rubs, or gallops, palpable pedal pulses bilaterally  Gastrointestinal: Positive bowel sounds, soft, nontender, nondistended  Musculoskeletal: 1+ bilateral ankle edema, no clubbing or cyanosis to extremities  Psychiatric: Appropriate affect, cooperative  Neurologic: Oriented x 3, strength symmetric in all extremities, Cranial Nerves grossly intact to confrontation, speech clear  Skin: No rashes      Brief Assessment/Plan :  See detailed assessment and plan developed with APC which I have reviewed and/or edited for completeness.        Admission Status: I  believe that this patient meets INPATIENT status due to acute hypoxic respiratory failure, CHF presents with I feel patient’s risk for adverse outcomes and need for care warrant INPATIENT evaluation and I predict the patient’s care encounter to likely last beyond 2 midnights.        Dajuan Collins DO  21                    Electronically signed by Dajuan Collins DO at 21 2335             Dajuan Collins DO at 21 2047              [facility] Medicine Services  HISTORY AND PHYSICAL    Patient Name: Isrrael Marino  : 1940  MRN: [id number]  Primary Care Physician: Ivanna George MD  Date of admission: 2021    Elena Palacio, APRN 21 8:44 PM EST     Subjective   Subjective     Chief Complaint:  Shortness of breath    HPI:  Isrrael Marino is a 80 y.o. male with past medical history of CAD s/p stents, hypertension, hyperlipidemia, IBS, GERD, presents to the ED with complaints of shortness of breath.  Patient initially started experiencing shortness of breath while on a hunting trip in 2020.  Since then he has been experiencing intermittent shortness of breath that has worsened significantly over the past week.  He states that his dyspnea is worse with exertion, and he has to stop multiple times to get from the car into his house.  He also complains of some fatigue and a nonproductive cough.  He did get his CPAP machine yesterday and slept with it for the first time last.  Patient denies fever, chills, chest pain, lower extremity edema, abdominal distention, abdominal pain, nausea, vomiting, diarrhea, or any other complaints at this time.  Chest x-ray shows increased pulmonary vascularity bilaterally with small to moderate-sized bilateral pleural effusions and increased markings at the lung bases.  Patient was given Lasix 60 mg IV x1 dose in the ED.  While in the ED patient's oxygen saturation was 90% on room air and he was placed back on oxygen  via nasal cannula.  Patient is being admitted to the hospital medicine service for further evaluation and management.      Current COVID Risks are:  [] Fever [x]  Cough [x] Shortness of breath [] Fatigue [] Change in taste or smell    [] Exposure to COVID positive patient  [] High risk facility   []  NONE    Review of Systems   Constitutional: Positive for fatigue. Negative for appetite change, chills, diaphoresis and fever.   HENT: Negative.    Eyes: Negative.    Respiratory: Positive for cough and shortness of breath. Negative for wheezing.    Cardiovascular: Positive for leg swelling. Negative for chest pain and palpitations.   Gastrointestinal: Negative.    Endocrine: Negative.    Musculoskeletal: Negative.    Skin: Negative.    Neurological: Negative.    Hematological: Negative.    Psychiatric/Behavioral: Negative.         All other systems reviewed and are negative.     Personal History     Past Medical History:   Diagnosis Date   • Arrhythmia    • Arthritis     both knees   • Broken neck (CMS/HCC)    • CAD (coronary artery disease)    • Cancer (CMS/HCC)    • Chondrocalcinosis of knee    • GERD (gastroesophageal reflux disease)    • Gout    • Heart attack (CMS/HCC)      Last Assessed: 28 Mar 2014   • Heart disease    • History of transfusion     own blood with hip surgery   • Hyperlipidemia    • Hypertension    • IBS (irritable bowel syndrome)    • Left rotator cuff tear arthropathy    • Low back pain    • Lower extremity neuropathy    • Lumbar canal stenosis    • Neck pain    • Numbness    • Right hip pain    • Rotator cuff tear arthropathy of right shoulder    • Thin blood (CMS/HCC)        Past Surgical History:   Procedure Laterality Date   • APPENDECTOMY  1956   • BACK SURGERY  1997    spinal decompression and fusion   • BREAST LUMPECTOMY  2003   • CARDIAC CATHETERIZATION      with two stents//. DR. TOLEDO   • CARPAL TUNNEL RELEASE  03/28/2014    Neuroplasty Decompression Median Nerve At Carpal Tunnel   •  "CERVICAL DISCECTOMY POSTERIOR FUSION WITH BRAIN LAB N/A 7/13/2018    Procedure: CERVICAL LAMINECTOMY DECOMPRESSION POSTERIOR C1-2 POSTERIOR CERVICAL FUSION;  Surgeon: Randall Barnett MD;  Location: [facility];  Service: Neurosurgery   • COLONOSCOPY     • CORONARY STENT PLACEMENT  2013   • HERNIA REPAIR  2002    & 1998   • LUMBAR DISCECTOMY FUSION INSTRUMENTATION     • TONSILLECTOMY     • TOTAL HIP ARTHROPLASTY  2002   • VASECTOMY     • WISDOM TOOTH EXTRACTION         Family History: family history includes Cancer in his mother; Heart attack in his father; Heart disease in his father; Hypertension in his father; Sleep apnea in his son. Otherwise pertinent FHx was reviewed and unremarkable.     Social History:  reports that he has never smoked. He has never used smokeless tobacco. He reports previous alcohol use. He reports that he does not use drugs.  Social History     Social History Narrative   • Not on file       Medications:  B Complex Vitamins, DULoxetine, HYDROcodone-acetaminophen, Melatonin, Potassium Gluconate, Saw Palmetto, Zinc, atorvastatin, clopidogrel, diclofenac, fluorouracil, fluticasone, metoprolol succinate XL, tamsulosin, vitamin B-12, and vitamin D3    Allergies   Allergen Reactions   • Ibuprofen Hives     \"Pt states he hasn't taken this in a long time\"       Objective   Objective     Vital Signs:   Temp:  [97.1 °F (36.2 °C)] 97.1 °F (36.2 °C)  Heart Rate:  [63-93] 71  Resp:  [16-20] 20  BP: (103-135)/(55-99) 103/55  Flow (L/min):  [3] 3    Physical Exam  Constitutional:       General: He is not in acute distress.     Appearance: He is not ill-appearing, toxic-appearing or diaphoretic.   HENT:      Head: Normocephalic.      Nose: Nose normal.      Mouth/Throat:      Mouth: Mucous membranes are moist.   Eyes:      Pupils: Pupils are equal, round, and reactive to light.   Neck:      Musculoskeletal: Normal range of motion.   Cardiovascular:      Rate and Rhythm: Normal rate and regular rhythm.     "  Pulses: Normal pulses.      Heart sounds: Normal heart sounds. No murmur. No friction rub. No gallop.    Pulmonary:      Effort: Pulmonary effort is normal. No respiratory distress.      Breath sounds: Rales (bilateral bases) present. No wheezing or rhonchi.   Chest:      Chest wall: Tenderness present.   Abdominal:      General: Bowel sounds are normal. There is no distension.      Palpations: Abdomen is soft.      Tenderness: There is no abdominal tenderness. There is no guarding or rebound.   Musculoskeletal:         General: Swelling (2+ BLE) present. No tenderness or signs of injury.   Skin:     General: Skin is warm and dry.      Coloration: Skin is not pale.      Findings: No erythema or rash.   Neurological:      Mental Status: He is alert and oriented to person, place, and time. Mental status is at baseline.      Sensory: No sensory deficit.   Psychiatric:         Mood and Affect: Mood normal.         Behavior: Behavior normal.         Thought Content: Thought content normal.         Judgment: Judgment normal.            Results Reviewed:  I have personally reviewed most recent indicated data and agree with findings including:  [x]  Laboratory  [x]  Radiology  [x]  EKG/Telemetry  []  Pathology  []  Cardiac/Vascular Studies  [x]  Old records  []  Other:  Most pertinent findings include: Troponin x2 is negative, proBNP 3722, BUN 37, creatinine 1.3      LAB RESULTS:      Lab 01/06/21  1418   WBC 7.56   HEMOGLOBIN 13.7   HEMATOCRIT 42.1   PLATELETS 200   NEUTROS ABS 5.07   IMMATURE GRANS (ABS) 0.03   LYMPHS ABS 1.57   MONOS ABS 0.70   EOS ABS 0.14   MCV 90.3         Lab 01/06/21  1418   SODIUM 141   POTASSIUM 4.3   CHLORIDE 107   CO2 23.0   ANION GAP 11.0   BUN 37*   CREATININE 1.30*   GLUCOSE 99   CALCIUM 8.7   MAGNESIUM 2.0         Lab 01/06/21  1418   TOTAL PROTEIN 6.2   ALBUMIN 3.50   GLOBULIN 2.7   ALT (SGPT) 19   AST (SGOT) 25   BILIRUBIN 0.4   ALK PHOS 68         Lab 01/06/21  1746 01/06/21  1418    PROBNP  --  3,722.0*   TROPONIN T 0.012 0.013                 Brief Urine Lab Results  (Last result in the past 365 days)      Color   Clarity   Blood   Leuk Est   Nitrite   Protein   CREAT   Urine HCG        12/16/20 0917 Yellow  Comment:  REFERENCE RANGE: Yellow, Straw Clear Negative Negative Negative Negative             Microbiology Results (last 10 days)     Procedure Component Value - Date/Time    COVID PRE-OP / PRE-PROCEDURE SCREENING ORDER (NO ISOLATION) - Swab, Nasopharynx [[id number]]  (Normal) Collected: 01/06/21 1500    Lab Status: Final result Specimen: Swab from Nasopharynx Updated: 01/06/21 1550    Narrative:      The following orders were created for panel order COVID PRE-OP / PRE-PROCEDURE SCREENING ORDER (NO ISOLATION) - Swab, Nasopharynx.  Procedure                               Abnormality         Status                     ---------                               -----------         ------                     COVID-19 and FLU A/B PCR...[[id number]]  Normal              Final result                 Please view results for these tests on the individual orders.    COVID-19 and FLU A/B PCR - Swab, Nasopharynx [[id number]]  (Normal) Collected: 01/06/21 1500    Lab Status: Final result Specimen: Swab from Nasopharynx Updated: 01/06/21 1550     COVID19 Not Detected     Influenza A PCR Not Detected     Influenza B PCR Not Detected    Narrative:      Fact sheet for providers: https://www.fda.gov/media/044450/download    Fact sheet for patients: https://www.fda.gov/media/551118/download    Test performed by PCR.          Xr Chest 1 View    Result Date: 1/6/2021  EXAMINATION: XR CHEST 1 VW- 01/06/2021  INDICATION: SOA triage protocol  COMPARISON: NONE  FINDINGS: Imaging of the chest reveals heart to be enlarged. Increased pulmonary vascularity bilaterally with small bilateral pleural effusions. There are degenerative changes seen within the spine.        Impression: Increased pulmonary vascularity  "bilaterally with small to moderate-sized bilateral pleural effusions and increased markings at the lung bases.  D:  01/06/2021 E:  01/06/2021              Assessment/Plan   Assessment & Plan       Gastroesophageal reflux disease    Hypertension    Hyperlipidemia    Chronic coronary artery disease    Prediabetes    JIN (obstructive sleep apnea)    Acute congestive heart failure (CMS/[facility])      Isrrael Marino is a 80 y.o. male with past medical history of CAD s/p stents, hypertension, hyperlipidemia, IBS, GERD, presents to the ED with complaints of shortness of breath.  Patient initially started experiencing shortness of breath while on a hunting trip in 11/2020.  Since then he has been experiencing intermittent shortness of breath that has worsened significantly over the past week.  He states that his dyspnea is worse with exertion, and he has to stop multiple times to get from the car into his house.      Assessment and Plan:    Acute CHF, acute hypoxic respiratory failure  --Increased pulmonary vascularity bilaterally with small to moderate-sized bilateral pleural effusions and increased markings at the lung bases   --was given Lasix 60 mg IV in the ED  --will give Lasix 60 mg IV x 1 dose in the am and defer further diuresis to cardiology in the am  --consult Dr. Bernal in the am  --echo in the am  --strict I&O's  --daily weights  --continue metoprolol  --aspirin, plavix  --am labs    LORI  --urine studies  --bmp in the am  --recently placed on flomax as he reports having to urinate \"every hour\" and was getting up 5x nightly.  --? bph with urinary retention which could be contributing to edema  --q4h bladder scan x3.    HTN  HLD  CAD  --metoprolol  --statin  --aspirin and plavix    Prediabetes  --a1c in the am    JIN  --CPAP    DVT prophylaxis:  heparin      CODE STATUS:  Full code  Code Status and Medical Interventions:   Ordered at: 01/06/21 0454     Level Of Support Discussed With:    Patient     Code Status: "    CPR     Medical Interventions (Level of Support Prior to Arrest):    Full       Electronically signed by JULY Toure, 01/06/21, 9:47 PM EST.       Attending   Admission Attestation       I have seen and examined the patient, performing an independent face-to-face diagnostic evaluation with plan of care reviewed and developed with the advanced practice clinician (APC).      Brief Summary Statement:   Isrrael Marino is a 80 y.o. male with past medical history of hypertension, hyperlipidemia, GERD, IBS, coronary artery disease with stents in the past who follows with Dr. Bernal who presents to the ER with complaints of shortness of breath and fatigue.  Patient reports he initially started experiencing increased shortness of breath in comparison to his baseline on a hunting trip in November 2020.  He reports that he has had worsening dyspnea with exertion over the past week and increased fatigue.  He does report nonproductive cough.  Denies any fever, chills, or exposure to COVID-19 that he is aware of.  Covid test was negative in the ER.  Patient does report that he was recently diagnosed with obstructive sleep apnea and received his CPAP machine yesterday.  He slept with it for the first time last night.  Patient with work-up in the ER consistent with probable CHF exacerbation with moderate bilateral pleural effusions and increased perihilar and interstitial markings throughout the lungs especially in the bases.  Patient was given 60 mg IV Lasix in the ER with adequate diuresis.  He continues to require oxygen.  Denies any pleuritic chest pain.  Patient is not tachycardic.    Remainder of detailed HPI is as noted by APC and has been reviewed and/or edited by me for completeness.    Attending Physical Exam:  Constitutional: Awake, alert  Eyes: PERRLA, sclerae anicteric, no conjunctival injection  HENT: NCAT, mucous membranes moist  Neck: Supple, no thyromegaly, no lymphadenopathy, trachea  midline  Respiratory: Bilateral crackles in the bases with decreased breath sounds in the bases, mildly labored respirations   Cardiovascular: RRR, no murmurs, rubs, or gallops, palpable pedal pulses bilaterally  Gastrointestinal: Positive bowel sounds, soft, nontender, nondistended  Musculoskeletal: 1+ bilateral ankle edema, no clubbing or cyanosis to extremities  Psychiatric: Appropriate affect, cooperative  Neurologic: Oriented x 3, strength symmetric in all extremities, Cranial Nerves grossly intact to confrontation, speech clear  Skin: No rashes      Brief Assessment/Plan :  See detailed assessment and plan developed with APC which I have reviewed and/or edited for completeness.        Admission Status: I believe that this patient meets INPATIENT status due to acute hypoxic respiratory failure, CHF presents with I feel patient’s risk for adverse outcomes and need for care warrant INPATIENT evaluation and I predict the patient’s care encounter to likely last beyond 2 midnights.        Dajuan Collins DO  01/06/21                    Electronically signed by Dajuan Collins DO at 01/06/21 2334       21 2015  --  78  --  113/77  room air  --  88Abnormal    01/08/21 2010  --  76  --  --  room air  --  81Abnormal    01/08/21 2005  --  76  --  --  room air  --  89Abnormal    01/08/21 2000  --  96  --  141/83  room air  --  80Abnormal    01/08/21 1945  --  80  --  145/75  --  --  86Abnormal    01/08/21 1915  --  76  --  124/86  --  --  86Abnormal    01/08/21 1900  --  84  18  130/81  --  --  97   01/08/21 1845  --  --  --  132/110Abnormal   --  --  87Abnormal    01/08/21 1830  --  66  --  109/85  --  --  94   01/08/21 1800  --  79  --  119/89  --  --  84Abnormal    01/08/21 1720  --  59  --  111/65  --  --  --   01/08/21 1700  --  60  --  --  --  --  94   01/08/21 1640  --  64  --  --  --  --  89Abnormal      [facility] 4H  [address] [facility] [id number]  Dept. Phone:  [phone]  Dept.  "Fax:  [phone] Date Ordered: 2021         Patient:  Isrrael Marino MRN:  [id number]   [address] [place] [address] :  1940  SSN:    Phone: [phone] Sex:  M     Weight: 81.5 kg (179 lb 11.2 oz)         Ht Readings from Last 1 Encounters:   21 182.9 cm (72.01\")         Oxygen Therapy         (Order ID: [id number])    Diagnosis:  Acute congestive heart failure, unspecified heart failure type (CMS/HCC) (I50.9 [ICD-10-CM] 428.0 [ICD-9-CM])   Quantity:  1     Delivery Modality: Nasal Cannula  Liters Per Minute: 2  Duration: Continuous  Equipment:  Oxygen Concentrator &  &  Portable Gaseous Oxygen System & Portable Oxygen Contents Gaseous &  Conserving Regulator  Length of Need (99 Months = Lifetime): 99 Months = Lifetime        Authorizing Provider's Phone: [phone]   Verbal Order Mode: Verbal with readback   Authorizing Provider: Unique Lamar MD  Authorizing Provider's NPI: [id number]     Order Entered By: Anitra Rodriguez RN 2021 12:48 PM               "

## 2021-01-10 ENCOUNTER — READMISSION MANAGEMENT (OUTPATIENT)
Dept: CALL CENTER | Facility: HOSPITAL | Age: 81
End: 2021-01-10

## 2021-01-10 VITALS
WEIGHT: 178.9 LBS | OXYGEN SATURATION: 93 % | BODY MASS INDEX: 24.23 KG/M2 | SYSTOLIC BLOOD PRESSURE: 115 MMHG | RESPIRATION RATE: 18 BRPM | HEIGHT: 72 IN | HEART RATE: 61 BPM | DIASTOLIC BLOOD PRESSURE: 66 MMHG | TEMPERATURE: 97.8 F

## 2021-01-10 LAB
ANION GAP SERPL CALCULATED.3IONS-SCNC: 8 MMOL/L (ref 5–15)
BUN SERPL-MCNC: 29 MG/DL (ref 8–23)
BUN/CREAT SERPL: 27.9 (ref 7–25)
CALCIUM SPEC-SCNC: 8.8 MG/DL (ref 8.6–10.5)
CHLORIDE SERPL-SCNC: 103 MMOL/L (ref 98–107)
CO2 SERPL-SCNC: 31 MMOL/L (ref 22–29)
CREAT SERPL-MCNC: 1.04 MG/DL (ref 0.76–1.27)
GFR SERPL CREATININE-BSD FRML MDRD: 68 ML/MIN/1.73
GLUCOSE SERPL-MCNC: 95 MG/DL (ref 65–99)
POTASSIUM SERPL-SCNC: 4.3 MMOL/L (ref 3.5–5.2)
SODIUM SERPL-SCNC: 142 MMOL/L (ref 136–145)

## 2021-01-10 PROCEDURE — 80048 BASIC METABOLIC PNL TOTAL CA: CPT | Performed by: INTERNAL MEDICINE

## 2021-01-10 PROCEDURE — 25010000002 HEPARIN (PORCINE) PER 1000 UNITS: Performed by: INTERNAL MEDICINE

## 2021-01-10 PROCEDURE — 99239 HOSP IP/OBS DSCHRG MGMT >30: CPT | Performed by: INTERNAL MEDICINE

## 2021-01-10 PROCEDURE — 99232 SBSQ HOSP IP/OBS MODERATE 35: CPT | Performed by: INTERNAL MEDICINE

## 2021-01-10 RX ORDER — FUROSEMIDE 20 MG/1
20 TABLET ORAL DAILY
Qty: 30 TABLET | Refills: 11 | Status: SHIPPED | OUTPATIENT
Start: 2021-01-11 | End: 2021-05-17 | Stop reason: SDUPTHER

## 2021-01-10 RX ORDER — LISINOPRIL 5 MG/1
5 TABLET ORAL DAILY
Qty: 30 TABLET | Refills: 6 | Status: SHIPPED | OUTPATIENT
Start: 2021-01-11 | End: 2021-04-13 | Stop reason: SDUPTHER

## 2021-01-10 RX ORDER — SPIRONOLACTONE 25 MG/1
25 TABLET ORAL DAILY
Qty: 30 TABLET | Refills: 11 | Status: SHIPPED | OUTPATIENT
Start: 2021-01-11 | End: 2021-01-22

## 2021-01-10 RX ORDER — CARVEDILOL 6.25 MG/1
6.25 TABLET ORAL EVERY 12 HOURS SCHEDULED
Qty: 60 TABLET | Refills: 11 | Status: SHIPPED | OUTPATIENT
Start: 2021-01-10 | End: 2021-05-17 | Stop reason: SDUPTHER

## 2021-01-10 RX ADMIN — FUROSEMIDE 40 MG: 40 TABLET ORAL at 08:23

## 2021-01-10 RX ADMIN — SPIRONOLACTONE 25 MG: 25 TABLET ORAL at 08:24

## 2021-01-10 RX ADMIN — DULOXETINE HYDROCHLORIDE 30 MG: 30 CAPSULE, DELAYED RELEASE ORAL at 08:24

## 2021-01-10 RX ADMIN — TAMSULOSIN HYDROCHLORIDE 0.4 MG: 0.4 CAPSULE ORAL at 08:23

## 2021-01-10 RX ADMIN — SODIUM CHLORIDE, PRESERVATIVE FREE 10 ML: 5 INJECTION INTRAVENOUS at 08:25

## 2021-01-10 RX ADMIN — HEPARIN SODIUM 5000 UNITS: 5000 INJECTION INTRAVENOUS; SUBCUTANEOUS at 06:04

## 2021-01-10 RX ADMIN — CARVEDILOL 6.25 MG: 6.25 TABLET, FILM COATED ORAL at 08:24

## 2021-01-10 RX ADMIN — LISINOPRIL 5 MG: 5 TABLET ORAL at 08:24

## 2021-01-10 RX ADMIN — CYANOCOBALAMIN TAB 1000 MCG 1000 MCG: 1000 TAB at 08:24

## 2021-01-10 RX ADMIN — CLOPIDOGREL BISULFATE 75 MG: 75 TABLET ORAL at 08:24

## 2021-01-10 NOTE — OUTREACH NOTE
Prep Survey      Responses   [facility] facility patient discharged from?  [facility]   Is LACE score < 7 ?  No   Emergency Room discharge w/ pulse ox?  No   Eligibility  [facility]   Date of Admission  01/06/21   Date of Discharge  01/10/21   Discharge Disposition  Home or Self Care   Discharge diagnosis  Acute CHF, CKD, HTN   Does the patient have one of the following disease processes/diagnoses(primary or secondary)?  CHF   Does the patient have Home health ordered?  No   Is there a DME ordered?  No   Prep survey completed?  Yes          Joy Simpson RN

## 2021-01-10 NOTE — DISCHARGE SUMMARY
[facility] Medicine Services  DISCHARGE SUMMARY    Patient Name: Isrrael Marino  : 1940  MRN: [id number]    Date of Admission: 2021  2:41 PM  Date of Discharge:  1/10/2021  Primary Care Physician: Ivanna George MD    Consults     Date and Time Order Name Status Description    2021 0032 Inpatient Cardiology Consult Completed           Hospital Course     Presenting Problem:   Acute congestive heart failure, unspecified heart failure type (CMS/HCC) [I50.9]  Acute congestive heart failure, unspecified heart failure type (CMS/HCC) [I50.9]    Active Hospital Problems    Diagnosis  POA   • Acute congestive heart failure (CMS/HCC) [I50.9]  Yes   • Acute respiratory failure with hypoxia (CMS/HCC) [J96.01]  Unknown   • JIN (obstructive sleep apnea) [G47.33]  Yes   • Prediabetes [R73.03]  Yes   • Gastroesophageal reflux disease [K21.9]  Yes   • Hypertension [I10]  Yes   • Hyperlipidemia [E78.5]  Yes   • Chronic coronary artery disease [I25.10]  Yes      Resolved Hospital Problems   No resolved problems to display.      Hospital Course:  Isrrael Marino is a 80 y.o. male past medical history hypertension lipidemia CAD, JIN who presents to the ED with SOA.  Admitted with acute respiratory failure with hypoxia today likely secondary to acute systolic heart failure right pleural effusion.     Acute respiratory failure with hypoxia  -Etiology was secondary to acute systolic heart failure and bilateral pleural effusions  -He is s/p right thoracentesis with removal of 1.2 L of serous fluid  -Patient was on 2 L NC, we were able to wean this off, no need for home O2    Severe systolic heart failure  -Patient with a EF of 30% per echo 2020  -He is s/p LHC with finding of no flow-limiting coronary arteries disease, EF of 25% with dilated left ventricle  --Cardiology was consulted continue, ASA,  lisinopril, Aldactone, Coreg     CKD stage III-renal function seems to be currently  at baseline     Hypertension-BP low but stable, continue meds    Depression-continue Cymbalta     JIN- cpap     BPH-continue Flomax    Discharge Follow Up Recommendations for outpatient labs/diagnostics:  Follow-up with PCP in 1 week  Follow-up with cardiology Dr. Bernal in 2-3 weeks    Day of Discharge     HPI: No acute events overnight, patient did not get much sleep, denies any soa, no CP    Review of Systems  Gen- No fevers, chills  CV- No chest pain, palpitations  Resp- No cough, dyspnea  GI- No N/V/D, abd pain    All other systems reviewed and are negative    Vital Signs:   Temp:  [97.4 °F (36.3 °C)-97.8 °F (36.6 °C)] 97.6 °F (36.4 °C)  Heart Rate:  [57-78] 62  Resp:  [18-20] 20  BP: ()/(55-70) 115/66     Physical Exam:  Constitutional: No acute distress, awake, alert  HENT: NCAT, mucous membranes moist  Respiratory: Clear to auscultation bilaterally, respiratory effort normal   Cardiovascular: RRR, no murmurs, rubs, or gallops  Gastrointestinal: Positive bowel sounds, soft, nontender, nondistended  Musculoskeletal: No bilateral ankle edema  Psychiatric: Appropriate affect, cooperative  Neurologic: Oriented x 3, nonfocal  Skin: No rashes    Pertinent  and/or Most Recent Results     Results from last 7 days   Lab Units 01/10/21  0612 01/09/21  0655 01/08/21  0649 01/07/21  0802 01/06/21  1418   WBC 10*3/mm3  --  7.73  --  8.43 7.56   HEMOGLOBIN g/dL  --  14.2  --  14.5 13.7   HEMATOCRIT %  --  44.1  --  45.3 42.1   PLATELETS 10*3/mm3  --  194  --  217 200   SODIUM mmol/L 142 141 137 142 141   POTASSIUM mmol/L 4.3 3.9 4.7 4.1 4.3   CHLORIDE mmol/L 103 101 99 102 107   CO2 mmol/L 31.0* 29.0 28.0 28.0 23.0   BUN mg/dL 29* 30* 34* 39* 37*   CREATININE mg/dL 1.04 0.97 1.19 1.25 1.30*   GLUCOSE mg/dL 95 83 78 84 99   CALCIUM mg/dL 8.8 9.2 9.5 9.5 8.7     Results from last 7 days   Lab Units 01/07/21  1247 01/06/21  1418   BILIRUBIN mg/dL  --  0.4   ALK PHOS U/L  --  68   ALT (SGPT) U/L  --  19   AST (SGOT) U/L   --  25   PROTIME Seconds 13.1  --    INR  1.02  --    APTT seconds 29.5*  --      Results from last 7 days   Lab Units 01/09/21  0655 01/07/21  0802   CHOLESTEROL mg/dL 140 166   TRIGLYCERIDES mg/dL 70 49   HDL CHOL mg/dL 61* 68*   LDL CHOL mg/dL 65 88     Results from last 7 days   Lab Units 01/07/21  0802 01/06/21  1746 01/06/21  1418   TSH uIU/mL 3.980  --   --    HEMOGLOBIN A1C % 5.80*  --   --    PROBNP pg/mL  --   --  3,722.0*   TROPONIN T ng/mL  --  0.012 0.013   PROCALCITONIN ng/mL  --  0.06  --        Brief Urine Lab Results  (Last result in the past 365 days)      Color   Clarity   Blood   Leuk Est   Nitrite   Protein   CREAT   Urine HCG        01/06/21 2256             23.8             Microbiology Results Abnormal     Procedure Component Value - Date/Time    COVID PRE-OP / PRE-PROCEDURE SCREENING ORDER (NO ISOLATION) - Swab, Nasopharynx [[id number]]  (Normal) Collected: 01/06/21 1500    Lab Status: Final result Specimen: Swab from Nasopharynx Updated: 01/06/21 1550    Narrative:      The following orders were created for panel order COVID PRE-OP / PRE-PROCEDURE SCREENING ORDER (NO ISOLATION) - Swab, Nasopharynx.  Procedure                               Abnormality         Status                     ---------                               -----------         ------                     COVID-19 and FLU A/B PCR...[[id number]]  Normal              Final result                 Please view results for these tests on the individual orders.    COVID-19 and FLU A/B PCR - Swab, Nasopharynx [[id number]]  (Normal) Collected: 01/06/21 1500    Lab Status: Final result Specimen: Swab from Nasopharynx Updated: 01/06/21 1550     COVID19 Not Detected     Influenza A PCR Not Detected     Influenza B PCR Not Detected    Narrative:      Fact sheet for providers: https://www.fda.gov/media/573053/download    Fact sheet for patients: https://www.fda.gov/media/465198/download    Test performed by PCR.          Imaging Results (All)      Procedure Component Value Units Date/Time    XR Chest 1 View [[id number]] Collected: 01/07/21 0935     Updated: 01/07/21 2206    Narrative:      EXAMINATION: XR CHEST 1 VW-01/07/2021:     INDICATION: Effusions; I50.9-Heart failure, unspecified;  R09.02-Hypoxemia; R06.02-Shortness of breath.     COMPARISON: 01/06/2021.     FINDINGS: The heart shadow is enlarged and there is still a mild diffuse  interstitial edema pattern present, generally stable from the prior  exam. There are persistent pleural effusions, probably unchanged. No  pneumothorax or new pulmonary parenchymal disease is seen.       Impression:      Stable pattern of congestive heart failure, with mild  pulmonary edema and persistent pleural effusions.     D:  01/07/2021  E:  01/07/2021            This report was finalized on 1/7/2021 10:03 PM by Dr. Raleigh Blanchadr MD.       XR Chest 1 View [[id number]] Collected: 01/06/21 1529     Updated: 01/07/21 1749    Narrative:      EXAMINATION: XR CHEST 1 VW- 01/06/2021     INDICATION: SOA triage protocol      COMPARISON: NONE     FINDINGS: Imaging of the chest reveals heart to be enlarged. Increased  pulmonary vascularity bilaterally with small bilateral pleural  effusions. There are degenerative changes seen within the spine.          Impression:      Increased pulmonary vascularity bilaterally with small to  moderate-sized bilateral pleural effusions and increased markings at the  lung bases.     D:  01/06/2021  E:  01/06/2021     This report was finalized on 1/7/2021 3:57 PM by Dr. Jordana Romo MD.       CT Guided Thoracentesis [[id number]] Collected: 01/07/21 1547     Updated: 01/07/21 1749    Narrative:      PROCEDURE: CT-guided thoracentesis     Procedural Personnel  Attending physician(s): LUCILLE Godinez M.D.  Fellow physician(s): None  Resident physician(s): None  Advanced practice provider(s): None     Pre-procedure diagnosis: Acute hypoxic respiratory failure;  Acute?Systolic?CHF; Bilateral pleural  effusions  Post-procedure diagnosis: Same  Indication: Diagnostic/therapeutic  Additional clinical history: None     Complications: No immediate complications.       Impression:         CT-guided thoracentesis with drainage of 1200 mL of serous fluid.  Portion of fluid sent for requested laboratory analysis     Plan:      Resume care by clinical team.  _______________________________________________________________     PROCEDURE SUMMARY:  - Limited thoracic CT  - CT-guided thoracentesis  - Additional procedure(s): None     PROCEDURE DETAILS:     Pre-procedure  Consent: Informed consent for the procedure including risks, benefits  and alternatives was obtained and time-out was performed prior to the  procedure.  Preparation: The site was prepared and draped using maximal sterile  barrier technique including cutaneous antisepsis.     Anesthesia/sedation  Level of anesthesia/sedation: No sedation     Limited thoracic CT  Limited thoracic CT was performed. A safe window for thoracentesis was  identified.   Right hemithorax findings: Large pleural fluid collection     Thoracentesis  Local anesthesia was administered. The pleural space was accessed using  trocar technique  and fluid return confirmed position. The fluid was  drained. The catheter was removed, and a sterile bandage was applied.  Catheter placed: 8.5 Kosovan nonlocking pigtail  Post-drainage hemithorax findings: No immediate complication     Radiation Dose  CT dose length product (mGy-cm): 412     Additional Details  Additional description of procedure: None  Equipment details: None  Specimens removed: Pleural fluid  Estimated blood loss (mL): Less than 10  Standardized report: Thoracentesis     Attestation  I was present and scrubbed for the entire procedure. Imaging reviewed.  Agree with final report as written.        This report was finalized on 1/7/2021 3:48 PM by Suresh Godinez.       CT Angiogram Chest With & Without Contrast [[id number]] Collected:  01/07/21 0717     Updated: 01/07/21 0719    Narrative:      CT ANGIOGRAM CHEST W WO CONTRAST    INDICATION:   78-year-old male with elevated d-dimer shortness of air and hypoxia. Congestive heart failure. No improvement with diuresis.    TECHNIQUE:   CT angiogram of the chest with IV contrast. 3-D reconstructions were obtained and reviewed.   Radiation dose reduction techniques included automated exposure control or exposure modulation based on body size. Count of known CT and cardiac nuc med studies  performed in previous 12 months: 0.     COMPARISON:   None available.    FINDINGS:   Small to early moderate left and moderate to large right-sided pleural effusions are present. There is compressive atelectasis favored over pneumonia in the lung bases right greater than left. No pneumothorax. No pericardial effusion. No threshold  adenopathy. Included thyroid negative. Included upper abdomen demonstrates no acute finding. Streak artifact from prior back surgery. Reflux of contrast into the intrahepatic IVC is nonspecific but suggests underlying right heart dysfunction and there is  diverticulosis.    Normal caliber aorta and atherosclerotic change. Coronary artery calcifications/stents. No PE in the central pulmonary arterial tree. No suspicious bone lesion.      Impression:        1. No PE.  2. No aortic aneurysm.  3. Bilateral pleural effusions right greater than left with probable bibasilar compressive atelectasis or less likely pneumonia.  4. Included upper abdomen demonstrates diverticulosis and findings suggestive of right heart dysfunction.    Signer Name: Buck Fraire MD   Signed: 1/7/2021 7:17 AM   Workstation Name: [id number]    [facility]          Results for orders placed during the hospital encounter of 12/10/19   Duplex Carotid Ultrasound CAR    Narrative · Left internal carotid artery stenosis of 0-49%.  · Right internal carotid artery stenosis of 0-49%.          Results for  orders placed during the hospital encounter of 12/10/19   Duplex Carotid Ultrasound CAR    Narrative · Left internal carotid artery stenosis of 0-49%.  · Right internal carotid artery stenosis of 0-49%.          Results for orders placed during the hospital encounter of 01/06/21   Adult Transthoracic Echo Complete W/ Cont if Necessary Per Protocol    Narrative · Estimated left ventricular EF = 30%  · No hemodynamically significant valvular heart disease          Plan for Follow-up of Pending Labs/Results:none    Discharge Details        Discharge Medications      New Medications      Instructions Start Date   carvedilol 6.25 MG tablet  Commonly known as: COREG   6.25 mg, Oral, Every 12 Hours Scheduled      furosemide 20 MG tablet  Commonly known as: LASIX   20 mg, Oral, Daily   Start Date: January 11, 2021     lisinopril 5 MG tablet  Commonly known as: PRINIVIL,ZESTRIL   5 mg, Oral, Daily   Start Date: January 11, 2021     spironolactone 25 MG tablet  Commonly known as: ALDACTONE   25 mg, Oral, Daily   Start Date: January 11, 2021        Changes to Medications      Instructions Start Date   DULoxetine 30 MG capsule  Commonly known as: CYMBALTA  What changed: how much to take   60 mg, Oral, Daily         Continue These Medications      Instructions Start Date   atorvastatin 40 MG tablet  Commonly known as: LIPITOR   40 mg, Oral, Nightly      clopidogrel 75 MG tablet  Commonly known as: PLAVIX   75 mg, Oral, Daily      diclofenac 75 MG EC tablet  Commonly known as: VOLTAREN   75 mg, Oral, Daily      fluorouracil 5 % cream  Commonly known as: EFUDEX   No dose, route, or frequency recorded.      fluticasone 50 MCG/ACT nasal spray  Commonly known as: FLONASE   1 spray, Nasal, Daily      HYDROcodone-acetaminophen 5-325 MG per tablet  Commonly known as: NORCO   1 tablet, Oral, As Needed      Melatonin 3 MG capsule   Oral      Potassium Gluconate 550 (90 K) MG tablet   Oral, Daily      tamsulosin 0.4 MG capsule 24 hr  "capsule  Commonly known as: FLOMAX   0.4 mg, Oral, Daily      VITAMIN B COMPLEX PO   1 tablet, Oral, Daily      vitamin B-12 1000 MCG tablet  Commonly known as: CYANOCOBALAMIN   1,000 mcg, Oral, Daily      vitamin D3 125 MCG (5000 UT) capsule capsule   2,000 Units, Oral, Daily      Zinc 50 MG capsule   Oral, Daily         Stop These Medications    metoprolol succinate XL 25 MG 24 hr tablet  Commonly known as: TOPROL-XL            Allergies   Allergen Reactions   • Ibuprofen Hives     \"Pt states he hasn't taken this in a long time\"       Discharge Disposition: Home  Home or Self Care    Diet:  Hospital:  Diet Order   Procedures   • Diet Regular; Cardiac       Activity: As tolerated    Restrictions or Other Recommendations:  None       CODE STATUS:    Code Status and Medical Interventions:   Ordered at: 01/06/21 2147     Level Of Support Discussed With:    Patient     Code Status:    CPR     Medical Interventions (Level of Support Prior to Arrest):    Full       Future Appointments   Date Time Provider Department Center   2/22/2021  9:45 AM Elena Rosas APRN [facility] None   3/2/2021  8:30 AM Tish Shelley PA-C [facility]   6/14/2021  8:00 AM Ivanna George MD [facility]   8/31/2021  8:15 AM Ivanna George MD [facility]   12/6/2021  9:30 AM Radha Valdivia APRN [facility] None     Additional Instructions for the Follow-ups that You Need to Schedule     Discharge Follow-up with Specialty: heart valve center; 1 Week   As directed      Specialty: heart valve center    Follow Up: 1 Week         Discharge Follow-up with Specialty: afia/annaer; 3 Weeks   As directed      Specialty: afia/clemarshaer    Follow Up: 3 Weeks             Unique Lamar MD  01/10/21      Time Spent on Discharge:  I spent  35  minutes on this discharge activity which included: face-to-face encounter with the patient, reviewing the data in the system, coordination of the care with the nursing staff " as well as consultants, documentation, and entering orders.

## 2021-01-10 NOTE — PROGRESS NOTES
Continued Stay Note  [facility]     Patient Name: Isrrael Marino  MRN: [id number]  Today's Date: 1/10/2021    Admit Date: 1/6/2021    Discharge Plan     Row Name 01/10/21 0927       Plan    Plan  Home    Patient/Family in Agreement with Plan  yes    Plan Comments  Received a call from Mr Marino's primary nurse stating that she had a discussion with Dr Bernal regarding the patient's oxygen saturations. Mr Marino ambulated on room air and while sitting on RA, his O2 saturations remained above 90%. Dr Bernal stated that he no longer needs home oxygen due to his lungs have improved. I called [place] with [facility] to cancel the oxygen order and asked him to please come and  the portable tank that was delivered. He is being DC home today with family to transport via car. No other DC needs voiced for case mgmt to address.    Final Discharge Disposition Code  01 - home or self-care        Discharge Codes    No documentation.       Expected Discharge Date and Time     Expected Discharge Date Expected Discharge Time    Negrito 10, 2021             Anitra Rodriguez RN

## 2021-01-10 NOTE — PROGRESS NOTES
"  [facility]   Inpatient Progress Note       LOS: 4 days   Patient Care Team:  Ivanna George MD as PCP - General (Internal Medicine)  Omar Mckeon MD as Referring Physician (Emergency Medicine)    Chief Complaint: Heart failure/cardiomyopathy    Subjective     Interval History:   Patient feels well today.  Denies any dyspnea.  O2 sats are 93% on room air.  A little dizzy when he first stands up but only last a few seconds.  Resolved when he walked.    Review of Systems:   Pertinent positives noted in history, exam, and assessment. Otherwise reviewed and negative.      Objective     Vitals:  Blood pressure 115/66, pulse 62, temperature 97.6 °F (36.4 °C), temperature source Oral, resp. rate 20, height 182.9 cm (72.01\"), weight 81.1 kg (178 lb 14.4 oz), SpO2 98 %.     Intake/Output Summary (Last 24 hours) at 1/10/2021 0913  Last data filed at 1/10/2021 0300  Gross per 24 hour   Intake 600 ml   Output 325 ml   Net 275 ml     Vitals signs reviewed.   Constitutional:       Appearance: Well-developed and not in distress.   Neck:      Thyroid: No thyromegaly.      Vascular: No carotid bruit or JVD.   Pulmonary:      Breath sounds: No rhonchi.   Cardiovascular:      Regular rhythm.      No gallop. No S3 and S4 gallop.   Edema:     Peripheral edema absent.   Abdominal:      General: Bowel sounds are normal.      Palpations: Abdomen is soft. There is no abdominal mass.      Tenderness: There is no abdominal tenderness.   Musculoskeletal:         General: No deformity.      Extremities: No clubbing present.  Skin:     General: Skin is warm and dry.      Findings: No rash.   Neurological:      Mental Status: Alert and oriented to person, place, and time.            Results Review:     I reviewed the patient's new clinical results.    Results from last 7 days   Lab Units 01/09/21  0655   WBC 10*3/mm3 7.73   HEMOGLOBIN g/dL 14.2   HEMATOCRIT % 44.1   PLATELETS 10*3/mm3 194     Results from " last 7 days   Lab Units 01/10/21  0612  01/06/21  1418   SODIUM mmol/L 142   < > 141   POTASSIUM mmol/L 4.3   < > 4.3   CHLORIDE mmol/L 103   < > 107   CO2 mmol/L 31.0*   < > 23.0   BUN mg/dL 29*   < > 37*   CREATININE mg/dL 1.04   < > 1.30*   CALCIUM mg/dL 8.8   < > 8.7   BILIRUBIN mg/dL  --   --  0.4   ALK PHOS U/L  --   --  68   ALT (SGPT) U/L  --   --  19   AST (SGOT) U/L  --   --  25   GLUCOSE mg/dL 95   < > 99    < > = values in this interval not displayed.     Results from last 7 days   Lab Units 01/10/21  0612   SODIUM mmol/L 142   POTASSIUM mmol/L 4.3   CHLORIDE mmol/L 103   CO2 mmol/L 31.0*   BUN mg/dL 29*   CREATININE mg/dL 1.04   GLUCOSE mg/dL 95   CALCIUM mg/dL 8.8     Results from last 7 days   Lab Units 01/07/21  1247   INR  1.02     No results found for: TROPONINI  Results from last 7 days   Lab Units 01/07/21  0802   TSH uIU/mL 3.980     Results from last 7 days   Lab Units 01/09/21  0655   CHOLESTEROL mg/dL 140   TRIGLYCERIDES mg/dL 70   HDL CHOL mg/dL 61*   LDL CHOL mg/dL 65     Results from last 7 days   Lab Units 01/06/21  1418   PROBNP pg/mL 3,722.0*         Tele: Sinus rhythm    Assessment/Plan       Gastroesophageal reflux disease    Hypertension    Hyperlipidemia    Chronic coronary artery disease    Prediabetes    JIN (obstructive sleep apnea)    Acute congestive heart failure (CMS/[facility])    Acute respiratory failure with hypoxia (CMS/[facility])      1.  Cardiomyopathy, LVEF 25%.  LVEDP at time of cath was 12, normal suggesting adequate diuresis at this visit and euvolemia.  2.  Coronary artery disease.  No significant disease by cath yesterday  3.  Dyslipidemia  4.  Obstructive sleep apnea, started on CPAP 1 day prior to admission    Plan:  1.  Discussed the need for using CPAP.  2.  Ambulate today without oxygen, see if he qualifies for home therapy  3.  Continue to maximize carvedilol and lisinopril as tolerated by blood pressure  4.  Okay to be discharged home today.  Discharge meds addressed  in ADT.  Follow-up in our office in 2 to 3 weeks    Casimiro Bernal MD    Dictated utilizing Dragon dictation

## 2021-01-11 ENCOUNTER — DOCUMENTATION (OUTPATIENT)
Dept: CARDIAC REHAB | Facility: HOSPITAL | Age: 81
End: 2021-01-11

## 2021-01-11 ENCOUNTER — TRANSITIONAL CARE MANAGEMENT TELEPHONE ENCOUNTER (OUTPATIENT)
Dept: CALL CENTER | Facility: HOSPITAL | Age: 81
End: 2021-01-11

## 2021-01-11 NOTE — OUTREACH NOTE
Call Center TCM Note      Responses   [facility] patient discharged from?  [place]   Does the patient have one of the following disease processes/diagnoses(primary or secondary)?  CHF   TCM attempt successful?  Yes   Call start time  1120   Call end time  1127   Discharge diagnosis  Acute CHF, CKD, HTN   Does the patient have all medications ordered at discharge?  Yes   Is the patient taking all medications as directed (includes completed medication regime)?  Yes   Comments regarding appointments  appt with Heart and valve   Does the patient have a primary care provider?   Yes   Does the patient have an appointment with their PCP within 7 days of discharge?  N/A   Comments regarding PCP  patient will call to make a f/u appt with PCP    Has the patient kept scheduled appointments due by today?  N/A   Psychosocial issues?  No   Did the patient receive a copy of their discharge instructions?  Yes   Nursing interventions  Reviewed instructions with patient   What is the patient's perception of their health status since discharge?  Improving   Nursing interventions  Nurse provided patient education   Is the patient able to teach back signs and symptoms of worsening condition? (i.e. weight gain, shortness of air, etc.)  Yes   Is the patient/caregiver able to teach back the hierarchy of who to call/visit for symptoms/problems? PCP, Specialist, Home health nurse, Urgent Care, ED, 911  Yes   TCM call completed?  Yes   Wrap up additional comments  States he is doing well, no questions or concerns at this time.           Rajwinder Bell RN    1/11/2021, 11:28 EST

## 2021-01-11 NOTE — PROGRESS NOTES
Referral received for Phase II Cardiac Rehab.  Staff has reviewed chart and patient does not have a qualifying diagnosis for Phase II Cardiac Rehab at this time. Heart failure is noted as acute and must be chronic in nature to qualify for Phase II Cardiac Rehab. Staff available if further consultation is needed.

## 2021-01-12 DIAGNOSIS — N40.0 BENIGN PROSTATIC HYPERPLASIA WITHOUT LOWER URINARY TRACT SYMPTOMS: ICD-10-CM

## 2021-01-12 DIAGNOSIS — E61.1 IRON DEFICIENCY: Primary | ICD-10-CM

## 2021-01-12 DIAGNOSIS — I25.10 CHRONIC CORONARY ARTERY DISEASE: ICD-10-CM

## 2021-01-12 DIAGNOSIS — E78.2 MIXED HYPERLIPIDEMIA: ICD-10-CM

## 2021-01-12 RX ORDER — ATORVASTATIN CALCIUM 40 MG/1
40 TABLET, FILM COATED ORAL NIGHTLY
Qty: 90 TABLET | Refills: 3 | Status: SHIPPED | OUTPATIENT
Start: 2021-01-12 | End: 2021-05-17 | Stop reason: SDUPTHER

## 2021-01-12 RX ORDER — TAMSULOSIN HYDROCHLORIDE 0.4 MG/1
0.4 CAPSULE ORAL DAILY
Qty: 90 CAPSULE | Refills: 3 | Status: SHIPPED | OUTPATIENT
Start: 2021-01-12

## 2021-01-12 NOTE — TELEPHONE ENCOUNTER
Caller: Isrrael Marino    Relationship: Self    Best call back number: [phone]     Medication needed:   Requested Prescriptions     Pending Prescriptions Disp Refills   • atorvastatin (LIPITOR) 40 MG tablet 90 tablet 0     Sig: Take 1 tablet by mouth Every Night.   • tamsulosin (FLOMAX) 0.4 MG capsule 24 hr capsule 90 capsule 0     Sig: Take 1 capsule by mouth Daily.       When do you need the refill by: SOON    What details did the patient provide when requesting the medication: HAS ENOUGH FOR 2 WEEKS AND WOULD LIKE A 90 SUPPLY IF POSSIBLE    Does the patient have less than a 3 day supply:  [] Yes  [x] No    What is the patient's preferred pharmacy: [facility] - [facility] [address] [facility] - [phone] [facility] [phone] FX       PATIENT IS ALSO NEEDING A REFERRAL FOR DR. LARA FOR A COLONOSCOPY IF THE REFERRAL IS NEEDED     [address] [facility]   [phone]    PATIENT CALL BACK NUMBER IS [phone]

## 2021-01-12 NOTE — TELEPHONE ENCOUNTER
Last Office Visit: 12/16/20  Next Office Visit: 01/19/21    Labs completed in past 6 months? yes  Labs completed in past year? yes    Last Refill Date:08/25/20  Quantity:90  Refills:0    Pharmacy:

## 2021-01-14 NOTE — PROGRESS NOTES
"Chief Complaint  Congestive Heart Failure and Establish Care    Subjective    History of Present Illness {CC  Problem List  Visit  Diagnosis   Encounters  Notes  Medications  Labs  Result Review Imaging  Media :23}       History of Present Illness   80-year-old male presents the office today for ongoing evaluation of his heart failure.  He was recently hospitalized at [facility] 1/6/2021 to 1/10/2021 with heart failure exacerbation.  Echo during hospital stay showed an EF of 30%.  Patient underwent left heart cath which showed dilated left ventricle and no flow-limiting coronary artery disease.  Patient has chronic kidney disease stage III with renal function at baseline, hypertension, depression, obstructive sleep apnea, BPH.  Notes dyspnea on exertion has resolved.  Notes energy level is improving daily.  Patient reports intermittent dizziness since discharge from the hospital.  Reports he has been checking his blood pressures at home and they have been running upper 80s to low 90s over 40s to 50s.  Objective     Vital Signs:   Vitals:    01/15/21 0902 01/15/21 0906 01/15/21 0907   BP: (!) 89/53 (!) 86/48 (!) 85/50   BP Location: Right arm Left arm Left arm   Patient Position: Sitting Sitting Standing   Cuff Size: Adult Adult Adult   Pulse: 60 61 63   Resp:   15   Temp:   97.6 °F (36.4 °C)   TempSrc:   Temporal   SpO2: 96% 95% 97%   Weight:   80.5 kg (177 lb 6 oz)   Height:   182.9 cm (72.01\")     Body mass index is 24.05 kg/m².  Physical Exam  Vitals signs and nursing note reviewed.   Constitutional:       Appearance: Normal appearance.   HENT:      Head: Normocephalic.   Eyes:      Pupils: Pupils are equal, round, and reactive to light.   Neck:      Musculoskeletal: Normal range of motion.   Cardiovascular:      Rate and Rhythm: Normal rate and regular rhythm.      Pulses: Normal pulses.      Heart sounds: Normal heart sounds. No murmur.   Pulmonary:      Effort: Pulmonary effort is " normal.      Breath sounds: Normal breath sounds.   Abdominal:      General: Bowel sounds are normal.      Palpations: Abdomen is soft.   Musculoskeletal: Normal range of motion.      Right lower leg: No edema.      Left lower leg: No edema.   Skin:     General: Skin is warm and dry.      Capillary Refill: Capillary refill takes less than 2 seconds.   Neurological:      Mental Status: He is alert and oriented to person, place, and time.   Psychiatric:         Mood and Affect: Mood normal.         Thought Content: Thought content normal.              Result Review  Data Reviewed:{ Labs  Result Review  Imaging  Med Tab  Media :23}   Basic Metabolic Panel (01/10/2021 06:12)  Lipid Panel (01/09/2021 06:55)  CBC (No Diff) (01/09/2021 06:55)  SCANNED - TELEMETRY (01/08/2021)  Cardiac Catheterization/Vascular Study (01/08/2021 13:29)  Adult Transthoracic Echo Complete W/ Cont if Necessary Per Protocol (01/08/2021 08:27)    Recent hospitalization notes including LHC and echo             Assessment and Plan {CC Problem List  Visit Diagnosis  ROS  Review (Popup)  Health Maintenance  Quality  BestPractice  Medications  SmartSets  SnapShot Encounters  Media :23}   1. Chronic systolic heart failure (CMS/HCC)  euvolemic  nyha II  Heart failure education today including signs and symptoms, the role of the heart failure center, daily weights, low sodium diet (less than 1500 mg per day), and daily physical activity. Reviewed HF Zones with patient and family.  Patient to continue current medications as previously ordered.   2. Chronic coronary artery disease  Currently without angina  Recent left heart cath showed no flow-limiting coronary artery disease  Continue Lipitor, Plavix  3. Essential hypertension  Periods of symptomatic hypotension since discharge from the hospital will decrease lisinopril to 2.5 mg daily  HTN Education provided today including signs and symptoms, medication management, daily blood pressure  monitoring. Patient encouraged to call the Heart and Valve center with any abnormal readings.     4. Mixed hyperlipidemia  statin therapy          Follow Up {Instructions Charge Capture  Follow-up Communications :23}   Return if symptoms worsen or fail to improve.    Patient was given instructions and counseling regarding his condition or for health maintenance advice. Please see specific information pulled into the AVS if appropriate.  Patient was instructed to call the [facility] and [facility] Center with any questions, concerns, or worsening symptoms.    *Please note that portions of this note were completed with a voice recognition program. Efforts were made to edit the dictations, but occasionally words are mistranscribed.

## 2021-01-15 ENCOUNTER — OFFICE VISIT (OUTPATIENT)
Dept: CARDIOLOGY | Facility: HOSPITAL | Age: 81
End: 2021-01-15

## 2021-01-15 VITALS
RESPIRATION RATE: 15 BRPM | HEART RATE: 63 BPM | DIASTOLIC BLOOD PRESSURE: 50 MMHG | TEMPERATURE: 97.6 F | HEIGHT: 72 IN | WEIGHT: 177.38 LBS | OXYGEN SATURATION: 97 % | BODY MASS INDEX: 24.03 KG/M2 | SYSTOLIC BLOOD PRESSURE: 85 MMHG

## 2021-01-15 DIAGNOSIS — I50.22 CHRONIC SYSTOLIC HEART FAILURE (HCC): Primary | ICD-10-CM

## 2021-01-15 DIAGNOSIS — I25.10 CHRONIC CORONARY ARTERY DISEASE: ICD-10-CM

## 2021-01-15 DIAGNOSIS — I10 ESSENTIAL HYPERTENSION: ICD-10-CM

## 2021-01-15 DIAGNOSIS — E78.2 MIXED HYPERLIPIDEMIA: ICD-10-CM

## 2021-01-15 PROCEDURE — 99214 OFFICE O/P EST MOD 30 MIN: CPT | Performed by: NURSE PRACTITIONER

## 2021-01-19 ENCOUNTER — OFFICE VISIT (OUTPATIENT)
Dept: INTERNAL MEDICINE | Facility: CLINIC | Age: 81
End: 2021-01-19

## 2021-01-19 ENCOUNTER — READMISSION MANAGEMENT (OUTPATIENT)
Dept: CALL CENTER | Facility: HOSPITAL | Age: 81
End: 2021-01-19

## 2021-01-19 VITALS
SYSTOLIC BLOOD PRESSURE: 89 MMHG | DIASTOLIC BLOOD PRESSURE: 52 MMHG | HEART RATE: 72 BPM | HEIGHT: 72 IN | TEMPERATURE: 97.1 F | BODY MASS INDEX: 23.86 KG/M2 | RESPIRATION RATE: 16 BRPM | WEIGHT: 176.2 LBS | OXYGEN SATURATION: 99 %

## 2021-01-19 DIAGNOSIS — I25.10 CHRONIC CORONARY ARTERY DISEASE: ICD-10-CM

## 2021-01-19 DIAGNOSIS — I50.21 ACUTE SYSTOLIC CONGESTIVE HEART FAILURE (HCC): Primary | ICD-10-CM

## 2021-01-19 DIAGNOSIS — I10 ESSENTIAL HYPERTENSION: ICD-10-CM

## 2021-01-19 PROBLEM — J96.01 ACUTE RESPIRATORY FAILURE WITH HYPOXIA: Status: RESOLVED | Noted: 2021-01-06 | Resolved: 2021-01-19

## 2021-01-19 PROCEDURE — 99495 TRANSJ CARE MGMT MOD F2F 14D: CPT | Performed by: INTERNAL MEDICINE

## 2021-01-19 PROCEDURE — 1111F DSCHRG MED/CURRENT MED MERGE: CPT | Performed by: INTERNAL MEDICINE

## 2021-01-19 NOTE — PROGRESS NOTES
Transitional Care Follow Up Visit  Subjective     Isrrael Marino is a 80 y.o. male who presents for a transitional care management visit.    Within 48 business hours after discharge our office contacted him via telephone to coordinate his care and needs.      I reviewed and discussed the details of that call along with the discharge summary, hospital problems, inpatient lab results, inpatient diagnostic studies, and consultation reports with Isrrael.     Current outpatient and discharge medications have been reconciled for the patient.  Reviewed by: Ivanna George MD      Date of TCM Phone Call 1/10/2021   [facility]   Date of Admission 1/6/2021   Date of Discharge 1/10/2021   Discharge Disposition Home or Self Care     Risk for Readmission (LACE) Score: 9 (1/10/2021  6:00 AM)      History of Present Illness   Course During Hospital Stay:  Mr. Marino was admitted 1/6-1/10 for acute systolic congestive heart failure. He had echo with EF 30% and subsequent LHC showing nonobstructive CAD, EF 25% and dilated LV. He was diuresed and he had thoracentesis for bilateral R>L pleural effusions. He was discharged on ASA, plavix, coreg 6.25mg BID, lisinopril 5mg daily, aldactone 25mg daily, and lasix 20mg daily. His CKD3 was felt to be stable during his admission. After discharge he has had dizziness, lightheadedness and was seen by cardiology on 1/15 with BP 85/50. Lisinopril was decreased to 2.5mg daily. He continues to have dizziness upon standing. He brings BP and weight log today showing premedication BP Sun 123/70, Mon 130/95, Tue 115/71 and weight 168.6, 169.4, 171.0. He reports that his good weight is around 175lbs.     The following portions of the patient's history were reviewed and updated as appropriate: allergies, current medications, past family history, past medical history, past social history, past surgical history and problem list.    Review of Systems   Constitutional: Positive for fatigue.  Negative for activity change, appetite change, chills, diaphoresis, fever and unexpected weight change.   Respiratory: Negative.    Cardiovascular: Negative for chest pain, palpitations and leg swelling.   Gastrointestinal: Positive for diarrhea (single episode, now resolved). Negative for abdominal distention, abdominal pain, anal bleeding, blood in stool, nausea and vomiting.   Genitourinary: Negative for decreased urine volume and difficulty urinating.   Neurological: Positive for dizziness, weakness and light-headedness.       Objective   Physical Exam  Vitals signs and nursing note reviewed.   Constitutional:       General: He is not in acute distress.     Appearance: Normal appearance. He is well-developed and normal weight.   HENT:      Head: Normocephalic and atraumatic.      Ears:      Comments: Hard of hearing     Mouth/Throat:      Mouth: Mucous membranes are moist.      Pharynx: Oropharynx is clear.   Eyes:      Conjunctiva/sclera: Conjunctivae normal.   Cardiovascular:      Rate and Rhythm: Normal rate and regular rhythm.      Heart sounds: Normal heart sounds. No murmur.   Pulmonary:      Effort: Pulmonary effort is normal. No respiratory distress.      Breath sounds: Normal breath sounds. No rales.      Comments:  No evidence of large residual pleural effusions on examination  Musculoskeletal:      Right lower leg: No edema.      Left lower leg: No edema.   Skin:     General: Skin is warm and dry.   Neurological:      General: No focal deficit present.      Mental Status: He is alert and oriented to person, place, and time. Mental status is at baseline.      Gait: Gait normal.   Psychiatric:         Mood and Affect: Mood normal.         Behavior: Behavior normal.         Assessment/Plan   Diagnoses and all orders for this visit:    Acute systolic congestive heart failure (CMS/HCC)  - admitted 1/6-1/10 with acute systolic CHF with EF 30% on echo, 25% with dilated LV and low LV filling pressures on  [facility]  - No obstructive CAD on [facility]  - Clinically improved with diuresis and thoracentesis of R>L pleural effusions  - Discharged on coreg 6.25mg BID, aldactone 25mg daily, lasix 20mg daily, and lisinopril 5mg daily  - Has had relatively stable weight and appears euvolemic however secondary to aggressive medication regimen has been experiencing symptomatic hypotension and lisinopril decreased to 2.5mg daily on 1/15 cardiology visit. Unfortunately BP remains low.  - Will dc aldactone today and have him continue to monitor BP with plan for recheck on Friday. If BP still hypotensive will have him make lasix PRN    Chronic coronary artery disease  - [facility] 1/7 without flow limiting CAD, on coreg, statin, ASA and plavix due to hx of prior stenting    Essential hypertension  - Currently with hypotension suspected secondary to medications. Discontinuing aldactone as noted above with recheck in 3 days.  - He will remain on coreg 6.25mg BID, lisinopril 2.5mg daily, and lasix 20mg daily.    Health Maintenance  - Colonoscopy: No longer indicated due to age.  - Immunizations: Tdap 2017. Flu UTD. Shingrix series complete. Pneumovax 2010.  - Depression screening: negative 8/2020           Return in about 3 days (around 1/22/2021) for Recheck.

## 2021-01-19 NOTE — OUTREACH NOTE
CHF Week 2 Survey      Responses   [facility] patient discharged from?  [place]   Does the patient have one of the following disease processes/diagnoses(primary or secondary)?  CHF   Week 2 attempt successful?  No   Unsuccessful attempts  Attempt 1          Rajwinder Bell RN

## 2021-01-20 ENCOUNTER — READMISSION MANAGEMENT (OUTPATIENT)
Dept: CALL CENTER | Facility: HOSPITAL | Age: 81
End: 2021-01-20

## 2021-01-20 NOTE — OUTREACH NOTE
CHF Week 2 Survey      Responses   [facility] patient discharged from?  [place]   Does the patient have one of the following disease processes/diagnoses(primary or secondary)?  CHF   Week 2 attempt successful?  No   Unsuccessful attempts  Attempt 2          Rose Marie Loza RN

## 2021-01-22 ENCOUNTER — OFFICE VISIT (OUTPATIENT)
Dept: INTERNAL MEDICINE | Facility: CLINIC | Age: 81
End: 2021-01-22

## 2021-01-22 VITALS
DIASTOLIC BLOOD PRESSURE: 54 MMHG | TEMPERATURE: 96.9 F | BODY MASS INDEX: 23.84 KG/M2 | HEART RATE: 63 BPM | HEIGHT: 72 IN | SYSTOLIC BLOOD PRESSURE: 105 MMHG | WEIGHT: 176 LBS | RESPIRATION RATE: 16 BRPM | OXYGEN SATURATION: 91 %

## 2021-01-22 DIAGNOSIS — E86.1 HYPOTENSION DUE TO HYPOVOLEMIA: Primary | ICD-10-CM

## 2021-01-22 DIAGNOSIS — I95.89 HYPOTENSION DUE TO HYPOVOLEMIA: Primary | ICD-10-CM

## 2021-01-22 PROCEDURE — 99213 OFFICE O/P EST LOW 20 MIN: CPT | Performed by: INTERNAL MEDICINE

## 2021-01-22 NOTE — PROGRESS NOTES
Internal Medicine Follow Up    Chief Complaint  Isrrael Marino is a 80 y.o. male who presents today for follow up of chronic medical conditions outlined below.    Chief Complaint   Patient presents with   • Congestive Heart Failure     3 day follow up        HPI  Mr. Marino comes in today for close follow up of hypotension. Last visit was 3 days ago and he has held his aldactone since then. BP remains low at 80s/50s in office today however he does bring BP log with higher readings 120/80s before AM medications and 110/70-80 1 hour following. He notes difficulty getting cuff positioned and attributes higher readings to this. He does still feel dizzy upon standing. He notes decrease in urination predominantly less episodes of nocturia. He is trying to hydrate better during the day. He denies N/V/D, blood in stools, poor intake. He is not SOA and has no edema. His weight today is down to 167, he reports his good weight to be around 175.       Review of Systems  Review of Systems   Constitutional: Positive for unexpected weight loss. Negative for chills and fever.   Respiratory: Negative.    Cardiovascular: Negative.    Gastrointestinal: Negative for anal bleeding, blood in stool, diarrhea, nausea and vomiting.   Genitourinary: Positive for decreased urine volume and nocturia (less). Negative for difficulty urinating.   Musculoskeletal: Negative for gait problem (no falls).   Neurological: Positive for dizziness. Negative for syncope.        Current Medications  Current Outpatient Medications on File Prior to Visit   Medication Sig Dispense Refill   • atorvastatin (LIPITOR) 40 MG tablet Take 1 tablet by mouth Every Night. 90 tablet 3   • B Complex Vitamins (VITAMIN B COMPLEX PO) Take 1 tablet by mouth Daily.     • carvedilol (COREG) 6.25 MG tablet Take 1 tablet by mouth Every 12 (Twelve) Hours. 60 tablet 11   • Cholecalciferol (VITAMIN D3) 125 MCG (5000 UT) capsule capsule Take 2,000 Units by mouth Daily.     •  "clopidogrel (PLAVIX) 75 MG tablet Take 1 tablet by mouth Daily. 90 tablet 4   • diclofenac (VOLTAREN) 75 MG EC tablet Take 75 mg by mouth Daily.     • DULoxetine (CYMBALTA) 30 MG capsule Take 2 capsules by mouth Daily. (Patient taking differently: Take 30 mg by mouth Daily.) 180 capsule 1   • fluorouracil (EFUDEX) 5 % cream      • fluticasone (FLONASE) 50 MCG/ACT nasal spray 1 spray into the nostril(s) as directed by provider Daily. 3 bottle 1   • furosemide (LASIX) 20 MG tablet Take 1 tablet by mouth Daily. 30 tablet 11   • HYDROcodone-acetaminophen (NORCO) 5-325 MG per tablet Take 1 tablet by mouth As Needed.     • lisinopril (PRINIVIL,ZESTRIL) 5 MG tablet Take 1 tablet by mouth Daily. (Patient taking differently: Take 2.5 mg by mouth Daily. Change by Tish MCDOWELL) 30 tablet 6   • Melatonin 3 MG capsule Take  by mouth.     • Potassium Gluconate 550 (90 K) MG tablet Take  by mouth Daily.     • spironolactone (ALDACTONE) 25 MG tablet Take 1 tablet by mouth Daily. 30 tablet 11   • tamsulosin (FLOMAX) 0.4 MG capsule 24 hr capsule Take 1 capsule by mouth Daily. 90 capsule 3   • vitamin B-12 (CYANOCOBALAMIN) 1000 MCG tablet Take 1,000 mcg by mouth Daily.     • Zinc 50 MG capsule Take  by mouth Daily.       No current facility-administered medications on file prior to visit.        Allergies  Allergies   Allergen Reactions   • Ibuprofen Hives     \"Pt states he hasn't taken this in a long time\"       Objective  Visit Vitals  /54 Comment: PT personal cuff   Pulse 63   Temp 96.9 °F (36.1 °C)   Resp 16   Ht 182.9 cm (72.01\")   Wt 79.8 kg (176 lb)   SpO2 91%   BMI 23.86 kg/m²        Physical Exam  Physical Exam  Constitutional:       Appearance: Normal appearance. He is not ill-appearing or diaphoretic.      Comments: Thin   HENT:      Head: Normocephalic and atraumatic.      Right Ear: External ear normal.      Left Ear: External ear normal.   Eyes:      Conjunctiva/sclera: Conjunctivae normal.   Cardiovascular: "      Rate and Rhythm: Normal rate and regular rhythm.      Heart sounds: No murmur.   Pulmonary:      Effort: Pulmonary effort is normal. No respiratory distress.      Breath sounds: Normal breath sounds. No rales.   Neurological:      Mental Status: He is alert.         Results  No results for today's encounter.     Assessment and Plan  Diagnoses and all orders for this visit:    Hypotension due to hypovolemia  - Ongoing hypotension since discharge from hospital with acute systolic CHF on new medication regimen. Has had lisinopril dose cut in half to 2.5mg daily, aldactone discontinued.  - Symptomatic with dizziness  - I see no signs of volume overload today and suspect potential hypovolemia from lasix. Will have him stop daily lasix and take lasix 20mg daily PRN edema, SOA, weight gain which he was instructed on today. He is to hold through the weekend.  - CBC and CMP today as well  - Close follow up in 1 week    Health Maintenance  - Colonoscopy: No longer indicated due to age.  - Immunizations: Tdap 2017. Flu UTD. Shingrix series complete. Pneumovax 2010.  - Depression screening: negative 8/2020     Return in about 1 week (around 1/29/2021) for Follow up low BP, Labs today.

## 2021-01-28 ENCOUNTER — READMISSION MANAGEMENT (OUTPATIENT)
Dept: CALL CENTER | Facility: HOSPITAL | Age: 81
End: 2021-01-28

## 2021-01-28 ENCOUNTER — OFFICE VISIT (OUTPATIENT)
Dept: INTERNAL MEDICINE | Facility: CLINIC | Age: 81
End: 2021-01-28

## 2021-01-28 VITALS
OXYGEN SATURATION: 98 % | HEART RATE: 70 BPM | BODY MASS INDEX: 22.78 KG/M2 | DIASTOLIC BLOOD PRESSURE: 60 MMHG | SYSTOLIC BLOOD PRESSURE: 104 MMHG | WEIGHT: 168.2 LBS | HEIGHT: 72 IN | RESPIRATION RATE: 16 BRPM | TEMPERATURE: 96.9 F

## 2021-01-28 DIAGNOSIS — E86.1 HYPOTENSION DUE TO HYPOVOLEMIA: ICD-10-CM

## 2021-01-28 DIAGNOSIS — J30.9 ALLERGIC RHINITIS, UNSPECIFIED SEASONALITY, UNSPECIFIED TRIGGER: ICD-10-CM

## 2021-01-28 DIAGNOSIS — I95.89 HYPOTENSION DUE TO HYPOVOLEMIA: ICD-10-CM

## 2021-01-28 DIAGNOSIS — I50.22 CHRONIC SYSTOLIC CONGESTIVE HEART FAILURE (HCC): Primary | ICD-10-CM

## 2021-01-28 PROCEDURE — 99214 OFFICE O/P EST MOD 30 MIN: CPT | Performed by: INTERNAL MEDICINE

## 2021-01-28 RX ORDER — FLUTICASONE PROPIONATE 50 MCG
1 SPRAY, SUSPENSION (ML) NASAL DAILY
Qty: 48 G | Refills: 3 | Status: SHIPPED | OUTPATIENT
Start: 2021-01-28 | End: 2022-08-08 | Stop reason: SDUPTHER

## 2021-01-28 NOTE — PROGRESS NOTES
Internal Medicine Follow Up    Chief Complaint  Isrrael Marino is a 80 y.o. male who presents today for follow up of chronic medical conditions outlined below.    Chief Complaint   Patient presents with   • Follow-up     1 week        HPI  Mr. Marino comes in today for follow up of his heart failure and symptomatic hypotension. He was last seen a week ago approximately. He has been holding lasix since that time with stable weight and minimal LE edema. No SOA. He endorses increased energy and no further episodes of dizziness. He has felt well enough to resume his supplements of vitamin D, B complex. His BP has improved to the low 100s/60s. He has no complaints otherwise and only requests refill of flonase which helps to manage nasal congestion and allows him to breathe through his nose when using CPAP.       Review of Systems  Review of Systems   Constitutional: Negative for fatigue and unexpected weight gain.   HENT: Positive for congestion.    Respiratory: Negative for shortness of breath.    Cardiovascular: Positive for leg swelling. Negative for chest pain and palpitations.   Genitourinary: Negative for difficulty urinating.   Musculoskeletal: Negative for gait problem.   Neurological: Negative for dizziness.        Current Medications  Current Outpatient Medications on File Prior to Visit   Medication Sig Dispense Refill   • atorvastatin (LIPITOR) 40 MG tablet Take 1 tablet by mouth Every Night. 90 tablet 3   • B Complex Vitamins (VITAMIN B COMPLEX PO) Take 1 tablet by mouth Daily.     • carvedilol (COREG) 6.25 MG tablet Take 1 tablet by mouth Every 12 (Twelve) Hours. 60 tablet 11   • Cholecalciferol (VITAMIN D3) 125 MCG (5000 UT) capsule capsule Take 2,000 Units by mouth Daily.     • clopidogrel (PLAVIX) 75 MG tablet Take 1 tablet by mouth Daily. 90 tablet 4   • diclofenac (VOLTAREN) 75 MG EC tablet Take 75 mg by mouth Daily.     • DULoxetine (CYMBALTA) 30 MG capsule Take 2 capsules by mouth Daily.  "(Patient taking differently: Take 30 mg by mouth Daily.) 180 capsule 1   • Ferrous Sulfate (IRON PO) Take  by mouth Every 3 (Three) Days.     • fluorouracil (EFUDEX) 5 % cream      • furosemide (LASIX) 20 MG tablet Take 1 tablet by mouth Daily. 30 tablet 11   • HYDROcodone-acetaminophen (NORCO) 5-325 MG per tablet Take 1 tablet by mouth As Needed.     • lisinopril (PRINIVIL,ZESTRIL) 5 MG tablet Take 1 tablet by mouth Daily. (Patient taking differently: Take 2.5 mg by mouth Daily. Change by Tish MCDOWELL) 30 tablet 6   • Melatonin 3 MG capsule Take  by mouth.     • Potassium Gluconate 550 (90 K) MG tablet Take  by mouth Daily.     • tamsulosin (FLOMAX) 0.4 MG capsule 24 hr capsule Take 1 capsule by mouth Daily. 90 capsule 3   • vitamin B-12 (CYANOCOBALAMIN) 1000 MCG tablet Take 1,000 mcg by mouth Daily.     • VITAMIN D, CHOLECALCIFEROL, PO Take  by mouth Daily.     • Zinc 50 MG capsule Take  by mouth Daily.     • [DISCONTINUED] fluticasone (FLONASE) 50 MCG/ACT nasal spray 1 spray into the nostril(s) as directed by provider Daily. 3 bottle 1     No current facility-administered medications on file prior to visit.        Allergies  Allergies   Allergen Reactions   • Ibuprofen Hives     \"Pt states he hasn't taken this in a long time\"       Objective  Visit Vitals  /60   Pulse 70   Temp 96.9 °F (36.1 °C)   Resp 16   Ht 182.9 cm (72.01\")   Wt 76.3 kg (168 lb 3.2 oz)   SpO2 98%   BMI 22.81 kg/m²        Physical Exam  Physical Exam  Vitals signs and nursing note reviewed.   Constitutional:       General: He is not in acute distress.     Appearance: Normal appearance. He is well-developed.   HENT:      Head: Normocephalic and atraumatic.   Eyes:      Conjunctiva/sclera: Conjunctivae normal.   Cardiovascular:      Rate and Rhythm: Normal rate and regular rhythm.      Heart sounds: Normal heart sounds.   Pulmonary:      Effort: Pulmonary effort is normal. No respiratory distress.      Breath sounds: Normal breath " sounds. No rales.   Musculoskeletal:      Right lower leg: Edema (trace pitting edema above sock line) present.      Left lower leg: Edema (trace pitting edema above sock line) present.   Skin:     General: Skin is warm and dry.   Neurological:      Mental Status: He is alert and oriented to person, place, and time. Mental status is at baseline.      Gait: Gait normal.   Psychiatric:         Mood and Affect: Mood normal.         Results  No results associated with this encounter.     Assessment and Plan  Diagnoses and all orders for this visit:    Chronic systolic congestive heart failure (CMS/HCC)  - During admission 1/2021 EF 30% on echo, 25% with dilated LV and low LV filling pressures on [facility]  - No obstructive CAD on [facility]  - As this was a new diagnosis he was discharged on several new medications leading to symptomatic hypotension with adjustments made to medication regimen, now resolved.  - Current regimen coreg 6.25mg BID, lasix 20mg daily PRN, and lisinopril 2.5mg daily  - Has mild edema however stable weight and no SOA. Advised to take lasix in the next day or two when convenient for him.  - Follow up with cardiology 2/15     Hypotension due to hypovolemia  - BP improved to within a reasonable range with holding lasix x6-7 days. Will continue PRN use as noted above.  - He did not get CBC, CMP ordered at last visit so will get those completed today    Allergic rhinitis, unspecified seasonality, unspecified trigger  - Managed with flonase, refilled today.     Health Maintenance  - Colonoscopy: No longer indicated due to age.  - Immunizations: Tdap 2017. Flu UTD. Shingrix series complete. Pneumovax 2010.  - Depression screening: negative 8/2020     Return in about 3 months (around 4/28/2021) for Follow up, Labs today.

## 2021-01-28 NOTE — OUTREACH NOTE
CHF Week 3 Survey      Responses   [facility] [place] patient discharged from?  [place]   Does the patient have one of the following disease processes/diagnoses(primary or secondary)?  CHF   Week 3 attempt successful?  No   Unsuccessful attempts  Attempt 1          Gila Laird RN

## 2021-02-02 ENCOUNTER — READMISSION MANAGEMENT (OUTPATIENT)
Dept: CALL CENTER | Facility: HOSPITAL | Age: 81
End: 2021-02-02

## 2021-02-10 ENCOUNTER — OFFICE VISIT (OUTPATIENT)
Dept: ORTHOPEDIC SURGERY | Facility: CLINIC | Age: 81
End: 2021-02-10

## 2021-02-10 VITALS — BODY MASS INDEX: 22.78 KG/M2 | HEART RATE: 80 BPM | OXYGEN SATURATION: 98 % | HEIGHT: 72 IN | WEIGHT: 168.21 LBS

## 2021-02-10 DIAGNOSIS — M19.011 ARTHRITIS OF RIGHT GLENOHUMERAL JOINT: ICD-10-CM

## 2021-02-10 DIAGNOSIS — G89.29 CHRONIC PAIN OF BOTH SHOULDERS: Primary | ICD-10-CM

## 2021-02-10 DIAGNOSIS — M25.512 CHRONIC PAIN OF BOTH SHOULDERS: Primary | ICD-10-CM

## 2021-02-10 DIAGNOSIS — M25.511 CHRONIC PAIN OF BOTH SHOULDERS: Primary | ICD-10-CM

## 2021-02-10 DIAGNOSIS — M19.012 ARTHRITIS OF LEFT GLENOHUMERAL JOINT: ICD-10-CM

## 2021-02-10 PROCEDURE — 20610 DRAIN/INJ JOINT/BURSA W/O US: CPT | Performed by: PHYSICIAN ASSISTANT

## 2021-02-10 PROCEDURE — 99213 OFFICE O/P EST LOW 20 MIN: CPT | Performed by: PHYSICIAN ASSISTANT

## 2021-02-10 RX ORDER — TRIAMCINOLONE ACETONIDE 40 MG/ML
40 INJECTION, SUSPENSION INTRA-ARTICULAR; INTRAMUSCULAR
Status: COMPLETED | OUTPATIENT
Start: 2021-02-10 | End: 2021-02-10

## 2021-02-10 RX ORDER — LIDOCAINE HYDROCHLORIDE 10 MG/ML
4 INJECTION, SOLUTION EPIDURAL; INFILTRATION; INTRACAUDAL; PERINEURAL
Status: COMPLETED | OUTPATIENT
Start: 2021-02-10 | End: 2021-02-10

## 2021-02-10 RX ADMIN — LIDOCAINE HYDROCHLORIDE 4 ML: 10 INJECTION, SOLUTION EPIDURAL; INFILTRATION; INTRACAUDAL; PERINEURAL at 08:37

## 2021-02-10 RX ADMIN — LIDOCAINE HYDROCHLORIDE 4 ML: 10 INJECTION, SOLUTION EPIDURAL; INFILTRATION; INTRACAUDAL; PERINEURAL at 08:36

## 2021-02-10 RX ADMIN — TRIAMCINOLONE ACETONIDE 40 MG: 40 INJECTION, SUSPENSION INTRA-ARTICULAR; INTRAMUSCULAR at 08:37

## 2021-02-10 RX ADMIN — TRIAMCINOLONE ACETONIDE 40 MG: 40 INJECTION, SUSPENSION INTRA-ARTICULAR; INTRAMUSCULAR at 08:36

## 2021-02-10 NOTE — PROGRESS NOTES
Procedure   Large Joint Arthrocentesis: R glenohumeral  Date/Time: 2/10/2021 8:36 AM  Consent given by: patient  Site marked: site marked  Timeout: Immediately prior to procedure a time out was called to verify the correct patient, procedure, equipment, support staff and site/side marked as required   Supporting Documentation  Indications: pain   Procedure Details  Location: shoulder - R glenohumeral  Preparation: Patient was prepped and draped in the usual sterile fashion  Needle size: 22 G  Approach: anterolateral  Medications administered: 40 mg triamcinolone acetonide 40 MG/ML; 4 mL lidocaine PF 1% 1 %  Patient tolerance: patient tolerated the procedure well with no immediate complications    Large Joint Arthrocentesis: L glenohumeral  Date/Time: 2/10/2021 8:37 AM  Consent given by: patient  Site marked: site marked  Timeout: Immediately prior to procedure a time out was called to verify the correct patient, procedure, equipment, support staff and site/side marked as required   Supporting Documentation  Indications: pain   Procedure Details  Location: shoulder - L glenohumeral  Preparation: Patient was prepped and draped in the usual sterile fashion  Needle size: 22 G  Approach: anterolateral  Medications administered: 40 mg triamcinolone acetonide 40 MG/ML; 4 mL lidocaine PF 1% 1 %  Patient tolerance: patient tolerated the procedure well with no immediate complications

## 2021-02-10 NOTE — PROGRESS NOTES
[facility] [place] [facility] Note    Subjective     Patient: Isrrael Marino  : 1940    Primary Care Provider: Ivanna George MD    Requesting Provider: As above    Follow-up (1 YEAR BILATERAL SHOUDLERS)      History    Chief Complaint: Bilateral shoulder pain    History of Present Illness: Patient returns today for his bilateral shoulder pain.  He has has GH injection with good relief.  He has known end stage shoulder arthritis and is not interested in surgery. He would like repeat injections today.    Current Outpatient Medications on File Prior to Visit   Medication Sig Dispense Refill   • atorvastatin (LIPITOR) 40 MG tablet Take 1 tablet by mouth Every Night. 90 tablet 3   • B Complex Vitamins (VITAMIN B COMPLEX PO) Take 1 tablet by mouth Daily.     • carvedilol (COREG) 6.25 MG tablet Take 1 tablet by mouth Every 12 (Twelve) Hours. 60 tablet 11   • Cholecalciferol (VITAMIN D3) 125 MCG (5000 UT) capsule capsule Take 2,000 Units by mouth Daily.     • clopidogrel (PLAVIX) 75 MG tablet Take 1 tablet by mouth Daily. 90 tablet 4   • diclofenac (VOLTAREN) 75 MG EC tablet Take 75 mg by mouth Daily.     • DULoxetine (CYMBALTA) 30 MG capsule Take 2 capsules by mouth Daily. (Patient taking differently: Take 30 mg by mouth Daily.) 180 capsule 1   • Ferrous Sulfate (IRON PO) Take  by mouth Every 3 (Three) Days.     • fluorouracil (EFUDEX) 5 % cream      • fluticasone (FLONASE) 50 MCG/ACT nasal spray 1 spray into the nostril(s) as directed by provider Daily. 48 g 3   • furosemide (LASIX) 20 MG tablet Take 1 tablet by mouth Daily. 30 tablet 11   • HYDROcodone-acetaminophen (NORCO) 5-325 MG per tablet Take 1 tablet by mouth As Needed.     • lisinopril (PRINIVIL,ZESTRIL) 5 MG tablet Take 1 tablet by mouth Daily. (Patient taking differently: Take 2.5 mg by mouth Daily. Change by Tish MCDOWELL) 30 tablet 6   • Melatonin 3 MG capsule Take  by mouth.     • Potassium Gluconate 550 (90 K) MG tablet Take   "by mouth Daily.     • tamsulosin (FLOMAX) 0.4 MG capsule 24 hr capsule Take 1 capsule by mouth Daily. 90 capsule 3   • vitamin B-12 (CYANOCOBALAMIN) 1000 MCG tablet Take 1,000 mcg by mouth Daily.     • VITAMIN D, CHOLECALCIFEROL, PO Take  by mouth Daily.     • Zinc 50 MG capsule Take  by mouth Daily.       No current facility-administered medications on file prior to visit.       Allergies   Allergen Reactions   • Ibuprofen Hives     \"Pt states he hasn't taken this in a long time\"      Past Medical History:   Diagnosis Date   • Arrhythmia    • Arthritis     both knees   • Broken neck (CMS/HCC)    • CAD (coronary artery disease)    • Cancer (CMS/HCC)    • Chondrocalcinosis of knee    • GERD (gastroesophageal reflux disease)    • Gout    • Heart attack (CMS/HCC)      Last Assessed: 28 Mar 2014   • Heart disease    • History of transfusion     own blood with hip surgery   • Hyperlipidemia    • Hypertension    • IBS (irritable bowel syndrome)    • Left rotator cuff tear arthropathy    • Low back pain    • Lower extremity neuropathy    • Lumbar canal stenosis    • Neck pain    • Numbness    • Right hip pain    • Rotator cuff tear arthropathy of right shoulder    • Thin blood (CMS/HCC)      Past Surgical History:   Procedure Laterality Date   • APPENDECTOMY  1956   • BACK SURGERY  1997    spinal decompression and fusion   • BREAST LUMPECTOMY  2003   • CARDIAC CATHETERIZATION      with two stents//. DR. TOLEDO   • CARDIAC CATHETERIZATION N/A 1/8/2021    Procedure: LEFT HEART CATH;  Surgeon: Casimiro Bernal MD;  Location:  [facility];  Service: Cardiovascular;  Laterality: N/A;   • CARPAL TUNNEL RELEASE  03/28/2014    Neuroplasty Decompression Median Nerve At Carpal Tunnel   • CERVICAL DISCECTOMY POSTERIOR FUSION WITH BRAIN LAB N/A 7/13/2018    Procedure: CERVICAL LAMINECTOMY DECOMPRESSION POSTERIOR C1-2 POSTERIOR CERVICAL FUSION;  Surgeon: Randall Barnett MD;  Location:  [facility] OR;  Service: " "Neurosurgery   • COLONOSCOPY     • CORONARY STENT PLACEMENT  2013   • HERNIA REPAIR  2002    & 1998   • LUMBAR DISCECTOMY FUSION INSTRUMENTATION     • TONSILLECTOMY     • TOTAL HIP ARTHROPLASTY  2002   • VASECTOMY     • WISDOM TOOTH EXTRACTION       Family History   Problem Relation Age of Onset   • Cancer Mother    • Heart disease Father    • Hypertension Father    • Heart attack Father    • Sleep apnea Son       Social History     Socioeconomic History   • Marital status:      Spouse name: Not on file   • Number of children: Not on file   • Years of education: Not on file   • Highest education level: Not on file   Tobacco Use   • Smoking status: Never Smoker   • Smokeless tobacco: Never Used   Substance and Sexual Activity   • Alcohol use: Not Currently   • Drug use: Never   • Sexual activity: Defer   Social History Narrative    Caffeine 1-2 decaf coffee        Review of Systems   Constitutional: Negative.    HENT: Negative.    Eyes: Negative.    Respiratory: Negative.    Cardiovascular: Negative.    Gastrointestinal: Negative.    Endocrine: Negative.    Genitourinary: Negative.    Musculoskeletal: Positive for arthralgias.   Skin: Negative.    Allergic/Immunologic: Negative.    Neurological: Negative.    Hematological: Negative.    Psychiatric/Behavioral: Negative.        The following portions of the patient's history were reviewed and updated as appropriate: allergies, current medications, past family history, past medical history, past social history, past surgical history and problem list.      Objective      Physical Exam  Pulse 80   Ht 182.9 cm (72.01\")   Wt 76.3 kg (168 lb 3.4 oz)   SpO2 98%   BMI 22.81 kg/m²     Body mass index is 22.81 kg/m².    Patient is well developed, well nourished and in no acute distress.  Alert and oriented x 3.    Ortho Exam  Musculoskeletal:  Bilateral shoulder exam: Stiffness in bilateral shoulders with intact rotator cuff strength but weak.  Neurovascular intact.  " Patient able to make a composite fist.      Medical Decision Making    Data Review:   ordered and reviewed x-rays today    Assessment:  1. Chronic pain of both shoulders    2. Arthritis of right glenohumeral joint    3. Arthritis of left glenohumeral joint        Plan:  Bilateral glenohumeral arthritis.  Reviewed his x-rays clinical findings past and current treatment with the patient.  X-rays today show Advanced glenohumeral degeneration, with bone-on-bone contact, mildly high riding humeral head, cystic formation in the humeral head, no acute bony abnormalities.  Worsening compared to the previous films.  Recommendation today is repeat glenohumeral steroid injection.  I will see him back when he is ready for knee injections or sooner if needed.    Using sterile technique, the left shoulder with sterilely prepped with Hibiclens.  After time out, using a 22-gauge needle, the left glenohumeral joint was injected with 40 mg Kenalog and 4 cc of lidocaine.  Patient tolerated the procedure well.    Using sterile technique, the right shoulder with sterilely prepped with Hibiclens.  After time out, using a 22-gauge needle, the right glenohumeral joint was injected with 40 mg Depo-Medrol and 4cc of lidocaine.  Patient tolerated the procedure well.        Tish Shelley PA-C  02/10/21  10:54 EST

## 2021-02-12 NOTE — PROGRESS NOTES
[facility] Cardiology  Subjective:     Encounter Date: 02/15/2021      Patient ID: Isrrael Marino is a 80 y.o. male.    Chief Complaint: CCAD      PROBLEM LIST:  1. Coronary artery disease:  a. Non STEMI, 09/22/2013.  Cardiac catheterization:   99% first diagonal (2.5 x 15 Xience), 95% second diagonal (PTCA), 50% LAD, FFR 0.83.  EF 50%.  b. On 11/19/2013:  Crescendo angina.  Catheterization:  95% LAD/70% second diagonal 3.0 x 23-mm XIENCE (LAD) and 2.75 x 12-mm XIENCE (second diagonal). Widely  patent first diagonal stent.   c. Carotid duplex, 12/10/2019: Bilateral ICA stenosis 0-49%.  d. Stress test, 12/10/2019: Normal study with no evidence of ischemia. EF 47%.  e. Echocardiogram, 1/8/2021: EF 30%. No hemodynamically significant valvular heart disease.   f. LHC, 1/8/2021: EF 25% with dilated left ventricle. Low LV filling pressures. No flow-limiting coronary artery disease.  2. Hypertension.  3. Dyslipidemia.   4. Lower  extremity neuropathy.  5. Arthritis.  6. Skin cancer with removal  7. Traumatic fracture of C2  8. Surgeries:  a. Appendectomy  b. Lumbar surgery  c. Carpal tunnel surgery  d. Hernia repair  e. Right hip replacement  f. Vasectomy      History of Present Illness  Isrrael Marino returns today for 3-month follow up with a history of coronary artery disease and cardiac risk factors. Since last visit, he was admitted at [facility] for 4 days due to hypoxia which was felt to be likely caused by acute systolic heart failure with bilateral pleural effusion. He underwent thoracentesis and was weaned off of oxygen with no need for home oxygen at discharge. He had echocardiogram and LHC during the admission. After discharge, his lisinopril dose was cut in half due to dizziness. His dizziness persisted and he feels this was related to excessive diuresis. He is now only taking furosemide and he has been monitoring his weight. He continues to experience occasional lightheadedness.  "He has been experiencing upper back pain which radiates to his left shoulder and he has tingling in the left shoulder and upper arm.     Allergies   Allergen Reactions   • Ibuprofen Hives     \"Pt states he hasn't taken this in a long time\"         Current Outpatient Medications:   •  atorvastatin (LIPITOR) 40 MG tablet, Take 1 tablet by mouth Every Night., Disp: 90 tablet, Rfl: 3  •  B Complex Vitamins (VITAMIN B COMPLEX PO), Take 1 tablet by mouth Daily., Disp: , Rfl:   •  carvedilol (COREG) 6.25 MG tablet, Take 1 tablet by mouth Every 12 (Twelve) Hours., Disp: 60 tablet, Rfl: 11  •  Cholecalciferol (VITAMIN D3) 125 MCG (5000 UT) capsule capsule, Take 2,000 Units by mouth Daily., Disp: , Rfl:   •  clopidogrel (PLAVIX) 75 MG tablet, Take 1 tablet by mouth Daily., Disp: 90 tablet, Rfl: 4  •  diclofenac (VOLTAREN) 75 MG EC tablet, Take 75 mg by mouth Daily., Disp: , Rfl:   •  DULoxetine (CYMBALTA) 30 MG capsule, Take 2 capsules by mouth Daily. (Patient taking differently: Take 30 mg by mouth Daily.), Disp: 180 capsule, Rfl: 1  •  Ferrous Sulfate (IRON PO), Take  by mouth Every 3 (Three) Days., Disp: , Rfl:   •  fluorouracil (EFUDEX) 5 % cream, , Disp: , Rfl:   •  fluticasone (FLONASE) 50 MCG/ACT nasal spray, 1 spray into the nostril(s) as directed by provider Daily., Disp: 48 g, Rfl: 3  •  furosemide (LASIX) 20 MG tablet, Take 1 tablet by mouth Daily. (Patient taking differently: Take 20 mg by mouth Every Morning.), Disp: 30 tablet, Rfl: 11  •  HYDROcodone-acetaminophen (NORCO) 5-325 MG per tablet, Take 1 tablet by mouth As Needed., Disp: , Rfl:   •  lisinopril (PRINIVIL,ZESTRIL) 5 MG tablet, Take 1 tablet by mouth Daily. (Patient taking differently: Take 2.5 mg by mouth Daily. Change by Tish MCDOWELL), Disp: 30 tablet, Rfl: 6  •  Melatonin 3 MG capsule, Take  by mouth., Disp: , Rfl:   •  Potassium Gluconate 550 (90 K) MG tablet, Take  by mouth Daily., Disp: , Rfl:   •  spironolactone (ALDACTONE) 25 MG tablet, " "Take 25 mg by mouth Every Night., Disp: , Rfl:   •  tamsulosin (FLOMAX) 0.4 MG capsule 24 hr capsule, Take 1 capsule by mouth Daily., Disp: 90 capsule, Rfl: 3  •  vitamin B-12 (CYANOCOBALAMIN) 1000 MCG tablet, Take 1,000 mcg by mouth Daily., Disp: , Rfl:   •  VITAMIN D, CHOLECALCIFEROL, PO, Take  by mouth Daily., Disp: , Rfl:   •  Zinc 50 MG capsule, Take  by mouth Daily., Disp: , Rfl:     The following portions of the patient's history were reviewed and updated as appropriate: allergies, current medications, past family history, past medical history, past social history, past surgical history and problem list.    Review of Systems   Constitution: Negative.   Cardiovascular: Negative for chest pain, dyspnea on exertion, leg swelling, palpitations and syncope.   Respiratory: Negative.  Negative for shortness of breath.    Hematologic/Lymphatic: Negative for bleeding problem. Does not bruise/bleed easily.   Skin: Negative for rash.   Musculoskeletal: Negative for muscle weakness and myalgias.   Gastrointestinal: Negative for heartburn, nausea and vomiting.   Neurological: Negative for dizziness, light-headedness, loss of balance and numbness.          Objective:     Vitals:    02/15/21 1143   BP: 118/60   BP Location: Right arm   Patient Position: Sitting   Pulse: 89   Temp: 97.5 °F (36.4 °C)   SpO2: 95%   Weight: 82.6 kg (182 lb)   Height: 182.9 cm (72\")         Constitutional:       Appearance: Well-developed.   Neck:      Thyroid: No thyromegaly.      Vascular: No carotid bruit or JVD.   Pulmonary:      Breath sounds: Normal breath sounds.   Cardiovascular:      Regular rhythm.      No gallop. No S3 and S4 gallop.   Edema:     Peripheral edema present.     Pretibial: 2+ edema of the left pretibial area and 1+ edema of the right pretibial area.  Abdominal:      General: Bowel sounds are normal.      Palpations: Abdomen is soft. There is no abdominal mass.      Tenderness: There is no abdominal tenderness.   Skin:    "  General: Skin is warm and dry.      Findings: No rash.   Neurological:      Mental Status: Alert and oriented to person, place, and time.         Lab Review:  Lab Results   Component Value Date    GLUCOSE 95 01/10/2021    BUN 29 (H) 01/10/2021    CREATININE 1.04 01/10/2021    EGFRIFNONA 68 01/10/2021    BCR 27.9 (H) 01/10/2021    K 4.3 01/10/2021    CO2 31.0 (H) 01/10/2021    CALCIUM 8.8 01/10/2021    ALBUMIN 3.50 01/06/2021    ALKPHOS 68 01/06/2021    AST 25 01/06/2021    ALT 19 01/06/2021     Lab Results   Component Value Date    CHOL 140 01/09/2021    TRIG 70 01/09/2021    HDL 61 (H) 01/09/2021    LDL 65 01/09/2021      Lab Results   Component Value Date    WBC 7.73 01/09/2021    RBC 4.72 01/09/2021    HGB 14.2 01/09/2021    HCT 44.1 01/09/2021    MCV 93.4 01/09/2021     01/09/2021     Lab Results   Component Value Date    TSH 3.980 01/07/2021     Lab Results   Component Value Date    HGBA1C 5.80 (H) 01/07/2021        Procedures        Assessment:   Diagnoses and all orders for this visit:    1. Chronic coronary artery disease (Primary)    2. Essential hypertension    3. Mixed hyperlipidemia        Impression:  1. Coronary artery disease. Stable and without angina on Plavix and beta blocker therapy. No flow-limiting disease on [facility], January 2021.  2. Hypertension. Well controlled on lisinopril 2.5 mg.  Last few days readings have been increased since he decreased his dose back from 5 mg  3. Hyperlipidemia. Well controlled on atorvastatin.  4. Ischemic cardiomyopathy.  Weight has been fluctuating, and patient has been up and down titrating his diuretic dosages (spironolactone and furosemide).  We have settled on a new dry weight of 175 pounds  5. Dizziness/hypotension, now resolved.  Was seen to be due to combination of new ACE inhibitor and overdiuresis    Plan:  1. Volume status is labile, but overall reasonable control.  Agree with new target weight 175.  Patient should take additional diuretics for of 5  pounds, and hold diuretics for less than 5 pounds from target  2. Encouraged aerobic exercise as tolerated. Patient should avoid heavy exertion in very cold or very hot temperatures.  3. If blood pressure remains elevated (130-140) over the next week, patient is to retry his lisinopril 5 mg.  4. Continue current medications.  5. Revisit in 3 MO, or sooner as needed.    Scribed for Casimiro Bernal MD by Valdemar Knapp. 2/15/2021  12:04 EST       Casimiro Bernal MD      Please note that portions of this note may have been completed with a voice recognition program. Efforts were made to edit the dictations, but occasionally words are mistranscribed.

## 2021-02-15 ENCOUNTER — OFFICE VISIT (OUTPATIENT)
Dept: CARDIOLOGY | Facility: CLINIC | Age: 81
End: 2021-02-15

## 2021-02-15 VITALS
HEIGHT: 72 IN | HEART RATE: 89 BPM | BODY MASS INDEX: 24.65 KG/M2 | TEMPERATURE: 97.5 F | SYSTOLIC BLOOD PRESSURE: 118 MMHG | OXYGEN SATURATION: 95 % | DIASTOLIC BLOOD PRESSURE: 60 MMHG | WEIGHT: 182 LBS

## 2021-02-15 DIAGNOSIS — E78.2 MIXED HYPERLIPIDEMIA: ICD-10-CM

## 2021-02-15 DIAGNOSIS — I25.5 ISCHEMIC CARDIOMYOPATHY: ICD-10-CM

## 2021-02-15 DIAGNOSIS — I10 ESSENTIAL HYPERTENSION: ICD-10-CM

## 2021-02-15 DIAGNOSIS — I25.10 CHRONIC CORONARY ARTERY DISEASE: Primary | ICD-10-CM

## 2021-02-15 PROCEDURE — 99214 OFFICE O/P EST MOD 30 MIN: CPT | Performed by: INTERNAL MEDICINE

## 2021-02-15 RX ORDER — SPIRONOLACTONE 25 MG/1
25 TABLET ORAL NIGHTLY
COMMUNITY
End: 2021-04-29

## 2021-02-22 ENCOUNTER — OFFICE VISIT (OUTPATIENT)
Dept: SLEEP MEDICINE | Facility: HOSPITAL | Age: 81
End: 2021-02-22

## 2021-02-22 VITALS
OXYGEN SATURATION: 98 % | SYSTOLIC BLOOD PRESSURE: 114 MMHG | BODY MASS INDEX: 24.52 KG/M2 | HEIGHT: 72 IN | DIASTOLIC BLOOD PRESSURE: 63 MMHG | WEIGHT: 181 LBS | HEART RATE: 62 BPM

## 2021-02-22 DIAGNOSIS — G47.33 OSA (OBSTRUCTIVE SLEEP APNEA): Primary | ICD-10-CM

## 2021-02-22 DIAGNOSIS — G47.34 NOCTURNAL HYPOXEMIA: ICD-10-CM

## 2021-02-22 PROCEDURE — 99212 OFFICE O/P EST SF 10 MIN: CPT | Performed by: NURSE PRACTITIONER

## 2021-02-22 NOTE — PROGRESS NOTES
"    Chief Complaint:   Chief Complaint   Patient presents with   • Follow-up       HPI:    Isrrael Marino is a 80 y.o. male here for follow-up of sleep apnea.  Patient was last seen 11/3/2020 for daytime somnolence, snoring, and witnessed apneas.  Patient had a sleep study 12/14/2020 that showed severe obstructive sleep apnea with an AHI of 75.7 as well as nocturnal hypoxemia.  Patient states he is doing \"fairly well\" with CPAP therapy.  He is sleeping 7 to 8 hours nightly and does feel refreshed upon awakening.  Patient will go to sleep very easily and does get up 1-2 times in the night to use the restroom.  Patient has an Burson score of 5/24.  We have discussed today a 90% pressure 14.0 and an AHI of 9.7.  We will increase his settings 10-20 to get this number down.  I will then see him back to reassess.  Patient has no concerns or complaints regarding CPAP and wishes to continue.        Current medications are:   Current Outpatient Medications:   •  atorvastatin (LIPITOR) 40 MG tablet, Take 1 tablet by mouth Every Night., Disp: 90 tablet, Rfl: 3  •  B Complex Vitamins (VITAMIN B COMPLEX PO), Take 1 tablet by mouth Daily., Disp: , Rfl:   •  carvedilol (COREG) 6.25 MG tablet, Take 1 tablet by mouth Every 12 (Twelve) Hours., Disp: 60 tablet, Rfl: 11  •  Cholecalciferol (VITAMIN D3) 125 MCG (5000 UT) capsule capsule, Take 2,000 Units by mouth Daily., Disp: , Rfl:   •  clopidogrel (PLAVIX) 75 MG tablet, Take 1 tablet by mouth Daily., Disp: 90 tablet, Rfl: 4  •  diclofenac (VOLTAREN) 75 MG EC tablet, Take 75 mg by mouth Daily., Disp: , Rfl:   •  DULoxetine (CYMBALTA) 30 MG capsule, Take 2 capsules by mouth Daily. (Patient taking differently: Take 30 mg by mouth Daily.), Disp: 180 capsule, Rfl: 1  •  Ferrous Sulfate (IRON PO), Take  by mouth Every 3 (Three) Days., Disp: , Rfl:   •  fluorouracil (EFUDEX) 5 % cream, , Disp: , Rfl:   •  fluticasone (FLONASE) 50 MCG/ACT nasal spray, 1 spray into the nostril(s) as " directed by provider Daily., Disp: 48 g, Rfl: 3  •  furosemide (LASIX) 20 MG tablet, Take 1 tablet by mouth Daily. (Patient taking differently: Take 20 mg by mouth Every Morning.), Disp: 30 tablet, Rfl: 11  •  HYDROcodone-acetaminophen (NORCO) 5-325 MG per tablet, Take 1 tablet by mouth As Needed., Disp: , Rfl:   •  lisinopril (PRINIVIL,ZESTRIL) 5 MG tablet, Take 1 tablet by mouth Daily. (Patient taking differently: Take 2.5 mg by mouth Daily. Change by Tish MCDOWELL), Disp: 30 tablet, Rfl: 6  •  Melatonin 3 MG capsule, Take  by mouth., Disp: , Rfl:   •  Potassium Gluconate 550 (90 K) MG tablet, Take  by mouth Daily., Disp: , Rfl:   •  spironolactone (ALDACTONE) 25 MG tablet, Take 25 mg by mouth Every Night., Disp: , Rfl:   •  tamsulosin (FLOMAX) 0.4 MG capsule 24 hr capsule, Take 1 capsule by mouth Daily., Disp: 90 capsule, Rfl: 3  •  vitamin B-12 (CYANOCOBALAMIN) 1000 MCG tablet, Take 1,000 mcg by mouth Daily., Disp: , Rfl:   •  VITAMIN D, CHOLECALCIFEROL, PO, Take  by mouth Daily., Disp: , Rfl:   •  Zinc 50 MG capsule, Take  by mouth Daily., Disp: , Rfl: .      The patient's relevant past medical, surgical, family and social history were reviewed and updated in Epic as appropriate.       Review of Systems   Eyes: Positive for visual disturbance.   Respiratory: Positive for apnea and shortness of breath.    Cardiovascular: Positive for leg swelling.   Gastrointestinal:        Heartburn   Genitourinary: Positive for frequency.   Musculoskeletal: Positive for arthralgias, back pain, joint swelling, myalgias, neck pain and neck stiffness.   Allergic/Immunologic: Positive for environmental allergies.   All other systems reviewed and are negative.        Objective:    Physical Exam  Constitutional:       Appearance: Normal appearance.   HENT:      Head: Normocephalic and atraumatic.      Mouth/Throat:      Mouth: Mucous membranes are moist.      Pharynx: Oropharynx is clear.      Comments: Mallampati 3  anatomy  Pulmonary:      Effort: Pulmonary effort is normal. No respiratory distress.   Skin:     General: Skin is dry.      Coloration: Skin is not pale.      Findings: No erythema.   Neurological:      Mental Status: He is alert and oriented to person, place, and time.   Psychiatric:         Mood and Affect: Mood normal.         Behavior: Behavior normal.         Thought Content: Thought content normal.         Judgment: Judgment normal.     30/31 days of use  Greater than 4-hour use 83.9  90% pressure 14.0  AHI of 9.7  Settings 8-18      ASSESSMENT/PLAN    Diagnoses and all orders for this visit:    1. JIN (obstructive sleep apnea) (Primary)  -     CPAP Therapy    2. Nocturnal hypoxemia            1. Counseled patient regarding multimodal approach with healthy nutrition, healthy sleep, regular physical activity, social activities, counseling, and medications. Encouraged to practice lateral sleep position. Avoid alcohol and sedatives close to bedtime.  2.   Refill supplies x1 year.  Setting change 10-20 I will see patient back in 5 weeks.  Patient will need overnight oximetry while wearing CPAP when AHI is under control.  I have reviewed the results of my evaluation and impression and discussed my recommendations in detail with the patient.      Signed by  JULY Sullivan    February 22, 2021      CC: Ivanna George MD          No ref. provider found

## 2021-03-02 ENCOUNTER — OFFICE VISIT (OUTPATIENT)
Dept: ORTHOPEDIC SURGERY | Facility: CLINIC | Age: 81
End: 2021-03-02

## 2021-03-02 VITALS — BODY MASS INDEX: 23.57 KG/M2 | WEIGHT: 174 LBS | HEART RATE: 65 BPM | HEIGHT: 72 IN | OXYGEN SATURATION: 93 %

## 2021-03-02 DIAGNOSIS — M17.0 PRIMARY OSTEOARTHRITIS OF BOTH KNEES: Primary | ICD-10-CM

## 2021-03-02 DIAGNOSIS — M19.012 ARTHRITIS OF LEFT GLENOHUMERAL JOINT: ICD-10-CM

## 2021-03-02 DIAGNOSIS — M19.011 ARTHRITIS OF RIGHT GLENOHUMERAL JOINT: ICD-10-CM

## 2021-03-02 PROCEDURE — 20610 DRAIN/INJ JOINT/BURSA W/O US: CPT | Performed by: PHYSICIAN ASSISTANT

## 2021-03-02 PROCEDURE — 99212 OFFICE O/P EST SF 10 MIN: CPT | Performed by: PHYSICIAN ASSISTANT

## 2021-03-02 RX ORDER — LIDOCAINE HYDROCHLORIDE 10 MG/ML
4 INJECTION, SOLUTION EPIDURAL; INFILTRATION; INTRACAUDAL; PERINEURAL
Status: COMPLETED | OUTPATIENT
Start: 2021-03-02 | End: 2021-03-02

## 2021-03-02 RX ORDER — TRIAMCINOLONE ACETONIDE 40 MG/ML
40 INJECTION, SUSPENSION INTRA-ARTICULAR; INTRAMUSCULAR
Status: COMPLETED | OUTPATIENT
Start: 2021-03-02 | End: 2021-03-02

## 2021-03-02 RX ADMIN — LIDOCAINE HYDROCHLORIDE 4 ML: 10 INJECTION, SOLUTION EPIDURAL; INFILTRATION; INTRACAUDAL; PERINEURAL at 08:28

## 2021-03-02 RX ADMIN — TRIAMCINOLONE ACETONIDE 40 MG: 40 INJECTION, SUSPENSION INTRA-ARTICULAR; INTRAMUSCULAR at 08:27

## 2021-03-02 RX ADMIN — LIDOCAINE HYDROCHLORIDE 4 ML: 10 INJECTION, SOLUTION EPIDURAL; INFILTRATION; INTRACAUDAL; PERINEURAL at 08:27

## 2021-03-02 RX ADMIN — TRIAMCINOLONE ACETONIDE 40 MG: 40 INJECTION, SUSPENSION INTRA-ARTICULAR; INTRAMUSCULAR at 08:28

## 2021-03-02 NOTE — PROGRESS NOTES
Procedure   Large Joint Arthrocentesis: R knee  Date/Time: 3/2/2021 8:27 AM  Consent given by: patient  Site marked: site marked  Timeout: Immediately prior to procedure a time out was called to verify the correct patient, procedure, equipment, support staff and site/side marked as required   Supporting Documentation  Indications: pain   Procedure Details  Location: knee - R knee  Preparation: Patient was prepped and draped in the usual sterile fashion  Needle size: 23 G  Approach: anterolateral  Medications administered: 40 mg triamcinolone acetonide 40 MG/ML; 4 mL lidocaine PF 1% 1 %  Patient tolerance: patient tolerated the procedure well with no immediate complications    Large Joint Arthrocentesis: L knee  Date/Time: 3/2/2021 8:28 AM  Consent given by: patient  Site marked: site marked  Timeout: Immediately prior to procedure a time out was called to verify the correct patient, procedure, equipment, support staff and site/side marked as required   Supporting Documentation  Indications: pain   Procedure Details  Location: knee - L knee  Preparation: Patient was prepped and draped in the usual sterile fashion  Needle size: 23 G  Approach: anterolateral  Medications administered: 40 mg triamcinolone acetonide 40 MG/ML; 4 mL lidocaine PF 1% 1 %  Patient tolerance: patient tolerated the procedure well with no immediate complications

## 2021-03-02 NOTE — PROGRESS NOTES
[facility] Note    Subjective     Patient: Isrrael Marino  : 1940    Primary Care Provider: Ivanna George MD    Requesting Provider: As above    Follow-up (3 month recheck - Primary osteoarthritis of both knees)      History    Chief Complaint: Bilateral knee pain and bilateral shoulder pain    History of Present Illness: Patient presents today for questions regarding his shoulders and his known bilateral knee arthritis.  He is well-known to me and gets intermittent steroid injection every 3 to 4 months in the knees..  He is not interested in surgery and would like repeat injections today.    Patient also gets intermittent glenohumeral injections.  He reports the last injection gave him significant relief for 2 days then he had a lot more pain.  He uses a CPAP that was just recently given to him and is wondering if it has something to do with the way he was sleeping.  The shoulders have settled down but he is wondering what his further options are regarding his glenohumeral arthritis    Current Outpatient Medications on File Prior to Visit   Medication Sig Dispense Refill   • atorvastatin (LIPITOR) 40 MG tablet Take 1 tablet by mouth Every Night. 90 tablet 3   • B Complex Vitamins (VITAMIN B COMPLEX PO) Take 1 tablet by mouth Daily.     • carvedilol (COREG) 6.25 MG tablet Take 1 tablet by mouth Every 12 (Twelve) Hours. 60 tablet 11   • Cholecalciferol (VITAMIN D3) 125 MCG (5000 UT) capsule capsule Take 2,000 Units by mouth Daily.     • clopidogrel (PLAVIX) 75 MG tablet Take 1 tablet by mouth Daily. 90 tablet 4   • diclofenac (VOLTAREN) 75 MG EC tablet Take 75 mg by mouth Daily.     • DULoxetine (CYMBALTA) 30 MG capsule Take 2 capsules by mouth Daily. (Patient taking differently: Take 30 mg by mouth Daily.) 180 capsule 1   • Ferrous Sulfate (IRON PO) Take  by mouth Every 3 (Three) Days.     • fluorouracil (EFUDEX) 5 % cream      • fluticasone (FLONASE) 50 MCG/ACT nasal  "spray 1 spray into the nostril(s) as directed by provider Daily. 48 g 3   • furosemide (LASIX) 20 MG tablet Take 1 tablet by mouth Daily. (Patient taking differently: Take 20 mg by mouth Every Morning.) 30 tablet 11   • HYDROcodone-acetaminophen (NORCO) 5-325 MG per tablet Take 1 tablet by mouth As Needed.     • lisinopril (PRINIVIL,ZESTRIL) 5 MG tablet Take 1 tablet by mouth Daily. (Patient taking differently: Take 2.5 mg by mouth Daily. Change by Tish MCDOWELL) 30 tablet 6   • Melatonin 3 MG capsule Take  by mouth.     • Potassium Gluconate 550 (90 K) MG tablet Take  by mouth Daily.     • spironolactone (ALDACTONE) 25 MG tablet Take 25 mg by mouth Every Night.     • tamsulosin (FLOMAX) 0.4 MG capsule 24 hr capsule Take 1 capsule by mouth Daily. 90 capsule 3   • vitamin B-12 (CYANOCOBALAMIN) 1000 MCG tablet Take 1,000 mcg by mouth Daily.     • VITAMIN D, CHOLECALCIFEROL, PO Take  by mouth Daily.     • Zinc 50 MG capsule Take  by mouth Daily.       No current facility-administered medications on file prior to visit.       Allergies   Allergen Reactions   • Ibuprofen Hives     \"Pt states he hasn't taken this in a long time\"      Past Medical History:   Diagnosis Date   • Arrhythmia    • Arthritis     both knees   • Broken neck (CMS/HCC)    • CAD (coronary artery disease)    • Cancer (CMS/HCC)    • Chondrocalcinosis of knee    • GERD (gastroesophageal reflux disease)    • Gout    • Heart attack (CMS/HCC)      Last Assessed: 28 Mar 2014   • Heart disease    • History of transfusion     own blood with hip surgery   • Hyperlipidemia    • Hypertension    • IBS (irritable bowel syndrome)    • Left rotator cuff tear arthropathy    • Low back pain    • Lower extremity neuropathy    • Lumbar canal stenosis    • Neck pain    • Numbness    • Right hip pain    • Rotator cuff tear arthropathy of right shoulder    • Thin blood (CMS/HCC)      Past Surgical History:   Procedure Laterality Date   • APPENDECTOMY  1956   • BACK " SURGERY  1997    spinal decompression and fusion   • BREAST LUMPECTOMY  2003   • CARDIAC CATHETERIZATION      with two stents//. DR. TOLEDO   • CARDIAC CATHETERIZATION N/A 1/8/2021    Procedure: LEFT HEART CATH;  Surgeon: Casimiro Bernal MD;  Location:  [facility];  Service: Cardiovascular;  Laterality: N/A;   • CARPAL TUNNEL RELEASE  03/28/2014    Neuroplasty Decompression Median Nerve At Carpal Tunnel   • CERVICAL DISCECTOMY POSTERIOR FUSION WITH BRAIN LAB N/A 7/13/2018    Procedure: CERVICAL LAMINECTOMY DECOMPRESSION POSTERIOR C1-2 POSTERIOR CERVICAL FUSION;  Surgeon: Randall Barnett MD;  Location:  [facility];  Service: Neurosurgery   • COLONOSCOPY     • CORONARY STENT PLACEMENT  2013   • HERNIA REPAIR  2002    & 1998   • LUMBAR DISCECTOMY FUSION INSTRUMENTATION     • TONSILLECTOMY     • TOTAL HIP ARTHROPLASTY  2002   • VASECTOMY     • WISDOM TOOTH EXTRACTION       Family History   Problem Relation Age of Onset   • Cancer Mother    • Heart disease Father    • Hypertension Father    • Heart attack Father    • Sleep apnea Son       Social History     Socioeconomic History   • Marital status:      Spouse name: Not on file   • Number of children: Not on file   • Years of education: Not on file   • Highest education level: Not on file   Tobacco Use   • Smoking status: Never Smoker   • Smokeless tobacco: Never Used   Substance and Sexual Activity   • Alcohol use: Not Currently   • Drug use: Never   • Sexual activity: Defer   Social History Narrative    Caffeine 1-2 decaf coffee        Review of Systems   Constitutional: Negative.    HENT: Positive for hearing loss and tinnitus.    Eyes: Negative.    Respiratory: Positive for apnea.    Cardiovascular: Positive for leg swelling.   Gastrointestinal: Negative.    Endocrine: Negative.    Genitourinary: Positive for difficulty urinating, frequency and urgency.   Musculoskeletal: Positive for arthralgias, back pain, joint swelling, neck pain and  "neck stiffness.   Skin: Negative.    Allergic/Immunologic: Negative.    Neurological: Positive for dizziness and light-headedness.   Hematological: Bruises/bleeds easily.   Psychiatric/Behavioral: Negative.        The following portions of the patient's history were reviewed and updated as appropriate: allergies, current medications, past family history, past medical history, past social history, past surgical history and problem list.      Objective      Physical Exam  Pulse 65   Ht 182.9 cm (72.01\")   Wt 78.9 kg (174 lb)   SpO2 93%   BMI 23.59 kg/m²     Body mass index is 23.59 kg/m².    Patient is well developed, well nourished and in no acute distress.  Alert and oriented x 3.    Ortho Exam  Bilateral shoulder exam: Stiffness and range of motion with crepitus.  Tender over the posterior joint line no acromioclavicular tenderness.  Neurovascular intact distally.    Right Knee Exam  ----------  ALIGNMENT: Right: Varus ----------  RANGE OF MOTION:  Right: 0-120  LIGAMENTOUS STABILITY:   Right: stable to valgus and varus stress----------  STRENGTH:  KNEE FLEXION Right 5/5  KNEE EXTENSION Right 5/5 ----------  PAIN WITH PALPATION: Right medial joint line  PAIN WITH KNEE ROM: Right no  PATELLAR CREPITUS: Right yes   ----------    Left Knee Exam  ----------  ALIGNMENT: Left: Varus ----------  RANGE OF MOTION:  Left: 0-120  LIGAMENTOUS STABILITY:   Left: stable to valgus and varus stress----------  STRENGTH:  KNEE FLEXION left 5/5  KNEE EXTENSION left 5/5 ----------  PAIN WITH PALPATION: Left medial joint line  PAIN WITH KNEE ROM: Left no  PATELLAR CREPITUS: Left yes         Medical Decision Making    Data Review:   none    Assessment:  1. Primary osteoarthritis of both knees    2. Arthritis of right glenohumeral joint    3. Arthritis of left glenohumeral joint        Plan:  1.  Bilateral glenohumeral arthritis.  I reviewed prior x-rays with the patient.  I explained to him the end-stage arthritis in the " ball-and-socket joint.  He also has significant acromioclavicular arthritis but this does not seem to be causing him pain.  We discussed further treatment options including continued intermittent cortisone injection.  I explained that the only thing this can really get him out of pain long-term is a shoulder replacement.  He is not really interested in pursuing that at this time.  I did tell him that Dr. Villanueva is a shoulder and elbow specialist and does shoulder replacements.  At this time, he will return as scheduled for his glenohumeral injections.    2. Bilateral knee arthritis.  Patient does well with intermittent steroid injection every 3 months.  Plan today is repeat steroid injection in both knees.  I will see him back in 3 to 4 months for the shoulders or the knees or sooner if needed.    Using sterile technique, the left knee was sterilely prepped with Hibiclens. Following a time out,  using a 22 gauge needle, the left knee was injected with 1cc (40mg) Kenalog, 4 cc lidocaine.  Patient tolerated the procedure well.  No complications.  Using sterile technique, the right knee was sterilely prepped with Hibiclens.  Following a time out,  using a 22 gauge needle, the right knee was injected with 1cc (40mg) Kenalog, 4 cc lidocaine.  Patient tolerated the procedure well.  No complications.          Tish Shelley PA-C  03/02/21  08:50 EST

## 2021-03-19 NOTE — PROGRESS NOTES
Discharge Planning Assessment  [facility]     Patient Name: Isrrael Marino  MRN: [id number]  Today's Date: 1/8/2021    Admit Date: 1/6/2021    Discharge Needs Assessment    No documentation.       Discharge Plan     Row Name 01/08/21 1546       Plan    Plan  Home with wife's assistance and home oxygen from [facility]    Patient/Family in Agreement with Plan  yes    Plan Comments  Mr. Marino's DC plan is to return home with his wife's assistance.  He is currently requiring continuous O2 at 3L per NC.  Arranged for home O2 with Gigi at [facility].  CM will require a room air O2 Sat for the oxygen referral.  Notified Katie on 4H to request that bedside RN to do a room air O2 Sat to qualify Mr. Marino for home O2.  Once CM received RA sat, I can order the oxygen transport system that can be delivered to the room today.    CM will continue to follow.    Final Discharge Disposition Code  01 - home or self-care        Continued Care and Services - Admitted Since 1/6/2021     Durable Medical Equipment Coordination complete    Service Provider Request Status Selected Services Address Phone Fax Patient Preferred    [facility]   Selected Durable Medical Equipment [address] [place] [id number] [phone] --              Expected Discharge Date and Time     Expected Discharge Date Expected Discharge Time    Jan 9, 2021         Demographic Summary    No documentation.       Functional Status    No documentation.       Psychosocial    No documentation.       Abuse/Neglect    No documentation.       Legal    No documentation.       Substance Abuse    No documentation.       Patient Forms    No documentation.           Susanna Pichardo RN     Head and Neck Oncology Multidisciplinary Clinic - Initial Visit    Visit Date: 3/18/2021    TEAM:  [facility] Surgeon: Raul Eisenberg MD  Medical Oncologist: Shekhar Henriquez MD  Radiation Oncologist: Jose Vasquez MD  Cancer Nurse Navigator: Kriss Jimenez RN     Diagnosis:  Adenoid Cystic Carcinoma of Sino-Nasal Cavity - Path Stage: pT4a pNx.     Progress Note:   Michelle presents to the [facility] today for discussion of newly diagnosed Adenoid Cystic Carcinoma of Sino-Nasal cavity.  He is accompanied by her supportive wife, Ana.     Dr. Eisenberg, Dr. Henriquez and Dr. Vasquez reviewed with him the history of this diagnosis as well as the recent pathology findings and impressions from recent imaging.  Options for treatment were reviewed with him and recommendation was for adjuvant radiation therapy.  The specifics of RT were reviewed including logistics and short and long term side effects.  Michelle stated his understanding and agreement with this plan.      The following plan was agreed on for next steps:  1) CT Chest will be ordered and obtained within the next 1-4 weeks to establish baseline.  2) MRI face will be ordered / completed within the next week to assess new post surgical baseline and assess for perineural invasion.  3) [facility] will touch base with his Dental provider and discuss dental care coordination.  4) Radiation Oncology dept will call him to set up SIMULATION appt.   5) Follow up with Dr. Sorto as is already scheduled.      He was provided with contact information for [facility] IN[facility] and welcomed to call with any questions or concerns.       Clinical Trials: No trials available at this time for this specific diagnosis.    [facility] Distress Tool Screening Score:   5/10    ECOG Performance status: 0    Supportive Care:  Dental: [facility] will contact dental provider  Nutrition; RD aware and will follow as needed  Speech/Swallow Therapy: As needed.  FMLA / Disability: advised to bring to appt with Dr. Vasquez if additional forms need to be  filed  Smoking Cessation: n/a  Psychosocial Support: supportive wife / family / friends  Social Work: Transportation, Financial : No issues identified  Additional: (acupuncture, message, PT, aromatherapy, advance directives): Not addressed at this visit.       Written Materials Provided and Reviewed:  [facility] of cancer support services  Cancer Nurse Navigator Brochure  List of relevant trusted websites  Site Specific Educational materials from [employer].  Phone list with Important Contacts  Survivorship brochure

## 2021-03-30 ENCOUNTER — OFFICE VISIT (OUTPATIENT)
Dept: SLEEP MEDICINE | Facility: HOSPITAL | Age: 81
End: 2021-03-30

## 2021-03-30 VITALS
DIASTOLIC BLOOD PRESSURE: 62 MMHG | BODY MASS INDEX: 24.95 KG/M2 | WEIGHT: 184.2 LBS | OXYGEN SATURATION: 95 % | HEIGHT: 72 IN | SYSTOLIC BLOOD PRESSURE: 121 MMHG | HEART RATE: 95 BPM | RESPIRATION RATE: 16 BRPM | TEMPERATURE: 96.8 F

## 2021-03-30 DIAGNOSIS — G47.33 OSA (OBSTRUCTIVE SLEEP APNEA): Primary | ICD-10-CM

## 2021-03-30 DIAGNOSIS — G47.34 NOCTURNAL HYPOXEMIA: ICD-10-CM

## 2021-03-30 PROCEDURE — 99212 OFFICE O/P EST SF 10 MIN: CPT | Performed by: NURSE PRACTITIONER

## 2021-03-30 NOTE — PROGRESS NOTES
Chief Complaint:   Chief Complaint   Patient presents with   • Sleep Apnea     5 week pressure change       HPI:    Isrrael Marino is a 80 y.o. male here for follow-up of sleep apnea.  Patient has a history of CAD, hyperlipidemia, hypertension, cardiomyopathy, CHF, GERD, arthritis, BPH, peripheral neuropathy, and sleep apnea.  Patient was last seen 2/22/2021 at that time his 90% pressure was 14.0 with an AHI of 9.7.  I did increase his minimum pressure to 10 at that time.  I have already spoken to DME today as it appears they did not increase his pressure to 10 but set it at 9.5.  With that had 90% pressure we are probably not going to be able to achieve normal AHI with merely a CPAP.  We did discuss today moving forward with a titration with this time and will also observe his nocturnal hypoxemia.  Patient does agree to this plan of care.        Current medications are:   Current Outpatient Medications:   •  atorvastatin (LIPITOR) 40 MG tablet, Take 1 tablet by mouth Every Night., Disp: 90 tablet, Rfl: 3  •  B Complex Vitamins (VITAMIN B COMPLEX PO), Take 1 tablet by mouth Daily., Disp: , Rfl:   •  carvedilol (COREG) 6.25 MG tablet, Take 1 tablet by mouth Every 12 (Twelve) Hours., Disp: 60 tablet, Rfl: 11  •  Cholecalciferol (VITAMIN D3) 125 MCG (5000 UT) capsule capsule, Take 2,000 Units by mouth Daily., Disp: , Rfl:   •  clopidogrel (PLAVIX) 75 MG tablet, Take 1 tablet by mouth Daily., Disp: 90 tablet, Rfl: 4  •  diclofenac (VOLTAREN) 75 MG EC tablet, Take 75 mg by mouth Daily., Disp: , Rfl:   •  DULoxetine (CYMBALTA) 30 MG capsule, Take 2 capsules by mouth Daily. (Patient taking differently: Take 30 mg by mouth Daily.), Disp: 180 capsule, Rfl: 1  •  Ferrous Sulfate (IRON PO), Take  by mouth Every 3 (Three) Days., Disp: , Rfl:   •  fluorouracil (EFUDEX) 5 % cream, , Disp: , Rfl:   •  fluticasone (FLONASE) 50 MCG/ACT nasal spray, 1 spray into the nostril(s) as directed by provider Daily., Disp: 48 g, Rfl:  3  •  furosemide (LASIX) 20 MG tablet, Take 1 tablet by mouth Daily. (Patient taking differently: Take 20 mg by mouth Every Morning.), Disp: 30 tablet, Rfl: 11  •  HYDROcodone-acetaminophen (NORCO) 5-325 MG per tablet, Take 1 tablet by mouth As Needed., Disp: , Rfl:   •  lisinopril (PRINIVIL,ZESTRIL) 5 MG tablet, Take 1 tablet by mouth Daily. (Patient taking differently: Take 2.5 mg by mouth Daily. Change by Tish MCDOWELL), Disp: 30 tablet, Rfl: 6  •  Melatonin 3 MG capsule, Take  by mouth., Disp: , Rfl:   •  Potassium Gluconate 550 (90 K) MG tablet, Take  by mouth Daily., Disp: , Rfl:   •  spironolactone (ALDACTONE) 25 MG tablet, Take 25 mg by mouth Every Night., Disp: , Rfl:   •  tamsulosin (FLOMAX) 0.4 MG capsule 24 hr capsule, Take 1 capsule by mouth Daily., Disp: 90 capsule, Rfl: 3  •  vitamin B-12 (CYANOCOBALAMIN) 1000 MCG tablet, Take 1,000 mcg by mouth Daily., Disp: , Rfl:   •  VITAMIN D, CHOLECALCIFEROL, PO, Take  by mouth Daily., Disp: , Rfl:   •  Zinc 50 MG capsule, Take  by mouth Daily., Disp: , Rfl: .      The patient's relevant past medical, surgical, family and social history were reviewed and updated in Epic as appropriate.       Review of Systems   HENT: Positive for hearing loss.         Very hard of hearing   Eyes: Positive for visual disturbance.   Respiratory: Positive for apnea and shortness of breath.    Cardiovascular: Positive for leg swelling.   Gastrointestinal:        Heartburn   Genitourinary: Positive for frequency.   Musculoskeletal: Positive for arthralgias, back pain, gait problem, joint swelling, myalgias, neck pain and neck stiffness.   Allergic/Immunologic: Positive for environmental allergies.   Neurological: Positive for numbness.         Objective:    Physical Exam  Constitutional:       Appearance: Normal appearance. He is normal weight.   HENT:      Head: Normocephalic and atraumatic.      Mouth/Throat:      Mouth: Mucous membranes are moist.      Pharynx: Oropharynx is  clear.      Comments: Mallampati 3 anatomy  Pulmonary:      Effort: Pulmonary effort is normal.      Breath sounds: Normal breath sounds.   Skin:     General: Skin is warm and dry.      Coloration: Skin is not pale.      Findings: No erythema.   Neurological:      Mental Status: He is alert and oriented to person, place, and time.   Psychiatric:         Mood and Affect: Mood normal.         Behavior: Behavior normal.         Thought Content: Thought content normal.         Judgment: Judgment normal.       26/30 days of use  Greater than 4-hour use 66.7  90% pressure 14.4  AHI of 9.4  Settings 9.5-20    ASSESSMENT/PLAN    Diagnoses and all orders for this visit:    1. JIN (obstructive sleep apnea) (Primary)  -     Polysomnography 4 or More Parameters With CPAP; Future    2. Nocturnal hypoxemia  -     Polysomnography 4 or More Parameters With CPAP; Future            1. Counseled patient regarding multimodal approach with healthy nutrition, healthy sleep, regular physical activity, social activities, counseling, and medications. Encouraged to practice lateral sleep position. Avoid alcohol and sedatives close to bedtime.  2. We will move forward with titration and I will see him back following the test.    I have reviewed the results of my evaluation and impression and discussed my recommendations in detail with the patient.      Signed by  JULY Sullivan    March 30, 2021      CC: Ivanna George MD          No ref. provider found

## 2021-04-13 RX ORDER — LISINOPRIL 5 MG/1
5 TABLET ORAL DAILY
Qty: 90 TABLET | Refills: 3 | Status: SHIPPED | OUTPATIENT
Start: 2021-04-13 | End: 2021-04-29 | Stop reason: DRUGHIGH

## 2021-04-29 ENCOUNTER — OFFICE VISIT (OUTPATIENT)
Dept: INTERNAL MEDICINE | Facility: CLINIC | Age: 81
End: 2021-04-29

## 2021-04-29 VITALS
BODY MASS INDEX: 24.6 KG/M2 | WEIGHT: 181.6 LBS | SYSTOLIC BLOOD PRESSURE: 122 MMHG | TEMPERATURE: 97.1 F | HEIGHT: 72 IN | DIASTOLIC BLOOD PRESSURE: 60 MMHG | OXYGEN SATURATION: 96 % | HEART RATE: 59 BPM | RESPIRATION RATE: 16 BRPM

## 2021-04-29 DIAGNOSIS — M17.0 PRIMARY OSTEOARTHRITIS OF BOTH KNEES: ICD-10-CM

## 2021-04-29 DIAGNOSIS — I10 ESSENTIAL HYPERTENSION: ICD-10-CM

## 2021-04-29 DIAGNOSIS — M19.019 GLENOHUMERAL ARTHRITIS: ICD-10-CM

## 2021-04-29 DIAGNOSIS — I50.22 CHRONIC SYSTOLIC CONGESTIVE HEART FAILURE (HCC): Primary | ICD-10-CM

## 2021-04-29 PROCEDURE — 99214 OFFICE O/P EST MOD 30 MIN: CPT | Performed by: INTERNAL MEDICINE

## 2021-04-29 RX ORDER — LISINOPRIL 2.5 MG/1
2.5 TABLET ORAL 2 TIMES DAILY
COMMUNITY
End: 2021-05-17 | Stop reason: SDUPTHER

## 2021-04-29 NOTE — PROGRESS NOTES
Internal Medicine Follow Up    Chief Complaint  Isrrael Marino is a 80 y.o. male who presents today for follow up of chronic medical conditions outlined below.    Chief Complaint   Patient presents with   • Follow-up     CHF, HTN, arthritis        HPI  Mr. Marino comes in today for follow up. He notes BP had started increasing so he has increased lisinopril to 2.5mg BID. BP has improved to 115-120/50-70. He also notes that he was having difficulty maintaining weight at 175lbs. Weight would fluctuate between 175-185. He denies significant nocturia from taking lasix at night and reports that it helps minimize dizziness. He has started taking lasix 20mg nightly and notes weight stabilized at 178-180. He feels well. Not SOA. Minimal edema. No chest pain. He has been getting steroid injections in his knees and shoulders with orthopedics.       Review of Systems  Review of Systems   Constitutional: Negative.    Respiratory: Negative.    Cardiovascular: Negative.    Genitourinary: Negative for nocturia.   Musculoskeletal: Positive for arthralgias.        Current Medications  Current Outpatient Medications on File Prior to Visit   Medication Sig Dispense Refill   • atorvastatin (LIPITOR) 40 MG tablet Take 1 tablet by mouth Every Night. 90 tablet 3   • B Complex Vitamins (VITAMIN B COMPLEX PO) Take 1 tablet by mouth Daily.     • carvedilol (COREG) 6.25 MG tablet Take 1 tablet by mouth Every 12 (Twelve) Hours. 60 tablet 11   • clopidogrel (PLAVIX) 75 MG tablet Take 1 tablet by mouth Daily. 90 tablet 4   • diclofenac (VOLTAREN) 75 MG EC tablet Take 75 mg by mouth Daily.     • DULoxetine (CYMBALTA) 30 MG capsule Take 2 capsules by mouth Daily. (Patient taking differently: Take 30 mg by mouth 2 (Two) Times a Day.) 180 capsule 1   • Ferrous Sulfate (IRON PO) Take  by mouth Every 3 (Three) Days.     • fluticasone (FLONASE) 50 MCG/ACT nasal spray 1 spray into the nostril(s) as directed by provider Daily. 48 g 3   • furosemide  "(LASIX) 20 MG tablet Take 1 tablet by mouth Daily. (Patient taking differently: Take 20 mg by mouth every night at bedtime.) 30 tablet 11   • lisinopril (PRINIVIL,ZESTRIL) 2.5 MG tablet Take 2.5 mg by mouth 2 (Two) Times a Day.     • Melatonin 3 MG capsule Take  by mouth.     • tamsulosin (FLOMAX) 0.4 MG capsule 24 hr capsule Take 1 capsule by mouth Daily. 90 capsule 3   • vitamin B-12 (CYANOCOBALAMIN) 1000 MCG tablet Take 1,000 mcg by mouth Daily.     • [DISCONTINUED] lisinopril (PRINIVIL,ZESTRIL) 5 MG tablet Take 1 tablet by mouth Daily. 90 tablet 3   • Cholecalciferol (VITAMIN D3) 125 MCG (5000 UT) capsule capsule Take 2,000 Units by mouth Daily.     • HYDROcodone-acetaminophen (NORCO) 5-325 MG per tablet Take 1 tablet by mouth As Needed.     • Potassium Gluconate 550 (90 K) MG tablet Take  by mouth Daily.     • VITAMIN D, CHOLECALCIFEROL, PO Take  by mouth Daily.     • Zinc 50 MG capsule Take  by mouth Daily.     • [DISCONTINUED] fluorouracil (EFUDEX) 5 % cream      • [DISCONTINUED] spironolactone (ALDACTONE) 25 MG tablet Take 25 mg by mouth Every Night.       No current facility-administered medications on file prior to visit.       Allergies  Allergies   Allergen Reactions   • Ibuprofen Hives     \"Pt states he hasn't taken this in a long time\"       Objective  Visit Vitals  /60   Pulse 64   Temp 97.1 °F (36.2 °C)   Resp 16   Ht 182.9 cm (72.01\")   Wt 82.4 kg (181 lb 9.6 oz)   SpO2 92%   BMI 24.62 kg/m²        Physical Exam  Physical Exam  Vitals and nursing note reviewed.   Constitutional:       General: He is not in acute distress.     Appearance: Normal appearance. He is well-developed. He is not ill-appearing.   HENT:      Head: Normocephalic and atraumatic.   Eyes:      Conjunctiva/sclera: Conjunctivae normal.   Cardiovascular:      Rate and Rhythm: Normal rate and regular rhythm.      Heart sounds: Normal heart sounds.   Pulmonary:      Effort: Pulmonary effort is normal. No respiratory distress.    "   Breath sounds: Normal breath sounds. No rales.   Musculoskeletal:      Comments: Bilateral trace pitting edema, RLE>LLE.   Skin:     General: Skin is warm and dry.   Neurological:      Mental Status: He is alert and oriented to person, place, and time. Mental status is at baseline.      Gait: Gait normal.   Psychiatric:         Mood and Affect: Mood normal.         Behavior: Behavior normal.         Results  No results associated with this encounter.     Assessment and Plan  Diagnoses and all orders for this visit:    Chronic systolic congestive heart failure (CMS/HCC)  - Echo 1/2021 EF 30%, EF 25% with dilated LV and low LV filling pressures on C  - Non-obstructive CAD on [facility]  - Current regimen coreg 6.25mg BID, lasix 20mg qhs, and lisinopril 2.5mg BID  - Has mild edema however stable weight and no SOA.   - continue current regimen and follow up with cardiology as scheduled    Essential hypertension  - BP well controlled  - Continue lisinopril 2.5mg BID, carvedilol 6.25mg BID, lasix 20mg qhs    Glenohumeral arthritis  - following with orthopedics and periodically getting steroid injections, however the last injections only resulted in about 2 weeks of pain relief. Declines surgical intervention.     Primary osteoarthritis of both knees  - following with orthopedics and periodically getting steroid injections. Declines surgical intervention.      Health Maintenance  - Colonoscopy: No longer indicated due to age.  - Immunizations: Tdap 2017. COVID complete. Shingrix series complete. Pneumovax 2010.  - Depression screening: negative 8/2020     Return in about 4 months (around 8/25/2021) for Medicare Wellness.

## 2021-05-16 NOTE — PROGRESS NOTES
[facility]  Subjective:     Encounter Date: 05/17/2021      Patient ID: Isrrael Marino is a 80 y.o. male.    Chief Complaint: Chronic coronary artery disease and Shortness of Breath      PROBLEM LIST:  1. Coronary artery disease:  a. Non STEMI, 09/22/2013.  Cardiac catheterization:  99% first diagonal (2.5 x 15 Xience), 95% second diagonal (PTCA), 50% LAD, FFR 0.83.  EF 50%.  b. On 11/19/2013:  Crescendo angina.  Catheterization:  95% LAD/70% second diagonal 3.0 x 23-mm XIENCE (LAD) and 2.75 x 12-mm XIENCE (second diagonal). Widely  patent first diagonal stent.   c. Carotid duplex, 12/10/2019: Bilateral ICA stenosis 0-49%.  d. Stress test, 12/10/2019: Normal study with no evidence of ischemia. EF 47%.  e. Echocardiogram, 1/8/2021: EF 30%. No hemodynamically significant valvular heart disease.   f. LHC, 1/8/2021: EF 25% with dilated left ventricle. Low LV filling pressures. No flow-limiting coronary artery disease.  2. Hypertension.  3. Dyslipidemia.   4. Lower  extremity neuropathy.  5. Arthritis.  6. Skin cancer with removal  7. Traumatic fracture of C2  8. Surgeries:  a. Appendectomy  b. Lumbar surgery  c. Carpal tunnel surgery  d. Hernia repair  e. Right hip replacement  f. Vasectomy      History of Present Illness  Isrrael Marino returns today for a 3 month follow up with a history of coronary artery disease and cardiac risk factors. Since last visit, he's been well overall. He mentions he's had issues maintaining his blood pressure. He reports furosemide had been making him dizzy and lightheaded, he was told to take it as needed. He did this for a short period of time but as of now he is taking his original dosage again. He takes half a tablet of lisinopril in the morning and the other half at night. Last week his heart rate was increased and was worried it was too high. He's gained weight and hasn't been able to sleep recently. He doesn't have edema in his left  "leg, but does in his right. Patient denies chest pain, shortness of breath, palpitations, and syncope.       Allergies   Allergen Reactions   • Ibuprofen Hives     \"Pt states he hasn't taken this in a long time\"         Current Outpatient Medications:   •  atorvastatin (LIPITOR) 40 MG tablet, Take 1 tablet by mouth Every Night., Disp: 90 tablet, Rfl: 3  •  B Complex Vitamins (VITAMIN B COMPLEX PO), Take 1 tablet by mouth Daily., Disp: , Rfl:   •  carvedilol (COREG) 6.25 MG tablet, Take 1 tablet by mouth Every 12 (Twelve) Hours., Disp: 60 tablet, Rfl: 11  •  Cholecalciferol (VITAMIN D3) 125 MCG (5000 UT) capsule capsule, Take 2,000 Units by mouth Daily., Disp: , Rfl:   •  clopidogrel (PLAVIX) 75 MG tablet, Take 1 tablet by mouth Daily., Disp: 90 tablet, Rfl: 4  •  diclofenac (VOLTAREN) 75 MG EC tablet, Take 75 mg by mouth Daily., Disp: , Rfl:   •  DULoxetine (CYMBALTA) 30 MG capsule, Take 2 capsules by mouth Daily. (Patient taking differently: Take 30 mg by mouth 2 (Two) Times a Day.), Disp: 180 capsule, Rfl: 1  •  Ferrous Sulfate (IRON PO), Take 1 tablet by mouth As Needed., Disp: , Rfl:   •  fluticasone (FLONASE) 50 MCG/ACT nasal spray, 1 spray into the nostril(s) as directed by provider Daily., Disp: 48 g, Rfl: 3  •  furosemide (LASIX) 20 MG tablet, Take 1 tablet by mouth Daily. (Patient taking differently: Take 20 mg by mouth every night at bedtime.), Disp: 30 tablet, Rfl: 11  •  HYDROcodone-acetaminophen (NORCO) 5-325 MG per tablet, Take 1 tablet by mouth As Needed., Disp: , Rfl:   •  lisinopril (PRINIVIL,ZESTRIL) 2.5 MG tablet, Take 2.5 mg by mouth 2 (Two) Times a Day., Disp: , Rfl:   •  Melatonin 3 MG capsule, Take 1 capsule by mouth Daily., Disp: , Rfl:   •  Potassium Gluconate 550 (90 K) MG tablet, Take 1 tablet by mouth Daily., Disp: , Rfl:   •  tamsulosin (FLOMAX) 0.4 MG capsule 24 hr capsule, Take 1 capsule by mouth Daily., Disp: 90 capsule, Rfl: 3  •  vitamin B-12 (CYANOCOBALAMIN) 1000 MCG tablet, Take " "1,000 mcg by mouth Daily., Disp: , Rfl:   •  VITAMIN D, CHOLECALCIFEROL, PO, Take 1 tablet by mouth Daily., Disp: , Rfl:   •  Zinc 50 MG capsule, Take 1 tablet by mouth Daily., Disp: , Rfl:     The following portions of the patient's history were reviewed and updated as appropriate: allergies, current medications, past family history, past medical history, past social history, past surgical history and problem list.    Review of Systems   Constitutional: Negative.   Cardiovascular: Positive for leg swelling. Negative for chest pain, dyspnea on exertion, palpitations and syncope.   Respiratory: Negative.  Negative for shortness of breath.    Hematologic/Lymphatic: Negative for bleeding problem. Does not bruise/bleed easily.   Skin: Negative for rash.   Musculoskeletal: Negative for muscle weakness and myalgias.   Gastrointestinal: Negative for heartburn, nausea and vomiting.   Neurological: Positive for dizziness and light-headedness. Negative for loss of balance and numbness.          Objective:     Vitals:    05/17/21 1430   BP: 110/58   BP Location: Right arm   Patient Position: Sitting   Pulse: 82   SpO2: 98%   Weight: 84.3 kg (185 lb 12.8 oz)   Height: 182.9 cm (72\")         Constitutional:       Appearance: Well-developed.   Neck:      Thyroid: No thyromegaly.      Vascular: No carotid bruit or JVD.   Pulmonary:      Breath sounds: Normal breath sounds.   Cardiovascular:      Regular rhythm.      No gallop. No S3 and S4 gallop.   Edema:     Peripheral edema absent.   Abdominal:      General: Bowel sounds are normal.      Palpations: Abdomen is soft. There is no abdominal mass.      Tenderness: There is no abdominal tenderness.   Skin:     General: Skin is warm and dry.      Findings: No rash.   Neurological:      Mental Status: Alert and oriented to person, place, and time.         Lab Review:  Lab Results   Component Value Date    GLUCOSE 95 01/10/2021    BUN 29 (H) 01/10/2021    CREATININE 1.04 01/10/2021    " EGFRIFNONA 68 01/10/2021    BCR 27.9 (H) 01/10/2021    K 4.3 01/10/2021    CO2 31.0 (H) 01/10/2021    CALCIUM 8.8 01/10/2021    ALBUMIN 3.50 01/06/2021    ALKPHOS 68 01/06/2021    AST 25 01/06/2021    ALT 19 01/06/2021     Lab Results   Component Value Date    CHOL 140 01/09/2021    TRIG 70 01/09/2021    HDL 61 (H) 01/09/2021    LDL 65 01/09/2021      Lab Results   Component Value Date    WBC 7.73 01/09/2021    RBC 4.72 01/09/2021    HGB 14.2 01/09/2021    HCT 44.1 01/09/2021    MCV 93.4 01/09/2021     01/09/2021     Lab Results   Component Value Date    TSH 3.980 01/07/2021     Lab Results   Component Value Date    HGBA1C 5.80 (H) 01/07/2021        Procedures        Assessment:   Diagnoses and all orders for this visit:    1. Chronic coronary artery disease (Primary)    2. Essential hypertension    3. Mixed hyperlipidemia        Impression:  1. Coronary artery disease. Stable without recurrent chest pain. On Plavix.   2. Hypertension. Well controlled. On lisinopril, Lasix, and carvedilol.   3. Hyperlipidemia. Well controlled. On atorvastatin.     Plan:  1. Stable cardiac status.  2. Recommended patient should stagger the times he takes his medications to relieve the intensity of their side effects.    3. Continue current medications.  4. Revisit in 6 MO, or sooner as needed.    Scribed for Casimiro Bernal MD by Ellen Shah. 5/17/2021 15:07 EDT      Casimiro Bernal MD      Please note that portions of this note may have been completed with a voice recognition program. Efforts were made to edit the dictations, but occasionally words are mistranscribed.

## 2021-05-17 ENCOUNTER — OFFICE VISIT (OUTPATIENT)
Dept: CARDIOLOGY | Facility: CLINIC | Age: 81
End: 2021-05-17

## 2021-05-17 VITALS
BODY MASS INDEX: 25.17 KG/M2 | OXYGEN SATURATION: 98 % | DIASTOLIC BLOOD PRESSURE: 58 MMHG | WEIGHT: 185.8 LBS | HEIGHT: 72 IN | HEART RATE: 82 BPM | SYSTOLIC BLOOD PRESSURE: 110 MMHG

## 2021-05-17 DIAGNOSIS — I10 ESSENTIAL HYPERTENSION: ICD-10-CM

## 2021-05-17 DIAGNOSIS — I25.10 CHRONIC CORONARY ARTERY DISEASE: ICD-10-CM

## 2021-05-17 DIAGNOSIS — I25.10 CHRONIC CORONARY ARTERY DISEASE: Primary | ICD-10-CM

## 2021-05-17 DIAGNOSIS — E78.2 MIXED HYPERLIPIDEMIA: ICD-10-CM

## 2021-05-17 PROCEDURE — 99214 OFFICE O/P EST MOD 30 MIN: CPT | Performed by: INTERNAL MEDICINE

## 2021-05-17 RX ORDER — CARVEDILOL 6.25 MG/1
6.25 TABLET ORAL EVERY 12 HOURS SCHEDULED
Qty: 180 TABLET | Refills: 3 | Status: SHIPPED | OUTPATIENT
Start: 2021-05-17 | End: 2022-01-10 | Stop reason: SDUPTHER

## 2021-05-17 RX ORDER — ATORVASTATIN CALCIUM 40 MG/1
40 TABLET, FILM COATED ORAL NIGHTLY
Qty: 90 TABLET | Refills: 3 | Status: SHIPPED | OUTPATIENT
Start: 2021-05-17 | End: 2022-01-10 | Stop reason: SDUPTHER

## 2021-05-17 RX ORDER — LISINOPRIL 2.5 MG/1
2.5 TABLET ORAL 2 TIMES DAILY
Qty: 90 TABLET | Refills: 3 | Status: SHIPPED | OUTPATIENT
Start: 2021-05-17 | End: 2022-08-08 | Stop reason: SDUPTHER

## 2021-05-17 RX ORDER — FUROSEMIDE 20 MG/1
20 TABLET ORAL DAILY
Qty: 90 TABLET | Refills: 1 | Status: SHIPPED | OUTPATIENT
Start: 2021-05-17 | End: 2022-01-28

## 2021-05-17 RX ORDER — CLOPIDOGREL BISULFATE 75 MG/1
75 TABLET ORAL DAILY
Qty: 90 TABLET | Refills: 4 | Status: ON HOLD | OUTPATIENT
Start: 2021-05-17 | End: 2021-12-01 | Stop reason: SDUPTHER

## 2021-05-27 ENCOUNTER — HOSPITAL ENCOUNTER (INPATIENT)
Facility: HOSPITAL | Age: 81
LOS: 3 days | Discharge: HOME | End: 2021-05-30
Attending: EMERGENCY MEDICINE | Admitting: INTERNAL MEDICINE

## 2021-05-27 ENCOUNTER — APPOINTMENT (OUTPATIENT)
Dept: GENERAL RADIOLOGY | Facility: HOSPITAL | Age: 81
End: 2021-05-27

## 2021-05-27 DIAGNOSIS — J96.01 ACUTE RESPIRATORY FAILURE WITH HYPOXIA (HCC): ICD-10-CM

## 2021-05-27 DIAGNOSIS — J81.0 FLASH PULMONARY EDEMA (HCC): Primary | ICD-10-CM

## 2021-05-27 DIAGNOSIS — I16.1 HYPERTENSIVE EMERGENCY: ICD-10-CM

## 2021-05-27 LAB
ALBUMIN SERPL-MCNC: 4.3 G/DL (ref 3.5–5.2)
ALBUMIN/GLOB SERPL: 1.4 G/DL
ALP SERPL-CCNC: 89 U/L (ref 39–117)
ALT SERPL W P-5'-P-CCNC: 22 U/L (ref 1–41)
ANION GAP SERPL CALCULATED.3IONS-SCNC: 13 MMOL/L (ref 5–15)
ARTERIAL PATENCY WRIST A: ABNORMAL
AST SERPL-CCNC: 29 U/L (ref 1–40)
ATMOSPHERIC PRESS: ABNORMAL MM[HG]
BASE EXCESS BLDA CALC-SCNC: -4.2 MMOL/L (ref 0–2)
BASOPHILS # BLD AUTO: 0.09 10*3/MM3 (ref 0–0.2)
BASOPHILS NFR BLD AUTO: 0.7 % (ref 0–1.5)
BDY SITE: ABNORMAL
BILIRUB SERPL-MCNC: 0.4 MG/DL (ref 0–1.2)
BODY TEMPERATURE: 37 C
BUN SERPL-MCNC: 39 MG/DL (ref 8–23)
BUN/CREAT SERPL: 30.2 (ref 7–25)
CALCIUM SPEC-SCNC: 9.3 MG/DL (ref 8.6–10.5)
CHLORIDE SERPL-SCNC: 107 MMOL/L (ref 98–107)
CO2 BLDA-SCNC: 22.6 MMOL/L (ref 22–33)
CO2 SERPL-SCNC: 22 MMOL/L (ref 22–29)
COHGB MFR BLD: 0.3 % (ref 0–2)
CREAT SERPL-MCNC: 1.29 MG/DL (ref 0.76–1.27)
D DIMER PPP FEU-MCNC: 2.85 MCGFEU/ML (ref 0–0.56)
DEPRECATED RDW RBC AUTO: 47.4 FL (ref 37–54)
EOSINOPHIL # BLD AUTO: 0.47 10*3/MM3 (ref 0–0.4)
EOSINOPHIL NFR BLD AUTO: 3.5 % (ref 0.3–6.2)
EPAP: 0
ERYTHROCYTE [DISTWIDTH] IN BLOOD BY AUTOMATED COUNT: 13.4 % (ref 12.3–15.4)
GFR SERPL CREATININE-BSD FRML MDRD: 53 ML/MIN/1.73
GLOBULIN UR ELPH-MCNC: 3 GM/DL
GLUCOSE SERPL-MCNC: 177 MG/DL (ref 65–99)
HCO3 BLDA-SCNC: 21.4 MMOL/L (ref 20–26)
HCT VFR BLD AUTO: 48.4 % (ref 37.5–51)
HCT VFR BLD CALC: 45.8 %
HGB BLD-MCNC: 15.6 G/DL (ref 13–17.7)
HGB BLDA-MCNC: 14.9 G/DL (ref 13.5–17.5)
HOLD SPECIMEN: NORMAL
HOLD SPECIMEN: NORMAL
IMM GRANULOCYTES # BLD AUTO: 0.06 10*3/MM3 (ref 0–0.05)
IMM GRANULOCYTES NFR BLD AUTO: 0.4 % (ref 0–0.5)
INHALED O2 CONCENTRATION: 100 %
IPAP: 0
LYMPHOCYTES # BLD AUTO: 4.1 10*3/MM3 (ref 0.7–3.1)
LYMPHOCYTES NFR BLD AUTO: 30.2 % (ref 19.6–45.3)
MCH RBC QN AUTO: 30.6 PG (ref 26.6–33)
MCHC RBC AUTO-ENTMCNC: 32.2 G/DL (ref 31.5–35.7)
MCV RBC AUTO: 95.1 FL (ref 79–97)
METHGB BLD QL: 0.4 % (ref 0–1.5)
MODALITY: ABNORMAL
MONOCYTES # BLD AUTO: 0.93 10*3/MM3 (ref 0.1–0.9)
MONOCYTES NFR BLD AUTO: 6.9 % (ref 5–12)
NEUTROPHILS NFR BLD AUTO: 58.3 % (ref 42.7–76)
NEUTROPHILS NFR BLD AUTO: 7.92 10*3/MM3 (ref 1.7–7)
NOTE: ABNORMAL
NRBC BLD AUTO-RTO: 0 /100 WBC (ref 0–0.2)
NT-PROBNP SERPL-MCNC: 2920 PG/ML (ref 0–1800)
OXYHGB MFR BLDV: 97.8 % (ref 94–99)
PAW @ PEAK INSP FLOW SETTING VENT: 0 CMH2O
PCO2 BLDA: 40.2 MM HG (ref 35–45)
PCO2 TEMP ADJ BLD: 40.2 MM HG (ref 35–48)
PH BLDA: 7.33 PH UNITS (ref 7.35–7.45)
PH, TEMP CORRECTED: 7.33 PH UNITS
PLATELET # BLD AUTO: 220 10*3/MM3 (ref 140–450)
PMV BLD AUTO: 10.2 FL (ref 6–12)
PO2 BLDA: 127 MM HG (ref 83–108)
PO2 TEMP ADJ BLD: 127 MM HG (ref 83–108)
POTASSIUM SERPL-SCNC: 4.6 MMOL/L (ref 3.5–5.2)
PROCALCITONIN SERPL-MCNC: 0.06 NG/ML (ref 0–0.25)
PROT SERPL-MCNC: 7.3 G/DL (ref 6–8.5)
RBC # BLD AUTO: 5.09 10*6/MM3 (ref 4.14–5.8)
SODIUM SERPL-SCNC: 142 MMOL/L (ref 136–145)
TOTAL RATE: 0 BREATHS/MINUTE
TROPONIN T SERPL-MCNC: 0.03 NG/ML (ref 0–0.03)
WBC # BLD AUTO: 13.57 10*3/MM3 (ref 3.4–10.8)
WHOLE BLOOD HOLD SPECIMEN: NORMAL
WHOLE BLOOD HOLD SPECIMEN: NORMAL

## 2021-05-27 PROCEDURE — 25010000002 METHYLPREDNISOLONE PER 125 MG: Performed by: EMERGENCY MEDICINE

## 2021-05-27 PROCEDURE — 94660 CPAP INITIATION&MGMT: CPT

## 2021-05-27 PROCEDURE — 71045 X-RAY EXAM CHEST 1 VIEW: CPT

## 2021-05-27 PROCEDURE — 82375 ASSAY CARBOXYHB QUANT: CPT

## 2021-05-27 PROCEDURE — 93005 ELECTROCARDIOGRAM TRACING: CPT | Performed by: EMERGENCY MEDICINE

## 2021-05-27 PROCEDURE — 94799 UNLISTED PULMONARY SVC/PX: CPT

## 2021-05-27 PROCEDURE — 83930 ASSAY OF BLOOD OSMOLALITY: CPT | Performed by: PHYSICIAN ASSISTANT

## 2021-05-27 PROCEDURE — 36600 WITHDRAWAL OF ARTERIAL BLOOD: CPT

## 2021-05-27 PROCEDURE — 80053 COMPREHEN METABOLIC PANEL: CPT | Performed by: EMERGENCY MEDICINE

## 2021-05-27 PROCEDURE — 85025 COMPLETE CBC W/AUTO DIFF WBC: CPT | Performed by: EMERGENCY MEDICINE

## 2021-05-27 PROCEDURE — 83050 HGB METHEMOGLOBIN QUAN: CPT

## 2021-05-27 PROCEDURE — 85379 FIBRIN DEGRADATION QUANT: CPT | Performed by: INTERNAL MEDICINE

## 2021-05-27 PROCEDURE — 83880 ASSAY OF NATRIURETIC PEPTIDE: CPT | Performed by: EMERGENCY MEDICINE

## 2021-05-27 PROCEDURE — 99223 1ST HOSP IP/OBS HIGH 75: CPT | Performed by: INTERNAL MEDICINE

## 2021-05-27 PROCEDURE — 94640 AIRWAY INHALATION TREATMENT: CPT

## 2021-05-27 PROCEDURE — 84145 PROCALCITONIN (PCT): CPT | Performed by: EMERGENCY MEDICINE

## 2021-05-27 PROCEDURE — 82805 BLOOD GASES W/O2 SATURATION: CPT

## 2021-05-27 PROCEDURE — 84484 ASSAY OF TROPONIN QUANT: CPT | Performed by: EMERGENCY MEDICINE

## 2021-05-27 PROCEDURE — 99291 CRITICAL CARE FIRST HOUR: CPT

## 2021-05-27 RX ORDER — METHYLPREDNISOLONE SODIUM SUCCINATE 125 MG/2ML
125 INJECTION, POWDER, LYOPHILIZED, FOR SOLUTION INTRAMUSCULAR; INTRAVENOUS ONCE
Status: COMPLETED | OUTPATIENT
Start: 2021-05-27 | End: 2021-05-27

## 2021-05-27 RX ORDER — IPRATROPIUM BROMIDE AND ALBUTEROL SULFATE 2.5; .5 MG/3ML; MG/3ML
3 SOLUTION RESPIRATORY (INHALATION) ONCE
Status: COMPLETED | OUTPATIENT
Start: 2021-05-27 | End: 2021-05-27

## 2021-05-27 RX ORDER — SODIUM CHLORIDE 0.9 % (FLUSH) 0.9 %
10 SYRINGE (ML) INJECTION AS NEEDED
Status: DISCONTINUED | OUTPATIENT
Start: 2021-05-27 | End: 2021-05-30 | Stop reason: HOSPADM

## 2021-05-27 RX ORDER — NITROGLYCERIN 20 MG/100ML
5-200 INJECTION INTRAVENOUS
Status: DISCONTINUED | OUTPATIENT
Start: 2021-05-27 | End: 2021-05-28

## 2021-05-27 RX ADMIN — IPRATROPIUM BROMIDE AND ALBUTEROL SULFATE 3 ML: 2.5; .5 SOLUTION RESPIRATORY (INHALATION) at 21:46

## 2021-05-27 RX ADMIN — METHYLPREDNISOLONE SODIUM SUCCINATE 125 MG: 125 INJECTION, POWDER, FOR SOLUTION INTRAMUSCULAR; INTRAVENOUS at 21:34

## 2021-05-27 RX ADMIN — NITROGLYCERIN 20 MCG/MIN: 20 INJECTION INTRAVENOUS at 21:19

## 2021-05-28 ENCOUNTER — APPOINTMENT (OUTPATIENT)
Dept: CARDIOLOGY | Facility: HOSPITAL | Age: 81
End: 2021-05-28

## 2021-05-28 ENCOUNTER — APPOINTMENT (OUTPATIENT)
Dept: CT IMAGING | Facility: HOSPITAL | Age: 81
End: 2021-05-28

## 2021-05-28 ENCOUNTER — TRANSCRIBE ORDERS (OUTPATIENT)
Dept: CARDIAC REHAB | Facility: HOSPITAL | Age: 81
End: 2021-05-28

## 2021-05-28 DIAGNOSIS — I50.23 ACUTE ON CHRONIC SYSTOLIC CHF (CONGESTIVE HEART FAILURE) (HCC): Primary | ICD-10-CM

## 2021-05-28 PROBLEM — D72.829 LEUKOCYTOSIS: Status: ACTIVE | Noted: 2021-05-28

## 2021-05-28 PROBLEM — R77.8 ELEVATED TROPONIN: Status: ACTIVE | Noted: 2021-05-28

## 2021-05-28 PROBLEM — J18.9 PNEUMONIA: Status: ACTIVE | Noted: 2021-05-28

## 2021-05-28 PROBLEM — I16.0 HYPERTENSIVE URGENCY: Status: ACTIVE | Noted: 2021-05-28

## 2021-05-28 PROBLEM — R79.89 ELEVATED TROPONIN: Status: ACTIVE | Noted: 2021-05-28

## 2021-05-28 LAB
ANION GAP SERPL CALCULATED.3IONS-SCNC: 9 MMOL/L (ref 5–15)
BASOPHILS # BLD AUTO: 0.04 10*3/MM3 (ref 0–0.2)
BASOPHILS NFR BLD AUTO: 0.3 % (ref 0–1.5)
BH CV ECHO MEAS - BSA(HAYCOCK): 2.1 M^2
BH CV ECHO MEAS - BSA: 2.1 M^2
BH CV ECHO MEAS - BZI_BMI: 25.6 KILOGRAMS/M^2
BH CV ECHO MEAS - BZI_METRIC_HEIGHT: 182.9 CM
BH CV ECHO MEAS - BZI_METRIC_WEIGHT: 85.7 KG
BH CV LOWER VASCULAR LEFT COMMON FEMORAL AUGMENT: NORMAL
BH CV LOWER VASCULAR LEFT COMMON FEMORAL COMPRESS: NORMAL
BH CV LOWER VASCULAR LEFT COMMON FEMORAL PHASIC: NORMAL
BH CV LOWER VASCULAR LEFT COMMON FEMORAL SPONT: NORMAL
BH CV LOWER VASCULAR LEFT DISTAL FEMORAL AUGMENT: NORMAL
BH CV LOWER VASCULAR LEFT DISTAL FEMORAL COMPRESS: NORMAL
BH CV LOWER VASCULAR LEFT DISTAL FEMORAL PHASIC: NORMAL
BH CV LOWER VASCULAR LEFT DISTAL FEMORAL SPONT: NORMAL
BH CV LOWER VASCULAR LEFT GASTRONEMIUS COMPRESS: NORMAL
BH CV LOWER VASCULAR LEFT GREATER SAPH AK COMPRESS: NORMAL
BH CV LOWER VASCULAR LEFT GREATER SAPH BK COMPRESS: NORMAL
BH CV LOWER VASCULAR LEFT LESSER SAPH COMPRESS: NORMAL
BH CV LOWER VASCULAR LEFT MID FEMORAL AUGMENT: NORMAL
BH CV LOWER VASCULAR LEFT MID FEMORAL COMPRESS: NORMAL
BH CV LOWER VASCULAR LEFT MID FEMORAL PHASIC: NORMAL
BH CV LOWER VASCULAR LEFT MID FEMORAL SPONT: NORMAL
BH CV LOWER VASCULAR LEFT PERONEAL AUGMENT: NORMAL
BH CV LOWER VASCULAR LEFT PERONEAL COMPRESS: NORMAL
BH CV LOWER VASCULAR LEFT POPLITEAL AUGMENT: NORMAL
BH CV LOWER VASCULAR LEFT POPLITEAL COMPRESS: NORMAL
BH CV LOWER VASCULAR LEFT POPLITEAL PHASIC: NORMAL
BH CV LOWER VASCULAR LEFT POPLITEAL SPONT: NORMAL
BH CV LOWER VASCULAR LEFT POSTERIOR TIBIAL AUGMENT: NORMAL
BH CV LOWER VASCULAR LEFT POSTERIOR TIBIAL COMPRESS: NORMAL
BH CV LOWER VASCULAR LEFT PROFUNDA FEMORAL AUGMENT: NORMAL
BH CV LOWER VASCULAR LEFT PROFUNDA FEMORAL PHASIC: NORMAL
BH CV LOWER VASCULAR LEFT PROFUNDA FEMORAL SPONT: NORMAL
BH CV LOWER VASCULAR LEFT PROXIMAL FEMORAL AUGMENT: NORMAL
BH CV LOWER VASCULAR LEFT PROXIMAL FEMORAL COMPRESS: NORMAL
BH CV LOWER VASCULAR LEFT PROXIMAL FEMORAL PHASIC: NORMAL
BH CV LOWER VASCULAR LEFT PROXIMAL FEMORAL SPONT: NORMAL
BH CV LOWER VASCULAR LEFT SAPHENOFEMORAL JUNCTION AUGMENT: NORMAL
BH CV LOWER VASCULAR LEFT SAPHENOFEMORAL JUNCTION COMPRESS: NORMAL
BH CV LOWER VASCULAR LEFT SAPHENOFEMORAL JUNCTION PHASIC: NORMAL
BH CV LOWER VASCULAR LEFT SAPHENOFEMORAL JUNCTION SPONT: NORMAL
BH CV LOWER VASCULAR RIGHT COMMON FEMORAL AUGMENT: NORMAL
BH CV LOWER VASCULAR RIGHT COMMON FEMORAL COMPRESS: NORMAL
BH CV LOWER VASCULAR RIGHT COMMON FEMORAL PHASIC: NORMAL
BH CV LOWER VASCULAR RIGHT COMMON FEMORAL SPONT: NORMAL
BH CV LOWER VASCULAR RIGHT DISTAL FEMORAL AUGMENT: NORMAL
BH CV LOWER VASCULAR RIGHT DISTAL FEMORAL COMPRESS: NORMAL
BH CV LOWER VASCULAR RIGHT DISTAL FEMORAL PHASIC: NORMAL
BH CV LOWER VASCULAR RIGHT DISTAL FEMORAL SPONT: NORMAL
BH CV LOWER VASCULAR RIGHT GASTRONEMIUS COMPRESS: NORMAL
BH CV LOWER VASCULAR RIGHT GREATER SAPH AK COMPRESS: NORMAL
BH CV LOWER VASCULAR RIGHT GREATER SAPH BK COMPRESS: NORMAL
BH CV LOWER VASCULAR RIGHT LESSER SAPH COMPRESS: NORMAL
BH CV LOWER VASCULAR RIGHT MID FEMORAL AUGMENT: NORMAL
BH CV LOWER VASCULAR RIGHT MID FEMORAL COMPRESS: NORMAL
BH CV LOWER VASCULAR RIGHT MID FEMORAL PHASIC: NORMAL
BH CV LOWER VASCULAR RIGHT MID FEMORAL SPONT: NORMAL
BH CV LOWER VASCULAR RIGHT PERONEAL AUGMENT: NORMAL
BH CV LOWER VASCULAR RIGHT PERONEAL COMPRESS: NORMAL
BH CV LOWER VASCULAR RIGHT POPLITEAL AUGMENT: NORMAL
BH CV LOWER VASCULAR RIGHT POPLITEAL COMPRESS: NORMAL
BH CV LOWER VASCULAR RIGHT POPLITEAL PHASIC: NORMAL
BH CV LOWER VASCULAR RIGHT POPLITEAL SPONT: NORMAL
BH CV LOWER VASCULAR RIGHT POSTERIOR TIBIAL AUGMENT: NORMAL
BH CV LOWER VASCULAR RIGHT POSTERIOR TIBIAL COMPRESS: NORMAL
BH CV LOWER VASCULAR RIGHT PROFUNDA FEMORAL AUGMENT: NORMAL
BH CV LOWER VASCULAR RIGHT PROFUNDA FEMORAL PHASIC: NORMAL
BH CV LOWER VASCULAR RIGHT PROFUNDA FEMORAL SPONT: NORMAL
BH CV LOWER VASCULAR RIGHT PROXIMAL FEMORAL AUGMENT: NORMAL
BH CV LOWER VASCULAR RIGHT PROXIMAL FEMORAL COMPRESS: NORMAL
BH CV LOWER VASCULAR RIGHT PROXIMAL FEMORAL PHASIC: NORMAL
BH CV LOWER VASCULAR RIGHT PROXIMAL FEMORAL SPONT: NORMAL
BH CV LOWER VASCULAR RIGHT SAPHENOFEMORAL JUNCTION AUGMENT: NORMAL
BH CV LOWER VASCULAR RIGHT SAPHENOFEMORAL JUNCTION COMPRESS: NORMAL
BH CV LOWER VASCULAR RIGHT SAPHENOFEMORAL JUNCTION PHASIC: NORMAL
BH CV LOWER VASCULAR RIGHT SAPHENOFEMORAL JUNCTION SPONT: NORMAL
BH CV POP FLUID COLLECT LEFT: 1
BILIRUB UR QL STRIP: NEGATIVE
BUN SERPL-MCNC: 42 MG/DL (ref 8–23)
BUN/CREAT SERPL: 32.6 (ref 7–25)
CALCIUM SPEC-SCNC: 8.9 MG/DL (ref 8.6–10.5)
CHLORIDE SERPL-SCNC: 106 MMOL/L (ref 98–107)
CHOLEST SERPL-MCNC: 164 MG/DL (ref 0–200)
CLARITY UR: CLEAR
CO2 SERPL-SCNC: 25 MMOL/L (ref 22–29)
COLOR UR: YELLOW
CREAT SERPL-MCNC: 1.29 MG/DL (ref 0.76–1.27)
CREAT UR-MCNC: 19.8 MG/DL
D-LACTATE SERPL-SCNC: 1.1 MMOL/L (ref 0.5–2)
DEPRECATED RDW RBC AUTO: 47.9 FL (ref 37–54)
EOSINOPHIL # BLD AUTO: 0 10*3/MM3 (ref 0–0.4)
EOSINOPHIL NFR BLD AUTO: 0 % (ref 0.3–6.2)
EOSINOPHIL SPEC QL MICRO: 0 % EOS/100 CELLS (ref 0–0)
ERYTHROCYTE [DISTWIDTH] IN BLOOD BY AUTOMATED COUNT: 13.4 % (ref 12.3–15.4)
FLUAV RNA RESP QL NAA+PROBE: NOT DETECTED
FLUBV RNA RESP QL NAA+PROBE: NOT DETECTED
GFR SERPL CREATININE-BSD FRML MDRD: 53 ML/MIN/1.73
GLUCOSE SERPL-MCNC: 163 MG/DL (ref 65–99)
GLUCOSE UR STRIP-MCNC: NEGATIVE MG/DL
HBA1C MFR BLD: 5.6 % (ref 4.8–5.6)
HCT VFR BLD AUTO: 43.3 % (ref 37.5–51)
HDLC SERPL-MCNC: 68 MG/DL (ref 40–60)
HGB BLD-MCNC: 13.7 G/DL (ref 13–17.7)
HGB UR QL STRIP.AUTO: NEGATIVE
HOLD SPECIMEN: NORMAL
IMM GRANULOCYTES # BLD AUTO: 0.07 10*3/MM3 (ref 0–0.05)
IMM GRANULOCYTES NFR BLD AUTO: 0.6 % (ref 0–0.5)
KETONES UR QL STRIP: NEGATIVE
LDLC SERPL CALC-MCNC: 86 MG/DL (ref 0–100)
LDLC/HDLC SERPL: 1.27 {RATIO}
LEUKOCYTE ESTERASE UR QL STRIP.AUTO: NEGATIVE
LYMPHOCYTES # BLD AUTO: 0.65 10*3/MM3 (ref 0.7–3.1)
LYMPHOCYTES NFR BLD AUTO: 5.2 % (ref 19.6–45.3)
MCH RBC QN AUTO: 30.2 PG (ref 26.6–33)
MCHC RBC AUTO-ENTMCNC: 31.6 G/DL (ref 31.5–35.7)
MCV RBC AUTO: 95.6 FL (ref 79–97)
MONOCYTES # BLD AUTO: 0.15 10*3/MM3 (ref 0.1–0.9)
MONOCYTES NFR BLD AUTO: 1.2 % (ref 5–12)
NEUTROPHILS NFR BLD AUTO: 11.53 10*3/MM3 (ref 1.7–7)
NEUTROPHILS NFR BLD AUTO: 92.7 % (ref 42.7–76)
NITRITE UR QL STRIP: NEGATIVE
NRBC BLD AUTO-RTO: 0 /100 WBC (ref 0–0.2)
OSMOLALITY SERPL: 312 MOSM/KG (ref 275–295)
OSMOLALITY UR: 360 MOSM/KG (ref 300–1100)
PH UR STRIP.AUTO: <=5 [PH] (ref 5–8)
PLATELET # BLD AUTO: 174 10*3/MM3 (ref 140–450)
PMV BLD AUTO: 10.5 FL (ref 6–12)
POTASSIUM SERPL-SCNC: 4.9 MMOL/L (ref 3.5–5.2)
PROT UR QL STRIP: NEGATIVE
PROT UR-MCNC: <4 MG/DL
RBC # BLD AUTO: 4.53 10*6/MM3 (ref 4.14–5.8)
SARS-COV-2 RNA RESP QL NAA+PROBE: NOT DETECTED
SODIUM SERPL-SCNC: 140 MMOL/L (ref 136–145)
SODIUM UR-SCNC: 120 MMOL/L
SP GR UR STRIP: 1.01 (ref 1–1.03)
TRIGL SERPL-MCNC: 49 MG/DL (ref 0–150)
TROPONIN T SERPL-MCNC: 0.05 NG/ML (ref 0–0.03)
TROPONIN T SERPL-MCNC: 0.06 NG/ML (ref 0–0.03)
TROPONIN T SERPL-MCNC: 0.07 NG/ML (ref 0–0.03)
UROBILINOGEN UR QL STRIP: NORMAL
VLDLC SERPL-MCNC: 10 MG/DL (ref 5–40)
WBC # BLD AUTO: 12.44 10*3/MM3 (ref 3.4–10.8)

## 2021-05-28 PROCEDURE — 80048 BASIC METABOLIC PNL TOTAL CA: CPT | Performed by: PHYSICIAN ASSISTANT

## 2021-05-28 PROCEDURE — 99233 SBSQ HOSP IP/OBS HIGH 50: CPT | Performed by: INTERNAL MEDICINE

## 2021-05-28 PROCEDURE — 97530 THERAPEUTIC ACTIVITIES: CPT

## 2021-05-28 PROCEDURE — 84300 ASSAY OF URINE SODIUM: CPT | Performed by: PHYSICIAN ASSISTANT

## 2021-05-28 PROCEDURE — 82570 ASSAY OF URINE CREATININE: CPT | Performed by: PHYSICIAN ASSISTANT

## 2021-05-28 PROCEDURE — 93970 EXTREMITY STUDY: CPT | Performed by: INTERNAL MEDICINE

## 2021-05-28 PROCEDURE — 83935 ASSAY OF URINE OSMOLALITY: CPT | Performed by: PHYSICIAN ASSISTANT

## 2021-05-28 PROCEDURE — 25010000002 FUROSEMIDE PER 20 MG: Performed by: NURSE PRACTITIONER

## 2021-05-28 PROCEDURE — 84156 ASSAY OF PROTEIN URINE: CPT | Performed by: PHYSICIAN ASSISTANT

## 2021-05-28 PROCEDURE — 94799 UNLISTED PULMONARY SVC/PX: CPT

## 2021-05-28 PROCEDURE — 87040 BLOOD CULTURE FOR BACTERIA: CPT | Performed by: INTERNAL MEDICINE

## 2021-05-28 PROCEDURE — 25010000002 METHYLPREDNISOLONE PER 125 MG: Performed by: PHYSICIAN ASSISTANT

## 2021-05-28 PROCEDURE — 83036 HEMOGLOBIN GLYCOSYLATED A1C: CPT | Performed by: INTERNAL MEDICINE

## 2021-05-28 PROCEDURE — 97165 OT EVAL LOW COMPLEX 30 MIN: CPT

## 2021-05-28 PROCEDURE — 87205 SMEAR GRAM STAIN: CPT | Performed by: PHYSICIAN ASSISTANT

## 2021-05-28 PROCEDURE — 93970 EXTREMITY STUDY: CPT

## 2021-05-28 PROCEDURE — 87636 SARSCOV2 & INF A&B AMP PRB: CPT | Performed by: INTERNAL MEDICINE

## 2021-05-28 PROCEDURE — 83605 ASSAY OF LACTIC ACID: CPT | Performed by: INTERNAL MEDICINE

## 2021-05-28 PROCEDURE — 25010000002 HEPARIN (PORCINE) PER 1000 UNITS: Performed by: PHYSICIAN ASSISTANT

## 2021-05-28 PROCEDURE — 84484 ASSAY OF TROPONIN QUANT: CPT | Performed by: INTERNAL MEDICINE

## 2021-05-28 PROCEDURE — 25010000002 CEFTRIAXONE PER 250 MG: Performed by: INTERNAL MEDICINE

## 2021-05-28 PROCEDURE — 0 IOPAMIDOL PER 1 ML: Performed by: INTERNAL MEDICINE

## 2021-05-28 PROCEDURE — 85025 COMPLETE CBC W/AUTO DIFF WBC: CPT | Performed by: INTERNAL MEDICINE

## 2021-05-28 PROCEDURE — 80061 LIPID PANEL: CPT | Performed by: INTERNAL MEDICINE

## 2021-05-28 PROCEDURE — 71275 CT ANGIOGRAPHY CHEST: CPT

## 2021-05-28 PROCEDURE — 99232 SBSQ HOSP IP/OBS MODERATE 35: CPT | Performed by: INTERNAL MEDICINE

## 2021-05-28 PROCEDURE — 97161 PT EVAL LOW COMPLEX 20 MIN: CPT

## 2021-05-28 PROCEDURE — 81003 URINALYSIS AUTO W/O SCOPE: CPT | Performed by: INTERNAL MEDICINE

## 2021-05-28 RX ORDER — IPRATROPIUM BROMIDE AND ALBUTEROL SULFATE 2.5; .5 MG/3ML; MG/3ML
3 SOLUTION RESPIRATORY (INHALATION)
Status: DISCONTINUED | OUTPATIENT
Start: 2021-05-28 | End: 2021-05-30 | Stop reason: HOSPADM

## 2021-05-28 RX ORDER — TAMSULOSIN HYDROCHLORIDE 0.4 MG/1
0.4 CAPSULE ORAL DAILY
Status: DISCONTINUED | OUTPATIENT
Start: 2021-05-28 | End: 2021-05-30 | Stop reason: HOSPADM

## 2021-05-28 RX ORDER — HEPARIN SODIUM 5000 [USP'U]/ML
5000 INJECTION, SOLUTION INTRAVENOUS; SUBCUTANEOUS EVERY 8 HOURS SCHEDULED
Status: DISCONTINUED | OUTPATIENT
Start: 2021-05-28 | End: 2021-05-30 | Stop reason: HOSPADM

## 2021-05-28 RX ORDER — FLUTICASONE PROPIONATE 50 MCG
1 SPRAY, SUSPENSION (ML) NASAL DAILY
Status: DISCONTINUED | OUTPATIENT
Start: 2021-05-28 | End: 2021-05-30 | Stop reason: HOSPADM

## 2021-05-28 RX ORDER — CHOLECALCIFEROL (VITAMIN D3) 125 MCG
5 CAPSULE ORAL NIGHTLY PRN
Status: DISCONTINUED | OUTPATIENT
Start: 2021-05-28 | End: 2021-05-30 | Stop reason: HOSPADM

## 2021-05-28 RX ORDER — ASPIRIN 325 MG
325 TABLET, DELAYED RELEASE (ENTERIC COATED) ORAL ONCE
Status: COMPLETED | OUTPATIENT
Start: 2021-05-28 | End: 2021-05-28

## 2021-05-28 RX ORDER — FUROSEMIDE 10 MG/ML
40 INJECTION INTRAMUSCULAR; INTRAVENOUS ONCE
Status: COMPLETED | OUTPATIENT
Start: 2021-05-28 | End: 2021-05-28

## 2021-05-28 RX ORDER — IPRATROPIUM BROMIDE AND ALBUTEROL SULFATE 2.5; .5 MG/3ML; MG/3ML
3 SOLUTION RESPIRATORY (INHALATION) EVERY 4 HOURS PRN
Status: DISCONTINUED | OUTPATIENT
Start: 2021-05-28 | End: 2021-05-30 | Stop reason: HOSPADM

## 2021-05-28 RX ORDER — IPRATROPIUM BROMIDE AND ALBUTEROL SULFATE 2.5; .5 MG/3ML; MG/3ML
3 SOLUTION RESPIRATORY (INHALATION)
Status: DISCONTINUED | OUTPATIENT
Start: 2021-05-28 | End: 2021-05-28 | Stop reason: SDUPTHER

## 2021-05-28 RX ORDER — SODIUM CHLORIDE 0.9 % (FLUSH) 0.9 %
10 SYRINGE (ML) INJECTION EVERY 12 HOURS SCHEDULED
Status: DISCONTINUED | OUTPATIENT
Start: 2021-05-28 | End: 2021-05-30 | Stop reason: HOSPADM

## 2021-05-28 RX ORDER — ATORVASTATIN CALCIUM 40 MG/1
40 TABLET, FILM COATED ORAL NIGHTLY
Status: DISCONTINUED | OUTPATIENT
Start: 2021-05-28 | End: 2021-05-30 | Stop reason: HOSPADM

## 2021-05-28 RX ORDER — ASPIRIN 81 MG/1
81 TABLET ORAL DAILY
Status: DISCONTINUED | OUTPATIENT
Start: 2021-05-28 | End: 2021-05-30 | Stop reason: HOSPADM

## 2021-05-28 RX ORDER — CLOPIDOGREL BISULFATE 75 MG/1
75 TABLET ORAL DAILY
Status: DISCONTINUED | OUTPATIENT
Start: 2021-05-28 | End: 2021-05-30 | Stop reason: HOSPADM

## 2021-05-28 RX ORDER — FERROUS SULFATE 325(65) MG
325 TABLET ORAL
Status: DISCONTINUED | OUTPATIENT
Start: 2021-05-28 | End: 2021-05-30 | Stop reason: HOSPADM

## 2021-05-28 RX ORDER — IPRATROPIUM BROMIDE AND ALBUTEROL SULFATE 2.5; .5 MG/3ML; MG/3ML
3 SOLUTION RESPIRATORY (INHALATION) EVERY 4 HOURS PRN
Status: DISCONTINUED | OUTPATIENT
Start: 2021-05-28 | End: 2021-05-28 | Stop reason: SDUPTHER

## 2021-05-28 RX ORDER — METHYLPREDNISOLONE SODIUM SUCCINATE 125 MG/2ML
60 INJECTION, POWDER, LYOPHILIZED, FOR SOLUTION INTRAMUSCULAR; INTRAVENOUS EVERY 8 HOURS
Status: COMPLETED | OUTPATIENT
Start: 2021-05-28 | End: 2021-05-28

## 2021-05-28 RX ORDER — SODIUM CHLORIDE 0.9 % (FLUSH) 0.9 %
10 SYRINGE (ML) INJECTION AS NEEDED
Status: DISCONTINUED | OUTPATIENT
Start: 2021-05-28 | End: 2021-05-30 | Stop reason: HOSPADM

## 2021-05-28 RX ADMIN — SODIUM CHLORIDE 1 G: 900 INJECTION INTRAVENOUS at 04:22

## 2021-05-28 RX ADMIN — ATORVASTATIN CALCIUM 40 MG: 40 TABLET, FILM COATED ORAL at 20:38

## 2021-05-28 RX ADMIN — TAMSULOSIN HYDROCHLORIDE 0.4 MG: 0.4 CAPSULE ORAL at 09:45

## 2021-05-28 RX ADMIN — METHYLPREDNISOLONE SODIUM SUCCINATE 60 MG: 125 INJECTION, POWDER, FOR SOLUTION INTRAMUSCULAR; INTRAVENOUS at 04:23

## 2021-05-28 RX ADMIN — IPRATROPIUM BROMIDE AND ALBUTEROL SULFATE 3 ML: 2.5; .5 SOLUTION RESPIRATORY (INHALATION) at 07:33

## 2021-05-28 RX ADMIN — METHYLPREDNISOLONE SODIUM SUCCINATE 60 MG: 125 INJECTION, POWDER, FOR SOLUTION INTRAMUSCULAR; INTRAVENOUS at 20:33

## 2021-05-28 RX ADMIN — FUROSEMIDE 40 MG: 10 INJECTION, SOLUTION INTRAMUSCULAR; INTRAVENOUS at 13:54

## 2021-05-28 RX ADMIN — ATORVASTATIN CALCIUM 40 MG: 40 TABLET, FILM COATED ORAL at 02:41

## 2021-05-28 RX ADMIN — CLOPIDOGREL BISULFATE 75 MG: 75 TABLET ORAL at 09:45

## 2021-05-28 RX ADMIN — FLUTICASONE PROPIONATE 1 SPRAY: 50 SPRAY, METERED NASAL at 09:44

## 2021-05-28 RX ADMIN — SODIUM CHLORIDE, PRESERVATIVE FREE 10 ML: 5 INJECTION INTRAVENOUS at 20:34

## 2021-05-28 RX ADMIN — IPRATROPIUM BROMIDE AND ALBUTEROL SULFATE 3 ML: 2.5; .5 SOLUTION RESPIRATORY (INHALATION) at 16:30

## 2021-05-28 RX ADMIN — SODIUM CHLORIDE, PRESERVATIVE FREE 10 ML: 5 INJECTION INTRAVENOUS at 09:46

## 2021-05-28 RX ADMIN — Medication 5 MG: at 02:41

## 2021-05-28 RX ADMIN — ASPIRIN 325 MG: 325 TABLET, COATED ORAL at 04:23

## 2021-05-28 RX ADMIN — IOPAMIDOL 70 ML: 755 INJECTION, SOLUTION INTRAVENOUS at 02:02

## 2021-05-28 RX ADMIN — Medication 5 MG: at 20:33

## 2021-05-28 RX ADMIN — METHYLPREDNISOLONE SODIUM SUCCINATE 60 MG: 125 INJECTION, POWDER, FOR SOLUTION INTRAMUSCULAR; INTRAVENOUS at 13:54

## 2021-05-28 RX ADMIN — HEPARIN SODIUM 5000 UNITS: 5000 INJECTION INTRAVENOUS; SUBCUTANEOUS at 20:34

## 2021-05-28 RX ADMIN — FERROUS SULFATE TAB 325 MG (65 MG ELEMENTAL FE) 325 MG: 325 (65 FE) TAB at 09:45

## 2021-05-28 RX ADMIN — IPRATROPIUM BROMIDE AND ALBUTEROL SULFATE 3 ML: 2.5; .5 SOLUTION RESPIRATORY (INHALATION) at 20:19

## 2021-05-28 RX ADMIN — ASPIRIN 81 MG: 81 TABLET, COATED ORAL at 09:45

## 2021-05-28 RX ADMIN — HEPARIN SODIUM 5000 UNITS: 5000 INJECTION INTRAVENOUS; SUBCUTANEOUS at 04:22

## 2021-05-28 RX ADMIN — DOXYCYCLINE 100 MG: 100 INJECTION, POWDER, LYOPHILIZED, FOR SOLUTION INTRAVENOUS at 04:22

## 2021-05-28 RX ADMIN — HEPARIN SODIUM 5000 UNITS: 5000 INJECTION INTRAVENOUS; SUBCUTANEOUS at 13:54

## 2021-05-29 LAB
ANION GAP SERPL CALCULATED.3IONS-SCNC: 11 MMOL/L (ref 5–15)
BUN SERPL-MCNC: 40 MG/DL (ref 8–23)
BUN/CREAT SERPL: 34.5 (ref 7–25)
CALCIUM SPEC-SCNC: 9.5 MG/DL (ref 8.6–10.5)
CHLORIDE SERPL-SCNC: 104 MMOL/L (ref 98–107)
CO2 SERPL-SCNC: 26 MMOL/L (ref 22–29)
CREAT SERPL-MCNC: 1.16 MG/DL (ref 0.76–1.27)
GFR SERPL CREATININE-BSD FRML MDRD: 60 ML/MIN/1.73
GLUCOSE SERPL-MCNC: 177 MG/DL (ref 65–99)
POTASSIUM SERPL-SCNC: 4.2 MMOL/L (ref 3.5–5.2)
SODIUM SERPL-SCNC: 141 MMOL/L (ref 136–145)

## 2021-05-29 PROCEDURE — 25010000002 FUROSEMIDE PER 20 MG: Performed by: INTERNAL MEDICINE

## 2021-05-29 PROCEDURE — 80048 BASIC METABOLIC PNL TOTAL CA: CPT | Performed by: INTERNAL MEDICINE

## 2021-05-29 PROCEDURE — 99233 SBSQ HOSP IP/OBS HIGH 50: CPT | Performed by: INTERNAL MEDICINE

## 2021-05-29 PROCEDURE — 94799 UNLISTED PULMONARY SVC/PX: CPT

## 2021-05-29 PROCEDURE — 25010000002 HEPARIN (PORCINE) PER 1000 UNITS: Performed by: PHYSICIAN ASSISTANT

## 2021-05-29 PROCEDURE — 99232 SBSQ HOSP IP/OBS MODERATE 35: CPT | Performed by: NURSE PRACTITIONER

## 2021-05-29 PROCEDURE — 94660 CPAP INITIATION&MGMT: CPT

## 2021-05-29 RX ORDER — FUROSEMIDE 10 MG/ML
40 INJECTION INTRAMUSCULAR; INTRAVENOUS ONCE
Status: COMPLETED | OUTPATIENT
Start: 2021-05-29 | End: 2021-05-29

## 2021-05-29 RX ORDER — SIMETHICONE 80 MG
80 TABLET,CHEWABLE ORAL 4 TIMES DAILY PRN
Status: DISCONTINUED | OUTPATIENT
Start: 2021-05-29 | End: 2021-05-30 | Stop reason: HOSPADM

## 2021-05-29 RX ORDER — LISINOPRIL 5 MG/1
2.5 TABLET ORAL
Status: DISCONTINUED | OUTPATIENT
Start: 2021-05-29 | End: 2021-05-30 | Stop reason: HOSPADM

## 2021-05-29 RX ORDER — CARVEDILOL 6.25 MG/1
6.25 TABLET ORAL 2 TIMES DAILY WITH MEALS
Status: DISCONTINUED | OUTPATIENT
Start: 2021-05-29 | End: 2021-05-30 | Stop reason: HOSPADM

## 2021-05-29 RX ADMIN — IPRATROPIUM BROMIDE AND ALBUTEROL SULFATE 3 ML: 2.5; .5 SOLUTION RESPIRATORY (INHALATION) at 08:13

## 2021-05-29 RX ADMIN — FUROSEMIDE 40 MG: 10 INJECTION, SOLUTION INTRAMUSCULAR; INTRAVENOUS at 09:36

## 2021-05-29 RX ADMIN — CLOPIDOGREL BISULFATE 75 MG: 75 TABLET ORAL at 09:35

## 2021-05-29 RX ADMIN — ATORVASTATIN CALCIUM 40 MG: 40 TABLET, FILM COATED ORAL at 21:02

## 2021-05-29 RX ADMIN — ASPIRIN 81 MG: 81 TABLET, COATED ORAL at 09:35

## 2021-05-29 RX ADMIN — IPRATROPIUM BROMIDE AND ALBUTEROL SULFATE 3 ML: 2.5; .5 SOLUTION RESPIRATORY (INHALATION) at 19:16

## 2021-05-29 RX ADMIN — LISINOPRIL 2.5 MG: 5 TABLET ORAL at 13:17

## 2021-05-29 RX ADMIN — SIMETHICONE 80 MG: 80 TABLET, CHEWABLE ORAL at 22:29

## 2021-05-29 RX ADMIN — CARVEDILOL 6.25 MG: 6.25 TABLET, FILM COATED ORAL at 18:50

## 2021-05-29 RX ADMIN — Medication 5 MG: at 21:02

## 2021-05-29 RX ADMIN — FERROUS SULFATE TAB 325 MG (65 MG ELEMENTAL FE) 325 MG: 325 (65 FE) TAB at 09:35

## 2021-05-29 RX ADMIN — FLUTICASONE PROPIONATE 1 SPRAY: 50 SPRAY, METERED NASAL at 09:36

## 2021-05-29 RX ADMIN — HEPARIN SODIUM 5000 UNITS: 5000 INJECTION INTRAVENOUS; SUBCUTANEOUS at 13:17

## 2021-05-29 RX ADMIN — HEPARIN SODIUM 5000 UNITS: 5000 INJECTION INTRAVENOUS; SUBCUTANEOUS at 06:21

## 2021-05-29 RX ADMIN — SODIUM CHLORIDE, PRESERVATIVE FREE 10 ML: 5 INJECTION INTRAVENOUS at 09:37

## 2021-05-29 RX ADMIN — HEPARIN SODIUM 5000 UNITS: 5000 INJECTION INTRAVENOUS; SUBCUTANEOUS at 21:02

## 2021-05-29 RX ADMIN — CARVEDILOL 6.25 MG: 6.25 TABLET, FILM COATED ORAL at 13:17

## 2021-05-29 RX ADMIN — SODIUM CHLORIDE, PRESERVATIVE FREE 10 ML: 5 INJECTION INTRAVENOUS at 21:02

## 2021-05-29 RX ADMIN — TAMSULOSIN HYDROCHLORIDE 0.4 MG: 0.4 CAPSULE ORAL at 09:35

## 2021-05-30 ENCOUNTER — READMISSION MANAGEMENT (OUTPATIENT)
Dept: CALL CENTER | Facility: HOSPITAL | Age: 81
End: 2021-05-30

## 2021-05-30 VITALS
HEIGHT: 72 IN | WEIGHT: 178.57 LBS | BODY MASS INDEX: 24.19 KG/M2 | SYSTOLIC BLOOD PRESSURE: 123 MMHG | TEMPERATURE: 97.2 F | HEART RATE: 72 BPM | DIASTOLIC BLOOD PRESSURE: 71 MMHG | OXYGEN SATURATION: 96 % | RESPIRATION RATE: 18 BRPM

## 2021-05-30 PROBLEM — R77.8 ELEVATED TROPONIN: Status: RESOLVED | Noted: 2021-05-28 | Resolved: 2021-05-30

## 2021-05-30 PROBLEM — J96.01 ACUTE RESPIRATORY FAILURE WITH HYPOXIA (HCC): Status: RESOLVED | Noted: 2021-01-06 | Resolved: 2021-05-30

## 2021-05-30 PROBLEM — R79.89 ELEVATED TROPONIN: Status: RESOLVED | Noted: 2021-05-28 | Resolved: 2021-05-30

## 2021-05-30 PROBLEM — I50.23 ACUTE ON CHRONIC SYSTOLIC (CONGESTIVE) HEART FAILURE (HCC): Status: RESOLVED | Noted: 2021-01-06 | Resolved: 2021-05-30

## 2021-05-30 PROBLEM — J18.9 PNEUMONIA: Status: RESOLVED | Noted: 2021-05-28 | Resolved: 2021-05-30

## 2021-05-30 PROBLEM — D72.829 LEUKOCYTOSIS: Status: RESOLVED | Noted: 2021-05-28 | Resolved: 2021-05-30

## 2021-05-30 LAB
QT INTERVAL: 336 MS
QTC INTERVAL: 440 MS

## 2021-05-30 PROCEDURE — 99232 SBSQ HOSP IP/OBS MODERATE 35: CPT | Performed by: INTERNAL MEDICINE

## 2021-05-30 PROCEDURE — 94660 CPAP INITIATION&MGMT: CPT

## 2021-05-30 PROCEDURE — 94799 UNLISTED PULMONARY SVC/PX: CPT

## 2021-05-30 PROCEDURE — 25010000002 HEPARIN (PORCINE) PER 1000 UNITS: Performed by: PHYSICIAN ASSISTANT

## 2021-05-30 PROCEDURE — 99239 HOSP IP/OBS DSCHRG MGMT >30: CPT | Performed by: INTERNAL MEDICINE

## 2021-05-30 RX ORDER — ASPIRIN 81 MG/1
81 TABLET ORAL DAILY
Qty: 30 TABLET | Refills: 0 | Status: ON HOLD | OUTPATIENT
Start: 2021-05-31 | End: 2021-12-01 | Stop reason: SDUPTHER

## 2021-05-30 RX ORDER — FUROSEMIDE 20 MG/1
20 TABLET ORAL DAILY
Status: DISCONTINUED | OUTPATIENT
Start: 2021-05-30 | End: 2021-05-30 | Stop reason: HOSPADM

## 2021-05-30 RX ADMIN — ASPIRIN 81 MG: 81 TABLET, COATED ORAL at 09:48

## 2021-05-30 RX ADMIN — CARVEDILOL 6.25 MG: 6.25 TABLET, FILM COATED ORAL at 09:48

## 2021-05-30 RX ADMIN — IPRATROPIUM BROMIDE AND ALBUTEROL SULFATE 3 ML: 2.5; .5 SOLUTION RESPIRATORY (INHALATION) at 08:16

## 2021-05-30 RX ADMIN — CLOPIDOGREL BISULFATE 75 MG: 75 TABLET ORAL at 09:49

## 2021-05-30 RX ADMIN — FERROUS SULFATE TAB 325 MG (65 MG ELEMENTAL FE) 325 MG: 325 (65 FE) TAB at 09:49

## 2021-05-30 RX ADMIN — HEPARIN SODIUM 5000 UNITS: 5000 INJECTION INTRAVENOUS; SUBCUTANEOUS at 05:04

## 2021-05-30 RX ADMIN — TAMSULOSIN HYDROCHLORIDE 0.4 MG: 0.4 CAPSULE ORAL at 09:48

## 2021-05-30 RX ADMIN — FUROSEMIDE 20 MG: 20 TABLET ORAL at 09:48

## 2021-05-30 RX ADMIN — SODIUM CHLORIDE, PRESERVATIVE FREE 10 ML: 5 INJECTION INTRAVENOUS at 09:49

## 2021-05-30 RX ADMIN — LISINOPRIL 2.5 MG: 5 TABLET ORAL at 09:48

## 2021-05-30 NOTE — OUTREACH NOTE
Prep Survey      Responses   [facility] patient discharged from?  [place]   Is LACE score < 7 ?  No   Emergency Room discharge w/ pulse ox?  No   Eligibility  [facility]   Date of Admission  05/27/21   Date of Discharge  05/30/21   Discharge Disposition  Home or Self Care   Discharge diagnosis  Acute respiratory failure with hypoxia, secondary to acute on chronic systolic heart failure exacerbation   Does the patient have one of the following disease processes/diagnoses(primary or secondary)?  CHF   Does the patient have Home health ordered?  No   Is there a DME ordered?  No   Prep survey completed?  Yes          Kristie Perez RN

## 2021-05-31 ENCOUNTER — TRANSITIONAL CARE MANAGEMENT TELEPHONE ENCOUNTER (OUTPATIENT)
Dept: CALL CENTER | Facility: HOSPITAL | Age: 81
End: 2021-05-31

## 2021-05-31 NOTE — OUTREACH NOTE
Call Center TCM Note      Responses   [facility] patient discharged from?  [place]   Does the patient have one of the following disease processes/diagnoses(primary or secondary)?  CHF   TCM attempt successful?  No [Gila Metz-wife]   Unsuccessful attempts  Attempt 1           Alka Simpson RN    5/31/2021, 16:31 EDT

## 2021-06-01 ENCOUNTER — TRANSITIONAL CARE MANAGEMENT TELEPHONE ENCOUNTER (OUTPATIENT)
Dept: CALL CENTER | Facility: HOSPITAL | Age: 81
End: 2021-06-01

## 2021-06-01 NOTE — OUTREACH NOTE
Call Center TCM Note      Responses   [facility] patient discharged from?  [place]   Does the patient have one of the following disease processes/diagnoses(primary or secondary)?  CHF   TCM attempt successful?  No   Unsuccessful attempts  Attempt 2 [patient and spouse]           Rajwinder Bell RN    6/1/2021, 10:38 EDT

## 2021-06-02 ENCOUNTER — TRANSITIONAL CARE MANAGEMENT TELEPHONE ENCOUNTER (OUTPATIENT)
Dept: CALL CENTER | Facility: HOSPITAL | Age: 81
End: 2021-06-02

## 2021-06-02 LAB
BACTERIA SPEC AEROBE CULT: NORMAL
BACTERIA SPEC AEROBE CULT: NORMAL

## 2021-06-02 NOTE — OUTREACH NOTE
Call Center TCM Note      Responses   [facility] [place] patient discharged from?  [place]   Does the patient have one of the following disease processes/diagnoses(primary or secondary)?  CHF   TCM attempt successful?  No   Unsuccessful attempts  Attempt 3 [Gila Metz on verbal release ]           Donna Hernandez RN    6/2/2021, 09:01 EDT

## 2021-06-03 ENCOUNTER — LAB (OUTPATIENT)
Dept: LAB | Facility: HOSPITAL | Age: 81
End: 2021-06-03

## 2021-06-03 ENCOUNTER — OFFICE VISIT (OUTPATIENT)
Dept: INTERNAL MEDICINE | Facility: CLINIC | Age: 81
End: 2021-06-03

## 2021-06-03 VITALS
SYSTOLIC BLOOD PRESSURE: 120 MMHG | OXYGEN SATURATION: 96 % | HEART RATE: 60 BPM | TEMPERATURE: 97.1 F | BODY MASS INDEX: 24.06 KG/M2 | HEIGHT: 72 IN | WEIGHT: 177.6 LBS | RESPIRATION RATE: 16 BRPM | DIASTOLIC BLOOD PRESSURE: 64 MMHG

## 2021-06-03 DIAGNOSIS — I80.8 SUPERFICIAL THROMBOPHLEBITIS OF RIGHT UPPER EXTREMITY: ICD-10-CM

## 2021-06-03 DIAGNOSIS — I25.10 CHRONIC CORONARY ARTERY DISEASE: ICD-10-CM

## 2021-06-03 DIAGNOSIS — I25.5 ISCHEMIC CARDIOMYOPATHY: Primary | ICD-10-CM

## 2021-06-03 DIAGNOSIS — N17.9 AKI (ACUTE KIDNEY INJURY) (HCC): ICD-10-CM

## 2021-06-03 LAB
BUN SERPL-MCNC: 37 MG/DL (ref 8–23)
BUN/CREAT SERPL: 34.6 (ref 7–25)
CALCIUM SERPL-MCNC: 9.4 MG/DL (ref 8.6–10.5)
CHLORIDE SERPL-SCNC: 102 MMOL/L (ref 98–107)
CO2 SERPL-SCNC: 27.5 MMOL/L (ref 22–29)
CREAT SERPL-MCNC: 1.07 MG/DL (ref 0.76–1.27)
GLUCOSE SERPL-MCNC: 94 MG/DL (ref 65–99)
POTASSIUM SERPL-SCNC: 4.9 MMOL/L (ref 3.5–5.2)
SODIUM SERPL-SCNC: 141 MMOL/L (ref 136–145)

## 2021-06-03 PROCEDURE — 99496 TRANSJ CARE MGMT HIGH F2F 7D: CPT | Performed by: INTERNAL MEDICINE

## 2021-06-03 PROCEDURE — 1111F DSCHRG MED/CURRENT MED MERGE: CPT | Performed by: INTERNAL MEDICINE

## 2021-06-03 NOTE — PROGRESS NOTES
Transitional Care Follow Up Visit  Subjective     Isrrael Marino is a 80 y.o. male who presents for a transitional care management visit.    Within 48 business hours after discharge our office contacted him via telephone to coordinate his care and needs.      I reviewed and discussed the details of that call along with the discharge summary, hospital problems, inpatient lab results, inpatient diagnostic studies, and consultation reports with Isrrael.     Current outpatient and discharge medications have been reconciled for the patient.  Reviewed by: Ivanna George MD      Date of TCM Phone Call 5/30/2021   [facility]   Date of Admission 5/27/2021   Date of Discharge 5/30/2021   Discharge Disposition Home or Self Care     Risk for Readmission (LACE) Score: 9 (5/30/2021  6:01 AM)      History of Present Illness   Course During Hospital Stay:  Admitted 5/27-5/30 with acute hypoxic respiratory failure due to CHF exacerbation. He notes that he had weight gain up to 187lbs despite taking lasix 20mg qhs. He had developed acute SOA. He required oxygen while in the ED. BNP was close to 3000. He was diuresed with IV lasix with improvement in SOA and oxygen requirement. He was seen by cardiology while admitted and instructed to take lasix 20mg daily with an additional dose if gaining weight, 3lbs in 1 day or 5lbs in a week. Since discharge he has been monitoring weight, BP, SPO2, pulse. His BP has been well controlled and weight has been steadily decreasing and today it is 175lbs. SPO2 has been normal. He has been taking lasix 20mg BID since discharge but now that he has returned to 175lbs he is planning to resume daily dosing. He has cardiology follow up tomorrow. He also will be participating in cardiac rehab. His main complaint today is R forearm tenderness and redness at the site of prior IV and knee pain. He has upcoming orthopedics appt for repeat knee injections.     The following portions  of the patient's history were reviewed and updated as appropriate: allergies, current medications, past medical history and problem list.    Review of Systems   Constitutional: Negative.    Respiratory: Negative.    Cardiovascular: Negative.  Negative for leg swelling (improved).   Musculoskeletal: Positive for arthralgias.   Skin: Positive for color change.   Neurological: Negative for dizziness and light-headedness.       Objective   Physical Exam  Vitals and nursing note reviewed.   Constitutional:       General: He is not in acute distress.     Appearance: He is well-developed. He is not ill-appearing or toxic-appearing.   HENT:      Head: Normocephalic and atraumatic.   Eyes:      Conjunctiva/sclera: Conjunctivae normal.   Cardiovascular:      Rate and Rhythm: Regular rhythm. Bradycardia present.      Heart sounds: Normal heart sounds. No murmur heard.     Pulmonary:      Effort: Pulmonary effort is normal. No respiratory distress.      Breath sounds: Normal breath sounds. No wheezing, rhonchi or rales.   Musculoskeletal:      Right lower leg: Edema (trace) present.      Left lower leg: No edema.   Skin:     General: Skin is warm and dry.      Comments: R forearm IV site with minimal surrounding patchy erythema, no swelling or warmth.   Neurological:      Mental Status: He is alert and oriented to person, place, and time. Mental status is at baseline.      Gait: Gait normal.         Assessment/Plan   Diagnoses and all orders for this visit:    Ischemic cardiomyopathy  - Echo 1/2021 EF 30%, EF 25% with dilated LV and low LV filling pressures on [facility]  - Non-obstructive CAD on [facility]  - Hospitalized for acute CHF exacerbation with elevated BNP, pleural effusions and pulmonary edema on CXR. Required supplemental O2.  - Exacerbation attributed to dietary noncompliance  - Diuresed with improvement and discharged on coreg 6.25mg BID, lisinopril 2.5mg BID, and lasix 20mg daily with additional dose with weight gain of 3lbs  in one day or 5lbs in a week.  - Weight today 175lbs at home, 177lbs in office. Felt to be his dry weight.  - continue current regimen, restrict dietary sodium intake, and follow up with cardiology as scheduled    Chronic coronary artery disease  - [facility] 1/7 without flow limiting CAD, on coreg, statin, ASA and plavix due to hx of prior stenting    LORI (acute kidney injury) (CMS/[facility])  - On admission gfr decreased from baseline 65-70 to 53. Improved throughout hospital stay to 60 at discharge.  - Likely cardiorenal physiology  - Will repeat BMP today    Superficial thrombophlebitis of right upper extremity  - Suspect R forearm pain and redness at site of PIV is a mild superficial thrombophlebitis.   - Per patient is already improving  - Recommend warm compresses and elevation    Health Maintenance  - Colonoscopy: No longer indicated due to age.  - Immunizations: Tdap 2017. COVID complete. Shingrix series complete. Pneumovax 2010.  - Depression screening: negative 8/2020          Return in about 3 months (around 8/31/2021) for as scheduled, Labs today.

## 2021-06-04 ENCOUNTER — OFFICE VISIT (OUTPATIENT)
Dept: CARDIOLOGY | Facility: HOSPITAL | Age: 81
End: 2021-06-04

## 2021-06-04 VITALS
RESPIRATION RATE: 18 BRPM | BODY MASS INDEX: 24.52 KG/M2 | OXYGEN SATURATION: 98 % | DIASTOLIC BLOOD PRESSURE: 56 MMHG | HEART RATE: 73 BPM | SYSTOLIC BLOOD PRESSURE: 115 MMHG | WEIGHT: 181 LBS | HEIGHT: 72 IN | TEMPERATURE: 96.4 F

## 2021-06-04 DIAGNOSIS — E78.2 MIXED HYPERLIPIDEMIA: ICD-10-CM

## 2021-06-04 DIAGNOSIS — I50.22 CHRONIC SYSTOLIC HEART FAILURE (HCC): Primary | ICD-10-CM

## 2021-06-04 DIAGNOSIS — I10 ESSENTIAL HYPERTENSION: ICD-10-CM

## 2021-06-04 PROCEDURE — 99214 OFFICE O/P EST MOD 30 MIN: CPT | Performed by: NURSE PRACTITIONER

## 2021-06-04 NOTE — PROGRESS NOTES
"Chief Complaint  Follow-up and Congestive Heart Failure    Subjective    History of Present Illness {CC  Problem List  Visit  Diagnosis   Encounters  Notes  Medications  Labs  Result Review Imaging  Media :23}       History of Present Illness   80 year old male presents to the office today for ongoing evaluation of his acute on chronic diastolic heart failure. Recently hospitalized at [facility] for flash pulmonary edema.  He was aggressively IV diuresed and was weaned off his oxygen.  He presents with his home heart rate, weight and blood pressure log.  He reports that his weight today is 176.4 pounds which is down from recent hospital stay.  Heart rates 50s to 60s and blood pressures 120s to 130s.  He reports overall he is feeling significantly better.  Notes dyspnea has resolved as well as abdominal fullness and pedal edema.  Notes energy level has improved as well.  Currently denies chest pain.  Patient does note that he is trying to follow a low-sodium diet but sometimes eats fast food.  Objective     Vital Signs:   Vitals:    06/04/21 0930   BP: 115/56   BP Location: Left arm   Patient Position: Sitting   Cuff Size: Adult   Pulse: 73   Resp: 18   Temp: 96.4 °F (35.8 °C)   TempSrc: Temporal   SpO2: 98%   Weight: 82.1 kg (181 lb)   Height: 182.9 cm (72\")     Body mass index is 24.55 kg/m².  Physical Exam  Vitals and nursing note reviewed.   Constitutional:       Appearance: Normal appearance.   HENT:      Head: Normocephalic.   Eyes:      Pupils: Pupils are equal, round, and reactive to light.   Cardiovascular:      Rate and Rhythm: Normal rate and regular rhythm.      Pulses: Normal pulses.      Heart sounds: Normal heart sounds. No murmur heard.     Pulmonary:      Effort: Pulmonary effort is normal.      Breath sounds: Normal breath sounds.   Abdominal:      General: Bowel sounds are normal.      Palpations: Abdomen is soft.   Musculoskeletal:         General: Normal range of motion. "      Cervical back: Normal range of motion.      Right lower leg: No edema.      Left lower leg: No edema.   Skin:     General: Skin is warm and dry.      Capillary Refill: Capillary refill takes less than 2 seconds.   Neurological:      Mental Status: He is alert and oriented to person, place, and time.   Psychiatric:         Mood and Affect: Mood normal.         Thought Content: Thought content normal.              Result Review  Data Reviewed:{ Labs  Result Review  Imaging  Med Tab  Media :23}   Lipid Panel (05/28/2021 03:51)  Hemoglobin A1c (05/28/2021 03:51)  CBC & Differential (05/28/2021 03:51)  Troponin (05/28/2021 07:03)  Basic Metabolic Panel (05/29/2021 07:07)  Basic Metabolic Panel (06/03/2021 09:11)               Assessment and Plan {CC Problem List  Visit Diagnosis  ROS  Review (Popup)  Health Maintenance  Quality  BestPractice  Medications  SmartSets  SnapShot Encounters  Media :23}   1. Chronic systolic heart failure (CMS/HCC)  euvolemic  Reviewed s/s of heart failure and when to call  Heart failure education today including signs and symptoms, the role of the heart failure center, daily weights, low sodium diet (less than 1500 mg per day), and daily physical activity. Reviewed HF Zones with patient and family.  Patient to continue current medications as previously ordered.   2. Essential hypertension  Well-controlled  HTN Education provided today including signs and symptoms, medication management, daily blood pressure monitoring. Patient encouraged to call the [facility] center with any abnormal readings.   3. Mixed hyperlipidemia    Statin Therapy    Follow Up {Instructions Charge Capture  Follow-up Communications :23}   Return if symptoms worsen or fail to improve.    Patient was given instructions and counseling regarding his condition or for health maintenance advice. Please see specific information pulled into the AVS if appropriate.  Patient was instructed to call the  [facility] with any questions, concerns, or worsening symptoms.    *Please note that portions of this note were completed with a voice recognition program. Efforts were made to edit the dictations, but occasionally words are mistranscribed.

## 2021-06-15 ENCOUNTER — OFFICE VISIT (OUTPATIENT)
Dept: ORTHOPEDIC SURGERY | Facility: CLINIC | Age: 81
End: 2021-06-15

## 2021-06-15 VITALS
WEIGHT: 179.8 LBS | SYSTOLIC BLOOD PRESSURE: 93 MMHG | DIASTOLIC BLOOD PRESSURE: 65 MMHG | HEART RATE: 74 BPM | HEIGHT: 72 IN | BODY MASS INDEX: 24.35 KG/M2

## 2021-06-15 DIAGNOSIS — M17.0 PRIMARY OSTEOARTHRITIS OF BOTH KNEES: Primary | ICD-10-CM

## 2021-06-15 PROCEDURE — 20610 DRAIN/INJ JOINT/BURSA W/O US: CPT | Performed by: PHYSICIAN ASSISTANT

## 2021-06-15 RX ORDER — LIDOCAINE HYDROCHLORIDE 10 MG/ML
4 INJECTION, SOLUTION EPIDURAL; INFILTRATION; INTRACAUDAL; PERINEURAL
Status: COMPLETED | OUTPATIENT
Start: 2021-06-15 | End: 2021-06-15

## 2021-06-15 RX ORDER — TRIAMCINOLONE ACETONIDE 40 MG/ML
40 INJECTION, SUSPENSION INTRA-ARTICULAR; INTRAMUSCULAR
Status: COMPLETED | OUTPATIENT
Start: 2021-06-15 | End: 2021-06-15

## 2021-06-15 RX ADMIN — TRIAMCINOLONE ACETONIDE 40 MG: 40 INJECTION, SUSPENSION INTRA-ARTICULAR; INTRAMUSCULAR at 08:55

## 2021-06-15 RX ADMIN — LIDOCAINE HYDROCHLORIDE 4 ML: 10 INJECTION, SOLUTION EPIDURAL; INFILTRATION; INTRACAUDAL; PERINEURAL at 08:55

## 2021-06-15 NOTE — PROGRESS NOTES
[facility] Note    Subjective     Patient: Isrrael Marino  : 1940    Primary Care Provider: Ivanna George MD    Requesting Provider: As above    Follow-up (3 month f/u--Primary osteoarthritis of both knees )      History    Chief Complaint: Bilateral knee pain    History of Present Illness: Patient returns today for his bilateral knee arthritis.  He is well-known to me.  He gets about 2 months relief from injections.  He is interested in learning more about knee replacement.    Current Outpatient Medications on File Prior to Visit   Medication Sig Dispense Refill   • aspirin 81 MG EC tablet Take 1 tablet by mouth Daily. 30 tablet 0   • atorvastatin (LIPITOR) 40 MG tablet Take 1 tablet by mouth Every Night. 90 tablet 3   • B Complex Vitamins (VITAMIN B COMPLEX PO) Take 1 tablet by mouth Daily.     • carvedilol (COREG) 6.25 MG tablet Take 1 tablet by mouth Every 12 (Twelve) Hours. 180 tablet 3   • Cholecalciferol (VITAMIN D3) 125 MCG (5000 UT) capsule capsule Take 2,000 Units by mouth Daily.     • clopidogrel (PLAVIX) 75 MG tablet Take 1 tablet by mouth Daily. 90 tablet 4   • DULoxetine (CYMBALTA) 30 MG capsule Take 2 capsules by mouth Daily. (Patient taking differently: Take 30 mg by mouth 2 (Two) Times a Day.) 180 capsule 1   • fluticasone (FLONASE) 50 MCG/ACT nasal spray 1 spray into the nostril(s) as directed by provider Daily. 48 g 3   • furosemide (LASIX) 20 MG tablet Take 1 tablet by mouth Daily. 90 tablet 1   • lisinopril (PRINIVIL,ZESTRIL) 2.5 MG tablet Take 1 tablet by mouth 2 (Two) Times a Day. 90 tablet 3   • Melatonin 3 MG capsule Take 1 capsule by mouth Daily.     • tamsulosin (FLOMAX) 0.4 MG capsule 24 hr capsule Take 1 capsule by mouth Daily. 90 capsule 3   • vitamin B-12 (CYANOCOBALAMIN) 1000 MCG tablet Take 1,000 mcg by mouth Daily.     • Zinc 50 MG capsule Take 1 tablet by mouth Daily.       No current facility-administered medications on file prior to  "visit.      Allergies   Allergen Reactions   • Ibuprofen Hives     \"Pt states he hasn't taken this in a long time\"      Past Medical History:   Diagnosis Date   • Arrhythmia    • Arthritis     both knees   • Broken neck (CMS/HCC)    • CAD (coronary artery disease)    • Cancer (CMS/HCC)    • Chondrocalcinosis of knee    • GERD (gastroesophageal reflux disease)    • Gout    • Heart attack (CMS/HCC)      Last Assessed: 28 Mar 2014   • Heart disease    • History of transfusion     own blood with hip surgery   • Hyperlipidemia    • Hypertension    • IBS (irritable bowel syndrome)    • Left rotator cuff tear arthropathy    • Low back pain    • Lower extremity neuropathy    • Lumbar canal stenosis    • Neck pain    • Numbness    • Right hip pain    • Rotator cuff tear arthropathy of right shoulder    • Thin blood (CMS/HCC)      Past Surgical History:   Procedure Laterality Date   • APPENDECTOMY  1956   • BACK SURGERY  1997    spinal decompression and fusion   • BREAST LUMPECTOMY  2003   • CARDIAC CATHETERIZATION      with two stents//. DR. TOLEDO   • CARDIAC CATHETERIZATION N/A 1/8/2021    Procedure: LEFT HEART CATH;  Surgeon: Casimiro Bernal MD;  Location:  [facility] LOC[facility];  Service: Cardiovascular;  Laterality: N/A;   • CARPAL TUNNEL RELEASE  03/28/2014    Neuroplasty Decompression Median Nerve At Carpal Tunnel   • CERVICAL DISCECTOMY POSTERIOR FUSION WITH BRAIN LAB N/A 7/13/2018    Procedure: CERVICAL LAMINECTOMY DECOMPRESSION POSTERIOR C1-2 POSTERIOR CERVICAL FUSION;  Surgeon: Randall Barnett MD;  Location:  [facility];  Service: Neurosurgery   • COLONOSCOPY     • CORONARY STENT PLACEMENT  2013   • HERNIA REPAIR  2002    & 1998   • LUMBAR DISCECTOMY FUSION INSTRUMENTATION     • TONSILLECTOMY     • TOTAL HIP ARTHROPLASTY  2002   • VASECTOMY     • WISDOM TOOTH EXTRACTION       Family History   Problem Relation Age of Onset   • Cancer Mother    • Heart disease Father    • Hypertension Father    • Heart " "attack Father    • Sleep apnea Son       Social History     Socioeconomic History   • Marital status:      Spouse name: Not on file   • Number of children: Not on file   • Years of education: Not on file   • Highest education level: Not on file   Tobacco Use   • Smoking status: Never Smoker   • Smokeless tobacco: Never Used   Vaping Use   • Vaping Use: Never used   Substance and Sexual Activity   • Alcohol use: Not Currently   • Drug use: Never   • Sexual activity: Defer        Review of Systems   Constitutional: Negative.    HENT: Negative.    Eyes: Negative.    Respiratory: Negative.    Cardiovascular: Negative.    Gastrointestinal: Negative.    Endocrine: Negative.    Genitourinary: Negative.    Musculoskeletal: Positive for arthralgias.   Skin: Negative.    Allergic/Immunologic: Negative.    Neurological: Negative.    Hematological: Negative.    Psychiatric/Behavioral: Negative.        The following portions of the patient's history were reviewed and updated as appropriate: allergies, current medications, past family history, past medical history, past social history, past surgical history and problem list.      Objective      Physical Exam  BP 93/65   Pulse 74   Ht 182.9 cm (72.01\")   Wt 81.6 kg (179 lb 12.8 oz)   BMI 24.38 kg/m²     Body mass index is 24.38 kg/m².    Patient is well developed, well nourished and in no acute distress.  Alert and oriented x 3.    Ortho Exam    Right Knee Exam  ----------  ALIGNMENT: Right: neutral----------  RANGE OF MOTION:  Right: 0-120  LIGAMENTOUS STABILITY:   Right: stable to valgus and varus stress----------  STRENGTH:  KNEE FLEXION Right 5/5  KNEE EXTENSION Right 5/5 ----------  PAIN WITH PALPATION: Right medial joint line  PAIN WITH KNEE ROM: Right no  PATELLAR CREPITUS: Right yes   ----------    Left Knee Exam  ----------  ALIGNMENT: Left: neutral----------  RANGE OF MOTION:  Left: 0-120  LIGAMENTOUS STABILITY:   Left: stable to valgus and varus " stress----------  STRENGTH:  KNEE FLEXION left 5/5  KNEE EXTENSION left 5/5 ----------  PAIN WITH PALPATION: Left medial joint line  PAIN WITH KNEE ROM: Left no  PATELLAR CREPITUS: Left yes         Medical Decision Making    Data Review:   none    Assessment:  1. Primary osteoarthritis of both knees        Plan:  Bilateral knee arthritis.  I reviewed x-rays from 12/20/2020 clinical findings past and current treatment the patient.  I answered multiple questions regarding knee replacement for him.  I explained this would be a total knee not a partial knee.  He does have cardiac issues, on Plavix with chronic heart failure.  Patient would like to consider knee replacement in the future if he is found to be a surgical candidate.  He understands that he will definitely need clearance from cardiology if he is considered a surgical candidate.  Plan today is repeat steroid injection to both knees.  I will have him follow-up with Dr. Craig in 2 months to further discuss surgery.    I discussed with the patient the potential benefits of performing a therapeutic injection of the left knee as well as potential risks including but not limited to infection, swelling, pain, bleeding, bruising, nerve/vessel damage, skin color changes, transient elevation in blood glucose levels, and fat atrophy. After informed consent and verifying correct patient, procedure site, and type of procedure, the area was prepped with Hibiclens, ethyl chloride was used to numb the skin. Via the inferior lateral approach, 4cc of 1% lidocaine and  40mg/ml of Kenalog were injected into the left knee. The patient tolerated the procedure well. There were no complications.     Using sterile technique, the right shoulder was sterilely prepped with Hibiclens.  Following a time out,  using a 22 gauge needle, the right AC joint was injected with 40mg Depo Medrol, 1 cc lidocaine and 1 cc marcaine.  Patient tolerated the procedure well.  No  complications.        Tish Shelley PA-C  06/15/21  08:56 EDT

## 2021-06-15 NOTE — PROGRESS NOTES
Procedure   Large Joint Arthrocentesis: R knee  Date/Time: 6/15/2021 8:55 AM  Consent given by: patient  Site marked: site marked  Timeout: Immediately prior to procedure a time out was called to verify the correct patient, procedure, equipment, support staff and site/side marked as required   Supporting Documentation  Indications: pain   Procedure Details  Location: knee - R knee  Preparation: Patient was prepped and draped in the usual sterile fashion  Needle size: 23 G  Approach: anterolateral  Medications administered: 4 mL lidocaine PF 1% 1 %; 40 mg triamcinolone acetonide 40 MG/ML  Patient tolerance: patient tolerated the procedure well with no immediate complications    Large Joint Arthrocentesis: L knee  Date/Time: 6/15/2021 8:55 AM  Consent given by: patient  Site marked: site marked  Timeout: Immediately prior to procedure a time out was called to verify the correct patient, procedure, equipment, support staff and site/side marked as required   Supporting Documentation  Indications: pain   Procedure Details  Location: knee - L knee  Preparation: Patient was prepped and draped in the usual sterile fashion  Needle size: 23 G  Approach: anterolateral  Medications administered: 4 mL lidocaine PF 1% 1 %; 40 mg triamcinolone acetonide 40 MG/ML  Patient tolerance: patient tolerated the procedure well with no immediate complications

## 2021-07-13 ENCOUNTER — TREATMENT (OUTPATIENT)
Dept: CARDIAC REHAB | Facility: HOSPITAL | Age: 81
End: 2021-07-13

## 2021-07-13 DIAGNOSIS — I50.23 ACUTE ON CHRONIC SYSTOLIC CHF (CONGESTIVE HEART FAILURE) (HCC): Primary | ICD-10-CM

## 2021-07-13 PROCEDURE — 93798 PHYS/QHP OP CAR RHAB W/ECG: CPT

## 2021-07-13 NOTE — PROGRESS NOTES
Cardiac Rehab Initial Assessment      Name: Isrrael Marino  :1940 Allergies:Ibuprofen   MRN: [id number] 81 y.o. Physician: Ivanna George MD   Primary Diagnosis:    Diagnosis Plan   1. Acute on chronic systolic CHF (congestive heart failure) (CMS/HCC)      Event Date: 21 Specialist: Dr. Bernal   Secondary Diagnosis:  Risk Stratification:High Risk Note Author: Leila Lovelace MA     Cardiovascular History: Previous PCI and Comments CAD, HTN, & HLP.     EXERCISE AT HOME  no        EF: 25-30%      Source: 21          Ambulatory Status:Independent  Ambulatory Fall Risk Assessed on Initial Visit: yes 6 Minute Walk Pre- Cardiac Rehab:  Distance:960ft      RPE:7  Max. HR: 95       SPO2:97    MET: 2.3  Resting BP: 150/68 LA, 156/64 RA    Peak BP: 172/82  Recovery BP: 142/64  Comments:       NUTRITION  Lipids:yes If yes, labs as follows;  Total: No components found for: CHOLESTEROL  HDL:   HDL Cholesterol   Date Value Ref Range Status   2021 68 (H) 40 - 60 mg/dL Final    Lipids continued:  LDL:  LDL Cholesterol    Date Value Ref Range Status   2021 86 0 - 100 mg/dL Final     Triglyceride: No components found for: TRIGLYCERIDE   Weight Management:                 Weight: 180  Height: 72                                   BMI: There is no height or weight on file to calculate BMI.  Waist Circumference:   Alcohol Use: 2 liquor drinks per month(s) Diabetes:No    Last HGBA1C with date if applicable:No components found for: A1C         SOCIAL HISTORY  Social History     Socioeconomic History   • Marital status:      Spouse name: Not on file   • Number of children: Not on file   • Years of education: Not on file   • Highest education level: Not on file   Tobacco Use   • Smoking status: Never Smoker   • Smokeless tobacco: Never Used   Vaping Use   • Vaping Use: Never used   Substance and Sexual Activity   • Alcohol use: Not Currently   • Drug use: Never   • Sexual activity: Defer        Educational Level (choose one that applies) high school diploma/GED Learning Barriers:Ready to Learn, Hearing    Family Support:yes    Living Arrangement: lives with their spouse    Risk Factors: Hyperlipidemia  Yes     Tobacco Adjunct: No  Never used tobacco      Comorbidities: Previous PCI     PSYCHOSOCIAL  Clinical Depression: no    Stress: no     Assess presence or absence of depression using a valid screening tool: yes      PHYSICAL ASSESSMENT  Influenza vaccine: yes  Pneumococcal vaccine: yes          Angina: no    Describe angina scale of 0 - 4: 0 = none    Today are you having incisional pain? No. If, Yes, Scale: 0        Today are you having any other pain? Yes. If, Yes, Scale: 3    Patient complains of chronic pain regarding his orthopedic problems listed below.  Diagnosed with Hypertension:yes    Heart Sounds: S1, S2 reg     Lung Sounds: normal air entry, lungs clear to auscultation        PER RN       Assessment:  Orthopedic Problems: Bilateral knee pain, bilateral rotator cuff tears in shoulders, back pain from previous surgery, and R hip replacement.     Are you being hurt, hit, or frightened by anyone at home or in your life? no    Are you being neglected by a caregiver? N/A Shoulder flexibility/Range of motion: Below average     Recommended arm activity: Any    Chair sit and reach within:    Leg flexibility: Average    Leg Strength/Balance/Five times sit to stand:     Chose one: Average    Recommended stretching: Standing    Assessment:     Family attends IA: no Time of arrival: 0910  Time of departure: 1015     Patient Goals: Gain confidence to exercise at local gym twice a week by dc.          7/13/2021  11:23 KENNETH Lovelace MA

## 2021-07-14 ENCOUNTER — TREATMENT (OUTPATIENT)
Dept: CARDIAC REHAB | Facility: HOSPITAL | Age: 81
End: 2021-07-14

## 2021-07-14 DIAGNOSIS — I50.23 ACUTE ON CHRONIC SYSTOLIC CHF (CONGESTIVE HEART FAILURE) (HCC): ICD-10-CM

## 2021-07-14 PROCEDURE — 93798 PHYS/QHP OP CAR RHAB W/ECG: CPT

## 2021-07-14 NOTE — PROGRESS NOTES
Attended Phase II Cardiac Rehab. No medication or health history changes reported. See [facility] for details.

## 2021-07-16 ENCOUNTER — TREATMENT (OUTPATIENT)
Dept: CARDIAC REHAB | Facility: HOSPITAL | Age: 81
End: 2021-07-16

## 2021-07-16 DIAGNOSIS — I50.23 ACUTE ON CHRONIC SYSTOLIC CHF (CONGESTIVE HEART FAILURE) (HCC): Primary | ICD-10-CM

## 2021-07-16 PROCEDURE — 93798 PHYS/QHP OP CAR RHAB W/ECG: CPT

## 2021-07-16 NOTE — PROGRESS NOTES
Attended Phase II Cardiac Rehab. No medication or health history changes reported. See [facility] for details.

## 2021-07-19 ENCOUNTER — TREATMENT (OUTPATIENT)
Dept: CARDIAC REHAB | Facility: HOSPITAL | Age: 81
End: 2021-07-19

## 2021-07-19 DIAGNOSIS — I50.23 ACUTE ON CHRONIC SYSTOLIC CHF (CONGESTIVE HEART FAILURE) (HCC): Primary | ICD-10-CM

## 2021-07-19 PROCEDURE — 93798 PHYS/QHP OP CAR RHAB W/ECG: CPT

## 2021-07-21 ENCOUNTER — TREATMENT (OUTPATIENT)
Dept: CARDIAC REHAB | Facility: HOSPITAL | Age: 81
End: 2021-07-21

## 2021-07-21 DIAGNOSIS — I50.23 ACUTE ON CHRONIC SYSTOLIC CHF (CONGESTIVE HEART FAILURE) (HCC): Primary | ICD-10-CM

## 2021-07-21 PROCEDURE — 93798 PHYS/QHP OP CAR RHAB W/ECG: CPT

## 2021-07-21 NOTE — PROGRESS NOTES
Adult Outpatient Nutrition  Cardiac rehab    Patient Name:  Isrrael Marino  YOB: 1940  MRN: [id number]    Assessment Date:  7/21/2021 10:40am    RD spoke with pt on phone in an effort to schedule nutrition education session. Pt refuses offer for education at this time; RD avail if pt changes his mind while he is participating in CR.        Electronically signed by:  Patricia Olson MS,MAYRA,LD  07/21/21 10:46 EDT

## 2021-07-23 ENCOUNTER — TREATMENT (OUTPATIENT)
Dept: CARDIAC REHAB | Facility: HOSPITAL | Age: 81
End: 2021-07-23

## 2021-07-23 DIAGNOSIS — I50.23 ACUTE ON CHRONIC SYSTOLIC CHF (CONGESTIVE HEART FAILURE) (HCC): Primary | ICD-10-CM

## 2021-07-23 PROCEDURE — 93798 PHYS/QHP OP CAR RHAB W/ECG: CPT

## 2021-07-23 NOTE — PROGRESS NOTES
Attended Phase II Cardiac Rehab. No medication or health history changes reported. See [facility] for details.

## 2021-07-26 ENCOUNTER — TREATMENT (OUTPATIENT)
Dept: CARDIAC REHAB | Facility: HOSPITAL | Age: 81
End: 2021-07-26

## 2021-07-26 DIAGNOSIS — I50.23 ACUTE ON CHRONIC SYSTOLIC CHF (CONGESTIVE HEART FAILURE) (HCC): Primary | ICD-10-CM

## 2021-07-26 PROCEDURE — 93798 PHYS/QHP OP CAR RHAB W/ECG: CPT

## 2021-07-28 ENCOUNTER — TELEPHONE (OUTPATIENT)
Dept: CARDIAC REHAB | Facility: HOSPITAL | Age: 81
End: 2021-07-28

## 2021-07-28 NOTE — TELEPHONE ENCOUNTER
Patient came into the department to cancel phase II cardiac rehab appointment.  Patient c/o knee pain.  Staff will follow up with patient at next session.

## 2021-08-13 ENCOUNTER — TELEPHONE (OUTPATIENT)
Dept: CARDIAC REHAB | Facility: HOSPITAL | Age: 81
End: 2021-08-13

## 2021-08-13 NOTE — TELEPHONE ENCOUNTER
Staff left message with Patient in regards to his extended absence from the program. Patient to return call or return to the program by Friday, August 20th or he will be discharged from the program at that time.

## 2021-08-17 ENCOUNTER — OFFICE VISIT (OUTPATIENT)
Dept: ORTHOPEDIC SURGERY | Facility: CLINIC | Age: 81
End: 2021-08-17

## 2021-08-17 VITALS
HEART RATE: 80 BPM | WEIGHT: 181 LBS | BODY MASS INDEX: 24.52 KG/M2 | DIASTOLIC BLOOD PRESSURE: 83 MMHG | HEIGHT: 72 IN | SYSTOLIC BLOOD PRESSURE: 134 MMHG

## 2021-08-17 DIAGNOSIS — M17.0 PRIMARY OSTEOARTHRITIS OF BOTH KNEES: Primary | ICD-10-CM

## 2021-08-17 PROCEDURE — 99214 OFFICE O/P EST MOD 30 MIN: CPT | Performed by: ORTHOPAEDIC SURGERY

## 2021-08-17 RX ORDER — ACETAMINOPHEN 325 MG/1
1000 TABLET ORAL ONCE
Status: CANCELLED | OUTPATIENT
Start: 2021-08-17 | End: 2021-08-17

## 2021-08-17 RX ORDER — PREGABALIN 75 MG/1
75 CAPSULE ORAL ONCE
Status: CANCELLED | OUTPATIENT
Start: 2021-08-17 | End: 2021-08-17

## 2021-08-17 RX ORDER — MELOXICAM 7.5 MG/1
15 TABLET ORAL ONCE
Status: CANCELLED | OUTPATIENT
Start: 2021-08-17 | End: 2021-08-17

## 2021-08-17 NOTE — PROGRESS NOTES
Orthopaedic Clinic Note: Knee New Patient    Chief Complaint   Patient presents with   • Right Knee - Pain   • Left Knee - Pain        HPI    Isrrael Marino is a 81 y.o. male who presents with bilateral knee pain for 5 year(s). Onset atraumatic and gradual in nature. Pain is localized to the medial joint line and is a 9/10 on the pain scale. Pain is described as dull, aching, burning, throbbing, stabbing and shooting. Associated symptoms include pain and giving way/buckling. The pain is worse with walking, standing, sitting, climbing stairs, sleeping, leisure and rising from seated position; resting, ice and pain medication and/or NSAID make it better. Previous treatments have included: steroid injection (last injection 03.02.2021) since symptom onset. Although some transient relief was reported with these interventions, these conservative measures have failed and symptoms have persisted. The patient is limited in daily activities and has had a significant decrease in quality of life as a result. He denies fevers, chills, or constitutional symptoms.    I have reviewed the following portions of the patient's history:History of Present Illness    Past Medical History:   Diagnosis Date   • Arrhythmia    • Arthritis     both knees   • Broken neck (CMS/HCC)    • CAD (coronary artery disease)    • Cancer (CMS/HCC)    • Chondrocalcinosis of knee    • GERD (gastroesophageal reflux disease)    • Gout    • Heart attack (CMS/HCC)      Last Assessed: 28 Mar 2014   • Heart disease    • History of transfusion     own blood with hip surgery   • Hyperlipidemia    • Hypertension    • IBS (irritable bowel syndrome)    • Left rotator cuff tear arthropathy    • Low back pain    • Lower extremity neuropathy    • Lumbar canal stenosis    • Neck pain    • Numbness    • Right hip pain    • Rotator cuff tear arthropathy of right shoulder    • Thin blood (CMS/HCC)       Past Surgical History:   Procedure Laterality Date   • APPENDECTOMY   1956   • BACK SURGERY  1997    spinal decompression and fusion   • BREAST LUMPECTOMY  2003   • CARDIAC CATHETERIZATION      with two stents//. DR. TOLEDO   • CARDIAC CATHETERIZATION N/A 1/8/2021    Procedure: LEFT HEART CATH;  Surgeon: Casimiro Bernal MD;  Location:  [facility];  Service: Cardiovascular;  Laterality: N/A;   • CARPAL TUNNEL RELEASE  03/28/2014    Neuroplasty Decompression Median Nerve At Carpal Tunnel   • CERVICAL DISCECTOMY POSTERIOR FUSION WITH BRAIN LAB N/A 7/13/2018    Procedure: CERVICAL LAMINECTOMY DECOMPRESSION POSTERIOR C1-2 POSTERIOR CERVICAL FUSION;  Surgeon: Randall Barnett MD;  Location:  [facility];  Service: Neurosurgery   • COLONOSCOPY     • CORONARY STENT PLACEMENT  2013   • HERNIA REPAIR  2002    & 1998   • LUMBAR DISCECTOMY FUSION INSTRUMENTATION     • TONSILLECTOMY     • TOTAL HIP ARTHROPLASTY  2002   • VASECTOMY     • WISDOM TOOTH EXTRACTION        Family History   Problem Relation Age of Onset   • Cancer Mother    • Heart disease Father    • Hypertension Father    • Heart attack Father    • Sleep apnea Son      Social History     Socioeconomic History   • Marital status:      Spouse name: Not on file   • Number of children: Not on file   • Years of education: Not on file   • Highest education level: Not on file   Tobacco Use   • Smoking status: Never Smoker   • Smokeless tobacco: Never Used   Vaping Use   • Vaping Use: Never used   Substance and Sexual Activity   • Alcohol use: Not Currently   • Drug use: Never   • Sexual activity: Defer      Current Outpatient Medications on File Prior to Visit   Medication Sig Dispense Refill   • aspirin 81 MG EC tablet Take 1 tablet by mouth Daily. 30 tablet 0   • atorvastatin (LIPITOR) 40 MG tablet Take 1 tablet by mouth Every Night. 90 tablet 3   • B Complex Vitamins (VITAMIN B COMPLEX PO) Take 1 tablet by mouth Daily.     • carvedilol (COREG) 6.25 MG tablet Take 1 tablet by mouth Every 12 (Twelve) Hours. 180  "tablet 3   • Cholecalciferol (VITAMIN D3) 125 MCG (5000 UT) capsule capsule Take 2,000 Units by mouth Daily.     • clopidogrel (PLAVIX) 75 MG tablet Take 1 tablet by mouth Daily. 90 tablet 4   • DULoxetine (CYMBALTA) 30 MG capsule Take 2 capsules by mouth Daily. (Patient taking differently: Take 30 mg by mouth 2 (Two) Times a Day.) 180 capsule 1   • fluticasone (FLONASE) 50 MCG/ACT nasal spray 1 spray into the nostril(s) as directed by provider Daily. 48 g 3   • furosemide (LASIX) 20 MG tablet Take 1 tablet by mouth Daily. 90 tablet 1   • lisinopril (PRINIVIL,ZESTRIL) 2.5 MG tablet Take 1 tablet by mouth 2 (Two) Times a Day. 90 tablet 3   • Melatonin 3 MG capsule Take 1 capsule by mouth Daily.     • tamsulosin (FLOMAX) 0.4 MG capsule 24 hr capsule Take 1 capsule by mouth Daily. 90 capsule 3   • vitamin B-12 (CYANOCOBALAMIN) 1000 MCG tablet Take 1,000 mcg by mouth Daily.     • Zinc 50 MG capsule Take 1 tablet by mouth Daily.       No current facility-administered medications on file prior to visit.      Allergies   Allergen Reactions   • Ibuprofen Hives     \"Pt states he hasn't taken this in a long time\"        Review of Systems   HENT: Positive for hearing loss.    Eyes: Negative.    Respiratory: Negative.    Cardiovascular: Positive for leg swelling.   Gastrointestinal: Negative.    Endocrine: Negative.    Genitourinary: Positive for difficulty urinating, frequency and urgency.   Musculoskeletal: Positive for arthralgias, back pain, neck pain and neck stiffness.   Skin: Negative.    Allergic/Immunologic: Negative.    Neurological: Positive for weakness and headaches.   Hematological: Bruises/bleeds easily.   Psychiatric/Behavioral: Negative.         The patient's Review of Systems was personally reviewed and confirmed as accurate.    The following portions of the patient's history were reviewed and updated as appropriate: allergies, current medications, past family history, past medical history, past social " "history, past surgical history and problem list.    Physical Exam  Blood pressure 134/83, pulse 80, height 182.9 cm (72.01\"), weight 82.1 kg (181 lb).    Body mass index is 24.54 kg/m².    GENERAL APPEARANCE: awake, alert & oriented x 3, in no acute distress and well developed, well nourished  PSYCH: normal affect  LUNGS:  breathing nonlabored  EYES: sclera anicteric  CARDIOVASCULAR: palpable dorsalis pedis, palpable posterior tibial bilaterally. Capillary refill less than 2 seconds  EXTREMITIES: no clubbing, cyanosis  GAIT:  Antalgic            Right Lower Extremity Exam:   ----------  Hip Exam  ----------  FLEXION CONTRACTURE: None  FLEXION: 110 degrees  INTERNAL ROTATION: 20 degrees at 90 degrees of flexion   EXTERNAL ROTATION: 40 degrees at 90 degrees of flexion    PAIN WITH HIP MOTION: no  ----------  Knee Exam  ----------  ALIGNMENT: severe varus, correctable to neutral    RANGE OF MOTION:  Decreased (5 - 115 degrees) with no extensor lag  LIGAMENTOUS STABILITY:   stable to varus and valgus stress at terminal extension and 30 degrees; retensioning of the MCL is appreciated with valgus stress at 30 degrees consistent with medial compartment degeneration     STRENGTH:  4/5 knee flexion, extension. 5/5 ankle dorsiflexion and plantarflexion.     PAIN WITH PALPATION: global  KNEE EFFUSION: yes, trace effusion  PAIN WITH KNEE ROM: yes, global  PATELLAR CREPITUS: yes, painful and symptomatic  SPECIAL EXAM FINDINGS:  none    REFLEXES:  PATELLAR 2+/4  ACHILLES 2+/4    CLONUS: no  STRAIGHT LEG TEST:   negative    SENSATION TO LIGHT TOUCH:  DEEP PERONEAL/SUPERFICIAL PERONEAL/SURAL/SAPHENOUS/TIBIAL:   intact    EDEMA:  no  ERYTHEMA:  no  WOUNDS/INCISIONS:  no        Left Lower Extremity Exam:   ----------  Hip Exam  ----------  FLEXION CONTRACTURE: None  FLEXION: 110 degrees  INTERNAL ROTATION: 20 degrees at 90 degrees of flexion   EXTERNAL ROTATION: 40 degrees at 90 degrees of flexion    PAIN WITH HIP MOTION: " no  ----------  Knee Exam  ----------  ALIGNMENT: severe varus, correctable to neutral    RANGE OF MOTION:  Decreased (5 - 115 degrees) with no extensor lag  LIGAMENTOUS STABILITY:   stable to varus and valgus stress at terminal extension and 30 degrees; retensioning of the MCL is appreciated with valgus stress at 30 degrees consistent with medial compartment degeneration     STRENGTH:  4/5 knee flexion, extension. 5/5 ankle dorsiflexion and plantarflexion.     PAIN WITH PALPATION: global  KNEE EFFUSION: yes, moderate effusion  PAIN WITH KNEE ROM: yes, global  PATELLAR CREPITUS: yes, painful and symptomatic  SPECIAL EXAM FINDINGS:  none    REFLEXES:  PATELLAR 2+/4  ACHILLES 2+/4    CLONUS: no  STRAIGHT LEG TEST:   negative    SENSATION TO LIGHT TOUCH:  DEEP PERONEAL/SUPERFICIAL PERONEAL/SURAL/SAPHENOUS/TIBIAL:   intact    EDEMA:  no  ERYTHEMA:  no  WOUNDS/INCISIONS:  no    ______________________________________________________________________  ______________________________________________________________________    RADIOGRAPHIC FINDINGS:   Indication: Bilateral knee pain    Comparison: Todays xrays were compared to previous xrays from 12/1/2020    Bilateral knee(s) 4 views: moderate to severe tricompartmental arthritis with genu varum alignment and bone-on-bone articulation medial compartment, periarticular osteophytes visualized in all compartments and Radiographs demonstrate worsening deformity with advancing arthritic changes and wear compared to prior radiographs.      Assessment/Plan:   Diagnosis Plan   1. Primary osteoarthritis of both knees  XR Knee 4+ View Bilateral    Case Request    CBC and Differential    Basic metabolic panel    Protime-INR    APTT    Hemoglobin A1c    ECG 12 Lead    Nicotine & Metabolite, Quant    Tranexamic Acid 1,000 mg in sodium chloride 0.9 % 100 mL    Tranexamic Acid 1,000 mg in sodium chloride 0.9 % 100 mL    ceFAZolin (ANCEF) 2 g in sodium chloride 0.9 % 100 mL IVPB     acetaminophen (TYLENOL) tablet 975 mg    meloxicam (MOBIC) tablet 15 mg    pregabalin (LYRICA) capsule 75 mg    mupirocin (BACTROBAN) 2 % nasal ointment 1 application    Case Request     Patient has failed conservative treatment options and is ready to proceed to total knee arthroplasty.  Recommend proceeding with staged total knee arthroplasty starting with the left is the most symptomatic.  He is agreeable to this plan.  We will schedule him for surgery at his convenience.    The patient has clinical and radiographic evidence of end-stage left knee joint degeneration. Conservative measures have been tried for 3 months or longer, but have failed to adequately treat or improve the patient's symptoms. Pain is restricting the patient's daily activities as well as quality of life. The recommendation at this time is to proceed with a left total knee arthroplasty with the goal to improve patient function and pain. The risks, benefits, potential complications, and alternatives were discussed with the patient in detail. Risks included but were not limited to bleeding, infection, anesthesia risks, damage to neurovascular structures, osteolysis, aseptic loosening, instability, dislocation, pain, continued pain, iatrogenic fracture, possible need for future surgery including the potential for amputation, blood clots, myocardial infarction, stroke, and death. Jana-operative blood management and the potential for blood transfusion were discussed with risks and options clearly outlined. Specific details of the surgical procedure, hospitalization, recovery, rehabilitation, and long-term precautions were also presented. Pre-operative teaching was provided. Implant/prosthesis selection was outlined, and the many options available were explained; the final choice will be made at the time of the procedure to match the anatomy and condition of the bone, ligaments, tendons, and muscles. Given this instruction, the patient elected to  proceed with the left total knee arthroplasty. The patient will be seen by pre-admission testing for pre-operative optimization and risk assessment and will be scheduled for surgery once this is completed.    The patient is considered standard risk for DVT based on patient risk factors and will be placed on aspirin postoperatively for DVT prophylaxis.      Kendall Craig MD  08/17/21  09:29 EDT

## 2021-08-18 ENCOUNTER — APPOINTMENT (OUTPATIENT)
Dept: CARDIAC REHAB | Facility: HOSPITAL | Age: 81
End: 2021-08-18

## 2021-08-20 ENCOUNTER — APPOINTMENT (OUTPATIENT)
Dept: CARDIAC REHAB | Facility: HOSPITAL | Age: 81
End: 2021-08-20

## 2021-08-23 ENCOUNTER — APPOINTMENT (OUTPATIENT)
Dept: CARDIAC REHAB | Facility: HOSPITAL | Age: 81
End: 2021-08-23

## 2021-08-25 ENCOUNTER — APPOINTMENT (OUTPATIENT)
Dept: CARDIAC REHAB | Facility: HOSPITAL | Age: 81
End: 2021-08-25

## 2021-08-27 ENCOUNTER — APPOINTMENT (OUTPATIENT)
Dept: CARDIAC REHAB | Facility: HOSPITAL | Age: 81
End: 2021-08-27

## 2021-08-30 ENCOUNTER — APPOINTMENT (OUTPATIENT)
Dept: CARDIAC REHAB | Facility: HOSPITAL | Age: 81
End: 2021-08-30

## 2021-08-31 ENCOUNTER — OFFICE VISIT (OUTPATIENT)
Dept: INTERNAL MEDICINE | Facility: CLINIC | Age: 81
End: 2021-08-31

## 2021-08-31 VITALS
HEIGHT: 72 IN | OXYGEN SATURATION: 97 % | HEART RATE: 82 BPM | RESPIRATION RATE: 18 BRPM | DIASTOLIC BLOOD PRESSURE: 66 MMHG | SYSTOLIC BLOOD PRESSURE: 108 MMHG | WEIGHT: 183.2 LBS | BODY MASS INDEX: 24.81 KG/M2 | TEMPERATURE: 97.6 F

## 2021-08-31 DIAGNOSIS — Z00.00 MEDICARE ANNUAL WELLNESS VISIT, SUBSEQUENT: Primary | ICD-10-CM

## 2021-08-31 DIAGNOSIS — G47.33 OSA (OBSTRUCTIVE SLEEP APNEA): ICD-10-CM

## 2021-08-31 DIAGNOSIS — J30.9 ALLERGIC RHINITIS, UNSPECIFIED SEASONALITY, UNSPECIFIED TRIGGER: ICD-10-CM

## 2021-08-31 DIAGNOSIS — M19.011 GLENOHUMERAL ARTHRITIS, RIGHT: ICD-10-CM

## 2021-08-31 DIAGNOSIS — E78.2 MIXED HYPERLIPIDEMIA: ICD-10-CM

## 2021-08-31 DIAGNOSIS — I25.10 CHRONIC CORONARY ARTERY DISEASE: ICD-10-CM

## 2021-08-31 DIAGNOSIS — K21.9 GASTROESOPHAGEAL REFLUX DISEASE WITHOUT ESOPHAGITIS: ICD-10-CM

## 2021-08-31 DIAGNOSIS — M17.0 PRIMARY OSTEOARTHRITIS OF BOTH KNEES: ICD-10-CM

## 2021-08-31 DIAGNOSIS — I25.5 ISCHEMIC CARDIOMYOPATHY: ICD-10-CM

## 2021-08-31 DIAGNOSIS — I10 ESSENTIAL HYPERTENSION: ICD-10-CM

## 2021-08-31 DIAGNOSIS — G60.9 IDIOPATHIC PERIPHERAL NEUROPATHY: ICD-10-CM

## 2021-08-31 DIAGNOSIS — R73.03 PREDIABETES: ICD-10-CM

## 2021-08-31 PROBLEM — I16.0 HYPERTENSIVE URGENCY: Status: RESOLVED | Noted: 2021-05-28 | Resolved: 2021-08-31

## 2021-08-31 PROCEDURE — 1160F RVW MEDS BY RX/DR IN RCRD: CPT | Performed by: INTERNAL MEDICINE

## 2021-08-31 PROCEDURE — 1170F FXNL STATUS ASSESSED: CPT | Performed by: INTERNAL MEDICINE

## 2021-08-31 PROCEDURE — G0439 PPPS, SUBSEQ VISIT: HCPCS | Performed by: INTERNAL MEDICINE

## 2021-08-31 PROCEDURE — 1125F AMNT PAIN NOTED PAIN PRSNT: CPT | Performed by: INTERNAL MEDICINE

## 2021-08-31 PROCEDURE — 96160 PT-FOCUSED HLTH RISK ASSMT: CPT | Performed by: INTERNAL MEDICINE

## 2021-08-31 PROCEDURE — 99397 PER PM REEVAL EST PAT 65+ YR: CPT | Performed by: INTERNAL MEDICINE

## 2021-08-31 RX ORDER — DICLOFENAC SODIUM 75 MG/1
75 TABLET, DELAYED RELEASE ORAL DAILY PRN
Qty: 30 TABLET | Refills: 5 | Status: ON HOLD | OUTPATIENT
Start: 2021-08-31 | End: 2021-12-01

## 2021-08-31 NOTE — PROGRESS NOTES
The ABCs of the Annual Wellness Visit  Subsequent Medicare Wellness Visit    Chief Complaint   Patient presents with   • Medicare Wellness-subsequent       Subjective   History of Present Illness:  Isrrael Marino is a 81 y.o. male who presents for a Subsequent Medicare Wellness Visit.    HEALTH RISK ASSESSMENT    Recent Hospitalizations:  Recently treated at the following:  [facility]    Current Medical Providers:  Patient Care Team:  Ivanna George MD as PCP - General (Internal Medicine)  Omar Mckeon MD as Referring Physician (Emergency Medicine)    Smoking Status:  Social History     Tobacco Use   Smoking Status Never Smoker   Smokeless Tobacco Never Used       Alcohol Consumption:  Social History     Substance and Sexual Activity   Alcohol Use Not Currently       Depression Screen:   PHQ-2/PHQ-9 Depression Screening 8/31/2021   Little interest or pleasure in doing things 0   Feeling down, depressed, or hopeless 0   Trouble falling or staying asleep, or sleeping too much -   Feeling tired or having little energy -   Poor appetite or overeating -   Feeling bad about yourself - or that you are a failure or have let yourself or your family down -   Trouble concentrating on things, such as reading the newspaper or watching television -   Moving or speaking so slowly that other people could have noticed. Or the opposite - being so fidgety or restless that you have been moving around a lot more than usual -   Thoughts that you would be better off dead, or of hurting yourself in some way -   Total Score 0       Fall Risk Screen:  STEADI Fall Risk Assessment was completed, and patient is at MODERATE risk for falls. Assessment completed on:8/31/2021    Health Habits and Functional and Cognitive Screening:  Functional & Cognitive Status 8/31/2021   Do you have difficulty preparing food and eating? No   Do you have difficulty bathing yourself, getting dressed or grooming yourself? Yes   Do you  have difficulty using the toilet? No   Do you have difficulty moving around from place to place? Yes   Do you have trouble with steps or getting out of a bed or a chair? Yes   Current Diet Well Balanced Diet   Dental Exam Up to date   Eye Exam Up to date   Exercise (times per week) 7 times per week   Current Exercises Include Walking   Current Exercise Activities Include -   Do you need help using the phone?  No   Are you deaf or do you have serious difficulty hearing?  Yes   Do you need help with transportation? No   Do you need help shopping? No   Do you need help preparing meals?  No   Do you need help with housework?  No   Do you need help with laundry? No   Do you need help taking your medications? No   Do you need help managing money? No   Do you ever drive or ride in a car without wearing a seat belt? No   Have you felt unusual stress, anger or loneliness in the last month? No   Who do you live with? Spouse   If you need help, do you have trouble finding someone available to you? No   Have you been bothered in the last four weeks by sexual problems? No   Do you have difficulty concentrating, remembering or making decisions? No         Does the patient have evidence of cognitive impairment? No    Asprin use counseling:Currently not on ASA due to GI intolerance, advised need to resume ASA given CAD and to take with food    Age-appropriate Screening Schedule:  Refer to the list below for future screening recommendations based on patient's age, sex and/or medical conditions. Orders for these recommended tests are listed in the plan section. The patient has been provided with a written plan.    Health Maintenance   Topic Date Due   • INFLUENZA VACCINE  10/01/2021   • LIPID PANEL  05/28/2022   • TDAP/TD VACCINES (3 - Td or Tdap) 11/12/2027   • ZOSTER VACCINE  Completed          The following portions of the patient's history were reviewed and updated as appropriate: allergies, current medications, past family  history, past medical history, past social history, past surgical history and problem list.    Outpatient Medications Prior to Visit   Medication Sig Dispense Refill   • aspirin 81 MG EC tablet Take 1 tablet by mouth Daily. 30 tablet 0   • atorvastatin (LIPITOR) 40 MG tablet Take 1 tablet by mouth Every Night. 90 tablet 3   • B Complex Vitamins (VITAMIN B COMPLEX PO) Take 1 tablet by mouth Daily.     • carvedilol (COREG) 6.25 MG tablet Take 1 tablet by mouth Every 12 (Twelve) Hours. 180 tablet 3   • Chlorhexidine Gluconate (Antiseptic Skin Cleanser) 4 % solution Shower daily with solution x 5 days, beginning 5 days before surgery. 237 mL 0   • Cholecalciferol (VITAMIN D3) 125 MCG (5000 UT) capsule capsule Take 2,000 Units by mouth Daily.     • clopidogrel (PLAVIX) 75 MG tablet Take 1 tablet by mouth Daily. 90 tablet 4   • DULoxetine (CYMBALTA) 30 MG capsule Take 2 capsules by mouth Daily. (Patient taking differently: Take 30 mg by mouth 2 (Two) Times a Day.) 180 capsule 1   • fluticasone (FLONASE) 50 MCG/ACT nasal spray 1 spray into the nostril(s) as directed by provider Daily. 48 g 3   • furosemide (LASIX) 20 MG tablet Take 1 tablet by mouth Daily. 90 tablet 1   • lisinopril (PRINIVIL,ZESTRIL) 2.5 MG tablet Take 1 tablet by mouth 2 (Two) Times a Day. 90 tablet 3   • Melatonin 3 MG capsule Take 1 capsule by mouth Daily.     • mupirocin (BACTROBAN) 2 % ointment Apply to the inside of each nostril with a cotton swab 2 (Two) Times a Day, morning and evening, x5 days before surgery. 22 g 0   • tamsulosin (FLOMAX) 0.4 MG capsule 24 hr capsule Take 1 capsule by mouth Daily. 90 capsule 3   • vitamin B-12 (CYANOCOBALAMIN) 1000 MCG tablet Take 1,000 mcg by mouth Daily.     • Zinc 50 MG capsule Take 1 tablet by mouth Daily.       No facility-administered medications prior to visit.       Patient Active Problem List   Diagnosis   • Gastroesophageal reflux disease   • Atopic rhinitis   • Peripheral neuropathy   • Low back pain  "  • Hypertension   • Hyperlipidemia   • Benign prostatic hyperplasia   • Chronic coronary artery disease   • Primary osteoarthritis of both knees   • Glenohumeral arthritis, right   • Prediabetes   • JIN (obstructive sleep apnea)   • Hypersomnia   • Iron deficiency   • Ischemic cardiomyopathy   • Superficial thrombophlebitis of right upper extremity       Advanced Care Planning:  ACP discussion was held with the patient during this visit. Patient has an advance directive in EMR which is still valid.     Review of Systems   Constitutional: Negative.    Eyes: Negative.    Respiratory: Negative.    Cardiovascular: Positive for leg swelling. Negative for chest pain and palpitations.   Gastrointestinal: Negative.    Genitourinary: Negative.    Musculoskeletal: Positive for arthralgias and gait problem.   Skin: Negative.    Allergic/Immunologic: Positive for environmental allergies.   Neurological: Negative for syncope and memory problem.   Psychiatric/Behavioral: Negative.        Compared to one year ago, the patient feels his physical health is worse. Due to arthritis, rotator cuff issues.  Compared to one year ago, the patient feels his mental health is the same.    Reviewed chart for potential of high risk medication in the elderly: yes  Reviewed chart for potential of harmful drug interactions in the elderly:yes    Objective         Vitals:    08/31/21 0815   BP: 108/66   Pulse: 82   Resp: 18   Temp: 97.6 °F (36.4 °C)   SpO2: 97%   Weight: 83.1 kg (183 lb 3.2 oz)   Height: 182.9 cm (72\")   PainSc: 0-No pain       Body mass index is 24.85 kg/m².  Discussed the patient's BMI with him. The BMI is in the acceptable range.    Physical Exam  Vitals and nursing note reviewed.   Constitutional:       General: He is not in acute distress.     Appearance: He is well-developed. He is not ill-appearing, toxic-appearing or diaphoretic.   HENT:      Head: Normocephalic and atraumatic.      Right Ear: External ear normal.      Left " Ear: External ear normal.      Nose: Nose normal.   Eyes:      General: No scleral icterus.     Conjunctiva/sclera: Conjunctivae normal.   Cardiovascular:      Rate and Rhythm: Normal rate and regular rhythm.      Heart sounds: Normal heart sounds. No murmur heard.     Pulmonary:      Effort: Pulmonary effort is normal. No respiratory distress.      Breath sounds: Normal breath sounds. No rales.   Abdominal:      General: There is no distension.   Musculoskeletal:         General: Deformity (bony enlargement of both knees) present.      Cervical back: Neck supple.      Right lower leg: Edema (trace-1+) present.      Left lower leg: Edema (trace) present.   Skin:     General: Skin is warm and dry.      Findings: No rash.   Neurological:      Mental Status: He is alert and oriented to person, place, and time. Mental status is at baseline.      Gait: Gait abnormal (slow to stand from seated position but gait steady and upright).   Psychiatric:         Mood and Affect: Mood normal.         Behavior: Behavior normal.         Lab Results   Component Value Date    GLU 94 06/03/2021        Assessment/Plan   Medicare Risks and Personalized Health Plan  CMS Preventative Services Quick Reference  Advance Directive Discussion  Cardiovascular risk  Chronic Pain   Fall Risk  Immunizations Discussed/Encouraged (specific immunizations; Pneumococcal 23 and COVID19 )    The above risks/problems have been discussed with the patient.  Pertinent information has been shared with the patient in the After Visit Summary.  Follow up plans and orders are seen below in the Assessment/Plan Section.    Diagnoses and all orders for this visit:    Medicare annual wellness visit, subsequent  - Counseling was given to patient for the following topics:  appropriate exercise, healthy eating habits, disease prevention and importance of immunizations, including risks and benefits. Also discussed the importance of regular dental and vision  care.    Ischemic cardiomyopathy  - Echo 1/2021 EF 30%, EF 25% with dilated LV and low LV filling pressures on [facility]  - Non-obstructive CAD on [facility]  - On coreg 6.25mg BID, lisinopril 2.5mg BID, and lasix 20mg daily with additional dose with weight gain of 3lbs in one day or 5lbs in a week.  - Weight today 179lbs at home, stable. Felt to be his dry weight.  - continue current regimen, restrict dietary sodium intake, and follow up with cardiology as scheduled    Chronic coronary artery disease  - [facility] 1/7 without flow limiting CAD  - On coreg, lipitor, and plavix. Has not been taking ASA due to GI intolerance. Advised to resume and take with food.    Essential hypertension  - BP well controlled with exception of elevated BP as expected with uncontrolled pain.   - Continue coreg 6.25mg BID, lisinopril 2.5mg daily, and lasix 20mg daily.    Mixed hyperlipidemia  - Lipid panel 5/2021 with LDL 86, continue lipitor 40mg daily.    Glenohumeral arthritis, right  - following with orthopedics and periodically getting steroid injections, however the last injections only resulted in about 2 weeks of pain relief. Declines surgical intervention.  - Has had pain relief with diclofenac however this was held due to borderline renal function. He has self resumed due to intolerable pain. Advised to use as infrequently as possible. He understands the risk of developing renal dysfunction.    Primary osteoarthritis of both knees  - following with orthopedics and has failed conservative treatment so plan for staged knee replacements. First is L TKA 12/1/2021.  - Using diclofenac for pain control. Discussed above.    Prediabetes  - A1c improved to 5.6 5/2021    JIN (obstructive sleep apnea)  - on CPAP    Idiopathic peripheral neuropathy  - He had previously been evaluated and managed by Dr. Rizvi but not currently  - Does not tolerate gabapentin  - Currently controlled on cymbalta 30mg BID, will continue.    Allergic rhinitis, unspecified  seasonality, unspecified trigger  - Controlled on flonase.    Gastroesophageal reflux disease without esophagitis  - Controlled with dietary modification    Health Maintenance  - Colonoscopy: No longer indicated due to age.  - Immunizations: Tdap 2017. COVID complete, discussed booster. Shingrix series complete. Pneumovax 2010.  - Depression screening: negative 8/2021     Follow Up:  Return in about 4 months (around 12/31/2021) for Follow up, 1 year for wellness.     An After Visit Summary and PPPS were given to the patient.

## 2021-09-01 ENCOUNTER — APPOINTMENT (OUTPATIENT)
Dept: CARDIAC REHAB | Facility: HOSPITAL | Age: 81
End: 2021-09-01

## 2021-09-03 ENCOUNTER — APPOINTMENT (OUTPATIENT)
Dept: CARDIAC REHAB | Facility: HOSPITAL | Age: 81
End: 2021-09-03

## 2021-09-08 ENCOUNTER — APPOINTMENT (OUTPATIENT)
Dept: CARDIAC REHAB | Facility: HOSPITAL | Age: 81
End: 2021-09-08

## 2021-09-10 ENCOUNTER — APPOINTMENT (OUTPATIENT)
Dept: CARDIAC REHAB | Facility: HOSPITAL | Age: 81
End: 2021-09-10

## 2021-09-13 ENCOUNTER — APPOINTMENT (OUTPATIENT)
Dept: CARDIAC REHAB | Facility: HOSPITAL | Age: 81
End: 2021-09-13

## 2021-09-15 ENCOUNTER — APPOINTMENT (OUTPATIENT)
Dept: CARDIAC REHAB | Facility: HOSPITAL | Age: 81
End: 2021-09-15

## 2021-09-17 ENCOUNTER — APPOINTMENT (OUTPATIENT)
Dept: CARDIAC REHAB | Facility: HOSPITAL | Age: 81
End: 2021-09-17

## 2021-09-20 ENCOUNTER — APPOINTMENT (OUTPATIENT)
Dept: CARDIAC REHAB | Facility: HOSPITAL | Age: 81
End: 2021-09-20

## 2021-09-22 ENCOUNTER — APPOINTMENT (OUTPATIENT)
Dept: CARDIAC REHAB | Facility: HOSPITAL | Age: 81
End: 2021-09-22

## 2021-09-24 ENCOUNTER — APPOINTMENT (OUTPATIENT)
Dept: CARDIAC REHAB | Facility: HOSPITAL | Age: 81
End: 2021-09-24

## 2021-09-27 ENCOUNTER — APPOINTMENT (OUTPATIENT)
Dept: CARDIAC REHAB | Facility: HOSPITAL | Age: 81
End: 2021-09-27

## 2021-09-29 ENCOUNTER — APPOINTMENT (OUTPATIENT)
Dept: CARDIAC REHAB | Facility: HOSPITAL | Age: 81
End: 2021-09-29

## 2021-10-01 ENCOUNTER — APPOINTMENT (OUTPATIENT)
Dept: CARDIAC REHAB | Facility: HOSPITAL | Age: 81
End: 2021-10-01

## 2021-10-04 ENCOUNTER — APPOINTMENT (OUTPATIENT)
Dept: CARDIAC REHAB | Facility: HOSPITAL | Age: 81
End: 2021-10-04

## 2021-10-06 ENCOUNTER — APPOINTMENT (OUTPATIENT)
Dept: CARDIAC REHAB | Facility: HOSPITAL | Age: 81
End: 2021-10-06

## 2021-10-08 ENCOUNTER — APPOINTMENT (OUTPATIENT)
Dept: CARDIAC REHAB | Facility: HOSPITAL | Age: 81
End: 2021-10-08

## 2021-10-11 ENCOUNTER — APPOINTMENT (OUTPATIENT)
Dept: CARDIAC REHAB | Facility: HOSPITAL | Age: 81
End: 2021-10-11

## 2021-10-13 ENCOUNTER — APPOINTMENT (OUTPATIENT)
Dept: CARDIAC REHAB | Facility: HOSPITAL | Age: 81
End: 2021-10-13

## 2021-10-15 ENCOUNTER — APPOINTMENT (OUTPATIENT)
Dept: CARDIAC REHAB | Facility: HOSPITAL | Age: 81
End: 2021-10-15

## 2021-10-18 ENCOUNTER — APPOINTMENT (OUTPATIENT)
Dept: CARDIAC REHAB | Facility: HOSPITAL | Age: 81
End: 2021-10-18

## 2021-10-20 ENCOUNTER — APPOINTMENT (OUTPATIENT)
Dept: CARDIAC REHAB | Facility: HOSPITAL | Age: 81
End: 2021-10-20

## 2021-10-22 ENCOUNTER — APPOINTMENT (OUTPATIENT)
Dept: CARDIAC REHAB | Facility: HOSPITAL | Age: 81
End: 2021-10-22

## 2021-10-25 ENCOUNTER — APPOINTMENT (OUTPATIENT)
Dept: CARDIAC REHAB | Facility: HOSPITAL | Age: 81
End: 2021-10-25

## 2021-11-18 ENCOUNTER — OFFICE VISIT (OUTPATIENT)
Dept: INTERNAL MEDICINE | Facility: CLINIC | Age: 81
End: 2021-11-18

## 2021-11-18 ENCOUNTER — PRE-ADMISSION TESTING (OUTPATIENT)
Dept: PREADMISSION TESTING | Facility: HOSPITAL | Age: 81
End: 2021-11-18

## 2021-11-18 VITALS
SYSTOLIC BLOOD PRESSURE: 124 MMHG | BODY MASS INDEX: 25.33 KG/M2 | HEART RATE: 67 BPM | DIASTOLIC BLOOD PRESSURE: 62 MMHG | TEMPERATURE: 97.1 F | RESPIRATION RATE: 18 BRPM | HEIGHT: 72 IN | OXYGEN SATURATION: 99 %

## 2021-11-18 VITALS — BODY MASS INDEX: 25.3 KG/M2 | HEIGHT: 72 IN | WEIGHT: 186.8 LBS

## 2021-11-18 DIAGNOSIS — J06.9 VIRAL URI WITH COUGH: Primary | ICD-10-CM

## 2021-11-18 DIAGNOSIS — M54.50 CHRONIC BILATERAL LOW BACK PAIN, UNSPECIFIED WHETHER SCIATICA PRESENT: ICD-10-CM

## 2021-11-18 DIAGNOSIS — M17.0 PRIMARY OSTEOARTHRITIS OF BOTH KNEES: ICD-10-CM

## 2021-11-18 DIAGNOSIS — M19.011 GLENOHUMERAL ARTHRITIS, RIGHT: ICD-10-CM

## 2021-11-18 DIAGNOSIS — M50.30 DDD (DEGENERATIVE DISC DISEASE), CERVICAL: ICD-10-CM

## 2021-11-18 DIAGNOSIS — G89.29 CHRONIC BILATERAL LOW BACK PAIN, UNSPECIFIED WHETHER SCIATICA PRESENT: ICD-10-CM

## 2021-11-18 LAB
ANION GAP SERPL CALCULATED.3IONS-SCNC: 11 MMOL/L (ref 5–15)
APTT PPP: 26.6 SECONDS (ref 22–39)
BASOPHILS # BLD AUTO: 0.07 10*3/MM3 (ref 0–0.2)
BASOPHILS NFR BLD AUTO: 0.7 % (ref 0–1.5)
BUN SERPL-MCNC: 37 MG/DL (ref 8–23)
BUN/CREAT SERPL: 32.7 (ref 7–25)
CALCIUM SPEC-SCNC: 9.7 MG/DL (ref 8.6–10.5)
CHLORIDE SERPL-SCNC: 100 MMOL/L (ref 98–107)
CO2 SERPL-SCNC: 26 MMOL/L (ref 22–29)
CREAT SERPL-MCNC: 1.13 MG/DL (ref 0.76–1.27)
DEPRECATED RDW RBC AUTO: 43.8 FL (ref 37–54)
EOSINOPHIL # BLD AUTO: 0.39 10*3/MM3 (ref 0–0.4)
EOSINOPHIL NFR BLD AUTO: 4 % (ref 0.3–6.2)
ERYTHROCYTE [DISTWIDTH] IN BLOOD BY AUTOMATED COUNT: 13 % (ref 12.3–15.4)
GFR SERPL CREATININE-BSD FRML MDRD: 62 ML/MIN/1.73
GLUCOSE SERPL-MCNC: 93 MG/DL (ref 65–99)
HBA1C MFR BLD: 6 % (ref 4.8–5.6)
HCT VFR BLD AUTO: 40.3 % (ref 37.5–51)
HGB BLD-MCNC: 13.4 G/DL (ref 13–17.7)
IMM GRANULOCYTES # BLD AUTO: 0.06 10*3/MM3 (ref 0–0.05)
IMM GRANULOCYTES NFR BLD AUTO: 0.6 % (ref 0–0.5)
INR PPP: 0.99 (ref 0.85–1.16)
LYMPHOCYTES # BLD AUTO: 1.9 10*3/MM3 (ref 0.7–3.1)
LYMPHOCYTES NFR BLD AUTO: 19.6 % (ref 19.6–45.3)
MCH RBC QN AUTO: 30.2 PG (ref 26.6–33)
MCHC RBC AUTO-ENTMCNC: 33.3 G/DL (ref 31.5–35.7)
MCV RBC AUTO: 91 FL (ref 79–97)
MONOCYTES # BLD AUTO: 1 10*3/MM3 (ref 0.1–0.9)
MONOCYTES NFR BLD AUTO: 10.3 % (ref 5–12)
NEUTROPHILS NFR BLD AUTO: 6.29 10*3/MM3 (ref 1.7–7)
NEUTROPHILS NFR BLD AUTO: 64.8 % (ref 42.7–76)
NRBC BLD AUTO-RTO: 0 /100 WBC (ref 0–0.2)
PLATELET # BLD AUTO: 239 10*3/MM3 (ref 140–450)
PMV BLD AUTO: 9.9 FL (ref 6–12)
POTASSIUM SERPL-SCNC: 5.1 MMOL/L (ref 3.5–5.2)
PROTHROMBIN TIME: 12.8 SECONDS (ref 11.4–14.4)
QT INTERVAL: 440 MS
QTC INTERVAL: 475 MS
RBC # BLD AUTO: 4.43 10*6/MM3 (ref 4.14–5.8)
SODIUM SERPL-SCNC: 137 MMOL/L (ref 136–145)
WBC NRBC COR # BLD: 9.71 10*3/MM3 (ref 3.4–10.8)

## 2021-11-18 PROCEDURE — 85610 PROTHROMBIN TIME: CPT

## 2021-11-18 PROCEDURE — 83036 HEMOGLOBIN GLYCOSYLATED A1C: CPT

## 2021-11-18 PROCEDURE — 85025 COMPLETE CBC W/AUTO DIFF WBC: CPT

## 2021-11-18 PROCEDURE — 99213 OFFICE O/P EST LOW 20 MIN: CPT | Performed by: INTERNAL MEDICINE

## 2021-11-18 PROCEDURE — 36415 COLL VENOUS BLD VENIPUNCTURE: CPT

## 2021-11-18 PROCEDURE — 80048 BASIC METABOLIC PNL TOTAL CA: CPT

## 2021-11-18 PROCEDURE — 93010 ELECTROCARDIOGRAM REPORT: CPT | Performed by: INTERNAL MEDICINE

## 2021-11-18 PROCEDURE — 85730 THROMBOPLASTIN TIME PARTIAL: CPT

## 2021-11-18 PROCEDURE — G0480 DRUG TEST DEF 1-7 CLASSES: HCPCS

## 2021-11-18 PROCEDURE — 93005 ELECTROCARDIOGRAM TRACING: CPT

## 2021-11-18 RX ORDER — INFLUENZA A VIRUS A/MICHIGAN/45/2015 X-275 (H1N1) ANTIGEN (FORMALDEHYDE INACTIVATED), INFLUENZA A VIRUS A/SINGAPORE/INFIMH-16-0019/2016 IVR-186 (H3N2) ANTIGEN (FORMALDEHYDE INACTIVATED), INFLUENZA B VIRUS B/PHUKET/3073/2013 ANTIGEN (FORMALDEHYDE INACTIVATED), AND INFLUENZA B VIRUS B/MARYLAND/15/2016 BX-69A ANTIGEN (FORMALDEHYDE INACTIVATED) 60; 60; 60; 60 UG/.7ML; UG/.7ML; UG/.7ML; UG/.7ML
INJECTION, SUSPENSION INTRAMUSCULAR
COMMUNITY
Start: 2021-10-12 | End: 2022-04-08

## 2021-11-18 RX ORDER — AMMONIUM LACTATE 12 G/100G
LOTION TOPICAL EVERY 12 HOURS SCHEDULED
COMMUNITY

## 2021-11-18 ASSESSMENT — KOOS JR
KOOS JR SCORE: 22
KOOS JR SCORE: 31.307

## 2021-11-18 NOTE — PAT
"Patient has appt with Dr Hidalgo 21     Pt has had a cough for approx 1 week. His wife with cough for 2 weeks. She was tested for Covid and reported as negative. The patient has appt with his PCP today after PAT appt.      Per Anesthesia Request, patient instructed not to take their ACE/ARB medications on the AM of surgery.  Patient to apply Chlorhexadine wipes  to surgical area (as instructed) the night before procedure and the AM of procedure. Wipes provided.    Bactroban and Chlorhexidine Prescription prescribed by physician before PAT visit.  Verified with patient that medications were picked up from their pharmacy.  Written instructions given to patient during PAT visit.  Patient/family also instructed to complete skin prep checklist and return the checklist on the day of surgery to preoperative staff.  Patient/family verbalized understanding.    Patient instructed to bring CPAP mask and tubing to the hospital for overnight stay.  Explained that it is not necessary to bring their CPAP machine to the hospital instead a CPAP machine will be provided for use by the hospital. If patient knows their CPAP settings, those settings will be implemented.  If not, the CPAP machine will be utilized on the auto setting using their mask and tubing.    Patient verbalized understanding.    Patient instructed to drink 20 ounces (or until full) of Gatorade and it needs to be completed 1 hour (for Main OR patients) or 2 hours (scheduled  section patients) before given arrival time for procedure (NO RED Gatorade)    Patient verbalized understanding.    Pt reports he \" does not do computers\" . Joint education provided in person and Joint class binder reviewed with patient.   "

## 2021-11-18 NOTE — DISCHARGE INSTRUCTIONS
Patient to apply Chlorhexadine wipes  to surgical area (as instructed) the night before procedure and the AM of procedure. Wipes provided.    Patient instructed to drink 20 ounces (or until full) of Gatorade and it needs to be completed 1 hour (for Main OR patients) or 2 hours (scheduled  section patients) before given arrival time for procedure (NO RED Gatorade)    Per Anesthesia Request, patient instructed not to take their ACE/ARB medications on the AM of surgery.    Bactroban and Chlorhexidine Prescription prescribed by physician before PAT visit.  Verified with patient that medications were picked up from their pharmacy.  Written instructions given to patient during PAT visit.  Patient/family also instructed to complete skin prep checklist and return the checklist on the day of surgery to preoperative staff.  Patient/family verbalized understanding.    Patient instructed to bring CPAP mask and tubing to the hospital for overnight stay.  Explained that it is not necessary to bring their CPAP machine to the hospital instead a CPAP machine will be provided for use by the hospital. If patient knows their CPAP settings, those settings will be implemented.  If not, the CPAP machine will be utilized on the auto setting using their mask and tubing.    Patient verbalized understanding.    Patient did not review general PAT education video as instructed in their preoperative information received from their surgeon.  One-on-one Pre Admission Testing general education provided during PAT visit.  Copies of PAT general education handouts (Incentive Spirometry, Meds to Beds Program, Patient Belongings, Pre-op skin preparation instructions, Blood Glucose testing, Visitor policy, Surgery FAQ, Code H) distributed to patient. Encouraged patient/family to read PAT general education handouts thoroughly and notify PAT staff with any questions or concerns. Patient instructed to bring PAT pass and completed skin prep sheet (if  "applicable) on the day of procedure. Patient verbalized understanding of all information and priority content. This included the Joint replacement class as the patient reports \" I dont do computers \"   "

## 2021-11-18 NOTE — PROGRESS NOTES
Internal Medicine Acute Visit    Chief Complaint   Patient presents with   • Cough     sinus drainage, x 7 days        HPI  Mr. Marino comes in today with 1 week of URI symptoms. He notes that he had been coughing but as of this morning he is feeling better. He has not had fever. He has had some congestion. His wife had similar symptoms and was COVID test last week which was negative. He is fully vaccinated including recent booster. He is also UTD with his flu vaccine this year. He is using coricidin HBP. He feels well today with exception of chronic neck, knee pain. He is scheduled for L knee replacement 12/1. He has previously been receiving glenohumeral joint injections as well for his shoulder pain but these are on hold while he awaits knee replacement. He does request pain medication today. He has been using pain medication previously prescribed by his dentist and stretching out script.       Review of Systems  Review of Systems   Constitutional: Negative for chills and fever.   HENT: Positive for congestion and postnasal drip. Negative for ear pain, sinus pressure and sore throat.    Respiratory: Positive for cough and wheezing. Negative for shortness of breath.    Cardiovascular: Negative.    Musculoskeletal: Positive for arthralgias, back pain, gait problem (due to knee pain) and neck pain.   Neurological: Positive for numbness (neuropathy).        Medications  Current Outpatient Medications on File Prior to Visit   Medication Sig Dispense Refill   • ammonium lactate (LAC-HYDRIN) 12 % lotion Every 12 (Twelve) Hours.     • aspirin 81 MG EC tablet Take 1 tablet by mouth Daily. 30 tablet 0   • atorvastatin (LIPITOR) 40 MG tablet Take 1 tablet by mouth Every Night. 90 tablet 3   • B Complex Vitamins (VITAMIN B COMPLEX PO) Take 1 tablet by mouth Daily.     • carvedilol (COREG) 6.25 MG tablet Take 1 tablet by mouth Every 12 (Twelve) Hours. 180 tablet 3   • Chlorhexidine Gluconate (Antiseptic Skin Cleanser) 4 %  "solution Shower daily with solution x 5 days, beginning 5 days before surgery. 237 mL 0   • Cholecalciferol (VITAMIN D3) 125 MCG (5000 UT) capsule capsule Take 2,000 Units by mouth Daily.     • clopidogrel (PLAVIX) 75 MG tablet Take 1 tablet by mouth Daily. 90 tablet 4   • diclofenac (VOLTAREN) 75 MG EC tablet Take 1 tablet by mouth Daily As Needed (pain). 30 tablet 5   • DULoxetine (CYMBALTA) 30 MG capsule Take 2 capsules by mouth Daily. (Patient taking differently: Take 30 mg by mouth 2 (Two) Times a Day.) 180 capsule 1   • fluticasone (FLONASE) 50 MCG/ACT nasal spray 1 spray into the nostril(s) as directed by provider Daily. 48 g 3   • Fluzone High-Dose Quadrivalent 0.7 ML suspension prefilled syringe injection      • furosemide (LASIX) 20 MG tablet Take 1 tablet by mouth Daily. 90 tablet 1   • lisinopril (PRINIVIL,ZESTRIL) 2.5 MG tablet Take 1 tablet by mouth 2 (Two) Times a Day. 90 tablet 3   • Melatonin 3 MG capsule Take 1 capsule by mouth Daily.     • mupirocin (BACTROBAN) 2 % ointment Apply to the inside of each nostril with a cotton swab 2 (Two) Times a Day, morning and evening, x5 days before surgery. 22 g 0   • tamsulosin (FLOMAX) 0.4 MG capsule 24 hr capsule Take 1 capsule by mouth Daily. 90 capsule 3   • vitamin B-12 (CYANOCOBALAMIN) 1000 MCG tablet Take 1,000 mcg by mouth Daily.     • Zinc 50 MG capsule Take 1 tablet by mouth Daily.       No current facility-administered medications on file prior to visit.        Allergies  Allergies   Allergen Reactions   • Ibuprofen Hives     \"Pt states he hasn't taken this in a long time\"       PM  Past Medical History:   Diagnosis Date   • Arrhythmia    • Arthritis     both knees   • Broken neck (HCC)    • CAD (coronary artery disease)    • Cancer (HCC)     skin cancer  head and neck  nonmelanoma   • Chondrocalcinosis of knee    • GERD (gastroesophageal reflux disease)    • Gout    • Heart attack (HCC)      Last Assessed: 28 Mar 2014   • Heart disease    • History " "of transfusion     own blood with hip surgery   • Hyperlipidemia    • Hypertension    • Hypertensive urgency 5/28/2021   • IBS (irritable bowel syndrome)    • Left rotator cuff tear arthropathy    • Low back pain    • Lower extremity neuropathy    • Lumbar canal stenosis    • Neck pain    • Numbness    • Right hip pain    • Rotator cuff tear arthropathy of right shoulder    • Sleep apnea     semi compliant with c-pap    • Thin blood (HCC)        Objective  Visit Vitals  /62   Pulse 67   Temp 97.1 °F (36.2 °C)   Resp 18   Ht 182.9 cm (72.01\")   SpO2 99%   BMI 25.33 kg/m²        Physical Exam  Physical Exam  Vitals and nursing note reviewed.   Constitutional:       General: He is not in acute distress.     Appearance: Normal appearance. He is well-developed. He is not ill-appearing or toxic-appearing.   HENT:      Head: Normocephalic and atraumatic.      Right Ear: Tympanic membrane, ear canal and external ear normal.      Left Ear: Tympanic membrane, ear canal and external ear normal.      Nose: Congestion present.      Mouth/Throat:      Mouth: Mucous membranes are moist.      Pharynx: Oropharynx is clear. No oropharyngeal exudate or posterior oropharyngeal erythema.   Eyes:      Conjunctiva/sclera: Conjunctivae normal.   Cardiovascular:      Rate and Rhythm: Normal rate and regular rhythm.      Heart sounds: Normal heart sounds.   Pulmonary:      Effort: Pulmonary effort is normal. No respiratory distress.      Breath sounds: Normal breath sounds. No wheezing.   Skin:     General: Skin is warm and dry.   Neurological:      Mental Status: He is alert and oriented to person, place, and time.      Gait: Gait abnormal (difficulty getting up from chair).         Results  Results for orders placed or performed in visit on 11/18/21   Basic metabolic panel    Specimen: Blood   Result Value Ref Range    Glucose 93 65 - 99 mg/dL    BUN 37 (H) 8 - 23 mg/dL    Creatinine 1.13 0.76 - 1.27 mg/dL    Sodium 137 136 - 145 " mmol/L    Potassium 5.1 3.5 - 5.2 mmol/L    Chloride 100 98 - 107 mmol/L    CO2 26.0 22.0 - 29.0 mmol/L    Calcium 9.7 8.6 - 10.5 mg/dL    eGFR Non African Amer 62 >60 mL/min/1.73    BUN/Creatinine Ratio 32.7 (H) 7.0 - 25.0    Anion Gap 11.0 5.0 - 15.0 mmol/L   Protime-INR    Specimen: Blood   Result Value Ref Range    Protime 12.8 11.4 - 14.4 Seconds    INR 0.99 0.85 - 1.16   APTT    Specimen: Blood   Result Value Ref Range    PTT 26.6 22.0 - 39.0 seconds   Hemoglobin A1c    Specimen: Blood   Result Value Ref Range    Hemoglobin A1C 6.00 (H) 4.80 - 5.60 %   CBC Auto Differential    Specimen: Blood   Result Value Ref Range    WBC 9.71 3.40 - 10.80 10*3/mm3    RBC 4.43 4.14 - 5.80 10*6/mm3    Hemoglobin 13.4 13.0 - 17.7 g/dL    Hematocrit 40.3 37.5 - 51.0 %    MCV 91.0 79.0 - 97.0 fL    MCH 30.2 26.6 - 33.0 pg    MCHC 33.3 31.5 - 35.7 g/dL    RDW 13.0 12.3 - 15.4 %    RDW-SD 43.8 37.0 - 54.0 fl    MPV 9.9 6.0 - 12.0 fL    Platelets 239 140 - 450 10*3/mm3    Neutrophil % 64.8 42.7 - 76.0 %    Lymphocyte % 19.6 19.6 - 45.3 %    Monocyte % 10.3 5.0 - 12.0 %    Eosinophil % 4.0 0.3 - 6.2 %    Basophil % 0.7 0.0 - 1.5 %    Immature Grans % 0.6 (H) 0.0 - 0.5 %    Neutrophils, Absolute 6.29 1.70 - 7.00 10*3/mm3    Lymphocytes, Absolute 1.90 0.70 - 3.10 10*3/mm3    Monocytes, Absolute 1.00 (H) 0.10 - 0.90 10*3/mm3    Eosinophils, Absolute 0.39 0.00 - 0.40 10*3/mm3    Basophils, Absolute 0.07 0.00 - 0.20 10*3/mm3    Immature Grans, Absolute 0.06 (H) 0.00 - 0.05 10*3/mm3    nRBC 0.0 0.0 - 0.2 /100 WBC   ECG 12 Lead   Result Value Ref Range    QT Interval 440 ms    QTC Interval 475 ms        Assessment and Plan  Diagnoses and all orders for this visit:    Viral URI with cough  - Resolving with supportive care. Continue coricidin.  - COVID19 infection unlikely given full vaccination and wife's negative test.    Primary osteoarthritis of both knees  - following with orthopedics and has failed conservative treatment so plan for  staged knee replacements. First is L TKA 12/1/2021.  - Using diclofenac for pain control. Discussed below.  - If pain remains uncontrolled following surgery would refer to pain management.    Glenohumeral arthritis, right  - following with orthopedics and periodically getting steroid injections, however the last injections only resulted in about 2 weeks of pain relief. Injections currently on hold awaiting knee replacement. Declines surgical intervention.  - Using diclofenac despite renal impairment, he understands risk.  - If pain remains uncontrolled would refer to pain management.    Chronic bilateral low back pain, unspecified whether sciatica present  - Has diffuse DDD  - Discussed potential for pain management referral, deferred until after knee surgery    DDD (degenerative disc disease), cervical  - hx of odontoid fracture s/p cervical fusion with ongoing chronic pain  - Discussed that I would not prescribe chronic pain medication but could refer him to pain management after he has his knee surgery.    Health Maintenance  - Colonoscopy: No longer indicated due to age.  - Immunizations: Flu UTD. Tdap 2017. COVID complete, including booster. Shingrix series complete. Pneumovax 2010.  - Depression screening: negative 8/2021    Return if symptoms worsen or fail to improve.

## 2021-11-22 ENCOUNTER — OFFICE VISIT (OUTPATIENT)
Dept: CARDIOLOGY | Facility: CLINIC | Age: 81
End: 2021-11-22

## 2021-11-22 VITALS
SYSTOLIC BLOOD PRESSURE: 116 MMHG | HEART RATE: 57 BPM | OXYGEN SATURATION: 95 % | BODY MASS INDEX: 25.14 KG/M2 | WEIGHT: 185.6 LBS | DIASTOLIC BLOOD PRESSURE: 70 MMHG | HEIGHT: 72 IN

## 2021-11-22 DIAGNOSIS — E78.2 MIXED HYPERLIPIDEMIA: ICD-10-CM

## 2021-11-22 DIAGNOSIS — I25.10 CHRONIC CORONARY ARTERY DISEASE: Primary | ICD-10-CM

## 2021-11-22 DIAGNOSIS — I10 ESSENTIAL HYPERTENSION: ICD-10-CM

## 2021-11-22 PROCEDURE — 99214 OFFICE O/P EST MOD 30 MIN: CPT | Performed by: INTERNAL MEDICINE

## 2021-11-22 NOTE — PROGRESS NOTES
"[facility]  Subjective:     Encounter Date: 11/22/2021      Patient ID: Isrrael Marino is a 81 y.o. male.    Chief Complaint: Chronic coronary artery disease      PROBLEM LIST:  1. Coronary artery disease:  a. Non STEMI, 09/22/2013.  Cardiac catheterization:  99% first diagonal (2.5 x 15 Xience), 95% second diagonal (PTCA), 50% LAD, FFR 0.83.  EF 50%.  b. On 11/19/2013:  Crescendo angina.  Catheterization:  95% LAD/70% second diagonal 3.0 x 23-mm XIENCE (LAD) and 2.75 x 12-mm XIENCE (second diagonal). Widely  patent first diagonal stent. .  c. Echocardiogram, 1/8/2021: EF 30%. No hemodynamically significant valvular heart disease.   d. LHC, 1/8/2021: EF 25% with dilated left ventricle. Low LV filling pressures. No flow-limiting coronary artery disease.  2. Hypertension.  3. Dyslipidemia.   4. Lower  extremity neuropathy.  5. Arthritis.  6. Skin cancer with removal  7. Traumatic fracture of C2  8. Surgeries:  a. Appendectomy  b. Lumbar surgery  c. Carpal tunnel surgery  d. Hernia repair  e. Right hip replacement  f. Vasectomy      History of Present Illness  Isrrael Marino returns today for a 6 month follow up with a history of coronary artery disease and cardiac risk factors. Since last visit, the patient has been doing well overall from a cardiovascular standpoint. He needs cardiac clearance for an upcoming left knee replacement 12/1 and has been asked to hold anticoagulation. He reports decreased appetite. He's noticed increase in weight after consuming small amount of food and will take Lasix at night if there's a 4 pounds increase. He tries to avoid salty foods. Patient denies chest pain, shortness of breath, palpitations, dizziness, and syncope.     Allergies   Allergen Reactions   • Ibuprofen Hives     \"Pt states he hasn't taken this in a long time\"         Current Outpatient Medications:   •  ammonium lactate (LAC-HYDRIN) 12 % lotion, Every 12 (Twelve) Hours., " Disp: , Rfl:   •  aspirin 81 MG EC tablet, Take 1 tablet by mouth Daily., Disp: 30 tablet, Rfl: 0  •  atorvastatin (LIPITOR) 40 MG tablet, Take 1 tablet by mouth Every Night., Disp: 90 tablet, Rfl: 3  •  B Complex Vitamins (VITAMIN B COMPLEX PO), Take 1 tablet by mouth Daily., Disp: , Rfl:   •  carvedilol (COREG) 6.25 MG tablet, Take 1 tablet by mouth Every 12 (Twelve) Hours., Disp: 180 tablet, Rfl: 3  •  Cholecalciferol (VITAMIN D3) 125 MCG (5000 UT) capsule capsule, Take 2,000 Units by mouth Daily., Disp: , Rfl:   •  clopidogrel (PLAVIX) 75 MG tablet, Take 1 tablet by mouth Daily., Disp: 90 tablet, Rfl: 4  •  diclofenac (VOLTAREN) 75 MG EC tablet, Take 1 tablet by mouth Daily As Needed (pain)., Disp: 30 tablet, Rfl: 5  •  DULoxetine (CYMBALTA) 30 MG capsule, Take 2 capsules by mouth Daily. (Patient taking differently: Take 30 mg by mouth 2 (Two) Times a Day.), Disp: 180 capsule, Rfl: 1  •  fluticasone (FLONASE) 50 MCG/ACT nasal spray, 1 spray into the nostril(s) as directed by provider Daily., Disp: 48 g, Rfl: 3  •  Fluzone High-Dose Quadrivalent 0.7 ML suspension prefilled syringe injection, , Disp: , Rfl:   •  furosemide (LASIX) 20 MG tablet, Take 1 tablet by mouth Daily., Disp: 90 tablet, Rfl: 1  •  lisinopril (PRINIVIL,ZESTRIL) 2.5 MG tablet, Take 1 tablet by mouth 2 (Two) Times a Day., Disp: 90 tablet, Rfl: 3  •  Melatonin 3 MG capsule, Take 1 capsule by mouth Daily., Disp: , Rfl:   •  mupirocin (BACTROBAN) 2 % ointment, Apply to the inside of each nostril with a cotton swab 2 (Two) Times a Day, morning and evening, x5 days before surgery., Disp: 22 g, Rfl: 0  •  tamsulosin (FLOMAX) 0.4 MG capsule 24 hr capsule, Take 1 capsule by mouth Daily., Disp: 90 capsule, Rfl: 3  •  vitamin B-12 (CYANOCOBALAMIN) 1000 MCG tablet, Take 1,000 mcg by mouth Daily., Disp: , Rfl:   •  Zinc 50 MG capsule, Take 1 tablet by mouth Daily., Disp: , Rfl:     The following portions of the patient's history were reviewed and updated as  "appropriate: allergies, current medications, past family history, past medical history, past social history, past surgical history and problem list.    Review of Systems   Constitutional: Positive for decreased appetite and weight gain.   Cardiovascular: Negative for chest pain, dyspnea on exertion, leg swelling, palpitations and syncope.   Respiratory: Negative.  Negative for shortness of breath.    Hematologic/Lymphatic: Negative for bleeding problem. Does not bruise/bleed easily.   Skin: Negative for rash.   Musculoskeletal: Negative for muscle weakness and myalgias.   Gastrointestinal: Negative for heartburn, nausea and vomiting.   Neurological: Negative for dizziness, light-headedness, loss of balance and numbness.          Objective:     Vitals:    11/22/21 1034   BP: 116/70   BP Location: Left arm   Patient Position: Sitting   Pulse: 57   SpO2: 95%   Weight: 84.2 kg (185 lb 9.6 oz)   Height: 182.9 cm (72\")         Constitutional:       Appearance: Well-developed.   Neck:      Thyroid: No thyromegaly.      Vascular: No carotid bruit or JVD.   Pulmonary:      Breath sounds: Normal breath sounds.   Cardiovascular:      Regular rhythm.      No gallop. No S3 and S4 gallop.   Edema:     Peripheral edema absent.   Abdominal:      General: Bowel sounds are normal.      Palpations: Abdomen is soft. There is no abdominal mass.      Tenderness: There is no abdominal tenderness.   Skin:     General: Skin is warm and dry.      Findings: No rash.   Neurological:      Mental Status: Alert and oriented to person, place, and time.         Lab Review:  Lab Results   Component Value Date    GLUCOSE 93 11/18/2021    BUN 37 (H) 11/18/2021    CREATININE 1.13 11/18/2021    EGFRIFNONA 62 11/18/2021    EGFRIFAFRI 80 06/03/2021    BCR 32.7 (H) 11/18/2021    K 5.1 11/18/2021    CO2 26.0 11/18/2021    CALCIUM 9.7 11/18/2021    ALBUMIN 4.30 05/27/2021    ALKPHOS 89 05/27/2021    AST 29 05/27/2021    ALT 22 05/27/2021     Lab Results "   Component Value Date    CHOL 164 05/28/2021    TRIG 49 05/28/2021    HDL 68 (H) 05/28/2021    LDL 86 05/28/2021      Lab Results   Component Value Date    WBC 9.71 11/18/2021    RBC 4.43 11/18/2021    HGB 13.4 11/18/2021    HCT 40.3 11/18/2021    MCV 91.0 11/18/2021     11/18/2021     Lab Results   Component Value Date    TSH 3.980 01/07/2021     Lab Results   Component Value Date    HGBA1C 6.00 (H) 11/18/2021        Procedures        Assessment:   Diagnoses and all orders for this visit:    1. Chronic coronary artery disease (Primary)    2. Essential hypertension    3. Mixed hyperlipidemia        Impression:  Coronary artery disease. Stable without angina. Hold Plavix prior to knee replacement but remain on aspirin.   Hypertension. Well controlled. On lisinopril, carvedilol, and Lasix.   Hyperlipidemia. Well controlled. On atorvastatin.    Preoperative left knee replacement, low cardiovascular risk  Ischemic cardiomyopathy.  Currently no heart failure.  Last LVEF 30%.  No ICD secondary to age/general health status.  Currently functional class II, limited by orthopedic issues    Plan:  1. Stable cardiac status.   2. Patient is low risk for upcoming knee replacement from a cardiac standpoint and can hold Plavix for 5 to 7 days prior to surgery.   3. Continue to take additional Lasix as needed for fluid retention/weight gain. Avoid consuming salt/sodium.     4. Continue current medications.  5. Revisit in 12 MO, or sooner as needed.    Scribed for Casimiro eBrnal MD by Ellen Shah. 11/22/2021 10:53 EST      Casimiro Bernal MD      Please note that portions of this note may have been completed with a voice recognition program. Efforts were made to edit the dictations, but occasionally words are mistranscribed.

## 2021-11-23 LAB
COTININE SERPL-MCNC: <1 NG/ML
NICOTINE SERPL-MCNC: <1 NG/ML

## 2021-11-24 ENCOUNTER — TELEPHONE (OUTPATIENT)
Dept: INTERNAL MEDICINE | Facility: CLINIC | Age: 81
End: 2021-11-24

## 2021-11-24 NOTE — TELEPHONE ENCOUNTER
Caller: Isrrael Marino    Relationship: Self    Best call back number: [phone]    What medication are you requesting: something for runny nose with green and yellow mucus    What are your current symptoms:     How long have you been experiencing symptoms: 2 weeks    Have you had these symptoms before:    [] Yes  [] No    Have you been treated for these symptoms before:   [] Yes  [] No    If a prescription is needed, what is your [facility] and [facility] [address] [phone] [facility] [phone]      Additional notes:

## 2021-11-29 ENCOUNTER — APPOINTMENT (OUTPATIENT)
Dept: PREADMISSION TESTING | Facility: HOSPITAL | Age: 81
End: 2021-11-29

## 2021-11-29 LAB — SARS-COV-2 RNA PNL SPEC NAA+PROBE: NOT DETECTED

## 2021-11-29 PROCEDURE — U0004 COV-19 TEST NON-CDC HGH THRU: HCPCS | Performed by: ORTHOPAEDIC SURGERY

## 2021-11-29 NOTE — TELEPHONE ENCOUNTER
Spoke to Pt and informed him that he will need a follow up to re-evaluate symptoms. Pt stated that unless we could give him something that would help in two days time he does not want to come in. He stated that he is having knee surgery on Wednesday. Pt stated that he will just wait and see what they tell him about symptoms and surgery.

## 2021-11-30 ENCOUNTER — ANESTHESIA EVENT (OUTPATIENT)
Dept: PERIOP | Facility: HOSPITAL | Age: 81
End: 2021-11-30

## 2021-11-30 RX ORDER — FAMOTIDINE 10 MG/ML
20 INJECTION, SOLUTION INTRAVENOUS ONCE
Status: CANCELLED | OUTPATIENT
Start: 2021-11-30 | End: 2021-11-30

## 2021-12-01 ENCOUNTER — HOSPITAL ENCOUNTER (OUTPATIENT)
Facility: HOSPITAL | Age: 81
Discharge: HOME | End: 2021-12-02
Attending: ORTHOPAEDIC SURGERY | Admitting: ORTHOPAEDIC SURGERY

## 2021-12-01 ENCOUNTER — ANESTHESIA EVENT CONVERTED (OUTPATIENT)
Dept: ANESTHESIOLOGY | Facility: HOSPITAL | Age: 81
End: 2021-12-01

## 2021-12-01 ENCOUNTER — ANESTHESIA (OUTPATIENT)
Dept: PERIOP | Facility: HOSPITAL | Age: 81
End: 2021-12-01

## 2021-12-01 ENCOUNTER — APPOINTMENT (OUTPATIENT)
Dept: GENERAL RADIOLOGY | Facility: HOSPITAL | Age: 81
End: 2021-12-01

## 2021-12-01 DIAGNOSIS — Z96.652 STATUS POST TOTAL LEFT KNEE REPLACEMENT: Primary | ICD-10-CM

## 2021-12-01 DIAGNOSIS — I25.10 CHRONIC CORONARY ARTERY DISEASE: ICD-10-CM

## 2021-12-01 DIAGNOSIS — M17.0 PRIMARY OSTEOARTHRITIS OF BOTH KNEES: ICD-10-CM

## 2021-12-01 LAB
GLUCOSE BLDC GLUCOMTR-MCNC: 108 MG/DL (ref 70–130)
POTASSIUM SERPL-SCNC: 4.1 MMOL/L (ref 3.5–5.2)

## 2021-12-01 PROCEDURE — 63710000001 OXYCODONE 5 MG TABLET: Performed by: ORTHOPAEDIC SURGERY

## 2021-12-01 PROCEDURE — 63710000001 ONDANSETRON PER 8 MG: Performed by: ORTHOPAEDIC SURGERY

## 2021-12-01 PROCEDURE — 63710000001 DULOXETINE 30 MG CAPSULE DELAYED-RELEASE PARTICLES: Performed by: NURSE PRACTITIONER

## 2021-12-01 PROCEDURE — 25010000002 HYDROMORPHONE 1 MG/ML SOLUTION

## 2021-12-01 PROCEDURE — 25010000002 ROPIVACAINE PER 1 MG: Performed by: ORTHOPAEDIC SURGERY

## 2021-12-01 PROCEDURE — 0 CEFAZOLIN IN DEXTROSE 2-4 GM/100ML-% SOLUTION: Performed by: ORTHOPAEDIC SURGERY

## 2021-12-01 PROCEDURE — 97116 GAIT TRAINING THERAPY: CPT

## 2021-12-01 PROCEDURE — A9270 NON-COVERED ITEM OR SERVICE: HCPCS | Performed by: ORTHOPAEDIC SURGERY

## 2021-12-01 PROCEDURE — 82962 GLUCOSE BLOOD TEST: CPT

## 2021-12-01 PROCEDURE — G0378 HOSPITAL OBSERVATION PER HR: HCPCS

## 2021-12-01 PROCEDURE — C1776 JOINT DEVICE (IMPLANTABLE): HCPCS | Performed by: ORTHOPAEDIC SURGERY

## 2021-12-01 PROCEDURE — 94799 UNLISTED PULMONARY SVC/PX: CPT

## 2021-12-01 PROCEDURE — 63710000001 CARVEDILOL 6.25 MG TABLET: Performed by: NURSE PRACTITIONER

## 2021-12-01 PROCEDURE — 84132 ASSAY OF SERUM POTASSIUM: CPT | Performed by: ANESTHESIOLOGY

## 2021-12-01 PROCEDURE — A9270 NON-COVERED ITEM OR SERVICE: HCPCS | Performed by: NURSE PRACTITIONER

## 2021-12-01 PROCEDURE — 27447 TOTAL KNEE ARTHROPLASTY: CPT | Performed by: PHYSICIAN ASSISTANT

## 2021-12-01 PROCEDURE — 25010000002 PROPOFOL 10 MG/ML EMULSION: Performed by: NURSE ANESTHETIST, CERTIFIED REGISTERED

## 2021-12-01 PROCEDURE — 63710000001 LISINOPRIL 5 MG TABLET: Performed by: NURSE PRACTITIONER

## 2021-12-01 PROCEDURE — A9270 NON-COVERED ITEM OR SERVICE: HCPCS | Performed by: ANESTHESIOLOGY

## 2021-12-01 PROCEDURE — 25010000002 ROPIVACAINE PER 1 MG: Performed by: NURSE ANESTHETIST, CERTIFIED REGISTERED

## 2021-12-01 PROCEDURE — 25010000002 FENTANYL CITRATE (PF) 50 MCG/ML SOLUTION: Performed by: NURSE ANESTHETIST, CERTIFIED REGISTERED

## 2021-12-01 PROCEDURE — 73560 X-RAY EXAM OF KNEE 1 OR 2: CPT

## 2021-12-01 PROCEDURE — 63710000001 ACETAMINOPHEN 500 MG TABLET: Performed by: ORTHOPAEDIC SURGERY

## 2021-12-01 PROCEDURE — 25010000002 DEXAMETHASONE PER 1 MG: Performed by: NURSE ANESTHETIST, CERTIFIED REGISTERED

## 2021-12-01 PROCEDURE — S0260 H&P FOR SURGERY: HCPCS | Performed by: PHYSICIAN ASSISTANT

## 2021-12-01 PROCEDURE — 63710000001 TAMSULOSIN 0.4 MG CAPSULE: Performed by: NURSE PRACTITIONER

## 2021-12-01 PROCEDURE — A9270 NON-COVERED ITEM OR SERVICE: HCPCS | Performed by: INTERNAL MEDICINE

## 2021-12-01 PROCEDURE — 63710000001 MUPIROCIN 2 % OINTMENT 1 G TUBE: Performed by: ORTHOPAEDIC SURGERY

## 2021-12-01 PROCEDURE — 97161 PT EVAL LOW COMPLEX 20 MIN: CPT

## 2021-12-01 PROCEDURE — 25010000002 KETOROLAC TROMETHAMINE PER 15 MG: Performed by: ORTHOPAEDIC SURGERY

## 2021-12-01 PROCEDURE — 63710000001 ATORVASTATIN 40 MG TABLET: Performed by: NURSE PRACTITIONER

## 2021-12-01 PROCEDURE — 25010000002 MORPHINE PER 10 MG: Performed by: ORTHOPAEDIC SURGERY

## 2021-12-01 PROCEDURE — 63710000001 TAMSULOSIN 0.4 MG CAPSULE: Performed by: INTERNAL MEDICINE

## 2021-12-01 PROCEDURE — P9612 CATHETERIZE FOR URINE SPEC: HCPCS

## 2021-12-01 PROCEDURE — 63710000001 POVIDONE-IODINE 10 % SOLUTION 30 ML BOTTLE: Performed by: ORTHOPAEDIC SURGERY

## 2021-12-01 PROCEDURE — 27447 TOTAL KNEE ARTHROPLASTY: CPT | Performed by: ORTHOPAEDIC SURGERY

## 2021-12-01 PROCEDURE — 25010000002 ONDANSETRON PER 1 MG: Performed by: NURSE ANESTHETIST, CERTIFIED REGISTERED

## 2021-12-01 PROCEDURE — 63710000001 PREGABALIN 75 MG CAPSULE: Performed by: ORTHOPAEDIC SURGERY

## 2021-12-01 PROCEDURE — 63710000001 FAMOTIDINE 20 MG TABLET: Performed by: ANESTHESIOLOGY

## 2021-12-01 PROCEDURE — 25010000002 DEXAMETHASONE SODIUM PHOSPHATE 10 MG/ML SOLUTION: Performed by: NURSE ANESTHETIST, CERTIFIED REGISTERED

## 2021-12-01 PROCEDURE — C1889 IMPLANT/INSERT DEVICE, NOC: HCPCS | Performed by: ORTHOPAEDIC SURGERY

## 2021-12-01 DEVICE — DEV CONTRL TISS STRATAFIX SPIRAL PDO BIDIR 1 36X36CM: Type: IMPLANTABLE DEVICE | Site: KNEE | Status: FUNCTIONAL

## 2021-12-01 DEVICE — TIBIAL COMPONENT
Type: IMPLANTABLE DEVICE | Site: KNEE | Status: FUNCTIONAL
Brand: TRIATHLON

## 2021-12-01 DEVICE — TIBIAL BEARING INSERT - CS
Type: IMPLANTABLE DEVICE | Site: KNEE | Status: FUNCTIONAL
Brand: TRIATHLON

## 2021-12-01 DEVICE — PATELLA
Type: IMPLANTABLE DEVICE | Site: PATELLA | Status: FUNCTIONAL
Brand: TRIATHLON

## 2021-12-01 DEVICE — CAP TOTAL KN CMTLS 4 PC: Type: IMPLANTABLE DEVICE | Site: KNEE | Status: FUNCTIONAL

## 2021-12-01 RX ORDER — NALOXONE HCL 0.4 MG/ML
0.4 VIAL (ML) INJECTION AS NEEDED
Status: DISCONTINUED | OUTPATIENT
Start: 2021-12-01 | End: 2021-12-01 | Stop reason: HOSPADM

## 2021-12-01 RX ORDER — DEXAMETHASONE SODIUM PHOSPHATE 4 MG/ML
INJECTION, SOLUTION INTRA-ARTICULAR; INTRALESIONAL; INTRAMUSCULAR; INTRAVENOUS; SOFT TISSUE AS NEEDED
Status: DISCONTINUED | OUTPATIENT
Start: 2021-12-01 | End: 2021-12-01 | Stop reason: SURG

## 2021-12-01 RX ORDER — OXYCODONE HYDROCHLORIDE 5 MG/1
10 TABLET ORAL EVERY 4 HOURS PRN
Status: DISCONTINUED | OUTPATIENT
Start: 2021-12-01 | End: 2021-12-02 | Stop reason: HOSPADM

## 2021-12-01 RX ORDER — FENTANYL CITRATE 50 UG/ML
INJECTION, SOLUTION INTRAMUSCULAR; INTRAVENOUS AS NEEDED
Status: DISCONTINUED | OUTPATIENT
Start: 2021-12-01 | End: 2021-12-01 | Stop reason: SURG

## 2021-12-01 RX ORDER — ASPIRIN 81 MG/1
81 TABLET ORAL EVERY 12 HOURS SCHEDULED
Qty: 60 TABLET | Refills: 0 | Status: SHIPPED | OUTPATIENT
Start: 2021-12-02 | End: 2022-09-02

## 2021-12-01 RX ORDER — ONDANSETRON 4 MG/1
4 TABLET, FILM COATED ORAL EVERY 6 HOURS PRN
Status: DISCONTINUED | OUTPATIENT
Start: 2021-12-01 | End: 2021-12-02 | Stop reason: HOSPADM

## 2021-12-01 RX ORDER — MELOXICAM 7.5 MG/1
15 TABLET ORAL DAILY
Status: DISCONTINUED | OUTPATIENT
Start: 2021-12-02 | End: 2021-12-02 | Stop reason: HOSPADM

## 2021-12-01 RX ORDER — SODIUM CHLORIDE, SODIUM LACTATE, POTASSIUM CHLORIDE, CALCIUM CHLORIDE 600; 310; 30; 20 MG/100ML; MG/100ML; MG/100ML; MG/100ML
9 INJECTION, SOLUTION INTRAVENOUS CONTINUOUS
Status: DISCONTINUED | OUTPATIENT
Start: 2021-12-01 | End: 2021-12-02 | Stop reason: HOSPADM

## 2021-12-01 RX ORDER — ACETAMINOPHEN 500 MG
1000 TABLET ORAL ONCE
Status: COMPLETED | OUTPATIENT
Start: 2021-12-01 | End: 2021-12-01

## 2021-12-01 RX ORDER — TAMSULOSIN HYDROCHLORIDE 0.4 MG/1
0.4 CAPSULE ORAL DAILY
Status: DISCONTINUED | OUTPATIENT
Start: 2021-12-01 | End: 2021-12-01 | Stop reason: SDUPTHER

## 2021-12-01 RX ORDER — FAMOTIDINE 20 MG/1
20 TABLET, FILM COATED ORAL ONCE
Status: COMPLETED | OUTPATIENT
Start: 2021-12-01 | End: 2021-12-01

## 2021-12-01 RX ORDER — CEFAZOLIN SODIUM 2 G/100ML
2 INJECTION, SOLUTION INTRAVENOUS EVERY 8 HOURS
Status: COMPLETED | OUTPATIENT
Start: 2021-12-01 | End: 2021-12-02

## 2021-12-01 RX ORDER — MELOXICAM 15 MG/1
15 TABLET ORAL ONCE
Status: DISCONTINUED | OUTPATIENT
Start: 2021-12-01 | End: 2021-12-01 | Stop reason: HOSPADM

## 2021-12-01 RX ORDER — SODIUM CHLORIDE 0.9 % (FLUSH) 0.9 %
10 SYRINGE (ML) INJECTION EVERY 12 HOURS SCHEDULED
Status: DISCONTINUED | OUTPATIENT
Start: 2021-12-01 | End: 2021-12-01 | Stop reason: HOSPADM

## 2021-12-01 RX ORDER — ONDANSETRON 2 MG/ML
INJECTION INTRAMUSCULAR; INTRAVENOUS AS NEEDED
Status: DISCONTINUED | OUTPATIENT
Start: 2021-12-01 | End: 2021-12-01 | Stop reason: SURG

## 2021-12-01 RX ORDER — BUPIVACAINE HYDROCHLORIDE 2.5 MG/ML
INJECTION, SOLUTION EPIDURAL; INFILTRATION; INTRACAUDAL
Status: COMPLETED | OUTPATIENT
Start: 2021-12-01 | End: 2021-12-01

## 2021-12-01 RX ORDER — LABETALOL HYDROCHLORIDE 5 MG/ML
10 INJECTION, SOLUTION INTRAVENOUS EVERY 4 HOURS PRN
Status: DISCONTINUED | OUTPATIENT
Start: 2021-12-01 | End: 2021-12-02 | Stop reason: HOSPADM

## 2021-12-01 RX ORDER — LIDOCAINE HYDROCHLORIDE 10 MG/ML
INJECTION, SOLUTION EPIDURAL; INFILTRATION; INTRACAUDAL; PERINEURAL AS NEEDED
Status: DISCONTINUED | OUTPATIENT
Start: 2021-12-01 | End: 2021-12-01 | Stop reason: SURG

## 2021-12-01 RX ORDER — MEPERIDINE HYDROCHLORIDE 25 MG/ML
12.5 INJECTION INTRAMUSCULAR; INTRAVENOUS; SUBCUTANEOUS
Status: DISCONTINUED | OUTPATIENT
Start: 2021-12-01 | End: 2021-12-01 | Stop reason: HOSPADM

## 2021-12-01 RX ORDER — ACETAMINOPHEN 500 MG
1000 TABLET ORAL EVERY 8 HOURS
Qty: 42 TABLET | Refills: 0 | Status: SHIPPED | OUTPATIENT
Start: 2021-12-01

## 2021-12-01 RX ORDER — ATORVASTATIN CALCIUM 40 MG/1
40 TABLET, FILM COATED ORAL NIGHTLY
Status: DISCONTINUED | OUTPATIENT
Start: 2021-12-01 | End: 2021-12-02 | Stop reason: HOSPADM

## 2021-12-01 RX ORDER — TAMSULOSIN HYDROCHLORIDE 0.4 MG/1
0.4 CAPSULE ORAL ONCE
Status: COMPLETED | OUTPATIENT
Start: 2021-12-01 | End: 2021-12-01

## 2021-12-01 RX ORDER — MIDAZOLAM HYDROCHLORIDE 1 MG/ML
0.5 INJECTION INTRAMUSCULAR; INTRAVENOUS
Status: DISCONTINUED | OUTPATIENT
Start: 2021-12-01 | End: 2021-12-01 | Stop reason: HOSPADM

## 2021-12-01 RX ORDER — PROPOFOL 10 MG/ML
VIAL (ML) INTRAVENOUS AS NEEDED
Status: DISCONTINUED | OUTPATIENT
Start: 2021-12-01 | End: 2021-12-01 | Stop reason: SURG

## 2021-12-01 RX ORDER — SODIUM CHLORIDE, SODIUM LACTATE, POTASSIUM CHLORIDE, CALCIUM CHLORIDE 600; 310; 30; 20 MG/100ML; MG/100ML; MG/100ML; MG/100ML
100 INJECTION, SOLUTION INTRAVENOUS CONTINUOUS
Status: DISCONTINUED | OUTPATIENT
Start: 2021-12-01 | End: 2021-12-02 | Stop reason: HOSPADM

## 2021-12-01 RX ORDER — CEFAZOLIN SODIUM 2 G/100ML
2 INJECTION, SOLUTION INTRAVENOUS ONCE
Status: COMPLETED | OUTPATIENT
Start: 2021-12-01 | End: 2021-12-01

## 2021-12-01 RX ORDER — ONDANSETRON 2 MG/ML
4 INJECTION INTRAMUSCULAR; INTRAVENOUS EVERY 6 HOURS PRN
Status: DISCONTINUED | OUTPATIENT
Start: 2021-12-01 | End: 2021-12-02 | Stop reason: HOSPADM

## 2021-12-01 RX ORDER — CLOPIDOGREL BISULFATE 75 MG/1
75 TABLET ORAL DAILY
Qty: 90 TABLET | Refills: 4 | Status: SHIPPED | OUTPATIENT
Start: 2021-12-01 | End: 2023-01-18 | Stop reason: SDUPTHER

## 2021-12-01 RX ORDER — ASPIRIN 81 MG/1
81 TABLET ORAL EVERY 12 HOURS SCHEDULED
Status: DISCONTINUED | OUTPATIENT
Start: 2021-12-02 | End: 2021-12-02 | Stop reason: HOSPADM

## 2021-12-01 RX ORDER — SODIUM CHLORIDE 0.9 % (FLUSH) 0.9 %
3-10 SYRINGE (ML) INJECTION AS NEEDED
Status: DISCONTINUED | OUTPATIENT
Start: 2021-12-01 | End: 2021-12-02 | Stop reason: HOSPADM

## 2021-12-01 RX ORDER — PREGABALIN 75 MG/1
75 CAPSULE ORAL ONCE
Status: COMPLETED | OUTPATIENT
Start: 2021-12-01 | End: 2021-12-01

## 2021-12-01 RX ORDER — FENTANYL CITRATE 50 UG/ML
50 INJECTION, SOLUTION INTRAMUSCULAR; INTRAVENOUS
Status: DISCONTINUED | OUTPATIENT
Start: 2021-12-01 | End: 2021-12-01 | Stop reason: HOSPADM

## 2021-12-01 RX ORDER — HYDROMORPHONE HYDROCHLORIDE 1 MG/ML
0.5 INJECTION, SOLUTION INTRAMUSCULAR; INTRAVENOUS; SUBCUTANEOUS
Status: DISCONTINUED | OUTPATIENT
Start: 2021-12-01 | End: 2021-12-02 | Stop reason: HOSPADM

## 2021-12-01 RX ORDER — HYDROMORPHONE HYDROCHLORIDE 1 MG/ML
0.5 INJECTION, SOLUTION INTRAMUSCULAR; INTRAVENOUS; SUBCUTANEOUS
Status: DISCONTINUED | OUTPATIENT
Start: 2021-12-01 | End: 2021-12-01 | Stop reason: HOSPADM

## 2021-12-01 RX ORDER — SODIUM CHLORIDE 0.9 % (FLUSH) 0.9 %
10 SYRINGE (ML) INJECTION AS NEEDED
Status: DISCONTINUED | OUTPATIENT
Start: 2021-12-01 | End: 2021-12-01 | Stop reason: HOSPADM

## 2021-12-01 RX ORDER — ONDANSETRON 2 MG/ML
4 INJECTION INTRAMUSCULAR; INTRAVENOUS ONCE AS NEEDED
Status: DISCONTINUED | OUTPATIENT
Start: 2021-12-01 | End: 2021-12-01 | Stop reason: HOSPADM

## 2021-12-01 RX ORDER — DULOXETIN HYDROCHLORIDE 30 MG/1
30 CAPSULE, DELAYED RELEASE ORAL 2 TIMES DAILY
Status: DISCONTINUED | OUTPATIENT
Start: 2021-12-01 | End: 2021-12-02 | Stop reason: HOSPADM

## 2021-12-01 RX ORDER — MAGNESIUM HYDROXIDE 1200 MG/15ML
LIQUID ORAL AS NEEDED
Status: DISCONTINUED | OUTPATIENT
Start: 2021-12-01 | End: 2021-12-01 | Stop reason: HOSPADM

## 2021-12-01 RX ORDER — CARVEDILOL 6.25 MG/1
6.25 TABLET ORAL EVERY 12 HOURS SCHEDULED
Status: DISCONTINUED | OUTPATIENT
Start: 2021-12-01 | End: 2021-12-02 | Stop reason: HOSPADM

## 2021-12-01 RX ORDER — OXYCODONE HYDROCHLORIDE 5 MG/1
5 TABLET ORAL EVERY 4 HOURS PRN
Status: DISCONTINUED | OUTPATIENT
Start: 2021-12-01 | End: 2021-12-02 | Stop reason: HOSPADM

## 2021-12-01 RX ORDER — LIDOCAINE HYDROCHLORIDE 10 MG/ML
0.5 INJECTION, SOLUTION EPIDURAL; INFILTRATION; INTRACAUDAL; PERINEURAL ONCE AS NEEDED
Status: COMPLETED | OUTPATIENT
Start: 2021-12-01 | End: 2021-12-01

## 2021-12-01 RX ORDER — DOCUSATE SODIUM 100 MG/1
100 CAPSULE, LIQUID FILLED ORAL 2 TIMES DAILY
Qty: 60 CAPSULE | Refills: 0 | Status: SHIPPED | OUTPATIENT
Start: 2021-12-01

## 2021-12-01 RX ORDER — BUPIVACAINE HCL/0.9 % NACL/PF 0.125 %
PLASTIC BAG, INJECTION (ML) EPIDURAL AS NEEDED
Status: DISCONTINUED | OUTPATIENT
Start: 2021-12-01 | End: 2021-12-01 | Stop reason: SURG

## 2021-12-01 RX ORDER — OXYCODONE HYDROCHLORIDE 5 MG/1
5 TABLET ORAL EVERY 4 HOURS PRN
Qty: 40 TABLET | Refills: 0 | Status: SHIPPED | OUTPATIENT
Start: 2021-12-01 | End: 2021-12-08

## 2021-12-01 RX ORDER — EPHEDRINE SULFATE 50 MG/ML
INJECTION, SOLUTION INTRAVENOUS AS NEEDED
Status: DISCONTINUED | OUTPATIENT
Start: 2021-12-01 | End: 2021-12-01 | Stop reason: SURG

## 2021-12-01 RX ORDER — ACETAMINOPHEN 500 MG
1000 TABLET ORAL EVERY 8 HOURS
Status: DISCONTINUED | OUTPATIENT
Start: 2021-12-01 | End: 2021-12-02 | Stop reason: HOSPADM

## 2021-12-01 RX ORDER — TAMSULOSIN HYDROCHLORIDE 0.4 MG/1
0.8 CAPSULE ORAL DAILY
Status: DISCONTINUED | OUTPATIENT
Start: 2021-12-02 | End: 2021-12-02 | Stop reason: HOSPADM

## 2021-12-01 RX ORDER — BUPIVACAINE HYDROCHLORIDE 2.5 MG/ML
INJECTION, SOLUTION EPIDURAL; INFILTRATION; INTRACAUDAL
Status: DISCONTINUED | OUTPATIENT
Start: 2021-12-01 | End: 2021-12-01

## 2021-12-01 RX ORDER — EPHEDRINE SULFATE 50 MG/ML
5 INJECTION, SOLUTION INTRAVENOUS ONCE AS NEEDED
Status: DISCONTINUED | OUTPATIENT
Start: 2021-12-01 | End: 2021-12-01 | Stop reason: HOSPADM

## 2021-12-01 RX ORDER — LISINOPRIL 5 MG/1
2.5 TABLET ORAL 2 TIMES DAILY
Status: DISCONTINUED | OUTPATIENT
Start: 2021-12-01 | End: 2021-12-02 | Stop reason: HOSPADM

## 2021-12-01 RX ORDER — SODIUM CHLORIDE 9 MG/ML
100 INJECTION, SOLUTION INTRAVENOUS CONTINUOUS
Status: DISCONTINUED | OUTPATIENT
Start: 2021-12-01 | End: 2021-12-02 | Stop reason: HOSPADM

## 2021-12-01 RX ORDER — SODIUM CHLORIDE 0.9 % (FLUSH) 0.9 %
3 SYRINGE (ML) INJECTION EVERY 12 HOURS SCHEDULED
Status: DISCONTINUED | OUTPATIENT
Start: 2021-12-01 | End: 2021-12-02 | Stop reason: HOSPADM

## 2021-12-01 RX ORDER — DEXAMETHASONE SODIUM PHOSPHATE 10 MG/ML
INJECTION, SOLUTION INTRAMUSCULAR; INTRAVENOUS
Status: COMPLETED | OUTPATIENT
Start: 2021-12-01 | End: 2021-12-01

## 2021-12-01 RX ORDER — NALOXONE HCL 0.4 MG/ML
0.1 VIAL (ML) INJECTION
Status: DISCONTINUED | OUTPATIENT
Start: 2021-12-01 | End: 2021-12-02 | Stop reason: HOSPADM

## 2021-12-01 RX ORDER — ASPIRIN 81 MG/1
81 TABLET ORAL DAILY
Qty: 30 TABLET | Refills: 0
Start: 2021-12-01

## 2021-12-01 RX ORDER — GLYCOPYRROLATE 0.2 MG/ML
INJECTION INTRAMUSCULAR; INTRAVENOUS AS NEEDED
Status: DISCONTINUED | OUTPATIENT
Start: 2021-12-01 | End: 2021-12-01 | Stop reason: SURG

## 2021-12-01 RX ADMIN — DEXAMETHASONE SODIUM PHOSPHATE 4 MG: 10 INJECTION, SOLUTION INTRAMUSCULAR; INTRAVENOUS at 07:26

## 2021-12-01 RX ADMIN — EPHEDRINE SULFATE 5 MG: 50 INJECTION INTRAVENOUS at 08:01

## 2021-12-01 RX ADMIN — FENTANYL CITRATE 25 MCG: 50 INJECTION, SOLUTION INTRAMUSCULAR; INTRAVENOUS at 07:57

## 2021-12-01 RX ADMIN — HYDROMORPHONE HYDROCHLORIDE 0.5 MG: 1 INJECTION, SOLUTION INTRAMUSCULAR; INTRAVENOUS; SUBCUTANEOUS at 10:28

## 2021-12-01 RX ADMIN — FAMOTIDINE 20 MG: 20 TABLET ORAL at 06:49

## 2021-12-01 RX ADMIN — Medication 100 MCG: at 07:20

## 2021-12-01 RX ADMIN — OXYCODONE 5 MG: 5 TABLET ORAL at 15:06

## 2021-12-01 RX ADMIN — EPHEDRINE SULFATE 5 MG: 50 INJECTION INTRAVENOUS at 07:38

## 2021-12-01 RX ADMIN — PROPOFOL 150 MG: 10 INJECTION, EMULSION INTRAVENOUS at 07:20

## 2021-12-01 RX ADMIN — ONDANSETRON 4 MG: 2 INJECTION INTRAMUSCULAR; INTRAVENOUS at 09:14

## 2021-12-01 RX ADMIN — SODIUM CHLORIDE, POTASSIUM CHLORIDE, SODIUM LACTATE AND CALCIUM CHLORIDE: 600; 310; 30; 20 INJECTION, SOLUTION INTRAVENOUS at 08:25

## 2021-12-01 RX ADMIN — FENTANYL CITRATE 50 MCG: 50 INJECTION, SOLUTION INTRAMUSCULAR; INTRAVENOUS at 07:20

## 2021-12-01 RX ADMIN — ONDANSETRON HYDROCHLORIDE 4 MG: 4 TABLET, FILM COATED ORAL at 20:11

## 2021-12-01 RX ADMIN — BUPIVACAINE HYDROCHLORIDE 25 ML: 2.5 INJECTION, SOLUTION EPIDURAL; INFILTRATION; INTRACAUDAL at 07:26

## 2021-12-01 RX ADMIN — EPHEDRINE SULFATE 5 MG: 50 INJECTION INTRAVENOUS at 08:10

## 2021-12-01 RX ADMIN — DEXAMETHASONE SODIUM PHOSPHATE 8 MG: 4 INJECTION, SOLUTION INTRA-ARTICULAR; INTRALESIONAL; INTRAMUSCULAR; INTRAVENOUS; SOFT TISSUE at 07:43

## 2021-12-01 RX ADMIN — TAMSULOSIN HYDROCHLORIDE 0.4 MG: 0.4 CAPSULE ORAL at 20:10

## 2021-12-01 RX ADMIN — SODIUM CHLORIDE, POTASSIUM CHLORIDE, SODIUM LACTATE AND CALCIUM CHLORIDE 9 ML/HR: 600; 310; 30; 20 INJECTION, SOLUTION INTRAVENOUS at 06:50

## 2021-12-01 RX ADMIN — ACETAMINOPHEN 1000 MG: 500 TABLET ORAL at 06:49

## 2021-12-01 RX ADMIN — CARVEDILOL 6.25 MG: 6.25 TABLET, FILM COATED ORAL at 20:11

## 2021-12-01 RX ADMIN — Medication 100 MCG: at 09:33

## 2021-12-01 RX ADMIN — OXYCODONE 5 MG: 5 TABLET ORAL at 20:10

## 2021-12-01 RX ADMIN — Medication 100 MCG: at 07:44

## 2021-12-01 RX ADMIN — GLYCOPYRROLATE 0.2 MCG: 0.2 INJECTION INTRAMUSCULAR; INTRAVENOUS at 08:03

## 2021-12-01 RX ADMIN — FENTANYL CITRATE 25 MCG: 50 INJECTION, SOLUTION INTRAMUSCULAR; INTRAVENOUS at 07:44

## 2021-12-01 RX ADMIN — FENTANYL CITRATE 25 MCG: 50 INJECTION, SOLUTION INTRAMUSCULAR; INTRAVENOUS at 07:46

## 2021-12-01 RX ADMIN — PREGABALIN 75 MG: 75 CAPSULE ORAL at 06:49

## 2021-12-01 RX ADMIN — EPHEDRINE SULFATE 5 MG: 50 INJECTION INTRAVENOUS at 08:16

## 2021-12-01 RX ADMIN — LIDOCAINE HYDROCHLORIDE 50 MG: 10 INJECTION, SOLUTION EPIDURAL; INFILTRATION; INTRACAUDAL; PERINEURAL at 07:20

## 2021-12-01 RX ADMIN — CEFAZOLIN SODIUM 2 G: 2 INJECTION, SOLUTION INTRAVENOUS at 07:18

## 2021-12-01 RX ADMIN — LIDOCAINE HYDROCHLORIDE 0.5 ML: 10 INJECTION, SOLUTION EPIDURAL; INFILTRATION; INTRACAUDAL; PERINEURAL at 06:50

## 2021-12-01 RX ADMIN — CARVEDILOL 6.25 MG: 6.25 TABLET, FILM COATED ORAL at 15:06

## 2021-12-01 RX ADMIN — ACETAMINOPHEN 1000 MG: 500 TABLET, FILM COATED ORAL at 20:10

## 2021-12-01 RX ADMIN — FENTANYL CITRATE 75 MCG: 50 INJECTION, SOLUTION INTRAMUSCULAR; INTRAVENOUS at 08:17

## 2021-12-01 RX ADMIN — EPHEDRINE SULFATE 10 MG: 50 INJECTION INTRAVENOUS at 07:43

## 2021-12-01 RX ADMIN — PROPOFOL 50 MG: 10 INJECTION, EMULSION INTRAVENOUS at 07:23

## 2021-12-01 RX ADMIN — EPHEDRINE SULFATE 5 MG: 50 INJECTION INTRAVENOUS at 07:46

## 2021-12-01 RX ADMIN — LISINOPRIL 2.5 MG: 5 TABLET ORAL at 20:10

## 2021-12-01 RX ADMIN — ATORVASTATIN CALCIUM 40 MG: 40 TABLET, FILM COATED ORAL at 20:10

## 2021-12-01 RX ADMIN — CEFAZOLIN SODIUM 2 G: 2 INJECTION, SOLUTION INTRAVENOUS at 23:36

## 2021-12-01 RX ADMIN — MUPIROCIN 1 APPLICATION: 20 OINTMENT TOPICAL at 06:51

## 2021-12-01 RX ADMIN — SODIUM CHLORIDE 100 ML/HR: 9 INJECTION, SOLUTION INTRAVENOUS at 11:20

## 2021-12-01 RX ADMIN — CEFAZOLIN SODIUM 2 G: 2 INJECTION, SOLUTION INTRAVENOUS at 14:28

## 2021-12-01 RX ADMIN — ACETAMINOPHEN 1000 MG: 500 TABLET, FILM COATED ORAL at 14:28

## 2021-12-01 RX ADMIN — Medication 100 MCG: at 09:19

## 2021-12-01 RX ADMIN — Medication 100 MCG: at 09:37

## 2021-12-01 RX ADMIN — ROPIVACAINE HYDROCHLORIDE 10 ML/HR: 2 INJECTION, SOLUTION EPIDURAL; INFILTRATION at 10:06

## 2021-12-01 RX ADMIN — BUPIVACAINE HYDROCHLORIDE 15 ML: 2.5 INJECTION, SOLUTION EPIDURAL; INFILTRATION; INTRACAUDAL at 10:06

## 2021-12-01 RX ADMIN — Medication 100 MCG: at 07:26

## 2021-12-01 RX ADMIN — GLYCOPYRROLATE 0.2 MCG: 0.2 INJECTION INTRAMUSCULAR; INTRAVENOUS at 08:05

## 2021-12-01 RX ADMIN — Medication 100 MCG: at 07:36

## 2021-12-01 RX ADMIN — EPHEDRINE SULFATE 10 MG: 50 INJECTION INTRAVENOUS at 07:32

## 2021-12-01 RX ADMIN — EPHEDRINE SULFATE 5 MG: 50 INJECTION INTRAVENOUS at 07:35

## 2021-12-01 RX ADMIN — DULOXETINE HYDROCHLORIDE 30 MG: 30 CAPSULE, DELAYED RELEASE ORAL at 20:10

## 2021-12-01 RX ADMIN — TAMSULOSIN HYDROCHLORIDE 0.4 MG: 0.4 CAPSULE ORAL at 15:06

## 2021-12-01 NOTE — OP NOTE
OPERATIVE REPORT     DATE OF PROCEDURE: 12/1/2021    SURGEON: Kendall Craig M.D.     ASSISTANT(S): Circulator: Ada Jimenes RN; Miroslava Sandhu RN  Physician Assistant: Shyla Banks PA  Scrub Person: Venecia Navarro  Vendor Representative: Sophie Willis Brad  Nursing Assistant: Hannah Sanz  Assistant: Vinay Hernandez PA-C  Assistant: Vinay Hernandez PA-C    Note-PA was utilized during the case to facilitate positioning the patient, exposure, retraction, placement of final components and definitive closure.    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 knee secondary to osteoarthritis    POSTOPERATIVE DIAGNOSIS: same     PROCEDURE: Left total Knee Arthroplasty     SURGICAL DETAILS:     APPROACH: Medial parapatellar     ANESTHESIA: General plus regional plus local periarticular block    PREOPERATIVE ANTIBIOTICS: Ancef 2 g IV    TRANEXAMIC ACID: IV    TOURNIQUET TIME: 88 min @300 mmHg     ESTIMATED BLOOD LOSS: 50 cc     SPECIMENS: None    IMPLANTS:   /Brand: Minh triathlon  Tibial component size: 7 pressfit tritanium baseplate   Femoral component size: 8 pressfit cruciate retaining   Tibial polyethylene insert: 13 mm cruciate stabilizing   Patellar component: 40 mm asymmetric tritanium  Cement: None    DRAINS: None    LOCAL INJECTION: 1 cc Toradol 30mg/ml, 4 cc duramorph 2mg/ml, 20 cc 0.5% ropivicaine, 20 cc 0.5% lidocaine with 1:200,000 epinephrine, 15 cc preservative free normal saline     MODIFIER(S): None    COMPLICATIONS: None apparent    INDICATIONS FOR PROCEDURE: The patient has a long history of progressive knee pain, arthritis, and degeneration resulting in deformity in the left knee from predominantly medial wear and bone loss. Non-operative treatment and conservative therapeutic measures have been attempted, but have not improved or controlled the symptoms and pain that occurs during normal daily activities. Knee motion has also become  limited and is restricting the patient. Total knee arthroplasty was recommended. The risks, benefits, alternatives, and potential complications of the arthroplasty surgery were discussed with the patient in detail to include but not be limited to infection, bleeding, anesthesia risks, damage to neurovascular structures, osteolysis, aseptic loosening, instability, anterior knee pain, continued pain, iatrogenic fracture, dislocation, need for future surgery including the potential for amputation, blood clots, myocardial infarction, stroke, and death. Specific details of the surgical procedure, hospitalization, recovery, rehabilitation, and long-term precautions were also presented. Pre-operative teaching was provided. Implant/prosthesis selection was outlined, and the many options available were explained; the final choice will be made at the time of the procedure to match the anatomy and condition of the bone, ligaments, tendons, and muscles. The patient completed preoperative medical optimization and risk assessment, joint arthroplasty education, and MRSA decolonization using a universal decolonization protocol. Perioperative blood management and the potential for blood transfusion were discussed with risks and options clearly outlined.     INTRAOPERATIVE FINDINGS: Advanced tricompartmental osteoarthritis with genu varum alignment    PROCEDURE: The patient was identified in the preoperative holding area. The operative site was confirmed and marked. A sequential compression device was placed on the nonoperative leg. The risks, benefits, and alternatives to surgery were again confirmed with the patient and the patient wished to proceed. The patient was brought to the operating room and placed on the operating room table in the supine position. All bony prominences were padded. A huddle was performed with the patient and all vital surgical team members to confirm the correct operative site, procedure, anesthesia type,  and operative plan with the patient. After anesthesia was performed, a tourniquet was applied to the upper thigh of the operative leg. A full knee exam was performed once anesthesia was in full effect. Intravenous antibiotic prophylaxis was given and confirmed with the anesthesia team.     The operative leg was prepped and draped in the usual sterile fashion. A surgical time out was performed immediately preceding the incision with all personnel in the operating room to confirm patient identity, the correct operative site and extremity, correct radiographic studies, availability of appropriate surgical equipment and agreement on the planned procedure. The operative knee was elevated and exsanguinated using an esmarch and the tourniquet was inflated. The knee was exposed using a limited anterior-midline skin incision. Dissection was carried down through skin and subcutaneous tissue to the extensor mechanism with a scalpel. A medial parapatellar arthrotomy was made to enter the knee space sharply. A large amount of normal appearing joint fluid was encountered and suctioned. The synovium was thickened, hypertrophic, and inflamed. A partial synovectomy was performed for exposure, and the medial and lateral gutters were cleared of scar and synovial reflections. The superficial medial collateral ligament was carefully elevated off osteophytes which were then removed with a rongeur and osteotome. Degenerative meniscal remnants were excised medially and laterally. The patellar synovial reflections were released and the patella exposed to reveal complete wear through the articular surface. The trochlea demonstrated similar severe wear. The patella was then subluxed laterally. The knee was then flexed up to 90 degrees.     Assessment of the knee joint revealed severe end-stage articular damage with no remaining medial weight bearing cartilage. The medial compartment was severely eburnated with bone loss on the medial tibia  and medial femoral condyle, resulting in the varus deformity. Osteophytes were removed from the notch. The ACL was resected. Osteophytes were then further debrided from the femur and the tibia.     Attention was then turned to the femur. The medullary cavity of the femur was entered anterior-medial to the intra-condylar notch above the PCL with a 5/6th inch step drill. The femoral canal was over-reamed, irrigated, and suctioned to prevent fat embolization. An intramedullary alignment system was then placed, fixed to the femur with pins, and the distal femoral osteotomy was made in 5 degrees of valgus with an 8 mm resection. Attention was then turned to the tibia. Retractors were placed medially and laterally to protect the collateral ligaments and a Jeffrey retractor was placed around the PCL in the intra-condylar notch. The tibia was then subluxed anteriorly for wide exposure. An extramedullary alignment system was then placed and the proximal tibial osteotomy was made measuring 1-2 mm off the most affected side and 9-10 mm off the unaffected side with approximately 3 degrees posterior slope. The guide was also confirmed to be perpendicular to the tibial axis prior to the osteotomy. A sizing guide was then used to select the tibial component size. The knee was then brought to extension and the appropriate sized spacer block was placed. This brought the knee to full extension with excellent medial and lateral balance.     The flexion gap was then inspected and measured both visually and with a tensioner device to assess the medial and lateral flexion balance. A femur posterior reference guide was placed in 4.5 degrees of external rotation in accordance with the soft tissue balance. This resulted in symmetric flexion and extension gaps and was verified against the epicondylar axis and Leigh's line. The femur was sized using a posterior referencing guide and, using the previously determined degrees of rotation,  drill holes were made for the cutting block. The 4 in 1 cutting block was impacted into place and secured. Cuts were completed using a saw with the collaterals protected by Z-retractors. Care was taken to preserve the PCL. A lamina  was placed with the knee in 90 degrees of flexion and a large curved osteotome, rongeur, and curettes were utilized to clear posterior osteophytes, loose bodies and meniscal remnants.     A femoral trial implant was placed; excellent fit was confirmed. The medial-lateral and anterior-posterior dimensions were checked; anatomic fit and coverage were achieved.The proximal tibia baseplate trial was placed with its mid-point at the junction of medial one-third and lateral two-thirds of the tibial tubercle and pinned to this fixed position. A trial reduction was then performed. Trial reduction demonstrated the knee achieved full extension with excellent stability and range of motion, and no tendency toward instability with varus-valgus stress at full extension, mid-flexion, or 90 degrees of flexion. The PCL was also found to be appropriately tensioned with normal posterior tibial excursion.     Next, attention was turned to the patella. It was measured and the posterior 9-10 mm was resected leaving a healthy remnant with greater than 11mm thickness. The patella was sized with the asymmetric guide, and drill holes were made. A trial button was placed and tracking of the patella and the entire knee trial was tested. The patella tracking was excellent throughout range of motion with no instability. Punches and drills were then placed through the trials to accommodate the final implants. All trials were removed.     The wound was copiously lavaged with a pulse irrigation/suction system. The posterior recess of the knee and areas of known bleeding were treated with the electrocautery to reduce post-operative bleeding. A pain cocktail was injected into the julius-articular tissues. The cut  bone surfaces were then irrigated again, suctioned, and dried. The final implants were impacted into place, tibia followed by tibial polyethylene followed by femur followed by patella. The tourniquet was released and no excessive bleeding was encountered. Synovial bleeding was further treated with the electrocautery until adequate hemostasis was obtained.     The wound was again irrigated with dilute betadine solution followed by saline. The extensor mechanism and capsule was then anatomically closed with interrupted #1 Vicryl suture and a running #2 Stratafix stitch. Knee stability and range of motion with the capsule closed was excellent, and range of motion was 0 to 135 degrees without excessive stress on the repair. Instrument and sponge count was completed and confirmed correct. Deep and superficial subcutaneous tissue was closed with interrupted 2-0 Vicryl suture. A running 3-0 Monocryl subcuticular stitch was used to re-approximate the skin edges followed by skin glue adhesive to seal the wound. A silver impregnated dressing was then placed, followed by a sequential compression device to the operative limb. The patient was sufficiently recovered from anesthesia, transferred to a hospital bed and taken to the PACU in stable condition.     One gram (1000 mg) of intravenous tranexamic acid was administered prior to incision. A second one gram (1000 mg) intravenous dose was given prior to wound closure.    No apparent complications occurred during the procedure. Instrument, sponge and needle counts were correct x 2.     The patient underwent risk stratification preoperatively and aspirin was chosen for DVT prophylaxis. Delay in starting chemical prophylaxis for 23 hours from surgical incision was over concerns for hematoma formation and wound related issues.     POST OPERATIVE PLAN:   Weight bearing as tolerated with knee range of motion as tolerated   Pain control with PO/IV meds   Adductor canal catheter  placement by Anesthesia Pain Management Team in PACU.   23 hours perioperative antibiotic prophylaxis   PT/OT for mobilization and medical equipment needs   Keep silver dressing in place for 7 days post op. Change dressing only if saturated.   SCDs to bilateral lower extremities   Social work for discharge planning needs   Follow up in 3 weeks for post operative wound check with XR AP and lateral of operative knee.

## 2021-12-01 NOTE — THERAPY EVALUATION
Patient Name: Isrrael Marino  : 1940    MRN: [id number]                              Today's Date: 2021       Admit Date: 2021    Visit Dx:     ICD-10-CM ICD-9-CM   1. Primary osteoarthritis of both knees  M17.0 715.16   2. Chronic coronary artery disease  I25.10 414.00     Patient Active Problem List   Diagnosis   • Gastroesophageal reflux disease   • Atopic rhinitis   • Peripheral neuropathy   • Low back pain   • Hypertension   • Hyperlipidemia   • Benign prostatic hyperplasia   • Chronic coronary artery disease   • Primary osteoarthritis of both knees   • Glenohumeral arthritis, right   • Prediabetes   • JIN (obstructive sleep apnea)   • Hypersomnia   • Iron deficiency   • Ischemic cardiomyopathy   • Superficial thrombophlebitis of right upper extremity   • DDD (degenerative disc disease), cervical   • Status post total left knee replacement     Past Medical History:   Diagnosis Date   • Arrhythmia    • Arthritis     both knees   • Broken neck (HCC)    • CAD (coronary artery disease)    • Cancer (HCC)     skin cancer  head and neck  nonmelanoma   • Chondrocalcinosis of knee    • GERD (gastroesophageal reflux disease)    • Gout    • Heart attack (HCC)      Last Assessed: 28 Mar 2014   • Heart disease    • History of transfusion     own blood with hip surgery   • Hyperlipidemia    • Hypertension    • Hypertensive urgency 2021   • IBS (irritable bowel syndrome)    • Left rotator cuff tear arthropathy    • Low back pain    • Lower extremity neuropathy    • Lumbar canal stenosis    • Neck pain    • Numbness    • Right hip pain    • Rotator cuff tear arthropathy of right shoulder    • Sleep apnea     semi compliant with c-pap    • Thin blood (HCC)      Past Surgical History:   Procedure Laterality Date   • APPENDECTOMY     • BACK SURGERY      spinal decompression and fusion   • BREAST LUMPECTOMY      benign   • CARDIAC CATHETERIZATION      with two stents//. DR. TOLEDO   • CARDIAC  CATHETERIZATION N/A 1/8/2021    Procedure: LEFT HEART CATH;  Surgeon: Casimiro Bernal MD;  Location:  [facility];  Service: Cardiovascular;  Laterality: N/A;   • CARPAL TUNNEL RELEASE  03/28/2014    Neuroplasty Decompression Median Nerve At Carpal Tunnel   • CERVICAL DISCECTOMY POSTERIOR FUSION WITH BRAIN LAB N/A 7/13/2018    Procedure: CERVICAL LAMINECTOMY DECOMPRESSION POSTERIOR C1-2 POSTERIOR CERVICAL FUSION;  Surgeon: Randall Barnett MD;  Location:  [facility];  Service: Neurosurgery   • COLONOSCOPY     • CORONARY STENT PLACEMENT  2013   • HERNIA REPAIR  2002    & 1998   • JOINT REPLACEMENT     • LUMBAR DISCECTOMY FUSION INSTRUMENTATION     • SKIN BIOPSY     • TONSILLECTOMY     • TOTAL HIP ARTHROPLASTY  2002   • VASECTOMY     • WISDOM TOOTH EXTRACTION        General Information     Row Name 12/01/21 1305          Physical Therapy Time and Intention    Document Type evaluation  -NATI     Mode of Treatment individual therapy; physical therapy  -NATI     Row Name 12/01/21 1305          General Information    Patient Profile Reviewed yes  -NATI     Prior Level of Function min assist:; all household mobility; transfer; bed mobility; ADL's  -NATI     Existing Precautions/Restrictions other (see comments)  L adductor nerve cath  -NATI     Barriers to Rehab none identified  -NATI     Row Name 12/01/21 1305          Living Environment    Lives With spouse  -NATI     Row Name 12/01/21 1305          Home Main Entrance    Number of Stairs, Main Entrance two  landing between steps  -NATI     Stair Railings, Main Entrance none  -NATI     Row Name 12/01/21 1305          Stairs Within Home, Primary    Stairs, Within Home, Primary 0  -NATI     Number of Stairs, Within Home, Primary none  -NATI     Row Name 12/01/21 1305          Cognition    Orientation Status (Cognition) oriented x 4  -NATI     Row Name 12/01/21 1305          Safety Issues, Functional Mobility    Safety Issues Affecting Function (Mobility) safety precautions  follow-through/compliance; safety precaution awareness  -NATI     Impairments Affecting Function (Mobility) endurance/activity tolerance; strength; range of motion (ROM); pain  -NATI           User Key  (r) = Recorded By, (t) = Taken By, (c) = Cosigned By    Initials Name Provider Type    NATI Christian Lundy PT Physical Therapist               Mobility     Row Name 12/01/21 1305          Bed Mobility    Bed Mobility scooting/bridging; supine-sit  -NATI     Scooting/Bridging [place] (Bed Mobility) supervision; verbal cues  -NATI     Supine-Sit [place] (Bed Mobility) supervision  -NATI     Assistive Device (Bed Mobility) bed rails; head of bed elevated  -NATI     Comment (Bed Mobility) Verbal cues for LE management off of EOB and trunk control into sitting  -NATI     Row Name 12/01/21 1305          Transfers    Comment (Transfers) Verbal cues for safe hand placement during standing/sitting and moving L LE out for comfort prior to sitting  -NATI     Row Name 12/01/21 1305          Sit-Stand Transfer    Sit-Stand [place] (Transfers) verbal cues; contact guard  -NATI     Assistive Device (Sit-Stand Transfers) walker, front-wheeled  -NATI     Row Name 12/01/21 1305          Gait/Stairs (Locomotion)    [place] Level (Gait) contact guard; verbal cues; 1 person to manage equipment  -NATI     Assistive Device (Gait) walker, front-wheeled  -NATI     Distance in Feet (Gait) 300  -NATI     Deviations/Abnormal Patterns (Gait) bilateral deviations; kayli decreased; gait speed decreased; stride length decreased  -NATI     Bilateral Gait Deviations forward flexed posture  -NATI     Left Sided Gait Deviations heel strike decreased; weight shift ability decreased  -NATI     [place] Level (Stairs) verbal cues; contact guard  -NATI     Assistive Device (Stairs) walker, front-wheeled  -NATI     Handrail Location (Stairs) none  -NATI     Number of Steps (Stairs) 2  backwards technique  -NATI     Ascending Technique (Stairs) step-to-step  -NATI     Descending  Technique (Stairs) step-to-step  -     Comment (Gait/Stairs) Pt ambulated with step through pattern and improved speed. Verbal cues for maintaining upright posture, body within walker, increase step length. Pt ascended/descended 2 steps using RW, CGA, and backwards technique. Gait/stair training limited by fatigue. No knee buckling noted.  -[facility] Name 12/01/21 1305          Mobility    Extremity Weight-bearing Status left lower extremity  -     Left Lower Extremity (Weight-bearing Status) weight-bearing as tolerated (WBAT)  -           User Key  (r) = Recorded By, (t) = Taken By, (c) = Cosigned By    Initials Name Provider Type    NATI Christian Lundy, PT Physical Therapist               Obj/Interventions     [facility] Name 12/01/21 1305          Range of Motion Comprehensive    General Range of Motion lower extremity range of motion deficits identified  -     Comment, General Range of Motion R LE AROM WFL; L LE AROM impaired 25%; able to actively DF/PF  -[facility] Name 12/01/21 1305          Strength Comprehensive (MMT)    General Manual Muscle Testing (MMT) Assessment lower extremity strength deficits identified  -     Comment, General Manual Muscle Testing (MMT) Assessment R LE functionally 4+/5; L LE functionallly 4-/5; IND with SLR  [facility] Name 12/01/21 1305          Motor Skills    Therapeutic Exercise hip; knee; ankle  -[facility] Name 12/01/21 1305          Hip (Therapeutic Exercise)    Hip (Therapeutic Exercise) isometric exercises  -     Hip Isometrics (Therapeutic Exercise) gluteal sets; 10 repetitions  -[facility] Name 12/01/21 1305          Knee (Therapeutic Exercise)    Knee (Therapeutic Exercise) isometric exercises  -     Knee Isometrics (Therapeutic Exercise) quad sets; 10 repetitions  -[facility] Name 12/01/21 130          Ankle (Therapeutic Exercise)    Ankle (Therapeutic Exercise) AROM (active range of motion)  -     Ankle AROM (Therapeutic Exercise) bilateral; dorsiflexion;  plantarflexion; 10 repetitions  -NATI     Row Name 12/01/21 1305          Sensory Assessment (Somatosensory)    Sensory Assessment (Somatosensory) LE sensation intact  -NATI           User Key  (r) = Recorded By, (t) = Taken By, (c) = Cosigned By    Initials Name Provider Type    Christian Amezquita, PT Physical Therapist               Goals/Plan     Row Name 12/01/21 1305          Bed Mobility Goal 1 (PT)    Activity/Assistive Device (Bed Mobility Goal 1, PT) sit to supine/supine to sit  -NATI     [place] Level/Cues Needed (Bed Mobility Goal 1, PT) modified independence  -NATI     Time Frame (Bed Mobility Goal 1, PT) long term goal (LTG); 3 days  -     Row Name 12/01/21 1305          Transfer Goal 1 (PT)    Activity/Assistive Device (Transfer Goal 1, PT) sit-to-stand/stand-to-sit; walker, rolling  -NATI     [place] Level/Cues Needed (Transfer Goal 1, PT) modified independence  -NATI     Time Frame (Transfer Goal 1, PT) long term goal (LTG); 3 days  -     Row Name 12/01/21 130          Gait Training Goal 1 (PT)    Activity/Assistive Device (Gait Training Goal 1, PT) gait (walking locomotion); walker, rolling  -NATI     [place] Level (Gait Training Goal 1, PT) modified independence  -NATI     Distance (Gait Training Goal 1, PT) 500 feet  -NATI     Time Frame (Gait Training Goal 1, PT) long term goal (LTG); 3 days  -     Row Name 12/01/21 1305          ROM Goal 1 (PT)    ROM Goal 1 (PT) L knee AROM 0-90 degrees  -NATI     Time Frame (ROM Goal 1, PT) long-term goal (LTG); 3 days  -     Row Name 12/01/21 130          Stairs Goal 1 (PT)    Activity/Assistive Device (Stairs Goal 1, PT) stairs, all skills; walker, rolling  -NATI     [place] Level/Cues Needed (Stairs Goal 1, PT) modified independence  -NATI     Number of Stairs (Stairs Goal 1, PT) 2  backwards technique  -NATI     Time Frame (Stairs Goal 1, PT) long term goal (LTG); 3 days  -NATI           User Key  (r) = Recorded By, (t) = Taken By, (c) = Cosigned By     Initials Name Provider Type    Christian Amezquita, PT Physical Therapist               Clinical Impression     Row Name 12/01/21 1305          Pain    Additional Documentation Pain Scale: Numbers Pre/Post-Treatment (Group)  -NATI     Row Name 12/01/21 1305          Pain Scale: Numbers Pre/Post-Treatment    Pretreatment Pain Rating 0/10 - no pain  -NATI     Posttreatment Pain Rating 0/10 - no pain  -NATI     Row Name 12/01/21 1305          Therapy Assessment/Plan (PT)    Patient/Family Therapy Goals Statement (PT) To return home  -NATI     Rehab Potential (PT) good, to achieve stated therapy goals  -NATI     Criteria for Skilled Interventions Met (PT) yes; meets criteria; skilled treatment is necessary  -NATI     Row Name 12/01/21 1305          Positioning and Restraints    Pre-Treatment Position in bed  -NATI     Post Treatment Position chair  -NATI     In Chair notified nsg; reclined; call light within reach; encouraged to call for assist; exit alarm on; with family/caregiver; legs elevated; compression device  -NATI           User Key  (r) = Recorded By, (t) = Taken By, (c) = Cosigned By    Initials Name Provider Type    Christian Amezquita, PT Physical Therapist               Outcome Measures     Row Name 12/01/21 1305          How much help from another person do you currently need...    Turning from your back to your side while in flat bed without using bedrails? 4  -NATI     Moving from lying on back to sitting on the side of a flat bed without bedrails? 4  -NATI     Moving to and from a bed to a chair (including a wheelchair)? 3  -NATI     Standing up from a chair using your arms (e.g., wheelchair, bedside chair)? 3  -NATI     Climbing 3-5 steps with a railing? 3  -NATI     To walk in hospital room? 3  -NATI     AM-PAC 6 Clicks Score (PT) 20  -NATI     Row Name 12/01/21 1305          PADD    Diagnosis 1  -NATI     Gender 2  -NATI     Age Group 1  -NATI     Gait Distance 1  -NATI     Assist Level 1  -NATI     Home Support 3  -NATI     PADD Score 8  -NATI     Patient  Preference home with outpatient rehab  -NATI     Prediction by PADD Score directly home (with home health or out-patient rehab)  -NATI     Row Name 12/01/21 1305          Functional Assessment    Outcome Measure Options AM-PAC 6 Clicks Basic Mobility (PT); PADD  -NATI           User Key  (r) = Recorded By, (t) = Taken By, (c) = Cosigned By    Initials Name Provider Type    Christian Amezquita PT Physical Therapist                             Physical Therapy Education                 Title: PT OT SLP Therapies (Done)     Topic: Physical Therapy (Done)     Point: Mobility training (Done)     Learning Progress Summary           Patient Acceptance, E,D,H, VU by NATI at 12/1/2021 1305    Comment: Edcuated on safe sequencing with bed mobility, ambulatory/car transfers, gait, and stair training. Reviewed HEP and knee precautions via handout.   Significant Other Acceptance, E,D,H, VU by NATI at 12/1/2021 1305    Comment: Edcuated on safe sequencing with bed mobility, ambulatory/car transfers, gait, and stair training. Reviewed HEP and knee precautions via handout.                   Point: Home exercise program (Done)     Learning Progress Summary           Patient Acceptance, E,D,H, VU by NATI at 12/1/2021 1305    Comment: Edcuated on safe sequencing with bed mobility, ambulatory/car transfers, gait, and stair training. Reviewed HEP and knee precautions via handout.   Significant Other Acceptance, E,D,H, VU by NATI at 12/1/2021 1305    Comment: Edcuated on safe sequencing with bed mobility, ambulatory/car transfers, gait, and stair training. Reviewed HEP and knee precautions via handout.                   Point: Body mechanics (Done)     Learning Progress Summary           Patient Acceptance, E,D,H, VU by NATI at 12/1/2021 1305    Comment: Edcuated on safe sequencing with bed mobility, ambulatory/car transfers, gait, and stair training. Reviewed HEP and knee precautions via handout.   Significant Other Acceptance, E,D,H, VU by NATI at 12/1/2021  1305    Comment: Edcuated on safe sequencing with bed mobility, ambulatory/car transfers, gait, and stair training. Reviewed HEP and knee precautions via handout.                   Point: Precautions (Done)     Learning Progress Summary           Patient Acceptance, E,D,H, VU by NATI at 12/1/2021 1305    Comment: Edcuated on safe sequencing with bed mobility, ambulatory/car transfers, gait, and stair training. Reviewed HEP and knee precautions via handout.   Significant Other Acceptance, E,D,H, VU by NATI at 12/1/2021 1305    Comment: Edcuated on safe sequencing with bed mobility, ambulatory/car transfers, gait, and stair training. Reviewed HEP and knee precautions via handout.                               User Key     Initials Effective Dates Name Provider Type Discipline    NATI 06/16/21 -  Christian Lundy, PT Physical Therapist PT              PT Recommendation and Plan  Planned Therapy Interventions (PT): balance training, bed mobility training, gait training, home exercise program, patient/family education, transfer training, ROM (range of motion), stair training, strengthening  Plan of Care Reviewed With: patient, spouse, grandchild(ubaldo)  Progress: improving  Outcome Summary: PT eval complete. Pt ambulated 300 feet using RW, CGA, and one person to manage equipment. Pt ascended/descended 2 steps using RW, CGA, and backwards technique. Gait/stair training limited by fatigue. Bed mobility performed with supervision and STS with CGA. No knee buckling noted. Pt IND with SLR. Will assess L knee AROM POD#1 if pt does not d/c today. Reviewed HEP and knee precautions via handout. Educated on safe car transfers. PADD score = 8. ADLs assessed, pt does not require OT eval tonight. Functionally, pt safe to d/c home with assist from a PT perspective. Recommend HHPT.     Time Calculation:    PT Charges     Row Name 12/01/21 1305             Time Calculation    Start Time 1305  -NATI      PT Received On 12/01/21  -NATI      PT Goal Re-Cert  Due Date 12/11/21  -NATI              Time Calculation- PT    Total Timed Code Minutes- PT 10 minute(s)  -NATI              Timed Charges    36149 - PT Therapeutic Exercise Minutes 2  -NATI      75232 - Gait Training Minutes  8  -NATI              Untimed Charges    PT Eval/Re-eval Minutes 34  -NATI              Total Minutes    Timed Charges Total Minutes 10  -ANTI      Untimed Charges Total Minutes 34  -NATI       Total Minutes 44  -NATI            User Key  (r) = Recorded By, (t) = Taken By, (c) = Cosigned By    Initials Name Provider Type    Christian Amezquita, PT Physical Therapist              Therapy Charges for Today     Code Description Service Date Service Provider Modifiers Qty    15342571609 HC GAIT TRAINING EA 15 MIN 12/1/2021 Christian Lundy, PT GP 1    74613180270 HC PT EVAL LOW COMPLEXITY 3 12/1/2021 Christian Lundy, PT GP 1    88734141768 HC PT THER SUPP EA 15 MIN 12/1/2021 Christian Lundy, PT GP 3          PT G-Codes  Outcome Measure Options: AM-PAC 6 Clicks Basic Mobility (PT), PADD  AM-PAC 6 Clicks Score (PT): 20    Christian Lundy PT  12/1/2021

## 2021-12-01 NOTE — ANESTHESIA PROCEDURE NOTES
Adductor canal catheter      Patient reassessed immediately prior to procedure    Patient location during procedure: post-op  Reason for block: at surgeon's request and post-op pain management  Performed by  CRNA: Dillan Blake CRNA  Assisted by: Brittney Roberts SRNA  Preanesthetic Checklist  Completed: patient identified, IV checked, site marked, risks and benefits discussed, surgical consent, monitors and equipment checked, pre-op evaluation and timeout performed  Prep:  Pt Position: supine  Sterile barriers:cap, gloves, mask and sterile barriers  Prep: ChloraPrep  Patient monitoring: blood pressure monitoring, continuous pulse oximetry and EKG  Procedure    Sedation: no  Performed under: local infiltration  Guidance:ultrasound guided    ULTRASOUND INTERPRETATION. Using ultrasound guidance a 20 G gauge needle was placed in close proximity to the nerve, at which point, under ultrasound guidance anesthetic was injected in the area of the nerve and spread of the anesthesia was seen on ultrasound in close proximity thereto.  There were no abnormalities seen on ultrasound; a digital image was taken; and the patient tolerated the procedure with no complications. Images:still images obtained, printed/placed on chart    Laterality:left  Block Type:adductor canal block  Injection Technique:catheter  Needle Type:Tuohy and echogenic  Needle Gauge:18 G  Resistance on Injection: none  Catheter Size:20 G (20g)  Cath Depth at skin: 9 cm    Medications Used: bupivacaine PF (MARCAINE) 0.25 % injection, 15 mL      Post Assessment  Injection Assessment: negative aspiration for heme, incremental injection and no paresthesia on injection  Patient Tolerance:comfortable throughout block  Complications:no  Additional Notes  Procedure:             The pt was placed in the Supine position.  The Insertion site was  prepped and Draped in sterile fashion.  The pt was anesthetized with  IV Sedation( see meds).  Skin and cutaneous tissue was  infiltrated and anesthetized with 1% Lidocaine 3 mls via a 25g needle.  A BBraun 4 inch 18g echogenic needle was then  inserted approximately midline, mid-thigh and advanced In-plane with Ultrasound guidance.  Normal Saline PSF was utilized for hydrodissection of tissue.  The Vastus medialis and Sartorius muscle where visualized and the needle tip was placed in the adductor canal,  lateral to the femoral artery.  LA injection spread was visualized, injection was incremental 1-5ml, injection pressure was normal or little, no intraneural injection, no vascular injection.  LA dose was injected thru the needle(see dose above).  A BBraun 20g wire stylet catheter was placed via the needle with ultrasound visualization and confirmation with NS fluid bolus. The catheter insertion site was sealed with exofin tissue adhesive. The labeled catheter was then coiled and secured to skin with benzoin,  steristrips and CHG transparent dressing.  Appropriate labels were applied.  Thank you. Block performed by ANTHONY Childers

## 2021-12-01 NOTE — BRIEF OP NOTE
TOTAL KNEE ARTHROPLASTY  Progress Note    Isrrael Marino  12/1/2021    Pre-op Diagnosis:   Primary osteoarthritis of both knees [M17.0]       Post-Op Diagnosis Codes:     * Primary osteoarthritis of both knees [M17.0]    Procedure/CPT® Codes:  NC TOTAL KNEE ARTHROPLASTY [98948]      Procedure(s):  TOTAL KNEE ARTHROPLASTY LEFT    Surgeon(s):  Kendall Craig MD    Anesthesia: Spinal    Staff:   Circulator: Ada Jimenes RN; Miroslava Sandhu RN  Physician Assistant: Shyla Banks PA  Scrub Person: Venecia Navarro  Vendor Representative: Sophie Willis Brad  Nursing Assistant: Hannah Sanz  Assistant: Vinay Hernandez PA-C  Assistant: Vinay Hernandez PA-C      Estimated Blood Loss: 50 mL    Urine Voided: * No values recorded between 12/1/2021  7:15 AM and 12/1/2021  9:12 AM *    Specimens:                None          Drains: * No LDAs found *    Findings: Advanced tricompartmental osteoarthritis with genu varum alignment    Complications: None apparent    Assistant: Vinay Hernandez PA-C  was responsible for performing the following activities: Retraction, Suction, Irrigation, Suturing, Closing and Placing Dressing and their skilled assistance was necessary for the success of this case.    Kendall Craig MD     Date: 12/1/2021  Time: 09:36 EST

## 2021-12-01 NOTE — ANESTHESIA PREPROCEDURE EVALUATION
Anesthesia Evaluation     Patient summary reviewed and Nursing notes reviewed   NPO Solid Status: > 8 hours  NPO Liquid Status: > 2 hours           Airway   Mallampati: III  TM distance: >3 FB  Neck ROM: limited  Possible difficult intubation  Dental    (+) partials    Pulmonary    (+) sleep apnea on CPAP,   (-) COPD, asthma, shortness of breath, recent URI, not a smoker  Cardiovascular     ECG reviewed    (+) hypertension, past MI (2014)  >12 months, CAD, cardiac stents more than 12 months ago dysrhythmias (bigeminey), hyperlipidemia,   (-) angina    ROS comment: ECG SR 1st degree AV block with occasional PVC's PACs   NS TW abn  Prolonged QT    ECHO 2021·EF = 30%  No significant valvular  disease    GXT 12/19 · normal myocardial perfusion study with no evidence of ischemia. EF = 47%.  ·   CATH 2021· No flow-limiting CAD · LVEF 25% with dilated left ventricle   Low LV filling pressures.        Neuro/Psych  (+) numbness,     (-) seizures, CVA    ROS Comment: Sp odontoid fx   GI/Hepatic/Renal/Endo    (+)  GERD,    (-) no renal disease, diabetes (A1C 6), no thyroid disorder    Musculoskeletal     (+) neck pain,   Abdominal    Substance History      OB/GYN          Other   arthritis,    history of cancer    ROS/Med Hx Other: Plavix                Anesthesia Plan    ASA 3     general with block   (5 days off plavix   ACB / Cath post op   )  intravenous induction     Anesthetic plan, all risks, benefits, and alternatives have been provided, discussed and informed consent has been obtained with: patient.    Plan discussed with CRNA.

## 2021-12-01 NOTE — H&P
"Patient Name: Isrrael Marino  MRN: [id number]  : 1940  DOS: 2021    Attending: Kendall Craig MD    Primary Care Provider: Ivanna George MD      Chief complaint: Left knee pain    Subjective   Patient is a pleasant 81 y.o. male presented for scheduled surgery by Dr. Craig.  He underwent left total knee arthroplasty under general anesthesia.  He tolerated surgery well and was admitted for further medical management.  His knee has been painful for many years.  He uses a cane occasionally for ambulation.  He has had frequent cortisone injections over the past several years.  He denies recent falls.    When seen postop he is doing well.  His pain is well controlled.  He is already cleared physical therapy for discharge.  He denies nausea, shortness of breath or chest pain.  No history of DVT or PE.    He has history of hypertension, hyperlipidemia and coronary artery disease.  He is followed by Dr. Bernal, cardiology, and had preop cardiac clearance.  He held his Plavix for 5 days preoperatively.    Allergies:  Allergies   Allergen Reactions   • Ibuprofen Hives     \"Pt states he hasn't taken this in a long time\"       Meds:  Medications Prior to Admission   Medication Sig Dispense Refill Last Dose   • aspirin 81 MG EC tablet Take 1 tablet by mouth Daily. 30 tablet 0 2021 at 0800   • atorvastatin (LIPITOR) 40 MG tablet Take 1 tablet by mouth Every Night. 90 tablet 3 2021 at 2100   • carvedilol (COREG) 6.25 MG tablet Take 1 tablet by mouth Every 12 (Twelve) Hours. 180 tablet 3 2021 at 0800 time   • DULoxetine (CYMBALTA) 30 MG capsule Take 2 capsules by mouth Daily. (Patient taking differently: Take 30 mg by mouth 2 (Two) Times a Day.) 180 capsule 1 2021 at Unknown time   • fluticasone (FLONASE) 50 MCG/ACT nasal spray 1 spray into the nostril(s) as directed by provider Daily. 48 g 3 Past Week at Unknown time   • furosemide (LASIX) 20 MG tablet Take 1 tablet by mouth Daily. " 90 tablet 1 11/30/2021 at 2100   • lisinopril (PRINIVIL,ZESTRIL) 2.5 MG tablet Take 1 tablet by mouth 2 (Two) Times a Day. 90 tablet 3 12/1/2021 at Unknown time   • Melatonin 3 MG capsule Take 1 capsule by mouth Daily.   11/30/2021 at 2100   • mupirocin (BACTROBAN) 2 % ointment Apply to the inside of each nostril with a cotton swab 2 (Two) Times a Day, morning and evening, x5 days before surgery. 22 g 0 11/30/2021 at 2100   • tamsulosin (FLOMAX) 0.4 MG capsule 24 hr capsule Take 1 capsule by mouth Daily. 90 capsule 3 11/30/2021 at 0800   • ammonium lactate (LAC-HYDRIN) 12 % lotion Every 12 (Twelve) Hours.   More than a month at Unknown time   • B Complex Vitamins (VITAMIN B COMPLEX PO) Take 1 tablet by mouth Daily.   11/26/2021 at 0800   • Cholecalciferol (VITAMIN D3) 125 MCG (5000 UT) capsule capsule Take 2,000 Units by mouth Daily.   11/26/2021   • clopidogrel (PLAVIX) 75 MG tablet Take 1 tablet by mouth Daily. 90 tablet 4 11/26/2021 at 0800   • diclofenac (VOLTAREN) 75 MG EC tablet Take 1 tablet by mouth Daily As Needed (pain). 30 tablet 5    • Fluzone High-Dose Quadrivalent 0.7 ML suspension prefilled syringe injection    More than a month at Unknown time   • vitamin B-12 (CYANOCOBALAMIN) 1000 MCG tablet Take 1,000 mcg by mouth Daily.   11/26/2021   • Zinc 50 MG capsule Take 1 tablet by mouth Daily.   11/26/2021         History:   Past Medical History:   Diagnosis Date   • Arrhythmia    • Arthritis     both knees   • Broken neck (HCC)    • CAD (coronary artery disease)    • Cancer (HCC)     skin cancer  head and neck  nonmelanoma   • Chondrocalcinosis of knee    • GERD (gastroesophageal reflux disease)    • Gout    • Heart attack (HCC)      Last Assessed: 28 Mar 2014   • Heart disease    • History of transfusion     own blood with hip surgery   • Hyperlipidemia    • Hypertension    • Hypertensive urgency 5/28/2021   • IBS (irritable bowel syndrome)    • Left rotator cuff tear arthropathy    • Low back pain    •  Lower extremity neuropathy    • Lumbar canal stenosis    • Neck pain    • Numbness    • Right hip pain    • Rotator cuff tear arthropathy of right shoulder    • Sleep apnea     semi compliant with c-pap    • Thin blood (HCC)      Past Surgical History:   Procedure Laterality Date   • APPENDECTOMY  1956   • BACK SURGERY  1997    spinal decompression and fusion   • BREAST LUMPECTOMY  2003    benign   • CARDIAC CATHETERIZATION      with two stents//. DR. TOLEDO   • CARDIAC CATHETERIZATION N/A 1/8/2021    Procedure: LEFT HEART CATH;  Surgeon: Casimiro Bernal MD;  Location:  [facility] CATH INVASIVE LOCATION;  Service: Cardiovascular;  Laterality: N/A;   • CARPAL TUNNEL RELEASE  03/28/2014    Neuroplasty Decompression Median Nerve At Carpal Tunnel   • CERVICAL DISCECTOMY POSTERIOR FUSION WITH BRAIN LAB N/A 7/13/2018    Procedure: CERVICAL LAMINECTOMY DECOMPRESSION POSTERIOR C1-2 POSTERIOR CERVICAL FUSION;  Surgeon: Randall Barnett MD;  Location: [facility] OR;  Service: Neurosurgery   • COLONOSCOPY     • CORONARY STENT PLACEMENT  2013   • HERNIA REPAIR  2002    & 1998   • JOINT REPLACEMENT     • LUMBAR DISCECTOMY FUSION INSTRUMENTATION     • SKIN BIOPSY     • TONSILLECTOMY     • TOTAL HIP ARTHROPLASTY  2002   • VASECTOMY     • WISDOM TOOTH EXTRACTION       Family History   Problem Relation Age of Onset   • Cancer Mother    • Heart disease Father    • Hypertension Father    • Heart attack Father    • Sleep apnea Son      Social History     Tobacco Use   • Smoking status: Never Smoker   • Smokeless tobacco: Never Used   Vaping Use   • Vaping Use: Never used   Substance Use Topics   • Alcohol use: Not Currently   • Drug use: Never   He is  with 3 children.  He is retired from [employer].    Review of Systems  Pertinent items are noted in HPI, all other systems reviewed and negative    Vital Signs  /73 (BP Location: Right arm, Patient Position: Lying)   Pulse 67   Temp 97.7 °F (36.5 °C) (Axillary)   Resp 16   Ht  "182.9 cm (72\")   Wt 84.3 kg (185 lb 14.4 oz)   SpO2 94%   BMI 25.21 kg/m²     Physical Exam:    General Appearance:    Alert, cooperative, in no acute distress   Head:    Normocephalic, without obvious abnormality, atraumatic   Eyes:            Lids and lashes normal, conjunctivae and sclerae normal, no   icterus, no pallor, corneas clear,    Ears:    Ears appear intact with no abnormalities noted   Throat:   No oral lesions, no thrush, oral mucosa moist   Neck:   No adenopathy, supple, trachea midline, no thyromegaly    Lungs:     Clear to auscultation,respirations regular, even and unlabored    Heart:    Regular rhythm and normal rate, normal S1 and S2, no murmur, no gallop   Abdomen:     Normal bowel sounds, no masses, no organomegaly, soft non-tender, non-distended, no guarding, no rebound  tenderness   Genitalia:    Deferred   Extremities:  Left knee Ace wrap CDI.  Nerve block present.   Pulses:   Pulses palpable and equal bilaterally   Skin:   No bleeding, bruising or rash   Neurologic:   Cranial nerves 2 - 12 grossly intact. Flexion and dorsiflexion intact bilateral feet.        I reviewed the patient's new clinical results.     Results from last 7 days   Lab Units 12/01/21  0609   POTASSIUM mmol/L 4.1     Lab Results   Component Value Date    HGBA1C 6.00 (H) 11/18/2021     Results for VERENA REGALADO (MRN [id number]) as of 12/1/2021 13:59   Ref. Range 11/18/2021 09:12   Glucose Latest Ref Range: 65 - 99 mg/dL 93   Sodium Latest Ref Range: 136 - 145 mmol/L 137   Potassium Latest Ref Range: 3.5 - 5.2 mmol/L 5.1   CO2 Latest Ref Range: 22.0 - 29.0 mmol/L 26.0   Chloride Latest Ref Range: 98 - 107 mmol/L 100   Anion Gap Latest Ref Range: 5.0 - 15.0 mmol/L 11.0   Creatinine Latest Ref Range: 0.76 - 1.27 mg/dL 1.13   BUN Latest Ref Range: 8 - 23 mg/dL 37 (H)   BUN/Creatinine Ratio Latest Ref Range: 7.0 - 25.0  32.7 (H)   Calcium Latest Ref Range: 8.6 - 10.5 mg/dL 9.7   eGFR Non  Am Latest Ref Range: " >60 mL/min/1.73 62   Hemoglobin A1C Latest Ref Range: 4.80 - 5.60 % 6.00 (H)   Protime Latest Ref Range: 11.4 - 14.4 Seconds 12.8   INR Latest Ref Range: 0.85 - 1.16  0.99   PTT Latest Ref Range: 22.0 - 39.0 seconds 26.6   WBC Latest Ref Range: 3.40 - 10.80 10*3/mm3 9.71   RBC Latest Ref Range: 4.14 - 5.80 10*6/mm3 4.43   Hemoglobin Latest Ref Range: 13.0 - 17.7 g/dL 13.4   Hematocrit Latest Ref Range: 37.5 - 51.0 % 40.3   RDW Latest Ref Range: 12.3 - 15.4 % 13.0   MCV Latest Ref Range: 79.0 - 97.0 fL 91.0   MCH Latest Ref Range: 26.6 - 33.0 pg 30.2   MCHC Latest Ref Range: 31.5 - 35.7 g/dL 33.3   MPV Latest Ref Range: 6.0 - 12.0 fL 9.9   Platelets Latest Ref Range: 140 - 450 10*3/mm3 239       Assessment and Plan:     Status post total left knee replacement    Primary osteoarthritis of both knees    Hypertension    Hyperlipidemia    Benign prostatic hyperplasia    Chronic coronary artery disease    Prediabetes    JIN (obstructive sleep apnea)      Plan  1. PT/OT- WBAT LLE  2. Pain control-prns, AC nerve block   3. IS-encourage  4. DVT proph- Mechs/ASA  5. Bowel regimen  6. Resume home medications as appropriate  7. Monitor post-op labs  8. DC planning for home, likely this evening    HTN, Hyperlipidemia, CAD  - Continue home statin, lisinopril  - Resume Lasix and Plavix tomorrow  - Monitor BP   - Holding parameters for BP meds  - Labetalol PRN for SBP>170    BPH  -Continue home Flomax  -Monitor for postop urinary retention    PreDM  - hgb A1c on 11/18/21 6.0  - Accu-Chek AC and HS with low dose SSI    JIN  - CPAP at night      JULY Correa  12/01/21  13:29 EST

## 2021-12-01 NOTE — ANESTHESIA PROCEDURE NOTES
Airway  Urgency: elective    Date/Time: 12/1/2021 7:23 AM  Airway not difficult    General Information and Staff    Patient location during procedure: OR  CRNA: Jaret Blanchard CRNA    Indications and Patient Condition  Indications for airway management: airway protection    Preoxygenated: yes  Mask difficulty assessment: 1 - vent by mask    Final Airway Details  Final airway type: supraglottic airway      Successful airway: I-gel  Size 4    Number of attempts at approach: 1  Assessment: lips, teeth, and gum same as pre-op    Additional Comments  LMA placed without difficulty, ventilation with assist, equal breath sounds and symmetric chest rise and fall

## 2021-12-01 NOTE — PLAN OF CARE
Problem: Adult Inpatient Plan of Care  Goal: Plan of Care Review  Flowsheets (Taken 12/1/2021 1305)  Progress: improving  Plan of Care Reviewed With:   patient   spouse   grandchild(ubaldo)  Outcome Summary: PT eval complete. Pt ambulated 300 feet using RW, CGA, and one person to manage equipment. Pt ascended/descended 2 steps using RW, CGA, and backwards technique. Gait/stair training limited by fatigue. Bed mobility performed with supervision and STS with CGA. No knee buckling noted. Pt IND with SLR. Will assess L knee AROM POD#1 if pt does not d/c today. Reviewed HEP and knee precautions via handout. Educated on safe car transfers. PADD score = 8. ADLs assessed, pt does not require OT eval tonight. Functionally, pt safe to d/c home with assist from a PT perspective. Recommend HHPT.   Goal Outcome Evaluation:  Plan of Care Reviewed With: patient, spouse, grandchild(ubaldo)        Progress: improving  Outcome Summary: PT eval complete. Pt ambulated 300 feet using RW, CGA, and one person to manage equipment. Pt ascended/descended 2 steps using RW, CGA, and backwards technique. Gait/stair training limited by fatigue. Bed mobility performed with supervision and STS with CGA. No knee buckling noted. Pt IND with SLR. Will assess L knee AROM POD#1 if pt does not d/c today. Reviewed HEP and knee precautions via handout. Educated on safe car transfers. PADD score = 8. ADLs assessed, pt does not require OT eval tonight. Functionally, pt safe to d/c home with assist from a PT perspective. Recommend HHPT.

## 2021-12-01 NOTE — CASE MANAGEMENT/SOCIAL WORK
Discharge Planning Assessment  [facility]     Patient Name: Isrrael Marino  MRN: [id number]  Today's Date: 12/1/2021    Admit Date: 12/1/2021     Discharge Needs Assessment     Row Name 12/01/21 1506       Living Environment    Lives With spouse    Current Living Arrangements home/apartment/condo    Primary Care Provided by self    Provides Primary Care For no one    Family Caregiver if Needed spouse    Quality of Family Relationships involved; supportive    Able to Return to Prior Arrangements no       Resource/Environmental Concerns    Resource/Environmental Concerns none       Transition Planning    Patient/Family Anticipates Transition to home with help/services    Patient/Family Anticipated Services at Transition ; rehabilitation services    Transportation Anticipated family or friend will provide       Discharge Needs Assessment    Readmission Within the Last 30 Days no previous admission in last 30 days    Equipment Currently Used at Home rollator    Concerns to be Addressed discharge planning    Anticipated Changes Related to Illness none    Equipment Needed After Discharge walker, rolling               Discharge Plan     Row Name 12/01/21 1508       Plan    Plan Home w/[facility]    Patient/Family in Agreement with Plan yes    Plan Comments Spoke with patient in room to initiate discharge planning.  He lives with his wife in [facility].  Prior to admission, he was independent with ADL's, usinga rollator to assist with ambulation.  He has no other DME at home and is not current with home health.  His PCP is Ivanna Charles.  He has an advanced directive in EPIC.  Mr. Marino has RX coverage and hs his scripts filled at [facility].  His plan is to return home with [facility].  Referral called and faxed to [facility].  Therapy recommending a rolling walker.  Order called to Krystal with [facility].  DME will be delivered to patient's bedside prior to discharge.  No other needs noted.   Geni Pérez RN [phone]    Final Discharge Disposition Code 01 - home or self-care              Continued Care and Services - Admitted Since 12/1/2021    Coordination has not been started for this encounter.       Expected Discharge Date and Time     Expected Discharge Date Expected Discharge Time    Dec 1, 2021          Demographic Summary     Row Name 12/01/21 1505       General Information    Admission Type other (see comments)  outpatient    Arrived From PACU/recovery room    Referral Source admission list    Reason for Consult discharge planning    Preferred Language English     Used During This Interaction no               Functional Status     Row Name 12/01/21 1505       Functional Status    Usual Activity Tolerance moderate    Current Activity Tolerance fair       Functional Status, IADL    Medications independent    Meal Preparation independent    Housekeeping independent    Laundry independent    Shopping independent               Psychosocial    No documentation.                Abuse/Neglect    No documentation.                Legal    No documentation.                Substance Abuse    No documentation.                Patient Forms    No documentation.                   Geni Pérez RN

## 2021-12-01 NOTE — ANESTHESIA PROCEDURE NOTES
Adductor canal SS      Patient reassessed immediately prior to procedure    Patient location during procedure: OR  Reason for block: at surgeon's request and post-op pain management  Performed by  CRNA: Jaret Blanchard, CRNA  Assisted by: Brittney Roberts SRNA  Preanesthetic Checklist  Completed: patient identified, IV checked, site marked, risks and benefits discussed, surgical consent, monitors and equipment checked, pre-op evaluation and timeout performed  Prep:  Pt Position: supine  Sterile barriers:cap, gloves, mask and sterile barriers  Prep: ChloraPrep  Patient monitoring: blood pressure monitoring, continuous pulse oximetry and EKG  Procedure  Performed under: general  Guidance:ultrasound guided    ULTRASOUND INTERPRETATION. Using ultrasound guidance a 20 G gauge needle was placed in close proximity to the nerve, at which point, under ultrasound guidance anesthetic was injected in the area of the nerve and spread of the anesthesia was seen on ultrasound in close proximity thereto.  There were no abnormalities seen on ultrasound; a digital image was taken; and the patient tolerated the procedure with no complications. Images:still images obtained, printed/placed on chart    Laterality:left  Block Type:adductor canal block  Injection Technique:single-shot  Needle Type:Tuohy and echogenic  Needle Gauge:18 G  Resistance on Injection: none  Catheter size: 20g.    Medications Used: bupivacaine PF (MARCAINE) 0.25 % injection, 25 mL  dexamethasone sodium phosphate injection, 4 mg  Med administered at 12/1/2021 7:26 AM      Post Assessment  Injection Assessment: negative aspiration for heme, incremental injection and no paresthesia on injection  Patient Tolerance:comfortable throughout block  Complications:no  Additional Notes  Procedure:             The pt was placed in the Supine position.  The Insertion site was  prepped and Draped in sterile fashion.  The pt was anesthetized with  IV Sedation( see meds).  Skin and  cutaneous tissue was infiltrated and anesthetized with 1% Lidocaine 3 mls via a 25g needle.  A BBraun 4 inch 18g echogenic needle was then  inserted approximately midline, mid-thigh and advanced In-plane with Ultrasound guidance.  Normal Saline PSF was utilized for hydrodissection of tissue.  The Vastus medialis and Sartorius muscle where visualized and the needle tip was placed in the adductor canal,  lateral to the femoral artery.  LA injection spread was visualized, injection was incremental 1-5ml, injection pressure was normal or little, no intraneural injection, no vascular injection.  LA dose was injected thru the needle(see dose above).  A BBraun 20g wire stylet catheter was placed via the needle with ultrasound visualization and confirmation with NS fluid bolus. The catheter insertion site was sealed with exofin tissue adhesive. The labeled catheter was then coiled and secured to skin with benzoin,  steristrips and CHG transparent dressing.  Appropriate labels were applied.  Thank you.

## 2021-12-01 NOTE — DISCHARGE INSTRUCTIONS
"DISCHARGE INSTRUCTIONS   Dr. Craig     Total Knee Replacement/ Partial (Uni) Knee Replacement     Wound Care   1) Keep wound / incision area clean and dry.   2) Dressing to remain in place until post-operative day 7. Upon dressing removal, assess for wound drainage. If no drainage is present, keep wound / incision area open to air as much as possible. If drainage is present, place sterile dressing to cover wound and assess daily. If drainage continues to occur after post-operative day 14, call the office for an urgent appointment. (You should be seen in the clinic within 1-2 days of calling). DO NOT REMOVE SUTURES (IF PRESENT) UNDER ANY CIRCUMSTANCES PRIOR TO FOLLOW UP APPOINTMENT.  3) No baths or swimming until otherwise instructed. The wound must remain dry for 10 days after surgery. After 10 days, you may begin to shower only if no drainage is present. No submerging the wound under standing water until cleared by your physician (no baths, hot tubs, swimming pools, etc). Sponge baths are the best way to perform personal hygiene while at the same time protecting the wound from moisture.   4) Prior to showering, the wound must remain dry for 72 consecutive hours (no drainage whatsoever) prior to showering. If the wound drains or spots, the clock \"resets\" - make sure the wound has been drainage-free for 72 consecutive hours.   5) Once you are allowed to get the wound wet, please use gentle soap to wash the wound area. DO NOT aggressively scrub the wound with a washcloth or bath sponge. Please visually inspect your wound(s) at least once daily. If the wound(s) are in a difficult to see location, please use a mirror or have someone else assist with visual inspection.   6) No scrubbing the wound. You may \"pad dry\" the wound, but do not rub, as this may open up the wound and pre-dispose to wound infection.   7) Do not apply lotions or creams to incision site, unless instructed otherwise.   8) Observe for redness, " "swelling, or drainage. Please call the clinic immediately if you have fevers, chills with warmth/redness surrounding wound site or if you notice pus drainage from the wound site     Activity   No heavy lifting objects greater than 10 pounds.   No driving while on narcotic pain medication.   No submerging wound under standing water (pool, bath tub, etc.) until otherwise instructed.   You may be protected weightbearing as tolerated on your operative (left lower) extremity   Use crutches or a walker for ambulation.   Wean as appropriate per physical therapist's discretion.   Do not sleep with a pillow behind your knee. You may sleep with a pillow behind your Achilles or foot. This will prevent your knee from getting stiff in the flexed (\"bent\") position and will encourage full extension (leg straightening).   Be vigilant in terms of working on full knee extension and flexion. Your goal should be 0 to 90 degrees by 2 weeks post-op - MINIMUM!   Knee range of motion as tolerated.    Blood Clot Prophylaxis   (Aspirin vs. Lovenox vs. Eliquis administration is determined by your surgeon and tailored to your specific risk profile. You will be discharged with one of these medications.) You will need to complete a total 4 week course of enteric coated aspirin 325 mg (or 81mg) twice daily or Eliquis 2.5mg twice daily, in order to minimize your risk of blood clots following surgery. You will be supplied with a prescription to obtain this. Alternatively, you will need to compete a total 2 week course of Lovenox after surgery (followed by a 2 week course of aspirin twice daily), in order to minimize the risk of blood clots following surgery. Lovenox requires a single shot in the abdomen, to be taken once daily. You will be supplied with the prescription to obtain this. Prior to your discharge from the hospital, the nursing staff will instruct you on self-administration of the Lovenox, if you will be returning directly home from the " "hospital.     Discharge Pain Medications   You will be given a prescription for pain medication. You should start taking this the same day after your surgery. Wean off as tolerated. Do not wait to take the pain medication until the pain is severe, as it will be difficult to \"catch up\" once this occurs. The pain medication usually reaches its full effect ~1 hour after ingesting. If you have been sent home on Valium, this medication works well for muscle spasms. If you have been sent home on Colace, this medication should be taken until you are off all narcotic (i.e. Norco, Percocet, Oxycodone, etc) pain medications, in order to prevent constipation. Percocet or Norco have Tylenol in their ingredient lists. You must be careful not to exceed 4,000mg (4 grams) of Tylenol, from all sources, within a single 24-hr period. This means that you may not take more than 12 Percocets or Norcos within a 24-hr period. If you have been sent home with a combination of oxycodone and Tylenol, please take Tylenol as scheduled.  The oxycodone is to be taken as needed for \"breakthrough\" pain.  Do NOT take Regular or Extra Strength Tylenol when taking your Percocet or Norco medications. Some common side effects of the narcotic pain medications (Percocet, Oxycodone, Norco, etc) include nausea and itching. Benadryl is a great over the counter medication that helps calm your stomach, decreases your anxiety levels, and minimizes the itching. You can easily purchase this at your local pharmacy as an over-the-counter medication. Please abide by the instructions as printed on the bottle. If your nausea persists, make sure to take small amounts of crackers or other lighter foods.     Follow-Up   Follow-up with Dr. Craig's office in 3 weeks from the surgery date for a post-operative evaluation. Have the following xrays done upon arrival to the follow-up appointment: 3 views of operative knee. Please call Dr. Craig's office at [phone] for " orthopaedic appointments or questions.

## 2021-12-01 NOTE — ANESTHESIA POSTPROCEDURE EVALUATION
Patient: Isrrael Marino    Procedure Summary     Date: 12/01/21 Room / Location:  [facility]  [facility]    Anesthesia Start: 0716 Anesthesia Stop:     Procedure: TOTAL KNEE ARTHROPLASTY LEFT (Left Knee) Diagnosis:       Primary osteoarthritis of both knees      (Primary osteoarthritis of both knees [M17.0])    Surgeons: Kendall Craig MD Provider: Darwin Nielsen MD    Anesthesia Type: general with block ASA Status: 3          Anesthesia Type: general with block    Vitals  Vitals Value Taken Time   /65 12/01/21 1005   Temp 97.1 °F (36.2 °C) 12/01/21 1001   Pulse 73 12/01/21 1009   Resp 16 12/01/21 1001   SpO2 89 % 12/01/21 1009   Vitals shown include unvalidated device data.        Post Anesthesia Care and Evaluation    Patient location during evaluation: PACU  Patient participation: complete - patient participated  Level of consciousness: awake and alert  Pain management: adequate  Airway patency: patent  Anesthetic complications: No anesthetic complications  PONV Status: none  Cardiovascular status: hemodynamically stable and acceptable  Respiratory status: nonlabored ventilation, acceptable and nasal cannula  Hydration status: acceptable

## 2021-12-01 NOTE — H&P
"Pre-Op H&P  Isrrael Marino  [id number]  1940    Chief complaint: L knee pain    HPI:    Patient is a 81 y.o.male with a PMH of CAD who presents with several years of left knee pain that has failed conservative management. He is scheduled for a L total knee arthroplasty with Dr. Craig.    He last took Plavix five days ago.     Review of Systems:  General ROS: negative for chills, fever or skin lesions.  Cardiovascular ROS: no chest pain or dyspnea on exertion  Respiratory ROS: no cough, shortness of breath, or wheezing    Allergies:   Allergies   Allergen Reactions   • Ibuprofen Hives     \"Pt states he hasn't taken this in a long time\"       Home Meds:    No current facility-administered medications on file prior to encounter.     Current Outpatient Medications on File Prior to Encounter   Medication Sig Dispense Refill   • aspirin 81 MG EC tablet Take 1 tablet by mouth Daily. 30 tablet 0   • atorvastatin (LIPITOR) 40 MG tablet Take 1 tablet by mouth Every Night. 90 tablet 3   • carvedilol (COREG) 6.25 MG tablet Take 1 tablet by mouth Every 12 (Twelve) Hours. 180 tablet 3   • DULoxetine (CYMBALTA) 30 MG capsule Take 2 capsules by mouth Daily. (Patient taking differently: Take 30 mg by mouth 2 (Two) Times a Day.) 180 capsule 1   • fluticasone (FLONASE) 50 MCG/ACT nasal spray 1 spray into the nostril(s) as directed by provider Daily. 48 g 3   • furosemide (LASIX) 20 MG tablet Take 1 tablet by mouth Daily. 90 tablet 1   • lisinopril (PRINIVIL,ZESTRIL) 2.5 MG tablet Take 1 tablet by mouth 2 (Two) Times a Day. 90 tablet 3   • Melatonin 3 MG capsule Take 1 capsule by mouth Daily.     • mupirocin (BACTROBAN) 2 % ointment Apply to the inside of each nostril with a cotton swab 2 (Two) Times a Day, morning and evening, x5 days before surgery. 22 g 0   • tamsulosin (FLOMAX) 0.4 MG capsule 24 hr capsule Take 1 capsule by mouth Daily. 90 capsule 3   • B Complex Vitamins (VITAMIN B COMPLEX PO) Take 1 tablet by mouth " Daily.     • Cholecalciferol (VITAMIN D3) 125 MCG (5000 UT) capsule capsule Take 2,000 Units by mouth Daily.     • clopidogrel (PLAVIX) 75 MG tablet Take 1 tablet by mouth Daily. 90 tablet 4   • vitamin B-12 (CYANOCOBALAMIN) 1000 MCG tablet Take 1,000 mcg by mouth Daily.     • Zinc 50 MG capsule Take 1 tablet by mouth Daily.         PMH:   Past Medical History:   Diagnosis Date   • Arrhythmia    • Arthritis     both knees   • Broken neck (HCC)    • CAD (coronary artery disease)    • Cancer (HCC)     skin cancer  head and neck  nonmelanoma   • Chondrocalcinosis of knee    • GERD (gastroesophageal reflux disease)    • Gout    • Heart attack (HCC)      Last Assessed: 28 Mar 2014   • Heart disease    • History of transfusion     own blood with hip surgery   • Hyperlipidemia    • Hypertension    • Hypertensive urgency 5/28/2021   • IBS (irritable bowel syndrome)    • Left rotator cuff tear arthropathy    • Low back pain    • Lower extremity neuropathy    • Lumbar canal stenosis    • Neck pain    • Numbness    • Right hip pain    • Rotator cuff tear arthropathy of right shoulder    • Sleep apnea     semi compliant with c-pap    • Thin blood (HCC)      PSH:    Past Surgical History:   Procedure Laterality Date   • APPENDECTOMY  1956   • BACK SURGERY  1997    spinal decompression and fusion   • BREAST LUMPECTOMY  2003    benign   • CARDIAC CATHETERIZATION      with two stents//. DR. TOLEDO   • CARDIAC CATHETERIZATION N/A 1/8/2021    Procedure: LEFT HEART CATH;  Surgeon: Casimiro Bernal MD;  [facility]:  [facility];  Service: Cardiovascular;  Laterality: N/A;   • CARPAL TUNNEL RELEASE  03/28/2014    Neuroplasty Decompression Median Nerve At Carpal Tunnel   • CERVICAL DISCECTOMY POSTERIOR FUSION WITH BRAIN LAB N/A 7/13/2018    Procedure: CERVICAL LAMINECTOMY DECOMPRESSION POSTERIOR C1-2 POSTERIOR CERVICAL FUSION;  Surgeon: Randall Barnett MD;  Location:  [facility];  Service: Neurosurgery   • COLONOSCOPY    "  • CORONARY STENT PLACEMENT  2013   • HERNIA REPAIR  2002    & 1998   • JOINT REPLACEMENT     • LUMBAR DISCECTOMY FUSION INSTRUMENTATION     • SKIN BIOPSY     • TONSILLECTOMY     • TOTAL HIP ARTHROPLASTY  2002   • VASECTOMY     • WISDOM TOOTH EXTRACTION         Immunization History:  Influenza: UTD  Pneumococcal: UTD  Tetanus: UTD    Social History:   Tobacco:   Social History     Tobacco Use   Smoking Status Never Smoker   Smokeless Tobacco Never Used      Alcohol:     Social History     Substance and Sexual Activity   Alcohol Use Not Currently       Vitals:           /86 (BP Location: Right arm, Patient Position: Lying)   Pulse 63   Temp 97.2 °F (36.2 °C) (Temporal)   Resp 16   Ht 182.9 cm (72\")   Wt 84.3 kg (185 lb 14.4 oz)   SpO2 96%   BMI 25.21 kg/m²     Physical Exam:  General Appearance:    Alert, cooperative, no distress, appears stated age   Head:    Normocephalic, without obvious abnormality, atraumatic   Lungs:     Clear to auscultation bilaterally, respirations unlabored    Heart:   Regular rate and rhythm, no murmur, rub or gallop    Abdomen:    Soft, nontender. No rigidity or guarding.            Extremities:   Extremities normal, atraumatic, no cyanosis or edema   Skin:   Skin color, texture, turgor normal, no rashes or lesions   Neurologic:   Grossly intact   Results Review  LABS:  Lab Results   Component Value Date    WBC 9.71 11/18/2021    HGB 13.4 11/18/2021    HCT 40.3 11/18/2021    MCV 91.0 11/18/2021     11/18/2021    NEUTROABS 6.29 11/18/2021    GLUCOSE 93 11/18/2021    BUN 37 (H) 11/18/2021    CREATININE 1.13 11/18/2021    EGFRIFNONA 62 11/18/2021    EGFRIFAFRI 80 06/03/2021     11/18/2021    K 5.1 11/18/2021     11/18/2021    CO2 26.0 11/18/2021    MG 2.1 01/07/2021    CALCIUM 9.7 11/18/2021    ALBUMIN 4.30 05/27/2021    AST 29 05/27/2021    ALT 22 05/27/2021    BILITOT 0.4 05/27/2021    PTT 26.6 11/18/2021    INR 0.99 11/18/2021       RADIOLOGY:  No " radiology results for the last 3 days      I reviewed the patient's new imaging results and agree with the interpretation.    Impression: L knee pain    Plan: L total knee arthoplasty     Vinay Hernandez PA-C   12/1/2021   06:53 EST

## 2021-12-01 NOTE — ANESTHESIA POSTPROCEDURE EVALUATION
Patient: Isrrael Marino    Procedure Summary     Date: 12/01/21 Room [facility]    Anesthesia Start: 0716 Anesthesia Stop: 1011    Procedure: TOTAL KNEE ARTHROPLASTY LEFT (Left Knee) Diagnosis:       Primary osteoarthritis of both knees      (Primary osteoarthritis of both knees [M17.0])    Surgeons: Kendall Craig MD Provider: Darwin Nielsen MD    Anesthesia Type: general with block ASA Status: 3          Anesthesia Type: general with block    Vitals  Vitals Value Taken Time   /70 12/01/21 1030   Temp 97.1 °F (36.2 °C) 12/01/21 1015   Pulse 69 12/01/21 1032   Resp 16 12/01/21 1015   SpO2 93 % 12/01/21 1032   Vitals shown include unvalidated device data.        Post Anesthesia Care and Evaluation    Patient location during evaluation: PACU  Patient participation: complete - patient participated  Level of consciousness: awake and alert  Pain score: 0  Pain management: adequate  Airway patency: patent  Anesthetic complications: No anesthetic complications  PONV Status: none  Cardiovascular status: hemodynamically stable and acceptable  Respiratory status: nonlabored ventilation, acceptable and nasal cannula  Hydration status: acceptable

## 2021-12-02 VITALS
HEART RATE: 67 BPM | HEIGHT: 72 IN | SYSTOLIC BLOOD PRESSURE: 94 MMHG | TEMPERATURE: 98.5 F | BODY MASS INDEX: 25.18 KG/M2 | OXYGEN SATURATION: 93 % | DIASTOLIC BLOOD PRESSURE: 51 MMHG | WEIGHT: 185.9 LBS | RESPIRATION RATE: 18 BRPM

## 2021-12-02 PROBLEM — R33.8 POSTOPERATIVE URINARY RETENTION: Status: ACTIVE | Noted: 2021-12-02

## 2021-12-02 PROBLEM — N99.89 POSTOPERATIVE URINARY RETENTION: Status: ACTIVE | Noted: 2021-12-02

## 2021-12-02 LAB
ANION GAP SERPL CALCULATED.3IONS-SCNC: 10 MMOL/L (ref 5–15)
BUN SERPL-MCNC: 33 MG/DL (ref 8–23)
BUN/CREAT SERPL: 28 (ref 7–25)
CALCIUM SPEC-SCNC: 8.7 MG/DL (ref 8.6–10.5)
CHLORIDE SERPL-SCNC: 99 MMOL/L (ref 98–107)
CO2 SERPL-SCNC: 27 MMOL/L (ref 22–29)
CREAT SERPL-MCNC: 1.18 MG/DL (ref 0.76–1.27)
DEPRECATED RDW RBC AUTO: 42.3 FL (ref 37–54)
ERYTHROCYTE [DISTWIDTH] IN BLOOD BY AUTOMATED COUNT: 12.7 % (ref 12.3–15.4)
GFR SERPL CREATININE-BSD FRML MDRD: 59 ML/MIN/1.73
GLUCOSE SERPL-MCNC: 102 MG/DL (ref 65–99)
HCT VFR BLD AUTO: 34.6 % (ref 37.5–51)
HGB BLD-MCNC: 11.3 G/DL (ref 13–17.7)
MCH RBC QN AUTO: 29.9 PG (ref 26.6–33)
MCHC RBC AUTO-ENTMCNC: 32.7 G/DL (ref 31.5–35.7)
MCV RBC AUTO: 91.5 FL (ref 79–97)
PLATELET # BLD AUTO: 237 10*3/MM3 (ref 140–450)
PMV BLD AUTO: 10 FL (ref 6–12)
POTASSIUM SERPL-SCNC: 4.7 MMOL/L (ref 3.5–5.2)
RBC # BLD AUTO: 3.78 10*6/MM3 (ref 4.14–5.8)
SODIUM SERPL-SCNC: 136 MMOL/L (ref 136–145)
WBC NRBC COR # BLD: 15.6 10*3/MM3 (ref 3.4–10.8)

## 2021-12-02 PROCEDURE — 99024 POSTOP FOLLOW-UP VISIT: CPT | Performed by: ORTHOPAEDIC SURGERY

## 2021-12-02 PROCEDURE — A9270 NON-COVERED ITEM OR SERVICE: HCPCS | Performed by: INTERNAL MEDICINE

## 2021-12-02 PROCEDURE — 63710000001 BISACODYL 10 MG SUPPOSITORY: Performed by: INTERNAL MEDICINE

## 2021-12-02 PROCEDURE — A9270 NON-COVERED ITEM OR SERVICE: HCPCS | Performed by: NURSE PRACTITIONER

## 2021-12-02 PROCEDURE — A9270 NON-COVERED ITEM OR SERVICE: HCPCS | Performed by: ORTHOPAEDIC SURGERY

## 2021-12-02 PROCEDURE — 85027 COMPLETE CBC AUTOMATED: CPT | Performed by: NURSE PRACTITIONER

## 2021-12-02 PROCEDURE — 63710000001 TAMSULOSIN 0.4 MG CAPSULE: Performed by: INTERNAL MEDICINE

## 2021-12-02 PROCEDURE — 63710000001 DULOXETINE 30 MG CAPSULE DELAYED-RELEASE PARTICLES: Performed by: NURSE PRACTITIONER

## 2021-12-02 PROCEDURE — 63710000001 ASPIRIN 81 MG TABLET DELAYED-RELEASE: Performed by: ORTHOPAEDIC SURGERY

## 2021-12-02 PROCEDURE — 63710000001 OXYCODONE 5 MG TABLET: Performed by: ORTHOPAEDIC SURGERY

## 2021-12-02 PROCEDURE — 97116 GAIT TRAINING THERAPY: CPT

## 2021-12-02 PROCEDURE — 97535 SELF CARE MNGMENT TRAINING: CPT

## 2021-12-02 PROCEDURE — 63710000001 MELOXICAM 7.5 MG TABLET: Performed by: ORTHOPAEDIC SURGERY

## 2021-12-02 PROCEDURE — 97110 THERAPEUTIC EXERCISES: CPT

## 2021-12-02 PROCEDURE — 63710000001 ACETAMINOPHEN 500 MG TABLET: Performed by: ORTHOPAEDIC SURGERY

## 2021-12-02 PROCEDURE — 80048 BASIC METABOLIC PNL TOTAL CA: CPT | Performed by: ORTHOPAEDIC SURGERY

## 2021-12-02 PROCEDURE — 97165 OT EVAL LOW COMPLEX 30 MIN: CPT

## 2021-12-02 RX ORDER — BISACODYL 10 MG
10 SUPPOSITORY, RECTAL RECTAL DAILY
Status: DISCONTINUED | OUTPATIENT
Start: 2021-12-02 | End: 2021-12-02 | Stop reason: HOSPADM

## 2021-12-02 RX ORDER — ONDANSETRON 4 MG/1
4 TABLET, FILM COATED ORAL EVERY 8 HOURS PRN
Qty: 20 TABLET | Refills: 0 | Status: SHIPPED | OUTPATIENT
Start: 2021-12-02 | End: 2022-02-10

## 2021-12-02 RX ADMIN — DULOXETINE HYDROCHLORIDE 30 MG: 30 CAPSULE, DELAYED RELEASE ORAL at 08:07

## 2021-12-02 RX ADMIN — OXYCODONE 5 MG: 5 TABLET ORAL at 05:37

## 2021-12-02 RX ADMIN — ACETAMINOPHEN 1000 MG: 500 TABLET, FILM COATED ORAL at 05:37

## 2021-12-02 RX ADMIN — ASPIRIN 81 MG: 81 TABLET, COATED ORAL at 08:06

## 2021-12-02 RX ADMIN — BISACODYL 10 MG: 10 SUPPOSITORY RECTAL at 11:51

## 2021-12-02 RX ADMIN — TAMSULOSIN HYDROCHLORIDE 0.8 MG: 0.4 CAPSULE ORAL at 08:07

## 2021-12-02 RX ADMIN — MELOXICAM 15 MG: 7.5 TABLET ORAL at 08:06

## 2021-12-02 NOTE — PROGRESS NOTES
[facility]    Acute pain service Inpatient Progress Note    Patient Name: Isrrael Marino  :  1940  MRN:  [id number]        Acute Pain  Service Inpatient Progress Note:    Analgesia:Excellent  Pain Score:0/10  LOC: alert and awake  Resp Status: room air  Cardiac: VS stable  Side Effects:None  Catheter Site:clean, dressing intact and dry  Cath type: peripheral nerve cath with ON Q  Infusion rate: 10ml/hr  Catheter Plan:Catheter to remain Insitu and Continue catheter infusion rate unchanged

## 2021-12-02 NOTE — THERAPY TREATMENT NOTE
Patient Name: Isrrael Marino  : 1940    MRN: [id number]                              Today's Date: 2021       Admit Date: 2021    Visit Dx:     ICD-10-CM ICD-9-CM   1. Status post total left knee replacement  Z96.652 V43.65   2. Primary osteoarthritis of both knees  M17.0 715.16   3. Chronic coronary artery disease  I25.10 414.00     Patient Active Problem List   Diagnosis   • Gastroesophageal reflux disease   • Atopic rhinitis   • Peripheral neuropathy   • Low back pain   • Hypertension   • Hyperlipidemia   • Benign prostatic hyperplasia   • Chronic coronary artery disease   • Primary osteoarthritis of both knees   • Glenohumeral arthritis, right   • Prediabetes   • JIN (obstructive sleep apnea)   • Hypersomnia   • Iron deficiency   • Ischemic cardiomyopathy   • Superficial thrombophlebitis of right upper extremity   • DDD (degenerative disc disease), cervical   • Status post total left knee replacement     Past Medical History:   Diagnosis Date   • Arrhythmia    • Arthritis     both knees   • Broken neck (HCC)    • CAD (coronary artery disease)    • Cancer (HCC)     skin cancer  head and neck  nonmelanoma   • Chondrocalcinosis of knee    • GERD (gastroesophageal reflux disease)    • Gout    • Heart attack (HCC)      Last Assessed: 28 Mar 2014   • Heart disease    • History of transfusion     own blood with hip surgery   • Hyperlipidemia    • Hypertension    • Hypertensive urgency 2021   • IBS (irritable bowel syndrome)    • Left rotator cuff tear arthropathy    • Low back pain    • Lower extremity neuropathy    • Lumbar canal stenosis    • Neck pain    • Numbness    • Right hip pain    • Rotator cuff tear arthropathy of right shoulder    • Sleep apnea     semi compliant with c-pap    • Thin blood (HCC)      Past Surgical History:   Procedure Laterality Date   • APPENDECTOMY     • BACK SURGERY      spinal decompression and fusion   • BREAST LUMPECTOMY      benign   • CARDIAC  CATHETERIZATION      with two stents//. DR. TOLEDO   • CARDIAC CATHETERIZATION N/A 1/8/2021    Procedure: LEFT HEART CATH;  Surgeon: Casimiro Bernal MD;  [facility];  Service: Cardiovascular;  Laterality: N/A;   • CARPAL TUNNEL RELEASE  03/28/2014    Neuroplasty Decompression Median Nerve At Carpal Tunnel   • CERVICAL DISCECTOMY POSTERIOR FUSION WITH BRAIN LAB N/A 7/13/2018    Procedure: CERVICAL LAMINECTOMY DECOMPRESSION POSTERIOR C1-2 POSTERIOR CERVICAL FUSION;  Surgeon: Randall Barnett MD;  Location:  [facility];  Service: Neurosurgery   • COLONOSCOPY     • CORONARY STENT PLACEMENT  2013   • HERNIA REPAIR  2002    & 1998   • JOINT REPLACEMENT     • LUMBAR DISCECTOMY FUSION INSTRUMENTATION     • SKIN BIOPSY     • TONSILLECTOMY     • TOTAL HIP ARTHROPLASTY  2002   • VASECTOMY     • WISDOM TOOTH EXTRACTION        General Information     Row Name 12/02/21 0835          Physical Therapy Time and Intention    Document Type therapy note (daily note)  -     Mode of Treatment individual therapy; physical therapy  -     Row Name 12/02/21 0835          General Information    Existing Precautions/Restrictions other (see comments)  L adductor nerve  -     Row Name 12/02/21 0835          Cognition    Orientation Status (Cognition) oriented x 4  -     Row Name 12/02/21 0835          Safety Issues, Functional Mobility    Safety Issues Affecting Function (Mobility) safety precaution awareness; safety precautions follow-through/compliance  -     Impairments Affecting Function (Mobility) endurance/activity tolerance; strength; range of motion (ROM); pain  -           User Key  (r) = Recorded By, (t) = Taken By, (c) = Cosigned By    Initials Name Provider Type    Christian Amezquita PT Physical Therapist               Mobility     Row Name 12/02/21 0835          Bed Mobility    Comment (Bed Mobility) Received UIC  -     Row Name 12/02/21 0835          Transfers    Comment (Transfers) Verbal  cues for safe hand placement and moving L LE out for comfort  -     Row Name 12/02/21 0835          Sit-Stand Transfer    Sit-Stand Coleman (Transfers) verbal cues; supervision  -     Assistive Device (Sit-Stand Transfers) walker, front-wheeled  -NATI     Row Name 12/02/21 0835          Gait/Stairs (Locomotion)    Coleman Level (Gait) contact guard; verbal cues  -NATI     Assistive Device (Gait) walker, front-wheeled  -NATI     Distance in Feet (Gait) 360  -NATI     Deviations/Abnormal Patterns (Gait) bilateral deviations; kayli decreased; gait speed decreased; stride length decreased  -NATI     Bilateral Gait Deviations forward flexed posture  -NATI     Left Sided Gait Deviations heel strike decreased; weight shift ability decreased  -NATI     Coleman Level (Stairs) verbal cues; contact guard  -     Assistive Device (Stairs) walker, front-wheeled  -NATI     Handrail Location (Stairs) none  -NATI     Number of Steps (Stairs) 2  backwards technique  -NATI     Ascending Technique (Stairs) step-to-step  -NATI     Descending Technique (Stairs) step-to-step  -NATI     Comment (Gait/Stairs) Pt ambulated with step through pattern and improved speed. Verbal cues for maintaining upright posture, body within walker, increase step length. Pt ascended/descended 2 steps using RW, CGA, and backwards technique. Gait/stair training limited by fatigue.  -     Row Name 12/02/21 0835          Mobility    Extremity Weight-bearing Status left lower extremity  -     Left Lower Extremity (Weight-bearing Status) weight-bearing as tolerated (WBAT)  -           User Key  (r) = Recorded By, (t) = Taken By, (c) = Cosigned By    Initials Name Provider Type    Christian Amezquita, PT Physical Therapist               Obj/Interventions     Row Name 12/02/21 0835          Range of Motion Comprehensive    Comment, General Range of Motion L knee AROM 8-97 degrees;  -NATI     Row Name 12/02/21 0835          Motor Skills    Therapeutic Exercise hip; knee;  ankle  -[facility] Name 12/02/21 0835          Hip (Therapeutic Exercise)    Hip (Therapeutic Exercise) isometric exercises  -     Hip Isometrics (Therapeutic Exercise) gluteal sets; 10 repetitions  -[facility] Name 12/02/21 0835          Knee (Therapeutic Exercise)    Knee (Therapeutic Exercise) isometric exercises; strengthening exercise  -     Knee Isometrics (Therapeutic Exercise) quad sets; 10 repetitions  -     Knee Strengthening (Therapeutic Exercise) left; heel slides; SLR (straight leg raise); LAQ (long arc quad); SAQ (short arc quad); 10 repetitions  -[facility] Name 12/02/21 08          Ankle (Therapeutic Exercise)    Ankle (Therapeutic Exercise) AROM (active range of motion)  -     Ankle AROM (Therapeutic Exercise) bilateral; dorsiflexion; plantarflexion; 10 repetitions  -           User Key  (r) = Recorded By, (t) = Taken By, (c) = Cosigned By    Initials Name Provider Type    Christian Amezquita, PT Physical Therapist               Goals/Plan    No documentation.                Clinical Impression     [facility] Name 12/02/21 0835          Pain    Additional Documentation Pain Scale: Numbers Pre/Post-Treatment (Group)  -NATI     Row Name 12/02/21 0835          Pain Scale: Numbers Pre/Post-Treatment    Pretreatment Pain Rating 4/10  -     Posttreatment Pain Rating 5/10  -     Pain Location - Side Left  -     Pain Location - Orientation posterior  -     Pain Location knee  -     Pain Intervention(s) Ambulation/increased activity; Repositioned; Cold applied  -[facility] Name 12/02/21 0835          Therapy Assessment/Plan (PT)    Rehab Potential (PT) good, to achieve stated therapy goals  -     Criteria for Skilled Interventions Met (PT) yes; meets criteria; skilled treatment is necessary  -[facility] Name 12/02/21 0835          Positioning and Restraints    Pre-Treatment Position sitting in chair/recliner  -     Post Treatment Position chair  -NATI     In Chair notified nsg; reclined; call light  within reach; encouraged to call for assist; exit alarm on; legs elevated  -           User Key  (r) = Recorded By, (t) = Taken By, (c) = Cosigned By    Initials Name Provider Type    Christian Amezquita PT Physical Therapist               Outcome Measures     Row Name 12/02/21 0835          How much help from another person do you currently need...    Turning from your back to your side while in flat bed without using bedrails? 4  -NATI     Moving from lying on back to sitting on the side of a flat bed without bedrails? 4  -NATI     Moving to and from a bed to a chair (including a wheelchair)? 4  -NATI     Standing up from a chair using your arms (e.g., wheelchair, bedside chair)? 3  -NATI     Climbing 3-5 steps with a railing? 3  -NATI     To walk in hospital room? 3  -NATI     AM-PAC 6 Clicks Score (PT) 21  -     Row Name 12/02/21 0835          Functional Assessment    Outcome Measure Options AM-PAC 6 Clicks Basic Mobility (PT)  -           User Key  (r) = Recorded By, (t) = Taken By, (c) = Cosigned By    Initials Name Provider Type    Christian Amzequita PT Physical Therapist                             Physical Therapy Education                 Title: PT OT SLP Therapies (Done)     Topic: Physical Therapy (Done)     Point: Mobility training (Done)     Learning Progress Summary           Patient Acceptance, E,D,H, VU by NATI at 12/2/2021 0835    Comment: Educated on safe sequencing with ambulatory/car transfers, gait, and stair training. Reviewed HEP and knee precautions via handout.    Acceptance, E,D,H, VU by NATI at 12/1/2021 1305    Comment: Edcuated on safe sequencing with bed mobility, ambulatory/car transfers, gait, and stair training. Reviewed HEP and knee precautions via handout.   Significant Other Acceptance, E,D,H, VU by NATI at 12/1/2021 1305    Comment: Edcuated on safe sequencing with bed mobility, ambulatory/car transfers, gait, and stair training. Reviewed HEP and knee precautions via handout.                    Point: Home exercise program (Done)     Learning Progress Summary           Patient Acceptance, E,D,H, VU by NATI at 12/2/2021 0835    Comment: Educated on safe sequencing with ambulatory/car transfers, gait, and stair training. Reviewed HEP and knee precautions via handout.    Acceptance, E,D,H, VU by NATI at 12/1/2021 1305    Comment: Edcuated on safe sequencing with bed mobility, ambulatory/car transfers, gait, and stair training. Reviewed HEP and knee precautions via handout.   Significant Other Acceptance, E,D,H, VU by NATI at 12/1/2021 1305    Comment: Edcuated on safe sequencing with bed mobility, ambulatory/car transfers, gait, and stair training. Reviewed HEP and knee precautions via handout.                   Point: Body mechanics (Done)     Learning Progress Summary           Patient Acceptance, E,D,H, VU by NATI at 12/2/2021 0835    Comment: Educated on safe sequencing with ambulatory/car transfers, gait, and stair training. Reviewed HEP and knee precautions via handout.    Acceptance, E,D,H, VU by NATI at 12/1/2021 1305    Comment: Edcuated on safe sequencing with bed mobility, ambulatory/car transfers, gait, and stair training. Reviewed HEP and knee precautions via handout.   Significant Other Acceptance, E,D,H, VU by NATI at 12/1/2021 1305    Comment: Edcuated on safe sequencing with bed mobility, ambulatory/car transfers, gait, and stair training. Reviewed HEP and knee precautions via handout.                   Point: Precautions (Done)     Learning Progress Summary           Patient Acceptance, E,D,H, VU by NATI at 12/2/2021 0835    Comment: Educated on safe sequencing with ambulatory/car transfers, gait, and stair training. Reviewed HEP and knee precautions via handout.    Acceptance, E,D,H, VU by NATI at 12/1/2021 1305    Comment: Edcuated on safe sequencing with bed mobility, ambulatory/car transfers, gait, and stair training. Reviewed HEP and knee precautions via handout.   Significant Other Acceptance, E,D,H,  VU by NATI at 12/1/2021 1305    Comment: Edcuated on safe sequencing with bed mobility, ambulatory/car transfers, gait, and stair training. Reviewed HEP and knee precautions via handout.                               User Key     Initials Effective Dates Name Provider Type Discipline    NATI 06/16/21 -  Christian Lundy, PT Physical Therapist PT              PT Recommendation and Plan  Planned Therapy Interventions (PT): balance training, bed mobility training, gait training, home exercise program, patient/family education, transfer training, ROM (range of motion), stair training, strengthening  Plan of Care Reviewed With: patient  Progress: improving  Outcome Summary: Pt increased ambulation distance to 360 feet using RW and CGA. Verbal cues for maintaining upright posture, body within walker, increase step length. Pt ascended/descended 2 steps using RW, CGA, and backwards technique. Gait/stair training limited by fatigue. STS performed with supervision. L knee AROM measured at 8-97 degrees. Reviewed HEP and knee precautions via handout. Educated on safe car transfer. Functionally, pt safe to d/c home with assist today from a PT perspective. Recommend OPPT.     Time Calculation:    PT Charges     Row Name 12/02/21 0835             Time Calculation    Start Time 0835  -      PT Received On 12/02/21  -      PT Goal Re-Cert Due Date 12/11/21  -              Time Calculation- PT    Total Timed Code Minutes- PT 23 minute(s)  -              Timed Charges    29084 - PT Therapeutic Exercise Minutes 10  -NATI      88567 - Gait Training Minutes  13  -NATI              Total Minutes    Timed Charges Total Minutes 23  -       Total Minutes 23  -NATI            User Key  (r) = Recorded By, (t) = Taken By, (c) = Cosigned By    Initials Name Provider Type    Christian Amezquita, PT Physical Therapist              Therapy Charges for Today     Code Description Service Date Service Provider Modifiers Qty    97215883271 HC GAIT TRAINING EA 15  MIN 12/1/2021 Christian Lundy, PT GP 1    71268497785 HC PT EVAL LOW COMPLEXITY 3 12/1/2021 Christian Lundy, PT GP 1    64489314085 HC PT THER SUPP EA 15 MIN 12/1/2021 Christian Lundy, PT GP 3    36270244794 HC PT THER PROC EA 15 MIN 12/2/2021 Christian Lundy, PT GP 1    [id number] HC GAIT TRAINING EA 15 MIN 12/2/2021 Christian Lundy, PT GP 1          PT G-Codes  Outcome Measure Options: AM-PAC 6 Clicks Basic Mobility (PT)  AM-PAC 6 Clicks Score (PT): 21    Christian Lundy, PT  12/2/2021

## 2021-12-02 NOTE — THERAPY EVALUATION
Patient Name: Isrrael Marino  : 1940    MRN: [id number]                              Today's Date: 2021       Admit Date: 2021    Visit Dx:     ICD-10-CM ICD-9-CM   1. Status post total left knee replacement  Z96.652 V43.65   2. Primary osteoarthritis of both knees  M17.0 715.16   3. Chronic coronary artery disease  I25.10 414.00     Patient Active Problem List   Diagnosis   • Gastroesophageal reflux disease   • Atopic rhinitis   • Peripheral neuropathy   • Low back pain   • Hypertension   • Hyperlipidemia   • Benign prostatic hyperplasia   • Chronic coronary artery disease   • Primary osteoarthritis of both knees   • Glenohumeral arthritis, right   • Prediabetes   • JIN (obstructive sleep apnea)   • Hypersomnia   • Iron deficiency   • Ischemic cardiomyopathy   • Superficial thrombophlebitis of right upper extremity   • DDD (degenerative disc disease), cervical   • Status post total left knee replacement     Past Medical History:   Diagnosis Date   • Arrhythmia    • Arthritis     both knees   • Broken neck (HCC)    • CAD (coronary artery disease)    • Cancer (HCC)     skin cancer  head and neck  nonmelanoma   • Chondrocalcinosis of knee    • GERD (gastroesophageal reflux disease)    • Gout    • Heart attack (HCC)      Last Assessed: 28 Mar 2014   • Heart disease    • History of transfusion     own blood with hip surgery   • Hyperlipidemia    • Hypertension    • Hypertensive urgency 2021   • IBS (irritable bowel syndrome)    • Left rotator cuff tear arthropathy    • Low back pain    • Lower extremity neuropathy    • Lumbar canal stenosis    • Neck pain    • Numbness    • Right hip pain    • Rotator cuff tear arthropathy of right shoulder    • Sleep apnea     semi compliant with c-pap    • Thin blood (HCC)      Past Surgical History:   Procedure Laterality Date   • APPENDECTOMY     • BACK SURGERY      spinal decompression and fusion   • BREAST LUMPECTOMY      benign   • CARDIAC  CATHETERIZATION      with two stents//. DR. TOLEDO   • CARDIAC CATHETERIZATION N/A 1/8/2021    Procedure: LEFT HEART CATH;  Surgeon: Casimiro Bernal MD;  Location:  [facility];  Service: Cardiovascular;  Laterality: N/A;   • CARPAL TUNNEL RELEASE  03/28/2014    Neuroplasty Decompression Median Nerve At Carpal Tunnel   • CERVICAL DISCECTOMY POSTERIOR FUSION WITH BRAIN LAB N/A 7/13/2018    Procedure: CERVICAL LAMINECTOMY DECOMPRESSION POSTERIOR C1-2 POSTERIOR CERVICAL FUSION;  Surgeon: Randall Barnett MD;  Location:  [facility];  Service: Neurosurgery   • COLONOSCOPY     • CORONARY STENT PLACEMENT  2013   • HERNIA REPAIR  2002    & 1998   • JOINT REPLACEMENT     • LUMBAR DISCECTOMY FUSION INSTRUMENTATION     • SKIN BIOPSY     • TONSILLECTOMY     • TOTAL HIP ARTHROPLASTY  2002   • VASECTOMY     • WISDOM TOOTH EXTRACTION        General Information     Row Name 12/02/21 1011          OT Time and Intention    Document Type evaluation  -HK     Mode of Treatment occupational therapy  -     Row Name 12/02/21 1011          General Information    Patient Profile Reviewed yes  -HK     Prior Level of Function min assist:; all household mobility; community mobility; gait; transfer; bed mobility; ADL's  -HK     Existing Precautions/Restrictions fall; other (see comments)  L adductor canal nerve catheter; [place]  -HK     Barriers to Rehab hearing deficit  -HK     Row Name 12/02/21 1011          Living Environment    Lives With spouse  -HK     Row Name 12/02/21 1011          Home Main Entrance    Number of Stairs, Main Entrance one  -HK     Row Name 12/02/21 1011          Stairs Within Home, Primary    Number of Stairs, Within Home, Primary none  -HK     Stair Railings, Within Home, Primary none  -HK     Stairs Comment, Within Home, Primary Pt has tub shower. OT issued hand out on tub bench and educated pt on need for safe tub transfers.  -     Row Name 12/02/21 1011          Cognition    Orientation Status  (Cognition) oriented x 4  -     Row Name 12/02/21 1011          Safety Issues, Functional Mobility    Safety Issues Affecting Function (Mobility) safety precautions follow-through/compliance; safety precaution awareness; insight into deficits/self-awareness  -     Impairments Affecting Function (Mobility) endurance/activity tolerance; strength; range of motion (ROM); pain  -           User Key  (r) = Recorded By, (t) = Taken By, (c) = Cosigned By    Initials Name Provider Type     Bee Silverman OT Occupational Therapist                 Mobility/ADL's     Row Name 12/02/21 1013          Bed Mobility    Comment (Bed Mobility) Received and left [facility]. Issued leg  and educated pt on use for completion of bed mobility and safe car transfers.  -     Row Name 12/02/21 1013          Transfers    Transfers sit-stand transfer  -     Comment (Transfers) Pt completed sit to stand to pull pants over hips. Demonstrates safe hand placement. Cues to slide L LE out prior to transfers for comfort.  -     Sit-Stand [place] (Transfers) standby assist; verbal cues  -     Row Name 12/02/21 1013          Sit-Stand Transfer    Assistive Device (Sit-Stand Transfers) walker, front-wheeled  -     Row Name 12/02/21 1013          Functional Mobility    Functional Mobility- Ind. Level not tested  -     Row Name 12/02/21 1013          Activities of Daily Living    BADL Assessment/Intervention bathing; lower body dressing  -     Row Name 12/02/21 1013          Mobility    Extremity Weight-bearing Status left lower extremity  -     Left Lower Extremity (Weight-bearing Status) weight-bearing as tolerated (WBAT)  -     Row Name 12/02/21 1013          Bathing Assessment/Intervention    Comment (Bathing) OT issued LH sponge and educated pt on use for completion of all LBB.  -     Row Name 12/02/21 1013          Lower Body Dressing Assessment/Training    [place] Level (Lower Body Dressing) doff; don; socks;  minimum assist (75% patient effort); verbal cues; other (see comments); moderate assist (50% patient effort)  pants  -HK     Position (Lower Body Dressing) unsupported sitting  -HK     Comment (Lower Body Dressing) OT issued reacher, sock aid, and  shoe horn and educated pt on use for completion of all LBD. Pt dof/don socks with [place] and use of AE. Pt required modA to don pants. Pt educated on safe completion of LBD around gleason catheter and L adductor canal nerve catheter.  -           User Key  (r) = Recorded By, (t) = Taken By, (c) = Cosigned By    Initials Name Provider Type     Bee Silverman, OT Occupational Therapist               Obj/Interventions     Row Name 12/02/21 1026          Sensory Assessment (Somatosensory)    Sensory Assessment (Somatosensory) sensation intact  -     Row Name 12/02/21 1026          Vision Assessment/Intervention    Visual Impairment/Limitations WFL  -     Row Name 12/02/21 1026          Range of Motion Comprehensive    General Range of Motion upper extremity range of motion deficits identified  -     Comment, General Range of Motion L UE grossly 60 degrees AROM. R UE grossly 160 degrees AROM.  -     Row Name 12/02/21 1026          Strength Comprehensive (MMT)    General Manual Muscle Testing (MMT) Assessment upper extremity strength deficits identified  -     Row Name 12/02/21 1026          Upper Extremity (Manual Muscle Testing)    Upper Extremity: Manual Muscle Testing (MMT) right shoulder strength deficit; left shoulder strength deficit  -     Row Name 12/02/21 1026          Balance    Balance Assessment sitting static balance; sitting dynamic balance; standing static balance; standing dynamic balance  -     Static Sitting Balance WNL; unsupported; sitting in chair  -HK     Dynamic Sitting Balance WNL; unsupported; sitting in chair  -HK     Static Standing Balance WFL; supported; standing  -HK     Dynamic Standing Balance WFL; supported; standing  -HK      Balance Interventions sitting; occupation based/functional task; standing  -HK     Row Name 12/02/21 1026          Right Shoulder (Manual Muscle Testing)    Right Shoulder Manual Muscle Testing (MMT) flexion  -HK     MMT: Flexion, Right Shoulder flexion  -HK     MMT, Gross Movement: Right Shoulder Flexion (4-/5) good minus  -HK     Row Name 12/02/21 1026          Left Shoulder (Manual Muscle Testing)    Left Shoulder Manual Muscle Testing (MMT) flexion  -HK     MMT: Flexion, Left Shoulder flexion  -HK     MMT, Gross Movement: Left Shoulder Flexion (2+/5) poor plus  -HK           User Key  (r) = Recorded By, (t) = Taken By, (c) = Cosigned By    Initials Name Provider Type     Bee Silverman, OT Occupational Therapist               Goals/Plan     Row Name 12/02/21 1031          Bed Mobility Goal 1 (OT)    Activity/Assistive Device (Bed Mobility Goal 1, OT) scooting; sit to supine/supine to sit  -HK     [place] Level/Cues Needed (Bed Mobility Goal 1, OT) supervision required  -HK     Time Frame (Bed Mobility Goal 1, OT) by discharge; long term goal (LTG)  -HK     Progress/Outcomes (Bed Mobility Goal 1, OT) goal ongoing  -     Row Name 12/02/21 1031          Transfer Goal 1 (OT)    Activity/Assistive Device (Transfer Goal 1, OT) sit-to-stand/stand-to-sit  -HK     [place] Level/Cues Needed (Transfer Goal 1, OT) standby assist  -HK     Time Frame (Transfer Goal 1, OT) by discharge; long term goal (LTG)  -HK     Progress/Outcome (Transfer Goal 1, OT) goal met  -HK     Row Name 12/02/21 1031          Dressing Goal 1 (OT)    Activity/Device (Dressing Goal 1, OT) lower body dressing; reacher; sock-aid; long-handled shoe horn  -HK     [place]/Cues Needed (Dressing Goal 1, OT) minimum assist (75% or more patient effort); verbal cues required  -HK     Time Frame (Dressing Goal 1, OT) by discharge; long term goal (LTG)  -HK     Progress/Outcome (Dressing Goal 1, OT) goal ongoing  -     Row Name 12/02/21 1031           Toileting Goal 1 (OT)    Activity/Device (Toileting Goal 1, OT) adjust/manage clothing; perform perineal hygiene  -HK     [place] Level/Cues Needed (Toileting Goal 1, OT) minimum assist (75% or more patient effort); verbal cues required  -HK     Time Frame (Toileting Goal 1, OT) by discharge; long term goal (LTG)  -HK     Progress/Outcome (Toileting Goal 1, OT) goal ongoing  -HK     Row Name 12/02/21 1031          Therapy Assessment/Plan (OT)    Planned Therapy Interventions (OT) adaptive equipment training; BADL retraining; functional balance retraining; occupation/activity based interventions; transfer/mobility retraining; ROM/therapeutic exercise  -HK           User Key  (r) = Recorded By, (t) = Taken By, (c) = Cosigned By    Initials Name Provider Type    HK Bee Silverman, OT Occupational Therapist               Clinical Impression     Row Name 12/02/21 1028          Pain Assessment    Additional Documentation Pain Scale: Numbers Pre/Post-Treatment (Group)  -HK     Row Name 12/02/21 1028          Pain Scale: Numbers Pre/Post-Treatment    Pretreatment Pain Rating 4/10  -HK     Posttreatment Pain Rating 4/10  -HK     Pain Location - Side Left  -HK     Pain Location - Orientation generalized  -HK     Pain Location knee  -HK     Pain Intervention(s) Ambulation/increased activity; Repositioned  -HK     Row Name 12/02/21 1028          Plan of Care Review    Plan of Care Reviewed With patient  -HK     Progress improving  -HK     Outcome Summary OT eval complete. Pt [place] however pleasent and cooperative. Pt completes sit to stand with SBA and demonstrates safe hand placement. Cues to slide L LE out prior to transfers. OT issued appropriate AE and educated pt on use for completion of all LBD and LBB. Pt dof/don socks with [place] and use of AE. Required modA to don pants with AE. Educated on safe completion of LBD around nerve catheter and gleason catheter. Recommended pt purchase tub bench and hand out issued.  Recommend d/c home with assist and OP OT.  -     Row Name 12/02/21 1028          Therapy Assessment/Plan (OT)    Rehab Potential (OT) good, to achieve stated therapy goals  -     Criteria for Skilled Therapeutic Interventions Met (OT) yes; skilled treatment is necessary  -HK     Therapy Frequency (OT) daily  -     Row Name 12/02/21 1028          Therapy Plan Review/Discharge Plan (OT)    Anticipated Discharge Disposition (OT) home with assist; home with outpatient therapy services  -     Row Name 12/02/21 1028          Vital Signs    Pre Systolic BP Rehab --  RN cleared for tx; VSS  -HK     O2 Delivery Pre Treatment room air  -HK     O2 Delivery Intra Treatment room air  -HK     O2 Delivery Post Treatment room air  -HK     Pre Patient Position Sitting  -HK     Intra Patient Position Standing  -HK     Post Patient Position Sitting  -HK     Row Name 12/02/21 1028          Positioning and Restraints    Pre-Treatment Position sitting in chair/recliner  -HK     Post Treatment Position chair  -HK     In Chair notified nsg; reclined; call light within reach; exit alarm on; encouraged to call for assist  -           User Key  (r) = Recorded By, (t) = Taken By, (c) = Cosigned By    Initials Name Provider Type    HK Bee Silverman, OT Occupational Therapist               Outcome Measures     Row Name 12/02/21 1032          How much help from another is currently needed...    Putting on and taking off regular lower body clothing? 2  -HK     Bathing (including washing, rinsing, and drying) 3  -HK     Toileting (which includes using toilet bed pan or urinal) 3  -HK     Putting on and taking off regular upper body clothing 3  -HK     Taking care of personal grooming (such as brushing teeth) 3  -HK     Eating meals 3  -HK     AM-PAC 6 Clicks Score (OT) 17  -HK     Row Name 12/02/21 0835          How much help from another person do you currently need...    Turning from your back to your side while in flat bed without  using bedrails? 4  -NATI     Moving from lying on back to sitting on the side of a flat bed without bedrails? 4  -NATI     Moving to and from a bed to a chair (including a wheelchair)? 4  -NATI     Standing up from a chair using your arms (e.g., wheelchair, bedside chair)? 3  -NATI     Climbing 3-5 steps with a railing? 3  -NATI     To walk in hospital room? 3  -NATI     AM-PAC 6 Clicks Score (PT) 21  -NATI     Row Name 12/02/21 1032 12/02/21 0835       Functional Assessment    Outcome Measure Options AM-PAC 6 Clicks Daily Activity (OT)  - AM-PAC 6 Clicks Basic Mobility (PT)  -          User Key  (r) = Recorded By, (t) = Taken By, (c) = Cosigned By    Initials Name Provider Type     Bee Silverman, OT Occupational Therapist    NATI Christian Lundy, PT Physical Therapist                Occupational Therapy Education                 Title: PT OT SLP Therapies (In Progress)     Topic: Occupational Therapy (In Progress)     Point: ADL training (Done)     Description:   Instruct learner(s) on proper safety adaptation and remediation techniques during self care or transfers.   Instruct in proper use of assistive devices.              Learning Progress Summary           Patient Acceptance, E,TB,D, VU,NR by  at 12/2/2021 1035                   Point: Home exercise program (Not Started)     Description:   Instruct learner(s) on appropriate technique for monitoring, assisting and/or progressing therapeutic exercises/activities.              Learner Progress:  Not documented in this visit.          Point: Precautions (Done)     Description:   Instruct learner(s) on prescribed precautions during self-care and functional transfers.              Learning Progress Summary           Patient Acceptance, E,TB,D, VU,NR by  at 12/2/2021 1035                   Point: Body mechanics (Done)     Description:   Instruct learner(s) on proper positioning and spine alignment during self-care, functional mobility activities and/or exercises.               Learning Progress Summary           Patient Acceptance, E,TB,D, VU,NR by  at 12/2/2021 1035                               User Key     Initials Effective Dates Name Provider Type Discipline     06/16/21 -  Bee Silverman, OT Occupational Therapist OT              OT Recommendation and Plan  Planned Therapy Interventions (OT): adaptive equipment training, BADL retraining, functional balance retraining, occupation/activity based interventions, transfer/mobility retraining, ROM/therapeutic exercise  Therapy Frequency (OT): daily  Plan of Care Review  Plan of Care Reviewed With: patient  Progress: improving  Outcome Summary: OT eval complete. Pt [place] however pleasent and cooperative. Pt completes sit to stand with SBA and demonstrates safe hand placement. Cues to slide L LE out prior to transfers. OT issued appropriate AE and educated pt on use for completion of all LBD and LBB. Pt dof/don socks with [place] and use of AE. Required modA to don pants with AE. Educated on safe completion of LBD around nerve catheter and gleason catheter. Recommended pt purchase tub bench and hand out issued. Recommend d/c home with assist and OP OT.     Time Calculation:    Time Calculation- OT     Row Name 12/02/21 0911 12/02/21 0835          Time Calculation- OT    OT Start Time 0911 - --     OT Received On 12/02/21  - --     OT Goal Re-Cert Due Date 12/12/21  - --            Timed Charges    17572 - Gait Training Minutes  -- 13  -NATI     22857 - OT Self Care/Mgmt Minutes 30  -HK --            Untimed Charges    OT Eval/Re-eval Minutes 32  -HK --            Total Minutes    Timed Charges Total Minutes 30  -HK 13  -NATI     Untimed Charges Total Minutes 32  -HK --      Total Minutes 62  -HK 13  -NATI           User Key  (r) = Recorded By, (t) = Taken By, (c) = Cosigned By    Initials Name Provider Type     Bee Silverman, OT Occupational Therapist    NATI Christian Lundy, PT Physical Therapist              Therapy Charges for Today     Code  Description Service Date Service Provider Modifiers Qty    30468136563 HC OT SELF CARE/MGMT/TRAIN EA 15 MIN 12/2/2021 Bee Silverman, OT GO 2    82027825509 HC OT EVAL LOW COMPLEXITY 2 12/2/2021 Bee Silverman, OT GO 1               Bee Silverman OT  12/2/2021

## 2021-12-02 NOTE — PLAN OF CARE
Problem: Adult Inpatient Plan of Care  Goal: Plan of Care Review  Flowsheets (Taken 12/2/2021 4209)  Progress: improving  Plan of Care Reviewed With: patient  Outcome Summary: Pt increased ambulation distance to 360 feet using RW and CGA. Verbal cues for maintaining upright posture, body within walker, increase step length. Pt ascended/descended 2 steps using RW, CGA, and backwards technique. Gait/stair training limited by fatigue. STS performed with supervision. L knee AROM measured at 8-97 degrees. Reviewed HEP and knee precautions via handout. Educated on safe car transfer. Functionally, pt safe to d/c home with assist today from a PT perspective. Recommend OPPT.   Goal Outcome Evaluation:  Plan of Care Reviewed With: patient        Progress: improving  Outcome Summary: Pt increased ambulation distance to 360 feet using RW and CGA. Verbal cues for maintaining upright posture, body within walker, increase step length. Pt ascended/descended 2 steps using RW, CGA, and backwards technique. Gait/stair training limited by fatigue. STS performed with supervision. L knee AROM measured at 8-97 degrees. Reviewed HEP and knee precautions via handout. Educated on safe car transfer. Functionally, pt safe to d/c home with assist today from a PT perspective. Recommend OPPT.

## 2021-12-02 NOTE — PLAN OF CARE
Goal Outcome Evaluation:  Plan of Care Reviewed With: patient        Progress: improving  Outcome Summary: OT eval complete. Pt [place] however pleasent and cooperative. Pt completes sit to stand with SBA and demonstrates safe hand placement. Cues to slide L LE out prior to transfers. OT issued appropriate AE and educated pt on use for completion of all LBD and LBB. Pt dof/don socks with [place] and use of AE. Required modA to don pants with AE. Educated on safe completion of LBD around nerve catheter and gleason catheter. Recommended pt purchase tub bench and hand out issued. Recommend d/c home with assist and OP OT.

## 2021-12-02 NOTE — CASE MANAGEMENT/SOCIAL WORK
Case Management Discharge Note      Final Note: Patient' splan is to return home with [facility] Transitions program.  Joyce Pérez, RN [phone]         Selected Continued Care - Admitted Since 12/1/2021     Destination    No services have been selected for the patient.              Durable Medical Equipment    No services have been selected for the patient.              Dialysis/Infusion    No services have been selected for the patient.              Home Medical Care    No services have been selected for the patient.              Therapy     Service Provider Selected Services Address Phone Fax Patient Preferred    [facility]  Outpatient Physical Therapy [address] [address] [phone] --          Community Resources    No services have been selected for the patient.              Community & DME    No services have been selected for the patient.                       Final Discharge Disposition Code: 01 - home or self-care

## 2021-12-02 NOTE — PLAN OF CARE
Patient arrived from PACU at 11:20.  ACE in place and was CDI.  PNC at 10ml/hr.  No complaints of pain upon arrival.  VS remained WNL.  Ambulation with PT and staff.  Unable to void.  BS showed 516ml.  Patient tried for another hour to urinate.  Unable to produce urine.  I&O cath at 17:27 with a return of 750ml.  MD notified and will see patient in the AM.  Wife notified that patient would remain over night.  Will continue to monitor.

## 2021-12-02 NOTE — DISCHARGE SUMMARY
Patient Name: Isrrael Marino  MRN: [id number]  : 1940  DOS: 2021    Attending: Kaleigh Richards MD    Primary Care Provider: Ivanna George MD    Date of Admission:.2021  5:05 AM    Date of Discharge:  2021    Discharge Diagnosis:   Status post total left knee replacement    Hypertension    Hyperlipidemia    Benign prostatic hyperplasia    Chronic coronary artery disease    Primary osteoarthritis of both knees    Prediabetes    JIN (obstructive sleep apnea)    Postoperative urinary retention      Hospital Course    At admit:  Patient is a pleasant 81 y.o. male presented for scheduled surgery by Dr. Craig.  He underwent left total knee arthroplasty under general anesthesia.  He tolerated surgery well and was admitted for further medical management.  His knee has been painful for many years.  He uses a cane occasionally for ambulation.  He has had frequent cortisone injections over the past several years.  He denies recent falls.     When seen postop he is doing well.  His pain is well controlled.  He is already cleared physical therapy for discharge.  He denies nausea, shortness of breath or chest pain.  No history of DVT or PE.     He has history of hypertension, hyperlipidemia and coronary artery disease.  He is followed by Dr. Bernal, cardiology, and had preop cardiac clearance.  He held his Plavix for 5 days preoperatively.        After admit:    Patient was provided pain medications as needed for pain control, along with adductor canal nerve block infusion of Ropivacaine.    Adjustments were made to pain medications to optimize postop pain management. Risks and benefits of opiate medications discussed with patient. RAZ report was reviewed.    He was seen by PT and OT and has progressed well over his stay.    He used an IS for atelectasis prophylaxis and aspirin along with mechanicals for DVT prophylaxis.  Per Dr. Craig he will resume home Plavix tomorrow.    Home  medications were resumed as appropriate, and labs were monitored and remained fairly stable.     Patient has had some issues emptying his bladder and was unable to void adequately overnight last night.  A Diaz catheter was placed this morning and patient was discharged with the catheter.  Patient will be seeing his urologist Dr. Gutierrez 12/6/2021 for retention evaluation.  (I discussed with the patient the option of a voiding trial prior to discharge and if he feels that reanchoring Diaz with follow-up with Dr. Banuelos.  He opted to just leave the catheter in for the time being and follow-up with Dr. Banuelos early next week.  I have advised him to take 0.8 mg dose of Flomax until his appointment.)wy    With the progress he has made, he is ready for DC home today.      He will have an Arrow pump ( instructed on it during this admit).    Discussed with patient regarding plan and he shows understanding and agreement.    Patient will have HHPT following discharge.        Procedures Performed  Procedure(s):  TOTAL KNEE ARTHROPLASTY LEFT       Pertinent Test Results:    I reviewed the patient's new clinical results.   Results from last 7 days   Lab Units 12/02/21  0926   WBC 10*3/mm3 15.60*   HEMOGLOBIN g/dL 11.3*   HEMATOCRIT % 34.6*   PLATELETS 10*3/mm3 237     Results from last 7 days   Lab Units 12/02/21  0925 12/01/21  0609   SODIUM mmol/L 136  --    POTASSIUM mmol/L 4.7 4.1   CHLORIDE mmol/L 99  --    CO2 mmol/L 27.0  --    BUN mg/dL 33*  --    CREATININE mg/dL 1.18  --    CALCIUM mg/dL 8.7  --    GLUCOSE mg/dL 102*  --    Results for VERENA REGALADO (MRN [id number]) as of 12/3/2021 07:41   Ref. Range 11/18/2021 09:12   Hemoglobin Latest Ref Range: 13.0 - 17.7 g/dL 13.4   Hematocrit Latest Ref Range: 37.5 - 51.0 % 40.3     I reviewed the patient's new imaging including images and reports.      Physical therapy  Pt increased ambulation distance to 360 feet using RW and CGA. Verbal cues for maintaining upright posture,  "body within walker, increase step length. Pt ascended/descended 2 steps using RW, CGA, and backwards technique. Gait/stair training limited by fatigue. STS performed with supervision. L knee AROM measured at 8-97 degrees. Reviewed HEP and knee precautions via handout. Educated on safe car transfer. Functionally, pt safe to d/c home with assist today from a PT perspective. Recommend OPPT.  Discharge Assessment:       Visit Vitals  BP 94/51 (BP Location: Left arm, Patient Position: Sitting)   Pulse 67   Temp 98.5 °F (36.9 °C) (Oral)   Resp 18   Ht 182.9 cm (72\")   Wt 84.3 kg (185 lb 14.4 oz)   SpO2 93%   BMI 25.21 kg/m²     Temp (24hrs), Av.9 °F (36.6 °C), Min:97.5 °F (36.4 °C), Max:98.5 °F (36.9 °C)      General Appearance:    Alert, cooperative, in no acute distress   Lungs:     Clear to auscultation,respirations regular, even and                   unlabored    Heart:    Regular rhythm and normal rate, normal S1 and S2   Abdomen:     Normal bowel sounds, no masses, no organomegaly, soft        non-tender, non-distended, no guarding, no rebound                 tenderness   Extremities:   CDI dressing surgical knee, left. ACB cath present, arrow pump.   Pulses:   Pulses palpable and equal bilaterally   Skin:   No bleeding, bruising or rash   Neurologic:   Cranial nerves 2 - 12 grossly intact, sensation intact, Flexion and dorsiflexion intact bilateral feet.         Discharge Disposition: Home          Discharge Medications      New Medications      Instructions Start Date   acetaminophen 500 MG tablet  Commonly known as: TYLENOL   1,000 mg, Oral, Every 8 Hours, For 1 week, then as needed      docusate sodium 100 MG capsule  Commonly known as: Colace   100 mg, Oral, 2 Times Daily      ondansetron 4 MG tablet  Commonly known as: Zofran   4 mg, Oral, Every 8 Hours PRN      oxyCODONE 5 MG immediate release tablet  Commonly known as: ROXICODONE   5 mg, Oral, Every 4 Hours PRN      Ropivacine HCl-NaCl  Commonly known " as: NAROPIN   10 mL/hr (20 mg/hr), Peripheral Nerve, Continuous         Changes to Medications      Instructions Start Date   aspirin 81 MG EC tablet  What changed: additional instructions   81 mg, Oral, Daily, Resume in 1 month      aspirin 81 MG EC tablet  What changed: You were already taking a medication with the same name, and this prescription was added. Make sure you understand how and when to take each.   81 mg, Oral, Every 12 Hours Scheduled, For 1 month      clopidogrel 75 MG tablet  Commonly known as: PLAVIX  What changed: additional instructions   75 mg, Oral, Daily, Resume 12/2/21      DULoxetine 30 MG capsule  Commonly known as: CYMBALTA  What changed:   · how much to take  · when to take this   60 mg, Oral, Daily         Continue These Medications      Instructions Start Date   ammonium lactate 12 % lotion  Commonly known as: LAC-HYDRIN   Every 12 Hours Scheduled      atorvastatin 40 MG tablet  Commonly known as: LIPITOR   40 mg, Oral, Nightly      carvedilol 6.25 MG tablet  Commonly known as: COREG   6.25 mg, Oral, Every 12 Hours Scheduled      fluticasone 50 MCG/ACT nasal spray  Commonly known as: FLONASE   1 spray, Nasal, Daily      Fluzone High-Dose Quadrivalent 0.7 ML suspension prefilled syringe injection  Generic drug: Influenza Vac High-Dose Quad   No dose, route, or frequency recorded.      furosemide 20 MG tablet  Commonly known as: LASIX   20 mg, Oral, Daily      lisinopril 2.5 MG tablet  Commonly known as: PRINIVIL,ZESTRIL   2.5 mg, Oral, 2 Times Daily      Melatonin 3 MG capsule   1 capsule, Oral, Daily      tamsulosin 0.4 MG capsule 24 hr capsule  Commonly known as: FLOMAX  Advised to take 0.8 mg daily until sees Urology. 0.4 mg, Oral, Daily      VITAMIN B COMPLEX PO   1 tablet, Oral, Daily      vitamin B-12 1000 MCG tablet  Commonly known as: CYANOCOBALAMIN   1,000 mcg, Oral, Daily      vitamin D3 125 MCG (5000 UT) capsule capsule   2,000 Units, Oral, Daily      Zinc 50 MG capsule   1  tablet, Oral, Daily             Discharge Diet: Regular    Activity at Discharge: Weightbearing as tolerated left lower extremity    Follow-up Appointments  Follow-up with Dr. Craig per his recommendation  Follow-up with Dr. Gutierrez, urology 12/6/2021        Dragon disclaimer:  Part of this encounter note is an electronic transcription/translation of spoken language to printed text. The electronic translation of spoken language may permit erroneous, or at times, nonsensical words or phrases to be inadvertently transcribed; Although I have reviewed the note for such errors, some may still exist.       JULY Jensen  12/02/21  15:09 EST    I have personally performed the evaluation on this patient. My history is consistent  with HPI obtained. My exam findings are listed above. I have personally reviewed and discussed the above formulated discharge plan with patient and Rachael MCDOWELL.wy

## 2021-12-02 NOTE — PROGRESS NOTES
"  SUBJECTIVE  Patient resting comfortably.  Pain well controlled.  Continuing to have urinary retention issues.    PHYSICAL THERAPY PROGRESS  Outcome Summary: PT eval complete. Pt ambulated 300 feet using RW, CGA, and one person to manage equipment. Pt ascended/descended 2 steps using RW, CGA, and backwards technique. Gait/stair training limited by fatigue. Bed mobility performed with supervision and STS with CGA. No knee buckling noted. Pt IND with SLR. Will assess L knee AROM POD#1 if pt does not d/c today. Reviewed HEP and knee precautions via handout. Educated on safe car transfers. PADD score = 8. ADLs assessed, pt does not require OT eval tonight. Functionally, pt safe to d/c home with assist from a PT perspective. Recommend HHPT. (21 1305)     OBJECTIVE  Temp (24hrs), Av.4 °F (36.3 °C), Min:97.1 °F (36.2 °C), Max:97.8 °F (36.6 °C)    Blood pressure 126/60, pulse 69, temperature 97.8 °F (36.6 °C), temperature source Oral, resp. rate 18, height 182.9 cm (72\"), weight 84.3 kg (185 lb 14.4 oz), SpO2 93 %.    Lab Results (last 24 hours)     Procedure Component Value Units Date/Time    Potassium [[id number]]  (Normal) Collected: 21    Specimen: Blood Updated: 21 0732     Potassium 4.1 mmol/L             PHYSICAL EXAM  Left lower extremity: Dressing clean, dry and intact.  Able to form straight leg raise without assist.  Intact EHL, FHL, tibialis anterior, and gastrocsoleus. Sensation intact to light touch to deep peroneal, superficial peroneal, sural, saphenous, tibial nerves. 2+ palpable DP and PT pulses.         Status post total left knee replacement    Hypertension    Hyperlipidemia    Benign prostatic hyperplasia    Chronic coronary artery disease    Primary osteoarthritis of both knees    Prediabetes    JIN (obstructive sleep apnea)      PLAN / DISPOSITION:  1 Day Post-Op left total knee arthroplasty    Protected weight bearing as tolerated left lower extremity, knee range of motion " as tolerated  Pain control  PT/OT for post op mobilization and medical equipment needs   23 hours perioperative antibiotic prophylaxis   SCD's bilateral lower extremities   Aspirin for DVT prophylaxis.  Okay to resume Plavix tomorrow.  Social work for discharge planning.  Okay to discharge once urinary retention issues resolved/outpatient follow-up for urinary retention established.  Dressing to remain in place for 7 days. May remove on POD#7. If no drainage, may shower on POD#10. No submerging wound in water. If drainage is noted, sterile dressing should be placed and wound checked daily. No showering until wound has remained dry for 72 consecutive hours.   Follow up in 3 weeks for re-assessment.      Future Appointments   Date Time Provider Department Center   12/21/2021  8:10 AM Tish Shelley PA-C [facility]   1/10/2022  8:00 AM Ivanna George MD [id number]   9/2/2022  8:15 AM Ivanna George MD [facility]   11/28/2022  9:15 AM Casimiro Bernal MD [facility]       Kendall Craig MD  12/02/21  07:03 EST

## 2021-12-03 NOTE — PROGRESS NOTES
[facility]    Nerve Cath Post Op Call    Patient Name: Isrrael Marino  :  1940  MRN:  [id number]  Date of Discharge: 2021    Nerve Cath Post Op Call:    Catheter Plan:Patient called, No answer. Message left to call [facility] pain service for any questions or complaints  Patient/Family instructed to call ON CALL anesthesia provider for any questions or problems.  Patient Follow Up:        No message system set up.

## 2021-12-05 NOTE — PROGRESS NOTES
[facility]    Nerve Cath Post Op Call    Patient Name: Isrrael Marino  :  1940  MRN:  [id number]  Date of Discharge: 2021    Nerve Cath Post Op Call:    Catheter Plan:Patient called, No answer. Message left to call [facility] pain service for any questions or complaints  Patient/Family instructed to call ON CALL anesthesia provider for any questions or problems.  Patient Follow Up:

## 2021-12-08 ENCOUNTER — TELEPHONE (OUTPATIENT)
Dept: ORTHOPEDIC SURGERY | Facility: CLINIC | Age: 81
End: 2021-12-08

## 2021-12-08 DIAGNOSIS — Z96.652 STATUS POST TOTAL LEFT KNEE REPLACEMENT: ICD-10-CM

## 2021-12-08 RX ORDER — OXYCODONE HYDROCHLORIDE 5 MG/1
5 TABLET ORAL EVERY 4 HOURS PRN
Qty: 40 TABLET | Refills: 0 | Status: SHIPPED | OUTPATIENT
Start: 2021-12-08 | End: 2021-12-16 | Stop reason: SDUPTHER

## 2021-12-08 NOTE — TELEPHONE ENCOUNTER
Caller:  VERENA REGALADO    Relationship to patient: SELF    Best call back number:     Patient is needing: PER PATIENT IS IN TERRIBLE PAIN WITH HIS KNEE.  SAID HE WENT FOR HIS FOLLOW UP AND EVRYTHING LOOKED GOOD BUT HE IS HAVING TERRIBLE, TERRIBLE PAIN.  HAS BEEN TAKING IBUPROFEN AND TYLENOL AND NOTHING IS HELPING.  REQ A CALL BACK FROM CLINICAL.  WASN'T ABLE TO TRANSFER OVER FOR CLINICAL ADVICE - PER [facility]U DUE TO STATE/ PLACE (BUSY) THE OFFICE WAS IN AT TIME OF CALL ONLY THING TO DO WAS PUT IN A NOTE TO CLINICAL.

## 2021-12-08 NOTE — TELEPHONE ENCOUNTER
Refill sent to pharmacy.  Sounds like the pain he is experiencing is appropriate.  Agree with recommendations you provided.

## 2021-12-08 NOTE — TELEPHONE ENCOUNTER
"Tried both numbers on chart, neither went to a voicemail.  Both said \"could not be completed as dialed\" after ringing for multiple rings  "

## 2021-12-08 NOTE — TELEPHONE ENCOUNTER
AHSAN Coto    Spoke with patient, he still is having pain.  He denies fevers, chills, swelling, redness or other concerning symptoms.  He says the incision looks good, no drainage or pain.  He says the physical therapy is making the knee hurt.  He says he is not taking the pain medication regularly, just when the pain in unbearable. I advised him to take the oxycodone every 4 hours for the next couple of days along with icing the knee to see if the pain subsides to a more tolerable state.  He asked why it feels like there is nerve pain, I told him the nerves have to basically readjust after this type of surgery and that will take time to get better.  He has 2 pain pills remaining, I told him that I will ask Dr. Craig about a refill.    Will we provide a refill?    Thanks,    Aníbal

## 2021-12-08 NOTE — TELEPHONE ENCOUNTER
Called patient to let him know the refill is sent and that Dr. Craig does not think there is anything abnormal about the pain.

## 2021-12-16 DIAGNOSIS — Z96.652 STATUS POST TOTAL LEFT KNEE REPLACEMENT: ICD-10-CM

## 2021-12-16 RX ORDER — OXYCODONE HYDROCHLORIDE 5 MG/1
5 TABLET ORAL EVERY 4 HOURS PRN
Qty: 40 TABLET | Refills: 0 | Status: SHIPPED | OUTPATIENT
Start: 2021-12-16 | End: 2022-01-03 | Stop reason: SDUPTHER

## 2021-12-16 NOTE — TELEPHONE ENCOUNTER
I called Mr. Marino to let him know that Dr. Craig sent in the pain medication to his pharmacy.       miguel

## 2021-12-16 NOTE — TELEPHONE ENCOUNTER
Dr. Craig   You done surgery on Mr. Marino on 12-1-21. Done a Left knee arthroplasty.   Please advise.       Oma

## 2021-12-21 ENCOUNTER — OFFICE VISIT (OUTPATIENT)
Dept: ORTHOPEDIC SURGERY | Facility: CLINIC | Age: 81
End: 2021-12-21

## 2021-12-21 VITALS — TEMPERATURE: 97.1 F

## 2021-12-21 DIAGNOSIS — Z96.652 STATUS POST TOTAL LEFT KNEE REPLACEMENT: Primary | ICD-10-CM

## 2021-12-21 PROCEDURE — 99024 POSTOP FOLLOW-UP VISIT: CPT | Performed by: PHYSICIAN ASSISTANT

## 2021-12-21 NOTE — PROGRESS NOTES
[facility] Note      Subjective     CC: Post-op (3 week recheck - Left total knee arthroplasty 12/1/21)      HPI    Isrrael Marino is a 81 y.o. male.  Patient presents a 3-week status post left total knee arthroplasty.  He reports pain is slowly improving.  Pain 7/10 at this time.  Taking Tylenol and oxycodone.  Doing outpatient PT.    Overall, patient's symptoms are improving    ROS:    Constiutional:Pt denies fever, chills, nausea, or vomiting.  MSK:as above        Objective      Past Medical History  Past Medical History:   Diagnosis Date   • Arrhythmia    • Arthritis     both knees   • Broken neck (HCC)    • CAD (coronary artery disease)    • Cancer (HCC)     skin cancer  head and neck  nonmelanoma   • Chondrocalcinosis of knee    • GERD (gastroesophageal reflux disease)    • Gout    • Heart attack (HCC)      Last Assessed: 28 Mar 2014   • Heart disease    • History of transfusion     own blood with hip surgery   • Hyperlipidemia    • Hypertension    • Hypertensive urgency 5/28/2021   • IBS (irritable bowel syndrome)    • Left rotator cuff tear arthropathy    • Low back pain    • Lower extremity neuropathy    • Lumbar canal stenosis    • Neck pain    • Numbness    • Right hip pain    • Rotator cuff tear arthropathy of right shoulder    • Sleep apnea     semi compliant with c-pap    • Thin blood (HCC)          Physical Exam  Temp 97.1 °F (36.2 °C)     There is no height or weight on file to calculate BMI.    Patient is well nourished and well developed.        Ortho Exam  Left knee exam: Anterior knee incision is healing well.  Range of motion 5-95 ligaments stable to valgus varus stress neurovascular intact distally.  Ambulating with a cane.    Imaging/Labs/EMG Reviewed:  Imaging Results (Last 24 Hours)     Procedure Component Value Units Date/Time    XR Knee 3+ View With [place] Left [[id number]] Resulted: 12/21/21 0830     Updated: 12/21/21 0834    Narrative:      Knee  X-ray    Indication: status-post TKA    Study:  AP, Lateral, and Sunrise views of Left knee    Comparison: Left knee 12/1/2021    Findings:  No signs of acute fracture are visualized  No signs of loosening are appreciated  Components are well aligned    Impression:  Status post Left total knee arthroplasty. No signs of   loosening or fracture.                Assessment    Assessment:  1. Status post total left knee replacement        Plan:  1. Recommend over the counter anti-inflammatories for pain and/or swelling  2. Doing well status post left total knee arthroplasty with Dr. Craig on 12/1/2021.  Patient will continue his PT.  He understands he can drive at 1 month out of surgery and off oxycodone.  He will return to see Dr. Craig in 3 weeks with repeat x-rays or sooner if needed.      Tish Shelley PA-C  12/21/21  08:35 EST      Dragon disclaimer:  Much of this encounter note is an electronic transcription/translation of spoken language to printed text. The electronic translation of spoken language may permit erroneous, or at times, nonsensical words or phrases to be inadvertently transcribed; Although I have reviewed the note for such errors, some may still exist.

## 2022-01-03 DIAGNOSIS — Z96.652 STATUS POST TOTAL LEFT KNEE REPLACEMENT: ICD-10-CM

## 2022-01-03 RX ORDER — OXYCODONE HYDROCHLORIDE 5 MG/1
5 TABLET ORAL EVERY 4 HOURS PRN
Qty: 40 TABLET | Refills: 0 | Status: SHIPPED | OUTPATIENT
Start: 2022-01-03 | End: 2022-02-10

## 2022-01-03 NOTE — TELEPHONE ENCOUNTER
Will you refill pain meds? He is out at this point. Feels that he needs some more for another week or so. He is also having issues with fluid retention. He has already contacted his PCP about this and is waiting for a call back from them.     Angela

## 2022-01-03 NOTE — TELEPHONE ENCOUNTER
Incoming Refill Request      Medication requested (name and dose): oxyCODONE (ROXICODONE) 5 MG immediate release tablet    Pharmacy where request should be sent: ON FILE     Additional details provided by patient: PATIENT STATED THAT HE IS HAVING SOME COMPLICATIONS AND WOULD LIKE A CALL BACK     Best call back number: [phone]    Does the patient have less than a 3 day supply:  [x] Yes  [] No    Jyothi Sousa Rep  01/03/22, 14:37 EST

## 2022-01-03 NOTE — TELEPHONE ENCOUNTER
PATIENT CALLED STATING THAT HE HAD A KNEE REPLACEMENT AND HE IS HAVING SOME COMPLICATIONS. PATIENT WOULD LIKE A CALL BACK

## 2022-01-10 ENCOUNTER — OFFICE VISIT (OUTPATIENT)
Dept: INTERNAL MEDICINE | Facility: CLINIC | Age: 82
End: 2022-01-10

## 2022-01-10 VITALS
DIASTOLIC BLOOD PRESSURE: 80 MMHG | HEART RATE: 59 BPM | SYSTOLIC BLOOD PRESSURE: 126 MMHG | BODY MASS INDEX: 25.5 KG/M2 | WEIGHT: 188 LBS | OXYGEN SATURATION: 97 %

## 2022-01-10 DIAGNOSIS — M10.072 ACUTE IDIOPATHIC GOUT INVOLVING TOE OF LEFT FOOT: Primary | ICD-10-CM

## 2022-01-10 DIAGNOSIS — E78.2 MIXED HYPERLIPIDEMIA: ICD-10-CM

## 2022-01-10 DIAGNOSIS — I25.10 CHRONIC CORONARY ARTERY DISEASE: ICD-10-CM

## 2022-01-10 DIAGNOSIS — M17.0 PRIMARY OSTEOARTHRITIS OF BOTH KNEES: ICD-10-CM

## 2022-01-10 DIAGNOSIS — R60.0 LOWER EXTREMITY EDEMA: ICD-10-CM

## 2022-01-10 PROCEDURE — 99214 OFFICE O/P EST MOD 30 MIN: CPT | Performed by: INTERNAL MEDICINE

## 2022-01-10 RX ORDER — CARVEDILOL 6.25 MG/1
6.25 TABLET ORAL EVERY 12 HOURS SCHEDULED
Qty: 180 TABLET | Refills: 3 | Status: SHIPPED | OUTPATIENT
Start: 2022-01-10 | End: 2023-03-10

## 2022-01-10 RX ORDER — ATORVASTATIN CALCIUM 40 MG/1
40 TABLET, FILM COATED ORAL NIGHTLY
Qty: 90 TABLET | Refills: 3 | Status: SHIPPED | OUTPATIENT
Start: 2022-01-10 | End: 2023-03-10

## 2022-01-10 NOTE — PROGRESS NOTES
Internal Medicine Follow Up    Chief Complaint  Isrrael Marino is a 81 y.o. male who presents today for follow up of chronic medical conditions outlined below.    Chief Complaint   Patient presents with   • Hyperlipidemia   • Knee Pain     Knee surgery in December.  Still having pain        HPI  Mr. Marino comes in today for follow up. He had L TKA by Dr. Craig on 12/1/2021. He reports surgery went well but he had pain in his feet postop as well as swelling LLE>RLE. He also had a flare of gout in L great toe. Pain is improving with black cherry juice and he declines steroids today with plan to discuss steroids tomorrow with orthopedics. He has been taking lasix 20mg daily PRN for weight gain and swelling but the last 2 days he has not taken it. He notes that it does not seem to be effective. Despite this he does note improvement in swelling overall. Denies SOA. He is in PT 2 times per week. Surgical incision is healing well.       Review of Systems  Review of Systems   Constitutional: Negative.    Respiratory: Negative.    Cardiovascular: Positive for leg swelling. Negative for chest pain.   Musculoskeletal: Positive for arthralgias, gait problem and joint swelling.   Skin: Positive for color change.        Current Medications  Current Outpatient Medications on File Prior to Visit   Medication Sig Dispense Refill   • acetaminophen (TYLENOL) 500 MG tablet Take 2 tablets by mouth Every 8 (Eight) Hours. For 1 week, then as needed 42 tablet 0   • ammonium lactate (LAC-HYDRIN) 12 % lotion Every 12 (Twelve) Hours.     • aspirin 81 MG EC tablet Take 1 tablet by mouth Daily. Resume in 1 month 30 tablet 0   • aspirin 81 MG EC tablet Take 1 tablet by mouth Every 12 (Twelve) Hours. For 1 month 60 tablet 0   • atorvastatin (LIPITOR) 40 MG tablet Take 1 tablet by mouth Every Night. 90 tablet 3   • B Complex Vitamins (VITAMIN B COMPLEX PO) Take 1 tablet by mouth Daily.     • carvedilol (COREG) 6.25 MG tablet Take 1 tablet  "by mouth Every 12 (Twelve) Hours. 180 tablet 3   • Cholecalciferol (VITAMIN D3) 125 MCG (5000 UT) capsule capsule Take 2,000 Units by mouth Daily.     • clopidogrel (PLAVIX) 75 MG tablet Take 1 tablet by mouth Daily. Resume 12/2/21 90 tablet 4   • docusate sodium (Colace) 100 MG capsule Take 1 capsule by mouth 2 (Two) Times a Day. 60 capsule 0   • DULoxetine (CYMBALTA) 30 MG capsule Take 2 capsules by mouth Daily. (Patient taking differently: Take 30 mg by mouth 2 (Two) Times a Day.) 180 capsule 1   • fluticasone (FLONASE) 50 MCG/ACT nasal spray 1 spray into the nostril(s) as directed by provider Daily. 48 g 3   • Fluzone High-Dose Quadrivalent 0.7 ML suspension prefilled syringe injection      • furosemide (LASIX) 20 MG tablet Take 1 tablet by mouth Daily. 90 tablet 1   • lisinopril (PRINIVIL,ZESTRIL) 2.5 MG tablet Take 1 tablet by mouth 2 (Two) Times a Day. 90 tablet 3   • Melatonin 3 MG capsule Take 1 capsule by mouth Daily.     • ondansetron (Zofran) 4 MG tablet Take 1 tablet by mouth Every 8 (Eight) Hours As Needed for Nausea or Vomiting for up to 20 doses. 20 tablet 0   • oxyCODONE (ROXICODONE) 5 MG immediate release tablet Take 1 tablet by mouth Every 4 (Four) Hours As Needed for Moderate Pain . 40 tablet 0   • Ropivacine HCl-NaCl (NAROPIN) 20 mg/hr by Peripheral Nerve route Continuous. Indications: Acute Pain     • tamsulosin (FLOMAX) 0.4 MG capsule 24 hr capsule Take 1 capsule by mouth Daily. 90 capsule 3   • vitamin B-12 (CYANOCOBALAMIN) 1000 MCG tablet Take 1,000 mcg by mouth Daily.     • Zinc 50 MG capsule Take 1 tablet by mouth Daily.       No current facility-administered medications on file prior to visit.       Allergies  Allergies   Allergen Reactions   • Ibuprofen Hives     \"Pt states he hasn't taken this in a long time\"       Objective  Visit Vitals  /80   Pulse 59   Wt 85.3 kg (188 lb)   SpO2 97%   BMI 25.50 kg/m²        Physical Exam  Physical Exam  Vitals and nursing note reviewed. "   Constitutional:       General: He is not in acute distress.     Appearance: He is well-developed. He is not ill-appearing or toxic-appearing.   HENT:      Head: Normocephalic and atraumatic.   Eyes:      Conjunctiva/sclera: Conjunctivae normal.   Pulmonary:      Effort: Pulmonary effort is normal. No respiratory distress.   Musculoskeletal:         General: Swelling (L knee, L great toe) and tenderness (Base of L great toe) present.      Right lower leg: Edema (trace to 1+) present.      Left lower leg: Edema (1+) present.   Skin:     General: Skin is warm and dry.      Findings: Erythema (mild erythema at base of L great toe) present.      Comments: Well healed surgical incision across L knee   Neurological:      Mental Status: He is alert and oriented to person, place, and time.      Gait: Gait abnormal.         Results  Results for orders placed or performed during the hospital encounter of 12/01/21   Potassium    Specimen: Blood   Result Value Ref Range    Potassium 4.1 3.5 - 5.2 mmol/L   Basic Metabolic Panel    Specimen: Blood   Result Value Ref Range    Glucose 102 (H) 65 - 99 mg/dL    BUN 33 (H) 8 - 23 mg/dL    Creatinine 1.18 0.76 - 1.27 mg/dL    Sodium 136 136 - 145 mmol/L    Potassium 4.7 3.5 - 5.2 mmol/L    Chloride 99 98 - 107 mmol/L    CO2 27.0 22.0 - 29.0 mmol/L    Calcium 8.7 8.6 - 10.5 mg/dL    eGFR Non African Amer 59 (L) >60 mL/min/1.73    BUN/Creatinine Ratio 28.0 (H) 7.0 - 25.0    Anion Gap 10.0 5.0 - 15.0 mmol/L   CBC (No Diff)    Specimen: Blood   Result Value Ref Range    WBC 15.60 (H) 3.40 - 10.80 10*3/mm3    RBC 3.78 (L) 4.14 - 5.80 10*6/mm3    Hemoglobin 11.3 (L) 13.0 - 17.7 g/dL    Hematocrit 34.6 (L) 37.5 - 51.0 %    MCV 91.5 79.0 - 97.0 fL    MCH 29.9 26.6 - 33.0 pg    MCHC 32.7 31.5 - 35.7 g/dL    RDW 12.7 12.3 - 15.4 %    RDW-SD 42.3 37.0 - 54.0 fl    MPV 10.0 6.0 - 12.0 fL    Platelets 237 140 - 450 10*3/mm3   POC Glucose Once    Specimen: Blood   Result Value Ref Range    Glucose  108 70 - 130 mg/dL        Assessment and Plan  Diagnoses and all orders for this visit:    Acute idiopathic gout involving toe of left foot  - Remote hx of gout but no recent flares and no current treatment. Has had swelling, mild erythema and pain at base of L great toe.  - Discussed avoidance of NSAIDs due to borderline renal function and preferred treatment with steroid taper however he declines as he states that he will see orthopedics tomorrow to discuss steroid injection for his R knee and shoulder which he expects will relieve gout pain.  - Consider allopurinol at his next visit if flare resolved    Primary osteoarthritis of both knees  - s/p L TKA 12/1/2021 with persistent pain and swelling. On oxycodone, tylenol.   - Working with PT twice weekly    Lower extremity edema  - Has had issues with edema intermittently due to ischemic cardiomyopathy and is on lasix 20mg daily PRN.   - Currently experiencing bilateral LE edema since surgery  - Has not been taking lasix x2 days but notes swelling is gradually improving.  - Given he is postop DVT is a concern however given bilaterality of edema and improvement I suspect this is less likely. More likely to be related to surgery, IVF.  - Recommend trial of lasix 40mg daily x2d  - Discussed that if he was not seeing further improvement to let me know for duplex of LE    Chronic coronary artery disease  - LHC 1/7 without flow limiting CAD  - On coreg, lipitor, ASA, plavix. Coreg refilled today.    Mixed hyperlipidemia  - Lipid panel 5/2021 with LDL 86, continue lipitor 40mg daily.    Health Maintenance  - Colonoscopy: No longer indicated due to age.  - Immunizations: Flu UTD. Tdap 2017. COVID complete, including booster. Shingrix series complete. Pneumovax 2010.  - Depression screening: negative 8/2021    Return in about 4 months (around 5/10/2022) for Follow up.

## 2022-01-11 ENCOUNTER — OFFICE VISIT (OUTPATIENT)
Dept: ORTHOPEDIC SURGERY | Facility: CLINIC | Age: 82
End: 2022-01-11

## 2022-01-11 VITALS — DIASTOLIC BLOOD PRESSURE: 70 MMHG | SYSTOLIC BLOOD PRESSURE: 125 MMHG

## 2022-01-11 DIAGNOSIS — Z96.652 STATUS POST TOTAL LEFT KNEE REPLACEMENT: Primary | ICD-10-CM

## 2022-01-11 PROCEDURE — 99024 POSTOP FOLLOW-UP VISIT: CPT | Performed by: ORTHOPAEDIC SURGERY

## 2022-01-11 NOTE — PROGRESS NOTES
Orthopaedic Clinic Note:  Knee Post Op    Chief Complaint   Patient presents with   • Post-op     1.5 week recheck-  status post Left total knee arthroplasty 12/1/21        HPI    Mr. Marino is 5  week(s) s/p left total knee arthroplasty. Rates pain 6/10. He is ambulating with a cane and is taking Tylenol for pain control. He denies fevers, chills, or constitutional symptoms.  He is continuing outpatient PT. Patient is improving overall.  He is having some swelling in both legs and neuropathy pain in his feet that is seems to be inhibiting his ability to do therapy..    I have reviewed the following portions of the patient's history:History of Present Illness    Past Medical History:   Diagnosis Date   • Arrhythmia    • Arthritis     both knees   • Broken neck (HCC)    • CAD (coronary artery disease)    • Cancer (HCC)     skin cancer  head and neck  nonmelanoma   • Chondrocalcinosis of knee    • GERD (gastroesophageal reflux disease)    • Gout    • Heart attack (HCC)      Last Assessed: 28 Mar 2014   • Heart disease    • History of transfusion     own blood with hip surgery   • Hyperlipidemia    • Hypertension    • Hypertensive urgency 5/28/2021   • IBS (irritable bowel syndrome)    • Left rotator cuff tear arthropathy    • Low back pain    • Lower extremity neuropathy    • Lumbar canal stenosis    • Neck pain    • Numbness    • Right hip pain    • Rotator cuff tear arthropathy of right shoulder    • Sleep apnea     semi compliant with c-pap    • Thin blood (HCC)       Past Surgical History:   Procedure Laterality Date   • APPENDECTOMY  1956   • BACK SURGERY  1997    spinal decompression and fusion   • BREAST LUMPECTOMY  2003    benign   • CARDIAC CATHETERIZATION      with two stents//. DR. TOLEDO   • CARDIAC CATHETERIZATION N/A 1/8/2021    Procedure: LEFT HEART CATH;  Surgeon: Casimiro Bernal MD;  Location: [facility] CATH INVASIVE LOCATION;  Service: Cardiovascular;  Laterality: N/A;   • CARPAL TUNNEL RELEASE   03/28/2014    Neuroplasty Decompression Median Nerve At Carpal Tunnel   • CERVICAL DISCECTOMY POSTERIOR FUSION WITH BRAIN LAB N/A 7/13/2018    Procedure: CERVICAL LAMINECTOMY DECOMPRESSION POSTERIOR C1-2 POSTERIOR CERVICAL FUSION;  Surgeon: Randall Barnett MD;  Location:  [facility];  Service: Neurosurgery   • COLONOSCOPY     • CORONARY STENT PLACEMENT  2013   • HERNIA REPAIR  2002    & 1998   • JOINT REPLACEMENT     • LUMBAR DISCECTOMY FUSION INSTRUMENTATION     • SKIN BIOPSY     • TONSILLECTOMY     • TOTAL HIP ARTHROPLASTY  2002   • TOTAL KNEE ARTHROPLASTY Left 12/1/2021    Procedure: TOTAL KNEE ARTHROPLASTY LEFT;  Surgeon: Kendall Craig MD;  Location:  [facility];  Service: Orthopedics;  Laterality: Left;   • VASECTOMY     • WISDOM TOOTH EXTRACTION       Family History   Problem Relation Age of Onset   • Cancer Mother    • Heart disease Father    • Hypertension Father    • Heart attack Father    • Sleep apnea Son       Social History     Socioeconomic History   • Marital status:    Tobacco Use   • Smoking status: Never Smoker   • Smokeless tobacco: Never Used   Vaping Use   • Vaping Use: Never used   Substance and Sexual Activity   • Alcohol use: Not Currently   • Drug use: Never   • Sexual activity: Defer      Current Outpatient Medications on File Prior to Visit   Medication Sig Dispense Refill   • acetaminophen (TYLENOL) 500 MG tablet Take 2 tablets by mouth Every 8 (Eight) Hours. For 1 week, then as needed 42 tablet 0   • ammonium lactate (LAC-HYDRIN) 12 % lotion Every 12 (Twelve) Hours.     • aspirin 81 MG EC tablet Take 1 tablet by mouth Daily. Resume in 1 month 30 tablet 0   • aspirin 81 MG EC tablet Take 1 tablet by mouth Every 12 (Twelve) Hours. For 1 month 60 tablet 0   • atorvastatin (LIPITOR) 40 MG tablet Take 1 tablet by mouth Every Night. 90 tablet 3   • B Complex Vitamins (VITAMIN B COMPLEX PO) Take 1 tablet by mouth Daily.     • carvedilol (COREG) 6.25 MG tablet Take 1 tablet by mouth  "Every 12 (Twelve) Hours. 180 tablet 3   • Cholecalciferol (VITAMIN D3) 125 MCG (5000 UT) capsule capsule Take 2,000 Units by mouth Daily.     • clopidogrel (PLAVIX) 75 MG tablet Take 1 tablet by mouth Daily. Resume 12/2/21 90 tablet 4   • docusate sodium (Colace) 100 MG capsule Take 1 capsule by mouth 2 (Two) Times a Day. 60 capsule 0   • DULoxetine (CYMBALTA) 30 MG capsule Take 2 capsules by mouth Daily. (Patient taking differently: Take 30 mg by mouth 2 (Two) Times a Day.) 180 capsule 1   • fluticasone (FLONASE) 50 MCG/ACT nasal spray 1 spray into the nostril(s) as directed by provider Daily. 48 g 3   • Fluzone High-Dose Quadrivalent 0.7 ML suspension prefilled syringe injection      • furosemide (LASIX) 20 MG tablet Take 1 tablet by mouth Daily. 90 tablet 1   • lisinopril (PRINIVIL,ZESTRIL) 2.5 MG tablet Take 1 tablet by mouth 2 (Two) Times a Day. 90 tablet 3   • Melatonin 3 MG capsule Take 1 capsule by mouth Daily.     • ondansetron (Zofran) 4 MG tablet Take 1 tablet by mouth Every 8 (Eight) Hours As Needed for Nausea or Vomiting for up to 20 doses. 20 tablet 0   • oxyCODONE (ROXICODONE) 5 MG immediate release tablet Take 1 tablet by mouth Every 4 (Four) Hours As Needed for Moderate Pain . 40 tablet 0   • Ropivacine HCl-NaCl (NAROPIN) 20 mg/hr by Peripheral Nerve route Continuous. Indications: Acute Pain     • tamsulosin (FLOMAX) 0.4 MG capsule 24 hr capsule Take 1 capsule by mouth Daily. 90 capsule 3   • vitamin B-12 (CYANOCOBALAMIN) 1000 MCG tablet Take 1,000 mcg by mouth Daily.     • Zinc 50 MG capsule Take 1 tablet by mouth Daily.       No current facility-administered medications on file prior to visit.      Allergies   Allergen Reactions   • Ibuprofen Hives     \"Pt states he hasn't taken this in a long time\"        Review of Systems   Constitutional: Negative.    HENT: Negative.    Eyes: Negative.    Respiratory: Negative.    Cardiovascular: Positive for leg swelling.   Gastrointestinal: Negative.  "   Endocrine: Negative.    Genitourinary: Negative.    Musculoskeletal: Positive for arthralgias and joint swelling.   Skin: Negative.    Allergic/Immunologic: Negative.    Neurological: Negative.    Hematological: Negative.    Psychiatric/Behavioral: Negative.         Physical Exam  Blood pressure 125/70.    There is no height or weight on file to calculate BMI.    GENERAL APPEARANCE: awake, alert, oriented, in no acute distress and well developed, well nourished  LUNGS:  breathing nonlabored  EXTREMITIES: no clubbing, cyanosis  PERIPHERAL PULSES: palpable dorsalis pedis and posterior tibial pulses bilaterally.    GAIT:  Normal          Left Knee Exam:  ----------  ALIGNMENT: neutral  ----------  RANGE OF MOTION:  Decreased (10 - 105 degrees) with no extensor lag  LIGAMENTOUS STABILITY:   stable to varus and valgus stress at terminal extension and 30 degrees without any evidence of laxity  ----------  STRENGTH:  KNEE FLEXION 5/5  KNEE EXTENSION  5/5  ANKLE DORSIFLEXION  5/5  ANKLE PLANTARFLEXION  5/5  ----------  PAIN WITH PALPATION:denies tenderness to palpation about the knee  KNEE EFFUSION: yes, mild effusion  PAIN WITH KNEE ROM: no  PATELLAR CREPITUS:  no  ----------  SENSATION TO LIGHT TOUCH:  DEEP PERONEAL/SUPERFICIAL PERONEAL/SURAL/SAPHENOUS/TIBIAL:    intact and intact, except Baseline stocking glove neuropathy from mid tibia distally  ----------  EDEMA:  yes, 2+ edema to proximal third tibia  ERYTHEMA:    no  WOUNDS/INCISIONS:   yes, well healed surgical incision without evidence of erythema or drainage  _____________________________________________________________________  _____________________________________________________________________    RADIOGRAPHIC FINDINGS:   No new imaging today    Assessment/Plan:   Diagnosis Plan   1. Status post total left knee replacement       Patient is doing well in regards to his total knee arthroplasty.  He is experiencing some limitations in extension as well as  flexion.  Encouraged him to work aggressively with physical therapy.  His main complaints are due to lymphedema in his legs as well as peripheral neuropathy.  I explained that compression stockings are helpful until decrease his lymphedema.  In regards to his neuropathy, this will likely improve with improvement in his lymphedema.  His knee range of motion needs to be aggressively worked on with therapy.  I will see him back in 4 weeks for repeat assessment with x-ray 3 views left knee on return.    Kendall Craig MD  01/11/22  08:24 EST

## 2022-01-28 ENCOUNTER — TELEPHONE (OUTPATIENT)
Dept: ORTHOPEDIC SURGERY | Facility: CLINIC | Age: 82
End: 2022-01-28

## 2022-01-28 RX ORDER — FUROSEMIDE 20 MG/1
TABLET ORAL
Qty: 30 TABLET | Refills: 11 | Status: SHIPPED | OUTPATIENT
Start: 2022-01-28

## 2022-01-28 NOTE — TELEPHONE ENCOUNTER
Called patient to reschedule this appointment 2/8 per provider, please reschedule if patient calls back. Was not abl to M

## 2022-02-10 ENCOUNTER — OFFICE VISIT (OUTPATIENT)
Dept: ORTHOPEDIC SURGERY | Facility: CLINIC | Age: 82
End: 2022-02-10

## 2022-02-10 DIAGNOSIS — Z96.652 STATUS POST TOTAL LEFT KNEE REPLACEMENT: Primary | ICD-10-CM

## 2022-02-10 PROCEDURE — 99024 POSTOP FOLLOW-UP VISIT: CPT | Performed by: ORTHOPAEDIC SURGERY

## 2022-02-10 NOTE — PROGRESS NOTES
Orthopaedic Clinic Note:  Knee Post Op    Chief Complaint   Patient presents with   • Post-op     4 week f/u--10 weeks status post Left total knee arthroplasty 12/1/21        HPI    Mr. Marino is 10  week(s) s/p left total knee arthroplasty. Rates pain 4/10. He is ambulating with a cane and is taking Motrin/Ibuprofen/Meloxicam/Naproxen/Diclofenac for pain control. He denies fevers, chills, or constitutional symptoms.  He is continuing outpatient PT. Patient is improving overall.  He denies complications.  Overall he is doing better.    Past Medical History:   Diagnosis Date   • Arrhythmia    • Arthritis     both knees   • Broken neck (HCC)    • CAD (coronary artery disease)    • Cancer (HCC)     skin cancer  head and neck  nonmelanoma   • Chondrocalcinosis of knee    • GERD (gastroesophageal reflux disease)    • Gout    • Heart attack (HCC)      Last Assessed: 28 Mar 2014   • Heart disease    • History of transfusion     own blood with hip surgery   • Hyperlipidemia    • Hypertension    • Hypertensive urgency 5/28/2021   • IBS (irritable bowel syndrome)    • Left rotator cuff tear arthropathy    • Low back pain    • Lower extremity neuropathy    • Lumbar canal stenosis    • Neck pain    • Numbness    • Right hip pain    • Rotator cuff tear arthropathy of right shoulder    • Sleep apnea     semi compliant with c-pap    • Thin blood (HCC)       Past Surgical History:   Procedure Laterality Date   • APPENDECTOMY  1956   • BACK SURGERY  1997    spinal decompression and fusion   • BREAST LUMPECTOMY  2003    benign   • CARDIAC CATHETERIZATION      with two stents//. DR. TOLEDO   • CARDIAC CATHETERIZATION N/A 1/8/2021    Procedure: LEFT HEART CATH;  Surgeon: Casimiro Bernal MD;  Location: [facility] CATH INVASIVE LOCATION;  Service: Cardiovascular;  Laterality: N/A;   • CARPAL TUNNEL RELEASE  03/28/2014    Neuroplasty Decompression Median Nerve At Carpal Tunnel   • CERVICAL DISCECTOMY POSTERIOR FUSION WITH BRAIN LAB N/A  7/13/2018    Procedure: CERVICAL LAMINECTOMY DECOMPRESSION POSTERIOR C1-2 POSTERIOR CERVICAL FUSION;  Surgeon: Randall Barnett MD;  Location: [facility] OR;  Service: Neurosurgery   • COLONOSCOPY     • CORONARY STENT PLACEMENT  2013   • HERNIA REPAIR  2002    & 1998   • JOINT REPLACEMENT     • LUMBAR DISCECTOMY FUSION INSTRUMENTATION     • SKIN BIOPSY     • TONSILLECTOMY     • TOTAL HIP ARTHROPLASTY  2002   • TOTAL KNEE ARTHROPLASTY Left 12/1/2021    Procedure: TOTAL KNEE ARTHROPLASTY LEFT;  Surgeon: Kendall Craig MD;  Location:  [facility] OR;  Service: Orthopedics;  Laterality: Left;   • VASECTOMY     • WISDOM TOOTH EXTRACTION       Family History   Problem Relation Age of Onset   • Cancer Mother    • Heart disease Father    • Hypertension Father    • Heart attack Father    • Sleep apnea Son       Social History     Socioeconomic History   • Marital status:    Tobacco Use   • Smoking status: Never Smoker   • Smokeless tobacco: Never Used   Vaping Use   • Vaping Use: Never used   Substance and Sexual Activity   • Alcohol use: Not Currently   • Drug use: Never   • Sexual activity: Defer      Current Outpatient Medications on File Prior to Visit   Medication Sig Dispense Refill   • acetaminophen (TYLENOL) 500 MG tablet Take 2 tablets by mouth Every 8 (Eight) Hours. For 1 week, then as needed 42 tablet 0   • ammonium lactate (LAC-HYDRIN) 12 % lotion Every 12 (Twelve) Hours.     • aspirin 81 MG EC tablet Take 1 tablet by mouth Daily. Resume in 1 month 30 tablet 0   • aspirin 81 MG EC tablet Take 1 tablet by mouth Every 12 (Twelve) Hours. For 1 month 60 tablet 0   • atorvastatin (LIPITOR) 40 MG tablet Take 1 tablet by mouth Every Night. 90 tablet 3   • B Complex Vitamins (VITAMIN B COMPLEX PO) Take 1 tablet by mouth Daily.     • carvedilol (COREG) 6.25 MG tablet Take 1 tablet by mouth Every 12 (Twelve) Hours. 180 tablet 3   • Cholecalciferol (VITAMIN D3) 125 MCG (5000 UT) capsule capsule Take 2,000 Units by  "mouth Daily.     • clopidogrel (PLAVIX) 75 MG tablet Take 1 tablet by mouth Daily. Resume 12/2/21 90 tablet 4   • docusate sodium (Colace) 100 MG capsule Take 1 capsule by mouth 2 (Two) Times a Day. 60 capsule 0   • DULoxetine (CYMBALTA) 30 MG capsule Take 2 capsules by mouth Daily. (Patient taking differently: Take 30 mg by mouth 2 (Two) Times a Day.) 180 capsule 1   • fluticasone (FLONASE) 50 MCG/ACT nasal spray 1 spray into the nostril(s) as directed by provider Daily. 48 g 3   • Fluzone High-Dose Quadrivalent 0.7 ML suspension prefilled syringe injection      • furosemide (LASIX) 20 MG tablet TAKE ONE TABLET BY MOUTH DAILY 30 tablet 11   • lisinopril (PRINIVIL,ZESTRIL) 2.5 MG tablet Take 1 tablet by mouth 2 (Two) Times a Day. 90 tablet 3   • Melatonin 3 MG capsule Take 1 capsule by mouth Daily.     • Ropivacine HCl-NaCl (NAROPIN) 20 mg/hr by Peripheral Nerve route Continuous. Indications: Acute Pain     • tamsulosin (FLOMAX) 0.4 MG capsule 24 hr capsule Take 1 capsule by mouth Daily. 90 capsule 3   • vitamin B-12 (CYANOCOBALAMIN) 1000 MCG tablet Take 1,000 mcg by mouth Daily.     • Zinc 50 MG capsule Take 1 tablet by mouth Daily.     • [DISCONTINUED] ondansetron (Zofran) 4 MG tablet Take 1 tablet by mouth Every 8 (Eight) Hours As Needed for Nausea or Vomiting for up to 20 doses. 20 tablet 0   • [DISCONTINUED] oxyCODONE (ROXICODONE) 5 MG immediate release tablet Take 1 tablet by mouth Every 4 (Four) Hours As Needed for Moderate Pain . 40 tablet 0     No current facility-administered medications on file prior to visit.      Allergies   Allergen Reactions   • Ibuprofen Hives     \"Pt states he hasn't taken this in a long time\"        Review of Systems     Physical Exam  There were no vitals taken for this visit.    There is no height or weight on file to calculate BMI.    GENERAL APPEARANCE: awake, alert, oriented, in no acute distress and well developed, well nourished  LUNGS:  breathing nonlabored  EXTREMITIES: no " clubbing, cyanosis  PERIPHERAL PULSES: palpable dorsalis pedis and posterior tibial pulses bilaterally.    GAIT:  Normal          Left Knee Exam:  ----------  ALIGNMENT: neutral  ----------  RANGE OF MOTION:  Decreased (7 - 125 degrees) with no extensor lag  LIGAMENTOUS STABILITY:   stable to varus and valgus stress at terminal extension and 30 degrees without any evidence of laxity  ----------  STRENGTH:  KNEE FLEXION 5/5  KNEE EXTENSION  5/5  ANKLE DORSIFLEXION  5/5  ANKLE PLANTARFLEXION  5/5  ----------  PAIN WITH PALPATION:denies tenderness to palpation about the knee  KNEE EFFUSION: yes, mild effusion  PAIN WITH KNEE ROM: no  PATELLAR CREPITUS:  no  ----------  SENSATION TO LIGHT TOUCH:  DEEP PERONEAL/SUPERFICIAL PERONEAL/SURAL/SAPHENOUS/TIBIAL:    intact and intact, except Baseline stocking glove neuropathy from mid tibia distally  ----------  EDEMA:  yes, 1+ edema to proximal third tibia  ERYTHEMA:    no  WOUNDS/INCISIONS:   yes, well healed surgical incision without evidence of erythema or drainage  _____________________________________________________________________  _____________________________________________________________________    RADIOGRAPHIC FINDINGS:   Indication: Status post left total knee arthroplasty    Comparison: Todays xrays were compared to previous xrays from 12/21/2021    Knee films: Demonstrate well positioned knee arthroplasty components in satisfactory alignment without evidence of wear, loosening, subsidence, fracture, or osteolysis and No significant changes compared to prior radiographs.       Assessment/Plan:   Diagnosis Plan   1. Status post total left knee replacement  XR Knee 3+ View With [place] Left     Patient is doing well 10-week status post left total knee arthroplasty.  His range of motion has improved.  He still lacks approximately 7 degrees of extension.  However his contralateral knee demonstrates the same amount of flexion contracture.  Therefore he is probably at  his baseline.  I encouraged him to continue working on edema control and gait training and strengthening to wean from the cane.  I will see him back in a month for repeat assessment.  Provided he is doing well at that time, we will extend follow-up to 1 year anniversary.    Kendall Craig MD  02/10/22  09:59 EST

## 2022-02-25 ENCOUNTER — TELEPHONE (OUTPATIENT)
Dept: ORTHOPEDIC SURGERY | Facility: CLINIC | Age: 82
End: 2022-02-25

## 2022-02-25 NOTE — TELEPHONE ENCOUNTER
Patient called in stating he has a rash on his lower left leg, not the knee. He states it has been there for a while but could not remember when he first saw it. It is itchy but not hot. No swelling in the leg or knee. He noticed it 2-3 weeks after surgery. He has not changed his detergent. He did wear some long johns under his pants and thought it might have been a heat rash. He is not complaining of any calf pain. I advised him to clean the area with a mild soap and put hydrocortisone cream on the area and to do this over the weekend. He will call us on Monday to let us know how it is doing.   Matilda Epps RT (R), ROT

## 2022-02-28 NOTE — TELEPHONE ENCOUNTER
I called Mr. Marino and he states he used hydrocortisone cream on the area over the weekend and it took the itch out and says it is clearing up. He will call if anything gets worse.  Matilda

## 2022-02-28 NOTE — TELEPHONE ENCOUNTER
Patient was calling to the message he left on Friday in regards to his rash on his legs and would like to get a call back in regards to this matter at [phone].      Please advise and call pt back.

## 2022-03-10 ENCOUNTER — OFFICE VISIT (OUTPATIENT)
Dept: ORTHOPEDIC SURGERY | Facility: CLINIC | Age: 82
End: 2022-03-10

## 2022-03-10 VITALS
SYSTOLIC BLOOD PRESSURE: 130 MMHG | HEIGHT: 72 IN | DIASTOLIC BLOOD PRESSURE: 70 MMHG | WEIGHT: 188.05 LBS | BODY MASS INDEX: 25.47 KG/M2

## 2022-03-10 DIAGNOSIS — Z96.652 STATUS POST TOTAL LEFT KNEE REPLACEMENT: Primary | ICD-10-CM

## 2022-03-10 PROCEDURE — 99212 OFFICE O/P EST SF 10 MIN: CPT | Performed by: ORTHOPAEDIC SURGERY

## 2022-03-10 NOTE — PROGRESS NOTES
Orthopaedic Clinic Note: Knee Established Patient    Chief Complaint   Patient presents with   • Follow-up     4 week f/u--14 weeks status post Left total knee arthroplasty 12/1/21        HPI    It has been 4  week(s) since Mr. Marino's last visit. He returns to clinic today for follow-up left total knee arthroplasty.  He is little over 3 months out from surgery.  Rates his pain 1/10 on pain scale today.  He is ambulating with no assistive device.  Denies fevers chills or constitutional symptoms.  Overall he is doing better.    Past Medical History:   Diagnosis Date   • Arrhythmia    • Arthritis     both knees   • Broken neck (HCC)    • CAD (coronary artery disease)    • Cancer (HCC)     skin cancer  head and neck  nonmelanoma   • Chondrocalcinosis of knee    • GERD (gastroesophageal reflux disease)    • Gout    • Heart attack (HCC)      Last Assessed: 28 Mar 2014   • Heart disease    • History of transfusion     own blood with hip surgery   • Hyperlipidemia    • Hypertension    • Hypertensive urgency 5/28/2021   • IBS (irritable bowel syndrome)    • Left rotator cuff tear arthropathy    • Low back pain    • Lower extremity neuropathy    • Lumbar canal stenosis    • Neck pain    • Numbness    • Right hip pain    • Rotator cuff tear arthropathy of right shoulder    • Sleep apnea     semi compliant with c-pap    • Thin blood (HCC)       Past Surgical History:   Procedure Laterality Date   • APPENDECTOMY  1956   • BACK SURGERY  1997    spinal decompression and fusion   • BREAST LUMPECTOMY  2003    benign   • CARDIAC CATHETERIZATION      with two stents//. DR. TOLEDO   • CARDIAC CATHETERIZATION N/A 1/8/2021    Procedure: LEFT HEART CATH;  Surgeon: Casimiro Bernal MD;  Location: [facility] CATH INVASIVE LOCATION;  Service: Cardiovascular;  Laterality: N/A;   • CARPAL TUNNEL RELEASE  03/28/2014    Neuroplasty Decompression Median Nerve At Carpal Tunnel   • CERVICAL DISCECTOMY POSTERIOR FUSION WITH BRAIN LAB N/A 7/13/2018     Procedure: CERVICAL LAMINECTOMY DECOMPRESSION POSTERIOR C1-2 POSTERIOR CERVICAL FUSION;  Surgeon: Randall Barnett MD;  Location: [facility] OR;  Service: Neurosurgery   • COLONOSCOPY     • CORONARY STENT PLACEMENT  2013   • HERNIA REPAIR  2002    & 1998   • JOINT REPLACEMENT     • LUMBAR DISCECTOMY FUSION INSTRUMENTATION     • SKIN BIOPSY     • TONSILLECTOMY     • TOTAL HIP ARTHROPLASTY  2002   • TOTAL KNEE ARTHROPLASTY Left 12/1/2021    Procedure: TOTAL KNEE ARTHROPLASTY LEFT;  Surgeon: Kendall Craig MD;  Location:  [facility] OR;  Service: Orthopedics;  Laterality: Left;   • VASECTOMY     • WISDOM TOOTH EXTRACTION        Family History   Problem Relation Age of Onset   • Cancer Mother    • Heart disease Father    • Hypertension Father    • Heart attack Father    • Sleep apnea Son      Social History     Socioeconomic History   • Marital status:    Tobacco Use   • Smoking status: Never Smoker   • Smokeless tobacco: Never Used   Vaping Use   • Vaping Use: Never used   Substance and Sexual Activity   • Alcohol use: Not Currently   • Drug use: Never   • Sexual activity: Defer      Current Outpatient Medications on File Prior to Visit   Medication Sig Dispense Refill   • acetaminophen (TYLENOL) 500 MG tablet Take 2 tablets by mouth Every 8 (Eight) Hours. For 1 week, then as needed 42 tablet 0   • ammonium lactate (LAC-HYDRIN) 12 % lotion Every 12 (Twelve) Hours.     • aspirin 81 MG EC tablet Take 1 tablet by mouth Daily. Resume in 1 month 30 tablet 0   • aspirin 81 MG EC tablet Take 1 tablet by mouth Every 12 (Twelve) Hours. For 1 month 60 tablet 0   • atorvastatin (LIPITOR) 40 MG tablet Take 1 tablet by mouth Every Night. 90 tablet 3   • B Complex Vitamins (VITAMIN B COMPLEX PO) Take 1 tablet by mouth Daily.     • carvedilol (COREG) 6.25 MG tablet Take 1 tablet by mouth Every 12 (Twelve) Hours. 180 tablet 3   • Cholecalciferol (VITAMIN D3) 125 MCG (5000 UT) capsule capsule Take 2,000 Units by mouth Daily.    "  • clopidogrel (PLAVIX) 75 MG tablet Take 1 tablet by mouth Daily. Resume 12/2/21 90 tablet 4   • docusate sodium (Colace) 100 MG capsule Take 1 capsule by mouth 2 (Two) Times a Day. 60 capsule 0   • DULoxetine (CYMBALTA) 30 MG capsule Take 2 capsules by mouth Daily. (Patient taking differently: Take 30 mg by mouth 2 (Two) Times a Day.) 180 capsule 1   • fluticasone (FLONASE) 50 MCG/ACT nasal spray 1 spray into the nostril(s) as directed by provider Daily. 48 g 3   • Fluzone High-Dose Quadrivalent 0.7 ML suspension prefilled syringe injection      • furosemide (LASIX) 20 MG tablet TAKE ONE TABLET BY MOUTH DAILY 30 tablet 11   • lisinopril (PRINIVIL,ZESTRIL) 2.5 MG tablet Take 1 tablet by mouth 2 (Two) Times a Day. 90 tablet 3   • Melatonin 3 MG capsule Take 1 capsule by mouth Daily.     • Ropivacine HCl-NaCl (NAROPIN) 20 mg/hr by Peripheral Nerve route Continuous. Indications: Acute Pain     • tamsulosin (FLOMAX) 0.4 MG capsule 24 hr capsule Take 1 capsule by mouth Daily. 90 capsule 3   • vitamin B-12 (CYANOCOBALAMIN) 1000 MCG tablet Take 1,000 mcg by mouth Daily.     • Zinc 50 MG capsule Take 1 tablet by mouth Daily.       No current facility-administered medications on file prior to visit.      Allergies   Allergen Reactions   • Ibuprofen Hives     \"Pt states he hasn't taken this in a long time\"        Review of Systems   Constitutional: Negative.    HENT: Negative.    Eyes: Negative.    Respiratory: Negative.    Cardiovascular: Negative.    Gastrointestinal: Negative.    Endocrine: Negative.    Genitourinary: Negative.    Musculoskeletal: Positive for arthralgias.   Skin: Negative.    Allergic/Immunologic: Negative.    Neurological: Negative.    Hematological: Negative.    Psychiatric/Behavioral: Negative.         The patient's Review of Systems was personally reviewed and confirmed as accurate.    Physical Exam  Blood pressure 130/70, height 182.9 cm (72.01\"), weight 85.3 kg (188 lb 0.8 oz).    Body mass index " is 25.5 kg/m².    GENERAL APPEARANCE: awake, alert, oriented, in no acute distress and well developed, well nourished  LUNGS:  breathing nonlabored  EXTREMITIES: no clubbing, cyanosis  PERIPHERAL PULSES: palpable dorsalis pedis and posterior tibial pulses bilaterally.    GAIT:  Normal        ----------  Left Knee Exam:  ----------  ALIGNMENT: neutral  ----------  RANGE OF MOTION:  Decreased (3 - 125 degrees) with no extensor lag  LIGAMENTOUS STABILITY:   stable to varus and valgus stress at terminal extension and 30 degrees without any evidence of laxity  ----------  STRENGTH:  KNEE FLEXION 5/5  KNEE EXTENSION  5/5  ANKLE DORSIFLEXION  5/5  ANKLE PLANTARFLEXION  5/5  ----------  PAIN WITH PALPATION:denies tenderness to palpation about the knee  KNEE EFFUSION: yes, trace effusion  PAIN WITH KNEE ROM: no  PATELLAR CREPITUS:  no  ----------  SENSATION TO LIGHT TOUCH:  DEEP PERONEAL/SUPERFICIAL PERONEAL/SURAL/SAPHENOUS/TIBIAL:    intact and intact, except Baseline stocking glove neuropathy from mid tibia distally  ----------  EDEMA:  no  ERYTHEMA:    no  WOUNDS/INCISIONS:   yes, well healed surgical incision without evidence of erythema or drainage  _____________________________________________________________________  _____________________________________________________________________    RADIOGRAPHIC FINDINGS:   No new imaging today    Assessment/Plan:   Diagnosis Plan   1. Status post total left knee replacement       Patient symptoms have greatly improved.  He is now ambulating without an assistive device and states that his knee is functioning well.  He only has minor discomfort after periods of immobility or walking for long periods of time.  Overall he is happy with his outcome.  He will follow-up in 9 months for 1 year anniversary with x-ray 3 views left knee on return.  He is welcome to follow-up sooner should problems arise.      Kendall Craig MD  03/10/22  08:23 EST

## 2022-04-05 ENCOUNTER — TELEPHONE (OUTPATIENT)
Dept: INTERNAL MEDICINE | Facility: CLINIC | Age: 82
End: 2022-04-05

## 2022-04-05 NOTE — TELEPHONE ENCOUNTER
Caller: Isrrael Marino    Relationship: Self    Best call back number: [phone]    What orders are you requesting (i.e. lab or imaging): BLOOD WORK    In what timeframe would the patient need to come in: BEFORE 4/8/2022    Additional notes: PATIENT WOULD LIKE TO GET BLOOD WORK DONE SO HE CAN DISCUSS RESULTS WITH PCP ON Friday'S APPOINTMENT.

## 2022-04-08 ENCOUNTER — OFFICE VISIT (OUTPATIENT)
Dept: INTERNAL MEDICINE | Facility: CLINIC | Age: 82
End: 2022-04-08

## 2022-04-08 VITALS
WEIGHT: 186.4 LBS | OXYGEN SATURATION: 97 % | HEIGHT: 72 IN | TEMPERATURE: 96.9 F | RESPIRATION RATE: 16 BRPM | BODY MASS INDEX: 25.25 KG/M2 | DIASTOLIC BLOOD PRESSURE: 74 MMHG | HEART RATE: 70 BPM | SYSTOLIC BLOOD PRESSURE: 144 MMHG

## 2022-04-08 DIAGNOSIS — L20.84 INTRINSIC ECZEMA: Primary | ICD-10-CM

## 2022-04-08 DIAGNOSIS — N64.4 NIPPLE PAIN: ICD-10-CM

## 2022-04-08 PROCEDURE — 99214 OFFICE O/P EST MOD 30 MIN: CPT | Performed by: INTERNAL MEDICINE

## 2022-04-08 RX ORDER — OXYCODONE HYDROCHLORIDE 5 MG/1
5 TABLET ORAL EVERY 4 HOURS PRN
COMMUNITY
End: 2022-09-02

## 2022-04-08 RX ORDER — TRIAMCINOLONE ACETONIDE 1 MG/G
1 CREAM TOPICAL 2 TIMES DAILY
Qty: 45 G | Refills: 5 | Status: SHIPPED | OUTPATIENT
Start: 2022-04-08

## 2022-04-08 NOTE — PROGRESS NOTES
Internal Medicine Acute Visit    Chief Complaint   Patient presents with   • Rash     Both legs, Since January, itchy        HPI  Mr. Marino comes in today for rash on both lower legs x3 months. Noted initially in [facility] after knee replacement. Has spread up left shin and to R shin. Itchy. Has tried lotrimin and hydrocortisone. He also notes 10 days of L nipple tenderness. He relates this to his suspender rubbing and is trying to prevent this. He does have a history of lump removal from both breasts.       Review of Systems  Review of Systems   Constitutional: Negative.    Genitourinary: Negative for breast discharge.        +nipple pain   Skin: Positive for rash.        Medications  Current Outpatient Medications on File Prior to Visit   Medication Sig Dispense Refill   • acetaminophen (TYLENOL) 500 MG tablet Take 2 tablets by mouth Every 8 (Eight) Hours. For 1 week, then as needed 42 tablet 0   • ammonium lactate (LAC-HYDRIN) 12 % lotion Every 12 (Twelve) Hours.     • aspirin 81 MG EC tablet Take 1 tablet by mouth Daily. Resume in 1 month 30 tablet 0   • aspirin 81 MG EC tablet Take 1 tablet by mouth Every 12 (Twelve) Hours. For 1 month 60 tablet 0   • atorvastatin (LIPITOR) 40 MG tablet Take 1 tablet by mouth Every Night. 90 tablet 3   • B Complex Vitamins (VITAMIN B COMPLEX PO) Take 1 tablet by mouth Daily.     • carvedilol (COREG) 6.25 MG tablet Take 1 tablet by mouth Every 12 (Twelve) Hours. 180 tablet 3   • Cholecalciferol (VITAMIN D3) 125 MCG (5000 UT) capsule capsule Take 2,000 Units by mouth Daily.     • clopidogrel (PLAVIX) 75 MG tablet Take 1 tablet by mouth Daily. Resume 12/2/21 90 tablet 4   • docusate sodium (Colace) 100 MG capsule Take 1 capsule by mouth 2 (Two) Times a Day. 60 capsule 0   • DULoxetine (CYMBALTA) 30 MG capsule Take 2 capsules by mouth Daily. (Patient taking differently: Take 30 mg by mouth 2 (Two) Times a Day.) 180 capsule 1   • fluticasone (FLONASE) 50 MCG/ACT nasal spray 1 spray  "into the nostril(s) as directed by provider Daily. 48 g 3   • furosemide (LASIX) 20 MG tablet TAKE ONE TABLET BY MOUTH DAILY 30 tablet 11   • lisinopril (PRINIVIL,ZESTRIL) 2.5 MG tablet Take 1 tablet by mouth 2 (Two) Times a Day. 90 tablet 3   • Melatonin 3 MG capsule Take 1 capsule by mouth Daily.     • oxyCODONE (ROXICODONE) 5 MG immediate release tablet Take 5 mg by mouth Every 4 (Four) Hours As Needed for Moderate Pain .     • tamsulosin (FLOMAX) 0.4 MG capsule 24 hr capsule Take 1 capsule by mouth Daily. 90 capsule 3   • vitamin B-12 (CYANOCOBALAMIN) 1000 MCG tablet Take 1,000 mcg by mouth Daily.     • Zinc 50 MG capsule Take 1 tablet by mouth Daily.     • [DISCONTINUED] Fluzone High-Dose Quadrivalent 0.7 ML suspension prefilled syringe injection      • [DISCONTINUED] Ropivacine HCl-NaCl (NAROPIN) 20 mg/hr by Peripheral Nerve route Continuous. Indications: Acute Pain       No current facility-administered medications on file prior to visit.        Allergies  Allergies   Allergen Reactions   • Ibuprofen Hives     \"Pt states he hasn't taken this in a long time\"       PMH  Past Medical History:   Diagnosis Date   • Arrhythmia    • Arthritis     both knees   • Broken neck (HCC)    • CAD (coronary artery disease)    • Cancer ([facility])     skin cancer  head and neck  nonmelanoma   • Chondrocalcinosis of knee    • GERD (gastroesophageal reflux disease)    • Gout    • Heart attack ([facility])      Last Assessed: 28 Mar 2014   • Heart disease    • History of transfusion     own blood with hip surgery   • Hyperlipidemia    • Hypertension    • Hypertensive urgency 5/28/2021   • IBS (irritable bowel syndrome)    • Left rotator cuff tear arthropathy    • Low back pain    • Lower extremity neuropathy    • Lumbar canal stenosis    • Neck pain    • Numbness    • Right hip pain    • Rotator cuff tear arthropathy of right shoulder    • Sleep apnea     semi compliant with c-pap    • Thin blood ([facility])        Objective  Visit Vitals  BP " "144/74   Pulse 70   Temp 96.9 °F (36.1 °C)   Resp 16   Ht 182.9 cm (72.01\")   Wt 84.6 kg (186 lb 6.4 oz)   SpO2 97%   BMI 25.27 kg/m²        Physical Exam  Physical Exam  Vitals and nursing note reviewed.   Constitutional:       General: He is not in acute distress.     Appearance: Normal appearance. He is well-developed and normal weight. He is not ill-appearing or toxic-appearing.   HENT:      Head: Normocephalic and atraumatic.   Eyes:      Conjunctiva/sclera: Conjunctivae normal.   Pulmonary:      Effort: Pulmonary effort is normal. No respiratory distress.   Chest:   Breasts:      Right: Normal.      Left: Tenderness (localized over nipple) present. No swelling, bleeding, inverted nipple, mass, nipple discharge or skin change.       Skin:     General: Skin is warm and dry.      Findings: Rash (itchy, raised red rash on top of L foot and along bilateral lower legs) present.   Neurological:      Mental Status: He is alert and oriented to person, place, and time. Mental status is at baseline.         Results  Results for orders placed or performed during the hospital encounter of 12/01/21   Potassium    Specimen: Blood   Result Value Ref Range    Potassium 4.1 3.5 - 5.2 mmol/L   Basic Metabolic Panel    Specimen: Blood   Result Value Ref Range    Glucose 102 (H) 65 - 99 mg/dL    BUN 33 (H) 8 - 23 mg/dL    Creatinine 1.18 0.76 - 1.27 mg/dL    Sodium 136 136 - 145 mmol/L    Potassium 4.7 3.5 - 5.2 mmol/L    Chloride 99 98 - 107 mmol/L    CO2 27.0 22.0 - 29.0 mmol/L    Calcium 8.7 8.6 - 10.5 mg/dL    eGFR Non African Amer 59 (L) >60 mL/min/1.73    BUN/Creatinine Ratio 28.0 (H) 7.0 - 25.0    Anion Gap 10.0 5.0 - 15.0 mmol/L   CBC (No Diff)    Specimen: Blood   Result Value Ref Range    WBC 15.60 (H) 3.40 - 10.80 10*3/mm3    RBC 3.78 (L) 4.14 - 5.80 10*6/mm3    Hemoglobin 11.3 (L) 13.0 - 17.7 g/dL    Hematocrit 34.6 (L) 37.5 - 51.0 %    MCV 91.5 79.0 - 97.0 fL    MCH 29.9 26.6 - 33.0 pg    MCHC 32.7 31.5 - 35.7 g/dL    " RDW 12.7 12.3 - 15.4 %    RDW-SD 42.3 37.0 - 54.0 fl    MPV 10.0 6.0 - 12.0 fL    Platelets 237 140 - 450 10*3/mm3   POC Glucose Once    Specimen: Blood   Result Value Ref Range    Glucose 108 70 - 130 mg/dL        Assessment and Plan  Diagnoses and all orders for this visit:    Intrinsic eczema  - Triamcinolone BID to affected areas. If not improved within 2 weeks will need derm referral.    Nipple pain  - May be related to rubbing of suspender. No certain mass on exam today but has hx of benign lump removed from both breasts, suspect this was gynecomastia.  - If not improved by next visit will pursue mammogram.    Health Maintenance  - Colonoscopy: No longer indicated due to age.  - Immunizations: Flu UTD. Tdap 2017. COVID complete, including booster. Shingrix series complete. Pneumovax 2010.  - Depression screening: negative 8/2021    Return for Next scheduled follow up.

## 2022-04-19 ENCOUNTER — TELEPHONE (OUTPATIENT)
Dept: ORTHOPEDIC SURGERY | Facility: CLINIC | Age: 82
End: 2022-04-19

## 2022-04-19 NOTE — TELEPHONE ENCOUNTER
Provider: ERIN MUÑOZ PA-C  Caller: VERENA REGALADO  Relationship to Patient: PATIENT     Phone Number: [phone]  Reason for Call: SCHEDULE DEEPA SHOULDER INJECTIONS   When was the patient last seen: SHOULDER INJECTIONS 02/21  DID NOT GET INJECTIONS WHILE HAVING KNEE SX

## 2022-04-29 ENCOUNTER — OFFICE VISIT (OUTPATIENT)
Dept: ORTHOPEDIC SURGERY | Facility: CLINIC | Age: 82
End: 2022-04-29

## 2022-04-29 VITALS
SYSTOLIC BLOOD PRESSURE: 124 MMHG | DIASTOLIC BLOOD PRESSURE: 78 MMHG | WEIGHT: 186.51 LBS | HEIGHT: 72 IN | BODY MASS INDEX: 25.26 KG/M2

## 2022-04-29 DIAGNOSIS — M19.012 ARTHRITIS OF LEFT GLENOHUMERAL JOINT: ICD-10-CM

## 2022-04-29 DIAGNOSIS — M19.011 ARTHRITIS OF RIGHT GLENOHUMERAL JOINT: ICD-10-CM

## 2022-04-29 DIAGNOSIS — M25.511 CHRONIC PAIN OF BOTH SHOULDERS: Primary | ICD-10-CM

## 2022-04-29 DIAGNOSIS — M25.512 CHRONIC PAIN OF BOTH SHOULDERS: Primary | ICD-10-CM

## 2022-04-29 DIAGNOSIS — G89.29 CHRONIC PAIN OF BOTH SHOULDERS: Primary | ICD-10-CM

## 2022-04-29 PROCEDURE — 20610 DRAIN/INJ JOINT/BURSA W/O US: CPT | Performed by: PHYSICIAN ASSISTANT

## 2022-04-29 RX ORDER — KETOROLAC TROMETHAMINE 5 MG/ML
SOLUTION OPHTHALMIC
COMMUNITY
Start: 2022-04-08 | End: 2022-09-02

## 2022-04-29 RX ORDER — LISINOPRIL 5 MG/1
2.5 TABLET ORAL
COMMUNITY
Start: 2022-04-08 | End: 2022-09-02

## 2022-04-29 RX ORDER — OFLOXACIN 3 MG/ML
SOLUTION/ DROPS OPHTHALMIC
COMMUNITY
Start: 2022-03-02 | End: 2022-09-02

## 2022-04-29 RX ORDER — PREDNISOLONE ACETATE 10 MG/ML
SUSPENSION/ DROPS OPHTHALMIC
COMMUNITY
Start: 2022-04-08 | End: 2022-09-02

## 2022-04-29 RX ADMIN — TRIAMCINOLONE ACETONIDE 40 MG: 40 INJECTION, SUSPENSION INTRA-ARTICULAR; INTRAMUSCULAR at 11:54

## 2022-04-29 RX ADMIN — LIDOCAINE HYDROCHLORIDE 4 ML: 10 INJECTION, SOLUTION EPIDURAL; INFILTRATION; INTRACAUDAL; PERINEURAL at 11:54

## 2022-05-04 RX ORDER — LIDOCAINE HYDROCHLORIDE 10 MG/ML
4 INJECTION, SOLUTION EPIDURAL; INFILTRATION; INTRACAUDAL; PERINEURAL
Status: COMPLETED | OUTPATIENT
Start: 2022-04-29 | End: 2022-04-29

## 2022-05-04 RX ORDER — TRIAMCINOLONE ACETONIDE 40 MG/ML
40 INJECTION, SUSPENSION INTRA-ARTICULAR; INTRAMUSCULAR
Status: COMPLETED | OUTPATIENT
Start: 2022-04-29 | End: 2022-04-29

## 2022-05-12 ENCOUNTER — OFFICE VISIT (OUTPATIENT)
Dept: INTERNAL MEDICINE | Facility: CLINIC | Age: 82
End: 2022-05-12

## 2022-05-12 VITALS
OXYGEN SATURATION: 93 % | BODY MASS INDEX: 24.68 KG/M2 | TEMPERATURE: 97.6 F | SYSTOLIC BLOOD PRESSURE: 130 MMHG | WEIGHT: 182 LBS | HEART RATE: 72 BPM | DIASTOLIC BLOOD PRESSURE: 72 MMHG

## 2022-05-12 DIAGNOSIS — M17.0 PRIMARY OSTEOARTHRITIS OF BOTH KNEES: ICD-10-CM

## 2022-05-12 DIAGNOSIS — I25.5 ISCHEMIC CARDIOMYOPATHY: ICD-10-CM

## 2022-05-12 DIAGNOSIS — M19.019 GLENOHUMERAL ARTHRITIS: ICD-10-CM

## 2022-05-12 DIAGNOSIS — N64.4 NIPPLE PAIN: ICD-10-CM

## 2022-05-12 DIAGNOSIS — I10 PRIMARY HYPERTENSION: Primary | ICD-10-CM

## 2022-05-12 DIAGNOSIS — L20.84 INTRINSIC ECZEMA: ICD-10-CM

## 2022-05-12 PROBLEM — M19.011 GLENOHUMERAL ARTHRITIS, RIGHT: Status: RESOLVED | Noted: 2018-03-27 | Resolved: 2022-05-12

## 2022-05-12 PROBLEM — N99.89 POSTOPERATIVE URINARY RETENTION: Status: RESOLVED | Noted: 2021-12-02 | Resolved: 2022-05-12

## 2022-05-12 PROBLEM — R33.8 POSTOPERATIVE URINARY RETENTION: Status: RESOLVED | Noted: 2021-12-02 | Resolved: 2022-05-12

## 2022-05-12 PROCEDURE — 99214 OFFICE O/P EST MOD 30 MIN: CPT | Performed by: INTERNAL MEDICINE

## 2022-05-12 NOTE — PROGRESS NOTES
Internal Medicine Follow Up    Chief Complaint  Isrrael Marino is a 81 y.o. male who presents today for follow up of chronic medical conditions outlined below.    Chief Complaint   Patient presents with   • Follow-up     Nipple pain, HTN, edema, arthritis         HPI  Mr. Marino comes in today for follow up. His L nipple pain is still present but better. Notes no pain when he wakes up but will intermittently have mild pain during the day. He is still wearing suspenders and attributes pain to this. His BP is controlled. Weight down. He is watching his weight and when he reaches 185lb takes lasix, if no improvement then takes lasix x2. He has been getting relief of glenohumeral arthritis with injections. He has run out of steroid cream for his lower legs and pharmacy not filling it.       Review of Systems  Review of Systems   Constitutional: Negative.    Respiratory: Negative.    Cardiovascular: Negative.    Musculoskeletal: Positive for arthralgias.   Skin: Positive for rash.        Current Medications  Current Outpatient Medications on File Prior to Visit   Medication Sig Dispense Refill   • acetaminophen (TYLENOL) 500 MG tablet Take 2 tablets by mouth Every 8 (Eight) Hours. For 1 week, then as needed 42 tablet 0   • ammonium lactate (LAC-HYDRIN) 12 % lotion Every 12 (Twelve) Hours.     • aspirin 81 MG EC tablet Take 1 tablet by mouth Daily. Resume in 1 month 30 tablet 0   • aspirin 81 MG EC tablet Take 1 tablet by mouth Every 12 (Twelve) Hours. For 1 month 60 tablet 0   • atorvastatin (LIPITOR) 40 MG tablet Take 1 tablet by mouth Every Night. 90 tablet 3   • B Complex Vitamins (VITAMIN B COMPLEX PO) Take 1 tablet by mouth Daily.     • carvedilol (COREG) 6.25 MG tablet Take 1 tablet by mouth Every 12 (Twelve) Hours. 180 tablet 3   • Cholecalciferol (VITAMIN D3) 125 MCG (5000 UT) capsule capsule Take 2,000 Units by mouth Daily.     • clopidogrel (PLAVIX) 75 MG tablet Take 1 tablet by mouth Daily. Resume 12/2/21  "90 tablet 4   • docusate sodium (Colace) 100 MG capsule Take 1 capsule by mouth 2 (Two) Times a Day. 60 capsule 0   • DULoxetine (CYMBALTA) 30 MG capsule Take 2 capsules by mouth Daily. (Patient taking differently: Take 30 mg by mouth 2 (Two) Times a Day.) 180 capsule 1   • fluticasone (FLONASE) 50 MCG/ACT nasal spray 1 spray into the nostril(s) as directed by provider Daily. 48 g 3   • furosemide (LASIX) 20 MG tablet TAKE ONE TABLET BY MOUTH DAILY 30 tablet 11   • ketorolac (ACULAR) 0.5 % ophthalmic solution      • lisinopril (PRINIVIL,ZESTRIL) 2.5 MG tablet Take 1 tablet by mouth 2 (Two) Times a Day. 90 tablet 3   • lisinopril (PRINIVIL,ZESTRIL) 5 MG tablet 2.5 mg.     • Melatonin 3 MG capsule Take 1 capsule by mouth Daily.     • ofloxacin (OCUFLOX) 0.3 % ophthalmic solution      • oxyCODONE (ROXICODONE) 5 MG immediate release tablet Take 5 mg by mouth Every 4 (Four) Hours As Needed for Moderate Pain .     • prednisoLONE acetate (PRED FORTE) 1 % ophthalmic suspension      • tamsulosin (FLOMAX) 0.4 MG capsule 24 hr capsule Take 1 capsule by mouth Daily. 90 capsule 3   • triamcinolone (KENALOG) 0.1 % cream Apply 1 application topically to the appropriate area as directed 2 (Two) Times a Day. 45 g 5   • vitamin B-12 (CYANOCOBALAMIN) 1000 MCG tablet Take 1,000 mcg by mouth Daily.     • Zinc 50 MG capsule Take 1 tablet by mouth Daily.       No current facility-administered medications on file prior to visit.       Allergies  Allergies   Allergen Reactions   • Ibuprofen Hives     \"Pt states he hasn't taken this in a long time\"       Objective  Visit Vitals  /72   Pulse 72   Temp 97.6 °F (36.4 °C)   Wt 82.6 kg (182 lb)   SpO2 93%   BMI 24.68 kg/m²        Physical Exam  Physical Exam  Vitals and nursing note reviewed.   Constitutional:       General: He is not in acute distress.     Appearance: Normal appearance. He is well-developed and normal weight. He is not ill-appearing or toxic-appearing.   HENT:      Head: " Normocephalic and atraumatic.   Eyes:      Conjunctiva/sclera: Conjunctivae normal.   Pulmonary:      Effort: Pulmonary effort is normal. No respiratory distress.   Musculoskeletal:      Right lower leg: Edema (trace) present.      Left lower leg: Edema (trace) present.   Skin:     General: Skin is warm and dry.      Findings: Rash (dry skin with flaking on L lower leg, small scab) present.   Neurological:      Mental Status: He is alert and oriented to person, place, and time. Mental status is at baseline.      Gait: Gait normal.         Results  Results for orders placed or performed during the hospital encounter of 12/01/21   Potassium    Specimen: Blood   Result Value Ref Range    Potassium 4.1 3.5 - 5.2 mmol/L   Basic Metabolic Panel    Specimen: Blood   Result Value Ref Range    Glucose 102 (H) 65 - 99 mg/dL    BUN 33 (H) 8 - 23 mg/dL    Creatinine 1.18 0.76 - 1.27 mg/dL    Sodium 136 136 - 145 mmol/L    Potassium 4.7 3.5 - 5.2 mmol/L    Chloride 99 98 - 107 mmol/L    CO2 27.0 22.0 - 29.0 mmol/L    Calcium 8.7 8.6 - 10.5 mg/dL    eGFR Non African Amer 59 (L) >60 mL/min/1.73    BUN/Creatinine Ratio 28.0 (H) 7.0 - 25.0    Anion Gap 10.0 5.0 - 15.0 mmol/L   CBC (No Diff)    Specimen: Blood   Result Value Ref Range    WBC 15.60 (H) 3.40 - 10.80 10*3/mm3    RBC 3.78 (L) 4.14 - 5.80 10*6/mm3    Hemoglobin 11.3 (L) 13.0 - 17.7 g/dL    Hematocrit 34.6 (L) 37.5 - 51.0 %    MCV 91.5 79.0 - 97.0 fL    MCH 29.9 26.6 - 33.0 pg    MCHC 32.7 31.5 - 35.7 g/dL    RDW 12.7 12.3 - 15.4 %    RDW-SD 42.3 37.0 - 54.0 fl    MPV 10.0 6.0 - 12.0 fL    Platelets 237 140 - 450 10*3/mm3   POC Glucose Once    Specimen: Blood   Result Value Ref Range    Glucose 108 70 - 130 mg/dL        Assessment and Plan  Diagnoses and all orders for this visit:    Primary hypertension  - BP well controlled  - Continue lisinopril 2.5mg BID, carvedilol 6.25mg BID, lasix 20mg daily    Primary osteoarthritis of both knees  - s/p L TKA 12/1/2021. On  oxycodone, tylenol.     Glenohumeral arthritis  - Following with orthopedics, receiving injections bilaterally with improvement    Nipple pain  - improving, defer imaging    Ischemic cardiomyopathy  - Echo 1/2021 EF 30%, EF 25% with dilated LV and low LV filling pressures on [facility]  - Non-obstructive CAD on [facility]  - Weight currently stable, minimal edema  - on coreg 6.25mg BID, lisinopril 2.5mg BID, and lasix 20mg daily for weight gain    Intrinsic eczema  - Improving with triamcinolone BID but has run out and unable to get refill. Advised he discuss with pharmacy reason for denial and have them reach out to us if needed.     Health Maintenance  - Colonoscopy: No longer indicated due to age.  - Immunizations: Flu UTD. Tdap 2017. COVID booster discussed. Shingrix series complete. Pneumococcal complete.  - Depression screening: negative 8/2021    Return in about 4 months (around 9/2/2022) for as scheduled.

## 2022-06-15 ENCOUNTER — OFFICE VISIT (OUTPATIENT)
Dept: INTERNAL MEDICINE | Facility: CLINIC | Age: 82
End: 2022-06-15

## 2022-06-15 VITALS
WEIGHT: 185 LBS | TEMPERATURE: 97.9 F | BODY MASS INDEX: 25.06 KG/M2 | HEART RATE: 77 BPM | HEIGHT: 72 IN | SYSTOLIC BLOOD PRESSURE: 124 MMHG | DIASTOLIC BLOOD PRESSURE: 68 MMHG | OXYGEN SATURATION: 95 %

## 2022-06-15 DIAGNOSIS — N64.4 NIPPLE PAIN: Primary | ICD-10-CM

## 2022-06-15 DIAGNOSIS — H90.3 SENSORINEURAL HEARING LOSS (SNHL) OF BOTH EARS: ICD-10-CM

## 2022-06-15 PROCEDURE — 99213 OFFICE O/P EST LOW 20 MIN: CPT | Performed by: INTERNAL MEDICINE

## 2022-06-15 NOTE — PROGRESS NOTES
Internal Medicine Acute Visit    Chief Complaint   Patient presents with   • Breast Problem     Left nipple tenderness    • Ear Fullness        HPI  Mr. Marino comes in today for recheck of breast pain. Pain recurred about a week ago and persists. Located at L nipple. No nipple discharge. Has hx of surgery to both breasts when much younger. He additionally notes hearing loss, ear fullness. Has hearing aids but still difficult to hear.       Review of Systems  Review of Systems   Constitutional: Negative.    HENT: Positive for ear pain (fullness) and hearing loss.    Respiratory: Negative.    Cardiovascular: Negative.    Genitourinary: Negative for breast discharge.        +breast pain        Medications  Current Outpatient Medications on File Prior to Visit   Medication Sig Dispense Refill   • acetaminophen (TYLENOL) 500 MG tablet Take 2 tablets by mouth Every 8 (Eight) Hours. For 1 week, then as needed 42 tablet 0   • ammonium lactate (LAC-HYDRIN) 12 % lotion Every 12 (Twelve) Hours.     • aspirin 81 MG EC tablet Take 1 tablet by mouth Daily. Resume in 1 month 30 tablet 0   • aspirin 81 MG EC tablet Take 1 tablet by mouth Every 12 (Twelve) Hours. For 1 month 60 tablet 0   • atorvastatin (LIPITOR) 40 MG tablet Take 1 tablet by mouth Every Night. 90 tablet 3   • B Complex Vitamins (VITAMIN B COMPLEX PO) Take 1 tablet by mouth Daily.     • carvedilol (COREG) 6.25 MG tablet Take 1 tablet by mouth Every 12 (Twelve) Hours. 180 tablet 3   • Cholecalciferol (VITAMIN D3) 125 MCG (5000 UT) capsule capsule Take 2,000 Units by mouth Daily.     • clopidogrel (PLAVIX) 75 MG tablet Take 1 tablet by mouth Daily. Resume 12/2/21 90 tablet 4   • docusate sodium (Colace) 100 MG capsule Take 1 capsule by mouth 2 (Two) Times a Day. 60 capsule 0   • DULoxetine (CYMBALTA) 30 MG capsule Take 2 capsules by mouth Daily. (Patient taking differently: Take 30 mg by mouth 2 (Two) Times a Day.) 180 capsule 1   • fluticasone (FLONASE) 50 MCG/ACT  "nasal spray 1 spray into the nostril(s) as directed by provider Daily. 48 g 3   • furosemide (LASIX) 20 MG tablet TAKE ONE TABLET BY MOUTH DAILY 30 tablet 11   • ketorolac (ACULAR) 0.5 % ophthalmic solution      • lisinopril (PRINIVIL,ZESTRIL) 2.5 MG tablet Take 1 tablet by mouth 2 (Two) Times a Day. 90 tablet 3   • lisinopril (PRINIVIL,ZESTRIL) 5 MG tablet 2.5 mg.     • Melatonin 3 MG capsule Take 1 capsule by mouth Daily.     • ofloxacin (OCUFLOX) 0.3 % ophthalmic solution      • oxyCODONE (ROXICODONE) 5 MG immediate release tablet Take 5 mg by mouth Every 4 (Four) Hours As Needed for Moderate Pain .     • prednisoLONE acetate (PRED FORTE) 1 % ophthalmic suspension      • tamsulosin (FLOMAX) 0.4 MG capsule 24 hr capsule Take 1 capsule by mouth Daily. 90 capsule 3   • triamcinolone (KENALOG) 0.1 % cream Apply 1 application topically to the appropriate area as directed 2 (Two) Times a Day. 45 g 5   • vitamin B-12 (CYANOCOBALAMIN) 1000 MCG tablet Take 1,000 mcg by mouth Daily.     • Zinc 50 MG capsule Take 1 tablet by mouth Daily.       No current facility-administered medications on file prior to visit.        Allergies  Allergies   Allergen Reactions   • Ibuprofen Hives     \"Pt states he hasn't taken this in a long time\"       PMH  Past Medical History:   Diagnosis Date   • Arrhythmia    • Arthritis     both knees   • Broken neck (HCC)    • CAD (coronary artery disease)    • Cancer (HCC)     skin cancer  head and neck  nonmelanoma   • Chondrocalcinosis of knee    • GERD (gastroesophageal reflux disease)    • Gout    • Heart attack ([facility]CC)      Last Assessed: 28 Mar 2014   • Heart disease    • History of transfusion     own blood with hip surgery   • Hyperlipidemia    • Hypertension    • Hypertensive urgency 5/28/2021   • IBS (irritable bowel syndrome)    • Left rotator cuff tear arthropathy    • Low back pain    • Lower extremity neuropathy    • Lumbar canal stenosis    • Neck pain    • Numbness    • Right hip pain  " "  • Rotator cuff tear arthropathy of right shoulder    • Sleep apnea     semi compliant with c-pap    • Thin blood (HCC)        Objective  Visit Vitals  /68   Pulse 77   Temp 97.9 °F (36.6 °C)   Ht 182.9 cm (72.01\")   Wt 83.9 kg (185 lb)   SpO2 95%   BMI 25.08 kg/m²        Physical Exam  Physical Exam  Vitals and nursing note reviewed.   Constitutional:       General: He is not in acute distress.     Appearance: He is well-developed.   HENT:      Head: Normocephalic and atraumatic.      Right Ear: Tympanic membrane, ear canal and external ear normal.      Left Ear: Tympanic membrane, ear canal and external ear normal.   Eyes:      Conjunctiva/sclera: Conjunctivae normal.   Pulmonary:      Effort: Pulmonary effort is normal. No respiratory distress.   Chest:   Breasts:      Right: Normal.      Left: Mass (near nipple) and tenderness present. No swelling, inverted nipple, nipple discharge or skin change.       Skin:     General: Skin is warm and dry.   Neurological:      Mental Status: He is alert and oriented to person, place, and time. Mental status is at baseline.         Results  Results for orders placed or performed during the hospital encounter of 12/01/21   Potassium    Specimen: Blood   Result Value Ref Range    Potassium 4.1 3.5 - 5.2 mmol/L   Basic Metabolic Panel    Specimen: Blood   Result Value Ref Range    Glucose 102 (H) 65 - 99 mg/dL    BUN 33 (H) 8 - 23 mg/dL    Creatinine 1.18 0.76 - 1.27 mg/dL    Sodium 136 136 - 145 mmol/L    Potassium 4.7 3.5 - 5.2 mmol/L    Chloride 99 98 - 107 mmol/L    CO2 27.0 22.0 - 29.0 mmol/L    Calcium 8.7 8.6 - 10.5 mg/dL    eGFR Non African Amer 59 (L) >60 mL/min/1.73    BUN/Creatinine Ratio 28.0 (H) 7.0 - 25.0    Anion Gap 10.0 5.0 - 15.0 mmol/L   CBC (No Diff)    Specimen: Blood   Result Value Ref Range    WBC 15.60 (H) 3.40 - 10.80 10*3/mm3    RBC 3.78 (L) 4.14 - 5.80 10*6/mm3    Hemoglobin 11.3 (L) 13.0 - 17.7 g/dL    Hematocrit 34.6 (L) 37.5 - 51.0 %    MCV " 91.5 79.0 - 97.0 fL    MCH 29.9 26.6 - 33.0 pg    MCHC 32.7 31.5 - 35.7 g/dL    RDW 12.7 12.3 - 15.4 %    RDW-SD 42.3 37.0 - 54.0 fl    MPV 10.0 6.0 - 12.0 fL    Platelets 237 140 - 450 10*3/mm3   POC Glucose Once    Specimen: Blood   Result Value Ref Range    Glucose 108 70 - 130 mg/dL        Assessment and Plan  Diagnoses and all orders for this visit:    Nipple pain  - Most likely gynecomastia, needs mammogram    Sensorineural hearing loss (SNHL) of both ears  - Has had hearing loss, now has hearing aids but still difficult to hear. Exam normal, no cerumen impaction.  - Consider follow up with audiologist    Health Maintenance  - Colonoscopy: No longer indicated due to age.  - Immunizations: Tdap 2017. COVID UTD. Shingrix series complete. Pneumococcal complete.  - Depression screening: negative 8/2021    Return for Next scheduled follow up.

## 2022-07-20 ENCOUNTER — HOSPITAL ENCOUNTER (OUTPATIENT)
Dept: ULTRASOUND IMAGING | Facility: HOSPITAL | Age: 82
Discharge: HOME | End: 2022-07-20

## 2022-07-20 ENCOUNTER — HOSPITAL ENCOUNTER (OUTPATIENT)
Dept: MAMMOGRAPHY | Facility: HOSPITAL | Age: 82
Discharge: HOME | End: 2022-07-20

## 2022-07-20 DIAGNOSIS — N64.4 NIPPLE PAIN: ICD-10-CM

## 2022-07-20 PROCEDURE — G0279 TOMOSYNTHESIS, MAMMO: HCPCS | Performed by: RADIOLOGY

## 2022-07-20 PROCEDURE — G0279 TOMOSYNTHESIS, MAMMO: HCPCS

## 2022-07-20 PROCEDURE — 77066 DX MAMMO INCL CAD BI: CPT

## 2022-07-20 PROCEDURE — 77066 DX MAMMO INCL CAD BI: CPT | Performed by: RADIOLOGY

## 2022-07-20 PROCEDURE — 76642 ULTRASOUND BREAST LIMITED: CPT | Performed by: RADIOLOGY

## 2022-07-20 PROCEDURE — 76642 ULTRASOUND BREAST LIMITED: CPT

## 2022-07-24 PROBLEM — N62 GYNECOMASTIA: Status: ACTIVE | Noted: 2022-07-24

## 2022-07-26 ENCOUNTER — TELEPHONE (OUTPATIENT)
Dept: INTERNAL MEDICINE | Facility: CLINIC | Age: 82
End: 2022-07-26

## 2022-07-29 ENCOUNTER — OFFICE VISIT (OUTPATIENT)
Dept: ORTHOPEDIC SURGERY | Facility: CLINIC | Age: 82
End: 2022-07-29

## 2022-07-29 VITALS
HEIGHT: 74 IN | WEIGHT: 182.4 LBS | DIASTOLIC BLOOD PRESSURE: 54 MMHG | BODY MASS INDEX: 23.41 KG/M2 | SYSTOLIC BLOOD PRESSURE: 122 MMHG

## 2022-07-29 DIAGNOSIS — M17.11 PRIMARY OSTEOARTHRITIS OF RIGHT KNEE: Primary | ICD-10-CM

## 2022-07-29 DIAGNOSIS — M25.461 EFFUSION OF RIGHT KNEE: ICD-10-CM

## 2022-07-29 PROCEDURE — 99214 OFFICE O/P EST MOD 30 MIN: CPT | Performed by: ORTHOPAEDIC SURGERY

## 2022-07-29 PROCEDURE — 20610 DRAIN/INJ JOINT/BURSA W/O US: CPT | Performed by: ORTHOPAEDIC SURGERY

## 2022-07-29 RX ORDER — HYDROCODONE BITARTRATE AND ACETAMINOPHEN 10; 325 MG/1; MG/1
TABLET ORAL
COMMUNITY
Start: 2022-07-21 | End: 2022-09-02

## 2022-07-29 RX ORDER — LIDOCAINE HYDROCHLORIDE 10 MG/ML
3 INJECTION, SOLUTION EPIDURAL; INFILTRATION; INTRACAUDAL; PERINEURAL
Status: COMPLETED | OUTPATIENT
Start: 2022-07-29 | End: 2022-07-29

## 2022-07-29 RX ORDER — TRIAMCINOLONE ACETONIDE 40 MG/ML
80 INJECTION, SUSPENSION INTRA-ARTICULAR; INTRAMUSCULAR
Status: COMPLETED | OUTPATIENT
Start: 2022-07-29 | End: 2022-07-29

## 2022-07-29 RX ADMIN — TRIAMCINOLONE ACETONIDE 80 MG: 40 INJECTION, SUSPENSION INTRA-ARTICULAR; INTRAMUSCULAR at 08:00

## 2022-07-29 RX ADMIN — LIDOCAINE HYDROCHLORIDE 3 ML: 10 INJECTION, SOLUTION EPIDURAL; INFILTRATION; INTRACAUDAL; PERINEURAL at 08:00

## 2022-07-29 NOTE — PROGRESS NOTES
Procedure   Large Joint Arthrocentesis: R knee  Date/Time: 7/29/2022 8:00 AM  Consent given by: patient  Site marked: site marked  Timeout: Immediately prior to procedure a time out was called to verify the correct patient, procedure, equipment, support staff and site/side marked as required   Supporting Documentation  Indications: pain   Procedure Details  Location: knee - R knee  Preparation: Patient was prepped and draped in the usual sterile fashion  Needle size: 18 G  Approach: anterolateral  Medications administered: 3 mL lidocaine PF 1% 1 %; 80 mg triamcinolone acetonide 40 MG/ML (1cc marcaine .75%, NDC 8219-3543-07, Lot [id number], Exp [id number])  Aspirate amount: 80 mL  Aspirate: yellow and clear  Patient tolerance: patient tolerated the procedure well with no immediate complications

## 2022-07-29 NOTE — PROGRESS NOTES
Orthopaedic Clinic Note: Knee Established Patient    Chief Complaint   Patient presents with   • Follow-up     1 year f/u-- Primary osteoarthritis of right knee        HPI    It has been 1  year(s) since Mr. Marino's last visit for his right knee. He returns to clinic today for worsening right knee pain.  He is status post left total knee arthroplasty doing well.  He comes to clinic today complaining of increased pain and swelling in the right knee that has gotten progressively worse over the past 2 to 3 months.  Rates his pain 6/10 on the pain scale but with physical activity he experiences worsening swelling and stiffness and pain increases to a 9/10 on the pain scale.  He is ambulating with no assistive device.  Denies fevers chills or constitutional symptoms.  Overall he is doing worse.    Past Medical History:   Diagnosis Date   • Arrhythmia    • Arthritis     both knees   • Broken neck (HCC)    • CAD (coronary artery disease)    • Cancer ([facility])     skin cancer  head and neck  nonmelanoma   • Chondrocalcinosis of knee    • GERD (gastroesophageal reflux disease)    • Gout    • Heart attack ([facility])      Last Assessed: 28 Mar 2014   • Heart disease    • History of transfusion     own blood with hip surgery   • Hyperlipidemia    • Hypertension    • Hypertensive urgency 05/28/2021   • IBS (irritable bowel syndrome)    • Left rotator cuff tear arthropathy    • Low back pain    • Lower extremity neuropathy    • Lumbar canal stenosis    • Neck pain    • Numbness    • Right hip pain    • Rotator cuff tear arthropathy of right shoulder    • Sleep apnea     semi compliant with c-pap    • Thin blood (HCC)       Past Surgical History:   Procedure Laterality Date   • APPENDECTOMY  1956   • BACK SURGERY  1997    spinal decompression and fusion   • BREAST BIOPSY  2003    BENIGN EXCISIONAL. NOT CANCER   • CARDIAC CATHETERIZATION      with two stents//. DR. TOLEDO   • CARDIAC CATHETERIZATION N/A 01/08/2021    Procedure: LEFT  HEART CATH;  Surgeon: Casimiro Bernal MD;  Location:  [facility];  Service: Cardiovascular;  Laterality: N/A;   • CARPAL TUNNEL RELEASE  03/28/2014    Neuroplasty Decompression Median Nerve At Carpal Tunnel   • CERVICAL DISCECTOMY POSTERIOR FUSION WITH BRAIN LAB N/A 07/13/2018    Procedure: CERVICAL LAMINECTOMY DECOMPRESSION POSTERIOR C1-2 POSTERIOR CERVICAL FUSION;  Surgeon: Randall Barnett MD;  Location:  [facility];  Service: Neurosurgery   • COLONOSCOPY     • CORONARY STENT PLACEMENT  2013   • HERNIA REPAIR  2002    & 1998   • JOINT REPLACEMENT     • LUMBAR DISCECTOMY FUSION INSTRUMENTATION     • SKIN BIOPSY     • TONSILLECTOMY     • TOTAL HIP ARTHROPLASTY  2002   • TOTAL KNEE ARTHROPLASTY Left 12/01/2021    Procedure: TOTAL KNEE ARTHROPLASTY LEFT;  Surgeon: Kendall Craig MD;  Location:  [facility];  Service: Orthopedics;  Laterality: Left;   • VASECTOMY     • WISDOM TOOTH EXTRACTION        Family History   Problem Relation Age of Onset   • Breast cancer Mother 62   • Cancer Mother    • Heart disease Father    • Hypertension Father    • Heart attack Father    • Sleep apnea Son    • Breast cancer Maternal Aunt      Social History     Socioeconomic History   • Marital status:    Tobacco Use   • Smoking status: Never Smoker   • Smokeless tobacco: Never Used   Vaping Use   • Vaping Use: Never used   Substance and Sexual Activity   • Alcohol use: Not Currently   • Drug use: Never   • Sexual activity: Defer      Current Outpatient Medications on File Prior to Visit   Medication Sig Dispense Refill   • acetaminophen (TYLENOL) 500 MG tablet Take 2 tablets by mouth Every 8 (Eight) Hours. For 1 week, then as needed 42 tablet 0   • ammonium lactate (LAC-HYDRIN) 12 % lotion Every 12 (Twelve) Hours.     • aspirin 81 MG EC tablet Take 1 tablet by mouth Daily. Resume in 1 month 30 tablet 0   • aspirin 81 MG EC tablet Take 1 tablet by mouth Every 12 (Twelve) Hours. For 1 month 60 tablet 0   •  atorvastatin (LIPITOR) 40 MG tablet Take 1 tablet by mouth Every Night. 90 tablet 3   • B Complex Vitamins (VITAMIN B COMPLEX PO) Take 1 tablet by mouth Daily.     • carvedilol (COREG) 6.25 MG tablet Take 1 tablet by mouth Every 12 (Twelve) Hours. 180 tablet 3   • Cholecalciferol (VITAMIN D3) 125 MCG (5000 UT) capsule capsule Take 2,000 Units by mouth Daily.     • clopidogrel (PLAVIX) 75 MG tablet Take 1 tablet by mouth Daily. Resume 12/2/21 90 tablet 4   • docusate sodium (Colace) 100 MG capsule Take 1 capsule by mouth 2 (Two) Times a Day. 60 capsule 0   • DULoxetine (CYMBALTA) 30 MG capsule Take 2 capsules by mouth Daily. (Patient taking differently: Take 30 mg by mouth 2 (Two) Times a Day.) 180 capsule 1   • fluticasone (FLONASE) 50 MCG/ACT nasal spray 1 spray into the nostril(s) as directed by provider Daily. 48 g 3   • furosemide (LASIX) 20 MG tablet TAKE ONE TABLET BY MOUTH DAILY 30 tablet 11   • HYDROcodone-acetaminophen (NORCO)  MG per tablet      • ketorolac (ACULAR) 0.5 % ophthalmic solution      • lisinopril (PRINIVIL,ZESTRIL) 2.5 MG tablet Take 1 tablet by mouth 2 (Two) Times a Day. 90 tablet 3   • lisinopril (PRINIVIL,ZESTRIL) 5 MG tablet 2.5 mg.     • Melatonin 3 MG capsule Take 1 capsule by mouth Daily.     • ofloxacin (OCUFLOX) 0.3 % ophthalmic solution      • oxyCODONE (ROXICODONE) 5 MG immediate release tablet Take 5 mg by mouth Every 4 (Four) Hours As Needed for Moderate Pain .     • prednisoLONE acetate (PRED FORTE) 1 % ophthalmic suspension      • tamsulosin (FLOMAX) 0.4 MG capsule 24 hr capsule Take 1 capsule by mouth Daily. 90 capsule 3   • triamcinolone (KENALOG) 0.1 % cream Apply 1 application topically to the appropriate area as directed 2 (Two) Times a Day. 45 g 5   • vitamin B-12 (CYANOCOBALAMIN) 1000 MCG tablet Take 1,000 mcg by mouth Daily.     • Zinc 50 MG capsule Take 1 tablet by mouth Daily.       No current facility-administered medications on file prior to visit.     "  Allergies   Allergen Reactions   • Ibuprofen Hives     \"Pt states he hasn't taken this in a long time\"        Review of Systems   Constitutional: Negative.    HENT: Positive for hearing loss and tinnitus.    Eyes: Negative.    Respiratory: Negative.    Cardiovascular: Positive for leg swelling.   Gastrointestinal: Negative.    Endocrine: Negative.    Genitourinary: Positive for decreased urine volume, difficulty urinating, frequency and urgency.   Musculoskeletal: Positive for arthralgias, back pain, joint swelling, neck pain and neck stiffness.   Skin: Negative.    Allergic/Immunologic: Negative.    Neurological: Positive for dizziness, light-headedness and numbness.   Hematological: Bruises/bleeds easily.   Psychiatric/Behavioral: Negative.         The patient's Review of Systems was personally reviewed and confirmed as accurate.    Physical Exam  Blood pressure 122/54, height 188 cm (74.02\"), weight 82.7 kg (182 lb 6.4 oz).    Body mass index is 23.41 kg/m².    GENERAL APPEARANCE: awake, alert, oriented, in no acute distress and well developed, well nourished  LUNGS:  breathing nonlabored  EXTREMITIES: no clubbing, cyanosis  PERIPHERAL PULSES: palpable dorsalis pedis and posterior tibial pulses bilaterally.    GAIT:  Antalgic        ----------  Left Knee Exam:  ----------  ALIGNMENT: severe varus, correctable to neutral  ----------  RANGE OF MOTION:  Decreased (5 - 110 degrees) with no extensor lag  LIGAMENTOUS STABILITY:   stable to varus and valgus stress at terminal extension and 30 degrees; retensioning of the MCL is appreciated with valgus stress at 30 degrees consistent with medial compartment degeneration  ----------  STRENGTH:  KNEE FLEXION 4/5  KNEE EXTENSION  4/5  ANKLE DORSIFLEXION  5/5  ANKLE PLANTARFLEXION  5/5  ----------  PAIN WITH PALPATION:global  KNEE EFFUSION: yes, moderate effusion  PAIN WITH KNEE ROM: yes  PATELLAR CREPITUS:  yes, painful and symptomatic  ----------  SENSATION TO LIGHT " TOUCH:  DEEP PERONEAL/SUPERFICIAL PERONEAL/SURAL/SAPHENOUS/TIBIAL:    intact  ----------  EDEMA:  no  ERYTHEMA:    no  WOUNDS/INCISIONS:   no  _____________________________________________________________________  _____________________________________________________________________    RADIOGRAPHIC FINDINGS:   Indication: Right knee pain    Comparison: Todays xrays were compared to previous xrays from 8/17/2021    Knee films: moderate to severe tricompartmental arthritis with genu varum alignment, periarticular osteophytes visualized in all compartments and No significant changes compared to prior radiographs.    Assessment/Plan:   Diagnosis Plan   1. Primary osteoarthritis of right knee  XR Knee 4+ View Right    Large Joint Arthrocentesis: R knee    lidocaine PF 1% (XYLOCAINE) injection 3 mL    triamcinolone acetonide (KENALOG-40) injection 80 mg   2. Effusion of right knee       Patient is experiencing worsening knee pain due to arthritic exacerbation.  He has previously undergone conservative treatment measures and failed.  The patient has failed conservative treatment measures and is a candidate for joint arthroplasty.  I discussed the joint arthroplasty surgical process as well as the recovery and rehabilitation time frame.  The patient asked several questions regarding the joint arthroplasty surgery, which were answered accordingly.  Ultimately, the patient declines surgical intervention at this time and wishes to continue with conservative treatment measures.  Alternative conservative treatment measures were discussed including bracing, therapy, topical/oral anti-inflammatories, activity modification, and weight loss.  The patient considered these treatment options and wishes to proceed with corticosteroid injection(s) today.  Therefore we will proceed with corticosteroid injection(s) today for his worsening knee pain.  He will follow-up as needed.    Procedure Note:  I discussed with the patient the  potential benefits of performing a therapeutic aspiration and injection of the right knee as well as potential risks including but not limited to infection, swelling, pain, bleeding, bruising, nerve/vessel damage, skin color changes, transient elevation in blood glucose levels, and fat atrophy. After informed consent and verifying correct patient, procedure site, and type of procedure, the area was prepped with alcohol, ethyl chloride was used to numb the skin. Via the superior lateral approach, an 18-gauge needle was inserted in the knee joint and 80 cc of clear yellow joint fluid were aspirated from the knee.  Then, 3cc of 1% lidocaine, 1 cc of 0.75% Marcaine and 2 cc of 40mg/ml of Kenalog were injected into the right knee. The patient tolerated the procedure well. There were no complications. A sterile dressing was placed over the injection site.        Kendall Craig MD  07/29/22  08:11 EDT

## 2022-08-08 DIAGNOSIS — G60.9 IDIOPATHIC PERIPHERAL NEUROPATHY: ICD-10-CM

## 2022-08-08 DIAGNOSIS — J30.9 ALLERGIC RHINITIS, UNSPECIFIED SEASONALITY, UNSPECIFIED TRIGGER: ICD-10-CM

## 2022-08-08 RX ORDER — DULOXETIN HYDROCHLORIDE 30 MG/1
60 CAPSULE, DELAYED RELEASE ORAL DAILY
Qty: 180 CAPSULE | Refills: 0 | Status: SHIPPED | OUTPATIENT
Start: 2022-08-08 | End: 2023-01-18 | Stop reason: SDUPTHER

## 2022-08-08 RX ORDER — FLUTICASONE PROPIONATE 50 MCG
1 SPRAY, SUSPENSION (ML) NASAL DAILY
Qty: 48 G | Refills: 0 | Status: SHIPPED | OUTPATIENT
Start: 2022-08-08

## 2022-08-08 RX ORDER — LISINOPRIL 2.5 MG/1
2.5 TABLET ORAL 2 TIMES DAILY
Qty: 90 TABLET | Refills: 0 | Status: SHIPPED | OUTPATIENT
Start: 2022-08-08 | End: 2022-09-28

## 2022-08-08 NOTE — TELEPHONE ENCOUNTER
Caller: Isrrael Marino    Relationship: Self    Best call back number:[phone]  Requested Prescriptions:   Requested Prescriptions     Pending Prescriptions Disp Refills   • DULoxetine (CYMBALTA) 30 MG capsule 180 capsule 1     Sig: Take 2 capsules by mouth Daily.   • lisinopril (PRINIVIL,ZESTRIL) 2.5 MG tablet 90 tablet 3     Sig: Take 1 tablet by mouth 2 (Two) Times a Day.   • fluticasone (FLONASE) 50 MCG/ACT nasal spray 48 g 3     Si spray into the nostril(s) as directed by provider Daily.        Pharmacy where request should be sent: [facility] [address] [place] [address] [address]  - [phone]  - [phone] FX     Additional details provided by patient: PATIENT NEEDS REFILL    Does the patient have less than a 3 day supply:  [x] Yes  [] No    Jyothi Grijalva Rep   22 15:35 EDT

## 2022-09-02 ENCOUNTER — LAB (OUTPATIENT)
Dept: LAB | Facility: HOSPITAL | Age: 82
End: 2022-09-02

## 2022-09-02 ENCOUNTER — OFFICE VISIT (OUTPATIENT)
Dept: INTERNAL MEDICINE | Facility: CLINIC | Age: 82
End: 2022-09-02

## 2022-09-02 VITALS
WEIGHT: 188 LBS | OXYGEN SATURATION: 99 % | TEMPERATURE: 97.5 F | HEIGHT: 74 IN | BODY MASS INDEX: 24.13 KG/M2 | DIASTOLIC BLOOD PRESSURE: 72 MMHG | SYSTOLIC BLOOD PRESSURE: 140 MMHG | HEART RATE: 67 BPM

## 2022-09-02 DIAGNOSIS — I25.5 ISCHEMIC CARDIOMYOPATHY: ICD-10-CM

## 2022-09-02 DIAGNOSIS — M19.019 GLENOHUMERAL ARTHRITIS: ICD-10-CM

## 2022-09-02 DIAGNOSIS — R73.03 PREDIABETES: ICD-10-CM

## 2022-09-02 DIAGNOSIS — E78.2 MIXED HYPERLIPIDEMIA: ICD-10-CM

## 2022-09-02 DIAGNOSIS — H90.3 SENSORINEURAL HEARING LOSS (SNHL) OF BOTH EARS: ICD-10-CM

## 2022-09-02 DIAGNOSIS — Z96.652 STATUS POST TOTAL LEFT KNEE REPLACEMENT: ICD-10-CM

## 2022-09-02 DIAGNOSIS — M1A.0720 CHRONIC IDIOPATHIC GOUT INVOLVING TOE OF LEFT FOOT WITHOUT TOPHUS: ICD-10-CM

## 2022-09-02 DIAGNOSIS — Z00.00 MEDICARE ANNUAL WELLNESS VISIT, SUBSEQUENT: ICD-10-CM

## 2022-09-02 DIAGNOSIS — J30.9 ALLERGIC RHINITIS, UNSPECIFIED SEASONALITY, UNSPECIFIED TRIGGER: ICD-10-CM

## 2022-09-02 DIAGNOSIS — N40.0 BENIGN PROSTATIC HYPERPLASIA WITHOUT LOWER URINARY TRACT SYMPTOMS: ICD-10-CM

## 2022-09-02 DIAGNOSIS — M53.9 MULTILEVEL DEGENERATIVE DISC DISEASE: ICD-10-CM

## 2022-09-02 DIAGNOSIS — K21.9 GASTROESOPHAGEAL REFLUX DISEASE WITHOUT ESOPHAGITIS: ICD-10-CM

## 2022-09-02 DIAGNOSIS — M17.0 PRIMARY OSTEOARTHRITIS OF BOTH KNEES: ICD-10-CM

## 2022-09-02 DIAGNOSIS — I10 PRIMARY HYPERTENSION: ICD-10-CM

## 2022-09-02 DIAGNOSIS — N62 GYNECOMASTIA: ICD-10-CM

## 2022-09-02 DIAGNOSIS — G47.33 OSA (OBSTRUCTIVE SLEEP APNEA): ICD-10-CM

## 2022-09-02 DIAGNOSIS — Z00.00 MEDICARE ANNUAL WELLNESS VISIT, SUBSEQUENT: Primary | ICD-10-CM

## 2022-09-02 DIAGNOSIS — G60.9 IDIOPATHIC PERIPHERAL NEUROPATHY: ICD-10-CM

## 2022-09-02 DIAGNOSIS — I25.10 CHRONIC CORONARY ARTERY DISEASE: ICD-10-CM

## 2022-09-02 PROBLEM — N64.4 NIPPLE PAIN: Status: RESOLVED | Noted: 2022-04-08 | Resolved: 2022-09-02

## 2022-09-02 PROBLEM — I80.8 SUPERFICIAL THROMBOPHLEBITIS OF RIGHT UPPER EXTREMITY: Status: RESOLVED | Noted: 2021-06-03 | Resolved: 2022-09-02

## 2022-09-02 PROCEDURE — 99397 PER PM REEVAL EST PAT 65+ YR: CPT | Performed by: INTERNAL MEDICINE

## 2022-09-02 PROCEDURE — 96160 PT-FOCUSED HLTH RISK ASSMT: CPT | Performed by: INTERNAL MEDICINE

## 2022-09-02 PROCEDURE — G0439 PPPS, SUBSEQ VISIT: HCPCS | Performed by: INTERNAL MEDICINE

## 2022-09-02 PROCEDURE — 1170F FXNL STATUS ASSESSED: CPT | Performed by: INTERNAL MEDICINE

## 2022-09-02 PROCEDURE — 1160F RVW MEDS BY RX/DR IN RCRD: CPT | Performed by: INTERNAL MEDICINE

## 2022-09-02 PROCEDURE — 1125F AMNT PAIN NOTED PAIN PRSNT: CPT | Performed by: INTERNAL MEDICINE

## 2022-09-02 NOTE — PROGRESS NOTES
The ABCs of the Annual Wellness Visit  Subsequent Medicare Wellness Visit    Chief Complaint   Patient presents with   • Medicare Wellness-subsequent       Subjective   History of Present Illness:  Isrrael Marino is a 82 y.o. male who presents for a Subsequent Medicare Wellness Visit.    HEALTH RISK ASSESSMENT    Recent Hospitalizations:  Recently treated at the following:  [facility] 12/2021 for knee replacement.    Current Medical Providers:  Patient Care Team:  Ivanna George MD as PCP - General (Internal Medicine)  Omar Mckeon MD as Referring Physician (Emergency Medicine)  Casimiro Bernal MD as Consulting Physician (Cardiology)    Smoking Status:  Social History     Tobacco Use   Smoking Status Never Smoker   Smokeless Tobacco Never Used       Alcohol Consumption:  Social History     Substance and Sexual Activity   Alcohol Use Not Currently       Depression Screen:   PHQ-2/PHQ-9 Depression Screening 9/2/2022   Retired PHQ-9 Total Score -   Retired Total Score -   Little Interest or Pleasure in Doing Things 0-->not at all   Feeling Down, Depressed or Hopeless 0-->not at all   PHQ-9: Brief Depression Severity Measure Score 0       Fall Risk Screen:  LAYLA Fall Risk Assessment was completed, and patient is at LOW risk for falls.Assessment completed on:9/2/2022    Health Habits and Functional and Cognitive Screening:  Functional & Cognitive Status 9/2/2022   Do you have difficulty preparing food and eating? No   Do you have difficulty bathing yourself, getting dressed or grooming yourself? No   Do you have difficulty using the toilet? No   Do you have difficulty moving around from place to place? No   Do you have trouble with steps or getting out of a bed or a chair? No   Current Diet Well Balanced Diet   Dental Exam Up to date   Eye Exam Up to date   Exercise (times per week) Other   Current Exercises Include Yard Work   Current Exercise Activities Include -   Do you need help using  the phone?  Yes   Are you deaf or do you have serious difficulty hearing?  Yes   Do you need help with transportation? No   Do you need help shopping? No   Do you need help preparing meals?  No   Do you need help with housework?  No   Do you need help with laundry? No   Do you need help taking your medications? No   Do you need help managing money? No   Do you ever drive or ride in a car without wearing a seat belt? No   Have you felt unusual stress, anger or loneliness in the last month? No   Who do you live with? Spouse   If you need help, do you have trouble finding someone available to you? No   Have you been bothered in the last four weeks by sexual problems? No   Do you have difficulty concentrating, remembering or making decisions? No         Does the patient have evidence of cognitive impairment? No    Asprin use counseling:Taking ASA appropriately as indicated    Age-appropriate Screening Schedule:  Refer to the list below for future screening recommendations based on patient's age, sex and/or medical conditions. Orders for these recommended tests are listed in the plan section. The patient has been provided with a written plan.    Health Maintenance   Topic Date Due   • LIPID PANEL  05/28/2022   • INFLUENZA VACCINE  10/01/2022   • TDAP/TD VACCINES (3 - Td or Tdap) 11/12/2027   • ZOSTER VACCINE  Completed          The following portions of the patient's history were reviewed and updated as appropriate: allergies, current medications, past family history, past medical history, past social history, past surgical history and problem list.    Outpatient Medications Prior to Visit   Medication Sig Dispense Refill   • acetaminophen (TYLENOL) 500 MG tablet Take 2 tablets by mouth Every 8 (Eight) Hours. For 1 week, then as needed 42 tablet 0   • ammonium lactate (LAC-HYDRIN) 12 % lotion Every 12 (Twelve) Hours.     • aspirin 81 MG EC tablet Take 1 tablet by mouth Daily. Resume in 1 month 30 tablet 0   • aspirin 81  MG EC tablet Take 1 tablet by mouth Every 12 (Twelve) Hours. For 1 month 60 tablet 0   • atorvastatin (LIPITOR) 40 MG tablet Take 1 tablet by mouth Every Night. 90 tablet 3   • B Complex Vitamins (VITAMIN B COMPLEX PO) Take 1 tablet by mouth Daily.     • carvedilol (COREG) 6.25 MG tablet Take 1 tablet by mouth Every 12 (Twelve) Hours. 180 tablet 3   • Cholecalciferol (VITAMIN D3) 125 MCG (5000 UT) capsule capsule Take 2,000 Units by mouth Daily.     • clopidogrel (PLAVIX) 75 MG tablet Take 1 tablet by mouth Daily. Resume 12/2/21 90 tablet 4   • docusate sodium (Colace) 100 MG capsule Take 1 capsule by mouth 2 (Two) Times a Day. 60 capsule 0   • DULoxetine (CYMBALTA) 30 MG capsule Take 2 capsules by mouth Daily. 180 capsule 0   • fluticasone (FLONASE) 50 MCG/ACT nasal spray 1 spray into the nostril(s) as directed by provider Daily. 48 g 0   • furosemide (LASIX) 20 MG tablet TAKE ONE TABLET BY MOUTH DAILY 30 tablet 11   • HYDROcodone-acetaminophen (NORCO)  MG per tablet      • ketorolac (ACULAR) 0.5 % ophthalmic solution      • lisinopril (PRINIVIL,ZESTRIL) 2.5 MG tablet Take 1 tablet by mouth 2 (Two) Times a Day. 90 tablet 0   • lisinopril (PRINIVIL,ZESTRIL) 5 MG tablet 2.5 mg.     • Melatonin 3 MG capsule Take 1 capsule by mouth Daily.     • ofloxacin (OCUFLOX) 0.3 % ophthalmic solution      • oxyCODONE (ROXICODONE) 5 MG immediate release tablet Take 5 mg by mouth Every 4 (Four) Hours As Needed for Moderate Pain .     • prednisoLONE acetate (PRED FORTE) 1 % ophthalmic suspension      • tamsulosin (FLOMAX) 0.4 MG capsule 24 hr capsule Take 1 capsule by mouth Daily. 90 capsule 3   • triamcinolone (KENALOG) 0.1 % cream Apply 1 application topically to the appropriate area as directed 2 (Two) Times a Day. 45 g 5   • vitamin B-12 (CYANOCOBALAMIN) 1000 MCG tablet Take 1,000 mcg by mouth Daily.     • Zinc 50 MG capsule Take 1 tablet by mouth Daily.       No facility-administered medications prior to visit.  "      Patient Active Problem List   Diagnosis   • Gastroesophageal reflux disease   • Atopic rhinitis   • Peripheral neuropathy   • Low back pain   • Hypertension   • Hyperlipidemia   • Benign prostatic hyperplasia   • Chronic coronary artery disease   • Primary osteoarthritis of both knees   • Prediabetes   • JIN (obstructive sleep apnea)   • Hypersomnia   • Iron deficiency   • Ischemic cardiomyopathy   • DDD (degenerative disc disease), cervical   • Status post total left knee replacement   • Intrinsic eczema   • Glenohumeral arthritis   • Sensorineural hearing loss (SNHL) of both ears   • Gynecomastia       Advanced Care Planning:  ACP discussion was held with the patient during this visit. Patient has an advance directive in EMR which is still valid.     Review of Systems   Constitutional: Negative.    HENT: Positive for hearing loss.    Respiratory: Negative.    Cardiovascular: Negative.    Gastrointestinal: Negative.    Genitourinary: Negative.    Musculoskeletal: Positive for arthralgias and back pain. Negative for gait problem.   Skin: Negative.    Allergic/Immunologic: Positive for environmental allergies.   Neurological: Negative.    Psychiatric/Behavioral: Positive for stress (broken equipment, dental bills). Negative for depressed mood.       Compared to one year ago, the patient feels his physical health is the same.  Compared to one year ago, the patient feels his mental health is the same.    Reviewed chart for potential of high risk medication in the elderly: yes  Reviewed chart for potential of harmful drug interactions in the elderly:yes    Objective         Vitals:    09/02/22 0810   BP: 140/72   Pulse: 67   Temp: 97.5 °F (36.4 °C)   SpO2: 99%   Weight: 85.3 kg (188 lb)   Height: 188 cm (74\")       Body mass index is 24.14 kg/m².  Discussed the patient's BMI with him. The BMI is in the acceptable range.    Physical Exam  Vitals and nursing note reviewed.   Constitutional:       General: He is not " in acute distress.     Appearance: Normal appearance. He is well-developed and normal weight. He is not ill-appearing, toxic-appearing or diaphoretic.   HENT:      Head: Normocephalic and atraumatic.      Right Ear: Tympanic membrane, ear canal and external ear normal.      Left Ear: Tympanic membrane, ear canal and external ear normal.      Ears:      Comments: Hard of hearing     Nose: Nose normal.   Eyes:      General: No scleral icterus.     Conjunctiva/sclera: Conjunctivae normal.   Cardiovascular:      Rate and Rhythm: Normal rate and regular rhythm.      Heart sounds: Normal heart sounds.   Pulmonary:      Effort: Pulmonary effort is normal. No respiratory distress.      Breath sounds: Normal breath sounds.   Abdominal:      Palpations: Abdomen is soft.   Musculoskeletal:         General: No deformity.      Cervical back: Neck supple.      Right lower leg: Edema (trace) present.      Left lower leg: Edema (trace) present.   Lymphadenopathy:      Cervical: No cervical adenopathy.   Skin:     General: Skin is warm and dry.      Findings: No rash.   Neurological:      Mental Status: He is alert and oriented to person, place, and time. Mental status is at baseline.      Gait: Gait normal.   Psychiatric:         Mood and Affect: Mood normal.         Behavior: Behavior normal.         Thought Content: Thought content normal.         Judgment: Judgment normal.               Assessment & Plan   Medicare Risks and Personalized Health Plan  CMS Preventative Services Quick Reference  Advance Directive Discussion  Diabetic Lab Screening   Hearing Problem    The above risks/problems have been discussed with the patient.  Pertinent information has been shared with the patient in the After Visit Summary.  Follow up plans and orders are seen below in the Assessment/Plan Section.    Diagnoses and all orders for this visit:    Medicare annual wellness visit, subsequent  - Counseling was given to patient for the following  topics:  healthy eating habits, disease prevention and importance of immunizations, including risks and benefits. Also discussed the importance of regular dental and vision care.  -     CBC (No Diff); Future  -     Comprehensive Metabolic Panel; Future    Gynecomastia  - Has chronic intermittent L breast pain, mammogram obtained showing benign gynecomastia  - Will check LH, estradiol, testosterone, hcg  - May be due to ACEI or statin  - Currently asymptomatic    Primary hypertension  - BP well controlled  - Continue lisinopril 2.5mg BID, carvedilol 6.25mg BID    Primary osteoarthritis of both knees Status post total left knee replacement  - s/p L TKA 12/1/2021. Has had injections recently to R knee and candidate for R TKA when ready.    Glenohumeral arthritis  - Following with orthopedics, receiving injections bilaterally with improvement    Multilevel degenerative disc disease  - hx of odontoid fracture s/p cervical fusion. Has diffuse DDD.  - Manages with chiropractor, tylenol. Has been offered pain mangement referral in the past but declined.    Chronic coronary artery disease  - [facility] 1/7 without flow limiting CAD  - On coreg, lipitor, ASA, plavix.    Ischemic cardiomyopathy  - Echo 1/2021 EF 30%, EF 25% with dilated LV and low LV filling pressures on [facility]  - Non-obstructive CAD on [facility]  - Weight currently stable, minimal edema  - on coreg 6.25mg BID, lisinopril 2.5mg BID, and lasix 20mg daily PRN for weight gain (dry weight 184lbs)    Mixed hyperlipidemia  - Has been well controlled, continue atorvastatin 40mg daily  - Lipid panel ordered    Prediabetes  - Managed with diet  - A1c ordered    JIN (obstructive sleep apnea)  - on CPAP    Idiopathic peripheral neuropathy    Allergic rhinitis, unspecified seasonality, unspecified trigger  - Stable, continue flonase PRN    Gastroesophageal reflux disease without esophagitis  - stable, currently managed with diet    Benign prostatic hyperplasia without lower urinary tract  symptoms  - Stable, follows with urology and on tamsulosin    Sensorineural hearing loss (SNHL) of both ears  - Has hearing aids, forgot them today     Chronic idiopathic gout involving toe of left foot without tophus  - Very infrequent flares, not currently requiring urate lowering therapy    Health Maintenance  - Colonoscopy: No longer indicated due to age.  - Immunizations: Tdap 2017. COVID booster discussed. Shingrix series complete. Pneumococcal complete.  - Depression screening: negative 9/2022    Follow Up:  Return in about 6 months (around 3/2/2023) for Follow up, 1 year for wellness 45 minutes, Labs today.     An After Visit Summary and PPPS were given to the patient.

## 2022-09-03 LAB
ALBUMIN SERPL-MCNC: 4.3 G/DL (ref 3.5–5.2)
ALBUMIN/GLOB SERPL: 2.3 G/DL
ALP SERPL-CCNC: 88 U/L (ref 39–117)
ALT SERPL-CCNC: 22 U/L (ref 1–41)
AST SERPL-CCNC: 31 U/L (ref 1–40)
B-HCG SERPL QL: NEGATIVE MIU/ML
BILIRUB SERPL-MCNC: 0.5 MG/DL (ref 0–1.2)
BUN SERPL-MCNC: 31 MG/DL (ref 8–23)
BUN/CREAT SERPL: 25.6 (ref 7–25)
CALCIUM SERPL-MCNC: 9 MG/DL (ref 8.6–10.5)
CHLORIDE SERPL-SCNC: 106 MMOL/L (ref 98–107)
CHOLEST SERPL-MCNC: 138 MG/DL (ref 0–200)
CO2 SERPL-SCNC: 27 MMOL/L (ref 22–29)
CREAT SERPL-MCNC: 1.21 MG/DL (ref 0.76–1.27)
EGFRCR-CYS SERPLBLD CKD-EPI 2021: 59 ML/MIN/1.73
ERYTHROCYTE [DISTWIDTH] IN BLOOD BY AUTOMATED COUNT: 13.1 % (ref 12.3–15.4)
ESTRADIOL SERPL-MCNC: 42.6 PG/ML (ref 7.6–42.6)
GLOBULIN SER CALC-MCNC: 1.9 GM/DL
GLUCOSE SERPL-MCNC: 78 MG/DL (ref 65–99)
HBA1C MFR BLD: 5.8 % (ref 4.8–5.6)
HCT VFR BLD AUTO: 40.6 % (ref 37.5–51)
HDLC SERPL-MCNC: 59 MG/DL (ref 40–60)
HGB BLD-MCNC: 13.5 G/DL (ref 13–17.7)
LDLC SERPL CALC-MCNC: 67 MG/DL (ref 0–100)
LH SERPL-ACNC: 23.5 MIU/ML (ref 1.7–8.6)
MCH RBC QN AUTO: 29.4 PG (ref 26.6–33)
MCHC RBC AUTO-ENTMCNC: 33.3 G/DL (ref 31.5–35.7)
MCV RBC AUTO: 88.5 FL (ref 79–97)
PLATELET # BLD AUTO: 210 10*3/MM3 (ref 140–450)
POTASSIUM SERPL-SCNC: 4.2 MMOL/L (ref 3.5–5.2)
PROT SERPL-MCNC: 6.2 G/DL (ref 6–8.5)
RBC # BLD AUTO: 4.59 10*6/MM3 (ref 4.14–5.8)
SODIUM SERPL-SCNC: 143 MMOL/L (ref 136–145)
TESTOST SERPL-MCNC: 429 NG/DL (ref 264–916)
TRIGL SERPL-MCNC: 59 MG/DL (ref 0–150)
VLDLC SERPL CALC-MCNC: 12 MG/DL (ref 5–40)
WBC # BLD AUTO: 8.06 10*3/MM3 (ref 3.4–10.8)

## 2022-09-28 RX ORDER — LISINOPRIL 2.5 MG/1
TABLET ORAL
Qty: 90 TABLET | Refills: 0 | Status: SHIPPED | OUTPATIENT
Start: 2022-09-28 | End: 2022-10-24

## 2022-10-24 RX ORDER — LISINOPRIL 2.5 MG/1
TABLET ORAL
Qty: 90 TABLET | Refills: 0 | Status: SHIPPED | OUTPATIENT
Start: 2022-10-24 | End: 2023-01-06

## 2022-10-25 ENCOUNTER — OFFICE VISIT (OUTPATIENT)
Dept: ORTHOPEDIC SURGERY | Facility: CLINIC | Age: 82
End: 2022-10-25

## 2022-10-25 VITALS
HEIGHT: 74 IN | WEIGHT: 185 LBS | SYSTOLIC BLOOD PRESSURE: 118 MMHG | DIASTOLIC BLOOD PRESSURE: 64 MMHG | BODY MASS INDEX: 23.74 KG/M2

## 2022-10-25 DIAGNOSIS — Z96.652 STATUS POST TOTAL LEFT KNEE REPLACEMENT: Primary | ICD-10-CM

## 2022-10-25 DIAGNOSIS — M25.462 EFFUSION OF BURSA OF LEFT KNEE: ICD-10-CM

## 2022-10-25 PROCEDURE — 99213 OFFICE O/P EST LOW 20 MIN: CPT | Performed by: ORTHOPAEDIC SURGERY

## 2022-10-25 NOTE — PROGRESS NOTES
Orthopaedic Clinic Note: Knee Established Patient    Chief Complaint   Patient presents with   • Follow-up     7 month f/u -- 10 month status post Left total knee arthroplasty 12/1/21        HPI    It has been 7  month(s) since Mr. Marino's last visit. He returns to clinic today for Follow-up left total knee arthroplasty.  He is 10 months out from surgery.  Patient states he was doing extremely well with his knee replacement up until a week ago when he was climbing in and out of deer stands in the woods.  He states the next day, he had swelling and inability to weight-bear on the left knee afterwards.  He called for an appointment.  At that time his pain was an 8/10 on the pain scale.  Over the past 4 to 5 days, his pain has subsided.  Today he rates his pain a 0/10 on the pain scale.  He is able to walk without assistive device.  Denies recent illnesses, fevers, chills, constitutional symptoms.  He is here today due to concern he may have damaged the knee replacement.    Past Medical History:   Diagnosis Date   • Arrhythmia    • Arthritis     both knees   • Broken neck (HCC)    • CAD (coronary artery disease)    • Cancer (HCC)     skin cancer  head and neck  nonmelanoma   • Chondrocalcinosis of knee    • GERD (gastroesophageal reflux disease)    • Gout    • Heart attack (HCC)      Last Assessed: 28 Mar 2014   • Heart disease    • History of transfusion     own blood with hip surgery   • Hyperlipidemia    • Hypertension    • Hypertensive urgency 05/28/2021   • IBS (irritable bowel syndrome)    • Left rotator cuff tear arthropathy    • Low back pain    • Lower extremity neuropathy    • Lumbar canal stenosis    • Neck pain    • Nipple pain 4/8/2022   • Numbness    • Right hip pain    • Rotator cuff tear arthropathy of right shoulder    • Sleep apnea     semi compliant with c-pap    • Superficial thrombophlebitis of right upper extremity 6/3/2021   • Thin blood (HCC)       Past Surgical History:   Procedure  Laterality Date   • APPENDECTOMY  1956   • BACK SURGERY  1997    spinal decompression and fusion   • BREAST BIOPSY  2003    BENIGN EXCISIONAL. NOT CANCER   • CARDIAC CATHETERIZATION      with two stents//. DR. TOLEDO   • CARDIAC CATHETERIZATION N/A 01/08/2021    Procedure: LEFT HEART CATH;  Surgeon: Casimiro Bernal MD;  Location:  [facility];  Service: Cardiovascular;  Laterality: N/A;   • CARPAL TUNNEL RELEASE  03/28/2014    Neuroplasty Decompression Median Nerve At Carpal Tunnel   • CATARACT EXTRACTION Bilateral    • CERVICAL DISCECTOMY POSTERIOR FUSION WITH BRAIN LAB N/A 07/13/2018    Procedure: CERVICAL LAMINECTOMY DECOMPRESSION POSTERIOR C1-2 POSTERIOR CERVICAL FUSION;  Surgeon: Randall Barnett MD;  Location:  [facility];  Service: Neurosurgery   • COLONOSCOPY     • CORONARY STENT PLACEMENT  2013   • HERNIA REPAIR  2002    & 1998   • JOINT REPLACEMENT     • LUMBAR DISCECTOMY FUSION INSTRUMENTATION     • SKIN BIOPSY     • TONSILLECTOMY     • TOTAL HIP ARTHROPLASTY  2002   • TOTAL KNEE ARTHROPLASTY Left 12/01/2021    Procedure: TOTAL KNEE ARTHROPLASTY LEFT;  Surgeon: Kendall Craig MD;  Location:  [facility];  Service: Orthopedics;  Laterality: Left;   • VASECTOMY     • WISDOM TOOTH EXTRACTION        Family History   Problem Relation Age of Onset   • Breast cancer Mother 62   • Cancer Mother    • Heart disease Father    • Hypertension Father    • Heart attack Father    • Sleep apnea Son    • Breast cancer Maternal Aunt      Social History     Socioeconomic History   • Marital status:    Tobacco Use   • Smoking status: Never   • Smokeless tobacco: Never   Vaping Use   • Vaping Use: Never used   Substance and Sexual Activity   • Alcohol use: Not Currently   • Drug use: Never   • Sexual activity: Defer      Current Outpatient Medications on File Prior to Visit   Medication Sig Dispense Refill   • acetaminophen (TYLENOL) 500 MG tablet Take 2 tablets by mouth Every 8 (Eight) Hours. For 1  "week, then as needed 42 tablet 0   • ammonium lactate (LAC-HYDRIN) 12 % lotion Every 12 (Twelve) Hours.     • aspirin 81 MG EC tablet Take 1 tablet by mouth Daily. Resume in 1 month 30 tablet 0   • atorvastatin (LIPITOR) 40 MG tablet Take 1 tablet by mouth Every Night. 90 tablet 3   • B Complex Vitamins (VITAMIN B COMPLEX PO) Take 1 tablet by mouth Daily.     • carvedilol (COREG) 6.25 MG tablet Take 1 tablet by mouth Every 12 (Twelve) Hours. 180 tablet 3   • Cholecalciferol (VITAMIN D3) 125 MCG (5000 UT) capsule capsule Take 2,000 Units by mouth Daily.     • clopidogrel (PLAVIX) 75 MG tablet Take 1 tablet by mouth Daily. Resume 12/2/21 90 tablet 4   • docusate sodium (Colace) 100 MG capsule Take 1 capsule by mouth 2 (Two) Times a Day. 60 capsule 0   • DULoxetine (CYMBALTA) 30 MG capsule Take 2 capsules by mouth Daily. 180 capsule 0   • fluticasone (FLONASE) 50 MCG/ACT nasal spray 1 spray into the nostril(s) as directed by provider Daily. 48 g 0   • furosemide (LASIX) 20 MG tablet TAKE ONE TABLET BY MOUTH DAILY 30 tablet 11   • lisinopril (PRINIVIL,ZESTRIL) 2.5 MG tablet TAKE ONE TABLET BY MOUTH TWICE A DAY 90 tablet 0   • Melatonin 3 MG capsule Take 1 capsule by mouth Daily.     • tamsulosin (FLOMAX) 0.4 MG capsule 24 hr capsule Take 1 capsule by mouth Daily. 90 capsule 3   • triamcinolone (KENALOG) 0.1 % cream Apply 1 application topically to the appropriate area as directed 2 (Two) Times a Day. 45 g 5   • vitamin B-12 (CYANOCOBALAMIN) 1000 MCG tablet Take 1,000 mcg by mouth Daily.     • Zinc 50 MG capsule Take 1 tablet by mouth Daily.       No current facility-administered medications on file prior to visit.      Allergies   Allergen Reactions   • Ibuprofen Hives     \"Pt states he hasn't taken this in a long time\"        Review of Systems   Constitutional: Negative.    HENT: Negative.    Eyes: Negative.    Respiratory: Negative.    Cardiovascular: Negative.    Gastrointestinal: Negative.    Endocrine: Negative.  " "  Genitourinary: Negative.    Musculoskeletal: Positive for arthralgias.   Skin: Negative.    Allergic/Immunologic: Negative.    Neurological: Negative.    Hematological: Negative.    Psychiatric/Behavioral: Negative.         The patient's Review of Systems was personally reviewed and confirmed as accurate.    Physical Exam  Blood pressure 118/64, height 188 cm (74.02\"), weight 83.9 kg (185 lb).    Body mass index is 23.74 kg/m².    GENERAL APPEARANCE: awake, alert, oriented, in no acute distress and well developed, well nourished  LUNGS:  breathing nonlabored  EXTREMITIES: no clubbing, cyanosis  PERIPHERAL PULSES: palpable dorsalis pedis and posterior tibial pulses bilaterally.    GAIT:  Normal        ----------  Left Knee Exam:  ----------  ALIGNMENT: neutral  ----------  RANGE OF MOTION:  Normal (0-120 degrees) with no extensor lag or flexion contracture  LIGAMENTOUS STABILITY:   stable to varus and valgus stress at terminal extension and 30 degrees without any evidence of laxity  ----------  STRENGTH:  KNEE FLEXION 5/5  KNEE EXTENSION  5/5  ANKLE DORSIFLEXION  5/5  ANKLE PLANTARFLEXION  5/5  ----------  PAIN WITH PALPATION:denies tenderness to palpation about the knee  KNEE EFFUSION: yes, trace effusion  PAIN WITH KNEE ROM: no  PATELLAR CREPITUS:  no  ----------  SENSATION TO LIGHT TOUCH:  DEEP PERONEAL/SUPERFICIAL PERONEAL/SURAL/SAPHENOUS/TIBIAL:    intact  ----------  EDEMA:  no  ERYTHEMA:    no  WOUNDS/INCISIONS:   yes, well healed surgical incision without evidence of erythema or drainage  _____________________________________________________________________  _____________________________________________________________________    RADIOGRAPHIC FINDINGS:   Indication: Status post left total knee arthroplasty    Comparison: Todays xrays were compared to previous xrays from 2/10/2022    Knee films: Demonstrate well positioned knee arthroplasty components in satisfactory alignment without evidence of wear, " loosening, subsidence, fracture, or osteolysis and No significant changes compared to prior radiographs.    Assessment/Plan:   Diagnosis Plan   1. Status post total left knee replacement        2. Effusion of bursa of left knee  XR Knee 3+ View With [place] Left        I discussed with the patient that he likely tweaked the knee resulting in a small joint effusion.  Given the self-limiting nature of this condition and the fact that he is now able to weight-bear without assistive devices and currently rates his pain 0 out of 10, I am not concerned about infectious process.  His range of motion remains well-preserved as well with no pain through range of motion.  As a result, I believe that he will continue to improve with conservative treatment including rest, ice, and over-the-counter anti-inflammatories.  He will follow-up as previously scheduled.      Kendall Craig MD  10/25/22  15:13 EDT

## 2022-12-13 ENCOUNTER — OFFICE VISIT (OUTPATIENT)
Dept: ORTHOPEDIC SURGERY | Facility: CLINIC | Age: 82
End: 2022-12-13

## 2022-12-13 VITALS
WEIGHT: 184.97 LBS | SYSTOLIC BLOOD PRESSURE: 138 MMHG | BODY MASS INDEX: 23.74 KG/M2 | DIASTOLIC BLOOD PRESSURE: 72 MMHG | HEIGHT: 74 IN

## 2022-12-13 DIAGNOSIS — Z96.652 STATUS POST TOTAL LEFT KNEE REPLACEMENT: Primary | ICD-10-CM

## 2022-12-13 PROCEDURE — 99212 OFFICE O/P EST SF 10 MIN: CPT | Performed by: ORTHOPAEDIC SURGERY

## 2022-12-13 ASSESSMENT — KOOS JR
KOOS JR SCORE: 3
KOOS JR SCORE: 79.914

## 2022-12-13 NOTE — PROGRESS NOTES
Orthopaedic Clinic Note: Knee Established Patient    Chief Complaint   Patient presents with   • Follow-up     7 week recheck- 1 year s/p (L) TKA 12/01/2021        HPI    It has been 7  week(s) since Mr. Marino's last visit. He returns to clinic today for follow-up left total knee arthroplasty.  He is here from surgery.  Rates his pain a 2/10 on the pain scale primarily when he is kneeling.  Otherwise throughout the day he has no pain.  He is ambulating with no assistive device.  Denies fevers chills or constitutional symptoms.  Overall he is happy with his outcome.    Past Medical History:   Diagnosis Date   • Arrhythmia    • Arthritis     both knees   • Broken neck (HCC)    • CAD (coronary artery disease)    • Cancer (HCC)     skin cancer  head and neck  nonmelanoma   • Chondrocalcinosis of knee    • GERD (gastroesophageal reflux disease)    • Gout    • Heart attack (HCC)      Last Assessed: 28 Mar 2014   • Heart disease    • History of transfusion     own blood with hip surgery   • Hyperlipidemia    • Hypertension    • Hypertensive urgency 05/28/2021   • IBS (irritable bowel syndrome)    • Left rotator cuff tear arthropathy    • Low back pain    • Lower extremity neuropathy    • Lumbar canal stenosis    • Neck pain    • Nipple pain 4/8/2022   • Numbness    • Right hip pain    • Rotator cuff tear arthropathy of right shoulder    • Sleep apnea     semi compliant with c-pap    • Superficial thrombophlebitis of right upper extremity 6/3/2021   • Thin blood (HCC)       Past Surgical History:   Procedure Laterality Date   • APPENDECTOMY  1956   • BACK SURGERY  1997    spinal decompression and fusion   • BREAST BIOPSY  2003    BENIGN EXCISIONAL. NOT CANCER   • CARDIAC CATHETERIZATION      with two stents//. DR. TOLEDO   • CARDIAC CATHETERIZATION N/A 01/08/2021    Procedure: LEFT HEART CATH;  Surgeon: Casimiro Bernal MD;  Location: [facility] CATH INVASIVE LOCATION;  Service: Cardiovascular;  Laterality: N/A;   • CARPAL  TUNNEL RELEASE  03/28/2014    Neuroplasty Decompression Median Nerve At Carpal Tunnel   • CATARACT EXTRACTION Bilateral    • CERVICAL DISCECTOMY POSTERIOR FUSION WITH BRAIN LAB N/A 07/13/2018    Procedure: CERVICAL LAMINECTOMY DECOMPRESSION POSTERIOR C1-2 POSTERIOR CERVICAL FUSION;  Surgeon: Randall Barnett MD;  Location:  [facility];  Service: Neurosurgery   • COLONOSCOPY     • CORONARY STENT PLACEMENT  2013   • HERNIA REPAIR  2002    & 1998   • JOINT REPLACEMENT     • LUMBAR DISCECTOMY FUSION INSTRUMENTATION     • SKIN BIOPSY     • TONSILLECTOMY     • TOTAL HIP ARTHROPLASTY  2002   • TOTAL KNEE ARTHROPLASTY Left 12/01/2021    Procedure: TOTAL KNEE ARTHROPLASTY LEFT;  Surgeon: Kendall Craig MD;  Location:  [facility];  Service: Orthopedics;  Laterality: Left;   • VASECTOMY     • WISDOM TOOTH EXTRACTION        Family History   Problem Relation Age of Onset   • Breast cancer Mother 62   • Cancer Mother    • Heart disease Father    • Hypertension Father    • Heart attack Father    • Sleep apnea Son    • Breast cancer Maternal Aunt      Social History     Socioeconomic History   • Marital status:    Tobacco Use   • Smoking status: Never   • Smokeless tobacco: Never   Vaping Use   • Vaping Use: Never used   Substance and Sexual Activity   • Alcohol use: Not Currently   • Drug use: Never   • Sexual activity: Defer      Current Outpatient Medications on File Prior to Visit   Medication Sig Dispense Refill   • acetaminophen (TYLENOL) 500 MG tablet Take 2 tablets by mouth Every 8 (Eight) Hours. For 1 week, then as needed 42 tablet 0   • ammonium lactate (LAC-HYDRIN) 12 % lotion Every 12 (Twelve) Hours.     • aspirin 81 MG EC tablet Take 1 tablet by mouth Daily. Resume in 1 month 30 tablet 0   • atorvastatin (LIPITOR) 40 MG tablet Take 1 tablet by mouth Every Night. 90 tablet 3   • B Complex Vitamins (VITAMIN B COMPLEX PO) Take 1 tablet by mouth Daily.     • carvedilol (COREG) 6.25 MG tablet Take 1 tablet by  "mouth Every 12 (Twelve) Hours. 180 tablet 3   • Cholecalciferol (VITAMIN D3) 125 MCG (5000 UT) capsule capsule Take 2,000 Units by mouth Daily.     • clopidogrel (PLAVIX) 75 MG tablet Take 1 tablet by mouth Daily. Resume 12/2/21 90 tablet 4   • docusate sodium (Colace) 100 MG capsule Take 1 capsule by mouth 2 (Two) Times a Day. 60 capsule 0   • DULoxetine (CYMBALTA) 30 MG capsule Take 2 capsules by mouth Daily. 180 capsule 0   • fluticasone (FLONASE) 50 MCG/ACT nasal spray 1 spray into the nostril(s) as directed by provider Daily. 48 g 0   • furosemide (LASIX) 20 MG tablet TAKE ONE TABLET BY MOUTH DAILY 30 tablet 11   • lisinopril (PRINIVIL,ZESTRIL) 2.5 MG tablet TAKE ONE TABLET BY MOUTH TWICE A DAY 90 tablet 0   • Melatonin 3 MG capsule Take 1 capsule by mouth Daily.     • tamsulosin (FLOMAX) 0.4 MG capsule 24 hr capsule Take 1 capsule by mouth Daily. 90 capsule 3   • triamcinolone (KENALOG) 0.1 % cream Apply 1 application topically to the appropriate area as directed 2 (Two) Times a Day. 45 g 5   • vitamin B-12 (CYANOCOBALAMIN) 1000 MCG tablet Take 1,000 mcg by mouth Daily.     • Zinc 50 MG capsule Take 1 tablet by mouth Daily.       No current facility-administered medications on file prior to visit.      Allergies   Allergen Reactions   • Ibuprofen Hives     \"Pt states he hasn't taken this in a long time\"        Review of Systems   Constitutional: Negative.    HENT: Negative.    Eyes: Negative.    Respiratory: Negative.    Cardiovascular: Negative.    Gastrointestinal: Negative.    Endocrine: Negative.    Genitourinary: Negative.    Musculoskeletal: Positive for arthralgias.   Skin: Negative.    Allergic/Immunologic: Negative.    Neurological: Negative.    Hematological: Negative.    Psychiatric/Behavioral: Negative.         The patient's Review of Systems was personally reviewed and confirmed as accurate.    Physical Exam  Blood pressure 138/72, height 188 cm (74.02\"), weight 83.9 kg (184 lb 15.5 oz).    Body " mass index is 23.74 kg/m².    GENERAL APPEARANCE: awake, alert, oriented, in no acute distress and well developed, well nourished  LUNGS:  breathing nonlabored  EXTREMITIES: no clubbing, cyanosis  PERIPHERAL PULSES: palpable dorsalis pedis and posterior tibial pulses bilaterally.    GAIT:  Normal        ----------  Left Knee Exam:  ----------  ALIGNMENT: neutral  ----------  RANGE OF MOTION:  Normal (0-120 degrees) with no extensor lag or flexion contracture  LIGAMENTOUS STABILITY:   stable to varus and valgus stress at terminal extension and 30 degrees without any evidence of laxity  ----------  STRENGTH:  KNEE FLEXION 5/5  KNEE EXTENSION  5/5  ANKLE DORSIFLEXION  5/5  ANKLE PLANTARFLEXION  5/5  ----------  PAIN WITH PALPATION:denies tenderness to palpation about the knee  KNEE EFFUSION: Trace  PAIN WITH KNEE ROM: no  PATELLAR CREPITUS:  yes, asymptomatic  ----------  SENSATION TO LIGHT TOUCH:  DEEP PERONEAL/SUPERFICIAL PERONEAL/SURAL/SAPHENOUS/TIBIAL:    intact  ----------  EDEMA:  no  ERYTHEMA:    no  WOUNDS/INCISIONS:   yes, well healed surgical incision without evidence of erythema or drainage  _____________________________________________________________________  _____________________________________________________________________    RADIOGRAPHIC FINDINGS:   Indication: Status post left total knee arthroplasty    Comparison: Todays xrays were compared to previous xrays from 10/25/2022    Knee films: Demonstrate well positioned knee arthroplasty components in satisfactory alignment without evidence of wear, loosening, subsidence, fracture, or osteolysis and No significant changes compared to prior radiographs.    Assessment/Plan:   Diagnosis Plan   1. Status post total left knee replacement  XR Knee 3+ View With [place] Left        Patient doing well 1 year status post left total knee arthroplasty.  I explained that his discomfort with kneeling is very common phenomenon after knee replacement.  It does not  indicate that there is a problem with the knee.  I explained that he will not damage the construct by kneeling on the knee, but it may result in discomfort.  He can mitigate that with a knee pad if needed.  Otherwise he can continue activity as tolerated without restrictions.  I will see him back in 5 years for repeat assessment x-ray 3 views left knee on return.  He is welcome follow-up sooner should problems arise.      Angela Norton, [facility]  12/13/22  08:30 EST

## 2023-01-06 RX ORDER — LISINOPRIL 2.5 MG/1
TABLET ORAL
Qty: 90 TABLET | Refills: 0 | Status: SHIPPED | OUTPATIENT
Start: 2023-01-06 | End: 2023-01-23 | Stop reason: SDUPTHER

## 2023-01-18 DIAGNOSIS — G60.9 IDIOPATHIC PERIPHERAL NEUROPATHY: ICD-10-CM

## 2023-01-18 DIAGNOSIS — I25.10 CHRONIC CORONARY ARTERY DISEASE: ICD-10-CM

## 2023-01-18 RX ORDER — CLOPIDOGREL BISULFATE 75 MG/1
75 TABLET ORAL DAILY
Qty: 90 TABLET | Refills: 3 | Status: SHIPPED | OUTPATIENT
Start: 2023-01-18

## 2023-01-18 RX ORDER — DULOXETIN HYDROCHLORIDE 30 MG/1
60 CAPSULE, DELAYED RELEASE ORAL DAILY
Qty: 180 CAPSULE | Refills: 1 | Status: SHIPPED | OUTPATIENT
Start: 2023-01-18

## 2023-01-18 NOTE — TELEPHONE ENCOUNTER
Caller: Danielbriana Isrrael CARMELA    Relationship: Self    Best call back number: [phone]    Requested Prescriptions:   Requested Prescriptions     Pending Prescriptions Disp Refills   • clopidogrel (PLAVIX) 75 MG tablet 90 tablet 4     Sig: Take 1 tablet by mouth Daily. Resume 12/2/21   • DULoxetine (CYMBALTA) 30 MG capsule 180 capsule 0     Sig: Take 2 capsules by mouth Daily.        Pharmacy where request should be sent: [facility] [id number] [address] [place]  - [phone]  - [phone] FX     Additional details provided by patient: PATIENT HAS 3 DAYS OF THE DULOXETINE    Does the patient have less than a 3 day supply:  [x] Yes  [] No    Would you like a call back once the refill request has been completed: [x] Yes [] No    If the office needs to give you a call back, can they leave a voicemail: [x] Yes [] No    Jyothi Silva Rep   01/18/23 11:53 EST

## 2023-01-23 ENCOUNTER — OFFICE VISIT (OUTPATIENT)
Dept: INTERNAL MEDICINE | Facility: CLINIC | Age: 83
End: 2023-01-23
Payer: MEDICARE

## 2023-01-23 VITALS
HEART RATE: 67 BPM | OXYGEN SATURATION: 100 % | SYSTOLIC BLOOD PRESSURE: 134 MMHG | HEIGHT: 72 IN | BODY MASS INDEX: 24.92 KG/M2 | WEIGHT: 184 LBS | DIASTOLIC BLOOD PRESSURE: 70 MMHG | TEMPERATURE: 96.2 F

## 2023-01-23 DIAGNOSIS — I10 PRIMARY HYPERTENSION: Primary | ICD-10-CM

## 2023-01-23 PROCEDURE — 99213 OFFICE O/P EST LOW 20 MIN: CPT | Performed by: INTERNAL MEDICINE

## 2023-01-23 RX ORDER — LISINOPRIL 5 MG/1
5 TABLET ORAL 2 TIMES DAILY
Qty: 180 TABLET | Refills: 1 | Status: SHIPPED | OUTPATIENT
Start: 2023-01-23

## 2023-01-23 NOTE — PROGRESS NOTES
Internal Medicine Acute Visit    Chief Complaint   Patient presents with   • Hypertension        HPI  Mr. Marino comes in due to high BP. He brings his BP cuff with most readings 140-150s. Some are 115-130. He notes most often higher readings are in the evening but not always. He is a little lightheaded. He reports stable weight, no SOA, chest pain. Edema is minimal. He is taking lasix only PRN. Not sure if higher BP readings are on days he does not take lasix.    Hypertension  Pertinent negatives include no chest pain or palpitations.        Review of Systems  Review of Systems   Constitutional: Negative.    Respiratory: Negative.    Cardiovascular: Positive for leg swelling. Negative for chest pain and palpitations.   Neurological: Positive for light-headedness.        Medications  Current Outpatient Medications on File Prior to Visit   Medication Sig Dispense Refill   • acetaminophen (TYLENOL) 500 MG tablet Take 2 tablets by mouth Every 8 (Eight) Hours. For 1 week, then as needed 42 tablet 0   • ammonium lactate (LAC-HYDRIN) 12 % lotion Every 12 (Twelve) Hours.     • aspirin 81 MG EC tablet Take 1 tablet by mouth Daily. Resume in 1 month 30 tablet 0   • atorvastatin (LIPITOR) 40 MG tablet Take 1 tablet by mouth Every Night. 90 tablet 3   • B Complex Vitamins (VITAMIN B COMPLEX PO) Take 1 tablet by mouth Daily.     • carvedilol (COREG) 6.25 MG tablet Take 1 tablet by mouth Every 12 (Twelve) Hours. 180 tablet 3   • Cholecalciferol (VITAMIN D3) 125 MCG (5000 UT) capsule capsule Take 2,000 Units by mouth Daily.     • clopidogrel (PLAVIX) 75 MG tablet Take 1 tablet by mouth Daily. Resume 12/2/21 90 tablet 3   • docusate sodium (Colace) 100 MG capsule Take 1 capsule by mouth 2 (Two) Times a Day. 60 capsule 0   • DULoxetine (CYMBALTA) 30 MG capsule Take 2 capsules by mouth Daily. 180 capsule 1   • fluticasone (FLONASE) 50 MCG/ACT nasal spray 1 spray into the nostril(s) as directed by provider Daily. 48 g 0   •  "furosemide (LASIX) 20 MG tablet TAKE ONE TABLET BY MOUTH DAILY 30 tablet 11   • Melatonin 3 MG capsule Take 1 capsule by mouth Daily.     • tamsulosin (FLOMAX) 0.4 MG capsule 24 hr capsule Take 1 capsule by mouth Daily. 90 capsule 3   • triamcinolone (KENALOG) 0.1 % cream Apply 1 application topically to the appropriate area as directed 2 (Two) Times a Day. 45 g 5   • triamcinolone (KENALOG) 0.1 % ointment      • vitamin B-12 (CYANOCOBALAMIN) 1000 MCG tablet Take 1,000 mcg by mouth Daily.     • Zinc 50 MG capsule Take 1 tablet by mouth Daily.     • [DISCONTINUED] lisinopril (PRINIVIL,ZESTRIL) 2.5 MG tablet TAKE ONE TABLET BY MOUTH TWICE A DAY 90 tablet 0     No current facility-administered medications on file prior to visit.        Allergies  Allergies   Allergen Reactions   • Ibuprofen Hives     \"Pt states he hasn't taken this in a long time\"       PM  Past Medical History:   Diagnosis Date   • Arrhythmia    • Arthritis     both knees   • Broken neck (HCC)    • CAD (coronary artery disease)    • Cancer (HCC)     skin cancer  head and neck  nonmelanoma   • Chondrocalcinosis of knee    • GERD (gastroesophageal reflux disease)    • Gout    • Heart attack (HCC)      Last Assessed: 28 Mar 2014   • Heart disease    • History of transfusion     own blood with hip surgery   • Hyperlipidemia    • Hypertension    • Hypertensive urgency 05/28/2021   • IBS (irritable bowel syndrome)    • Left rotator cuff tear arthropathy    • Low back pain    • Lower extremity neuropathy    • Lumbar canal stenosis    • Neck pain    • Nipple pain 4/8/2022   • Numbness    • Right hip pain    • Rotator cuff tear arthropathy of right shoulder    • Sleep apnea     semi compliant with c-pap    • Superficial thrombophlebitis of right upper extremity 6/3/2021   • Thin blood ([facility])        Objective  Visit Vitals  /70   Pulse 67   Temp 96.2 °F (35.7 °C)   Ht 182.9 cm (72.01\")   Wt 83.5 kg (184 lb)   SpO2 100%   BMI 24.95 kg/m²        Physical " Exam  Physical Exam  Vitals and nursing note reviewed.   Constitutional:       General: He is not in acute distress.     Appearance: He is well-developed. He is not ill-appearing or toxic-appearing.   HENT:      Head: Normocephalic and atraumatic.      Comments: Hard of hearing despite hearing aids  Eyes:      Conjunctiva/sclera: Conjunctivae normal.   Cardiovascular:      Rate and Rhythm: Normal rate and regular rhythm.      Heart sounds: Normal heart sounds.   Pulmonary:      Effort: Pulmonary effort is normal. No respiratory distress.      Breath sounds: Normal breath sounds.   Musculoskeletal:      Right lower leg: Edema (trace to 1+) present.      Left lower leg: Edema (trace) present.   Skin:     General: Skin is warm and dry.   Neurological:      General: No focal deficit present.      Mental Status: He is alert and oriented to person, place, and time. Mental status is at baseline.         Results  Results for orders placed or performed in visit on 09/02/22   hCG, Serum, Qualitative    Specimen: Blood    Blood  Release to keily   Result Value Ref Range    HCG Qualitative Negative mIU/mL   Testosterone    Specimen: Blood    Blood  Release to keily   Result Value Ref Range    Testosterone, Total 429 264 - 916 ng/dL   Estradiol    Specimen: Blood    Blood  Release to keily   Result Value Ref Range    Estradiol 42.6 7.6 - 42.6 pg/mL   Luteinizing Hormone    Specimen: Blood    Blood  Release to keily   Result Value Ref Range    LH 23.5 (H) 1.7 - 8.6 mIU/mL   Hemoglobin A1c    Specimen: Blood    Blood  Release to keily   Result Value Ref Range    Hemoglobin A1C 5.80 (H) 4.80 - 5.60 %   Lipid Panel    Specimen: Blood    Blood  Release to keily   Result Value Ref Range    Total Cholesterol 138 0 - 200 mg/dL    Triglycerides 59 0 - 150 mg/dL    HDL Cholesterol 59 40 - 60 mg/dL    VLDL Cholesterol Zoran 12 5 - 40 mg/dL    LDL Chol Calc ([facility]) 67 0 - 100 mg/dL   Comprehensive Metabolic Panel    Specimen: Blood    Blood  Release  to keily   Result Value Ref Range    Glucose 78 65 - 99 mg/dL    BUN 31 (H) 8 - 23 mg/dL    Creatinine 1.21 0.76 - 1.27 mg/dL    EGFR Result 59.0 (L) >60.0 mL/min/1.73    BUN/Creatinine Ratio 25.6 (H) 7.0 - 25.0    Sodium 143 136 - 145 mmol/L    Potassium 4.2 3.5 - 5.2 mmol/L    Chloride 106 98 - 107 mmol/L    Total CO2 27.0 22.0 - 29.0 mmol/L    Calcium 9.0 8.6 - 10.5 mg/dL    Total Protein 6.2 6.0 - 8.5 g/dL    Albumin 4.30 3.50 - 5.20 g/dL    Globulin 1.9 gm/dL    A/G Ratio 2.3 g/dL    Total Bilirubin 0.5 0.0 - 1.2 mg/dL    Alkaline Phosphatase 88 39 - 117 U/L    AST (SGOT) 31 1 - 40 U/L    ALT (SGPT) 22 1 - 41 U/L   CBC (No Diff)    Specimen: Blood    Blood  Release to keily   Result Value Ref Range    WBC 8.06 3.40 - 10.80 10*3/mm3    RBC 4.59 4.14 - 5.80 10*6/mm3    Hemoglobin 13.5 13.0 - 17.7 g/dL    Hematocrit 40.6 37.5 - 51.0 %    MCV 88.5 79.0 - 97.0 fL    MCH 29.4 26.6 - 33.0 pg    MCHC 33.3 31.5 - 35.7 g/dL    RDW 13.1 12.3 - 15.4 %    Platelets 210 140 - 450 10*3/mm3        Assessment and Plan  Diagnoses and all orders for this visit:    Primary hypertension  - BP elevated most readings, borderline in office today  - will increase lisinopril to 5mg BID, continue coreg 6.25mg BID  - monitor lightheadedness and BP, call if issues  - BMP in 1 week    Health Maintenance  - Colonoscopy: No longer indicated due to age.  - Immunizations: Flu UTD. Tdap 2017. COVID booster discussed. Shingrix series complete. Pneumococcal complete.  - Depression screening: negative 9/2022    Return for Next scheduled follow up.

## 2023-01-30 ENCOUNTER — EXTERNAL PBMM DATA (OUTPATIENT)
Dept: PHARMACY | Facility: OTHER | Age: 83
End: 2023-01-30
Payer: MEDICARE

## 2023-02-09 ENCOUNTER — LAB (OUTPATIENT)
Dept: LAB | Facility: HOSPITAL | Age: 83
End: 2023-02-09
Payer: MEDICARE

## 2023-02-09 DIAGNOSIS — I10 PRIMARY HYPERTENSION: ICD-10-CM

## 2023-02-10 LAB
BUN SERPL-MCNC: 37 MG/DL (ref 8–27)
BUN/CREAT SERPL: 35 (ref 10–24)
CALCIUM SERPL-MCNC: 9.2 MG/DL (ref 8.6–10.2)
CHLORIDE SERPL-SCNC: 105 MMOL/L (ref 96–106)
CO2 SERPL-SCNC: 22 MMOL/L (ref 20–29)
CREAT SERPL-MCNC: 1.07 MG/DL (ref 0.76–1.27)
EGFRCR SERPLBLD CKD-EPI 2021: 68 ML/MIN/1.73
GLUCOSE SERPL-MCNC: 126 MG/DL (ref 70–99)
POTASSIUM SERPL-SCNC: 4.5 MMOL/L (ref 3.5–5.2)
SODIUM SERPL-SCNC: 139 MMOL/L (ref 134–144)

## 2023-02-27 ENCOUNTER — EXTERNAL PBMM DATA (OUTPATIENT)
Dept: PHARMACY | Facility: OTHER | Age: 83
End: 2023-02-27
Payer: MEDICARE

## 2023-03-02 ENCOUNTER — OFFICE VISIT (OUTPATIENT)
Dept: INTERNAL MEDICINE | Facility: CLINIC | Age: 83
End: 2023-03-02
Payer: MEDICARE

## 2023-03-02 VITALS
WEIGHT: 187 LBS | OXYGEN SATURATION: 100 % | HEIGHT: 72 IN | HEART RATE: 64 BPM | BODY MASS INDEX: 25.33 KG/M2 | SYSTOLIC BLOOD PRESSURE: 128 MMHG | TEMPERATURE: 97.1 F | DIASTOLIC BLOOD PRESSURE: 66 MMHG

## 2023-03-02 DIAGNOSIS — M12.812 ROTATOR CUFF ARTHROPATHY OF BOTH SHOULDERS: ICD-10-CM

## 2023-03-02 DIAGNOSIS — I25.5 ISCHEMIC CARDIOMYOPATHY: ICD-10-CM

## 2023-03-02 DIAGNOSIS — I10 PRIMARY HYPERTENSION: Primary | ICD-10-CM

## 2023-03-02 DIAGNOSIS — M51.9 LUMBAR DISC DISEASE: ICD-10-CM

## 2023-03-02 DIAGNOSIS — M50.30 DDD (DEGENERATIVE DISC DISEASE), CERVICAL: ICD-10-CM

## 2023-03-02 DIAGNOSIS — M66.321 NONTRAUMATIC RUPTURE OF RIGHT LONG HEAD BICEPS TENDON: ICD-10-CM

## 2023-03-02 DIAGNOSIS — M12.811 ROTATOR CUFF ARTHROPATHY OF BOTH SHOULDERS: ICD-10-CM

## 2023-03-02 PROCEDURE — 99214 OFFICE O/P EST MOD 30 MIN: CPT | Performed by: INTERNAL MEDICINE

## 2023-03-02 NOTE — PROGRESS NOTES
Internal Medicine Follow Up    Chief Complaint  Isrrael Marino is a 82 y.o. male who presents today for follow up of chronic medical conditions outlined below.    Chief Complaint   Patient presents with   • Hypertension   • Hyperlipidemia        HPI  Mr. Marino comes in today for follow up. He notes chronic pain in his low back, neck, both shoulders. Has had lumbar fusions in the past and is stiff most days. Notes hx of R biceps tear which was not repaired and chronic rotator cuff arthropathy in both shoulders. He has limited use of both upper extremities. He has had cortisone injections in his knees in the past and got several weeks of relief. He would be interested in this for other sites in his body. He also has left over percocet from his dentist that he uses on occasion. His BP has been well controlled, edema stable. He is taking lasix 20mg daily PRN when weight increases. Last used it last night. Weight down today to 183lb on home scale. He is not SOA but notes recent GI bug x2 days causing him to vomit and have diarrhea. He worries he could have aspirated while vomiting.    Hypertension  Associated symptoms include neck pain.   Hyperlipidemia  Associated symptoms include myalgias.        Review of Systems  Review of Systems   Constitutional: Negative for fever.   Respiratory: Negative.    Cardiovascular: Negative.    Gastrointestinal: Positive for diarrhea (now resolved), nausea (now resolved) and vomiting (now resolved).   Musculoskeletal: Positive for arthralgias, back pain, myalgias and neck pain.   Neurological: Positive for weakness (shoulders).        Current Medications  Current Outpatient Medications on File Prior to Visit   Medication Sig Dispense Refill   • acetaminophen (TYLENOL) 500 MG tablet Take 2 tablets by mouth Every 8 (Eight) Hours. For 1 week, then as needed 42 tablet 0   • ammonium lactate (LAC-HYDRIN) 12 % lotion Every 12 (Twelve) Hours.     • aspirin 81 MG EC tablet Take 1 tablet by  "mouth Daily. Resume in 1 month 30 tablet 0   • atorvastatin (LIPITOR) 40 MG tablet Take 1 tablet by mouth Every Night. 90 tablet 3   • B Complex Vitamins (VITAMIN B COMPLEX PO) Take 1 tablet by mouth Daily.     • carvedilol (COREG) 6.25 MG tablet Take 1 tablet by mouth Every 12 (Twelve) Hours. 180 tablet 3   • Cholecalciferol (VITAMIN D3) 125 MCG (5000 UT) capsule capsule Take 2,000 Units by mouth Daily.     • clopidogrel (PLAVIX) 75 MG tablet Take 1 tablet by mouth Daily. Resume 12/2/21 90 tablet 3   • docusate sodium (Colace) 100 MG capsule Take 1 capsule by mouth 2 (Two) Times a Day. 60 capsule 0   • DULoxetine (CYMBALTA) 30 MG capsule Take 2 capsules by mouth Daily. 180 capsule 1   • fluticasone (FLONASE) 50 MCG/ACT nasal spray 1 spray into the nostril(s) as directed by provider Daily. 48 g 0   • furosemide (LASIX) 20 MG tablet TAKE ONE TABLET BY MOUTH DAILY 30 tablet 11   • lisinopril (PRINIVIL,ZESTRIL) 5 MG tablet Take 1 tablet by mouth 2 (Two) Times a Day. 180 tablet 1   • Melatonin 3 MG capsule Take 1 capsule by mouth Daily.     • tamsulosin (FLOMAX) 0.4 MG capsule 24 hr capsule Take 1 capsule by mouth Daily. 90 capsule 3   • triamcinolone (KENALOG) 0.1 % cream Apply 1 application topically to the appropriate area as directed 2 (Two) Times a Day. 45 g 5   • triamcinolone (KENALOG) 0.1 % ointment      • vitamin B-12 (CYANOCOBALAMIN) 1000 MCG tablet Take 1 tablet by mouth Daily.     • Zinc 50 MG capsule Take 1 tablet by mouth Daily.       No current facility-administered medications on file prior to visit.       Allergies  Allergies   Allergen Reactions   • Ibuprofen Hives     \"Pt states he hasn't taken this in a long time\"       Objective  Visit Vitals  /66   Pulse 64   Temp 97.1 °F (36.2 °C)   Ht 182.9 cm (72\")   Wt 84.8 kg (187 lb)   SpO2 100%   BMI 25.36 kg/m²        Physical Exam  Physical Exam  Vitals and nursing note reviewed.   Constitutional:       General: He is not in acute distress.     " Appearance: Normal appearance. He is well-developed and normal weight. He is not ill-appearing or toxic-appearing.   HENT:      Head: Normocephalic and atraumatic.      Ears:      Comments: Hard of hearing and not using hearing aids today  Eyes:      Conjunctiva/sclera: Conjunctivae normal.   Cardiovascular:      Rate and Rhythm: Normal rate and regular rhythm.      Heart sounds: Normal heart sounds.   Pulmonary:      Effort: Pulmonary effort is normal. No respiratory distress.      Breath sounds: Normal breath sounds. No wheezing, rhonchi or rales.   Musculoskeletal:      Right lower leg: Edema (trace) present.      Left lower leg: Edema (trace) present.      Comments: Limited ability to abduct both shoulders   Skin:     General: Skin is warm and dry.   Neurological:      Mental Status: He is alert and oriented to person, place, and time. Mental status is at baseline.         Results  Results for orders placed or performed in visit on 02/09/23   Basic Metabolic Panel    Specimen: Blood    Blood  Release to [facility]   Result Value Ref Range    Glucose 126 (H) 70 - 99 mg/dL    BUN 37 (H) 8 - 27 mg/dL    Creatinine 1.07 0.76 - 1.27 mg/dL    EGFR Result 68 >59 mL/min/1.73    BUN/Creatinine Ratio 35 (H) 10 - 24    Sodium 139 134 - 144 mmol/L    Potassium 4.5 3.5 - 5.2 mmol/L    Chloride 105 96 - 106 mmol/L    Total CO2 22 20 - 29 mmol/L    Calcium 9.2 8.6 - 10.2 mg/dL        Assessment and Plan  Diagnoses and all orders for this visit:    Primary hypertension  - BP at goal, continue lisinopril 5mg BID, coreg 6.25mg BID    Ischemic cardiomyopathy  - Echo 1/2021 EF 30%, EF 25% with dilated LV and low LV filling pressures on LHC  - Non-obstructive CAD on [facility]  - Weight currently stable, minimal edema  - on coreg 6.25mg BID, lisinopril 5mg BID, and lasix 20mg daily PRN for weight gain (dry weight 184lbs)    DDD (degenerative disc disease), cervical  Lumbar disc disease  - remote hx of lumbar fusion x2 and cervical fusion  - has  chronic pain and stiffness in back and neck  - using tylenol arthritis. He is not prescribed chronic opiates but does use leftover percocet from dental procedures on occasion.  - would like referral to pain mgmt so will refer today    Rotator cuff arthropathy of both shoulders  - pain and limited ROM in both shoulders  - willing to see pain mgmt so will refer  - he would benefit from PT as well. Discussion on this was difficult due to significant hearing loss and not wearing his hearing aids. He will await recommendations from pain mgmt.    Nontraumatic rupture of right long head biceps tendon  - remote injury, was not repaired  - has chronic pain and limited ROM in RUE as a result    Health Maintenance  - Colonoscopy: No longer indicated due to age.  - Immunizations: Flu UTD. Tdap 2017. COVID booster discussed. Shingrix series complete. Pneumococcal complete.  - Depression screening: negative 9/2022     Return for Next scheduled follow up.

## 2023-03-08 ENCOUNTER — OFFICE VISIT (OUTPATIENT)
Dept: PAIN MEDICINE | Facility: CLINIC | Age: 83
End: 2023-03-08
Payer: MEDICARE

## 2023-03-08 VITALS
OXYGEN SATURATION: 99 % | RESPIRATION RATE: 16 BRPM | TEMPERATURE: 96.8 F | BODY MASS INDEX: 25.9 KG/M2 | HEIGHT: 72 IN | WEIGHT: 191.2 LBS | DIASTOLIC BLOOD PRESSURE: 66 MMHG | HEART RATE: 74 BPM | SYSTOLIC BLOOD PRESSURE: 120 MMHG

## 2023-03-08 DIAGNOSIS — Z98.1 HISTORY OF LUMBAR FUSION: ICD-10-CM

## 2023-03-08 DIAGNOSIS — G89.29 CHRONIC BILATERAL LOW BACK PAIN WITHOUT SCIATICA: ICD-10-CM

## 2023-03-08 DIAGNOSIS — Z98.1 HISTORY OF FUSION OF CERVICAL SPINE: ICD-10-CM

## 2023-03-08 DIAGNOSIS — M75.02 ADHESIVE CAPSULITIS OF BOTH SHOULDERS: ICD-10-CM

## 2023-03-08 DIAGNOSIS — Z96.652 HISTORY OF TOTAL LEFT KNEE REPLACEMENT: ICD-10-CM

## 2023-03-08 DIAGNOSIS — G89.29 CHRONIC PAIN OF BOTH SHOULDERS: Primary | ICD-10-CM

## 2023-03-08 DIAGNOSIS — M25.561 CHRONIC PAIN OF RIGHT KNEE: ICD-10-CM

## 2023-03-08 DIAGNOSIS — G89.29 CHRONIC PAIN OF RIGHT KNEE: ICD-10-CM

## 2023-03-08 DIAGNOSIS — M54.50 CHRONIC BILATERAL LOW BACK PAIN WITHOUT SCIATICA: ICD-10-CM

## 2023-03-08 DIAGNOSIS — M54.6 THORACIC BACK PAIN, UNSPECIFIED BACK PAIN LATERALITY, UNSPECIFIED CHRONICITY: ICD-10-CM

## 2023-03-08 DIAGNOSIS — M25.512 CHRONIC PAIN OF BOTH SHOULDERS: Primary | ICD-10-CM

## 2023-03-08 DIAGNOSIS — M75.01 ADHESIVE CAPSULITIS OF BOTH SHOULDERS: ICD-10-CM

## 2023-03-08 DIAGNOSIS — M25.511 CHRONIC PAIN OF BOTH SHOULDERS: Primary | ICD-10-CM

## 2023-03-08 DIAGNOSIS — M19.012 OSTEOARTHRITIS OF BOTH SHOULDERS, UNSPECIFIED OSTEOARTHRITIS TYPE: ICD-10-CM

## 2023-03-08 DIAGNOSIS — M19.011 OSTEOARTHRITIS OF BOTH SHOULDERS, UNSPECIFIED OSTEOARTHRITIS TYPE: ICD-10-CM

## 2023-03-08 PROCEDURE — 1125F AMNT PAIN NOTED PAIN PRSNT: CPT | Performed by: STUDENT IN AN ORGANIZED HEALTH CARE EDUCATION/TRAINING PROGRAM

## 2023-03-08 PROCEDURE — 3074F SYST BP LT 130 MM HG: CPT | Performed by: STUDENT IN AN ORGANIZED HEALTH CARE EDUCATION/TRAINING PROGRAM

## 2023-03-08 PROCEDURE — 3078F DIAST BP <80 MM HG: CPT | Performed by: STUDENT IN AN ORGANIZED HEALTH CARE EDUCATION/TRAINING PROGRAM

## 2023-03-08 PROCEDURE — 99204 OFFICE O/P NEW MOD 45 MIN: CPT | Performed by: STUDENT IN AN ORGANIZED HEALTH CARE EDUCATION/TRAINING PROGRAM

## 2023-03-08 NOTE — PROGRESS NOTES
03/08/2023      Referring Physician: Ivanna George MD  [address] [place] [id number]    Primary Physician: Ivanna George MD    CHIEF COMPLAINT or REASON FOR VISIT: Back Pain and Neck Pain (New patient)      HISTORY OF PRESENT ILLNESS:  Mr. Isrrael Marino is 82 y.o. male who presents as a new patient referral for evaluation and treatment of chronic multifocal pains including neck pain, upper thoracic pain, low back pain, bilateral shoulder pain, right knee pain.  He does have past medical history of a left knee replacement with Dr. Craig, posterior cervical fusion with Dr. Barnett, lumbar fusion with Dr. Santos.    Today he states that his primary pain complaint is bilateral shoulders, right knee.  He is unable to lift his arms above approximately 75 degrees.  He has previously undergone bilateral shoulder injections with great benefit as well as right knee injection with good benefit.  Patient denies any bowel or bladder dysfunction, lower extremity weakness, new onset saddle anesthesia or unexplained weight loss.           Objective Pain Scoring:   BRIEF PAIN INVENTORY:  Total score:   Pain Score    03/08/23 0912   PainSc:   5   PainLoc: Back  Comment: says entire body hurts      PHQ-2: PHQ-2 Total Score: 0  PHQ-9: PHQ-9: Brief Depression Severity Measure Score: 0  Opioid Risk Tool:         Review of Systems:   ROS negative except as otherwise noted     Past Medical History:   Past Medical History:   Diagnosis Date   • Arrhythmia    • Arthritis     both knees   • Broken neck (HCC)    • CAD (coronary artery disease)    • Cancer (HCC)     skin cancer  head and neck  nonmelanoma   • Chondrocalcinosis of knee    • GERD (gastroesophageal reflux disease)    • Gout    • Heart attack (HCC)      Last Assessed: 28 Mar 2014   • Heart disease    • History of transfusion     own blood with hip surgery   • Hyperlipidemia    • Hypertension    • Hypertensive urgency 05/28/2021   • IBS (irritable bowel  syndrome)    • Left rotator cuff tear arthropathy    • Low back pain    • Lower extremity neuropathy    • Lumbar canal stenosis    • Neck pain    • Nipple pain 4/8/2022   • Numbness    • Right hip pain    • Rotator cuff tear arthropathy of right shoulder    • Sleep apnea     semi compliant with c-pap    • Superficial thrombophlebitis of right upper extremity 6/3/2021   • Thin blood (HCC)          Past Surgical History:   Past Surgical History:   Procedure Laterality Date   • APPENDECTOMY  1956   • BACK SURGERY  1997    spinal decompression and fusion   • BREAST BIOPSY  2003    BENIGN EXCISIONAL. NOT CANCER   • CARDIAC CATHETERIZATION      with two stents//. DR. TOLEDO   • CARDIAC CATHETERIZATION N/A 01/08/2021    Procedure: LEFT HEART CATH;  Surgeon: Casimiro Bernal MD;  Location:  [facility];  Service: Cardiovascular;  Laterality: N/A;   • CARPAL TUNNEL RELEASE  03/28/2014    Neuroplasty Decompression Median Nerve At Carpal Tunnel   • CATARACT EXTRACTION Bilateral    • CERVICAL DISCECTOMY POSTERIOR FUSION WITH BRAIN LAB N/A 07/13/2018    Procedure: CERVICAL LAMINECTOMY DECOMPRESSION POSTERIOR C1-2 POSTERIOR CERVICAL FUSION;  Surgeon: Randall Barnett MD;  Location:  [facility];  Service: Neurosurgery   • COLONOSCOPY     • CORONARY STENT PLACEMENT  2013   • HERNIA REPAIR  2002    & 1998   • JOINT REPLACEMENT     • LUMBAR DISCECTOMY FUSION INSTRUMENTATION      L3-4, L4-5   • SKIN BIOPSY     • TONSILLECTOMY     • TOTAL HIP ARTHROPLASTY  2002   • TOTAL KNEE ARTHROPLASTY Left 12/01/2021    Procedure: TOTAL KNEE ARTHROPLASTY LEFT;  Surgeon: Kendall Craig MD;  Location:  [facility];  Service: Orthopedics;  Laterality: Left;   • VASECTOMY     • WISDOM TOOTH EXTRACTION           Family History   Family History   Problem Relation Age of Onset   • Breast cancer Mother 62   • Cancer Mother    • Heart disease Father    • Hypertension Father    • Heart attack Father    • Sleep apnea Son    • Breast cancer  Maternal Aunt          Social History   Social History     Socioeconomic History   • Marital status:    Tobacco Use   • Smoking status: Never   • Smokeless tobacco: Never   Vaping Use   • Vaping Use: Never used   Substance and Sexual Activity   • Alcohol use: Not Currently   • Drug use: Never   • Sexual activity: Defer        Medications:     Current Outpatient Medications:   •  acetaminophen (TYLENOL) 500 MG tablet, Take 2 tablets by mouth Every 8 (Eight) Hours. For 1 week, then as needed, Disp: 42 tablet, Rfl: 0  •  ammonium lactate (LAC-HYDRIN) 12 % lotion, Every 12 (Twelve) Hours., Disp: , Rfl:   •  aspirin 81 MG EC tablet, Take 1 tablet by mouth Daily. Resume in 1 month, Disp: 30 tablet, Rfl: 0  •  atorvastatin (LIPITOR) 40 MG tablet, Take 1 tablet by mouth Every Night., Disp: 90 tablet, Rfl: 3  •  B Complex Vitamins (VITAMIN B COMPLEX PO), Take 1 tablet by mouth Daily., Disp: , Rfl:   •  carvedilol (COREG) 6.25 MG tablet, Take 1 tablet by mouth Every 12 (Twelve) Hours., Disp: 180 tablet, Rfl: 3  •  Cholecalciferol (VITAMIN D3) 125 MCG (5000 UT) capsule capsule, Take 2,000 Units by mouth Daily., Disp: , Rfl:   •  clopidogrel (PLAVIX) 75 MG tablet, Take 1 tablet by mouth Daily. Resume 12/2/21, Disp: 90 tablet, Rfl: 3  •  docusate sodium (Colace) 100 MG capsule, Take 1 capsule by mouth 2 (Two) Times a Day., Disp: 60 capsule, Rfl: 0  •  DULoxetine (CYMBALTA) 30 MG capsule, Take 2 capsules by mouth Daily., Disp: 180 capsule, Rfl: 1  •  fluticasone (FLONASE) 50 MCG/ACT nasal spray, 1 spray into the nostril(s) as directed by provider Daily., Disp: 48 g, Rfl: 0  •  furosemide (LASIX) 20 MG tablet, TAKE ONE TABLET BY MOUTH DAILY, Disp: 30 tablet, Rfl: 11  •  lisinopril (PRINIVIL,ZESTRIL) 5 MG tablet, Take 1 tablet by mouth 2 (Two) Times a Day., Disp: 180 tablet, Rfl: 1  •  Melatonin 3 MG capsule, Take 1 capsule by mouth Daily., Disp: , Rfl:   •  tamsulosin (FLOMAX) 0.4 MG capsule 24 hr capsule, Take 1 capsule by  "mouth Daily., Disp: 90 capsule, Rfl: 3  •  triamcinolone (KENALOG) 0.1 % cream, Apply 1 application topically to the appropriate area as directed 2 (Two) Times a Day., Disp: 45 g, Rfl: 5  •  triamcinolone (KENALOG) 0.1 % ointment, , Disp: , Rfl:   •  vitamin B-12 (CYANOCOBALAMIN) 1000 MCG tablet, Take 1 tablet by mouth Daily., Disp: , Rfl:   •  Zinc 50 MG capsule, Take 1 tablet by mouth Daily., Disp: , Rfl:         Physical Exam:     Vitals:    03/08/23 0912   BP: 120/66   BP Location: Left arm   Patient Position: Sitting   Cuff Size: Adult   Pulse: 74   Resp: 16   Temp: 96.8 °F (36 °C)   TempSrc: Infrared   SpO2: 99%   Weight: 86.7 kg (191 lb 3.2 oz)   Height: 182.9 cm (72\")   PainSc:   5   PainLoc: Back  Comment: says entire body hurts        General: Alert and oriented, No acute distress.   HEENT: Normocephalic, atraumatic.   Cardiovascular: No gross edema  Respiratory: Respirations are non-labored    Cervical Spine:   No masses or atrophy  Range of motion - Flexion normal. Extension normal. Lateral rotation reduced.   Palpation - nontender   Spurling's - negative     Thoracic Spine:   Inspection: no gross abnormality  Paraspinal muscle palpation: Tender palpation bilateral rhomboids  Spinous process palpation: nontender    Lumbar Spine:   No masses or atrophy  Range of motion - Flexion normal. Extension reduced   Facet Loading: Positive bilaterally  Facet Palpation - tender bilaterally     Motor Exam:    Strength: Rate on 1-5 scale Right Left    C5-Elbow flexion, Deltoid 5 5    C6-Wrist extension 5 5    C7- Elbow / finger extension 5 5    C8- Finger flexion 5 5    T1- Intrinsics hand 5 5    Strength: Rate on 1-5 scale Right Left    L1/2- hip flexion 5 5    L3- knee extension 5 5    L4- ankle dorsiflexion 5 5    L5- great toe extension 5 5    S1- ankle plantarflexion 5 5    Sensory Exam: Full and equal sensation to light touch throughout.  SHOULDER Exam Right Left   Inspection No erythema, swelling, obvious bony " deformity, or atrophy No erythema, swelling, obvious bony deformity, or atrophy   Palpation Subacromial bursa:  Bicipital tendon:  Anterior joint:  Posterior joint:  AC Joint:  Subacromial bursa:  Bicipital tendon: TTP  Anterior joint:  Posterior joint:  AC Joint:   ROM Active Flexion (0-180):  Active Abduction (0-180): 75  Passive Flexion (0-180): 90  Apley scratch (ER+Abd):  Scapular reach (IR+Add):   Active Flexion (0-180):  Active Abduction (0-180): 75  Passive Flexion (0-180): 90  Apley scratch (ER+Abd):  Scapular reach (IR+Add):                              Neurologic: Cranial Nerves II-XII are grossly intact.   Psychiatric: Cooperative.   Gait: Normal   Assistive Devices: None    Imaging Studies:   No results found for this or any previous visit.      Impression & Plan:   Mr. Isrrael Marino is a 82 y.o. male with past medical history significant for posterior cervical fusion with Dr. Barnett, left TKA, lumbar fusion who presents to the pain clinic for evaluation and treatment of bilateral shoulder pain, right knee pain, and upper thoracic pain, low back pain.  Bilateral shoulder pain consistent with osteoarthritis and adhesive capsulitis.  Right knee pain consistent with osteoarthritis.  Upper thoracic pain consistent with myofascial pain, likely rhomboid activation for straightening of reportedly scoliotic thoracic spine.  Lumbar back pain consistent with adjacent segment spondylosis.    I will obtain bilateral shoulder, right knee, neck and lumbar spine x-rays.  Additionally we will schedule him for bilateral shoulder intra-articular steroid injection, right knee steroid injection.  We discussed the potential adverse effects of corticosteroid injection including flushing of the face, lipodystrophy, skin discoloration, elevated blood glucose, increased blood pressure.  Risks of frequent steroid administration include weight gain, hormonal changes, mood changes, osteoporosis.    Can consider muscle relaxer  for thoracic pain.  Can consider rhizotomy for lumbar pain.    1. Chronic pain of both shoulders    2. Chronic pain of right knee    3. Chronic bilateral low back pain without sciatica        PLAN:  1. Medication Recommendations: Recommend Voltaren topical, NSAIDs, Tylenol.  Can trial turmeric 500 mg twice daily if NSAID contraindicated.    2. Physical Therapy: Continue HEP    3. Psychological: defer    4. Complementary and alternative (CAM) Therapies:     5. Labs: None indicated     6. Imaging: Obtain bilateral shoulder x-ray, right knee x-ray, neck thoracic and lumbar spine x-ray.    7. Interventions: Schedule bilateral intra-articular shoulder steroid injections with fluoroscopy (CPT 25368).  We will schedule right knee intra-articular steroid injection (32001)    8. Referrals: None indicated     9. Records requested: n/a    10. Lifestyle goals:    Follow-up 3 to 4 months      [facility]  Dajuan Woody MD

## 2023-03-09 ENCOUNTER — HOSPITAL ENCOUNTER (OUTPATIENT)
Dept: GENERAL RADIOLOGY | Facility: HOSPITAL | Age: 83
Discharge: HOME | End: 2023-03-09
Admitting: STUDENT IN AN ORGANIZED HEALTH CARE EDUCATION/TRAINING PROGRAM
Payer: MEDICARE

## 2023-03-09 DIAGNOSIS — M19.011 OSTEOARTHRITIS OF BOTH SHOULDERS, UNSPECIFIED OSTEOARTHRITIS TYPE: ICD-10-CM

## 2023-03-09 DIAGNOSIS — Z98.1 HISTORY OF FUSION OF CERVICAL SPINE: ICD-10-CM

## 2023-03-09 DIAGNOSIS — M54.50 CHRONIC BILATERAL LOW BACK PAIN WITHOUT SCIATICA: ICD-10-CM

## 2023-03-09 DIAGNOSIS — M25.561 CHRONIC PAIN OF RIGHT KNEE: ICD-10-CM

## 2023-03-09 DIAGNOSIS — M25.512 CHRONIC PAIN OF BOTH SHOULDERS: ICD-10-CM

## 2023-03-09 DIAGNOSIS — Z98.1 HISTORY OF LUMBAR FUSION: ICD-10-CM

## 2023-03-09 DIAGNOSIS — M75.01 ADHESIVE CAPSULITIS OF BOTH SHOULDERS: ICD-10-CM

## 2023-03-09 DIAGNOSIS — G89.29 CHRONIC BILATERAL LOW BACK PAIN WITHOUT SCIATICA: ICD-10-CM

## 2023-03-09 DIAGNOSIS — M75.02 ADHESIVE CAPSULITIS OF BOTH SHOULDERS: ICD-10-CM

## 2023-03-09 DIAGNOSIS — M79.18 RHOMBOID MUSCLE PAIN: Primary | ICD-10-CM

## 2023-03-09 DIAGNOSIS — G89.29 CHRONIC PAIN OF BOTH SHOULDERS: ICD-10-CM

## 2023-03-09 DIAGNOSIS — Z96.652 HISTORY OF TOTAL LEFT KNEE REPLACEMENT: ICD-10-CM

## 2023-03-09 DIAGNOSIS — M25.511 CHRONIC PAIN OF BOTH SHOULDERS: ICD-10-CM

## 2023-03-09 DIAGNOSIS — M54.6 THORACIC BACK PAIN, UNSPECIFIED BACK PAIN LATERALITY, UNSPECIFIED CHRONICITY: ICD-10-CM

## 2023-03-09 DIAGNOSIS — G89.29 CHRONIC PAIN OF RIGHT KNEE: ICD-10-CM

## 2023-03-09 DIAGNOSIS — M19.012 OSTEOARTHRITIS OF BOTH SHOULDERS, UNSPECIFIED OSTEOARTHRITIS TYPE: ICD-10-CM

## 2023-03-09 PROCEDURE — 72120 X-RAY BEND ONLY L-S SPINE: CPT

## 2023-03-09 PROCEDURE — 73562 X-RAY EXAM OF KNEE 3: CPT

## 2023-03-09 PROCEDURE — 72040 X-RAY EXAM NECK SPINE 2-3 VW: CPT

## 2023-03-09 PROCEDURE — 72070 X-RAY EXAM THORAC SPINE 2VWS: CPT

## 2023-03-09 PROCEDURE — 73030 X-RAY EXAM OF SHOULDER: CPT

## 2023-03-09 RX ORDER — TIZANIDINE 2 MG/1
2 TABLET ORAL NIGHTLY PRN
Qty: 30 TABLET | Refills: 2 | Status: SHIPPED | OUTPATIENT
Start: 2023-03-09

## 2023-03-10 DIAGNOSIS — I25.10 CHRONIC CORONARY ARTERY DISEASE: ICD-10-CM

## 2023-03-10 DIAGNOSIS — E78.2 MIXED HYPERLIPIDEMIA: ICD-10-CM

## 2023-03-10 RX ORDER — ATORVASTATIN CALCIUM 40 MG/1
TABLET, FILM COATED ORAL
Qty: 90 TABLET | Refills: 3 | Status: SHIPPED | OUTPATIENT
Start: 2023-03-10

## 2023-03-10 RX ORDER — CARVEDILOL 6.25 MG/1
TABLET ORAL
Qty: 180 TABLET | Refills: 3 | Status: SHIPPED | OUTPATIENT
Start: 2023-03-10

## 2023-03-13 ENCOUNTER — TELEPHONE (OUTPATIENT)
Dept: INTERNAL MEDICINE | Facility: CLINIC | Age: 83
End: 2023-03-13
Payer: MEDICARE

## 2023-03-13 NOTE — TELEPHONE ENCOUNTER
----- Message from Ivanna George MD sent at 3/9/2023  5:29 PM EST -----  Please let patient know that I have spoken to his pain management provider who recommended we try a low dose muscle relaxer for his upper back pain. I have sent tizanidine 2mg qhs PRN to his pharmacy. He needs to watch for confusion, dizziness, drowsiness while using this medication.

## 2023-03-13 NOTE — TELEPHONE ENCOUNTER
Spoke with patient and advised of recommendations from pain management and informed of new medication and directions for taking and possible side effects to watch for.  Patient verbalized understanding.

## 2023-03-28 ENCOUNTER — OUTSIDE FACILITY SERVICE (OUTPATIENT)
Dept: PAIN MEDICINE | Facility: CLINIC | Age: 83
End: 2023-03-28
Payer: MEDICARE

## 2023-03-28 ENCOUNTER — DOCUMENTATION (OUTPATIENT)
Dept: PAIN MEDICINE | Facility: CLINIC | Age: 83
End: 2023-03-28

## 2023-03-28 PROCEDURE — 77002 NEEDLE LOCALIZATION BY XRAY: CPT | Performed by: STUDENT IN AN ORGANIZED HEALTH CARE EDUCATION/TRAINING PROGRAM

## 2023-03-28 PROCEDURE — 20610 DRAIN/INJ JOINT/BURSA W/O US: CPT | Performed by: STUDENT IN AN ORGANIZED HEALTH CARE EDUCATION/TRAINING PROGRAM

## 2023-03-28 NOTE — PROGRESS NOTES
[facility]  [address] [place] [id number]      Dajuan Woody MD    PROCEDURE: Fluoroscopically-guided [bilateral] Shoulder Intra-articular Joint Injection   PRE-OP DIAGNOSIS: Osteoarthritis  POST-OP DIAGNOSIS: Osteoarthritis    ANTIPLATELET/ANTICOAGULANT: ASA 3 guidelines followed  CONSENT: Risks, benefits and options were explained to the patient, all questions were answered and written informed consent was obtained.  ANESTHESIA:  see anesthesia note.  PROCEDURE NOTE:  A pre-procedural time out was performed to confirm the correct patient, procedure, side, and site. The patient was positioned supine with all pressure points padded. Proper protective gear was worn by the physician including a mask, scrub cap, and sterile gloves. The appropriate shoulder was positioned with the supinated arm resting at the patient's side. Standard ASA monitors were applied. Lead glasses were placed on the patient. The patient's selected shoulder was widely prepped with [chlorhexidine] and draped in a sterile fashion. Utilizing fluoroscopic guidance the anterior shoulder joint was visualized. A [25] gauge, 3.5 inch Quincke needle was advanced through the [right] anterior joint capsule with contact of the humerus. Placement was confirmed with 1 ml of [Omnipaque 300 mgI/ml]. After confirmation of proper joint spread and lack of vascular uptake, a mixture containing [methylprednisolone 40 mg] steroid, [bupivacaine 0.5% - 3 ml] local anesthetic for a total volume of [4] ml was injected without resistance or pain. The needle was removed and a bandage applied. The same procedure using the same technique was performed contralaterally.    EBL: None    COMPLICATIONS: None    The patient was monitored for at least 30 minutes prior to discharge. Vital signs remained stable. There were no immediate complications and the patient tolerated the procedure well. Sensory and motor exam was unchanged from  baseline. The patient received written discharge instructions prior to discharge.    FOLLOW UP: As scheduled     ADDITIONAL NOTES: []      [facility]  Dajuan Woody MD

## 2023-05-09 ENCOUNTER — DOCUMENTATION (OUTPATIENT)
Dept: PAIN MEDICINE | Facility: CLINIC | Age: 83
End: 2023-05-09

## 2023-05-09 ENCOUNTER — OUTSIDE FACILITY SERVICE (OUTPATIENT)
Dept: PAIN MEDICINE | Facility: CLINIC | Age: 83
End: 2023-05-09
Payer: MEDICARE

## 2023-05-09 NOTE — PROGRESS NOTES
[facility]  [address] [place] [id number]      PROCEDURE: Fluoroscopically-guided right knee joint injection   PRE-OP DIAGNOSIS: Knee osteoarthritis  POST-OP DIAGNOSIS: Knee osteoarthritis      CONSENT: Risks, benefits and options were explained to the patient, all questions were answered and written informed consent was obtained.    ANESTHESIA: see anesthesia note    PROCEDURE NOTE:  A pre-procedural time out was performed to confirm the correct patient, procedure, side, and site. The patient was placed supine with all pressure points padded.  The Torgersen knee was elevated on a block with slightly flexed position.  A sterile field was prepped in standard fashion using Chlorhexidine and draped with sterile towels. Proper protective gear was worn by the physician including a mask, scrub cap, and sterile gloves. Utilizing AP fluoroscopic imaging the joint pace was visualized.  C-arm was obliqued until patella was midline.  The overlying skin was anesthetized with 1% lidocaine using a 27-gauge 1.5 inch needle.  A 22gauge 3.5 inch spinal needle was advanced using intermittent fluoroscopy into the right knee joint.  20 cc of synovial fluid was aspirated from the joint.  Intra-articular placement was confirmed with 1 mL of Omnipaque contrast, also confirming absence of vascular uptake. Following negative aspiration, 40 mg of methylprednisolone with 4 ml of Bupivacaine 0.5% was injected into the joint. The needle was flushed and withdrawn. The patient's skin was cleaned with alcohol and the injection sites covered with bandages.    EBL: None    COMPLICATIONS: None    The patient was monitored for at least 30 minutes prior to discharge. Vital signs remained stable throughout the procedure and in the recovery area. There were no immediate complications and the patient tolerated the procedure well. Sensory and motor exam was unchanged from baseline. The patient received written  discharge instructions prior to discharge.    FOLLOW UP: As scheduled     ADDITIONAL NOTES:    [facility]  Dajuan Woody MD

## 2023-07-20 DIAGNOSIS — I10 PRIMARY HYPERTENSION: ICD-10-CM

## 2023-07-20 RX ORDER — LISINOPRIL 5 MG/1
TABLET ORAL
Qty: 180 TABLET | Refills: 0 | Status: SHIPPED | OUTPATIENT
Start: 2023-07-20

## 2023-07-25 ENCOUNTER — OUTSIDE FACILITY SERVICE (OUTPATIENT)
Dept: PAIN MEDICINE | Facility: CLINIC | Age: 83
End: 2023-07-25
Payer: MEDICARE

## 2023-07-25 ENCOUNTER — DOCUMENTATION (OUTPATIENT)
Dept: PAIN MEDICINE | Facility: CLINIC | Age: 83
End: 2023-07-25

## 2023-07-25 PROCEDURE — 64555 IMPLANT NEUROELECTRODES: CPT | Performed by: STUDENT IN AN ORGANIZED HEALTH CARE EDUCATION/TRAINING PROGRAM

## 2023-07-25 PROCEDURE — 99152 MOD SED SAME PHYS/QHP 5/>YRS: CPT | Performed by: STUDENT IN AN ORGANIZED HEALTH CARE EDUCATION/TRAINING PROGRAM

## 2023-07-25 NOTE — PROGRESS NOTES
[facility]  [address] [place] [id number]      PROCEDURE: Fluoroscopically-guided bilateral L2 medial branch peripheral nerve stimulator implant (2 leads)  PRE-OP DIAGNOSIS: Lumbar spondylosis  POST-OP DIAGNOSIS: Lumbar spondylosis    ANTIPLATELET/ANTICOAGULANT STOP DATE: Discussed with the patient and managed according to CADENCE guidelines.  Plavix was discontinued 7 days ago.    CONSENT: Risks, benefits and options were explained to the patient, all questions were answered and written informed consent was obtained.    ANESTHESIA: Moderate sedation was required to maintain comfort, safety, and cooperation during the procedure.  The duration of sedation service was over 10 minutes.  Patient received 0mg IV Versed and 25mcg IV fentanyl.  Independent observation and monitoring was performed by Juanjo Villar RN.  The patient's level of consciousness and physiologic status was continually monitored with pulse oximetry, EKG from 11:59 to 12:35.  There were no complications or adverse events during sedation.  After the sedation patient was taken to the recovery area.      PROCEDURE NOTE:  After the risks, benefits and alternatives were discussed with the patient and consent was obtained, patient was placed in the prone position and padded to foster comfort. Prior to delivery of anesthetics, the painful region was carefully outlined with a marker. Appropriate skin and bony landmarks were identified. The skin overlying the symptomatic spine was prepped and draped in sterile fashion.    Fluoroscopy was used to identify the spinous process and lamina in the center of the patient’s region of pain. After identifying and marking the intended target along the course of the medial branch nerve, the skin around the planned entry point and the subcutaneous tissues are injected with local anesthetic.    A percutaneous sleeve and stimulating probe lead introduction system were assembled, inserted  and advanced along the intended course of the medial branch nerve as it traverses the lamina medial and inferior to the zygapophyseal joint, taking care to maintain the proper depth of insertion as the introducer is advanced under fluoroscopic guidance. The introducer needle was delivered to a location in proximity to the nerve.    Multiple stimulation parameters were used to deliver stimulation to the medial branch nerve in concert with stimulating at multiple positions around the nerve. Nerve target acquisition was confirmed noting generation of paresthesias in the paravertebral regions corresponding to the level being stimulated as well as rhythmic thumping within the multi?di, the latter being further corroborated via palpation. Various electrical parameter combinations were tested, and the lead location was adjusted (physically relocated) until the patient indicated paresthesia or muscle tension overlapping the distribution of the patient’s typical region of pain.    The stimulating probe was removed from the introducer and a percutaneous lead was guided through the needle and delivered to a location in similar proximity to the nerve. Final location was verified with electrical stimulation and documented with fluoroscopy.    The introducer needle was removed, and the exposed end of the percutaneous lead was attached to an external stimulator unit. Various electrical parameter combinations were again tested until the patient indicated paresthesia or muscle tension overlapping the distribution of the patient’s typical region of pain.    After confirming that lead impedance was in the normal range, the external stimulator unit was detached, the needle was removed, and the lead was anchored at the skin.    The lead was threaded into the connector block and electrical continuity and desired patient response was confirmed. The connector block was attached to the external stimulator unit.    The site was covered with  a sterile occlusive dressing and a fluoroscopic image was taken to document final placement. The patient was observed for stability of vital signs and comfort.    EBL: None    COMPLICATIONS: None      Vital signs remained stable throughout the procedure and in the recovery area. There were no immediate complications and the patient tolerated the procedure well.     FOLLOW UP: As scheduled    ADDITIONAL NOTES:     [facility]    Dajuan Woody MD

## 2023-08-09 ENCOUNTER — OFFICE VISIT (OUTPATIENT)
Dept: PAIN MEDICINE | Facility: CLINIC | Age: 83
End: 2023-08-09
Payer: MEDICARE

## 2023-08-09 VITALS
HEIGHT: 72 IN | TEMPERATURE: 96.8 F | DIASTOLIC BLOOD PRESSURE: 62 MMHG | OXYGEN SATURATION: 97 % | SYSTOLIC BLOOD PRESSURE: 110 MMHG | HEART RATE: 72 BPM | BODY MASS INDEX: 25.49 KG/M2 | WEIGHT: 188.2 LBS

## 2023-08-09 DIAGNOSIS — Z98.1 HISTORY OF LUMBAR FUSION: Primary | ICD-10-CM

## 2023-08-09 DIAGNOSIS — M19.011 OSTEOARTHRITIS OF BOTH SHOULDERS, UNSPECIFIED OSTEOARTHRITIS TYPE: ICD-10-CM

## 2023-08-09 DIAGNOSIS — Z96.652 HISTORY OF TOTAL LEFT KNEE REPLACEMENT: ICD-10-CM

## 2023-08-09 DIAGNOSIS — M25.511 CHRONIC PAIN OF BOTH SHOULDERS: ICD-10-CM

## 2023-08-09 DIAGNOSIS — M47.816 SPONDYLOSIS OF LUMBAR REGION WITHOUT MYELOPATHY OR RADICULOPATHY: ICD-10-CM

## 2023-08-09 DIAGNOSIS — M25.561 CHRONIC PAIN OF RIGHT KNEE: ICD-10-CM

## 2023-08-09 DIAGNOSIS — M54.50 CHRONIC BILATERAL LOW BACK PAIN WITHOUT SCIATICA: ICD-10-CM

## 2023-08-09 DIAGNOSIS — M19.012 OSTEOARTHRITIS OF BOTH SHOULDERS, UNSPECIFIED OSTEOARTHRITIS TYPE: ICD-10-CM

## 2023-08-09 DIAGNOSIS — G89.29 CHRONIC PAIN OF RIGHT KNEE: ICD-10-CM

## 2023-08-09 DIAGNOSIS — G89.29 CHRONIC BILATERAL LOW BACK PAIN WITHOUT SCIATICA: ICD-10-CM

## 2023-08-09 DIAGNOSIS — M54.6 THORACIC BACK PAIN, UNSPECIFIED BACK PAIN LATERALITY, UNSPECIFIED CHRONICITY: ICD-10-CM

## 2023-08-09 DIAGNOSIS — M25.512 CHRONIC PAIN OF BOTH SHOULDERS: ICD-10-CM

## 2023-08-09 DIAGNOSIS — G89.29 CHRONIC PAIN OF BOTH SHOULDERS: ICD-10-CM

## 2023-08-09 DIAGNOSIS — M75.02 ADHESIVE CAPSULITIS OF BOTH SHOULDERS: ICD-10-CM

## 2023-08-09 DIAGNOSIS — M75.01 ADHESIVE CAPSULITIS OF BOTH SHOULDERS: ICD-10-CM

## 2023-08-09 NOTE — PROGRESS NOTES
Primary Physician: Ivanna George MD    CHIEF COMPLAINT or REASON FOR VISIT: Follow-up (Still feeling the same. )    Initial HPI 3/8/2023:  Mr. Isrrael Marino is 83 y.o. male who presents as a new patient referral for evaluation and treatment of chronic multifocal pains including neck pain, upper thoracic pain, low back pain, bilateral shoulder pain, right knee pain.  He does have past medical history of a left knee replacement with Dr. Craig, posterior cervical fusion with Dr. Barnett, lumbar fusion with Dr. Santos.    Today he states that his primary pain complaint is bilateral shoulders, right knee.  He is unable to lift his arms above approximately 75 degrees.  He has previously undergone bilateral shoulder injections with great benefit as well as right knee injection with good benefit.  Patient denies any bowel or bladder dysfunction, lower extremity weakness, new onset saddle anesthesia or unexplained weight loss.     Interval history: Patient returns after undergoing bilateral lumbar Sprint PNS.  He is not getting any benefit yet.      Interventions:    3/28/2023: Bilateral shoulder IA injection with 100% relief ongoing  5/9/2023: Right knee IA injection with 100% relief ongoing   7/25/2023: Bilateral lumbar Sprint PNS      Objective Pain Scoring:   BRIEF PAIN INVENTORY:  Total score:   Pain Score    08/09/23 0838   PainSc:   8   PainLoc: Generalized      PHQ-2: PHQ-2 Total Score: 1  PHQ-9: PHQ-9: Brief Depression Severity Measure Score: 3  Opioid Risk Tool:         Review of Systems:   ROS negative except as otherwise noted     Past Medical History:   Past Medical History:   Diagnosis Date    Arrhythmia     Arthritis     both knees    Broken neck     CAD (coronary artery disease)     Cancer     skin cancer  head and neck  nonmelanoma    Chondrocalcinosis of knee     GERD (gastroesophageal reflux disease)     Gout     Heart attack      Last Assessed: 28 Mar 2014    Heart disease     History of  transfusion     own blood with hip surgery    Hyperlipidemia     Hypertension     Hypertensive urgency 05/28/2021    IBS (irritable bowel syndrome)     Left rotator cuff tear arthropathy     Low back pain     Lower extremity neuropathy     Lumbar canal stenosis     Neck pain     Nipple pain 4/8/2022    Numbness     Right hip pain     Rotator cuff tear arthropathy of right shoulder     Sleep apnea     semi compliant with c-pap     Superficial thrombophlebitis of right upper extremity 6/3/2021    Thin blood          Past Surgical History:   Past Surgical History:   Procedure Laterality Date    APPENDECTOMY  1956    BACK SURGERY  1997    spinal decompression and fusion    BREAST BIOPSY  2003    BENIGN EXCISIONAL. NOT CANCER    CARDIAC CATHETERIZATION      with two stents//. DR. TOLEDO    CARDIAC CATHETERIZATION N/A 01/08/2021    Procedure: LEFT HEART CATH;  Surgeon: Casimiro Bernal MD;  Location:  [facility];  Service: Cardiovascular;  Laterality: N/A;    CARPAL TUNNEL RELEASE  03/28/2014    Neuroplasty Decompression Median Nerve At Carpal Tunnel    CATARACT EXTRACTION Bilateral     CERVICAL DISCECTOMY POSTERIOR FUSION WITH BRAIN LAB N/A 07/13/2018    Procedure: CERVICAL LAMINECTOMY DECOMPRESSION POSTERIOR C1-2 POSTERIOR CERVICAL FUSION;  Surgeon: Randall Barnett MD;  Location:  [facility];  Service: Neurosurgery    COLONOSCOPY      CORONARY STENT PLACEMENT  2013    HERNIA REPAIR  2002    & 1998    JOINT REPLACEMENT      LUMBAR DISCECTOMY FUSION INSTRUMENTATION      L3-4, L4-5    SKIN BIOPSY      TONSILLECTOMY      TOTAL HIP ARTHROPLASTY  2002    TOTAL KNEE ARTHROPLASTY Left 12/01/2021    Procedure: TOTAL KNEE ARTHROPLASTY LEFT;  Surgeon: Kendall Craig MD;  Location:  [facility];  Service: Orthopedics;  Laterality: Left;    VASECTOMY      WISDOM TOOTH EXTRACTION           Family History   Family History   Problem Relation Age of Onset    Breast cancer Mother 62    Cancer Mother     Heart  disease Father     Hypertension Father     Heart attack Father     Sleep apnea Son     Breast cancer Maternal Aunt          Social History   Social History     Socioeconomic History    Marital status:    Tobacco Use    Smoking status: Never     Passive exposure: Never    Smokeless tobacco: Never   Vaping Use    Vaping Use: Never used   Substance and Sexual Activity    Alcohol use: Not Currently    Drug use: Never    Sexual activity: Defer        Medications:     Current Outpatient Medications:     acetaminophen (TYLENOL) 500 MG tablet, Take 2 tablets by mouth Every 8 (Eight) Hours. For 1 week, then as needed, Disp: 42 tablet, Rfl: 0    ammonium lactate (LAC-HYDRIN) 12 % lotion, Every 12 (Twelve) Hours., Disp: , Rfl:     aspirin 81 MG EC tablet, Take 1 tablet by mouth Daily. Resume in 1 month, Disp: 30 tablet, Rfl: 0    atorvastatin (LIPITOR) 40 MG tablet, TAKE ONE TABLET BY MOUTH EVERY EVENING, Disp: 90 tablet, Rfl: 3    B Complex Vitamins (VITAMIN B COMPLEX PO), Take 1 tablet by mouth Daily., Disp: , Rfl:     carvedilol (COREG) 6.25 MG tablet, TAKE ONE TABLET BY MOUTH EVERY 12 HOURS, Disp: 180 tablet, Rfl: 3    Cholecalciferol (VITAMIN D3) 125 MCG (5000 UT) capsule capsule, Take 2,000 Units by mouth Daily., Disp: , Rfl:     clopidogrel (PLAVIX) 75 MG tablet, Take 1 tablet by mouth Daily. Resume 12/2/21, Disp: 90 tablet, Rfl: 3    docusate sodium (Colace) 100 MG capsule, Take 1 capsule by mouth 2 (Two) Times a Day., Disp: 60 capsule, Rfl: 0    DULoxetine (CYMBALTA) 30 MG capsule, Take 2 capsules by mouth Daily., Disp: 180 capsule, Rfl: 1    fluticasone (FLONASE) 50 MCG/ACT nasal spray, 1 spray into the nostril(s) as directed by provider Daily., Disp: 48 g, Rfl: 0    furosemide (LASIX) 20 MG tablet, TAKE ONE TABLET BY MOUTH DAILY, Disp: 30 tablet, Rfl: 11    ipratropium (ATROVENT) 0.03 % nasal spray, 2 sprays into the nostril(s) as directed by provider Every 12 (Twelve) Hours., Disp: 1 each, Rfl: 0     "lisinopril (PRINIVIL,ZESTRIL) 5 MG tablet, TAKE ONE TABLET BY MOUTH TWICE A DAY, Disp: 180 tablet, Rfl: 0    Melatonin 3 MG capsule, Take 1 capsule by mouth Daily., Disp: , Rfl:     methylPREDNISolone (MEDROL) 4 MG dose pack, Take as directed on package instructions., Disp: 21 tablet, Rfl: 0    tamsulosin (FLOMAX) 0.4 MG capsule 24 hr capsule, Take 1 capsule by mouth Daily., Disp: 90 capsule, Rfl: 3    tiZANidine (ZANAFLEX) 2 MG tablet, Take 1 tablet by mouth At Night As Needed for Muscle Spasms., Disp: 30 tablet, Rfl: 2    triamcinolone (KENALOG) 0.1 % cream, Apply 1 application topically to the appropriate area as directed 2 (Two) Times a Day., Disp: 45 g, Rfl: 5    vitamin B-12 (CYANOCOBALAMIN) 1000 MCG tablet, Take 1 tablet by mouth Daily., Disp: , Rfl:     Zinc 50 MG capsule, Take 1 tablet by mouth Daily., Disp: , Rfl:         Physical Exam:     Vitals:    08/09/23 0838   BP: 110/62   BP Location: Right arm   Patient Position: Sitting   Cuff Size: Adult   Pulse: 72   Temp: 96.8 øF (36 øC)   TempSrc: Infrared   SpO2: 97%   Weight: 85.4 kg (188 lb 3.2 oz)   Height: 182.9 cm (72\")   PainSc:   8   PainLoc: Generalized        General: Alert and oriented, No acute distress.   HEENT: Normocephalic, atraumatic.   Cardiovascular: No gross edema  Respiratory: Respirations are non-labored    Cervical Spine:   No masses or atrophy  Range of motion - Flexion normal. Extension normal. Lateral rotation reduced.   Palpation - nontender   Spurling's - negative     Thoracic Spine:   Inspection: no gross abnormality  Paraspinal muscle palpation: Tender palpation bilateral rhomboids  Spinous process palpation: nontender    Lumbar Spine:   No masses or atrophy  Range of motion - Flexion normal. Extension reduced   Facet Loading: Positive bilaterally  Facet Palpation - tender bilaterally     Motor Exam:    Strength: Rate on 1-5 scale Right Left    C5-Elbow flexion, Deltoid 5 5    C6-Wrist extension 5 5    C7- Elbow / finger " extension 5 5    C8- Finger flexion 5 5    T1- Intrinsics hand 5 5    Strength: Rate on 1-5 scale Right Left    L1/2- hip flexion 5 5    L3- knee extension 5 5    L4- ankle dorsiflexion 5 5    L5- great toe extension 5 5    S1- ankle plantarflexion 5 5    Sensory Exam: Full and equal sensation to light touch throughout.  SHOULDER Exam Right Left   Inspection No erythema, swelling, obvious bony deformity, or atrophy No erythema, swelling, obvious bony deformity, or atrophy   Palpation Subacromial bursa:  Bicipital tendon:  Anterior joint:  Posterior joint:  AC Joint:  Subacromial bursa:  Bicipital tendon: TTP  Anterior joint:  Posterior joint:  AC Joint:   ROM Active Flexion (0-180):  Active Abduction (0-180): 75  Passive Flexion (0-180): 90  Apley scratch (ER+Abd):  Scapular reach (IR+Add):   Active Flexion (0-180):  Active Abduction (0-180): 75  Passive Flexion (0-180): 90  Apley scratch (ER+Abd):  Scapular reach (IR+Add):                              Neurologic: Cranial Nerves II-XII are grossly intact.   Psychiatric: Cooperative.   Gait: Normal   Assistive Devices: None    Splint lead sites clean dry and intact without erythema purulence or exudate    Imaging Studies:   No results found for this or any previous visit.      Impression & Plan:   3/8/2023: Isrrael Marino is a 83 y.o. male with past medical history significant for posterior cervical fusion with Dr. Barnett, left TKA, lumbar fusion who presents to the pain clinic for evaluation and treatment of bilateral shoulder pain, right knee pain, and upper thoracic pain, low back pain.  Bilateral shoulder pain consistent with osteoarthritis and adhesive capsulitis.  Right knee pain consistent with osteoarthritis.  Upper thoracic pain consistent with myofascial pain, likely rhomboid activation for straightening of reportedly scoliotic thoracic spine.  Lumbar back pain consistent with adjacent segment spondylosis.    I will obtain bilateral shoulder, right knee,  neck and lumbar spine x-rays.  Additionally we will schedule him for bilateral shoulder intra-articular steroid injection, right knee steroid injection.  We discussed the potential adverse effects of corticosteroid injection including flushing of the face, lipodystrophy, skin discoloration, elevated blood glucose, increased blood pressure.  Risks of frequent steroid administration include weight gain, hormonal changes, mood changes, osteoporosis.    7/10/2023: Excellent relief from shoulder and knee injections.  Continued lumbar spondylosis related pain.  We discussed Sprint peripheral nerve stimulation, patient would like to proceed.  If no benefit can consider rhizotomy    8/9/2023: Patient doing well with Sprint, no benefit yet.  He is having a fairly high stim requirement (setting is at 87 today).  Will have rep reach out.    1. History of lumbar fusion    2. Spondylosis of lumbar region without myelopathy or radiculopathy    3. Chronic pain of both shoulders    4. Chronic bilateral low back pain without sciatica    5. Chronic pain of right knee    6. Thoracic back pain, unspecified back pain laterality, unspecified chronicity    7. Osteoarthritis of both shoulders, unspecified osteoarthritis type    8. History of total left knee replacement    9. Adhesive capsulitis of both shoulders            PLAN:  1. Medication Recommendations: Recommend Voltaren topical, NSAIDs, Tylenol.  Can trial turmeric 500 mg twice daily if NSAID contraindicated.    2. Physical Therapy: Continue HEP    3. Psychological: defer    4. Complementary and alternative (CAM) Therapies:     5. Labs: None indicated     6. Imaging: None indicated    7. Interventions: Continue bilateral lumbar peripheral nerve stimulation with Sprint    8. Referrals: None indicated     9. Records requested: n/a    10. Lifestyle goals:    Follow-up for 60-day lead removal      [facility]  Dajuan Woody MD

## 2023-08-23 ENCOUNTER — TELEPHONE (OUTPATIENT)
Dept: PAIN MEDICINE | Facility: CLINIC | Age: 83
End: 2023-08-23
Payer: MEDICARE

## 2023-08-23 NOTE — TELEPHONE ENCOUNTER
Spoke with patient's wife. He will come to the office at 8 am Aug 24 in order for Miroslava to check his wound.

## 2023-08-23 NOTE — TELEPHONE ENCOUNTER
Hub staff attempted to follow warm transfer process and was unsuccessful    Caller: VERENA REGALADO    Relationship to patient: SELF    Best call back number: [phone]    Patient is needing: NEEDS TO DISCUSS NERVE STIMULATOR-- THERE IS SORENESS/REDNESS AT THE SITE ON HIS BACK AND HE HAS SOME CONCERNS ABOUT A WIRE.

## 2023-08-23 NOTE — TELEPHONE ENCOUNTER
Called patient back and left VM requesting he call the office. I informed him he can come Friday morning to get his wound checked, and for him to call Gilles at [facility].

## 2023-08-24 ENCOUNTER — DOCUMENTATION (OUTPATIENT)
Dept: PAIN MEDICINE | Facility: CLINIC | Age: 83
End: 2023-08-24
Payer: MEDICARE

## 2023-09-13 ENCOUNTER — OFFICE VISIT (OUTPATIENT)
Dept: INTERNAL MEDICINE | Facility: CLINIC | Age: 83
End: 2023-09-13
Payer: MEDICARE

## 2023-09-13 ENCOUNTER — TELEPHONE (OUTPATIENT)
Dept: INTERNAL MEDICINE | Facility: CLINIC | Age: 83
End: 2023-09-13

## 2023-09-13 ENCOUNTER — LAB (OUTPATIENT)
Dept: LAB | Facility: HOSPITAL | Age: 83
End: 2023-09-13
Payer: MEDICARE

## 2023-09-13 VITALS
HEART RATE: 62 BPM | BODY MASS INDEX: 25.6 KG/M2 | SYSTOLIC BLOOD PRESSURE: 120 MMHG | WEIGHT: 189 LBS | DIASTOLIC BLOOD PRESSURE: 56 MMHG | TEMPERATURE: 97.9 F | HEIGHT: 72 IN | OXYGEN SATURATION: 97 %

## 2023-09-13 DIAGNOSIS — R73.03 PREDIABETES: ICD-10-CM

## 2023-09-13 DIAGNOSIS — I10 PRIMARY HYPERTENSION: ICD-10-CM

## 2023-09-13 DIAGNOSIS — M1A.0720 CHRONIC IDIOPATHIC GOUT INVOLVING TOE OF LEFT FOOT WITHOUT TOPHUS: ICD-10-CM

## 2023-09-13 DIAGNOSIS — I25.10 CHRONIC CORONARY ARTERY DISEASE: ICD-10-CM

## 2023-09-13 DIAGNOSIS — G60.9 IDIOPATHIC PERIPHERAL NEUROPATHY: ICD-10-CM

## 2023-09-13 DIAGNOSIS — M51.9 LUMBAR DISC DISEASE: ICD-10-CM

## 2023-09-13 DIAGNOSIS — G47.33 OSA (OBSTRUCTIVE SLEEP APNEA): ICD-10-CM

## 2023-09-13 DIAGNOSIS — N40.0 BENIGN PROSTATIC HYPERPLASIA WITHOUT LOWER URINARY TRACT SYMPTOMS: ICD-10-CM

## 2023-09-13 DIAGNOSIS — M17.0 PRIMARY OSTEOARTHRITIS OF BOTH KNEES: ICD-10-CM

## 2023-09-13 DIAGNOSIS — Z00.00 MEDICARE ANNUAL WELLNESS VISIT, SUBSEQUENT: Primary | ICD-10-CM

## 2023-09-13 DIAGNOSIS — M50.30 DDD (DEGENERATIVE DISC DISEASE), CERVICAL: ICD-10-CM

## 2023-09-13 DIAGNOSIS — E78.2 MIXED HYPERLIPIDEMIA: ICD-10-CM

## 2023-09-13 DIAGNOSIS — K21.9 GASTROESOPHAGEAL REFLUX DISEASE WITHOUT ESOPHAGITIS: ICD-10-CM

## 2023-09-13 DIAGNOSIS — Z96.652 STATUS POST TOTAL LEFT KNEE REPLACEMENT: ICD-10-CM

## 2023-09-13 DIAGNOSIS — Z00.00 MEDICARE ANNUAL WELLNESS VISIT, SUBSEQUENT: ICD-10-CM

## 2023-09-13 DIAGNOSIS — I25.5 ISCHEMIC CARDIOMYOPATHY: ICD-10-CM

## 2023-09-13 LAB
ALBUMIN SERPL-MCNC: 3.9 G/DL (ref 3.5–5.2)
ALBUMIN/GLOB SERPL: 1.5 G/DL
ALP SERPL-CCNC: 86 U/L (ref 39–117)
ALT SERPL W P-5'-P-CCNC: 16 U/L (ref 1–41)
ANION GAP SERPL CALCULATED.3IONS-SCNC: 10.8 MMOL/L (ref 5–15)
AST SERPL-CCNC: 28 U/L (ref 1–40)
BILIRUB SERPL-MCNC: 0.3 MG/DL (ref 0–1.2)
BUN SERPL-MCNC: 30 MG/DL (ref 8–23)
BUN/CREAT SERPL: 25.6 (ref 7–25)
CALCIUM SPEC-SCNC: 9 MG/DL (ref 8.6–10.5)
CHLORIDE SERPL-SCNC: 107 MMOL/L (ref 98–107)
CHOLEST SERPL-MCNC: 119 MG/DL (ref 0–200)
CO2 SERPL-SCNC: 23.2 MMOL/L (ref 22–29)
CREAT SERPL-MCNC: 1.17 MG/DL (ref 0.76–1.27)
DEPRECATED RDW RBC AUTO: 42.8 FL (ref 37–54)
EGFRCR SERPLBLD CKD-EPI 2021: 61.1 ML/MIN/1.73
ERYTHROCYTE [DISTWIDTH] IN BLOOD BY AUTOMATED COUNT: 13.1 % (ref 12.3–15.4)
GLOBULIN UR ELPH-MCNC: 2.6 GM/DL
GLUCOSE SERPL-MCNC: 118 MG/DL (ref 65–99)
HBA1C MFR BLD: 5.8 % (ref 4.8–5.6)
HCT VFR BLD AUTO: 37.6 % (ref 37.5–51)
HDLC SERPL-MCNC: 50 MG/DL (ref 40–60)
HGB BLD-MCNC: 12.6 G/DL (ref 13–17.7)
LDLC SERPL CALC-MCNC: 51 MG/DL (ref 0–100)
LDLC/HDLC SERPL: 1 {RATIO}
MCH RBC QN AUTO: 30.3 PG (ref 26.6–33)
MCHC RBC AUTO-ENTMCNC: 33.5 G/DL (ref 31.5–35.7)
MCV RBC AUTO: 90.4 FL (ref 79–97)
PLATELET # BLD AUTO: 214 10*3/MM3 (ref 140–450)
PMV BLD AUTO: 10.6 FL (ref 6–12)
POTASSIUM SERPL-SCNC: 4.1 MMOL/L (ref 3.5–5.2)
PROT SERPL-MCNC: 6.5 G/DL (ref 6–8.5)
RBC # BLD AUTO: 4.16 10*6/MM3 (ref 4.14–5.8)
SODIUM SERPL-SCNC: 141 MMOL/L (ref 136–145)
TRIGL SERPL-MCNC: 95 MG/DL (ref 0–150)
VLDLC SERPL-MCNC: 18 MG/DL (ref 5–40)
WBC NRBC COR # BLD: 9.86 10*3/MM3 (ref 3.4–10.8)

## 2023-09-13 PROCEDURE — 83036 HEMOGLOBIN GLYCOSYLATED A1C: CPT

## 2023-09-13 PROCEDURE — 85027 COMPLETE CBC AUTOMATED: CPT

## 2023-09-13 PROCEDURE — 80061 LIPID PANEL: CPT

## 2023-09-13 PROCEDURE — 80053 COMPREHEN METABOLIC PANEL: CPT

## 2023-09-13 NOTE — PROGRESS NOTES
The ABCs of the Annual Wellness Visit  Subsequent Medicare Wellness Visit    Chief Complaint   Patient presents with    Medicare Wellness-subsequent       Subjective   History of Present Illness:  Isrrael Marino is a 83 y.o. male who presents for a Subsequent Medicare Wellness Visit.    HEALTH RISK ASSESSMENT    Recent Hospitalizations:  No hospitalization(s) within the last year.    Current Medical Providers:  Patient Care Team:  Ivanna George MD as PCP - General (Internal Medicine)  Omar Mckeon MD as Referring Physician (Emergency Medicine)  Casimiro Bernal MD as Consulting Physician (Cardiology)  Dajuan Woody MD as Consulting Physician (Pain Medicine)  Radha Valdivia APRN as Nurse Practitioner (Cardiology)    Smoking Status:  Social History     Tobacco Use   Smoking Status Never    Passive exposure: Never   Smokeless Tobacco Never       Alcohol Consumption:  Social History     Substance and Sexual Activity   Alcohol Use Not Currently       Depression Screen:       2023     8:19 AM   PHQ-2/PHQ-9 Depression Screening   Little Interest or Pleasure in Doing Things 1-->several days   Feeling Down, Depressed or Hopeless 0-->not at all   PHQ-9: Brief Depression Severity Measure Score 1       Fall Risk Screen:  STEADI Fall Risk Assessment was completed, and patient is at LOW risk for falls.Assessment completed on:2023    Health Habits and Functional and Cognitive Screenin/13/2023     8:18 AM   Functional & Cognitive Status   Do you have difficulty preparing food and eating? No   Do you have difficulty bathing yourself, getting dressed or grooming yourself? Yes   Do you have difficulty using the toilet? No   Do you have difficulty moving around from place to place? Yes   Do you have trouble with steps or getting out of a bed or a chair? Yes   Current Diet Well Balanced Diet   Dental Exam Up to date   Eye Exam Up to date   Exercise (times per week) 0 times per week   Current  Exercises Include No Regular Exercise;Walking   Do you need help using the phone?  No   Are you deaf or do you have serious difficulty hearing?  Yes   Do you need help to go to places out of walking distance? No   Do you need help shopping? No   Do you need help preparing meals?  No   Do you need help with housework?  No   Do you need help with laundry? No   Do you need help taking your medications? No   Do you need help managing money? No   Do you ever drive or ride in a car without wearing a seat belt? No   Have you felt unusual stress, anger or loneliness in the last month? No   Who do you live with? Spouse   If you need help, do you have trouble finding someone available to you? No   Have you been bothered in the last four weeks by sexual problems? No   Do you have difficulty concentrating, remembering or making decisions? No         Does the patient have evidence of cognitive impairment? No    Asprin use counseling:Taking ASA appropriately as indicated    Age-appropriate Screening Schedule:  Refer to the list below for future screening recommendations based on patient's age, sex and/or medical conditions. Orders for these recommended tests are listed in the plan section. The patient has been provided with a written plan.    Health Maintenance   Topic Date Due    COVID-19 Vaccine (5 - Moderna series) 07/08/2022    LIPID PANEL  09/02/2023    INFLUENZA VACCINE  10/01/2023    ANNUAL WELLNESS VISIT  09/13/2024    BMI FOLLOWUP  09/13/2024    TDAP/TD VACCINES (3 - Td or Tdap) 11/12/2027    ZOSTER VACCINE  Completed    Pneumococcal Vaccine 65+  Discontinued          The following portions of the patient's history were reviewed and updated as appropriate: allergies, current medications, past family history, past medical history, past social history, past surgical history, and problem list.    Outpatient Medications Prior to Visit   Medication Sig Dispense Refill    acetaminophen (TYLENOL) 500 MG tablet Take 2 tablets by  mouth Every 8 (Eight) Hours. For 1 week, then as needed 42 tablet 0    ammonium lactate (LAC-HYDRIN) 12 % lotion Every 12 (Twelve) Hours.      aspirin 81 MG EC tablet Take 1 tablet by mouth Daily. Resume in 1 month 30 tablet 0    atorvastatin (LIPITOR) 40 MG tablet TAKE ONE TABLET BY MOUTH EVERY EVENING 90 tablet 3    B Complex Vitamins (VITAMIN B COMPLEX PO) Take 1 tablet by mouth Daily.      carvedilol (COREG) 6.25 MG tablet TAKE ONE TABLET BY MOUTH EVERY 12 HOURS 180 tablet 3    Cholecalciferol (VITAMIN D3) 125 MCG (5000 UT) capsule capsule Take 2,000 Units by mouth Daily.      clopidogrel (PLAVIX) 75 MG tablet Take 1 tablet by mouth Daily. Resume 12/2/21 90 tablet 3    docusate sodium (Colace) 100 MG capsule Take 1 capsule by mouth 2 (Two) Times a Day. 60 capsule 0    DULoxetine (CYMBALTA) 30 MG capsule Take 2 capsules by mouth Daily. 180 capsule 1    fluticasone (FLONASE) 50 MCG/ACT nasal spray 1 spray into the nostril(s) as directed by provider Daily. 48 g 0    furosemide (LASIX) 20 MG tablet TAKE ONE TABLET BY MOUTH DAILY 30 tablet 11    ipratropium (ATROVENT) 0.03 % nasal spray 2 sprays into the nostril(s) as directed by provider Every 12 (Twelve) Hours. 1 each 0    lisinopril (PRINIVIL,ZESTRIL) 5 MG tablet TAKE ONE TABLET BY MOUTH TWICE A  tablet 0    Melatonin 3 MG capsule Take 1 capsule by mouth Daily.      methylPREDNISolone (MEDROL) 4 MG dose pack Take as directed on package instructions. 21 tablet 0    tamsulosin (FLOMAX) 0.4 MG capsule 24 hr capsule Take 1 capsule by mouth Daily. 90 capsule 3    tiZANidine (ZANAFLEX) 2 MG tablet Take 1 tablet by mouth At Night As Needed for Muscle Spasms. 30 tablet 2    triamcinolone (KENALOG) 0.1 % cream Apply 1 application topically to the appropriate area as directed 2 (Two) Times a Day. 45 g 5    vitamin B-12 (CYANOCOBALAMIN) 1000 MCG tablet Take 1 tablet by mouth Daily.      Zinc 50 MG capsule Take 1 tablet by mouth Daily.       No facility-administered  medications prior to visit.       Patient Active Problem List   Diagnosis    Gastroesophageal reflux disease    Atopic rhinitis    Peripheral neuropathy    Hypertension    Hyperlipidemia    Benign prostatic hyperplasia    Chronic coronary artery disease    Primary osteoarthritis of both knees    Prediabetes    JIN (obstructive sleep apnea)    Hypersomnia    Iron deficiency    Ischemic cardiomyopathy    DDD (degenerative disc disease), cervical    Status post total left knee replacement    Intrinsic eczema    Glenohumeral arthritis    Sensorineural hearing loss (SNHL) of both ears    Gynecomastia    Chronic idiopathic gout involving toe of left foot without tophus    Nontraumatic rupture of right long head biceps tendon    Rotator cuff arthropathy of both shoulders    Lumbar disc disease       Advanced Care Planning:  ACP discussion was held with the patient during this visit. Patient has an advance directive in EMR which is still valid.     Review of Systems   Constitutional: Negative.    HENT:  Positive for hearing loss.    Eyes:  Positive for double vision (after using tramadol).   Respiratory: Negative.     Cardiovascular: Negative.    Gastrointestinal: Negative.    Genitourinary: Negative.    Musculoskeletal:  Positive for arthralgias, back pain, gait problem, myalgias and neck pain.   Skin: Negative.    Allergic/Immunologic: Positive for environmental allergies.   Neurological:  Positive for numbness (neuropathy).   Psychiatric/Behavioral:  Negative for depressed mood.      Compared to one year ago, the patient feels his physical health is worse. Slightly worse due to pain.  Compared to one year ago, the patient feels his mental health is the same.    Reviewed chart for potential of high risk medication in the elderly: yes  Reviewed chart for potential of harmful drug interactions in the elderly:yes    Objective         Vitals:    09/13/23 0808   BP: 120/56   Pulse: 62   Temp: 97.9 °F (36.6 °C)   SpO2: 97%  "  Weight: 85.7 kg (189 lb)   Height: 182.9 cm (72\")   PainSc:   8       Body mass index is 25.63 kg/m².  Discussed the patient's BMI with him. The BMI is in the acceptable range.    Physical Exam  Vitals and nursing note reviewed.   Constitutional:       General: He is not in acute distress.     Appearance: Normal appearance. He is well-developed. He is not ill-appearing, toxic-appearing or diaphoretic.   HENT:      Head: Normocephalic and atraumatic.      Ears:      Comments: Hard of hearing     Nose: Nose normal.   Eyes:      General: No scleral icterus.     Conjunctiva/sclera: Conjunctivae normal.   Cardiovascular:      Rate and Rhythm: Normal rate and regular rhythm.      Heart sounds: Normal heart sounds. No murmur heard.  Pulmonary:      Effort: Pulmonary effort is normal. No respiratory distress.      Breath sounds: Normal breath sounds.   Abdominal:      Palpations: Abdomen is soft.   Musculoskeletal:         General: No deformity.      Cervical back: Neck supple.      Right lower leg: Edema (trace) present.      Left lower leg: Edema (trace) present.   Skin:     General: Skin is warm and dry.      Findings: No rash.   Neurological:      Mental Status: He is alert and oriented to person, place, and time. Mental status is at baseline.      Gait: Gait normal.   Psychiatric:         Mood and Affect: Mood normal.         Behavior: Behavior normal.             Assessment & Plan   Medicare Risks and Personalized Health Plan  CMS Preventative Services Quick Reference  Advance Directive Discussion  Immunizations Discussed/Encouraged (specific immunizations; Influenza, COVID19, and RSV )    The above risks/problems have been discussed with the patient.  Pertinent information has been shared with the patient in the After Visit Summary.  Follow up plans and orders are seen below in the Assessment/Plan Section.    Diagnoses and all orders for this visit:    Medicare annual wellness visit, subsequent  - Counseling was " given to patient for the following topics:  importance of immunizations, including risks and benefits. Also discussed the importance of regular dental and vision care.  -     CBC (No Diff); Future    Benign prostatic hyperplasia without lower urinary tract symptoms  - stable, continue flomax    Chronic coronary artery disease  - [facility] without flow limiting CAD  - On coreg, lipitor, ASA, plavix.    Chronic idiopathic gout involving toe of left foot without tophus  - infrequent flares treated PRN    DDD (degenerative disc disease), cervical  Lumbar disc disease  - hx of odontoid fracture s/p cervical fusion as well as lumbar fusion L3-4, L4-5  - seeing chiropractor and pain management. Has recently had temporary PNS placed but so far no relief.    Gastroesophageal reflux disease without esophagitis  - stable, managed with diet    Mixed hyperlipidemia  - Has been well controlled, continue atorvastatin 40mg daily  - Lipid panel ordered    Primary hypertension  - BP at goal, continue lisinopril 5mg BID, coreg 6.25mg BID   - CMP ordered    Ischemic cardiomyopathy  - Echo 1/2021 EF 30%, EF 25% with dilated LV and low LV filling pressures on [facility]  - Non-obstructive CAD on [facility]  - Weight currently stable, minimal edema  - on coreg 6.25mg BID, lisinopril 5mg BID, and lasix 20mg daily PRN for weight gain (dry weight 184lbs)    JIN (obstructive sleep apnea)  - on CPAP    Idiopathic peripheral neuropathy  - stable, continue duloxetine 30mg daily    Prediabetes  - A1c ordered    Primary osteoarthritis of both knees Status post total left knee replacement  - s/p L TKA 12/1/2021. Has had injections to R knee.     Health Maintenance  - Colonoscopy: No longer indicated due to age.  - Immunizations: Flu deferred. Tdap 2017. COVID booster and RSV discussed. Shingrix series complete. Pneumococcal complete.  - Depression screening: negative 9/2023    Follow Up:  Return in about 6 months (around 3/13/2024) for Follow up, 1 year for wellness  45 minutes, Labs today.     An After Visit Summary and PPPS were given to the patient.

## 2023-09-13 NOTE — TELEPHONE ENCOUNTER
Patient stats he's not interested in doing another wellness visit, so I did not schedule his 2024 wellness.

## 2023-09-21 ENCOUNTER — OFFICE VISIT (OUTPATIENT)
Dept: INTERNAL MEDICINE | Facility: CLINIC | Age: 83
End: 2023-09-21
Payer: MEDICARE

## 2023-09-21 VITALS
WEIGHT: 188 LBS | OXYGEN SATURATION: 98 % | HEIGHT: 72 IN | SYSTOLIC BLOOD PRESSURE: 112 MMHG | HEART RATE: 82 BPM | BODY MASS INDEX: 25.47 KG/M2 | TEMPERATURE: 100.2 F | DIASTOLIC BLOOD PRESSURE: 66 MMHG

## 2023-09-21 DIAGNOSIS — R30.0 DYSURIA: ICD-10-CM

## 2023-09-21 DIAGNOSIS — R09.81 NASAL CONGESTION: ICD-10-CM

## 2023-09-21 DIAGNOSIS — N30.01 ACUTE CYSTITIS WITH HEMATURIA: Primary | ICD-10-CM

## 2023-09-21 LAB
BILIRUB BLD-MCNC: NEGATIVE MG/DL
CLARITY, POC: ABNORMAL
COLOR UR: ABNORMAL
EXPIRATION DATE: ABNORMAL
GLUCOSE UR STRIP-MCNC: NEGATIVE MG/DL
KETONES UR QL: ABNORMAL
LEUKOCYTE EST, POC: ABNORMAL
Lab: ABNORMAL
NITRITE UR-MCNC: POSITIVE MG/ML
PH UR: 6 [PH] (ref 5–8)
PROT UR STRIP-MCNC: ABNORMAL MG/DL
RBC # UR STRIP: ABNORMAL /UL
SP GR UR: 1.02 (ref 1–1.03)
UROBILINOGEN UR QL: NORMAL

## 2023-09-21 PROCEDURE — 87086 URINE CULTURE/COLONY COUNT: CPT | Performed by: NURSE PRACTITIONER

## 2023-09-21 PROCEDURE — 87186 SC STD MICRODIL/AGAR DIL: CPT | Performed by: NURSE PRACTITIONER

## 2023-09-21 PROCEDURE — 87077 CULTURE AEROBIC IDENTIFY: CPT | Performed by: NURSE PRACTITIONER

## 2023-09-21 RX ORDER — NITROFURANTOIN 25; 75 MG/1; MG/1
100 CAPSULE ORAL 2 TIMES DAILY
Qty: 14 CAPSULE | Refills: 0 | Status: SHIPPED | OUTPATIENT
Start: 2023-09-21 | End: 2023-09-28

## 2023-09-21 RX ORDER — CEFTRIAXONE 1 G/1
1 INJECTION, POWDER, FOR SOLUTION INTRAMUSCULAR; INTRAVENOUS ONCE
Status: COMPLETED | OUTPATIENT
Start: 2023-09-21 | End: 2023-09-21

## 2023-09-21 RX ADMIN — CEFTRIAXONE 1 G: 1 INJECTION, POWDER, FOR SOLUTION INTRAMUSCULAR; INTRAVENOUS at 11:32

## 2023-09-21 NOTE — PROGRESS NOTES
Office Note     Name: Isrrael Marino    : 1940     MRN: [id number]     Chief Complaint  dark urine , Nasal Congestion, and Difficulty Urinating    Subjective     History of Present Illness:  Isrrael Marino is a 83 y.o. male who presents today for acute symptoms    Patient regularly sees Dr. Geogre for chronic conditions.  He was recently seen by Dr. George this last week.    Patient is accompanied by family member today.  He is describing darker colored urine, urgency, some low back pain.  This only started over the recent past.  Patient does have allergies to ibuprofen.  Reviewed kidney function it does appear to be within normal limits at this time.    Patient also has some congestion.  It is very mild and has been present for quite some time.    Review of Systems   Constitutional:  Negative for chills, fatigue and fever.   HENT:  Negative for sore throat.    Eyes:  Negative for visual disturbance.   Respiratory:  Positive for cough. Negative for shortness of breath.    Cardiovascular:  Negative for chest pain.   Gastrointestinal:  Negative for abdominal pain.   Genitourinary:         Dark urine   Skin:  Negative for color change.   Allergic/Immunologic: Negative for immunocompromised state.   Neurological:  Negative for headaches.   Psychiatric/Behavioral:  Negative for behavioral problems.      Past Medical History:   Diagnosis Date    Arrhythmia     Arthritis     both knees    Broken neck     CAD (coronary artery disease)     Cancer     skin cancer  head and neck  nonmelanoma    Chondrocalcinosis of knee     GERD (gastroesophageal reflux disease)     Gout     Heart attack      Last Assessed: 28 Mar 2014    Heart disease     History of transfusion     own blood with hip surgery    Hyperlipidemia     Hypertension     Hypertensive urgency 2021    IBS (irritable bowel syndrome)     Left rotator cuff tear arthropathy     Low back pain     Lower extremity neuropathy     Lumbar canal stenosis      Neck pain     Nipple pain 4/8/2022    Numbness     Right hip pain     Rotator cuff tear arthropathy of right shoulder     Sleep apnea     semi compliant with c-pap     Superficial thrombophlebitis of right upper extremity 6/3/2021    Thin blood        Past Surgical History:   Procedure Laterality Date    APPENDECTOMY  1956    BACK SURGERY  1997    spinal decompression and fusion    BREAST BIOPSY  2003    BENIGN EXCISIONAL. NOT CANCER    CARDIAC CATHETERIZATION      with two stents//. DR. TOLEDO    CARDIAC CATHETERIZATION N/A 01/08/2021    Procedure: LEFT HEART CATH;  Surgeon: Casimiro Bernal MD;  Location:  [facility];  Service: Cardiovascular;  Laterality: N/A;    CARPAL TUNNEL RELEASE  03/28/2014    Neuroplasty Decompression Median Nerve At Carpal Tunnel    CATARACT EXTRACTION Bilateral     CERVICAL DISCECTOMY POSTERIOR FUSION WITH BRAIN LAB N/A 07/13/2018    Procedure: CERVICAL LAMINECTOMY DECOMPRESSION POSTERIOR C1-2 POSTERIOR CERVICAL FUSION;  Surgeon: Randall Barnett MD;  Location:  [facility];  Service: Neurosurgery    COLONOSCOPY      CORONARY STENT PLACEMENT  2013    HERNIA REPAIR  2002    & 1998    JOINT REPLACEMENT      LUMBAR DISCECTOMY FUSION INSTRUMENTATION      L3-4, L4-5    OTHER SURGICAL HISTORY      PNS trial    SKIN BIOPSY      TONSILLECTOMY      TOTAL HIP ARTHROPLASTY  2002    TOTAL KNEE ARTHROPLASTY Left 12/01/2021    Procedure: TOTAL KNEE ARTHROPLASTY LEFT;  Surgeon: Kendall Craig MD;  Location:  [facility];  Service: Orthopedics;  Laterality: Left;    VASECTOMY      WISDOM TOOTH EXTRACTION         Social History     Socioeconomic History    Marital status:    Tobacco Use    Smoking status: Never     Passive exposure: Never    Smokeless tobacco: Never   Vaping Use    Vaping Use: Never used   Substance and Sexual Activity    Alcohol use: Not Currently    Drug use: Never    Sexual activity: Defer         Current Outpatient Medications:     acetaminophen  (TYLENOL) 500 MG tablet, Take 2 tablets by mouth Every 8 (Eight) Hours. For 1 week, then as needed, Disp: 42 tablet, Rfl: 0    ammonium lactate (LAC-HYDRIN) 12 % lotion, Every 12 (Twelve) Hours., Disp: , Rfl:     aspirin 81 MG EC tablet, Take 1 tablet by mouth Daily. Resume in 1 month, Disp: 30 tablet, Rfl: 0    atorvastatin (LIPITOR) 40 MG tablet, TAKE ONE TABLET BY MOUTH EVERY EVENING, Disp: 90 tablet, Rfl: 3    B Complex Vitamins (VITAMIN B COMPLEX PO), Take 1 tablet by mouth Daily., Disp: , Rfl:     carvedilol (COREG) 6.25 MG tablet, TAKE ONE TABLET BY MOUTH EVERY 12 HOURS, Disp: 180 tablet, Rfl: 3    Cholecalciferol (VITAMIN D3) 125 MCG (5000 UT) capsule capsule, Take 2,000 Units by mouth Daily., Disp: , Rfl:     clopidogrel (PLAVIX) 75 MG tablet, Take 1 tablet by mouth Daily. Resume 12/2/21, Disp: 90 tablet, Rfl: 3    docusate sodium (Colace) 100 MG capsule, Take 1 capsule by mouth 2 (Two) Times a Day., Disp: 60 capsule, Rfl: 0    DULoxetine (CYMBALTA) 30 MG capsule, Take 2 capsules by mouth Daily., Disp: 180 capsule, Rfl: 1    fluticasone (FLONASE) 50 MCG/ACT nasal spray, 1 spray into the nostril(s) as directed by provider Daily., Disp: 48 g, Rfl: 0    furosemide (LASIX) 20 MG tablet, TAKE ONE TABLET BY MOUTH DAILY, Disp: 30 tablet, Rfl: 11    ipratropium (ATROVENT) 0.03 % nasal spray, 2 sprays into the nostril(s) as directed by provider Every 12 (Twelve) Hours., Disp: 1 each, Rfl: 0    lisinopril (PRINIVIL,ZESTRIL) 5 MG tablet, TAKE ONE TABLET BY MOUTH TWICE A DAY, Disp: 180 tablet, Rfl: 0    Melatonin 3 MG capsule, Take 1 capsule by mouth Daily., Disp: , Rfl:     methylPREDNISolone (MEDROL) 4 MG dose pack, Take as directed on package instructions., Disp: 21 tablet, Rfl: 0    tamsulosin (FLOMAX) 0.4 MG capsule 24 hr capsule, Take 1 capsule by mouth Daily., Disp: 90 capsule, Rfl: 3    tiZANidine (ZANAFLEX) 2 MG tablet, Take 1 tablet by mouth At Night As Needed for Muscle Spasms., Disp: 30 tablet, Rfl: 2     "triamcinolone (KENALOG) 0.1 % cream, Apply 1 application topically to the appropriate area as directed 2 (Two) Times a Day., Disp: 45 g, Rfl: 5    vitamin B-12 (CYANOCOBALAMIN) 1000 MCG tablet, Take 1 tablet by mouth Daily., Disp: , Rfl:     Zinc 50 MG capsule, Take 1 tablet by mouth Daily., Disp: , Rfl:     nitrofurantoin, macrocrystal-monohydrate, (Macrobid) 100 MG capsule, Take 1 capsule by mouth 2 (Two) Times a Day for 7 days., Disp: 14 capsule, Rfl: 0  No current facility-administered medications for this visit.    Objective     Vital Signs  /66   Pulse 82   Temp 100.2 °F (37.9 °C)   Ht 182.9 cm (72\")   Wt 85.3 kg (188 lb)   SpO2 98%   BMI 25.50 kg/m²   Estimated body mass index is 25.5 kg/m² as calculated from the following:    Height as of this encounter: 182.9 cm (72\").    Weight as of this encounter: 85.3 kg (188 lb).         Physical Exam  Vitals and nursing note reviewed.   Constitutional:       Appearance: Normal appearance.      Comments: Patient was accompanied by family member today.  Use of cane with walking   HENT:      Head: Normocephalic and atraumatic.      Nose: Congestion present.   Eyes:      Extraocular Movements: Extraocular movements intact.      Pupils: Pupils are equal, round, and reactive to light.   Cardiovascular:      Rate and Rhythm: Normal rate and regular rhythm.      Pulses: Normal pulses.      Heart sounds: Normal heart sounds.   Pulmonary:      Effort: Pulmonary effort is normal.      Breath sounds: Normal breath sounds.   Abdominal:      General: Abdomen is flat. Bowel sounds are normal.      Palpations: Abdomen is soft.      Tenderness: There is no abdominal tenderness. There is no guarding or rebound.   Musculoskeletal:         General: Normal range of motion.   Skin:     General: Skin is warm and dry.   Neurological:      Mental Status: He is alert and oriented to person, place, and time.   Psychiatric:         Mood and Affect: Mood normal.         Behavior: " Behavior normal.        Lab Review:   Latest Reference Range & Units 09/21/23 11:10   Color, UA Yellow, Straw, Dark Yellow, Donna  Dark Yellow   Appearance, UA Clear  Slightly Cloudy !   Specific Gravity, UA 1.005 - 1.030  1.025   pH, UA 5.0 - 8.0  6.0   Glucose Negative mg/dL Negative   Ketones, UA Negative  1+ !   Blood, UA Negative  3+ !   Nitrite, UA Negative  Positive !   Leukocytes, UA Negative  Small (1+) !   Protein, UA Negative mg/dL 2+ !   Bilirubin, UA Negative  Negative   Urobilinogen, UA Normal, 0.2 E.U./dL  Normal   Expiration Date  11/21/2024   Lot Number  [id number]   !: Data is abnormal         Assessment and Plan     Diagnoses and all orders for this visit:    1. Acute cystitis with hematuria (Primary)  -     cefTRIAXone (ROCEPHIN) injection 1 g  -     nitrofurantoin, macrocrystal-monohydrate, (Macrobid) 100 MG capsule; Take 1 capsule by mouth 2 (Two) Times a Day for 7 days.  Dispense: 14 capsule; Refill: 0    2. Dysuria  -     Urine Culture - Urine, Urine, Clean Catch; Future  -     POCT urinalysis dipstick, automated  -     Urine Culture - Urine, Urine, Clean Catch    3. Nasal congestion    Plan  Discussed in office urinalysis with patient and family member today  He will be provided a Rocephin injection in office today  Additional antibiotics were sent to the pharmacy to also be started  We will send out for urine culture for further evaluation  Continue to stay well-hydrated  Go to ER if any condition worsens or severe  Nasal congestion did not seem to be of significant concern to patient or family member.  Continue with saline nasal spray  Plan to follow-up with Dr. George as scheduled    Follow Up  Return for As scheduled Dr. George.    JULY Collado    Part of this note may be an electronic transcription/translation of spoken language to printed text using the Dragon Dictation System.

## 2023-09-22 ENCOUNTER — TELEPHONE (OUTPATIENT)
Dept: PAIN MEDICINE | Facility: CLINIC | Age: 83
End: 2023-09-22
Payer: MEDICARE

## 2023-09-22 NOTE — TELEPHONE ENCOUNTER
Called the patient and left  advising him that Dr. Woody will be out of network as of 9/22/2023. Advised patient to call his insurance company.

## 2023-09-23 LAB — BACTERIA SPEC AEROBE CULT: ABNORMAL

## 2023-09-25 ENCOUNTER — TELEPHONE (OUTPATIENT)
Dept: BEHAVIORAL HEALTH | Facility: CLINIC | Age: 83
End: 2023-09-25

## 2023-09-25 NOTE — TELEPHONE ENCOUNTER
----- Message from JULY Collado sent at 9/25/2023  9:33 AM EDT -----  Please let patient know urine culture resulted and did show that he has bacteria in his urine leading to UTI.  Continue current medication as prescribed.

## 2023-09-26 ENCOUNTER — TELEPHONE (OUTPATIENT)
Dept: INTERNAL MEDICINE | Facility: CLINIC | Age: 83
End: 2023-09-26
Payer: MEDICARE

## 2023-09-26 NOTE — TELEPHONE ENCOUNTER
Lvm giving results that urine culture shows he has bacteria in his urine and to continue with current medication that has been prescribed.

## 2023-09-27 ENCOUNTER — OFFICE VISIT (OUTPATIENT)
Dept: PAIN MEDICINE | Facility: CLINIC | Age: 83
End: 2023-09-27
Payer: MEDICARE

## 2023-09-27 VITALS
HEART RATE: 66 BPM | DIASTOLIC BLOOD PRESSURE: 64 MMHG | OXYGEN SATURATION: 97 % | WEIGHT: 185.4 LBS | SYSTOLIC BLOOD PRESSURE: 123 MMHG | HEIGHT: 72 IN | BODY MASS INDEX: 25.11 KG/M2

## 2023-09-27 DIAGNOSIS — M19.011 OSTEOARTHRITIS OF BOTH SHOULDERS, UNSPECIFIED OSTEOARTHRITIS TYPE: ICD-10-CM

## 2023-09-27 DIAGNOSIS — M47.816 SPONDYLOSIS OF LUMBAR REGION WITHOUT MYELOPATHY OR RADICULOPATHY: ICD-10-CM

## 2023-09-27 DIAGNOSIS — M19.012 OSTEOARTHRITIS OF BOTH SHOULDERS, UNSPECIFIED OSTEOARTHRITIS TYPE: ICD-10-CM

## 2023-09-27 DIAGNOSIS — Z98.1 HISTORY OF LUMBAR FUSION: ICD-10-CM

## 2023-09-27 DIAGNOSIS — M54.50 CHRONIC BILATERAL LOW BACK PAIN WITHOUT SCIATICA: ICD-10-CM

## 2023-09-27 DIAGNOSIS — G89.29 CHRONIC PAIN OF BOTH SHOULDERS: ICD-10-CM

## 2023-09-27 DIAGNOSIS — M54.6 THORACIC BACK PAIN, UNSPECIFIED BACK PAIN LATERALITY, UNSPECIFIED CHRONICITY: ICD-10-CM

## 2023-09-27 DIAGNOSIS — M75.01 ADHESIVE CAPSULITIS OF BOTH SHOULDERS: ICD-10-CM

## 2023-09-27 DIAGNOSIS — G89.29 CHRONIC BILATERAL LOW BACK PAIN WITHOUT SCIATICA: ICD-10-CM

## 2023-09-27 DIAGNOSIS — M25.511 CHRONIC PAIN OF BOTH SHOULDERS: ICD-10-CM

## 2023-09-27 DIAGNOSIS — Z96.652 HISTORY OF TOTAL LEFT KNEE REPLACEMENT: ICD-10-CM

## 2023-09-27 DIAGNOSIS — M25.512 CHRONIC PAIN OF BOTH SHOULDERS: ICD-10-CM

## 2023-09-27 DIAGNOSIS — G89.29 CHRONIC PAIN OF RIGHT KNEE: ICD-10-CM

## 2023-09-27 DIAGNOSIS — M75.02 ADHESIVE CAPSULITIS OF BOTH SHOULDERS: ICD-10-CM

## 2023-09-27 DIAGNOSIS — Z98.1 HISTORY OF FUSION OF CERVICAL SPINE: Primary | ICD-10-CM

## 2023-09-27 DIAGNOSIS — M25.561 CHRONIC PAIN OF RIGHT KNEE: ICD-10-CM

## 2023-09-27 NOTE — PROGRESS NOTES
Primary Physician: Ivanna George MD    CHIEF COMPLAINT or REASON FOR VISIT: Back Pain    Initial HPI 3/8/2023:  Mr. Isrrael Marino is 83 y.o. male who presents as a new patient referral for evaluation and treatment of chronic multifocal pains including neck pain, upper thoracic pain, low back pain, bilateral shoulder pain, right knee pain.  He does have past medical history of a left knee replacement with Dr. Craig, posterior cervical fusion with Dr. Barnett, lumbar fusion with Dr. Santos.    Today he states that his primary pain complaint is bilateral shoulders, right knee.  He is unable to lift his arms above approximately 75 degrees.  He has previously undergone bilateral shoulder injections with great benefit as well as right knee injection with good benefit.  Patient denies any bowel or bladder dysfunction, lower extremity weakness, new onset saddle anesthesia or unexplained weight loss.     Interval history: Patient returns after undergoing bilateral lumbar Sprint PNS.  He did not experience any relief.  We will be changing insurances and would like to return in January.      Interventions:    3/28/2023: Bilateral shoulder IA injection with 100% relief ongoing  5/9/2023: Right knee IA injection with 100% relief ongoing   7/25/2023: Bilateral lumbar Sprint PNS with 0% relief      Objective Pain Scoring:   BRIEF PAIN INVENTORY:  Total score:   Pain Score    09/27/23 1402   PainSc:   7   PainLoc: Back      PHQ-2: PHQ-2 Total Score: 1  PHQ-9: PHQ-9: Brief Depression Severity Measure Score: 1  Opioid Risk Tool:         Review of Systems:   ROS negative except as otherwise noted     Past Medical History:   Past Medical History:   Diagnosis Date    Arrhythmia     Arthritis     both knees    Broken neck     CAD (coronary artery disease)     Cancer     skin cancer  head and neck  nonmelanoma    Chondrocalcinosis of knee     GERD (gastroesophageal reflux disease)     Gout     Heart attack      Last Assessed: 28  Mar 2014    Heart disease     History of transfusion     own blood with hip surgery    Hyperlipidemia     Hypertension     Hypertensive urgency 05/28/2021    IBS (irritable bowel syndrome)     Left rotator cuff tear arthropathy     Low back pain     Lower extremity neuropathy     Lumbar canal stenosis     Neck pain     Nipple pain 4/8/2022    Numbness     Right hip pain     Rotator cuff tear arthropathy of right shoulder     Sleep apnea     semi compliant with c-pap     Superficial thrombophlebitis of right upper extremity 6/3/2021    Thin blood          Past Surgical History:   Past Surgical History:   Procedure Laterality Date    APPENDECTOMY  1956    BACK SURGERY  1997    spinal decompression and fusion    BREAST BIOPSY  2003    BENIGN EXCISIONAL. NOT CANCER    CARDIAC CATHETERIZATION      with two stents//. DR. TOLEDO    CARDIAC CATHETERIZATION N/A 01/08/2021    Procedure: LEFT HEART CATH;  Surgeon: Casimiro Bernal MD;  Location:  [facility];  Service: Cardiovascular;  Laterality: N/A;    CARPAL TUNNEL RELEASE  03/28/2014    Neuroplasty Decompression Median Nerve At Carpal Tunnel    CATARACT EXTRACTION Bilateral     CERVICAL DISCECTOMY POSTERIOR FUSION WITH BRAIN LAB N/A 07/13/2018    Procedure: CERVICAL LAMINECTOMY DECOMPRESSION POSTERIOR C1-2 POSTERIOR CERVICAL FUSION;  Surgeon: Randall Barnett MD;  Location:  [facility];  Service: Neurosurgery    COLONOSCOPY      CORONARY STENT PLACEMENT  2013    HERNIA REPAIR  2002    & 1998    JOINT REPLACEMENT      LUMBAR DISCECTOMY FUSION INSTRUMENTATION      L3-4, L4-5    OTHER SURGICAL HISTORY      PNS trial    SKIN BIOPSY      TONSILLECTOMY      TOTAL HIP ARTHROPLASTY  2002    TOTAL KNEE ARTHROPLASTY Left 12/01/2021    Procedure: TOTAL KNEE ARTHROPLASTY LEFT;  Surgeon: Kendall Craig MD;  Location:  [facility];  Service: Orthopedics;  Laterality: Left;    VASECTOMY      WISDOM TOOTH EXTRACTION           Family History   Family History   Problem  Relation Age of Onset    Breast cancer Mother 62    Cancer Mother     Heart disease Father     Hypertension Father     Heart attack Father     Sleep apnea Son     Breast cancer Maternal Aunt          Social History   Social History     Socioeconomic History    Marital status:    Tobacco Use    Smoking status: Never     Passive exposure: Never    Smokeless tobacco: Never   Vaping Use    Vaping Use: Never used   Substance and Sexual Activity    Alcohol use: Not Currently    Drug use: Never    Sexual activity: Defer        Medications:     Current Outpatient Medications:     acetaminophen (TYLENOL) 500 MG tablet, Take 2 tablets by mouth Every 8 (Eight) Hours. For 1 week, then as needed, Disp: 42 tablet, Rfl: 0    ammonium lactate (LAC-HYDRIN) 12 % lotion, Every 12 (Twelve) Hours., Disp: , Rfl:     aspirin 81 MG EC tablet, Take 1 tablet by mouth Daily. Resume in 1 month, Disp: 30 tablet, Rfl: 0    atorvastatin (LIPITOR) 40 MG tablet, TAKE ONE TABLET BY MOUTH EVERY EVENING, Disp: 90 tablet, Rfl: 3    B Complex Vitamins (VITAMIN B COMPLEX PO), Take 1 tablet by mouth Daily., Disp: , Rfl:     carvedilol (COREG) 6.25 MG tablet, TAKE ONE TABLET BY MOUTH EVERY 12 HOURS, Disp: 180 tablet, Rfl: 3    Cholecalciferol (VITAMIN D3) 125 MCG (5000 UT) capsule capsule, Take 2,000 Units by mouth Daily., Disp: , Rfl:     clopidogrel (PLAVIX) 75 MG tablet, Take 1 tablet by mouth Daily. Resume 12/2/21, Disp: 90 tablet, Rfl: 3    docusate sodium (Colace) 100 MG capsule, Take 1 capsule by mouth 2 (Two) Times a Day., Disp: 60 capsule, Rfl: 0    DULoxetine (CYMBALTA) 30 MG capsule, Take 2 capsules by mouth Daily., Disp: 180 capsule, Rfl: 1    fluticasone (FLONASE) 50 MCG/ACT nasal spray, 1 spray into the nostril(s) as directed by provider Daily., Disp: 48 g, Rfl: 0    furosemide (LASIX) 20 MG tablet, TAKE ONE TABLET BY MOUTH DAILY, Disp: 30 tablet, Rfl: 11    ipratropium (ATROVENT) 0.03 % nasal spray, 2 sprays into the nostril(s) as  "directed by provider Every 12 (Twelve) Hours., Disp: 1 each, Rfl: 0    lisinopril (PRINIVIL,ZESTRIL) 5 MG tablet, TAKE ONE TABLET BY MOUTH TWICE A DAY, Disp: 180 tablet, Rfl: 0    Melatonin 3 MG capsule, Take 1 capsule by mouth Daily., Disp: , Rfl:     methylPREDNISolone (MEDROL) 4 MG dose pack, Take as directed on package instructions., Disp: 21 tablet, Rfl: 0    nitrofurantoin, macrocrystal-monohydrate, (Macrobid) 100 MG capsule, Take 1 capsule by mouth 2 (Two) Times a Day for 7 days., Disp: 14 capsule, Rfl: 0    tamsulosin (FLOMAX) 0.4 MG capsule 24 hr capsule, Take 1 capsule by mouth Daily., Disp: 90 capsule, Rfl: 3    tiZANidine (ZANAFLEX) 2 MG tablet, Take 1 tablet by mouth At Night As Needed for Muscle Spasms., Disp: 30 tablet, Rfl: 2    triamcinolone (KENALOG) 0.1 % cream, Apply 1 application topically to the appropriate area as directed 2 (Two) Times a Day., Disp: 45 g, Rfl: 5    vitamin B-12 (CYANOCOBALAMIN) 1000 MCG tablet, Take 1 tablet by mouth Daily., Disp: , Rfl:     Zinc 50 MG capsule, Take 1 tablet by mouth Daily., Disp: , Rfl:         Physical Exam:     Vitals:    09/27/23 1402   BP: 123/64   Pulse: 66   SpO2: 97%   Weight: 84.1 kg (185 lb 6.4 oz)   Height: 182.9 cm (72\")   PainSc:   7   PainLoc: Back        General: Alert and oriented, No acute distress.   HEENT: Normocephalic, atraumatic.   Cardiovascular: No gross edema  Respiratory: Respirations are non-labored    Cervical Spine:   No masses or atrophy  Range of motion - Flexion normal. Extension normal. Lateral rotation reduced.   Palpation - nontender   Spurling's - negative     Thoracic Spine:   Inspection: no gross abnormality  Paraspinal muscle palpation: Tender palpation bilateral rhomboids  Spinous process palpation: nontender    Lumbar Spine:   No masses or atrophy  Range of motion - Flexion normal. Extension reduced   Facet Loading: Positive bilaterally  Facet Palpation - tender bilaterally     Motor Exam:    Strength: Rate on 1-5 " scale Right Left    C5-Elbow flexion, Deltoid 5 5    C6-Wrist extension 5 5    C7- Elbow / finger extension 5 5    C8- Finger flexion 5 5    T1- Intrinsics hand 5 5    Strength: Rate on 1-5 scale Right Left    L1/2- hip flexion 5 5    L3- knee extension 5 5    L4- ankle dorsiflexion 5 5    L5- great toe extension 5 5    S1- ankle plantarflexion 5 5    Sensory Exam: Full and equal sensation to light touch throughout.  SHOULDER Exam Right Left   Inspection No erythema, swelling, obvious bony deformity, or atrophy No erythema, swelling, obvious bony deformity, or atrophy   Palpation Subacromial bursa:  Bicipital tendon:  Anterior joint:  Posterior joint:  AC Joint:  Subacromial bursa:  Bicipital tendon: TTP  Anterior joint:  Posterior joint:  AC Joint:   ROM Active Flexion (0-180):  Active Abduction (0-180): 75  Passive Flexion (0-180): 90  Apley scratch (ER+Abd):  Scapular reach (IR+Add):   Active Flexion (0-180):  Active Abduction (0-180): 75  Passive Flexion (0-180): 90  Apley scratch (ER+Abd):  Scapular reach (IR+Add):                              Neurologic: Cranial Nerves II-XII are grossly intact.   Psychiatric: Cooperative.   Gait: Normal   Assistive Devices: None    Splint lead sites clean dry and intact without erythema purulence or exudate    Imaging Studies:   No results found for this or any previous visit.      Impression & Plan:   3/8/2023: Isrrael Marino is a 83 y.o. male with past medical history significant for posterior cervical fusion with Dr. Barnett, left TKA, lumbar fusion who presents to the pain clinic for evaluation and treatment of bilateral shoulder pain, right knee pain, and upper thoracic pain, low back pain.  Bilateral shoulder pain consistent with osteoarthritis and adhesive capsulitis.  Right knee pain consistent with osteoarthritis.  Upper thoracic pain consistent with myofascial pain, likely rhomboid activation for straightening of reportedly scoliotic thoracic spine.  Lumbar back  pain consistent with adjacent segment spondylosis.    I will obtain bilateral shoulder, right knee, neck and lumbar spine x-rays.  Additionally we will schedule him for bilateral shoulder intra-articular steroid injection, right knee steroid injection.  We discussed the potential adverse effects of corticosteroid injection including flushing of the face, lipodystrophy, skin discoloration, elevated blood glucose, increased blood pressure.  Risks of frequent steroid administration include weight gain, hormonal changes, mood changes, osteoporosis.    7/10/2023: Excellent relief from shoulder and knee injections.  Continued lumbar spondylosis related pain.  We discussed Sprint peripheral nerve stimulation, patient would like to proceed.  If no benefit can consider rhizotomy    8/9/2023: Patient doing well with Sprint, no benefit yet.  He is having a fairly high stim requirement (setting is at 87 today).  Will have rep reach out.  9/27/2023: No benefit with Sprint.  We will plan for LMBB above the level of his fusion when he returns in January.    1. History of fusion of cervical spine    2. History of lumbar fusion    3. Spondylosis of lumbar region without myelopathy or radiculopathy    4. Chronic pain of both shoulders    5. Thoracic back pain, unspecified back pain laterality, unspecified chronicity    6. Chronic bilateral low back pain without sciatica    7. Chronic pain of right knee    8. Osteoarthritis of both shoulders, unspecified osteoarthritis type    9. History of total left knee replacement    10. Adhesive capsulitis of both shoulders              PLAN:  1. Medication Recommendations: Recommend Voltaren topical, NSAIDs, Tylenol.  Can trial turmeric 500 mg twice daily if NSAID contraindicated.    2. Physical Therapy: Continue HEP    3. Psychological: defer    4. Complementary and alternative (CAM) Therapies:     5. Labs: None indicated     6. Imaging: None indicated    7. Interventions: Consider L1/2/3  medial branch blocks.  If the first blocks provide greater than 80% relief will schedule a second set of medial branch blocks.  If second set of medial branch blocks provides at least 80% relief will schedule rhizotomy.    8. Referrals: None indicated     9. Records requested: n/a    10. Lifestyle goals:    Follow-up in January 2 leads removed with tip intact, no complication.    [facility]  Dajuan Woody MD

## 2023-10-02 ENCOUNTER — TELEPHONE (OUTPATIENT)
Dept: INTERNAL MEDICINE | Facility: CLINIC | Age: 83
End: 2023-10-02

## 2023-10-02 DIAGNOSIS — M19.019 GLENOHUMERAL ARTHRITIS: ICD-10-CM

## 2023-10-02 DIAGNOSIS — M17.0 PRIMARY OSTEOARTHRITIS OF BOTH KNEES: ICD-10-CM

## 2023-10-02 DIAGNOSIS — G60.9 IDIOPATHIC PERIPHERAL NEUROPATHY: ICD-10-CM

## 2023-10-02 DIAGNOSIS — M51.9 LUMBAR DISC DISEASE: ICD-10-CM

## 2023-10-02 DIAGNOSIS — M50.30 DDD (DEGENERATIVE DISC DISEASE), CERVICAL: Primary | ICD-10-CM

## 2023-10-02 NOTE — TELEPHONE ENCOUNTER
Caller: Isrrael Marino    Relationship: Self    Best call back number: [phone]    What is the medical concern/diagnosis: PT HAS PAIN ALL OVER BODY    What specialty or service is being requested: PAIN DR    What is the provider, practice or medical service name: DR MIA CRAWFORD    What is the office location: [address] [place] [address]    What is the office phone number: [phone]  FAX:[phone]

## 2023-10-06 DIAGNOSIS — G60.9 IDIOPATHIC PERIPHERAL NEUROPATHY: ICD-10-CM

## 2023-10-06 RX ORDER — DULOXETIN HYDROCHLORIDE 30 MG/1
CAPSULE, DELAYED RELEASE ORAL
Qty: 180 CAPSULE | Refills: 1 | Status: SHIPPED | OUTPATIENT
Start: 2023-10-06

## 2023-11-05 DIAGNOSIS — D64.9 NORMOCYTIC ANEMIA: Primary | ICD-10-CM

## 2023-11-06 ENCOUNTER — OFFICE VISIT (OUTPATIENT)
Dept: CARDIOLOGY | Facility: CLINIC | Age: 83
End: 2023-11-06
Payer: MEDICARE

## 2023-11-06 VITALS
WEIGHT: 187.4 LBS | HEART RATE: 67 BPM | HEIGHT: 72 IN | BODY MASS INDEX: 25.38 KG/M2 | SYSTOLIC BLOOD PRESSURE: 122 MMHG | OXYGEN SATURATION: 93 % | DIASTOLIC BLOOD PRESSURE: 50 MMHG

## 2023-11-06 DIAGNOSIS — I10 PRIMARY HYPERTENSION: ICD-10-CM

## 2023-11-06 DIAGNOSIS — E78.2 MIXED HYPERLIPIDEMIA: ICD-10-CM

## 2023-11-06 DIAGNOSIS — I25.10 CHRONIC CORONARY ARTERY DISEASE: Primary | ICD-10-CM

## 2023-11-06 DIAGNOSIS — I42.8 NICM (NONISCHEMIC CARDIOMYOPATHY): ICD-10-CM

## 2023-11-06 DIAGNOSIS — I10 ESSENTIAL HYPERTENSION: ICD-10-CM

## 2023-11-06 PROCEDURE — 1160F RVW MEDS BY RX/DR IN RCRD: CPT | Performed by: NURSE PRACTITIONER

## 2023-11-06 PROCEDURE — 3078F DIAST BP <80 MM HG: CPT | Performed by: NURSE PRACTITIONER

## 2023-11-06 PROCEDURE — 99214 OFFICE O/P EST MOD 30 MIN: CPT | Performed by: NURSE PRACTITIONER

## 2023-11-06 PROCEDURE — 3074F SYST BP LT 130 MM HG: CPT | Performed by: NURSE PRACTITIONER

## 2023-11-06 PROCEDURE — 93000 ELECTROCARDIOGRAM COMPLETE: CPT | Performed by: NURSE PRACTITIONER

## 2023-11-06 PROCEDURE — 1159F MED LIST DOCD IN RCRD: CPT | Performed by: NURSE PRACTITIONER

## 2023-11-06 RX ORDER — LISINOPRIL 2.5 MG/1
2.5 TABLET ORAL DAILY
Qty: 90 TABLET | Refills: 3 | Status: SHIPPED | OUTPATIENT
Start: 2023-11-06

## 2023-11-06 NOTE — PROGRESS NOTES
"Subjective:     Encounter Date:11/06/2023    Primary Care Physician: Ivanna George MD      Patient ID: Isrrael Marino is a 83 y.o. male.    Chief Complaint:Chronic coronary artery disease (1-Yr F/U)      PROBLEM LIST:  Coronary artery disease:  Non STEMI, 09/22/2013.  Cardiac catheterization:  99% first diagonal (2.5 x 15 Xience), 95% second diagonal (PTCA), 50% LAD, FFR 0.83.  EF 50%.  On 11/19/2013:  Crescendo angina.  Catheterization:  95% LAD/70% second diagonal 3.0 x 23-mm XIENCE (LAD) and 2.75 x 12-mm XIENCE (second diagonal). Widely  patent first diagonal stent. .  Echocardiogram, 1/8/2021: EF 30%. No hemodynamically significant valvular heart disease.   LHC, 1/8/2021: EF 25% with dilated left ventricle. Low LV filling pressures. No flow-limiting coronary artery disease.  Hypertension.  Dyslipidemia.   Lower  extremity neuropathy.  Arthritis.  Skin cancer with removal  Traumatic fracture of C2  Surgeries:  Appendectomy  Lumbar surgery  Carpal tunnel surgery  Hernia repair  Right hip replacement  Vasectomy        Allergies   Allergen Reactions    Ibuprofen Hives     \"Pt states he hasn't taken this in a long time\"         Current Outpatient Medications:     acetaminophen (TYLENOL) 500 MG tablet, Take 2 tablets by mouth Every 8 (Eight) Hours. For 1 week, then as needed, Disp: 42 tablet, Rfl: 0    ammonium lactate (LAC-HYDRIN) 12 % lotion, Every 12 (Twelve) Hours., Disp: , Rfl:     atorvastatin (LIPITOR) 40 MG tablet, TAKE ONE TABLET BY MOUTH EVERY EVENING, Disp: 90 tablet, Rfl: 3    B Complex Vitamins (VITAMIN B COMPLEX PO), Take 1 tablet by mouth Daily., Disp: , Rfl:     carvedilol (COREG) 6.25 MG tablet, TAKE ONE TABLET BY MOUTH EVERY 12 HOURS, Disp: 180 tablet, Rfl: 3    Cholecalciferol (VITAMIN D3) 125 MCG (5000 UT) capsule capsule, Take 2,000 Units by mouth Daily., Disp: , Rfl:     clopidogrel (PLAVIX) 75 MG tablet, Take 1 tablet by mouth Daily. Resume 12/2/21, Disp: 90 tablet, Rfl: 3    " DULoxetine (CYMBALTA) 30 MG capsule, TAKE TWO CAPSULES BY MOUTH DAILY, Disp: 180 capsule, Rfl: 1    fluticasone (FLONASE) 50 MCG/ACT nasal spray, 1 spray into the nostril(s) as directed by provider Daily., Disp: 48 g, Rfl: 0    furosemide (LASIX) 20 MG tablet, TAKE ONE TABLET BY MOUTH DAILY, Disp: 30 tablet, Rfl: 11    lisinopril (PRINIVIL,ZESTRIL) 5 MG tablet, TAKE ONE TABLET BY MOUTH TWICE A DAY, Disp: 180 tablet, Rfl: 0    Melatonin 3 MG capsule, Take 1 capsule by mouth As Needed., Disp: , Rfl:     tamsulosin (FLOMAX) 0.4 MG capsule 24 hr capsule, Take 1 capsule by mouth Daily., Disp: 90 capsule, Rfl: 3    vitamin B-12 (CYANOCOBALAMIN) 1000 MCG tablet, Take 1 tablet by mouth Daily., Disp: , Rfl:     Zinc 50 MG capsule, Take 1 tablet by mouth Daily., Disp: , Rfl:     aspirin 81 MG EC tablet, Take 1 tablet by mouth Daily. Resume in 1 month (Patient not taking: Reported on 11/6/2023), Disp: 30 tablet, Rfl: 0        History of Present Illness    Patient is an 83-year-old  male who presents today for annual follow-up of coronary artery disease, cardiomyopathy and cardiac risk factors.  Since last being seen he has done overall relatively well from cardiac standpoint.  He denies any chest pain, pressure, tightness.  Has some occasional increase shortness of breath and volume overload for which he takes as needed diuretics.  No reported syncope, presyncope.  His primary issues are orthopedic related secondary to arthritis and neuropathy.    The following portions of the patient's history were reviewed and updated as appropriate: allergies, current medications, past family history, past medical history, past social history, past surgical history and problem list.      Social History     Tobacco Use    Smoking status: Never     Passive exposure: Never    Smokeless tobacco: Never   Vaping Use    Vaping Use: Never used   Substance Use Topics    Alcohol use: Not Currently    Drug use: Never         ROS      "  Objective:   /50 (BP Location: Right arm, Patient Position: Sitting, Cuff Size: Adult)   Pulse 67   Ht 182.9 cm (72\")   Wt 85 kg (187 lb 6.4 oz)   SpO2 93%   BMI 25.42 kg/m²         Vitals reviewed.   Constitutional:       Appearance: Well-developed and not in distress.   Neck:      Vascular: No JVD.      Trachea: No tracheal deviation.   Pulmonary:      Effort: Pulmonary effort is normal.      Breath sounds: Normal breath sounds.   Cardiovascular:      Normal rate. Regularly irregular rhythm.      Murmurs: There is no murmur.   Edema:     Peripheral edema absent.   Musculoskeletal:         General: No deformity. Skin:     General: Skin is warm and dry.   Neurological:      Mental Status: Alert and oriented to person, place, and time.           ECG 12 Lead    Date/Time: 11/6/2023 4:33 PM  Performed by: Radha Valdivia APRN    Authorized by: Radha Valdivia APRN  Comparison: compared with previous ECG from 11/18/2021  Similar to previous ECG  Rhythm: sinus rhythm  Ectopy: infrequent PVCs  Conduction: 1st degree AV block  Comments: Left axis deviation                Assessment:   Assessment & Plan      Diagnoses and all orders for this visit:    1. Chronic coronary artery disease (Primary), stable.  No active angina.  On aspirin and Plavix.    2. Essential hypertension, controlled.  On beta-blocker.    3. Mixed hyperlipidemia, stable.  Labs with primary care.  LDL in September was 51.    4. NICM (nonischemic cardiomyopathy), stable.  No active heart failure.  On Lasix and lisinopril.      Plan:  Patient is overall stable from cardiac standpoint.  Continue current cardiac medications.  Follow-up in 1 years time or sooner if needed.       Radha MCDOWELL     Advance Care Planning   ACP discussion was held with the patient during this visit. Patient has an advance directive in EMR which is still valid.         Dictated utilizing Dragon dictation  "

## 2023-11-06 NOTE — LETTER
"November 6, 2023       No Recipients    Patient: Isrrael Marino   YOB: 1940   Date of Visit: 11/6/2023     Dear Ivanna George MD:       Thank you for referring Isrrael Marino to me for evaluation. Below are the relevant portions of my assessment and plan of care.    If you have questions, please do not hesitate to call me. I look forward to following Isrrael along with you.         Sincerely,        JULY Rodriguez        CC:   No Recipients    Radha Valdivia APRN  11/06/23 1634  Sign when Signing Visit  Subjective:     Encounter Date:11/06/2023    Primary Care Physician: Ivanna George MD      Patient ID: Isrrael Marino is a 83 y.o. male.    Chief Complaint:Chronic coronary artery disease (1-Yr F/U)      PROBLEM LIST:  Coronary artery disease:  Non STEMI, 09/22/2013.  Cardiac catheterization:  99% first diagonal (2.5 x 15 Xience), 95% second diagonal (PTCA), 50% LAD, FFR 0.83.  EF 50%.  On 11/19/2013:  Crescendo angina.  Catheterization:  95% LAD/70% second diagonal 3.0 x 23-mm XIENCE (LAD) and 2.75 x 12-mm XIENCE (second diagonal). Widely  patent first diagonal stent. .  Echocardiogram, 1/8/2021: EF 30%. No hemodynamically significant valvular heart disease.   LHC, 1/8/2021: EF 25% with dilated left ventricle. Low LV filling pressures. No flow-limiting coronary artery disease.  Hypertension.  Dyslipidemia.   Lower  extremity neuropathy.  Arthritis.  Skin cancer with removal  Traumatic fracture of C2  Surgeries:  Appendectomy  Lumbar surgery  Carpal tunnel surgery  Hernia repair  Right hip replacement  Vasectomy        Allergies   Allergen Reactions   • Ibuprofen Hives     \"Pt states he hasn't taken this in a long time\"         Current Outpatient Medications:   •  acetaminophen (TYLENOL) 500 MG tablet, Take 2 tablets by mouth Every 8 (Eight) Hours. For 1 week, then as needed, Disp: 42 tablet, Rfl: 0  •  ammonium lactate (LAC-HYDRIN) 12 % lotion, Every 12 (Twelve) Hours., " Disp: , Rfl:   •  atorvastatin (LIPITOR) 40 MG tablet, TAKE ONE TABLET BY MOUTH EVERY EVENING, Disp: 90 tablet, Rfl: 3  •  B Complex Vitamins (VITAMIN B COMPLEX PO), Take 1 tablet by mouth Daily., Disp: , Rfl:   •  carvedilol (COREG) 6.25 MG tablet, TAKE ONE TABLET BY MOUTH EVERY 12 HOURS, Disp: 180 tablet, Rfl: 3  •  Cholecalciferol (VITAMIN D3) 125 MCG (5000 UT) capsule capsule, Take 2,000 Units by mouth Daily., Disp: , Rfl:   •  clopidogrel (PLAVIX) 75 MG tablet, Take 1 tablet by mouth Daily. Resume 12/2/21, Disp: 90 tablet, Rfl: 3  •  DULoxetine (CYMBALTA) 30 MG capsule, TAKE TWO CAPSULES BY MOUTH DAILY, Disp: 180 capsule, Rfl: 1  •  fluticasone (FLONASE) 50 MCG/ACT nasal spray, 1 spray into the nostril(s) as directed by provider Daily., Disp: 48 g, Rfl: 0  •  furosemide (LASIX) 20 MG tablet, TAKE ONE TABLET BY MOUTH DAILY, Disp: 30 tablet, Rfl: 11  •  lisinopril (PRINIVIL,ZESTRIL) 5 MG tablet, TAKE ONE TABLET BY MOUTH TWICE A DAY, Disp: 180 tablet, Rfl: 0  •  Melatonin 3 MG capsule, Take 1 capsule by mouth As Needed., Disp: , Rfl:   •  tamsulosin (FLOMAX) 0.4 MG capsule 24 hr capsule, Take 1 capsule by mouth Daily., Disp: 90 capsule, Rfl: 3  •  vitamin B-12 (CYANOCOBALAMIN) 1000 MCG tablet, Take 1 tablet by mouth Daily., Disp: , Rfl:   •  Zinc 50 MG capsule, Take 1 tablet by mouth Daily., Disp: , Rfl:   •  aspirin 81 MG EC tablet, Take 1 tablet by mouth Daily. Resume in 1 month (Patient not taking: Reported on 11/6/2023), Disp: 30 tablet, Rfl: 0        History of Present Illness    Patient is an 83-year-old  male who presents today for annual follow-up of coronary artery disease, cardiomyopathy and cardiac risk factors.  Since last being seen he has done overall relatively well from cardiac standpoint.  He denies any chest pain, pressure, tightness.  Has some occasional increase shortness of breath and volume overload for which he takes as needed diuretics.  No reported syncope, presyncope.  His  "primary issues are orthopedic related secondary to arthritis and neuropathy.    The following portions of the patient's history were reviewed and updated as appropriate: allergies, current medications, past family history, past medical history, past social history, past surgical history and problem list.      Social History     Tobacco Use   • Smoking status: Never     Passive exposure: Never   • Smokeless tobacco: Never   Vaping Use   • Vaping Use: Never used   Substance Use Topics   • Alcohol use: Not Currently   • Drug use: Never         ROS       Objective:   /50 (BP Location: Right arm, Patient Position: Sitting, Cuff Size: Adult)   Pulse 67   Ht 182.9 cm (72\")   Wt 85 kg (187 lb 6.4 oz)   SpO2 93%   BMI 25.42 kg/m²         Vitals reviewed.   Constitutional:       Appearance: Well-developed and not in distress.   Neck:      Vascular: No JVD.      Trachea: No tracheal deviation.   Pulmonary:      Effort: Pulmonary effort is normal.      Breath sounds: Normal breath sounds.   Cardiovascular:      Normal rate. Regularly irregular rhythm.      Murmurs: There is no murmur.   Edema:     Peripheral edema absent.   Musculoskeletal:         General: No deformity. Skin:     General: Skin is warm and dry.   Neurological:      Mental Status: Alert and oriented to person, place, and time.           ECG 12 Lead    Date/Time: 11/6/2023 4:33 PM  Performed by: Radha Valdivia APRN    Authorized by: Radha Valdivia APRN  Comparison: compared with previous ECG from 11/18/2021  Similar to previous ECG  Rhythm: sinus rhythm  Ectopy: infrequent PVCs  Conduction: 1st degree AV block  Comments: Left axis deviation                Assessment:   Assessment & Plan     Diagnoses and all orders for this visit:    1. Chronic coronary artery disease (Primary), stable.  No active angina.  On aspirin and Plavix.    2. Essential hypertension, controlled.  On beta-blocker.    3. Mixed hyperlipidemia, stable.  Labs with primary " care.  LDL in September was 51.    4. NICM (nonischemic cardiomyopathy), stable.  No active heart failure.  On Lasix and lisinopril.      Plan:  Patient is overall stable from cardiac standpoint.  Continue current cardiac medications.  Follow-up in 1 years time or sooner if needed.       Radha MCDOWELL     Advance Care Planning  ACP discussion was held with the patient during this visit. Patient has an advance directive in EMR which is still valid.         Dictated utilizing Dragon dictation

## 2023-11-07 ENCOUNTER — TELEPHONE (OUTPATIENT)
Dept: INTERNAL MEDICINE | Facility: CLINIC | Age: 83
End: 2023-11-07
Payer: MEDICARE

## 2023-11-07 NOTE — TELEPHONE ENCOUNTER
----- Message from Ivanna George MD sent at 11/5/2023 10:46 PM EST -----  Please call patient. His liver and kidney function are normal. He is slightly anemic which I would like to recheck at his convenience. His diabetes screening is stable in the prediabetic range and cholesterol is well controlled.

## 2023-11-09 ENCOUNTER — LAB (OUTPATIENT)
Dept: LAB | Facility: HOSPITAL | Age: 83
End: 2023-11-09
Payer: MEDICARE

## 2023-11-09 DIAGNOSIS — D64.9 NORMOCYTIC ANEMIA: ICD-10-CM

## 2023-11-09 PROCEDURE — 82728 ASSAY OF FERRITIN: CPT

## 2023-11-09 PROCEDURE — 85027 COMPLETE CBC AUTOMATED: CPT

## 2023-11-09 PROCEDURE — 84466 ASSAY OF TRANSFERRIN: CPT

## 2023-11-09 PROCEDURE — 83540 ASSAY OF IRON: CPT

## 2023-11-10 LAB
DEPRECATED RDW RBC AUTO: 42.4 FL (ref 37–54)
ERYTHROCYTE [DISTWIDTH] IN BLOOD BY AUTOMATED COUNT: 13.1 % (ref 12.3–15.4)
FERRITIN SERPL-MCNC: 144 NG/ML (ref 30–400)
HCT VFR BLD AUTO: 36.9 % (ref 37.5–51)
HGB BLD-MCNC: 12.4 G/DL (ref 13–17.7)
IRON 24H UR-MRATE: 71 MCG/DL (ref 59–158)
IRON SATN MFR SERPL: 28 % (ref 20–50)
MCH RBC QN AUTO: 30 PG (ref 26.6–33)
MCHC RBC AUTO-ENTMCNC: 33.6 G/DL (ref 31.5–35.7)
MCV RBC AUTO: 89.3 FL (ref 79–97)
PLATELET # BLD AUTO: 179 10*3/MM3 (ref 140–450)
PMV BLD AUTO: 10.9 FL (ref 6–12)
RBC # BLD AUTO: 4.13 10*6/MM3 (ref 4.14–5.8)
TIBC SERPL-MCNC: 256 MCG/DL (ref 298–536)
TRANSFERRIN SERPL-MCNC: 172 MG/DL (ref 200–360)
WBC NRBC COR # BLD: 6.46 10*3/MM3 (ref 3.4–10.8)

## 2023-11-12 DIAGNOSIS — D64.9 NORMOCYTIC ANEMIA: Primary | ICD-10-CM

## 2023-11-13 ENCOUNTER — TELEPHONE (OUTPATIENT)
Dept: INTERNAL MEDICINE | Facility: CLINIC | Age: 83
End: 2023-11-13
Payer: MEDICARE

## 2023-11-13 NOTE — TELEPHONE ENCOUNTER
----- Message from Ivanna George MD sent at 11/12/2023 10:37 PM EST -----  Please call patient and let him know that he remains anemic but iron level is adequate. I have ordered a couple more vitamin levels to try to figure out his anemia.

## 2023-11-14 ENCOUNTER — LAB (OUTPATIENT)
Dept: LAB | Facility: HOSPITAL | Age: 83
End: 2023-11-14
Payer: MEDICARE

## 2023-11-14 DIAGNOSIS — D64.9 NORMOCYTIC ANEMIA: ICD-10-CM

## 2023-11-14 LAB
FOLATE SERPL-MCNC: 19.1 NG/ML (ref 4.78–24.2)
VIT B12 BLD-MCNC: 554 PG/ML (ref 211–946)

## 2023-11-14 PROCEDURE — 82746 ASSAY OF FOLIC ACID SERUM: CPT

## 2023-11-14 PROCEDURE — 82607 VITAMIN B-12: CPT

## 2023-11-15 LAB
LAB AP CASE REPORT: NORMAL
PATH REPORT.FINAL DX SPEC: NORMAL
PATHOLOGY REVIEW: YES

## 2023-12-06 DIAGNOSIS — J30.9 ALLERGIC RHINITIS, UNSPECIFIED SEASONALITY, UNSPECIFIED TRIGGER: ICD-10-CM

## 2023-12-06 RX ORDER — FUROSEMIDE 20 MG/1
20 TABLET ORAL DAILY
Qty: 90 TABLET | Refills: 0 | Status: SHIPPED | OUTPATIENT
Start: 2023-12-06

## 2023-12-06 RX ORDER — FLUTICASONE PROPIONATE 50 MCG
1 SPRAY, SUSPENSION (ML) NASAL DAILY
Qty: 48 G | Refills: 0 | Status: SHIPPED | OUTPATIENT
Start: 2023-12-06

## 2023-12-06 NOTE — TELEPHONE ENCOUNTER
PATIENT IS CALLING BECAUSE HE NEEDS REFILLS OF LASIX 20MG AND FLONASE NASAL SPRAY. CAN REFILLS OF THESE MEDICATIONS BE SENT IN TO THE PHARMACY ON FILE FOR THE PATIENT?

## 2023-12-11 ENCOUNTER — LAB (OUTPATIENT)
Dept: LAB | Facility: HOSPITAL | Age: 83
End: 2023-12-11
Payer: MEDICARE

## 2023-12-11 ENCOUNTER — OFFICE VISIT (OUTPATIENT)
Dept: INTERNAL MEDICINE | Facility: CLINIC | Age: 83
End: 2023-12-11
Payer: MEDICARE

## 2023-12-11 VITALS
BODY MASS INDEX: 24.92 KG/M2 | SYSTOLIC BLOOD PRESSURE: 124 MMHG | HEIGHT: 72 IN | DIASTOLIC BLOOD PRESSURE: 66 MMHG | WEIGHT: 184 LBS | OXYGEN SATURATION: 98 % | TEMPERATURE: 98 F | HEART RATE: 82 BPM

## 2023-12-11 DIAGNOSIS — Z23 NEED FOR INFLUENZA VACCINATION: ICD-10-CM

## 2023-12-11 DIAGNOSIS — D64.9 NORMOCYTIC ANEMIA: Primary | ICD-10-CM

## 2023-12-11 DIAGNOSIS — K59.03 DRUG INDUCED CONSTIPATION: ICD-10-CM

## 2023-12-11 DIAGNOSIS — M19.019 GLENOHUMERAL ARTHRITIS: ICD-10-CM

## 2023-12-11 DIAGNOSIS — Z91.89 AT RISK FOR SLEEP APNEA: ICD-10-CM

## 2023-12-11 DIAGNOSIS — M50.30 DDD (DEGENERATIVE DISC DISEASE), CERVICAL: ICD-10-CM

## 2023-12-11 DIAGNOSIS — Z96.652 STATUS POST TOTAL LEFT KNEE REPLACEMENT: ICD-10-CM

## 2023-12-11 DIAGNOSIS — D64.9 NORMOCYTIC ANEMIA: ICD-10-CM

## 2023-12-11 DIAGNOSIS — R63.0 POOR APPETITE: ICD-10-CM

## 2023-12-11 DIAGNOSIS — M17.0 PRIMARY OSTEOARTHRITIS OF BOTH KNEES: ICD-10-CM

## 2023-12-11 DIAGNOSIS — M51.9 LUMBAR DISC DISEASE: ICD-10-CM

## 2023-12-11 PROCEDURE — 85025 COMPLETE CBC W/AUTO DIFF WBC: CPT

## 2023-12-11 RX ORDER — OXYCODONE HYDROCHLORIDE 5 MG/1
7.5 TABLET ORAL EVERY 12 HOURS PRN
COMMUNITY
Start: 2023-12-06

## 2023-12-11 NOTE — PROGRESS NOTES
Internal Medicine Follow Up    Chief Complaint  Isrrael Marino is a 83 y.o. male who presents today for follow up of chronic medical conditions outlined below.    Chief Complaint   Patient presents with    Referral     Follow up on pain management referral    Bloodwork     Follow up on blood work from 11/4/2023        HPI  Mr. Marino comes in today for follow up. Established with Dr. Daniels for pain management and started on oxycodone. He also questions whether he could try lodine which she has recommended. He is constipated due to oxycodone. Using citrucel and plans to add miralax. He notes decreased appetite since summer but no weight loss or abdominal pain. Taking iron off and on due to anemia. He has been doing this since November. He is going to be referred for sleep study by Dr. Daniels.          Review of Systems  Review of Systems   Constitutional:  Positive for appetite change (poor appetite since this summer). Negative for unexpected weight loss.   Gastrointestinal:  Positive for constipation. Negative for abdominal pain, anal bleeding, blood in stool, nausea, vomiting, GERD and indigestion.   Musculoskeletal:  Positive for arthralgias, back pain, myalgias and neck pain.        Current Medications  Current Outpatient Medications on File Prior to Visit   Medication Sig Dispense Refill    acetaminophen (TYLENOL) 500 MG tablet Take 2 tablets by mouth Every 8 (Eight) Hours. For 1 week, then as needed 42 tablet 0    ammonium lactate (LAC-HYDRIN) 12 % lotion Every 12 (Twelve) Hours.      aspirin 81 MG EC tablet Take 1 tablet by mouth Daily. Resume in 1 month 30 tablet 0    atorvastatin (LIPITOR) 40 MG tablet TAKE ONE TABLET BY MOUTH EVERY EVENING 90 tablet 3    B Complex Vitamins (VITAMIN B COMPLEX PO) Take 1 tablet by mouth Daily.      carvedilol (COREG) 6.25 MG tablet TAKE ONE TABLET BY MOUTH EVERY 12 HOURS 180 tablet 3    Cholecalciferol (VITAMIN D3) 125 MCG (5000 UT) capsule capsule Take 2,000 Units by  "mouth Daily.      clopidogrel (PLAVIX) 75 MG tablet Take 1 tablet by mouth Daily. Resume 12/2/21 90 tablet 3    DULoxetine (CYMBALTA) 30 MG capsule TAKE TWO CAPSULES BY MOUTH DAILY 180 capsule 1    fluticasone (FLONASE) 50 MCG/ACT nasal spray 1 spray into the nostril(s) as directed by provider Daily. 48 g 0    furosemide (LASIX) 20 MG tablet Take 1 tablet by mouth Daily. 90 tablet 0    lisinopril (PRINIVIL,ZESTRIL) 2.5 MG tablet Take 1 tablet by mouth Daily. 90 tablet 3    Melatonin 3 MG capsule Take 1 capsule by mouth As Needed.      oxyCODONE (ROXICODONE) 5 MG immediate release tablet 1.5 tablets Every 12 (Twelve) Hours As Needed.      tamsulosin (FLOMAX) 0.4 MG capsule 24 hr capsule Take 1 capsule by mouth Daily. 90 capsule 3    vitamin B-12 (CYANOCOBALAMIN) 1000 MCG tablet Take 1 tablet by mouth Daily.      Zinc 50 MG capsule Take 1 tablet by mouth Daily.       No current facility-administered medications on file prior to visit.       Allergies  Allergies   Allergen Reactions    Ibuprofen Hives     \"Pt states he hasn't taken this in a long time\"       Objective  Visit Vitals  /66   Pulse 82   Temp 98 °F (36.7 °C)   Ht 182.9 cm (72\")   Wt 83.5 kg (184 lb)   SpO2 98%   BMI 24.95 kg/m²        Physical Exam  Physical Exam  Vitals and nursing note reviewed.   Constitutional:       General: He is not in acute distress.     Appearance: Normal appearance. He is well-developed. He is not ill-appearing or toxic-appearing.   HENT:      Head: Normocephalic and atraumatic.      Ears:      Comments: Hard of hearing  Eyes:      Conjunctiva/sclera: Conjunctivae normal.   Pulmonary:      Effort: Pulmonary effort is normal. No respiratory distress.   Skin:     General: Skin is warm and dry.      Findings: No rash.   Neurological:      Mental Status: He is alert and oriented to person, place, and time. Mental status is at baseline.         Results  Results for orders placed or performed in visit on 11/14/23   Vitamin B12    " Specimen: Blood   Result Value Ref Range    Vitamin B-12 554 211 - 946 pg/mL   Folate    Specimen: Blood   Result Value Ref Range    Folate 19.10 4.78 - 24.20 ng/mL   Peripheral Blood Smear    Specimen: Blood   Result Value Ref Range    Pathology Review Yes    Pathology Consultation    Specimen: Blood, Venous   Result Value Ref Range    Final Diagnosis       1.  Peripheral blood, smear review:   -No significant abnormalities detected.    COMMENT: Review of the blood smears demonstrates erythrocytes are predominantly normal in morphology with rare microcytic and target cells noted.  White cells are mature, comprised of segmented neutrophils, lymphocytes, monocytes and occasional eosinophils.  Platelets are normal in morphology.  Given the patient's history of anemia, close follow-up of patient's hemoglobin and hematocrit and iron studies would be warranted.    [facility]      Case Report       Surgical Pathology Report                         Case: [id number]                                  Authorizing Provider:  Ivanna George MD    Collected:           11/14/2023 01:52 PM          Ordering Location:     [facility]   Received:            11/15/2023 02:29 AM                                 [place] [address]                                                      Pathologist:           Emma Chanel MD                                                    Specimen:    Blood, Venous, peripheral blood smear                                                          Assessment and Plan  Diagnoses and all orders for this visit:    Normocytic anemia  - not deficient in iron, b12, folate  - P smear unremarkable  - repeat CBC, if still anemic will refer to hematology    Poor appetite  - chronic since summer, no weight loss or GI sxs  - monitor    Lumbar disc disease  DDD (degenerative disc disease), cervical  - hx of odontoid fracture s/p cervical fusion as well as lumbar fusion L3-4, L4-5  - seeing  chiropractor and pain management. Has recently started oxycodone.    Glenohumeral arthritis  - has had injections with orthopedics and now following with pain mgmt. Recently started on oxycodone.    Primary osteoarthritis of both knees  Status post total left knee replacement  - s/p L TKA 12/1/2021. Has had injections to R knee  - recently established with new pain mgmt and started on oxycodone    At risk for sleep apnea  - plan for sleep study    Drug induced constipation  - due to oxycodone  - okay to add miralax, continue citrucel      Health Maintenance  - Colonoscopy: No longer indicated due to age.  - Immunizations: Flu today. Tdap 2017. COVID booster and RSV discussed. Shingrix series complete. Pneumococcal complete.  - Depression screening: negative 9/2023    Return for Next scheduled follow up, Labs today.

## 2023-12-12 LAB
BASOPHILS # BLD AUTO: 0.05 10*3/MM3 (ref 0–0.2)
BASOPHILS NFR BLD AUTO: 0.5 % (ref 0–1.5)
DEPRECATED RDW RBC AUTO: 39.2 FL (ref 37–54)
EOSINOPHIL # BLD AUTO: 0.27 10*3/MM3 (ref 0–0.4)
EOSINOPHIL NFR BLD AUTO: 2.5 % (ref 0.3–6.2)
ERYTHROCYTE [DISTWIDTH] IN BLOOD BY AUTOMATED COUNT: 12.6 % (ref 12.3–15.4)
HCT VFR BLD AUTO: 41.1 % (ref 37.5–51)
HGB BLD-MCNC: 13.1 G/DL (ref 13–17.7)
IMM GRANULOCYTES # BLD AUTO: 0.05 10*3/MM3 (ref 0–0.05)
IMM GRANULOCYTES NFR BLD AUTO: 0.5 % (ref 0–0.5)
LYMPHOCYTES # BLD AUTO: 2.51 10*3/MM3 (ref 0.7–3.1)
LYMPHOCYTES NFR BLD AUTO: 23.6 % (ref 19.6–45.3)
MCH RBC QN AUTO: 27.8 PG (ref 26.6–33)
MCHC RBC AUTO-ENTMCNC: 31.9 G/DL (ref 31.5–35.7)
MCV RBC AUTO: 87.3 FL (ref 79–97)
MONOCYTES # BLD AUTO: 1.01 10*3/MM3 (ref 0.1–0.9)
MONOCYTES NFR BLD AUTO: 9.5 % (ref 5–12)
NEUTROPHILS NFR BLD AUTO: 6.76 10*3/MM3 (ref 1.7–7)
NEUTROPHILS NFR BLD AUTO: 63.4 % (ref 42.7–76)
NRBC BLD AUTO-RTO: 0 /100 WBC (ref 0–0.2)
PLATELET # BLD AUTO: 263 10*3/MM3 (ref 140–450)
PMV BLD AUTO: 10.3 FL (ref 6–12)
RBC # BLD AUTO: 4.71 10*6/MM3 (ref 4.14–5.8)
WBC NRBC COR # BLD AUTO: 10.65 10*3/MM3 (ref 3.4–10.8)

## 2023-12-18 ENCOUNTER — TELEPHONE (OUTPATIENT)
Dept: INTERNAL MEDICINE | Facility: CLINIC | Age: 83
End: 2023-12-18
Payer: MEDICARE

## 2023-12-18 NOTE — TELEPHONE ENCOUNTER
----- Message from Ivanna George MD sent at 12/17/2023  1:35 PM EST -----  Please mail a result letter.    Repeat labs show that anemia has resolved.

## 2023-12-28 ENCOUNTER — OFFICE VISIT (OUTPATIENT)
Dept: INTERNAL MEDICINE | Facility: CLINIC | Age: 83
End: 2023-12-28
Payer: MEDICARE

## 2023-12-28 VITALS
OXYGEN SATURATION: 98 % | HEART RATE: 80 BPM | BODY MASS INDEX: 24.24 KG/M2 | HEIGHT: 72 IN | TEMPERATURE: 97.9 F | DIASTOLIC BLOOD PRESSURE: 80 MMHG | SYSTOLIC BLOOD PRESSURE: 148 MMHG | WEIGHT: 179 LBS

## 2023-12-28 DIAGNOSIS — R05.9 COUGH, UNSPECIFIED TYPE: ICD-10-CM

## 2023-12-28 DIAGNOSIS — H91.8X9 OTHER SPECIFIED FORMS OF HEARING LOSS, UNSPECIFIED LATERALITY: ICD-10-CM

## 2023-12-28 DIAGNOSIS — J06.9 VIRAL UPPER RESPIRATORY ILLNESS: Primary | ICD-10-CM

## 2023-12-28 LAB
EXPIRATION DATE: NORMAL
FLUAV AG UPPER RESP QL IA.RAPID: NOT DETECTED
FLUBV AG UPPER RESP QL IA.RAPID: NOT DETECTED
INTERNAL CONTROL: NORMAL
Lab: NORMAL
SARS-COV-2 AG UPPER RESP QL IA.RAPID: NOT DETECTED

## 2023-12-28 PROCEDURE — 99213 OFFICE O/P EST LOW 20 MIN: CPT | Performed by: NURSE PRACTITIONER

## 2023-12-28 PROCEDURE — 87428 SARSCOV & INF VIR A&B AG IA: CPT | Performed by: NURSE PRACTITIONER

## 2023-12-28 PROCEDURE — 1160F RVW MEDS BY RX/DR IN RCRD: CPT | Performed by: NURSE PRACTITIONER

## 2023-12-28 PROCEDURE — 1159F MED LIST DOCD IN RCRD: CPT | Performed by: NURSE PRACTITIONER

## 2023-12-28 RX ORDER — BROMPHENIRAMINE MALEATE, PSEUDOEPHEDRINE HYDROCHLORIDE, AND DEXTROMETHORPHAN HYDROBROMIDE 2; 30; 10 MG/5ML; MG/5ML; MG/5ML
5 SYRUP ORAL 4 TIMES DAILY PRN
Qty: 200 ML | Refills: 0 | Status: SHIPPED | OUTPATIENT
Start: 2023-12-28 | End: 2024-01-07

## 2023-12-28 RX ORDER — METHYLPREDNISOLONE 4 MG/1
TABLET ORAL
Qty: 21 TABLET | Refills: 0 | Status: SHIPPED | OUTPATIENT
Start: 2023-12-28

## 2023-12-28 RX ORDER — BENZONATATE 100 MG/1
100 CAPSULE ORAL 3 TIMES DAILY PRN
Qty: 30 CAPSULE | Refills: 0 | Status: SHIPPED | OUTPATIENT
Start: 2023-12-28 | End: 2024-01-07

## 2023-12-28 NOTE — PROGRESS NOTES
Office Note     Name: Isrrael Marino    : 1940     MRN: [id number]     Chief Complaint  Cough    Subjective     History of Present Illness:  Isrrael Marino is a 83 y.o. male who presents today for acute concerns    Patient is accompanied by his wife today    Patient regularly sees Dr. George    Patient is hard of hearing.  He has had a cough that started back in November.  He states he did go to the ER around that time.  The cough is stayed with him since that time.  No fevers.  The cough has continued since that time.  He does have a history of congestive heart failure that he wanted to make sure that I was aware of.  He wanted to see if steroids would be appropriate for his symptoms today as well as a refill of the cough medication/cigarette that he was provided previously      Review of Systems   Constitutional:  Negative for chills, fatigue and fever.   HENT:  Negative for sore throat.    Eyes:  Negative for visual disturbance.   Respiratory:  Positive for cough. Negative for shortness of breath.    Cardiovascular:  Negative for chest pain.   Gastrointestinal:  Negative for abdominal pain.   Skin:  Negative for color change.   Allergic/Immunologic: Negative for immunocompromised state.   Neurological:  Negative for headaches.   Psychiatric/Behavioral:  Negative for behavioral problems.        Past Medical History:   Diagnosis Date    Arrhythmia     Arthritis     both knees    Broken neck     CAD (coronary artery disease)     Cancer     skin cancer  head and neck  nonmelanoma    Chondrocalcinosis of knee     GERD (gastroesophageal reflux disease)     Gout     Heart attack      Last Assessed: 28 Mar 2014    Heart disease     History of transfusion     own blood with hip surgery    Hyperlipidemia     Hypertension     Hypertensive urgency 2021    IBS (irritable bowel syndrome)     Left rotator cuff tear arthropathy     Low back pain     Lower extremity neuropathy     Lumbar canal stenosis      Neck pain     Nipple pain 4/8/2022    Numbness     Right hip pain     Rotator cuff tear arthropathy of right shoulder     Sleep apnea     semi compliant with c-pap     Superficial thrombophlebitis of right upper extremity 6/3/2021    Thin blood        Past Surgical History:   Procedure Laterality Date    APPENDECTOMY  1956    BACK SURGERY  1997    spinal decompression and fusion    BREAST BIOPSY  2003    BENIGN EXCISIONAL. NOT CANCER    CARDIAC CATHETERIZATION      with two stents//. DR. TOLEDO    CARDIAC CATHETERIZATION N/A 01/08/2021    Procedure: LEFT HEART CATH;  Surgeon: Casimiro Bernal MD;  Location:  [facility];  Service: Cardiovascular;  Laterality: N/A;    CARPAL TUNNEL RELEASE  03/28/2014    Neuroplasty Decompression Median Nerve At Carpal Tunnel    CATARACT EXTRACTION Bilateral     CERVICAL DISCECTOMY POSTERIOR FUSION WITH BRAIN LAB N/A 07/13/2018    Procedure: CERVICAL LAMINECTOMY DECOMPRESSION POSTERIOR C1-2 POSTERIOR CERVICAL FUSION;  Surgeon: Randall Barnett MD;  Location:  [facility];  Service: Neurosurgery    COLONOSCOPY      CORONARY STENT PLACEMENT  2013    HERNIA REPAIR  2002    & 1998    JOINT REPLACEMENT      LUMBAR DISCECTOMY FUSION INSTRUMENTATION      L3-4, L4-5    OTHER SURGICAL HISTORY      PNS trial    SKIN BIOPSY      TONSILLECTOMY      TOTAL HIP ARTHROPLASTY  2002    TOTAL KNEE ARTHROPLASTY Left 12/01/2021    Procedure: TOTAL KNEE ARTHROPLASTY LEFT;  Surgeon: Kendall Craig MD;  Location:  [facility];  Service: Orthopedics;  Laterality: Left;    VASECTOMY      WISDOM TOOTH EXTRACTION         Social History     Socioeconomic History    Marital status:    Tobacco Use    Smoking status: Never     Passive exposure: Never    Smokeless tobacco: Never   Vaping Use    Vaping Use: Never used   Substance and Sexual Activity    Alcohol use: Not Currently    Drug use: Never    Sexual activity: Defer         Current Outpatient Medications:     acetaminophen (TYLENOL)  500 MG tablet, Take 2 tablets by mouth Every 8 (Eight) Hours. For 1 week, then as needed, Disp: 42 tablet, Rfl: 0    ammonium lactate (LAC-HYDRIN) 12 % lotion, Every 12 (Twelve) Hours., Disp: , Rfl:     aspirin 81 MG EC tablet, Take 1 tablet by mouth Daily. Resume in 1 month, Disp: 30 tablet, Rfl: 0    atorvastatin (LIPITOR) 40 MG tablet, TAKE ONE TABLET BY MOUTH EVERY EVENING, Disp: 90 tablet, Rfl: 3    B Complex Vitamins (VITAMIN B COMPLEX PO), Take 1 tablet by mouth Daily., Disp: , Rfl:     carvedilol (COREG) 6.25 MG tablet, TAKE ONE TABLET BY MOUTH EVERY 12 HOURS, Disp: 180 tablet, Rfl: 3    Cholecalciferol (VITAMIN D3) 125 MCG (5000 UT) capsule capsule, Take 2,000 Units by mouth Daily., Disp: , Rfl:     clopidogrel (PLAVIX) 75 MG tablet, Take 1 tablet by mouth Daily. Resume 12/2/21, Disp: 90 tablet, Rfl: 3    DULoxetine (CYMBALTA) 30 MG capsule, TAKE TWO CAPSULES BY MOUTH DAILY, Disp: 180 capsule, Rfl: 1    fluticasone (FLONASE) 50 MCG/ACT nasal spray, 1 spray into the nostril(s) as directed by provider Daily., Disp: 48 g, Rfl: 0    furosemide (LASIX) 20 MG tablet, Take 1 tablet by mouth Daily., Disp: 90 tablet, Rfl: 0    lisinopril (PRINIVIL,ZESTRIL) 2.5 MG tablet, Take 1 tablet by mouth Daily., Disp: 90 tablet, Rfl: 3    Melatonin 3 MG capsule, Take 1 capsule by mouth As Needed., Disp: , Rfl:     oxyCODONE (ROXICODONE) 5 MG immediate release tablet, 1.5 tablets Every 12 (Twelve) Hours As Needed., Disp: , Rfl:     tamsulosin (FLOMAX) 0.4 MG capsule 24 hr capsule, Take 1 capsule by mouth Daily., Disp: 90 capsule, Rfl: 3    vitamin B-12 (CYANOCOBALAMIN) 1000 MCG tablet, Take 1 tablet by mouth Daily., Disp: , Rfl:     Zinc 50 MG capsule, Take 1 tablet by mouth Daily., Disp: , Rfl:     benzonatate (Tessalon Perles) 100 MG capsule, Take 1 capsule by mouth 3 (Three) Times a Day As Needed for Cough for up to 10 days., Disp: 30 capsule, Rfl: 0    brompheniramine-pseudoephedrine-DM 30-2-10 MG/5ML syrup, Take 5 mL by  "mouth 4 (Four) Times a Day As Needed for Cough for up to 10 days., Disp: 200 mL, Rfl: 0    methylPREDNISolone (MEDROL) 4 MG dose pack, Take as directed on package instructions., Disp: 21 tablet, Rfl: 0    Objective     Vital Signs  /80   Pulse 80   Temp 97.9 °F (36.6 °C)   Ht 182.9 cm (72\")   Wt 81.2 kg (179 lb)   SpO2 98%   BMI 24.28 kg/m²   Estimated body mass index is 24.28 kg/m² as calculated from the following:    Height as of this encounter: 182.9 cm (72\").    Weight as of this encounter: 81.2 kg (179 lb).    BMI is within normal parameters. No other follow-up for BMI required.             Physical Exam  Vitals and nursing note reviewed.   Constitutional:       Appearance: Normal appearance.   HENT:      Head: Normocephalic and atraumatic.      Right Ear: Decreased hearing noted.      Left Ear: Decreased hearing noted.   Eyes:      Extraocular Movements: Extraocular movements intact.      Pupils: Pupils are equal, round, and reactive to light.   Cardiovascular:      Rate and Rhythm: Normal rate and regular rhythm.      Heart sounds: Normal heart sounds.   Pulmonary:      Effort: Pulmonary effort is normal.      Breath sounds: Normal breath sounds. No decreased breath sounds, wheezing or rhonchi.      Comments: Cough noted on exam  Musculoskeletal:         General: Normal range of motion.   Skin:     General: Skin is warm and dry.   Neurological:      Mental Status: He is alert and oriented to person, place, and time.   Psychiatric:         Mood and Affect: Mood normal.         Behavior: Behavior normal.          Lab Review:   Latest Reference Range & Units 12/28/23 14:53   SARS Antigen Not Detected, Presumptive Negative  Not Detected   Expiration Date  11/03/2024   Lot Number  [id number]   Influenza A Antigen OSMAN Not Detected  Not Detected   Influenza B Antigen OSMAN Not Detected  Not Detected            Assessment and Plan     Diagnoses and all orders for this visit:    1. Viral upper respiratory " illness (Primary)  -     brompheniramine-pseudoephedrine-DM 30-2-10 MG/5ML syrup; Take 5 mL by mouth 4 (Four) Times a Day As Needed for Cough for up to 10 days.  Dispense: 200 mL; Refill: 0  -     benzonatate (Tessalon Perles) 100 MG capsule; Take 1 capsule by mouth 3 (Three) Times a Day As Needed for Cough for up to 10 days.  Dispense: 30 capsule; Refill: 0  -     methylPREDNISolone (MEDROL) 4 MG dose pack; Take as directed on package instructions.  Dispense: 21 tablet; Refill: 0    2. Cough, unspecified type  -     POCT SARS-CoV-2 Antigen OSMAN + Flu    3. Other specified forms of hearing loss, unspecified laterality    Plan  Discussed results of in office testing with patient today  Additional medication was sent to the pharmacy to assist with symptoms  Continue coughing and deep breathing exercises  Continue stay well-hydrated  Go to ER if any condition worsens or severe  Plan to follow-up as scheduled with Dr. George      Follow Up  Return for Next scheduled follow up.    JULY Collado    Part of this note may be an electronic transcription/translation of spoken language to printed text using the Dragon Dictation System.

## 2024-01-08 ENCOUNTER — OFFICE VISIT (OUTPATIENT)
Dept: INTERNAL MEDICINE | Facility: CLINIC | Age: 84
End: 2024-01-08
Payer: MEDICARE

## 2024-01-08 VITALS
WEIGHT: 169 LBS | TEMPERATURE: 97.7 F | DIASTOLIC BLOOD PRESSURE: 52 MMHG | HEART RATE: 80 BPM | BODY MASS INDEX: 22.89 KG/M2 | HEIGHT: 72 IN | OXYGEN SATURATION: 94 % | SYSTOLIC BLOOD PRESSURE: 98 MMHG

## 2024-01-08 DIAGNOSIS — Z96.652 STATUS POST TOTAL LEFT KNEE REPLACEMENT: ICD-10-CM

## 2024-01-08 DIAGNOSIS — M17.0 PRIMARY OSTEOARTHRITIS OF BOTH KNEES: ICD-10-CM

## 2024-01-08 DIAGNOSIS — M51.9 LUMBAR DISC DISEASE: Primary | ICD-10-CM

## 2024-01-08 DIAGNOSIS — M50.30 DDD (DEGENERATIVE DISC DISEASE), CERVICAL: ICD-10-CM

## 2024-01-08 PROCEDURE — 1159F MED LIST DOCD IN RCRD: CPT | Performed by: INTERNAL MEDICINE

## 2024-01-08 PROCEDURE — 1160F RVW MEDS BY RX/DR IN RCRD: CPT | Performed by: INTERNAL MEDICINE

## 2024-01-08 PROCEDURE — 3074F SYST BP LT 130 MM HG: CPT | Performed by: INTERNAL MEDICINE

## 2024-01-08 PROCEDURE — 3078F DIAST BP <80 MM HG: CPT | Performed by: INTERNAL MEDICINE

## 2024-01-08 PROCEDURE — 99214 OFFICE O/P EST MOD 30 MIN: CPT | Performed by: INTERNAL MEDICINE

## 2024-01-08 RX ORDER — ETODOLAC 200 MG/1
200 CAPSULE ORAL EVERY 8 HOURS PRN
Qty: 90 CAPSULE | Refills: 1 | Status: SHIPPED | OUTPATIENT
Start: 2024-01-08

## 2024-01-08 NOTE — PROGRESS NOTES
Internal Medicine Follow Up    Chief Complaint  Isrrael Marino is a 83 y.o. male who presents today for follow up of chronic medical conditions outlined below.    Chief Complaint   Patient presents with    Med Management     Pain provider prefers PCP to prescribe pain meds.        HPI  Mr. Marino comes in today for follow up. He is here with his wife. He notes that he has had a sinus infection and has just recently completed steroids. While on the steroids he has had relief of his chronic pain. He was scheduled for LUIS with  interventional pain but due to pain relief this was cancelled. He is taking oxycodone 5-7.5mg BID. He spoke with his pain management provider today and discussed lodine. She requested that this be prescribed by our office which is why he is here today.         Review of Systems  Review of Systems   Constitutional: Negative.    Musculoskeletal:  Positive for arthralgias, back pain, myalgias and neck pain.        Current Medications  Current Outpatient Medications on File Prior to Visit   Medication Sig Dispense Refill    acetaminophen (TYLENOL) 500 MG tablet Take 2 tablets by mouth Every 8 (Eight) Hours. For 1 week, then as needed 42 tablet 0    ammonium lactate (LAC-HYDRIN) 12 % lotion Every 12 (Twelve) Hours.      aspirin 81 MG EC tablet Take 1 tablet by mouth Daily. Resume in 1 month 30 tablet 0    atorvastatin (LIPITOR) 40 MG tablet TAKE ONE TABLET BY MOUTH EVERY EVENING 90 tablet 3    B Complex Vitamins (VITAMIN B COMPLEX PO) Take 1 tablet by mouth Daily.      carvedilol (COREG) 6.25 MG tablet TAKE ONE TABLET BY MOUTH EVERY 12 HOURS 180 tablet 3    Cholecalciferol (VITAMIN D3) 125 MCG (5000 UT) capsule capsule Take 2,000 Units by mouth Daily.      clopidogrel (PLAVIX) 75 MG tablet Take 1 tablet by mouth Daily. Resume 12/2/21 90 tablet 3    DULoxetine (CYMBALTA) 30 MG capsule TAKE TWO CAPSULES BY MOUTH DAILY 180 capsule 1    fluticasone (FLONASE) 50 MCG/ACT nasal spray 1 spray into the  "nostril(s) as directed by provider Daily. 48 g 0    furosemide (LASIX) 20 MG tablet Take 1 tablet by mouth Daily. 90 tablet 0    lisinopril (PRINIVIL,ZESTRIL) 2.5 MG tablet Take 1 tablet by mouth Daily. 90 tablet 3    Melatonin 3 MG capsule Take 1 capsule by mouth As Needed.      oxyCODONE (ROXICODONE) 5 MG immediate release tablet 1.5 tablets Every 12 (Twelve) Hours As Needed.      tamsulosin (FLOMAX) 0.4 MG capsule 24 hr capsule Take 1 capsule by mouth Daily. 90 capsule 3    vitamin B-12 (CYANOCOBALAMIN) 1000 MCG tablet Take 1 tablet by mouth Daily.      Zinc 50 MG capsule Take 1 tablet by mouth Daily.      [DISCONTINUED] methylPREDNISolone (MEDROL) 4 MG dose pack Take as directed on package instructions. 21 tablet 0    [DISCONTINUED] benzonatate (Tessalon Perles) 100 MG capsule Take 1 capsule by mouth 3 (Three) Times a Day As Needed for Cough for up to 10 days. 30 capsule 0    [DISCONTINUED] brompheniramine-pseudoephedrine-DM 30-2-10 MG/5ML syrup Take 5 mL by mouth 4 (Four) Times a Day As Needed for Cough for up to 10 days. 200 mL 0     No current facility-administered medications on file prior to visit.       Allergies  Allergies   Allergen Reactions    Ibuprofen Hives     \"Pt states he hasn't taken this in a long time\"       Objective  Visit Vitals  BP 98/52   Pulse 80   Temp 97.7 °F (36.5 °C)   Ht 182.9 cm (72\")   Wt 76.7 kg (169 lb)   SpO2 94%   BMI 22.92 kg/m²        Physical Exam  Physical Exam  Vitals and nursing note reviewed.   Constitutional:       General: He is not in acute distress.     Appearance: Normal appearance. He is well-developed and normal weight. He is not ill-appearing or toxic-appearing.   HENT:      Head: Normocephalic and atraumatic.   Eyes:      Conjunctiva/sclera: Conjunctivae normal.   Pulmonary:      Effort: Pulmonary effort is normal. No respiratory distress.   Skin:     General: Skin is warm and dry.   Neurological:      Mental Status: He is alert and oriented to person, place, " and time. Mental status is at baseline.         Results  Results for orders placed or performed in visit on 12/28/23   POCT SARS-CoV-2 Antigen OSMAN + Flu    Specimen: Swab   Result Value Ref Range    SARS Antigen Not Detected Not Detected, Presumptive Negative    Influenza A Antigen OSMAN Not Detected Not Detected    Influenza B Antigen OSMAN Not Detected Not Detected    Internal Control Passed Passed    Lot Number [id number]     Expiration Date 11/03/2024         Assessment and Plan  Diagnoses and all orders for this visit:    Lumbar disc disease  DDD (degenerative disc disease), cervical  - hx of odontoid fracture s/p cervical fusion as well as lumbar fusion L3-4, L4-5  - seeing [facility] interventional pain, PMR, and pain management. Has had relief with oxycodone 5-7.5mg BID and recent steroids.  - had plan for LUIS but this is on hold while he is doing well  - will send lodine 200mg q8h PRN to pharmacy    Primary osteoarthritis of both knees  Status post total left knee replacement  - s/p L TKA 12/1/2021. Has had injections to R knee  - seeing [facility] interventional pain, PMR, and pain management. Has had relief with oxycodone 5-7.5mg BID and recent steroids.  - will send lodine 200mg q8h PRN to pharmacy     Health Maintenance  - Colonoscopy: No longer indicated due to age.  - Immunizations: Flu UTD. Tdap 2017. COVID booster and RSV discussed. Shingrix series complete. Pneumococcal complete.  - Depression screening: negative 9/2023    Return for Next scheduled follow up.

## 2024-01-09 ENCOUNTER — TELEPHONE (OUTPATIENT)
Dept: INTERNAL MEDICINE | Facility: CLINIC | Age: 84
End: 2024-01-09
Payer: MEDICARE

## 2024-01-09 NOTE — TELEPHONE ENCOUNTER
Caller: Gila Marino    Relationship: Emergency Contact    Best call back number: [phone]     Which medication are you concerned about: etodolac (LODINE) 200 MG capsule     Who prescribed you this medication: PCP    When did you start taking this medication: HAS NOT STARTED     What are your concerns: PATIENT'S WIFE STATES SHE WENT TO THE PHARMACY TO  MEDICATION AND WAS TOLD THE PHARMACY HAS BEEN TRYING TO REACH PCP TO ALERT OF A POSSIBLE DRUG INTERACTION BUT HAVE NOT RECEIVED A CALLBACK.     PHARMACY: [id number] - [place] - [address] - [phone]  - [phone]  [phone]

## 2024-01-10 NOTE — TELEPHONE ENCOUNTER
Called pharmacy and verified with Ángel that it is ok to fill medication and we are going to be in touch with pt regarding risks.    Called pt's wife Gila and reviewed possible risks of a bleed in the GI tract and asked that they watch for any blood in the stool or urine. Pts wife verbalized her understanding.

## 2024-02-05 NOTE — OUTREACH NOTE
CHF Week 3 Survey      Responses   [facility] patient discharged from?  [facility]   Does the patient have one of the following disease processes/diagnoses(primary or secondary)?  CHF   Week 3 attempt successful?  Yes   Call start time  1017   Call end time  1029   Discharge diagnosis  Acute CHF, CKD, HTN   Meds reviewed with patient/caregiver?  Yes   Is the patient having any side effects they believe may be caused by any medication additions or changes?  No   Does the patient have all medications ordered at discharge?  Yes   Is the patient taking all medications as directed (includes completed medication regime)?  Yes   Does the patient have a primary care provider?   Yes   Does the patient have an appointment with their PCP within 7 days of discharge?  Yes   Has the patient kept scheduled appointments due by today?  N/A   Has home health visited the patient within 72 hours of discharge?  N/A   Pulse Ox monitoring  Intermittent   Pulse Ox device source  Patient   O2 Sat comments  98% on RA   O2 Sat: education provided  Sat levels, Monitoring frequency, When to seek care   Psychosocial issues?  No   Did the patient receive a copy of their discharge instructions?  Yes   Nursing interventions  Reviewed instructions with patient   What is the patient's perception of their health status since discharge?  Improving   Nursing interventions  Nurse provided patient education   Is the patient weighing daily?  Yes   Does the patient have scales?  Yes   Is the patient able to teach back Heart Failure diet management?  Yes   Is the patient able to teach back Heart Failure Zones?  Yes   Is the patient able to teach back signs and symptoms of worsening condition? (i.e. weight gain, shortness of air, etc.)  Yes   Is the patient/caregiver able to teach back the hierarchy of who to call/visit for symptoms/problems? PCP, Specialist, Home health nurse, Urgent Care, ED, 911  Yes   Additional teach back comments  pt states has  thought to have gained approx 8 lbs in 4 days, not absolutely sure, is not seeing significant edema, plans to watch for changes and contact MD as needed   CHF Week 3 call completed?  Yes          Lidya Rivas RN   decreased ability to use arms for pushing/pulling/decreased ability to use legs for bridging/pushing

## 2024-03-13 ENCOUNTER — LAB (OUTPATIENT)
Dept: LAB | Facility: HOSPITAL | Age: 84
End: 2024-03-13
Payer: MEDICARE

## 2024-03-13 ENCOUNTER — OFFICE VISIT (OUTPATIENT)
Dept: INTERNAL MEDICINE | Facility: CLINIC | Age: 84
End: 2024-03-13
Payer: MEDICARE

## 2024-03-13 VITALS
SYSTOLIC BLOOD PRESSURE: 124 MMHG | HEART RATE: 70 BPM | TEMPERATURE: 97.2 F | RESPIRATION RATE: 16 BRPM | HEIGHT: 72 IN | WEIGHT: 185 LBS | OXYGEN SATURATION: 95 % | DIASTOLIC BLOOD PRESSURE: 64 MMHG | BODY MASS INDEX: 25.06 KG/M2

## 2024-03-13 DIAGNOSIS — I10 PRIMARY HYPERTENSION: ICD-10-CM

## 2024-03-13 DIAGNOSIS — D64.9 NORMOCYTIC ANEMIA: ICD-10-CM

## 2024-03-13 DIAGNOSIS — M51.9 LUMBAR DISC DISEASE: Primary | ICD-10-CM

## 2024-03-13 DIAGNOSIS — M50.30 DDD (DEGENERATIVE DISC DISEASE), CERVICAL: ICD-10-CM

## 2024-03-13 DIAGNOSIS — Z79.1 NSAID LONG-TERM USE: ICD-10-CM

## 2024-03-13 LAB
ANION GAP SERPL CALCULATED.3IONS-SCNC: 8 MMOL/L (ref 5–15)
BUN SERPL-MCNC: 27 MG/DL (ref 8–23)
BUN/CREAT SERPL: 26.5 (ref 7–25)
CALCIUM SPEC-SCNC: 9.2 MG/DL (ref 8.6–10.5)
CHLORIDE SERPL-SCNC: 105 MMOL/L (ref 98–107)
CO2 SERPL-SCNC: 28 MMOL/L (ref 22–29)
CREAT SERPL-MCNC: 1.02 MG/DL (ref 0.76–1.27)
EGFRCR SERPLBLD CKD-EPI 2021: 72 ML/MIN/1.73
GLUCOSE SERPL-MCNC: 93 MG/DL (ref 65–99)
POTASSIUM SERPL-SCNC: 4.3 MMOL/L (ref 3.5–5.2)
SODIUM SERPL-SCNC: 141 MMOL/L (ref 136–145)

## 2024-03-13 PROCEDURE — 3074F SYST BP LT 130 MM HG: CPT | Performed by: INTERNAL MEDICINE

## 2024-03-13 PROCEDURE — 3078F DIAST BP <80 MM HG: CPT | Performed by: INTERNAL MEDICINE

## 2024-03-13 PROCEDURE — 80048 BASIC METABOLIC PNL TOTAL CA: CPT

## 2024-03-13 RX ORDER — TRIAMCINOLONE ACETONIDE 1 MG/G
CREAM TOPICAL
COMMUNITY
Start: 2024-03-06

## 2024-03-13 NOTE — PROGRESS NOTES
Internal Medicine Follow Up    Chief Complaint  Isrrael Marino is a 83 y.o. male who presents today for follow up of chronic medical conditions outlined below.    Chief Complaint   Patient presents with    Lumbar Disk Disease    Anemia        HPI  Mr. Marino comes in today for follow up. He is having more back pain recently. He is taking oxycodone regularly and using lodine PRN. He is trying to limit NSAID use due to risk of kidney dysfunction. He is doing exercises recommended by PT and has done OMT but this was not helpful so he has not continued. BP controlled. Has not had any change in weight. Weighs daily. He denies chest pain, edema, SOA. He has not needed lasix recently.         Review of Systems  Review of Systems   Constitutional: Negative.    Respiratory: Negative.     Cardiovascular: Negative.    Genitourinary:  Negative for decreased urine volume and difficulty urinating.   Musculoskeletal:  Positive for arthralgias and back pain.        Current Medications  Current Outpatient Medications on File Prior to Visit   Medication Sig Dispense Refill    acetaminophen (TYLENOL) 500 MG tablet Take 2 tablets by mouth Every 8 (Eight) Hours. For 1 week, then as needed 42 tablet 0    ammonium lactate (LAC-HYDRIN) 12 % lotion Every 12 (Twelve) Hours.      atorvastatin (LIPITOR) 40 MG tablet TAKE ONE TABLET BY MOUTH EVERY EVENING 90 tablet 3    B Complex Vitamins (VITAMIN B COMPLEX PO) Take 1 tablet by mouth Daily.      carvedilol (COREG) 6.25 MG tablet TAKE ONE TABLET BY MOUTH EVERY 12 HOURS 180 tablet 3    Cholecalciferol (VITAMIN D3) 125 MCG (5000 UT) capsule capsule Take 2,000 Units by mouth Daily.      clopidogrel (PLAVIX) 75 MG tablet Take 1 tablet by mouth Daily. Resume 12/2/21 90 tablet 3    DULoxetine (CYMBALTA) 30 MG capsule TAKE TWO CAPSULES BY MOUTH DAILY 180 capsule 1    etodolac (LODINE) 200 MG capsule Take 1 capsule by mouth Every 8 (Eight) Hours As Needed (pain). 90 capsule 1    fluticasone  "(FLONASE) 50 MCG/ACT nasal spray 1 spray into the nostril(s) as directed by provider Daily. 48 g 0    furosemide (LASIX) 20 MG tablet Take 1 tablet by mouth Daily. 90 tablet 0    lisinopril (PRINIVIL,ZESTRIL) 2.5 MG tablet Take 1 tablet by mouth Daily. 90 tablet 3    Melatonin 3 MG capsule Take 1 capsule by mouth As Needed.      oxyCODONE (ROXICODONE) 5 MG immediate release tablet 1.5 tablets Every 12 (Twelve) Hours As Needed.      tamsulosin (FLOMAX) 0.4 MG capsule 24 hr capsule Take 1 capsule by mouth Daily. 90 capsule 3    vitamin B-12 (CYANOCOBALAMIN) 1000 MCG tablet Take 1 tablet by mouth Daily.      Zinc 50 MG capsule Take 1 tablet by mouth Daily.      aspirin 81 MG EC tablet Take 1 tablet by mouth Daily. Resume in 1 month (Patient not taking: Reported on 3/13/2024) 30 tablet 0     No current facility-administered medications on file prior to visit.       Allergies  Allergies   Allergen Reactions    Ibuprofen Hives     \"Pt states he hasn't taken this in a long time\"       Objective  Visit Vitals  /64   Pulse 70   Temp 97.2 °F (36.2 °C) (Temporal)   Resp 16   Ht 182.9 cm (72.01\")   Wt 83.9 kg (185 lb)   SpO2 95%   BMI 25.08 kg/m²        Physical Exam  Physical Exam  Vitals and nursing note reviewed.   Constitutional:       General: He is not in acute distress.     Appearance: Normal appearance. He is well-developed. He is not ill-appearing or toxic-appearing.   HENT:      Head: Normocephalic and atraumatic.      Ears:      Comments: Hard of hearing  Eyes:      Conjunctiva/sclera: Conjunctivae normal.   Cardiovascular:      Rate and Rhythm: Normal rate and regular rhythm.      Heart sounds: Normal heart sounds.   Pulmonary:      Effort: Pulmonary effort is normal. No respiratory distress.      Breath sounds: Normal breath sounds. No rales.   Musculoskeletal:         General: Deformity (enlarged MCP joints bilaterally) present.      Right lower leg: No edema.      Left lower leg: No edema.      Comments: " Wearing soft back brace   Skin:     General: Skin is warm and dry.   Neurological:      Mental Status: He is alert and oriented to person, place, and time. Mental status is at baseline.         Results  Results for orders placed or performed in visit on 12/28/23   POCT SARS-CoV-2 Antigen OSMAN + Flu    Specimen: Swab   Result Value Ref Range    SARS Antigen Not Detected Not Detected, Presumptive Negative    Influenza A Antigen OSMAN Not Detected Not Detected    Influenza B Antigen OSMAN Not Detected Not Detected    Internal Control Passed Passed    Lot Number [id number]     Expiration Date 11/03/2024         Assessment and Plan  Diagnoses and all orders for this visit:    Lumbar disc disease  DDD (degenerative disc disease), cervical  - hx of odontoid fracture s/p cervical fusion as well as lumbar fusion L3-4, L4-5  - seeing [facility], PMR, and pain management. Has had some relief with oxycodone 5-7.5mg BID, lodine 200mg q8h PRN.  - had planned LUIS but was on hold after relief from oral steroids, now having more pain so can hopefully be reconsidered    Normocytic anemia  - not deficient in iron, b12, folate  - P smear unremarkable  - spontaneously resolved    Primary hypertension  - BP at goal, continue lisinopril 5mg BID, coreg 6.25mg BID   - BMP ordered    NSAID long-term use  - on lodine  - BMP today     Health Maintenance  - Colonoscopy: No longer indicated due to age.  - Immunizations: Flu UTD. Tdap 2017. COVID booster and RSV discussed. Shingrix series complete. Pneumococcal complete.  - Depression screening: negative 9/2023      Return in about 7 months (around 10/13/2024) for Medicare Wellness 45 minutes, Labs today.

## 2024-04-22 DIAGNOSIS — I25.10 CHRONIC CORONARY ARTERY DISEASE: ICD-10-CM

## 2024-04-22 RX ORDER — CARVEDILOL 6.25 MG/1
TABLET ORAL
Qty: 180 TABLET | Refills: 3 | Status: SHIPPED | OUTPATIENT
Start: 2024-04-22

## 2024-04-22 RX ORDER — CLOPIDOGREL BISULFATE 75 MG/1
75 TABLET ORAL DAILY
Qty: 90 TABLET | Refills: 3 | Status: SHIPPED | OUTPATIENT
Start: 2024-04-22

## 2024-05-24 ENCOUNTER — TELEPHONE (OUTPATIENT)
Dept: CARDIOLOGY | Facility: CLINIC | Age: 84
End: 2024-05-24
Payer: MEDICARE

## 2024-07-11 DIAGNOSIS — I25.10 CHRONIC CORONARY ARTERY DISEASE: ICD-10-CM

## 2024-07-11 DIAGNOSIS — E78.2 MIXED HYPERLIPIDEMIA: ICD-10-CM

## 2024-07-11 RX ORDER — ATORVASTATIN CALCIUM 40 MG/1
TABLET, FILM COATED ORAL
Qty: 90 TABLET | Refills: 1 | Status: SHIPPED | OUTPATIENT
Start: 2024-07-11

## 2024-07-30 ENCOUNTER — TELEPHONE (OUTPATIENT)
Dept: INTERNAL MEDICINE | Facility: CLINIC | Age: 84
End: 2024-07-30
Payer: MEDICARE

## 2024-07-30 NOTE — TELEPHONE ENCOUNTER
Caller: Isrrael Marino    Relationship: Self    Best call back number: [phone]     What orders are you requesting (i.e. lab or imaging): WALKER THAT HAS BREAKS AND A SEAT, ROLLATOR    In what timeframe would the patient need to come in: AS SOON AS POSSIBLE    Additional notes: PATIENT HAS SIGNIFICANT PAIN IN HIS BACK, WALKING FOR A LONG TIME MEANS HE HAS TO SIT FREQUENTLY, THIS ALSO WILL HELP WITH [facility]. WOULD LIKE A COLORFUL ONE, AND WOULD LIKE TO KNOW ABOUT THE WHEEL SIZE. PATIENT IS 180LBS.

## 2024-08-15 NOTE — THERAPY EVALUATION
.Outpatient Physical Therapy Neuro Initial Evaluation  [facility]     Patient Name: Isrrael Marino  : 1940  MRN: [id number]  Today's Date: 2018      Visit Date: 2018    Patient Active Problem List   Diagnosis   • Gastroesophageal reflux disease   • Atopic rhinitis   • Arthritis   • Peripheral neuropathy   • Arthralgia of hip   • Low back pain   • Irritable bowel syndrome   • Hypertension   • Hyperlipidemia   • Hyperglycemia   • Hemorrhoids   • Gout   • Enlarged prostate   • Erectile dysfunction of nonorganic origin   • Cough   • Constipation   • Chronic diarrhea   • Benign prostatic hyperplasia   • Chronic coronary artery disease   • Primary osteoarthritis of both knees   • Odontoid fracture with routine healing   • Glenohumeral arthritis, right   • Closed fracture of odontoid process of axis (CMS/HCC)   • Odontoid fracture with nonunion   • Neuropathic pain        Past Medical History:   Diagnosis Date   • Arthritis     both knees   • Broken neck (CMS/HCC)    • CAD (coronary artery disease)    • Cancer (CMS/HCC)    • Chondrocalcinosis of knee    • GERD (gastroesophageal reflux disease)    • Gout    • Heart attack (CMS/HCC)      Last Assessed: 28 Mar 2014   • Heart disease    • History of transfusion     own blood with hip surgery   • Hyperlipidemia    • Hypertension    • IBS (irritable bowel syndrome)    • Left rotator cuff tear arthropathy    • Low back pain    • Lower extremity neuropathy    • Lumbar canal stenosis    • Neck pain    • Numbness    • Right hip pain    • Rotator cuff tear arthropathy of right shoulder    • Thin blood (CMS/HCC)         Past Surgical History:   Procedure Laterality Date   • APPENDECTOMY     • BACK SURGERY      spinal decompression and fusion   • BREAST LUMPECTOMY     • CARDIAC CATHETERIZATION      with two stents//. DR. TOLEDO   • CARPAL TUNNEL RELEASE  2014    Neuroplasty Decompression Median Nerve At Carpal Tunnel   • CERVICAL DISCECTOMY  POSTERIOR FUSION WITH BRAIN LAB N/A 7/13/2018    Procedure: CERVICAL LAMINECTOMY DECOMPRESSION POSTERIOR C1-2 POSTERIOR CERVICAL FUSION;  Surgeon: Randall Barnett MD;  Location: [facility];  Service: Neurosurgery   • COLONOSCOPY     • CORONARY STENT PLACEMENT  2013   • HERNIA REPAIR  2002    & 1998   • LUMBAR DISCECTOMY FUSION INSTRUMENTATION     • TONSILLECTOMY     • TOTAL HIP ARTHROPLASTY  2002   • VASECTOMY           Visit Dx:     ICD-10-CM ICD-9-CM   1. Balance problem R26.89 781.99             Patient History     Row Name 11/06/18 0900             History    Chief Complaint Balance Problems;Difficulty Walking;Muscle weakness  -MW      Date Current Problem(s) Began --   n/a  -MW      Patient/Caregiver Goals Improve strength   improve balance  -MW      Hand Dominance right-handed  -MW      Occupation/sports/leisure activities retired from construction  -MW         Pain     Pain Location Neck  -MW      Pain at Present 7  -MW      Pain at Best 0  -MW      Pain at Worst 10  -MW      What Performance Factors Make the Current Problem(s) WORSE? no medication  -MW      What Performance Factors Make the Current Problem(s) BETTER? medication  -MW         Fall Risk Assessment    Any falls in the past year: No  -MW         Daily Activities    Barriers to learning None  -MW      Pt Participated in POC and Goals Yes  -MW        User Key  (r) = Recorded By, (t) = Taken By, (c) = Cosigned By    Initials Name Provider Type    MW Allyssa Hayes, PT Physical Therapist                    PT Neuro     Row Name 11/06/18 0900             Subjective Pain    Able to rate subjective pain? yes  -MW      Pre-Treatment Pain Level 7  -MW      Post-Treatment Pain Level 7  -MW      Subjective Pain Comment cervical  -MW         Home Living    Living Arrangements house  -MW      Home Accessibility stairs to enter home  -MW      Number of Stairs, Main Entrance one  -MW      Stair Railings, Main Entrance none  -MW      Home Equipment Cane  "  walking sticks, elevated commode  -MW      Living Environment Comment lives with wife  -MW         Vision-Basic Assessment    Current Vision Wears glasses all the time  -MW         Cognition    Overall Cognitive Status WFL  -MW         Sensation    Light Touch Partial deficits in the RLE;Partial deficits in the LLE  -MW      Additional Comments B LE's below mid calf  -MW         Proprioception    Proprioception absent R big toe  -MW         Posture/Observations    Posture/Observations Comments R LE longer then L LE by 3/4\"   -MW         Coordination    Coordination WNL? WNL  -MW      Coordination Tests Rapid Alternating  -MW      Rapid Alternating Bilteral:;Intact  -MW         General ROM    Head/Neck/Trunk --   limited   -MW      GENERAL ROM COMMENTS R knee unable to perform full extension with 7 degrees of flexion  -MW         MMT (Manual Muscle Testing)    General MMT Comments B LE's grossly: 4-/5  -MW         Bed Mobility Assessment/Treatment    Comment (Bed Mobility) independent  -MW         Transfers    Bed-Chair Recluse (Transfers) conditional independence  -MW      Chair-Bed Recluse (Transfers) conditional independence  -MW      Sit-Stand Recluse (Transfers) conditional independence  -MW      Stand-Sit Recluse (Transfers) conditional independence  -MW         Gait/Stairs Assessment/Training    Deviations/Abnormal Patterns (Gait) kayli decreased;gait speed decreased  -MW      Bilateral Gait Deviations --   wide JAGJIT  -MW      Recluse Level (Stairs) supervision  -MW      Handrail Location (Stairs) left side (ascending)  -MW      Number of Steps (Stairs) 12  -MW      Ascending Technique (Stairs) step-over-step  -MW      Descending Technique (Stairs) step-over-step  -MW      Stairs, Impairments impaired balance;strength decreased;pain  -MW        User Key  (r) = Recorded By, (t) = Taken By, (c) = Cosigned By    Initials Name Provider Type    Allyssa Marie, PT Physical Therapist "                  Therapy Education  Given: HEP  Program: New  How Provided: Verbal  Provided to: Patient  Level of Understanding: Verbalized, Demonstrated          PT OP Goals     Row Name 11/06/18 0900          PT Short Term Goals    STG Date to Achieve 11/27/18  -MW     STG 1 Patient to ambulate 10 meters without AD within 9 sec without LOB for improved gait kayli and functional mobility.  -MW     STG 1 Progress New  -MW        Long Term Goals    LTG Date to Achieve 12/18/18  -MW     LTG 1 Patient to improve [place] balance score to >/= 54/56 to decrease client's risk of falls.  -MW     LTG 1 Progress New  -MW     LTG 2 Patient to perform TUG within 10 sec without LOB for improved functional mobility.  -MW     LTG 2 Progress New  -MW     LTG 3 Client to improve mini-BEST test balance score to >/= 26/28 to decrease client's risk of falls.  -MW     LTG 3 Progress New  -MW     LTG 4 Patient to improve FGA score to >/= 28/30 to decrease client's risk of falls.  -MW     LTG 4 Progress New  -MW     LTG 5 Patient to be I with HEP.  -MW     LTG 5 Progress New  -MW        Time Calculation    PT Goal Re-Cert Due Date 02/04/19  -MW       User Key  (r) = Recorded By, (t) = Taken By, (c) = Cosigned By    Initials Name Provider Type    Allyssa Marie, PT Physical Therapist                PT Assessment/Plan     Row Name 11/06/18 0900          PT Assessment    Functional Limitations Impaired gait;Impaired locomotion   impaired balance  -     Impairments Balance;Endurance;Gait;Impaired postural alignment;Sensation;Range of motion;Posture;Pain;Locomotion;Muscle strength;Joint mobility  -     Assessment Comments Pt. presents with stable impairements following diagnosis of peripheral neuropathy.  Pt. to benefit from skilled PT services to improve gait, balance, strength and overall functional mobility.  Pt. issued HEP for balance, see for details.  -MW     Please refer to paper survey for additional self-reported information  Yes  -MW     Rehab Potential Good  -MW     Patient/caregiver participated in establishment of treatment plan and goals Yes  -MW     Patient would benefit from skilled therapy intervention Yes  -MW        PT Plan    PT Frequency 1x/week  -MW     Predicted Duration of Therapy Intervention (Therapy Eval) 6 visits  -MW     Planned CPT's? PT EVAL LOW COMPLEXITY: 66041;PT THER PROC EA 15 MIN: 07955;PT NEUROMUSC RE-EDUCATION EA 15 MIN: 07826;PT GAIT TRAINING EA 15 MIN: 51705  -MW     PT Plan Comments PT services to improve gait, balance, stength and overall functional mobility.  -MW       User Key  (r) = Recorded By, (t) = Taken By, (c) = Cosigned By    Initials Name Provider Type    Allyssa Marie, PT Physical Therapist                   Exercises     Row Name 11/06/18 0900             Subjective Pain    Able to rate subjective pain? yes  -MW      Pre-Treatment Pain Level 7  -MW      Post-Treatment Pain Level 7  -MW      Subjective Pain Comment cervical  -MW         Exercise 1    Exercise Name 1 Issued and performed HEP, see for details.  -MW      Time 1 10 min  -MW        User Key  (r) = Recorded By, (t) = Taken By, (c) = Cosigned By    Initials Name Provider Type    Allyssa Marie, PT Physical Therapist                      Outcome Measure Options: 10 Meter Walk, Ascencio Balance, FGA (Functional Gait Assessment), Mini-Best Test, Timed Up and Go (TUG)  10 Meter Walk Test Self-Selected Velocity  Self-Selected Velocity: Trial 1: 10.92 sec.  10 Meter Walk Test Fast Velocity  10 Meter Walk Fast Velocity: Trial 1: 9.73 sec.  Ascencio Balance Scale  Sitting to Standing: able to stand without using hands and stabilize independently  Standing Unsupported: able to stand safely for 2 minutes  Sitting with Back Unsupported but Feet Supported on Floor or on Stool: able to sit safely and securely for 2 minutes  Standing to Sitting: sits safely with minimal use of hands  Transfers: able to transfer safely with minor use of  hands  Standing Unsupported with Eyes Closed: able to stand 10 seconds with supervision  Standing Unsupported with Feet Together: able to place feet together independently and stand 1 minute with supervision  Reaching Forward with Outstretched Arm While Standing: can reach forward confidently 25 cm (10 inches)   Object From the Floor From a Standing Position: able to  object safely and easily  Turning to Look Behind Over Left and Right Shoulders While Standing: looks behind from both sides and weight shifts well  Turn 360 Degrees: able to turn 360 degrees safely but slowly  Place Alternate Foot on Step or Stool While Standing Unsupported: able to stand independently and safely and complete 8 steps in 20 seconds  Standing Unsupported with One Foot in Front: able to place foot ahead independently and hold 30 seconds  Standing on One Leg: able to lift leg independently and hold >/equal to 3 seconds  Ascencio Total Score: 49  Functional Gait Assessment (FGA)  Gait Level Surface: Mild Impairment  Change in Gait Speed: Mild Impairment  Gait with Horizontal Head Turns: Normal  Gait with Vertical Head Turns: Normal  Gait and Pivot Turn: Normal  Step Over Obstacle: Normal  Gait with Narrow Base of Support: Severe Impairment  Gait with Eyes Closed: Mild Impairment  Ambulating Backwards: Normal  Steps: Mild Impairment  FGA Total Score: 23  Mini Best  Mini Best Score/Comments: 22/28  Timed Up and Go (TUG)  TUG Test 1: 12.23 seconds  TUG Test 2: 15.57 seconds (with cog)    Time Calculation:   Start Time: 0900   Therapy Suggested Charges     Code   Minutes Charges    None           Therapy Charges for Today     Code Description Service Date Service Provider Modifiers Qty    00569565659 HC PT MOBILITY CURRENT 11/6/2018 Allyssa Hayes, PT GP, CJ 1    33174043066 HC PT MOBILITY PROJECTED 11/6/2018 Allyssa Hayes, PT GP, CI 1    75909651011 HC PT EVAL LOW COMPLEXITY 4 11/6/2018 Allyssa Hayes, PT GP 1     [id number]  PT THER PROC EA 15 MIN 11/6/2018 Allyssa Hayes, PT GP 1          PT G-Codes  Outcome Measure Options: 10 Meter Walk, Ascencio Balance, FGA (Functional Gait Assessment), Mini-Best Test, Timed Up and Go (TUG)  Ascencio Total Score: 49  FGA Total Score: 23  TUG Test 1: 12.23 seconds  TUG Test 2: 15.57 seconds (with cog)  Functional Limitation: Mobility: Walking and moving around  Mobility: Walking and Moving Around Current Status (): At least 20 percent but less than 40 percent impaired, limited or restricted  Mobility: Walking and Moving Around Goal Status (): At least 1 percent but less than 20 percent impaired, limited or restricted         Allyssa Hayes, PT  11/6/2018      [FreeTextEntry1] : cont meds  follow up labs, labs drawn in office  bp stable, cont meds, cont with low salt diet  reviewed previous labs with patient-- elevated chol and rising psa, normal a1c and cbc repeat in office today  follow up cardio

## 2024-09-04 ENCOUNTER — EXTERNAL PBMM DATA (OUTPATIENT)
Dept: PHARMACY | Facility: OTHER | Age: 84
End: 2024-09-04
Payer: MEDICARE

## 2024-09-26 ENCOUNTER — EXTERNAL PBMM DATA (OUTPATIENT)
Dept: PHARMACY | Facility: OTHER | Age: 84
End: 2024-09-26
Payer: MEDICARE

## 2024-10-16 ENCOUNTER — POP HEALTH PHARMACY (OUTPATIENT)
Dept: PHARMACY | Facility: OTHER | Age: 84
End: 2024-10-16
Payer: MEDICARE

## 2024-11-11 ENCOUNTER — OFFICE VISIT (OUTPATIENT)
Dept: CARDIOLOGY | Facility: CLINIC | Age: 84
End: 2024-11-11
Payer: MEDICARE

## 2024-11-11 VITALS
SYSTOLIC BLOOD PRESSURE: 102 MMHG | WEIGHT: 186 LBS | DIASTOLIC BLOOD PRESSURE: 50 MMHG | HEIGHT: 72 IN | BODY MASS INDEX: 25.19 KG/M2 | OXYGEN SATURATION: 95 % | HEART RATE: 65 BPM

## 2024-11-11 DIAGNOSIS — I42.8 NICM (NONISCHEMIC CARDIOMYOPATHY): ICD-10-CM

## 2024-11-11 DIAGNOSIS — I10 ESSENTIAL HYPERTENSION: ICD-10-CM

## 2024-11-11 DIAGNOSIS — E78.5 DYSLIPIDEMIA: ICD-10-CM

## 2024-11-11 DIAGNOSIS — I25.10 CORONARY ARTERY DISEASE INVOLVING NATIVE CORONARY ARTERY OF NATIVE HEART WITHOUT ANGINA PECTORIS: Primary | ICD-10-CM

## 2024-11-11 PROCEDURE — 1159F MED LIST DOCD IN RCRD: CPT | Performed by: INTERNAL MEDICINE

## 2024-11-11 PROCEDURE — 1160F RVW MEDS BY RX/DR IN RCRD: CPT | Performed by: INTERNAL MEDICINE

## 2024-11-11 PROCEDURE — 99213 OFFICE O/P EST LOW 20 MIN: CPT | Performed by: INTERNAL MEDICINE

## 2024-11-11 NOTE — PROGRESS NOTES
[facility]  Subjective:     Encounter Date: 11/11/2024      Patient ID: Isrrael Marino is a 84 y.o. male.    Chief Complaint: Chronic coronary artery disease      PROBLEM LIST:  Coronary artery disease:  Non STEMI, 09/22/2013.  Cardiac catheterization:  99% first diagonal (2.5 x 15 Xience), 95% second diagonal (PTCA), 50% LAD, FFR 0.83.  EF 50%.  On 11/19/2013:  Crescendo angina.  Catheterization:  95% LAD/70% second diagonal 3.0 x 23-mm XIENCE (LAD) and 2.75 x 12-mm XIENCE (second diagonal). Widely  patent first diagonal stent. .  Echocardiogram, 1/8/2021: EF 30%. No hemodynamically significant valvular heart disease.   LHC, 1/8/2021: EF 25% with dilated left ventricle. Low LV filling pressures. No flow-limiting coronary artery disease.  Hypertension.  Dyslipidemia.   Lower  extremity neuropathy.  Arthritis.  Skin cancer with removal  Traumatic fracture of C2  Surgeries:  Appendectomy  Lumbar surgery  Carpal tunnel surgery  Hernia repair  Right hip replacement  Vasectomy         History of Present Illness  Isrrael Marino returns today for 1 year follow up with a history of CAD, NICM, and cardiac risk factors. Since last visit, patient has been doing well overall from a cardiovascular standpoint. Patient stays busy and active by hunting but has developed some difficulty. He reports he has been feeling short of breath and a dull chest pain while carrying his hunting equipment. Patient states he has developed some dizziness that is mostly noticeable when standing and turning his head side to side. He reports worsening neuropathy in his hands and feet. Patient has persistent back pain due to a previous spinal fusion and is currently being followed by pain management. He was given oxycodone and duloxetine that he uses infrequently. Patient was previously taking Aleve but discontinued due to a decline in his kidney function and now typically uses Tylenol. He has frequent  "swelling in his legs and weighs himself daily to monitor fluid retention. Patient uses lasix PRN dependent upon his weight being >180 lbs. He notices his blood pressure is typically low while taking oxycodone  and questions how low is too low for a SBP and was advised. Patient denies orthopnea, palpitations, and syncope.        Allergies   Allergen Reactions    Ibuprofen Hives     \"Pt states he hasn't taken this in a long time\"         Current Outpatient Medications:     acetaminophen (TYLENOL) 500 MG tablet, Take 2 tablets by mouth Every 8 (Eight) Hours. For 1 week, then as needed (Patient taking differently: Take 2 tablets by mouth As Needed.), Disp: 42 tablet, Rfl: 0    atorvastatin (LIPITOR) 40 MG tablet, TAKE ONE TABLET BY MOUTH EVERY EVENING, Disp: 90 tablet, Rfl: 1    B Complex Vitamins (VITAMIN B COMPLEX PO), Take 1 tablet by mouth Daily., Disp: , Rfl:     carvedilol (COREG) 6.25 MG tablet, TAKE ONE TABLET BY MOUTH EVERY 12 HOURS, Disp: 180 tablet, Rfl: 3    Cholecalciferol (VITAMIN D3) 125 MCG (5000 UT) capsule capsule, Take 2,000 Units by mouth Daily., Disp: , Rfl:     clopidogrel (PLAVIX) 75 MG tablet, TAKE ONE TABLET BY MOUTH DAILY, Disp: 90 tablet, Rfl: 3    DULoxetine (CYMBALTA) 30 MG capsule, TAKE TWO CAPSULES BY MOUTH DAILY, Disp: 180 capsule, Rfl: 1    etodolac (LODINE) 200 MG capsule, Take 1 capsule by mouth Every 8 (Eight) Hours As Needed (pain)., Disp: 90 capsule, Rfl: 1    fluticasone (FLONASE) 50 MCG/ACT nasal spray, 1 spray into the nostril(s) as directed by provider Daily. (Patient taking differently: Administer 1 spray into the nostril(s) as directed by provider As Needed.), Disp: 48 g, Rfl: 0    furosemide (LASIX) 20 MG tablet, Take 1 tablet by mouth Daily. (Patient taking differently: Take 1 tablet by mouth As Needed.), Disp: 90 tablet, Rfl: 0    lisinopril (PRINIVIL,ZESTRIL) 2.5 MG tablet, Take 1 tablet by mouth Daily., Disp: 90 tablet, Rfl: 3    Melatonin 3 MG capsule, Take 1 capsule by " "mouth As Needed., Disp: , Rfl:     oxyCODONE (ROXICODONE) 5 MG immediate release tablet, 1.5 tablets Every 12 (Twelve) Hours As Needed., Disp: , Rfl:     tamsulosin (FLOMAX) 0.4 MG capsule 24 hr capsule, Take 1 capsule by mouth Daily., Disp: 90 capsule, Rfl: 3    triamcinolone (KENALOG) 0.1 % cream, , Disp: , Rfl:     vitamin B-12 (CYANOCOBALAMIN) 1000 MCG tablet, Take 1 tablet by mouth Daily., Disp: , Rfl:     The following portions of the patient's history were reviewed and updated as appropriate: allergies, current medications, past family history, past medical history, past social history, past surgical history and problem list.    ROS       Objective:     Vitals:    11/11/24 1426   BP: 102/50   BP Location: Left arm   Patient Position: Sitting   Cuff Size: Adult   Pulse: 65   SpO2: 95%   Weight: 84.4 kg (186 lb)   Height: 182.9 cm (72\")         Vitals reviewed.   Constitutional:       Appearance: Well-developed and not in distress.   Neck:      Thyroid: No thyromegaly.      Vascular: No carotid bruit or JVD.   Pulmonary:      Breath sounds: Normal breath sounds.   Cardiovascular:      Regular rhythm.      No gallop. No S3 and S4 gallop.   Pulses:     Intact distal pulses.      Carotid: 2+ bilaterally.     Radial: 2+ bilaterally.  Edema:     Peripheral edema present.     Ankle: bilateral 1+ edema of the ankle.  Abdominal:      General: Bowel sounds are normal.      Palpations: Abdomen is soft. There is no abdominal mass.      Tenderness: There is no abdominal tenderness.   Musculoskeletal:         General: No deformity.      Extremities: No clubbing present.Skin:     General: Skin is warm and dry.      Findings: No rash.   Neurological:      Mental Status: Alert and oriented to person, place, and time.         Lab Review:  Lab Results   Component Value Date    GLUCOSE 93 03/13/2024    BUN 27 (H) 03/13/2024    CREATININE 1.02 03/13/2024    BCR 26.5 (H) 03/13/2024    K 4.3 03/13/2024    CO2 28.0 03/13/2024    " CALCIUM 9.2 03/13/2024     Lab Results   Component Value Date    WBC 10.65 12/11/2023    RBC 4.71 12/11/2023    HGB 13.1 12/11/2023    HCT 41.1 12/11/2023    MCV 87.3 12/11/2023     12/11/2023         Procedures      Advance Care Planning   ACP discussion was held with the patient during this visit. Patient has an advance directive in EMR which is still valid.            Assessment:   Diagnoses and all orders for this visit:    1. Coronary artery disease involving native coronary artery of native heart without angina pectoris (Primary)    2. NICM (nonischemic cardiomyopathy)    3. Essential hypertension    4. Dyslipidemia        Impression:  1. Coronary artery disease. Stable without angina on current activity. Continue on Plavix 75 mg daily for antiplatelet therapy.     2. NICM (nonischemic cardiomyopathy). Stable and asymptomatic.     3. Essential hypertension. Well controlled. Continue on carvedilol 6.25 mg BID for hypertension. Continue on Lasix 20 mg daily PRN for fluid retention. Continue on lisinopril 2.5 mg daily for hypertension.     4. Dyslipidemia. No labs for review. Continue on atorvastatin 40 mg daily for hyperlipidemia.     5.  Atypical chest pain nonischemic    Plan:  Stable cardiac status.   Continue current medications.  Revisit in 12 MO, or sooner as needed.    Casimiro Bernal MD          Scribed for Casimiro Bernal MD by Mekhi Lundy. 11/11/2024  16:27 EST      Please note that portions of this note may have been completed with a voice recognition program. Efforts were made to edit the dictations, but occasionally words are mistranscribed.

## 2024-12-13 ENCOUNTER — POP HEALTH PHARMACY (OUTPATIENT)
Dept: PHARMACY | Facility: OTHER | Age: 84
End: 2024-12-13
Payer: MEDICARE

## 2025-01-27 ENCOUNTER — LAB (OUTPATIENT)
Dept: LAB | Facility: HOSPITAL | Age: 85
End: 2025-01-27
Payer: MEDICARE

## 2025-01-27 ENCOUNTER — OFFICE VISIT (OUTPATIENT)
Dept: INTERNAL MEDICINE | Facility: CLINIC | Age: 85
End: 2025-01-27
Payer: MEDICARE

## 2025-01-27 VITALS
OXYGEN SATURATION: 95 % | BODY MASS INDEX: 25.11 KG/M2 | WEIGHT: 185.4 LBS | RESPIRATION RATE: 18 BRPM | HEART RATE: 68 BPM | SYSTOLIC BLOOD PRESSURE: 100 MMHG | TEMPERATURE: 97.3 F | HEIGHT: 72 IN | DIASTOLIC BLOOD PRESSURE: 52 MMHG

## 2025-01-27 DIAGNOSIS — E78.2 MIXED HYPERLIPIDEMIA: ICD-10-CM

## 2025-01-27 DIAGNOSIS — M51.9 LUMBAR DISC DISEASE: Primary | ICD-10-CM

## 2025-01-27 DIAGNOSIS — E61.1 IRON DEFICIENCY: ICD-10-CM

## 2025-01-27 DIAGNOSIS — R73.03 PREDIABETES: ICD-10-CM

## 2025-01-27 DIAGNOSIS — I10 PRIMARY HYPERTENSION: ICD-10-CM

## 2025-01-27 DIAGNOSIS — Z23 NEED FOR VACCINATION: ICD-10-CM

## 2025-01-27 DIAGNOSIS — M50.30 DDD (DEGENERATIVE DISC DISEASE), CERVICAL: ICD-10-CM

## 2025-01-27 PROBLEM — D64.9 NORMOCYTIC ANEMIA: Status: RESOLVED | Noted: 2023-12-11 | Resolved: 2025-01-27

## 2025-01-27 LAB — HBA1C MFR BLD: 5.9 % (ref 4.8–5.6)

## 2025-01-27 PROCEDURE — 83540 ASSAY OF IRON: CPT

## 2025-01-27 PROCEDURE — 1159F MED LIST DOCD IN RCRD: CPT | Performed by: INTERNAL MEDICINE

## 2025-01-27 PROCEDURE — 83036 HEMOGLOBIN GLYCOSYLATED A1C: CPT

## 2025-01-27 PROCEDURE — 84466 ASSAY OF TRANSFERRIN: CPT

## 2025-01-27 PROCEDURE — G0008 ADMIN INFLUENZA VIRUS VAC: HCPCS | Performed by: INTERNAL MEDICINE

## 2025-01-27 PROCEDURE — 99214 OFFICE O/P EST MOD 30 MIN: CPT | Performed by: INTERNAL MEDICINE

## 2025-01-27 PROCEDURE — 80061 LIPID PANEL: CPT

## 2025-01-27 PROCEDURE — 90662 IIV NO PRSV INCREASED AG IM: CPT | Performed by: INTERNAL MEDICINE

## 2025-01-27 PROCEDURE — 82728 ASSAY OF FERRITIN: CPT

## 2025-01-27 PROCEDURE — 1160F RVW MEDS BY RX/DR IN RCRD: CPT | Performed by: INTERNAL MEDICINE

## 2025-01-27 PROCEDURE — 80053 COMPREHEN METABOLIC PANEL: CPT

## 2025-01-27 PROCEDURE — 1125F AMNT PAIN NOTED PAIN PRSNT: CPT | Performed by: INTERNAL MEDICINE

## 2025-01-27 RX ORDER — ATORVASTATIN CALCIUM 40 MG/1
40 TABLET, FILM COATED ORAL DAILY
Qty: 90 TABLET | Refills: 3 | Status: SHIPPED | OUTPATIENT
Start: 2025-01-27

## 2025-01-27 RX ORDER — CARVEDILOL 6.25 MG/1
6.25 TABLET ORAL EVERY 12 HOURS
Qty: 180 TABLET | Refills: 3 | Status: SHIPPED | OUTPATIENT
Start: 2025-01-27

## 2025-01-27 NOTE — PROGRESS NOTES
Internal Medicine Follow Up    Chief Complaint  Isrrael Marino is a 84 y.o. male who presents today for follow up of chronic medical conditions outlined below.    Chief Complaint   Patient presents with    Follow-up    Hypertension    Hyperlipidemia        HPI  Mr. Marino comes in today for follow up. He is scheduled for interventional procedure at  this coming month to address his lumbar DDD. He is taking oxycodone and aleve PRN. He is trying to limit NSAID use due to risk of kidney dysfunction. BP has been low. He feels fatigued. Has used iron supplements PRN due to fatigue. Has continued lasix PRN. Felt mildly SOA and noted weight gain so took 40mg last night. Feels better today. Declined to complete a wellness exam.    Hypertension  Associated symptoms include shortness of breath. Pertinent negatives include no chest pain.   Hyperlipidemia  Associated symptoms include shortness of breath. Pertinent negatives include no chest pain.        Review of Systems  Review of Systems   Constitutional:  Positive for fatigue (off and on) and unexpected weight gain.   HENT:  Positive for postnasal drip.    Respiratory:  Positive for shortness of breath.    Cardiovascular:  Positive for leg swelling. Negative for chest pain.   Musculoskeletal:  Positive for back pain.        Current Medications  Current Outpatient Medications on File Prior to Visit   Medication Sig Dispense Refill    acetaminophen (TYLENOL) 500 MG tablet Take 2 tablets by mouth Every 8 (Eight) Hours. For 1 week, then as needed (Patient taking differently: Take 2 tablets by mouth As Needed.) 42 tablet 0    B Complex Vitamins (VITAMIN B COMPLEX PO) Take 1 tablet by mouth Daily.      Cholecalciferol (VITAMIN D3) 125 MCG (5000 UT) capsule capsule Take 2,000 Units by mouth Daily.      clopidogrel (PLAVIX) 75 MG tablet TAKE ONE TABLET BY MOUTH DAILY 90 tablet 3    DULoxetine (CYMBALTA) 30 MG capsule TAKE TWO CAPSULES BY MOUTH DAILY 180 capsule 1     "fluticasone (FLONASE) 50 MCG/ACT nasal spray 1 spray into the nostril(s) as directed by provider Daily. (Patient taking differently: Administer 1 spray into the nostril(s) as directed by provider As Needed.) 48 g 0    furosemide (LASIX) 20 MG tablet Take 1 tablet by mouth Daily. (Patient taking differently: Take 1 tablet by mouth As Needed.) 90 tablet 0    Melatonin 3 MG capsule Take 1 capsule by mouth As Needed.      oxyCODONE (ROXICODONE) 5 MG immediate release tablet 1.5 tablets Every 12 (Twelve) Hours As Needed.      tamsulosin (FLOMAX) 0.4 MG capsule 24 hr capsule Take 1 capsule by mouth Daily. 90 capsule 3    triamcinolone (KENALOG) 0.1 % cream       vitamin B-12 (CYANOCOBALAMIN) 1000 MCG tablet Take 1 tablet by mouth Daily.      [DISCONTINUED] atorvastatin (LIPITOR) 40 MG tablet TAKE ONE TABLET BY MOUTH EVERY EVENING (Patient taking differently: Take 1 tablet by mouth Daily.) 90 tablet 1    [DISCONTINUED] carvedilol (COREG) 6.25 MG tablet TAKE ONE TABLET BY MOUTH EVERY 12 HOURS 180 tablet 3    [DISCONTINUED] lisinopril (PRINIVIL,ZESTRIL) 2.5 MG tablet Take 1 tablet by mouth Daily. 90 tablet 3    [DISCONTINUED] etodolac (LODINE) 200 MG capsule Take 1 capsule by mouth Every 8 (Eight) Hours As Needed (pain). (Patient not taking: Reported on 1/27/2025) 90 capsule 1     No current facility-administered medications on file prior to visit.       Allergies  Allergies   Allergen Reactions    Ibuprofen Hives     \"Pt states he hasn't taken this in a long time\"       Objective  Visit Vitals  /52   Pulse 68   Temp 97.3 °F (36.3 °C) (Temporal)   Resp 18   Ht 182.9 cm (72.01\")   Wt 84.1 kg (185 lb 6.4 oz)   SpO2 95%   BMI 25.14 kg/m²        Physical Exam  Physical Exam  Vitals and nursing note reviewed.   Constitutional:       General: He is not in acute distress.     Appearance: He is well-developed. He is not ill-appearing or toxic-appearing.   HENT:      Head: Normocephalic and atraumatic.      Ears:      Comments: " Hard of hearing  Eyes:      Conjunctiva/sclera: Conjunctivae normal.   Cardiovascular:      Rate and Rhythm: Normal rate and regular rhythm.      Heart sounds: Normal heart sounds.   Pulmonary:      Effort: Pulmonary effort is normal. No respiratory distress.      Breath sounds: Normal breath sounds. No rales.   Musculoskeletal:      Right lower leg: Edema (trace-1+) present.      Left lower leg: Edema (trace-1+) present.   Skin:     General: Skin is warm and dry.   Neurological:      Mental Status: He is alert and oriented to person, place, and time. Mental status is at baseline.      Gait: Gait normal.         Results  Results for orders placed or performed in visit on 03/13/24   Basic Metabolic Panel    Collection Time: 03/13/24  8:50 AM    Specimen: Blood   Result Value Ref Range    Glucose 93 65 - 99 mg/dL    BUN 27 (H) 8 - 23 mg/dL    Creatinine 1.02 0.76 - 1.27 mg/dL    Sodium 141 136 - 145 mmol/L    Potassium 4.3 3.5 - 5.2 mmol/L    Chloride 105 98 - 107 mmol/L    CO2 28.0 22.0 - 29.0 mmol/L    Calcium 9.2 8.6 - 10.5 mg/dL    BUN/Creatinine Ratio 26.5 (H) 7.0 - 25.0    Anion Gap 8.0 5.0 - 15.0 mmol/L    eGFR 72.0 >60.0 mL/min/1.73        Assessment and Plan  Diagnoses and all orders for this visit:    Lumbar disc disease  DDD (degenerative disc disease), cervical  - hx of odontoid fracture s/p cervical fusion as well as lumbar fusion L3-4, L4-5  - seeing [facility], PMR, and pain management.   - had planned procedure upcoming  - using oxycodone and aleve PRN    Primary hypertension  - BP low and feeling fatigued  - discontinue lisinopril  - continue coreg 6.25mg BID  - CMP today    Mixed hyperlipidemia  - continue atorvastatin 40mg daily and update lipids    Prediabetes  - update A1c    Iron deficiency  - feeling fatigued  - will update CBC and iron studies     Need for vaccination  -     Fluzone High-Dose 65+yrs ([id number])      Health Maintenance  - Colonoscopy: No longer indicated due to  age.  - Immunizations: Flu today. Tdap 2017. COVID booster and RSV discussed. Shingrix series complete. Pneumococcal complete.  - Depression screening: negative 9/2023    Return in about 3 months (around 4/27/2025) for Follow up HTN, Labs today.

## 2025-01-28 LAB
ALBUMIN SERPL-MCNC: 3.2 G/DL (ref 3.5–5.2)
ALBUMIN/GLOB SERPL: 0.9 G/DL
ALP SERPL-CCNC: 89 U/L (ref 39–117)
ALT SERPL W P-5'-P-CCNC: 13 U/L (ref 1–41)
ANION GAP SERPL CALCULATED.3IONS-SCNC: 12.4 MMOL/L (ref 5–15)
AST SERPL-CCNC: 25 U/L (ref 1–40)
BILIRUB SERPL-MCNC: 0.5 MG/DL (ref 0–1.2)
BUN SERPL-MCNC: 28 MG/DL (ref 8–23)
BUN/CREAT SERPL: 21.9 (ref 7–25)
CALCIUM SPEC-SCNC: 9 MG/DL (ref 8.6–10.5)
CHLORIDE SERPL-SCNC: 102 MMOL/L (ref 98–107)
CHOLEST SERPL-MCNC: 132 MG/DL (ref 0–200)
CO2 SERPL-SCNC: 25.6 MMOL/L (ref 22–29)
CREAT SERPL-MCNC: 1.28 MG/DL (ref 0.76–1.27)
EGFRCR SERPLBLD CKD-EPI 2021: 54.5 ML/MIN/1.73
FERRITIN SERPL-MCNC: 132 NG/ML (ref 30–400)
GLOBULIN UR ELPH-MCNC: 3.4 GM/DL
GLUCOSE SERPL-MCNC: 58 MG/DL (ref 65–99)
HDLC SERPL-MCNC: 50 MG/DL (ref 40–60)
IRON 24H UR-MRATE: 47 MCG/DL (ref 59–158)
IRON SATN MFR SERPL: 17 % (ref 20–50)
LDLC SERPL CALC-MCNC: 68 MG/DL (ref 0–100)
LDLC/HDLC SERPL: 1.36 {RATIO}
POTASSIUM SERPL-SCNC: 4.1 MMOL/L (ref 3.5–5.2)
PROT SERPL-MCNC: 6.6 G/DL (ref 6–8.5)
SODIUM SERPL-SCNC: 140 MMOL/L (ref 136–145)
TIBC SERPL-MCNC: 280 MCG/DL (ref 298–536)
TRANSFERRIN SERPL-MCNC: 188 MG/DL (ref 200–360)
TRIGL SERPL-MCNC: 70 MG/DL (ref 0–150)
VLDLC SERPL-MCNC: 14 MG/DL (ref 5–40)

## 2025-01-29 DIAGNOSIS — E61.1 IRON DEFICIENCY: Primary | ICD-10-CM

## 2025-02-10 ENCOUNTER — TELEPHONE (OUTPATIENT)
Dept: INTERNAL MEDICINE | Facility: CLINIC | Age: 85
End: 2025-02-10
Payer: MEDICARE

## 2025-02-10 NOTE — TELEPHONE ENCOUNTER
----- Message from Ivanna George sent at 2/9/2025  8:22 PM EST -----  Please call patient. Iron levels low. Start OTC iron every other day. He needs to come back to lab to recollect CBC to determine if he has an anemia. His kidney function is slightly diminished. Avoid NSAIDs and increase hydration. Cholesterol is controlled and prediabetes is stable.

## 2025-02-10 NOTE — TELEPHONE ENCOUNTER
LVM for pt to return call to office to discuss lab results. Left office number.       ----- Message from Ivanna George sent at 2/9/2025  8:22 PM EST -----  Please call patient. Iron levels low. Start OTC iron every other day. He needs to come back to lab to recollect CBC to determine if he has an anemia. His kidney function is slightly diminished. Avoid NSAIDs and increase hydration. Cholesterol is controlled and prediabetes is stable.

## 2025-02-11 ENCOUNTER — LAB (OUTPATIENT)
Dept: LAB | Facility: HOSPITAL | Age: 85
End: 2025-02-11
Payer: MEDICARE

## 2025-02-11 DIAGNOSIS — E61.1 IRON DEFICIENCY: ICD-10-CM

## 2025-02-11 LAB
DEPRECATED RDW RBC AUTO: 41 FL (ref 37–54)
ERYTHROCYTE [DISTWIDTH] IN BLOOD BY AUTOMATED COUNT: 12.7 % (ref 12.3–15.4)
HCT VFR BLD AUTO: 44 % (ref 37.5–51)
HGB BLD-MCNC: 14.9 G/DL (ref 13–17.7)
MCH RBC QN AUTO: 30.3 PG (ref 26.6–33)
MCHC RBC AUTO-ENTMCNC: 33.9 G/DL (ref 31.5–35.7)
MCV RBC AUTO: 89.6 FL (ref 79–97)
PLATELET # BLD AUTO: 212 10*3/MM3 (ref 140–450)
PMV BLD AUTO: 11.7 FL (ref 6–12)
RBC # BLD AUTO: 4.91 10*6/MM3 (ref 4.14–5.8)
WBC NRBC COR # BLD AUTO: 9.88 10*3/MM3 (ref 3.4–10.8)

## 2025-02-11 PROCEDURE — 85027 COMPLETE CBC AUTOMATED: CPT

## 2025-02-13 ENCOUNTER — TELEPHONE (OUTPATIENT)
Dept: INTERNAL MEDICINE | Facility: CLINIC | Age: 85
End: 2025-02-13
Payer: MEDICARE

## 2025-02-13 NOTE — TELEPHONE ENCOUNTER
----- Message from Ivanna George sent at 2/12/2025 10:41 PM EST -----  Please mail patient a result letter.    Your blood counts are normal.

## 2025-03-14 RX ORDER — FUROSEMIDE 20 MG/1
20 TABLET ORAL DAILY
Qty: 90 TABLET | Refills: 0 | Status: SHIPPED | OUTPATIENT
Start: 2025-03-14

## 2025-03-14 NOTE — TELEPHONE ENCOUNTER
Caller: Isrrael Marino    Relationship: Self    Best call back number:     Requested Prescriptions:   Requested Prescriptions     Pending Prescriptions Disp Refills    furosemide (LASIX) 20 MG tablet 90 tablet 0     Sig: Take 1 tablet by mouth Daily.        Pharmacy where request should be sent: [facility] [id number] [address] - [phone] PH - [phone] FX     Last office visit with prescribing clinician: 1/27/2025   Last telemedicine visit with prescribing clinician: Visit date not found   Next office visit with prescribing clinician: 4/30/2025     Additional details provided by patient: PATIENT HAS 2 OR 3 LEFT     Does the patient have less than a 3 day supply:  [x] Yes  [] No    Would you like a call back once the refill request has been completed: [x] Yes [] No    If the office needs to give you a call back, can they leave a voicemail: [x] Yes [] No    Jyothi Agustin Rep   03/14/25 10:12 EDT

## 2025-04-30 ENCOUNTER — LAB (OUTPATIENT)
Dept: LAB | Facility: HOSPITAL | Age: 85
End: 2025-04-30
Payer: MEDICARE

## 2025-04-30 ENCOUNTER — RESULTS FOLLOW-UP (OUTPATIENT)
Dept: INTERNAL MEDICINE | Facility: CLINIC | Age: 85
End: 2025-04-30

## 2025-04-30 ENCOUNTER — OFFICE VISIT (OUTPATIENT)
Dept: INTERNAL MEDICINE | Facility: CLINIC | Age: 85
End: 2025-04-30
Payer: MEDICARE

## 2025-04-30 VITALS
BODY MASS INDEX: 24.57 KG/M2 | DIASTOLIC BLOOD PRESSURE: 70 MMHG | OXYGEN SATURATION: 97 % | HEIGHT: 72 IN | HEART RATE: 58 BPM | SYSTOLIC BLOOD PRESSURE: 138 MMHG | WEIGHT: 181.4 LBS | TEMPERATURE: 98.4 F

## 2025-04-30 DIAGNOSIS — I10 PRIMARY HYPERTENSION: Primary | ICD-10-CM

## 2025-04-30 DIAGNOSIS — N17.9 AKI (ACUTE KIDNEY INJURY): ICD-10-CM

## 2025-04-30 DIAGNOSIS — M51.9 LUMBAR DISC DISEASE: ICD-10-CM

## 2025-04-30 DIAGNOSIS — J30.9 ALLERGIC RHINITIS, UNSPECIFIED SEASONALITY, UNSPECIFIED TRIGGER: ICD-10-CM

## 2025-04-30 DIAGNOSIS — E61.1 IRON DEFICIENCY: ICD-10-CM

## 2025-04-30 DIAGNOSIS — G60.9 IDIOPATHIC PERIPHERAL NEUROPATHY: ICD-10-CM

## 2025-04-30 DIAGNOSIS — M50.30 DDD (DEGENERATIVE DISC DISEASE), CERVICAL: ICD-10-CM

## 2025-04-30 LAB
ANION GAP SERPL CALCULATED.3IONS-SCNC: 11.3 MMOL/L (ref 5–15)
BUN SERPL-MCNC: 35 MG/DL (ref 8–23)
BUN/CREAT SERPL: 32.1 (ref 7–25)
CALCIUM SPEC-SCNC: 9.5 MG/DL (ref 8.6–10.5)
CHLORIDE SERPL-SCNC: 108 MMOL/L (ref 98–107)
CO2 SERPL-SCNC: 22.7 MMOL/L (ref 22–29)
CREAT SERPL-MCNC: 1.09 MG/DL (ref 0.76–1.27)
EGFRCR SERPLBLD CKD-EPI 2021: 66.1 ML/MIN/1.73
FERRITIN SERPL-MCNC: 200 NG/ML (ref 30–400)
GLUCOSE SERPL-MCNC: 90 MG/DL (ref 65–99)
IRON 24H UR-MRATE: 88 MCG/DL (ref 59–158)
IRON SATN MFR SERPL: 30 % (ref 20–50)
POTASSIUM SERPL-SCNC: 4.1 MMOL/L (ref 3.5–5.2)
SODIUM SERPL-SCNC: 142 MMOL/L (ref 136–145)
TIBC SERPL-MCNC: 294 MCG/DL (ref 298–536)
TRANSFERRIN SERPL-MCNC: 197 MG/DL (ref 200–360)

## 2025-04-30 PROCEDURE — 80048 BASIC METABOLIC PNL TOTAL CA: CPT

## 2025-04-30 PROCEDURE — 84466 ASSAY OF TRANSFERRIN: CPT

## 2025-04-30 PROCEDURE — 83540 ASSAY OF IRON: CPT

## 2025-04-30 PROCEDURE — 82728 ASSAY OF FERRITIN: CPT

## 2025-04-30 RX ORDER — FLUTICASONE PROPIONATE 50 MCG
1 SPRAY, SUSPENSION (ML) NASAL DAILY PRN
Qty: 48 G | Refills: 3 | Status: SHIPPED | OUTPATIENT
Start: 2025-04-30

## 2025-04-30 RX ORDER — DULOXETIN HYDROCHLORIDE 30 MG/1
60 CAPSULE, DELAYED RELEASE ORAL DAILY
Qty: 60 CAPSULE | Refills: 0 | Status: SHIPPED | OUTPATIENT
Start: 2025-04-30

## 2025-04-30 NOTE — PROGRESS NOTES
Internal Medicine Follow Up    Chief Complaint  Isrrael Marino is a 84 y.o. male who presents today for follow up of chronic medical conditions outlined below.    Chief Complaint   Patient presents with    Hypertension        HPI  Mr. Marino comes in today for follow up. He notes that both interventional procedures at  for his lumbar DDD were unsuccessful so he was released from that clinic. He also reports being released by Dr. Daniels due to issues with insurance payment. He continues to take tylenol arthritis and as needed oxycodone for diffuse arthralgias and back pain. His BP is elevated today but he reports that he has not taken his medications in the last 2 days. Took coreg as he was roomed today. He notes that duloxetine seemed to cause him diarrhea so he stopped using it. He believes this is related to the  because it had not happened previously. He wants a written script to take to another pharmacy so that he can get medication from a different . He is taking iron.    Hypertension  Associated symptoms: no chest pain         Review of Systems  Review of Systems   Constitutional: Negative.    Respiratory: Negative.     Cardiovascular:  Positive for leg swelling. Negative for chest pain.   Gastrointestinal:  Positive for diarrhea (now resolved).   Genitourinary:  Negative for decreased urine volume and difficulty urinating.   Musculoskeletal:  Positive for arthralgias, back pain and gait problem.   Neurological:  Positive for numbness (neuropathy).        Current Medications  Current Outpatient Medications on File Prior to Visit   Medication Sig Dispense Refill    acetaminophen (TYLENOL) 500 MG tablet Take 2 tablets by mouth Every 8 (Eight) Hours. For 1 week, then as needed (Patient taking differently: Take 650 mg by mouth As Needed.) 42 tablet 0    atorvastatin (LIPITOR) 40 MG tablet Take 1 tablet by mouth Daily. 90 tablet 3    B Complex Vitamins (VITAMIN B COMPLEX PO) Take 1  "tablet by mouth Daily.      carvedilol (COREG) 6.25 MG tablet Take 1 tablet by mouth Every 12 (Twelve) Hours. 180 tablet 3    Cholecalciferol (VITAMIN D3) 125 MCG (5000 UT) capsule capsule Take 2,000 Units by mouth Daily.      clopidogrel (PLAVIX) 75 MG tablet TAKE ONE TABLET BY MOUTH DAILY 90 tablet 3    Ferrous Sulfate (IRON PO) Take  by mouth.      furosemide (LASIX) 20 MG tablet Take 1 tablet by mouth Daily. 90 tablet 0    Melatonin 3 MG capsule Take 1 capsule by mouth As Needed.      oxyCODONE (ROXICODONE) 5 MG immediate release tablet 1.5 tablets Every 12 (Twelve) Hours As Needed.      tamsulosin (FLOMAX) 0.4 MG capsule 24 hr capsule Take 1 capsule by mouth Daily. 90 capsule 3    triamcinolone (KENALOG) 0.1 % cream       vitamin B-12 (CYANOCOBALAMIN) 1000 MCG tablet Take 1 tablet by mouth Daily.      [DISCONTINUED] DULoxetine (CYMBALTA) 30 MG capsule TAKE TWO CAPSULES BY MOUTH DAILY 180 capsule 1    [DISCONTINUED] fluticasone (FLONASE) 50 MCG/ACT nasal spray 1 spray into the nostril(s) as directed by provider Daily. (Patient taking differently: Administer 1 spray into the nostril(s) as directed by provider As Needed.) 48 g 0     No current facility-administered medications on file prior to visit.       Allergies  Allergies   Allergen Reactions    Ibuprofen Hives     \"Pt states he hasn't taken this in a long time\"    Duloxetine Nausea Only       Objective  Visit Vitals  /70 (BP Location: Left arm, Patient Position: Sitting)   Pulse 58   Temp 98.4 °F (36.9 °C) (Temporal)   Ht 182.9 cm (72\")   Wt 82.3 kg (181 lb 6.4 oz)   SpO2 97%   BMI 24.60 kg/m²        Physical Exam  Physical Exam  Vitals and nursing note reviewed.   Constitutional:       General: He is not in acute distress.     Appearance: He is well-developed. He is not ill-appearing or toxic-appearing.   HENT:      Head: Normocephalic and atraumatic.   Eyes:      Conjunctiva/sclera: Conjunctivae normal.   Cardiovascular:      Rate and Rhythm: Normal " rate and regular rhythm.      Heart sounds: Normal heart sounds.   Pulmonary:      Effort: Pulmonary effort is normal. No respiratory distress.      Breath sounds: Normal breath sounds. No rales.   Musculoskeletal:      Right lower leg: Edema (trace-1+) present.      Left lower leg: Edema (trace-1+) present.   Skin:     General: Skin is warm and dry.   Neurological:      Mental Status: He is alert and oriented to person, place, and time. Mental status is at baseline.      Gait: Gait normal.         Results  Results for orders placed or performed in visit on 02/11/25   CBC (No Diff)    Collection Time: 02/11/25  3:36 PM    Specimen: Blood   Result Value Ref Range    WBC 9.88 3.40 - 10.80 10*3/mm3    RBC 4.91 4.14 - 5.80 10*6/mm3    Hemoglobin 14.9 13.0 - 17.7 g/dL    Hematocrit 44.0 37.5 - 51.0 %    MCV 89.6 79.0 - 97.0 fL    MCH 30.3 26.6 - 33.0 pg    MCHC 33.9 31.5 - 35.7 g/dL    RDW 12.7 12.3 - 15.4 %    RDW-SD 41.0 37.0 - 54.0 fl    MPV 11.7 6.0 - 12.0 fL    Platelets 212 140 - 450 10*3/mm3        Assessment and Plan  Diagnoses and all orders for this visit:    Primary hypertension  - BP borderline elevated today but off medication x2 days  - continue coreg 6.25mg BID    Allergic rhinitis, unspecified seasonality, unspecified trigger  - continue flonase daily PRN    Iron deficiency  - not anemic  - on iron  - recheck iron studies    LORI (acute kidney injury)  - likely NSAID induced, now using tylenol  - recheck BMP    Idiopathic peripheral neuropathy  - developed diarrhea attributed to duloxetine, discontinued and diarrhea resolved  - will try resuming duloxetine 60mg daily but with supply from different  per his request    DDD (degenerative disc disease), cervical  Lumbar disc disease  - hx of odontoid fracture s/p cervical fusion as well as lumbar fusion L3-4, L4-5  - has been seen by [facility] intervention[facility] pain, PMR, and pain management. No improvement after PNS, bilateral L1/2 and L2/3 RFA  - was  recommended MRI but declined  - using oxycodone and tylenol PRN  - needs new pain mgmt provider, will refer      Health Maintenance  - Colonoscopy: No longer indicated due to age.  - Immunizations: Tdap 2017. COVID and RSV discussed. Shingrix series complete. Pneumococcal complete.  - Depression screening: negative 9/2023    Return in about 6 months (around 10/30/2025) for Follow up, Labs today.

## 2025-04-30 NOTE — LETTER
Isrrael CASEY Ned  [address] [place] [address]    May 1, 2025     Dear Mr. Marino:    Below are the results from your recent visit:    Resulted Orders   Basic Metabolic Panel   Result Value Ref Range    Glucose 90 65 - 99 mg/dL    BUN 35 (H) 8 - 23 mg/dL    Creatinine 1.09 0.76 - 1.27 mg/dL    Sodium 142 136 - 145 mmol/L    Potassium 4.1 3.5 - 5.2 mmol/L    Chloride 108 (H) 98 - 107 mmol/L    CO2 22.7 22.0 - 29.0 mmol/L    Calcium 9.5 8.6 - 10.5 mg/dL    BUN/Creatinine Ratio 32.1 (H) 7.0 - 25.0    Anion Gap 11.3 5.0 - 15.0 mmol/L    eGFR 66.1 >60.0 mL/min/1.73   Iron Profile   Result Value Ref Range    Iron 88 59 - 158 mcg/dL    Iron Saturation (TSAT) 30 20 - 50 %    Transferrin 197 (L) 200 - 360 mg/dL    TIBC 294 (L) 298 - 536 mcg/dL   Ferritin   Result Value Ref Range    Ferritin 200.00 30.00 - 400.00 ng/mL       Your kidney function looks good and iron levels are improving.     If you have any questions or concerns, please don't hesitate to call.         Sincerely,        Ivanna George MD

## 2025-05-19 DIAGNOSIS — I25.10 CHRONIC CORONARY ARTERY DISEASE: ICD-10-CM

## 2025-05-19 RX ORDER — CLOPIDOGREL BISULFATE 75 MG/1
75 TABLET ORAL DAILY
Qty: 90 TABLET | Refills: 1 | Status: SHIPPED | OUTPATIENT
Start: 2025-05-19

## 2025-06-20 DIAGNOSIS — G60.9 IDIOPATHIC PERIPHERAL NEUROPATHY: ICD-10-CM

## 2025-06-20 RX ORDER — DULOXETIN HYDROCHLORIDE 30 MG/1
60 CAPSULE, DELAYED RELEASE ORAL DAILY
Qty: 60 CAPSULE | Refills: 1 | Status: SHIPPED | OUTPATIENT
Start: 2025-06-20

## 2025-06-20 RX ORDER — DULOXETIN HYDROCHLORIDE 30 MG/1
60 CAPSULE, DELAYED RELEASE ORAL DAILY
Qty: 60 CAPSULE | Refills: 1 | Status: SHIPPED | OUTPATIENT
Start: 2025-06-20 | End: 2025-06-20

## 2025-06-20 NOTE — TELEPHONE ENCOUNTER
Caller: Danielbriana Isrrael CARMELA    Relationship: Self    Best call back number: [phone]    Requested Prescriptions:   Requested Prescriptions     Pending Prescriptions Disp Refills    DULoxetine (CYMBALTA) 30 MG capsule 60 capsule 0     Sig: Take 2 capsules by mouth Daily.        Pharmacy where request should be sent: [facility] [address] [place] [address] [phone] [facility] [phone] FX     Last office visit with prescribing clinician: 4/30/2025   Last telemedicine visit with prescribing clinician: Visit date not found   Next office visit with prescribing clinician: 10/30/2025     Additional details provided by patient: PATIENT IS COMPLETELY OUT    Does the patient have less than a 3 day supply:  [x] Yes  [] No    Would you like a call back once the refill request has been completed: [] Yes [x] No    If the office needs to give you a call back, can they leave a voicemail: [] Yes [x] No    Jyothi Post   06/20/25 11:27 EDT

## 2025-06-24 ENCOUNTER — TELEPHONE (OUTPATIENT)
Dept: INTERNAL MEDICINE | Facility: CLINIC | Age: 85
End: 2025-06-24

## 2025-06-24 NOTE — TELEPHONE ENCOUNTER
Caller: Isrrael Marino    Relationship: Self    Best call back number: [phone]    What is the medical concern/diagnosis: PAIN    What specialty or service is being requested: PAIN MEDICINE     What is the provider, practice or medical service name:     What is the office location:     What is the office phone number:     Any additional details: PATIENT STATES THAT HE DECLINED THE APPOINTMENT BECAUSE HE WAS CONFUSED

## (undated) DEVICE — TOOL 14MH30 LEGEND 14CM 3MM: Brand: MIDAS REX ™

## (undated) DEVICE — SKIN PREP TRAY W/CHG: Brand: MEDLINE INDUSTRIES, INC.

## (undated) DEVICE — ADHS SKIN PREMIERPRO EXOFIN TOPICAL HI/VISC .5ML

## (undated) DEVICE — SPNG GZ WOVN 4X4IN 12PLY 10/BX STRL

## (undated) DEVICE — APPL CHLORAPREP W/TINT 26ML BLU

## (undated) DEVICE — MAGNETIC DRAPE: Brand: DEVON

## (undated) DEVICE — COVER,MAYO STAND,STERILE: Brand: MEDLINE

## (undated) DEVICE — SYR LUERLOK 30CC

## (undated) DEVICE — NEEDLE, QUINCKE 22GX3.5": Brand: MEDLINE INDUSTRIES, INC.

## (undated) DEVICE — Device

## (undated) DEVICE — INTENDED USE FOR SURGICAL MARKING ON INTACT SKIN, ALSO PROVIDES A PERMANENT METHOD OF IDENTIFYING OBJECTS IN THE OPERATING ROOM: Brand: WRITESITE® REGULAR TIP SKIN MARKER

## (undated) DEVICE — UNDERGLV SURG BIOGEL INDICAT PI SZ8 BLU

## (undated) DEVICE — NDL SPINE 22G 31/2IN BLK

## (undated) DEVICE — MAYFIELD® DISPOSABLE ADULT SKULL PIN (PLASTIC BASE): Brand: MAYFIELD®

## (undated) DEVICE — SUT VIC 3/0 SH CR8 18IN J864D

## (undated) DEVICE — ARM PADS: Brand: DEROYAL

## (undated) DEVICE — COVER,TABLE,HVY DUTY,60"X90",STRL: Brand: MEDLINE

## (undated) DEVICE — TP CLTH MEDIPORE SFT 2IN 10YD

## (undated) DEVICE — MEDI-VAC NON-CONDUCTIVE SUCTION TUBING: Brand: CARDINAL HEALTH

## (undated) DEVICE — GOWN,REINF,POLY,ECL,PP SLV,XL: Brand: MEDLINE

## (undated) DEVICE — SPHR MARKR STEALTH STATION

## (undated) DEVICE — DRSNG TELFA PAD NONADH STR 1S 3X8IN

## (undated) DEVICE — SYR CONTRL LUERLOK 10CC

## (undated) DEVICE — ELECTRD NDL EDGE/STD EXT 1P 6.5IN

## (undated) DEVICE — CATH DIAG EXPO M/ PK 6FR FL4/FR4 PIG 3PK

## (undated) DEVICE — TBG PENCL TELESCP MEGADYNE SMOKE EVAC 10FT

## (undated) DEVICE — CATH DIAG EXPO .056 FL3.5 6F 100CM

## (undated) DEVICE — IRRIGATOR BULB ASEPTO 60CC STRL

## (undated) DEVICE — NDL HYPO ECLPS SFTY 25G 1 1/2IN

## (undated) DEVICE — SPNG GZ STRL 2S 4X4 12PLY

## (undated) DEVICE — PK CATH CARD 10

## (undated) DEVICE — DEV COMP RAD PRELUDESYNC 24CM

## (undated) DEVICE — BIT NAV2076 VERT STERILE DRILL BIT 2.9MM: Brand: VERTEX® RECONSTRUCTION SYSTEM

## (undated) DEVICE — PAD ARMBRD SURG CONVOL 7.5X20X2IN

## (undated) DEVICE — PIN HOLD TEMP NOHEAD FLUT 1/8X3.5IN

## (undated) DEVICE — GLIDESHEATH 0.035" DILATOR HYDROPHILIC COATED INTRODUCER SHEATH: Brand: GLIDESHEATH

## (undated) DEVICE — PK KN TOTL 10

## (undated) DEVICE — SUT MONOCRYL PLS ANTIB UND 3/0  PS1 27IN

## (undated) DEVICE — BIT DRL VERTEX NAV 2.4MM

## (undated) DEVICE — UNDERCAST PADDING: Brand: DEROYAL

## (undated) DEVICE — BNDG ELAS W/CLIP 6IN 10YD LF STRL

## (undated) DEVICE — DISPOSABLE BIPOLAR FORCEPS 7 3/4" (19.7CM) SCOVILLE BAYONET, INSULATED, 1.5MM TIP AND 12 FT. (3.6M) CABLE: Brand: KIRWAN

## (undated) DEVICE — CRUCIATE RETAINING FEMORAL
Type: IMPLANTABLE DEVICE | Site: KNEE | Status: NON-FUNCTIONAL
Brand: TRIATHLON
Removed: 2021-12-01

## (undated) DEVICE — JACKSON-PRATT 100CC BULB RESERVOIR: Brand: CARDINAL HEALTH

## (undated) DEVICE — PATIENT RETURN ELECTRODE, SINGLE-USE, CONTACT QUALITY MONITORING, ADULT, WITH 9FT CORD, FOR PATIENTS WEIGING OVER 33LBS. (15KG): Brand: MEGADYNE

## (undated) DEVICE — SUT VIC 0 UR6 27IN VCP603H

## (undated) DEVICE — STRYKER PERFORMANCE SERIES SAGITTAL BLADE: Brand: STRYKER PERFORMANCE SERIES

## (undated) DEVICE — SOL LR 1000ML

## (undated) DEVICE — 3M™ STERI-DRAPE™ INSTRUMENT POUCH 1018: Brand: STERI-DRAPE™

## (undated) DEVICE — ANTIBACTERIAL UNDYED BRAIDED (POLYGLACTIN 910), SYNTHETIC ABSORBABLE SUTURE: Brand: COATED VICRYL

## (undated) DEVICE — GLV SURG SENSICARE PI MIC PF SZ9 LF STRL

## (undated) DEVICE — CANNULA,OXY,ADULT,SUPERSOFT,W/7'TUB,UC: Brand: MEDLINE

## (undated) DEVICE — COLR CERV MIAMI J W/REPL PAD REG MD

## (undated) DEVICE — TOWEL,OR,DSP,ST,BLUE,STD,4/PK,20PK/CS: Brand: MEDLINE

## (undated) DEVICE — 3M™ WARMING BLANKET, LOWER BODY, 10 PER CASE, 42568: Brand: BAIR HUGGER™

## (undated) DEVICE — ELECTRD BLD EZ CLN STD 2.5IN

## (undated) DEVICE — ENCORE® LATEX MICRO SIZE 8, STERILE LATEX POWDER-FREE SURGICAL GLOVE: Brand: ENCORE

## (undated) DEVICE — PK NEURO DISC 10

## (undated) DEVICE — GLV SURG BIOGEL LTX PF 8

## (undated) DEVICE — PUMP PAIN AUTOFUSER AUTO SELCT NOBOLUS 1TO14ML/HR 550ML DISP

## (undated) DEVICE — GLV SURG SENSICARE PI ORTHO SZ8 LF STRL

## (undated) DEVICE — TRAP,MUCUS SPECIMEN,40CC: Brand: MEDLINE

## (undated) DEVICE — INSTRUMENT 7756010 2.4MM DRILL BIT

## (undated) DEVICE — BNDG ELAS CO-FLEX SLF ADHR 6IN 5YD LF STRL

## (undated) DEVICE — TB SXN FRAZIER 12F STRL

## (undated) DEVICE — DRSNG SURG AQUACEL AG 9X25CM

## (undated) DEVICE — BLANKT WARM UPPR/BDY ARM/OUT 57X196CM

## (undated) DEVICE — INSTRUMENT 6956011 2.9MM DRL BIT STRLE

## (undated) DEVICE — JP PERF DRN SIL FLT 10MM FULL: Brand: CARDINAL HEALTH

## (undated) DEVICE — STERILE PVP: Brand: MEDLINE INDUSTRIES, INC.

## (undated) DEVICE — MEDI-VAC YANKAUER SUCTION HANDLE W/BULBOUS TIP: Brand: CARDINAL HEALTH

## (undated) DEVICE — INSTRUMENT 6956013 3.5MM DRL BIT STRLE

## (undated) DEVICE — AIRWY SZ11

## (undated) DEVICE — 2963 MEDIPORE SOFT CLOTH TAPE 3 IN X 10 YD 12 RLS/CS: Brand: 3M™ MEDIPORE™

## (undated) DEVICE — MODEL AT P65, P/N 701554-001KIT CONTENTS: HAND CONTROLLER, 3-WAY HIGH-PRESSURE STOPCOCK WITH ROTATING END AND PREMIUM HIGH-PRESSURE TUBING: Brand: ANGIOTOUCH® KIT

## (undated) DEVICE — TRAP FLD MINIVAC MEGADYNE 100ML

## (undated) DEVICE — MODEL BT2000 P/N 700287-012KIT CONTENTS: MANIFOLD WITH SALINE AND CONTRAST PORTS, SALINE TUBING WITH SPIKE AND HAND SYRINGE, TRANSDUCER: Brand: BT2000 AUTOMATED MANIFOLD KIT

## (undated) DEVICE — SUT VIC 2/0 CT1 CR8 18IN J839D

## (undated) DEVICE — TOOL 14BA60 LEGEND 14CM 6MM: Brand: MIDAS REX ™

## (undated) DEVICE — SUT VIC 1 CTX 36IN OBGYN VCP977H